# Patient Record
Sex: FEMALE | Race: BLACK OR AFRICAN AMERICAN | NOT HISPANIC OR LATINO | Employment: OTHER | RURAL
[De-identification: names, ages, dates, MRNs, and addresses within clinical notes are randomized per-mention and may not be internally consistent; named-entity substitution may affect disease eponyms.]

---

## 2017-09-19 ENCOUNTER — HISTORICAL (OUTPATIENT)
Dept: ADMINISTRATIVE | Facility: HOSPITAL | Age: 52
End: 2017-09-19

## 2017-09-20 LAB
LAB AP CLINICAL INFORMATION: NORMAL
LAB AP DIAGNOSIS - HISTORICAL: NORMAL
LAB AP GROSS PATHOLOGY - HISTORICAL: NORMAL
LAB AP SPECIMEN SUBMITTED - HISTORICAL: NORMAL

## 2021-01-20 ENCOUNTER — HISTORICAL (OUTPATIENT)
Dept: ADMINISTRATIVE | Facility: HOSPITAL | Age: 56
End: 2021-01-20

## 2021-01-20 LAB
ALBUMIN SERPL BCP-MCNC: 3.7 G/DL (ref 3.4–5)
ALBUMIN/GLOB SERPL: 1.3 {RATIO}
ALP SERPL-CCNC: 72 U/L (ref 46–116)
ALT SERPL W P-5'-P-CCNC: 19 U/L (ref 14–63)
ANION GAP SERPL CALCULATED.3IONS-SCNC: 14 MMOL/L
AST SERPL W P-5'-P-CCNC: 14 U/L (ref 15–37)
BASOPHILS # BLD AUTO: 0.02 X10E3/UL (ref 0–0.2)
BASOPHILS NFR BLD AUTO: 0.2 % (ref 0–1)
BILIRUB SERPL-MCNC: 0.3 MG/DL (ref 0.2–1)
BUN SERPL-MCNC: 19 MG/DL (ref 7–17)
BUN/CREAT SERPL: 21.8
CALCIUM SERPL-MCNC: 9.1 MG/DL (ref 8.5–10.1)
CHLORIDE SERPL-SCNC: 107 MMOL/L (ref 98–107)
CO2 SERPL-SCNC: 27 MMOL/L (ref 21–32)
CREAT SERPL-MCNC: 0.87 MG/DL (ref 0.55–1.3)
EOSINOPHIL # BLD AUTO: 0.19 X10E3/UL (ref 0–0.5)
EOSINOPHIL NFR BLD AUTO: 2.1 % (ref 1–4)
ERYTHROCYTE [DISTWIDTH] IN BLOOD BY AUTOMATED COUNT: 18.7 % (ref 11.5–14.5)
GLOBULIN SER-MCNC: 2.8 G/DL
GLUCOSE SERPL-MCNC: 130 MG/DL (ref 74–106)
HCT VFR BLD AUTO: 40 % (ref 38–47)
HGB BLD-MCNC: 12 G/DL (ref 12–16)
LYMPHOCYTES # BLD AUTO: 1.87 X10E3/UL (ref 1–4.8)
LYMPHOCYTES NFR BLD AUTO: 20.8 % (ref 27–41)
MCH RBC QN AUTO: 25.2 PG (ref 27–31)
MCHC RBC AUTO-ENTMCNC: 30 G/DL (ref 32–36)
MCV RBC AUTO: 84 FL (ref 80–96)
MONOCYTES # BLD AUTO: 0.33 X10E3/UL (ref 0–0.8)
MONOCYTES NFR BLD AUTO: 3.7 % (ref 2–6)
MPC BLD CALC-MCNC: 12.4 FL (ref 9.4–12.4)
NEUTROPHILS # BLD AUTO: 6.56 X10E3/UL (ref 1.8–7.7)
NEUTROPHILS NFR BLD AUTO: 73.2 % (ref 53–65)
PLATELET # BLD AUTO: 172 X10E3/UL (ref 150–400)
POTASSIUM SERPL-SCNC: 3.9 MMOL/L (ref 3.5–5.1)
PROT SERPL-MCNC: 6.5 G/DL (ref 6.4–8.2)
RBC # BLD AUTO: 4.76 X10E6/UL (ref 4.2–5.4)
SODIUM SERPL-SCNC: 144 MMOL/L (ref 136–145)
WBC # BLD AUTO: 8.97 X10E3/UL (ref 4.5–11)

## 2021-01-21 LAB
CHOLEST SERPL-MCNC: 158 MG/DL
CHOLEST/HDLC SERPL: 5.3 {RATIO}
HDLC SERPL-MCNC: 30 MG/DL
LDLC SERPL CALC-MCNC: 62 MG/DL
TRIGL SERPL-MCNC: 332 MG/DL
TSH SERPL DL<=0.005 MIU/L-ACNC: 5.41 UIU/ML (ref 0.36–3.74)

## 2021-01-22 ENCOUNTER — HISTORICAL (OUTPATIENT)
Dept: ADMINISTRATIVE | Facility: HOSPITAL | Age: 56
End: 2021-01-22

## 2021-01-22 LAB
BACTERIA #/AREA URNS HPF: ABNORMAL /HPF
BILIRUB UR QL STRIP: NEGATIVE MG/DL
CLARITY UR: CLEAR
COLOR UR: YELLOW
GLUCOSE UR STRIP-MCNC: NEGATIVE MG/DL
KETONES UR STRIP-SCNC: NEGATIVE MG/DL
LEUKOCYTE ESTERASE UR QL STRIP: NEGATIVE LEU/UL
NITRITE UR QL STRIP: NEGATIVE
PH UR STRIP: 6 PH UNITS (ref 5–8)
PROT UR QL STRIP: NEGATIVE MG/DL
RBC # UR STRIP: NEGATIVE ERY/UL
RBC #/AREA URNS HPF: ABNORMAL /HPF (ref 0–3)
SP GR UR STRIP: 1.02 (ref 1–1.03)
SQUAMOUS #/AREA URNS LPF: ABNORMAL /LPF
UROBILINOGEN UR STRIP-ACNC: 0.2 EU/DL
WBC #/AREA URNS HPF: ABNORMAL /HPF (ref 0–5)

## 2021-01-24 LAB
REPORT: NORMAL

## 2021-03-08 ENCOUNTER — HISTORICAL (OUTPATIENT)
Dept: ADMINISTRATIVE | Facility: HOSPITAL | Age: 56
End: 2021-03-08

## 2022-08-25 ENCOUNTER — HOSPITAL ENCOUNTER (EMERGENCY)
Facility: HOSPITAL | Age: 57
Discharge: HOME | End: 2022-08-25
Payer: MEDICARE

## 2022-08-25 VITALS
BODY MASS INDEX: 26.66 KG/M2 | RESPIRATION RATE: 18 BRPM | SYSTOLIC BLOOD PRESSURE: 128 MMHG | OXYGEN SATURATION: 97 % | HEART RATE: 62 BPM | DIASTOLIC BLOOD PRESSURE: 71 MMHG | WEIGHT: 160 LBS | HEIGHT: 65 IN | TEMPERATURE: 98 F

## 2022-08-25 DIAGNOSIS — K59.00 CONSTIPATION, UNSPECIFIED CONSTIPATION TYPE: Primary | ICD-10-CM

## 2022-08-25 DIAGNOSIS — R10.30 LOWER ABDOMINAL PAIN: ICD-10-CM

## 2022-08-25 LAB
ALBUMIN SERPL BCP-MCNC: 3.8 G/DL (ref 3.5–5)
ALBUMIN/GLOB SERPL: 1 {RATIO}
ALP SERPL-CCNC: 71 U/L (ref 46–118)
ALT SERPL W P-5'-P-CCNC: 19 U/L (ref 13–56)
ANION GAP SERPL CALCULATED.3IONS-SCNC: 13 MMOL/L (ref 7–16)
AST SERPL W P-5'-P-CCNC: 13 U/L (ref 15–37)
BASOPHILS # BLD AUTO: 0.03 K/UL (ref 0–0.2)
BASOPHILS NFR BLD AUTO: 0.4 % (ref 0–1)
BILIRUB SERPL-MCNC: 0.3 MG/DL (ref 0–1.2)
BILIRUB UR QL STRIP: NEGATIVE
BUN SERPL-MCNC: 19 MG/DL (ref 7–18)
BUN/CREAT SERPL: 24 (ref 6–20)
CALCIUM SERPL-MCNC: 9.2 MG/DL (ref 8.5–10.1)
CHLORIDE SERPL-SCNC: 107 MMOL/L (ref 98–107)
CLARITY UR: CLEAR
CO2 SERPL-SCNC: 30 MMOL/L (ref 21–32)
COLOR UR: YELLOW
CREAT SERPL-MCNC: 0.78 MG/DL (ref 0.55–1.02)
DIFFERENTIAL METHOD BLD: ABNORMAL
EGFR (NO RACE VARIABLE) (RUSH/TITUS): 88 ML/MIN/1.73M²
EOSINOPHIL # BLD AUTO: 0.2 K/UL (ref 0–0.5)
EOSINOPHIL NFR BLD AUTO: 3 % (ref 1–4)
ERYTHROCYTE [DISTWIDTH] IN BLOOD BY AUTOMATED COUNT: 18.5 % (ref 11.5–14.5)
GLOBULIN SER-MCNC: 3.9 G/DL (ref 2–4)
GLUCOSE SERPL-MCNC: 93 MG/DL (ref 74–106)
GLUCOSE UR STRIP-MCNC: NEGATIVE MG/DL
HCT VFR BLD AUTO: 42.1 % (ref 38–47)
HGB BLD-MCNC: 13.2 G/DL (ref 12–16)
KETONES UR STRIP-SCNC: NEGATIVE MG/DL
LACTATE SERPL-SCNC: 1.5 MMOL/L (ref 0.4–2)
LEUKOCYTE ESTERASE UR QL STRIP: NEGATIVE
LIPASE SERPL-CCNC: 154 U/L (ref 73–393)
LYMPHOCYTES # BLD AUTO: 2.61 K/UL (ref 1–4.8)
LYMPHOCYTES NFR BLD AUTO: 38.6 % (ref 27–41)
MCH RBC QN AUTO: 24.7 PG (ref 27–31)
MCHC RBC AUTO-ENTMCNC: 31.4 G/DL (ref 32–36)
MCV RBC AUTO: 78.7 FL (ref 80–96)
MONOCYTES # BLD AUTO: 0.56 K/UL (ref 0–0.8)
MONOCYTES NFR BLD AUTO: 8.3 % (ref 2–6)
MPC BLD CALC-MCNC: 11.7 FL (ref 9.4–12.4)
NEUTROPHILS # BLD AUTO: 3.37 K/UL (ref 1.8–7.7)
NEUTROPHILS NFR BLD AUTO: 49.7 % (ref 53–65)
NITRITE UR QL STRIP: NEGATIVE
PH UR STRIP: 6 PH UNITS
PLATELET # BLD AUTO: 162 K/UL (ref 150–400)
POTASSIUM SERPL-SCNC: 3.9 MMOL/L (ref 3.5–5.1)
PROT SERPL-MCNC: 7.7 G/DL (ref 6.4–8.2)
PROT UR QL STRIP: NEGATIVE
RBC # BLD AUTO: 5.35 M/UL (ref 4.2–5.4)
RBC # UR STRIP: NEGATIVE /UL
SODIUM SERPL-SCNC: 146 MMOL/L (ref 136–145)
SP GR UR STRIP: 1.02
UROBILINOGEN UR STRIP-ACNC: 0.2 MG/DL
WBC # BLD AUTO: 6.77 K/UL (ref 4.5–11)

## 2022-08-25 PROCEDURE — 83605 ASSAY OF LACTIC ACID: CPT

## 2022-08-25 PROCEDURE — 81003 URINALYSIS AUTO W/O SCOPE: CPT

## 2022-08-25 PROCEDURE — 80053 COMPREHEN METABOLIC PANEL: CPT

## 2022-08-25 PROCEDURE — 83690 ASSAY OF LIPASE: CPT

## 2022-08-25 PROCEDURE — 85025 COMPLETE CBC W/AUTO DIFF WBC: CPT

## 2022-08-25 PROCEDURE — 99284 EMERGENCY DEPT VISIT MOD MDM: CPT | Mod: 25

## 2022-08-25 PROCEDURE — 36415 COLL VENOUS BLD VENIPUNCTURE: CPT

## 2022-08-25 PROCEDURE — 99284 EMERGENCY DEPT VISIT MOD MDM: CPT | Mod: GF

## 2022-08-25 NOTE — DISCHARGE INSTRUCTIONS
Over the counter Miralax as discussed. Follow-up with your primary care provider. Return to Emergency Department for any new or worsening symptoms.

## 2022-08-25 NOTE — ED TRIAGE NOTES
Pt presents to ed complaining of lower abdominal and lower back pain beginning about 2 weeks ago. Pt states LBM 4 days ago.

## 2022-08-25 NOTE — ED PROVIDER NOTES
Encounter Date: 8/25/2022       History     Chief Complaint   Patient presents with    Abdominal Pain    Back Pain     56 yo female presents to ED with c/o intermittent, lower abdominal and lower back pain x 2 weeks. Reports last BM was 4 days ago and was loose without blood. She denies any N/V or fever. She is tolerating PO intake w/o difficulty. She has been seen by PCP for this complaint and was instructed to come to ED today for further evaluation. Patient is bedridden s/p back surgery 6 years ago with chronic pain on Norco.     The history is provided by the patient and a relative.     Review of patient's allergies indicates:   Allergen Reactions    Penicillins Anaphylaxis     Past Medical History:   Diagnosis Date    GERD (gastroesophageal reflux disease)     Paraplegia      History reviewed. No pertinent surgical history.  History reviewed. No pertinent family history.  Social History     Tobacco Use    Smoking status: Never Smoker    Smokeless tobacco: Never Used   Substance Use Topics    Alcohol use: Never    Drug use: Never     Review of Systems   Constitutional: Negative for appetite change and fever.   HENT: Negative for sore throat.    Respiratory: Negative for shortness of breath.    Cardiovascular: Negative for chest pain.   Gastrointestinal: Positive for abdominal pain and constipation. Negative for diarrhea, nausea and vomiting.   Genitourinary: Negative for dysuria.   Musculoskeletal: Positive for back pain.   Skin: Negative for rash.   Neurological: Negative for weakness.   Hematological: Does not bruise/bleed easily.       Physical Exam     Initial Vitals [08/25/22 1057]   BP Pulse Resp Temp SpO2   139/77 66 18 98.2 °F (36.8 °C) 98 %      MAP       --         Physical Exam    Vitals reviewed.  Constitutional: She is not diaphoretic. No distress.   HENT:   Head: Normocephalic and atraumatic.   Mouth/Throat: Oropharynx is clear and moist.   Eyes: Conjunctivae are normal. Pupils are equal,  round, and reactive to light.   Neck: Neck supple.   Normal range of motion.  Cardiovascular: Normal rate, regular rhythm, normal heart sounds and intact distal pulses.   Pulmonary/Chest: Breath sounds normal. No respiratory distress.   Abdominal: Abdomen is soft. Bowel sounds are normal. There is abdominal tenderness (lower abdomen).   Musculoskeletal:         General: No tenderness or edema.      Cervical back: Normal range of motion and neck supple.      Comments: Upper and lower extremity contractures noted.      Neurological: She is alert and oriented to person, place, and time.   Skin: Skin is warm and dry.   Psychiatric: She has a normal mood and affect.         Medical Screening Exam   See Full Note    ED Course   Procedures  Labs Reviewed   COMPREHENSIVE METABOLIC PANEL - Abnormal; Notable for the following components:       Result Value    Sodium 146 (*)     BUN 19 (*)     BUN/Creatinine Ratio 24 (*)     AST 13 (*)     All other components within normal limits   CBC WITH DIFFERENTIAL - Abnormal; Notable for the following components:    MCV 78.7 (*)     MCH 24.7 (*)     MCHC 31.4 (*)     RDW 18.5 (*)     Neutrophils % 49.7 (*)     Monocytes % 8.3 (*)     All other components within normal limits   LIPASE - Normal   LACTIC ACID, PLASMA - Normal   CBC W/ AUTO DIFFERENTIAL    Narrative:     The following orders were created for panel order CBC auto differential.  Procedure                               Abnormality         Status                     ---------                               -----------         ------                     CBC with Differential[[id number]]        Abnormal            Final result                 Please view results for these tests on the individual orders.   URINALYSIS, REFLEX TO URINE CULTURE          Imaging Results          CT Abdomen Pelvis  Without Contrast (Final result)  Result time 08/25/22 12:50:53    Final result by Mark Parra MD (08/25/22 12:50:53)                  Impression:      Slight stranding in the mesentery nonspecific similar to prior.  May represent sequela of infectious/inflammatory process.    Mild degree of colonic stool.    Nonobstructing right renal stones similar to prior.    Chronic right femoral neck fracture.      Electronically signed by: Mark Parra  Date:    08/25/2022  Time:    12:50             Narrative:    EXAMINATION:  CT ABDOMEN PELVIS WITHOUT CONTRAST    CLINICAL HISTORY:  Abdominal pain, acute, Pt presents to ed complaining of lower abdominal and lower back pain beginning about 2 weeks ago. Pt states LBM 4 days ago.    TECHNIQUE:  Low dose axial images, sagittal and coronal reformations were obtained from the lung bases to the pubic symphysis.  Oral contrast was not administered.    COMPARISON:  03/08/2021 abdominal and pelvic CT.    FINDINGS:  Lung bases are clear.    Liver and gallbladder unremarkable.    Adrenals, pancreas, and spleen are unremarkable.  There are nonobstructing stones seen of both kidneys.  There is no hydronephrosis or hydroureter of either kidney.    Urinary bladder is unremarkable.  No adnexal lesion.    There is a mild degree of colonic stool.  Dilated sigmoid colon with air in stool is similar to prior study.  No bowel obstruction identified.    No pneumoperitoneum.  No ascites.  No adenopathy.  There is slight stranding in the mesentery of the upper abdomen similar to prior exam.    Chronic right hip fracture.  Degenerative changes.  No acute fracture detected.                                 Medications - No data to display  Medical Decision Making:   ED Management:  CT reveals mild constipation w/o obstruction. Patient afebrile in no distress; tolerating PO intake. Labs unremarkable. I discussed lab/radiology findings with Dr. Brar who recommends Miralax and PCP f/u. I discussed home care, PCP f/u, and strict return to ED precautions. Patient and her mother at bedside voiced understanding.                    Clinical  Impression:   Final diagnoses:  [K59.00] Constipation, unspecified constipation type (Primary)  [R10.30] Lower abdominal pain          ED Disposition Condition    Discharge Stable        ED Prescriptions     None        Follow-up Information     Follow up With Specialties Details Why Contact Info    Gonzalo Barrera NP Gerontology, Family Medicine, Emergency Medicine Call today  [address] [facility] [address]  [phone]             MARYA Parra  08/25/22 6511

## 2022-08-26 ENCOUNTER — TELEPHONE (OUTPATIENT)
Dept: EMERGENCY MEDICINE | Facility: HOSPITAL | Age: 57
End: 2022-08-26
Payer: MEDICAID

## 2023-01-06 ENCOUNTER — LAB REQUISITION (OUTPATIENT)
Dept: LAB | Facility: HOSPITAL | Age: 58
End: 2023-01-06
Payer: MEDICARE

## 2023-01-06 DIAGNOSIS — N39.0 URINARY TRACT INFECTION, SITE NOT SPECIFIED: ICD-10-CM

## 2023-01-06 DIAGNOSIS — G90.09 OTHER IDIOPATHIC PERIPHERAL AUTONOMIC NEUROPATHY: ICD-10-CM

## 2023-01-06 LAB
ALBUMIN SERPL BCP-MCNC: 3.3 G/DL (ref 3.5–5)
ALBUMIN/GLOB SERPL: 1 {RATIO}
ALP SERPL-CCNC: 61 U/L (ref 46–118)
ALT SERPL W P-5'-P-CCNC: 27 U/L (ref 13–56)
ANION GAP SERPL CALCULATED.3IONS-SCNC: 12 MMOL/L (ref 7–16)
AST SERPL W P-5'-P-CCNC: 20 U/L (ref 15–37)
BASOPHILS # BLD AUTO: 0.01 K/UL (ref 0–0.2)
BASOPHILS NFR BLD AUTO: 0.2 % (ref 0–1)
BILIRUB SERPL-MCNC: 0.3 MG/DL (ref ?–1.2)
BUN SERPL-MCNC: 18 MG/DL (ref 7–18)
BUN/CREAT SERPL: 20 (ref 6–20)
CALCIUM SERPL-MCNC: 8.3 MG/DL (ref 8.5–10.1)
CHLORIDE SERPL-SCNC: 108 MMOL/L (ref 98–107)
CO2 SERPL-SCNC: 28 MMOL/L (ref 21–32)
CREAT SERPL-MCNC: 0.88 MG/DL (ref 0.55–1.02)
DIFFERENTIAL METHOD BLD: ABNORMAL
EGFR (NO RACE VARIABLE) (RUSH/TITUS): 76 ML/MIN/1.73M²
EOSINOPHIL # BLD AUTO: 0.04 K/UL (ref 0–0.5)
EOSINOPHIL NFR BLD AUTO: 0.8 % (ref 1–4)
ERYTHROCYTE [DISTWIDTH] IN BLOOD BY AUTOMATED COUNT: 17.7 % (ref 11.5–14.5)
GLOBULIN SER-MCNC: 3.2 G/DL (ref 2–4)
GLUCOSE SERPL-MCNC: 78 MG/DL (ref 74–106)
HCT VFR BLD AUTO: 37.3 % (ref 38–47)
HGB BLD-MCNC: 11.5 G/DL (ref 12–16)
LYMPHOCYTES # BLD AUTO: 1.78 K/UL (ref 1–4.8)
LYMPHOCYTES NFR BLD AUTO: 34.1 % (ref 27–41)
MCH RBC QN AUTO: 24.6 PG (ref 27–31)
MCHC RBC AUTO-ENTMCNC: 30.8 G/DL (ref 32–36)
MCV RBC AUTO: 79.9 FL (ref 80–96)
MONOCYTES # BLD AUTO: 0.49 K/UL (ref 0–0.8)
MONOCYTES NFR BLD AUTO: 9.4 % (ref 2–6)
MPC BLD CALC-MCNC: 12 FL (ref 9.4–12.4)
NEUTROPHILS # BLD AUTO: 2.9 K/UL (ref 1.8–7.7)
NEUTROPHILS NFR BLD AUTO: 55.5 % (ref 53–65)
PLATELET # BLD AUTO: 155 K/UL (ref 150–400)
POTASSIUM SERPL-SCNC: 4 MMOL/L (ref 3.5–5.1)
PROT SERPL-MCNC: 6.5 G/DL (ref 6.4–8.2)
RBC # BLD AUTO: 4.67 M/UL (ref 4.2–5.4)
SODIUM SERPL-SCNC: 144 MMOL/L (ref 136–145)
WBC # BLD AUTO: 5.22 K/UL (ref 4.5–11)

## 2023-01-06 PROCEDURE — 85025 COMPLETE CBC W/AUTO DIFF WBC: CPT | Performed by: NURSE PRACTITIONER

## 2023-01-06 PROCEDURE — 82306 VITAMIN D 25 HYDROXY: CPT | Performed by: NURSE PRACTITIONER

## 2023-01-06 PROCEDURE — 83036 HEMOGLOBIN GLYCOSYLATED A1C: CPT | Performed by: NURSE PRACTITIONER

## 2023-01-06 PROCEDURE — 82607 VITAMIN B-12: CPT | Performed by: NURSE PRACTITIONER

## 2023-01-06 PROCEDURE — 84443 ASSAY THYROID STIM HORMONE: CPT | Performed by: NURSE PRACTITIONER

## 2023-01-06 PROCEDURE — 80053 COMPREHEN METABOLIC PANEL: CPT | Performed by: NURSE PRACTITIONER

## 2023-01-07 LAB
25(OH)D3 SERPL-MCNC: 13.4 NG/ML
EST. AVERAGE GLUCOSE BLD GHB EST-MCNC: 110 MG/DL
HBA1C MFR BLD HPLC: 5.9 % (ref 4.5–6.6)
TSH SERPL DL<=0.005 MIU/L-ACNC: 1.37 UIU/ML (ref 0.36–3.74)
VIT B12 SERPL-MCNC: 141 PG/ML (ref 193–986)

## 2024-08-08 ENCOUNTER — LAB REQUISITION (OUTPATIENT)
Dept: LAB | Facility: HOSPITAL | Age: 59
End: 2024-08-08
Attending: NURSE PRACTITIONER
Payer: MEDICARE

## 2024-08-08 DIAGNOSIS — E78.5 HYPERLIPIDEMIA, UNSPECIFIED: ICD-10-CM

## 2024-08-08 DIAGNOSIS — D50.9 IRON DEFICIENCY ANEMIA, UNSPECIFIED: ICD-10-CM

## 2024-08-08 LAB
25(OH)D3 SERPL-MCNC: 28.5 NG/ML
ALBUMIN SERPL BCP-MCNC: 3.6 G/DL (ref 3.5–5)
ALBUMIN/GLOB SERPL: 1.1 {RATIO}
ALP SERPL-CCNC: 81 U/L (ref 50–130)
ALT SERPL W P-5'-P-CCNC: 22 U/L (ref 13–56)
ANION GAP SERPL CALCULATED.3IONS-SCNC: 12 MMOL/L (ref 7–16)
AST SERPL W P-5'-P-CCNC: 15 U/L (ref 15–37)
BASOPHILS # BLD AUTO: 0.04 K/UL (ref 0–0.2)
BASOPHILS NFR BLD AUTO: 0.5 % (ref 0–1)
BILIRUB SERPL-MCNC: 0.3 MG/DL (ref ?–1.2)
BUN SERPL-MCNC: 21 MG/DL (ref 7–18)
BUN/CREAT SERPL: 27 (ref 6–20)
CALCIUM SERPL-MCNC: 8.5 MG/DL (ref 8.5–10.1)
CHLORIDE SERPL-SCNC: 108 MMOL/L (ref 98–107)
CHOLEST SERPL-MCNC: 148 MG/DL (ref 0–200)
CHOLEST/HDLC SERPL: 4.1 {RATIO}
CO2 SERPL-SCNC: 30 MMOL/L (ref 21–32)
CREAT SERPL-MCNC: 0.77 MG/DL (ref 0.55–1.02)
DIFFERENTIAL METHOD BLD: ABNORMAL
EGFR (NO RACE VARIABLE) (RUSH/TITUS): 88 ML/MIN/1.73M2
EOSINOPHIL # BLD AUTO: 0.19 K/UL (ref 0–0.5)
EOSINOPHIL NFR BLD AUTO: 2.4 % (ref 1–4)
ERYTHROCYTE [DISTWIDTH] IN BLOOD BY AUTOMATED COUNT: 17.6 % (ref 11.5–14.5)
GLOBULIN SER-MCNC: 3.2 G/DL (ref 2–4)
GLUCOSE SERPL-MCNC: 114 MG/DL (ref 74–106)
HCT VFR BLD AUTO: 43.2 % (ref 38–47)
HDLC SERPL-MCNC: 36 MG/DL (ref 40–60)
HGB BLD-MCNC: 12.5 G/DL (ref 12–16)
IMM GRANULOCYTES # BLD AUTO: 0.02 K/UL (ref 0–0.04)
IMM GRANULOCYTES NFR BLD: 0.3 % (ref 0–0.4)
LDLC SERPL CALC-MCNC: 68 MG/DL
LDLC/HDLC SERPL: 1.9 {RATIO}
LYMPHOCYTES # BLD AUTO: 2.42 K/UL (ref 1–4.8)
LYMPHOCYTES NFR BLD AUTO: 30.9 % (ref 27–41)
MAGNESIUM SERPL-MCNC: 2 MG/DL (ref 1.7–2.3)
MCH RBC QN AUTO: 23.7 PG (ref 27–31)
MCHC RBC AUTO-ENTMCNC: 28.9 G/DL (ref 32–36)
MCV RBC AUTO: 82 FL (ref 80–96)
MONOCYTES # BLD AUTO: 0.59 K/UL (ref 0–0.8)
MONOCYTES NFR BLD AUTO: 7.5 % (ref 2–6)
MPC BLD CALC-MCNC: 11.8 FL (ref 9.4–12.4)
NEUTROPHILS # BLD AUTO: 4.58 K/UL (ref 1.8–7.7)
NEUTROPHILS NFR BLD AUTO: 58.4 % (ref 53–65)
NONHDLC SERPL-MCNC: 112 MG/DL
NRBC # BLD AUTO: 0 X10E3/UL
NRBC, AUTO (.00): 0 %
PLATELET # BLD AUTO: 175 K/UL (ref 150–400)
POTASSIUM SERPL-SCNC: 4.4 MMOL/L (ref 3.5–5.1)
PROT SERPL-MCNC: 6.8 G/DL (ref 6.4–8.2)
RBC # BLD AUTO: 5.27 M/UL (ref 4.2–5.4)
SODIUM SERPL-SCNC: 146 MMOL/L (ref 136–145)
TRIGL SERPL-MCNC: 220 MG/DL (ref 35–150)
TSH SERPL DL<=0.005 MIU/L-ACNC: 2.22 UIU/ML (ref 0.36–3.74)
VIT B12 SERPL-MCNC: 198 PG/ML (ref 193–986)
VLDLC SERPL-MCNC: 44 MG/DL
WBC # BLD AUTO: 7.84 K/UL (ref 4.5–11)

## 2024-08-08 PROCEDURE — 85025 COMPLETE CBC W/AUTO DIFF WBC: CPT | Performed by: NURSE PRACTITIONER

## 2024-08-08 PROCEDURE — 84443 ASSAY THYROID STIM HORMONE: CPT | Performed by: NURSE PRACTITIONER

## 2024-08-08 PROCEDURE — 82306 VITAMIN D 25 HYDROXY: CPT | Performed by: NURSE PRACTITIONER

## 2024-08-08 PROCEDURE — 80053 COMPREHEN METABOLIC PANEL: CPT | Performed by: NURSE PRACTITIONER

## 2024-08-08 PROCEDURE — 82607 VITAMIN B-12: CPT | Performed by: NURSE PRACTITIONER

## 2024-08-08 PROCEDURE — 80061 LIPID PANEL: CPT | Performed by: NURSE PRACTITIONER

## 2024-08-08 PROCEDURE — 83735 ASSAY OF MAGNESIUM: CPT | Performed by: NURSE PRACTITIONER

## 2024-08-12 ENCOUNTER — LAB REQUISITION (OUTPATIENT)
Dept: LAB | Facility: HOSPITAL | Age: 59
End: 2024-08-12
Payer: MEDICARE

## 2024-08-12 DIAGNOSIS — R30.0 DYSURIA: ICD-10-CM

## 2024-08-12 LAB
BACTERIA #/AREA URNS HPF: ABNORMAL /HPF
BILIRUB UR QL STRIP: NEGATIVE
CLARITY UR: ABNORMAL
COLOR UR: YELLOW
GLUCOSE UR STRIP-MCNC: NEGATIVE MG/DL
KETONES UR STRIP-SCNC: NEGATIVE MG/DL
LEUKOCYTE ESTERASE UR QL STRIP: NEGATIVE
NITRITE UR QL STRIP: POSITIVE
PH UR STRIP: 5.5 PH UNITS
PROT UR QL STRIP: NEGATIVE
RBC # UR STRIP: ABNORMAL /UL
RBC #/AREA URNS HPF: ABNORMAL /HPF
SP GR UR STRIP: 1.01
SQUAMOUS #/AREA URNS LPF: ABNORMAL /LPF
UROBILINOGEN UR STRIP-ACNC: 0.2 MG/DL
WBC #/AREA URNS HPF: ABNORMAL /HPF

## 2024-08-12 PROCEDURE — 81003 URINALYSIS AUTO W/O SCOPE: CPT | Performed by: NURSE PRACTITIONER

## 2024-08-12 PROCEDURE — 81001 URINALYSIS AUTO W/SCOPE: CPT | Performed by: NURSE PRACTITIONER

## 2024-08-12 PROCEDURE — 87086 URINE CULTURE/COLONY COUNT: CPT | Performed by: NURSE PRACTITIONER

## 2024-08-15 LAB — UA COMPLETE W REFLEX CULTURE PNL UR: ABNORMAL

## 2025-02-06 DIAGNOSIS — R13.10 DYSPHAGIA: Primary | ICD-10-CM

## 2025-02-12 ENCOUNTER — HOSPITAL ENCOUNTER (INPATIENT)
Facility: HOSPITAL | Age: 60
LOS: 2 days | Discharge: OTHER INSTITUTION | DRG: 871 | End: 2025-02-14
Attending: FAMILY MEDICINE | Admitting: FAMILY MEDICINE
Payer: MEDICARE

## 2025-02-12 DIAGNOSIS — N30.01 ACUTE CYSTITIS WITH HEMATURIA: ICD-10-CM

## 2025-02-12 DIAGNOSIS — A41.9 SEPSIS: Primary | ICD-10-CM

## 2025-02-12 DIAGNOSIS — R50.9 FEVER OF UNKNOWN ORIGIN: ICD-10-CM

## 2025-02-12 DIAGNOSIS — R00.0 TACHYCARDIA: ICD-10-CM

## 2025-02-12 DIAGNOSIS — J18.9 COMMUNITY ACQUIRED PNEUMONIA OF LEFT LOWER LOBE OF LUNG: ICD-10-CM

## 2025-02-12 DIAGNOSIS — R09.02 HYPOXIA: ICD-10-CM

## 2025-02-12 DIAGNOSIS — R00.0 SINUS TACHYCARDIA: ICD-10-CM

## 2025-02-12 PROBLEM — K21.9 GERD (GASTROESOPHAGEAL REFLUX DISEASE): Status: ACTIVE | Noted: 2025-02-12

## 2025-02-12 PROBLEM — G82.50 QUADRIPLEGIA: Status: ACTIVE | Noted: 2025-02-12

## 2025-02-12 LAB
ALBUMIN SERPL BCP-MCNC: 3.1 G/DL (ref 3.4–4.8)
ALBUMIN/GLOB SERPL: 0.9 {RATIO}
ALP SERPL-CCNC: 60 U/L (ref 40–150)
ALT SERPL W P-5'-P-CCNC: 7 U/L
ANION GAP SERPL CALCULATED.3IONS-SCNC: 13 MMOL/L (ref 7–16)
ANISOCYTOSIS BLD QL SMEAR: ABNORMAL
AST SERPL W P-5'-P-CCNC: 13 U/L (ref 5–34)
BACTERIA #/AREA URNS HPF: ABNORMAL /HPF
BASOPHILS # BLD AUTO: 0.02 K/UL (ref 0–0.2)
BASOPHILS NFR BLD AUTO: 0.1 % (ref 0–1)
BILIRUB SERPL-MCNC: 0.3 MG/DL
BILIRUB UR QL STRIP: NEGATIVE
BUN SERPL-MCNC: 12 MG/DL (ref 10–20)
BUN/CREAT SERPL: 18 (ref 6–20)
CALCIUM SERPL-MCNC: 8.9 MG/DL (ref 8.4–10.2)
CHLORIDE SERPL-SCNC: 103 MMOL/L (ref 98–107)
CLARITY UR: ABNORMAL
CO2 SERPL-SCNC: 29 MMOL/L (ref 23–31)
COLOR UR: YELLOW
CREAT SERPL-MCNC: 0.67 MG/DL (ref 0.55–1.02)
DIFFERENTIAL METHOD BLD: ABNORMAL
DOHLE BOD BLD QL SMEAR: ABNORMAL
EGFR (NO RACE VARIABLE) (RUSH/TITUS): 100 ML/MIN/1.73M2
EOSINOPHIL # BLD AUTO: 0 K/UL (ref 0–0.5)
EOSINOPHIL NFR BLD AUTO: 0 % (ref 1–4)
ERYTHROCYTE [DISTWIDTH] IN BLOOD BY AUTOMATED COUNT: 17.8 % (ref 11.5–14.5)
GLOBULIN SER-MCNC: 3.4 G/DL (ref 2–4)
GLUCOSE SERPL-MCNC: 117 MG/DL (ref 82–115)
GLUCOSE UR STRIP-MCNC: NEGATIVE MG/DL
HCT VFR BLD AUTO: 35.8 % (ref 38–47)
HGB BLD-MCNC: 10.4 G/DL (ref 12–16)
HYPOCHROMIA BLD QL SMEAR: ABNORMAL
IMM GRANULOCYTES # BLD AUTO: 0.07 K/UL (ref 0–0.04)
IMM GRANULOCYTES NFR BLD: 0.5 % (ref 0–0.4)
INFLUENZA A MOLECULAR (OHS): NEGATIVE
INFLUENZA B MOLECULAR (OHS): NEGATIVE
KETONES UR STRIP-SCNC: NEGATIVE MG/DL
LACTATE SERPL-SCNC: 2.1 MMOL/L (ref 0.5–2.2)
LACTATE SERPL-SCNC: 2.6 MMOL/L (ref 0.5–2.2)
LEUKOCYTE ESTERASE UR QL STRIP: ABNORMAL
LYMPHOCYTES # BLD AUTO: 1.14 K/UL (ref 1–4.8)
LYMPHOCYTES NFR BLD AUTO: 8 % (ref 27–41)
MAGNESIUM SERPL-MCNC: 1.9 MG/DL (ref 1.6–2.6)
MCH RBC QN AUTO: 24.5 PG (ref 27–31)
MCHC RBC AUTO-ENTMCNC: 29.1 G/DL (ref 32–36)
MCV RBC AUTO: 84.2 FL (ref 80–96)
MONOCYTES # BLD AUTO: 0.93 K/UL (ref 0–0.8)
MONOCYTES NFR BLD AUTO: 6.5 % (ref 2–6)
MPC BLD CALC-MCNC: 11.4 FL (ref 9.4–12.4)
NEUTROPHILS # BLD AUTO: 12.13 K/UL (ref 1.8–7.7)
NEUTROPHILS NFR BLD AUTO: 84.9 % (ref 53–65)
NITRITE UR QL STRIP: POSITIVE
NRBC # BLD AUTO: 0 X10E3/UL
NRBC, AUTO (.00): 0 %
NT-PROBNP SERPL-MCNC: 163 PG/ML (ref 1–125)
PH UR STRIP: 6 PH UNITS
PLATELET # BLD AUTO: 143 K/UL (ref 150–400)
PLATELET MORPHOLOGY: ABNORMAL
POTASSIUM SERPL-SCNC: 3.7 MMOL/L (ref 3.5–5.1)
PROT SERPL-MCNC: 6.5 G/DL (ref 5.8–7.6)
PROT UR QL STRIP: ABNORMAL
RBC # BLD AUTO: 4.25 M/UL (ref 4.2–5.4)
RBC # UR STRIP: ABNORMAL /UL
RBC #/AREA URNS HPF: ABNORMAL /HPF
SARS-COV-2 RDRP RESP QL NAA+PROBE: NEGATIVE
SODIUM SERPL-SCNC: 141 MMOL/L (ref 136–145)
SP GR UR STRIP: 1.02
SQUAMOUS #/AREA URNS LPF: ABNORMAL /LPF
UROBILINOGEN UR STRIP-ACNC: 0.2 MG/DL
WBC # BLD AUTO: 14.29 K/UL (ref 4.5–11)
WBC #/AREA URNS HPF: ABNORMAL /HPF

## 2025-02-12 PROCEDURE — 87040 BLOOD CULTURE FOR BACTERIA: CPT | Performed by: NURSE PRACTITIONER

## 2025-02-12 PROCEDURE — 11000001 HC ACUTE MED/SURG PRIVATE ROOM

## 2025-02-12 PROCEDURE — 94761 N-INVAS EAR/PLS OXIMETRY MLT: CPT

## 2025-02-12 PROCEDURE — 87502 INFLUENZA DNA AMP PROBE: CPT | Performed by: NURSE PRACTITIONER

## 2025-02-12 PROCEDURE — 36415 COLL VENOUS BLD VENIPUNCTURE: CPT | Performed by: NURSE PRACTITIONER

## 2025-02-12 PROCEDURE — 85025 COMPLETE CBC W/AUTO DIFF WBC: CPT | Performed by: NURSE PRACTITIONER

## 2025-02-12 PROCEDURE — 25000242 PHARM REV CODE 250 ALT 637 W/ HCPCS: Performed by: NURSE PRACTITIONER

## 2025-02-12 PROCEDURE — 93010 ELECTROCARDIOGRAM REPORT: CPT | Performed by: HOSPITALIST

## 2025-02-12 PROCEDURE — 80053 COMPREHEN METABOLIC PANEL: CPT | Performed by: NURSE PRACTITIONER

## 2025-02-12 PROCEDURE — 96361 HYDRATE IV INFUSION ADD-ON: CPT

## 2025-02-12 PROCEDURE — 27000944

## 2025-02-12 PROCEDURE — 25000003 PHARM REV CODE 250: Performed by: NURSE PRACTITIONER

## 2025-02-12 PROCEDURE — 87086 URINE CULTURE/COLONY COUNT: CPT | Performed by: NURSE PRACTITIONER

## 2025-02-12 PROCEDURE — 81003 URINALYSIS AUTO W/O SCOPE: CPT | Performed by: NURSE PRACTITIONER

## 2025-02-12 PROCEDURE — 27000221 HC OXYGEN, UP TO 24 HOURS

## 2025-02-12 PROCEDURE — 63600175 PHARM REV CODE 636 W HCPCS: Performed by: NURSE PRACTITIONER

## 2025-02-12 PROCEDURE — 87635 SARS-COV-2 COVID-19 AMP PRB: CPT | Performed by: NURSE PRACTITIONER

## 2025-02-12 PROCEDURE — 83880 ASSAY OF NATRIURETIC PEPTIDE: CPT | Performed by: NURSE PRACTITIONER

## 2025-02-12 PROCEDURE — 99285 EMERGENCY DEPT VISIT HI MDM: CPT | Mod: GF | Performed by: NURSE PRACTITIONER

## 2025-02-12 PROCEDURE — 83735 ASSAY OF MAGNESIUM: CPT | Performed by: NURSE PRACTITIONER

## 2025-02-12 PROCEDURE — 96365 THER/PROPH/DIAG IV INF INIT: CPT

## 2025-02-12 PROCEDURE — 99285 EMERGENCY DEPT VISIT HI MDM: CPT | Mod: 25

## 2025-02-12 PROCEDURE — 99900035 HC TECH TIME PER 15 MIN (STAT)

## 2025-02-12 PROCEDURE — 94640 AIRWAY INHALATION TREATMENT: CPT

## 2025-02-12 PROCEDURE — 94799 UNLISTED PULMONARY SVC/PX: CPT

## 2025-02-12 PROCEDURE — 83605 ASSAY OF LACTIC ACID: CPT | Performed by: NURSE PRACTITIONER

## 2025-02-12 PROCEDURE — 93005 ELECTROCARDIOGRAM TRACING: CPT

## 2025-02-12 RX ORDER — SODIUM CHLORIDE 9 MG/ML
1000 INJECTION, SOLUTION INTRAVENOUS
Status: COMPLETED | OUTPATIENT
Start: 2025-02-12 | End: 2025-02-12

## 2025-02-12 RX ORDER — SERTRALINE HYDROCHLORIDE 50 MG/1
50 TABLET, FILM COATED ORAL DAILY
Status: ON HOLD | COMMUNITY

## 2025-02-12 RX ORDER — CEFTRIAXONE 2 G/1
2 INJECTION, POWDER, FOR SOLUTION INTRAMUSCULAR; INTRAVENOUS
Status: DISCONTINUED | OUTPATIENT
Start: 2025-02-13 | End: 2025-02-14 | Stop reason: HOSPADM

## 2025-02-12 RX ORDER — IBUPROFEN 200 MG
600 TABLET ORAL
Status: COMPLETED | OUTPATIENT
Start: 2025-02-12 | End: 2025-02-12

## 2025-02-12 RX ORDER — CIPROFLOXACIN 2 MG/ML
400 INJECTION, SOLUTION INTRAVENOUS
Status: COMPLETED | OUTPATIENT
Start: 2025-02-12 | End: 2025-02-12

## 2025-02-12 RX ORDER — ATORVASTATIN CALCIUM 10 MG/1
20 TABLET, FILM COATED ORAL DAILY
Status: DISCONTINUED | OUTPATIENT
Start: 2025-02-13 | End: 2025-02-14 | Stop reason: HOSPADM

## 2025-02-12 RX ORDER — VIT C/E/ZN/COPPR/LUTEIN/ZEAXAN 250MG-90MG
50000 CAPSULE ORAL WEEKLY
Status: DISCONTINUED | OUTPATIENT
Start: 2025-02-12 | End: 2025-02-12

## 2025-02-12 RX ORDER — ATORVASTATIN CALCIUM 20 MG/1
20 TABLET, FILM COATED ORAL DAILY
Status: ON HOLD | COMMUNITY

## 2025-02-12 RX ORDER — SELENIUM 50 MCG
1 TABLET ORAL
Status: DISCONTINUED | OUTPATIENT
Start: 2025-02-12 | End: 2025-02-14 | Stop reason: HOSPADM

## 2025-02-12 RX ORDER — LINACLOTIDE 290 UG/1
290 CAPSULE, GELATIN COATED ORAL
Status: ON HOLD | COMMUNITY
Start: 2025-01-10

## 2025-02-12 RX ORDER — VIT C/E/ZN/COPPR/LUTEIN/ZEAXAN 250MG-90MG
50000 CAPSULE ORAL WEEKLY
Status: DISCONTINUED | OUTPATIENT
Start: 2025-02-17 | End: 2025-02-13

## 2025-02-12 RX ORDER — SODIUM CHLORIDE 9 MG/ML
INJECTION, SOLUTION INTRAVENOUS CONTINUOUS
Status: DISCONTINUED | OUTPATIENT
Start: 2025-02-12 | End: 2025-02-14

## 2025-02-12 RX ORDER — PREGABALIN 75 MG/1
75 CAPSULE ORAL ONCE
Status: COMPLETED | OUTPATIENT
Start: 2025-02-12 | End: 2025-02-12

## 2025-02-12 RX ORDER — ASPIRIN 325 MG
1250 TABLET, DELAYED RELEASE (ENTERIC COATED) ORAL
Status: ON HOLD | COMMUNITY

## 2025-02-12 RX ORDER — EZETIMIBE 10 MG/1
10 TABLET ORAL DAILY
COMMUNITY
Start: 2024-08-29 | End: 2025-02-12

## 2025-02-12 RX ORDER — PREGABALIN 75 MG/1
75 CAPSULE ORAL 2 TIMES DAILY
Status: ON HOLD | COMMUNITY

## 2025-02-12 RX ORDER — PANTOPRAZOLE SODIUM 40 MG/1
40 TABLET, DELAYED RELEASE ORAL DAILY
Status: DISCONTINUED | OUTPATIENT
Start: 2025-02-13 | End: 2025-02-14 | Stop reason: HOSPADM

## 2025-02-12 RX ORDER — ENOXAPARIN SODIUM 100 MG/ML
40 INJECTION SUBCUTANEOUS EVERY 24 HOURS
Status: DISCONTINUED | OUTPATIENT
Start: 2025-02-12 | End: 2025-02-14 | Stop reason: HOSPADM

## 2025-02-12 RX ORDER — OMEPRAZOLE 40 MG/1
40 CAPSULE, DELAYED RELEASE ORAL EVERY MORNING
Status: ON HOLD | COMMUNITY

## 2025-02-12 RX ORDER — ONDANSETRON HYDROCHLORIDE 2 MG/ML
4 INJECTION, SOLUTION INTRAVENOUS EVERY 8 HOURS PRN
Status: DISCONTINUED | OUTPATIENT
Start: 2025-02-12 | End: 2025-02-14 | Stop reason: HOSPADM

## 2025-02-12 RX ORDER — SERTRALINE HYDROCHLORIDE 50 MG/1
50 TABLET, FILM COATED ORAL DAILY
Status: DISCONTINUED | OUTPATIENT
Start: 2025-02-13 | End: 2025-02-14 | Stop reason: HOSPADM

## 2025-02-12 RX ORDER — ACETAMINOPHEN 325 MG/1
650 TABLET ORAL EVERY 4 HOURS PRN
Status: DISCONTINUED | OUTPATIENT
Start: 2025-02-12 | End: 2025-02-14 | Stop reason: HOSPADM

## 2025-02-12 RX ORDER — SODIUM CHLORIDE 0.9 % (FLUSH) 0.9 %
10 SYRINGE (ML) INJECTION EVERY 12 HOURS PRN
Status: DISCONTINUED | OUTPATIENT
Start: 2025-02-12 | End: 2025-02-14 | Stop reason: HOSPADM

## 2025-02-12 RX ORDER — ATORVASTATIN CALCIUM 40 MG/1
40 TABLET, FILM COATED ORAL NIGHTLY
Status: DISCONTINUED | OUTPATIENT
Start: 2025-02-12 | End: 2025-02-12

## 2025-02-12 RX ORDER — IPRATROPIUM BROMIDE AND ALBUTEROL SULFATE 2.5; .5 MG/3ML; MG/3ML
3 SOLUTION RESPIRATORY (INHALATION) EVERY 6 HOURS PRN
Status: DISCONTINUED | OUTPATIENT
Start: 2025-02-12 | End: 2025-02-14 | Stop reason: HOSPADM

## 2025-02-12 RX ORDER — TRAZODONE HYDROCHLORIDE 100 MG/1
100 TABLET ORAL NIGHTLY
Status: ON HOLD | COMMUNITY

## 2025-02-12 RX ADMIN — AZITHROMYCIN MONOHYDRATE 500 MG: 500 INJECTION, POWDER, LYOPHILIZED, FOR SOLUTION INTRAVENOUS at 06:02

## 2025-02-12 RX ADMIN — SODIUM CHLORIDE 1000 ML: 900 INJECTION, SOLUTION INTRAVENOUS at 02:02

## 2025-02-12 RX ADMIN — SODIUM CHLORIDE: 900 INJECTION, SOLUTION INTRAVENOUS at 06:02

## 2025-02-12 RX ADMIN — SODIUM CHLORIDE 1000 ML: 900 INJECTION, SOLUTION INTRAVENOUS at 04:02

## 2025-02-12 RX ADMIN — Medication 1 CAPSULE: at 06:02

## 2025-02-12 RX ADMIN — PREGABALIN 75 MG: 75 CAPSULE ORAL at 10:02

## 2025-02-12 RX ADMIN — ENOXAPARIN SODIUM 40 MG: 40 INJECTION SUBCUTANEOUS at 06:02

## 2025-02-12 RX ADMIN — IPRATROPIUM BROMIDE AND ALBUTEROL SULFATE 3 ML: 2.5; .5 SOLUTION RESPIRATORY (INHALATION) at 10:02

## 2025-02-12 RX ADMIN — IBUPROFEN 600 MG: 200 TABLET, FILM COATED ORAL at 04:02

## 2025-02-12 RX ADMIN — CIPROFLOXACIN 400 MG: 2 INJECTION, SOLUTION INTRAVENOUS at 03:02

## 2025-02-12 NOTE — Clinical Note
Diagnosis: Sepsis [700268]   Future Attending Provider: AMRISEL BURGOS IV [[id number]]   Reason for IP Medical Treatment  (Clinical interventions that can only be accomplished in the IP setting? ) :: need for IVFs and antibiotics   Plans for Post-Acute care--if anticipated (pick the single best option):: A. No post acute care anticipated at this time

## 2025-02-12 NOTE — SUBJECTIVE & OBJECTIVE
Past Medical History:   Diagnosis Date    GERD (gastroesophageal reflux disease)     Paraplegia        History reviewed. No pertinent surgical history.    Review of patient's allergies indicates:   Allergen Reactions    Penicillins Anaphylaxis       No current facility-administered medications on file prior to encounter.     Current Outpatient Medications on File Prior to Encounter   Medication Sig    atorvastatin (LIPITOR) 20 MG tablet Take 20 mg by mouth once daily.    cholecalciferol, vitamin D3, 1,250 mcg (50,000 unit) capsule Take 1,250 mcg by mouth every 7 days.    LINZESS 290 mcg Cap capsule Take 290 mcg by mouth before breakfast.    omeprazole (PRILOSEC) 40 MG capsule Take 40 mg by mouth every morning.    pregabalin (LYRICA) 75 MG capsule Take 75 mg by mouth 2 (two) times daily.    sertraline (ZOLOFT) 50 MG tablet Take 50 mg by mouth once daily.    traZODone (DESYREL) 100 MG tablet Take 100 mg by mouth every evening.    [DISCONTINUED] ezetimibe (ZETIA) 10 mg tablet Take 10 mg by mouth once daily.     Family History    None       Tobacco Use    Smoking status: Never    Smokeless tobacco: Never   Substance and Sexual Activity    Alcohol use: Never    Drug use: Never    Sexual activity: Not Currently     Review of Systems   Constitutional:  Negative for appetite change.        Reports fever starting this morning - mother reports temp up to 102   HENT:  Negative for dental problem, sinus pressure, sore throat and trouble swallowing.         Mother reports Mrs. Denney does not have any trouble swallowing- eats regular diet        Hard of hearing    Eyes:         Wears glasses   Respiratory:  Positive for cough and shortness of breath. Negative for chest tightness.         Reports having mucus for past couple of weeks worsening past few days    Cardiovascular:  Negative for palpitations.   Gastrointestinal:  Negative for abdominal distention, abdominal pain, constipation, diarrhea, nausea and vomiting.    Genitourinary:  Negative for difficulty urinating, dyspareunia, dysuria and flank pain.        Denies any odor to urine    Musculoskeletal:  Positive for arthralgias.        States has occasional pain to hands - states takes norco for pain    Neurological:  Negative for headaches.        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     Physical Exam  Constitutional:       Appearance: She is ill-appearing.   HENT:      Head: Normocephalic.   Eyes:      Pupils: Pupils are equal, round, and reactive to light.   Cardiovascular:      Rate and Rhythm: Tachycardia present.      Pulses: Normal pulses.      Heart sounds: Normal heart sounds. No murmur heard.     No friction rub. No gallop.   Pulmonary:      Comments: Decreased breath sound to RLL and LLL      Sat on 4 liters 95%  Abdominal:      General: Bowel sounds are normal. There is no distension.      Palpations: Abdomen is soft. There is no mass.      Tenderness: There is no abdominal tenderness.      Hernia: No hernia is present.   Musculoskeletal:      Comments: Contractures to hands     Abnormal muscle tone     Quadraplegic         Foot drop bilaterally   Neurological:      Mental Status: She is alert.      Comments: Quadraplegic               CRANIAL NERVES     CN III, IV, VI   Pupils are equal, round, and reactive to light.       Significant  Labs: All pertinent labs within the past 24 hours have been reviewed.  Recent Lab Results         02/12/25  1524   02/12/25  1448   02/12/25  1446   02/12/25  1414        Influenza A, Molecular       Negative       Influenza B, Molecular       Negative       Albumin/Globulin Ratio     0.9         Albumin     3.1         ALP     60         ALT     7         Anion Gap     13         Aniso   1+           Appearance, UA Cloudy             AST     13         Bacteria, UA Many             Baso #   0.02           Basophil %   0.1           Bilirubin (UA) Negative             BILIRUBIN TOTAL     0.3         BUN     12         BUN/CREAT RATIO     18         Calcium     8.9         Chloride     103         CO2     29         Color, UA Yellow             SARS COV-2 MOLECULAR       Negative       Creatinine     0.67         Differential Method   Scan Smear           Dohle Bodies     Comment: RARE           eGFR     100  Comment: Estimated GFR calculated using the CKD-EPI creatinine (2021) equation.         Eos #   0.00           Eos %   0.0           Globulin, Total     3.4         Glucose     117         Glucose, UA Negative             Hematocrit   35.8           Hemoglobin   10.4           Hypo   1+           Immature Grans (Abs)   0.07           Immature Granulocytes   0.5           Ketones, UA Negative             Lactic Acid Level   2.6  Comment: A repeat order for Lactic Acid has been placed for collection in 2 hours.           Leukocyte Esterase, UA Small             Lymph #   1.14           Lymph %   8.0           Magnesium      1.9         MCH   24.5           MCHC   29.1           MCV   84.2           Mono #   0.93           Mono %   6.5           MPV   11.4           Neutrophils, Abs   12.13           Neutrophils Relative   84.9           NITRITE UA Positive             nRBC   0.0           NT-proBNP     163         NUCLEATED RBC ABSOLUTE   0.00           Blood, UA Moderate             pH, UA 6.0              PLATELET MORPHOLOGY   Large & Giant Platelets           Platelet Count   143           Potassium     3.7         PROTEIN TOTAL     6.5         Protein, UA Trace             RBC   4.25           RBC, UA 10-15             RDW   17.8           Sodium     141         Spec Grav UA 1.020             Squam Epithel, UA Few             UROBILINOGEN UA 0.2             WBC, UA 11-15             WBC   14.29                   Significant Imaging: I have reviewed all pertinent imaging results/findings within the past 24 hours.

## 2025-02-12 NOTE — H&P
[facility] - Medical Surgical Unit  Hospital Medicine  History & Physical    Patient Name: Karie Denney  MRN: [id number]  Patient Class: IP- Inpatient  Admission Date: 2/12/2025  Attending Physician: Wyatt Hoffman IV, DO   Primary Care Provider: Gonzalo Barrera NP         Patient information was obtained from patient, past medical records, and ER records. Mother     Subjective:     Principal Problem:CAP (community acquired pneumonia)    Chief Complaint:   Chief Complaint   Patient presents with    Fever    low sats     X 1 day        HPI: Pt is a 59 year old bedridden AAF who presents to the ER for fever of unknown origin.  EMS was called for fever.  Pt denies cough, sore throat, ear pain, abd pain, n/v/d, HA.  EMS reports pulse ox was 88% on room air, and she is not typically on oxygen at home.  He placed patient on oxygen and pulse ox improved to 96%-97%. Patient arrived to the ER with hypotension, tachycardia and low grade fever.  Sepsis protocols initiated.  Patient received NS 500mls en route per EMS.  Patient is bedridden following complications from back surgery years ago.  She was able to use her left side after the surgery.  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       The history is provided by the patient, a parent and the EMS personnel.   Fever  Primary symptoms of the febrile illness include fever. Primary symptoms do not include headaches, cough, abdominal pain, nausea, vomiting, diarrhea, dysuria, altered mental status or rash.   The maximum temperature recorded prior to her arrival was 103 to 104 F.     Acute cystitis with hematuria: acute illness or injury     Details: Cipro  Community acquired pneumonia of left lower lobe of lung:     Details: Cipro in ER.  Changed to Rocephin and Azith for admission after speaking with Dr Hoffman  Hypoxia: acute illness or injury that poses a threat to life or bodily functions     Details: Oxygen  Sepsis:  acute illness or injury that poses a threat to life or bodily functions     Details: IVF, antibiotics     Amount and/or Complexity of Data Reviewed  Independent Historian: parent  External Data Reviewed: labs, radiology and notes.  Labs: ordered. Decision-making details documented in ED Course.  Radiology: ordered. Decision-making details documented in ED Course.  ECG/medicine tests: ordered. Decision-making details documented in ED Course.     Risk  OTC drugs.  Prescription drug management.  Decision regarding hospitalization.        Additional MDM:   Sepsis:   This patient does have evidence of infective focus  My overall impression is sepsis.  Source: Respiratory and Urinary Tract  Antibiotics given- Antibiotics     Patient Encounter Information Not Found      Latest lactate reviewed- 2.6  Organ dysfunction indicated by hypoxia, respiratory failure     Fluid challenge Contraindicated- Fluid bolus is contraindicated in this patient due to CXR reading edema, no known hx of CHF      Post- resuscitation assessment Yes - I attest a sepsis perfusion exam was performed within 6 hours of sepsis, severe sepsis, or septic shock presentation, following fluid resuscitation.        Work up in ER as above. Admitted to acute care with CAP, UTI, hypoxia and tachycardia. Mother present at bedside. Code status is full code. PCP is Gonzalo MALHOTRA.     Past Medical History:   Diagnosis Date    GERD (gastroesophageal reflux disease)     Paraplegia        History reviewed. No pertinent surgical history.    Review of patient's allergies indicates:   Allergen Reactions    Penicillins Anaphylaxis       No current facility-administered medications on file prior to encounter.     Current Outpatient Medications on File Prior to Encounter   Medication Sig    atorvastatin (LIPITOR) 20 MG tablet Take 20 mg by mouth once daily.    cholecalciferol, vitamin D3, 1,250 mcg (50,000 unit) capsule Take 1,250 mcg by mouth every 7 days.    LINZESS  290 mcg Cap capsule Take 290 mcg by mouth before breakfast.    omeprazole (PRILOSEC) 40 MG capsule Take 40 mg by mouth every morning.    pregabalin (LYRICA) 75 MG capsule Take 75 mg by mouth 2 (two) times daily.    sertraline (ZOLOFT) 50 MG tablet Take 50 mg by mouth once daily.    traZODone (DESYREL) 100 MG tablet Take 100 mg by mouth every evening.    [DISCONTINUED] ezetimibe (ZETIA) 10 mg tablet Take 10 mg by mouth once daily.     Family History    None       Tobacco Use    Smoking status: Never    Smokeless tobacco: Never   Substance and Sexual Activity    Alcohol use: Never    Drug use: Never    Sexual activity: Not Currently     Review of Systems   Constitutional:  Negative for appetite change.        Reports fever starting this morning - mother reports temp up to 102   HENT:  Negative for dental problem, sinus pressure, sore throat and trouble swallowing.         Mother reports Mrs. Denney does not have any trouble swallowing- eats regular diet        Hard of hearing    Eyes:         Wears glasses   Respiratory:  Positive for cough and shortness of breath. Negative for chest tightness.         Reports having mucus for past couple of weeks worsening past few days    Cardiovascular:  Negative for palpitations.   Gastrointestinal:  Negative for abdominal distention, abdominal pain, constipation, diarrhea, nausea and vomiting.   Genitourinary:  Negative for difficulty urinating, dyspareunia, dysuria and flank pain.        Denies any odor to urine    Musculoskeletal:  Positive for arthralgias.        States has occasional pain to hands - states takes norco for pain    Neurological:  Negative for headaches.        Mother states she has complained of headache past few days - which was relieved by tylenol      States she was paralyzed 9 years ago - complications from back surgery  Mother is primary caregiver  Has home health services     Objective:     Vital Signs (Most Recent):  Temp: 99.5 °F (37.5 °C) (02/12/25  1606)  Pulse: (!) 116 (02/12/25 1630)  Resp: 11 (02/12/25 1630)  BP: (!) 102/54 (02/12/25 1630)  SpO2: 95 % (02/12/25 1630) Vital Signs (24h Range):  Temp:  [99.5 °F (37.5 °C)-100.3 °F (37.9 °C)] 99.5 °F (37.5 °C)  Pulse:  [112-123] 116  Resp:  [11-18] 11  SpO2:  [94 %-98 %] 95 %  BP: ()/(48-54) 102/54     Weight: 86.2 kg (190 lb)  Body mass index is 30.67 kg/m².     Physical Exam  Constitutional:       Appearance: She is ill-appearing.   HENT:      Head: Normocephalic.   Eyes:      Pupils: Pupils are equal, round, and reactive to light.   Cardiovascular:      Rate and Rhythm: Tachycardia present.      Pulses: Normal pulses.      Heart sounds: Normal heart sounds. No murmur heard.     No friction rub. No gallop.   Pulmonary:      Comments: Decreased breath sound to RLL and LLL      Sat on 4 liters 95%  Abdominal:      General: Bowel sounds are normal. There is no distension.      Palpations: Abdomen is soft. There is no mass.      Tenderness: There is no abdominal tenderness.      Hernia: No hernia is present.   Musculoskeletal:      Comments: Contractures to hands     Abnormal muscle tone     Quadraplegic         Foot drop bilaterally   Neurological:      Mental Status: She is alert.      Comments: Quadraplegic               CRANIAL NERVES     CN III, IV, VI   Pupils are equal, round, and reactive to light.       Significant Labs: All pertinent labs within the past 24 hours have been reviewed.  Recent Lab Results         02/12/25  1524   02/12/25  1448   02/12/25  1446   02/12/25  1414        Influenza A, Molecular       Negative       Influenza B, Molecular       Negative       Albumin/Globulin Ratio     0.9         Albumin     3.1         ALP     60         ALT     7         Anion Gap     13         Aniso   1+           Appearance, UA Cloudy             AST     13         Bacteria, UA Many             Baso #   0.02           Basophil %   0.1           Bilirubin (UA) Negative             BILIRUBIN TOTAL      0.3         BUN     12         BUN/CREAT RATIO     18         Calcium     8.9         Chloride     103         CO2     29         Color, UA Yellow             SARS COV-2 MOLECULAR       Negative       Creatinine     0.67         Differential Method   Scan Smear           Dohle Bodies     Comment: RARE           eGFR     100  Comment: Estimated GFR calculated using the CKD-EPI creatinine (2021) equation.         Eos #   0.00           Eos %   0.0           Globulin, Total     3.4         Glucose     117         Glucose, UA Negative             Hematocrit   35.8           Hemoglobin   10.4           Hypo   1+           Immature Grans (Abs)   0.07           Immature Granulocytes   0.5           Ketones, UA Negative             Lactic Acid Level   2.6  Comment: A repeat order for Lactic Acid has been placed for collection in 2 hours.           Leukocyte Esterase, UA Small             Lymph #   1.14           Lymph %   8.0           Magnesium      1.9         MCH   24.5           MCHC   29.1           MCV   84.2           Mono #   0.93           Mono %   6.5           MPV   11.4           Neutrophils, Abs   12.13           Neutrophils Relative   84.9           NITRITE UA Positive             nRBC   0.0           NT-proBNP     163         NUCLEATED RBC ABSOLUTE   0.00           Blood, UA Moderate             pH, UA 6.0             PLATELET MORPHOLOGY   Large & Giant Platelets           Platelet Count   143           Potassium     3.7         PROTEIN TOTAL     6.5         Protein, UA Trace             RBC   4.25           RBC, UA 10-15             RDW   17.8           Sodium     141         Spec Grav UA 1.020             Squam Epithel, UA Few             UROBILINOGEN UA 0.2             WBC, UA 11-15             WBC   14.29                   Significant Imaging: I have reviewed all pertinent imaging results/findings within the past 24 hours.  Assessment/Plan:     * CAP (community acquired pneumonia)  Patient has a diagnosis  of pneumonia. The cause of the pneumonia is thought to be bacterial.  The patient has the following signs/symptoms of pneumonia: persistent hypoxia , cough, sputum production, and shortness of breath. The patient does have a current oxygen requirement and the patient does not have a home oxygen requirement. I have reviewed the pertinent imaging. The following cultures have been collected: Blood cultures The culture results are listed below.     Current antimicrobial regimen consists of the antibiotics listed below. Will monitor patient closely and continue current treatment plan unchanged.  Rocephin and zithromax     Microbiology Results (last 7 days)       Procedure Component Value Units Date/Time    Urine culture [[id number]] Collected: 02/12/25 1524    Order Status: Sent Specimen: Urine, Clean Catch Updated: 02/12/25 1530    Blood culture #2 [[id number]] Collected: 02/12/25 1430    Order Status: Sent Specimen: Blood Updated: 02/12/25 1452    Blood culture #1 [[id number]] Collected: 02/12/25 1415    Order Status: Sent Specimen: Blood Updated: 02/12/25 1449    Influenza A & B by Molecular [[id number]]  (Normal) Collected: 02/12/25 1414    Order Status: Completed Specimen: Nasopharyngeal Swab Updated: 02/12/25 1436     INFLUENZA A MOLECULAR Negative     INFLUENZA B MOLECULAR  Negative            Hypoxia  O2 per nasal cannula @ 4 liters  Sat is 96%    Duonebs every 6 hours     Acute cystitis with hematuria  Recd cipro in ER    Urine culture pending     Treat with rocephin and zithromax       Tachycardia  Monitor on telemetry         Quadriplegia  CLAUDIA mattress  Turn every 2 hours and prn       GERD (gastroesophageal reflux disease)  Continue PPI          VTE Risk Mitigation (From admission, onward)           Ordered     enoxaparin injection 40 mg  Daily         02/12/25 1708     IP VTE HIGH RISK PATIENT  Once         02/12/25 1708     Place sequential compression device  Until discontinued         02/12/25 1708                                     Wyatt Hoffmna IV, DO  Department of Hospital Medicine  [facility] - Medical Surgical Unit

## 2025-02-12 NOTE — HPI
Pt is a 59 year old bedridden AAF who presents to the ER for fever of unknown origin.  EMS was called for fever.  Pt denies cough, sore throat, ear pain, abd pain, n/v/d, HA.  EMS reports pulse ox was 88% on room air, and she is not typically on oxygen at home.  He placed patient on oxygen and pulse ox improved to 96%-97%. Patient arrived to the ER with hypotension, tachycardia and low grade fever.  Sepsis protocols initiated.  Patient received NS 500mls en route per EMS.  Patient is bedridden following complications from back surgery years ago.  She was able to use her left side after the surgery.  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       The history is provided by the patient, a parent and the EMS personnel.   Fever  Primary symptoms of the febrile illness include fever. Primary symptoms do not include headaches, cough, abdominal pain, nausea, vomiting, diarrhea, dysuria, altered mental status or rash.   The maximum temperature recorded prior to her arrival was 103 to 104 F.     Acute cystitis with hematuria: acute illness or injury     Details: Cipro  Community acquired pneumonia of left lower lobe of lung:     Details: Cipro in ER.  Changed to Rocephin and Azith for admission after speaking with Dr Hoffman  Hypoxia: acute illness or injury that poses a threat to life or bodily functions     Details: Oxygen  Sepsis: acute illness or injury that poses a threat to life or bodily functions     Details: IVF, antibiotics     Amount and/or Complexity of Data Reviewed  Independent Historian: parent  External Data Reviewed: labs, radiology and notes.  Labs: ordered. Decision-making details documented in ED Course.  Radiology: ordered. Decision-making details documented in ED Course.  ECG/medicine tests: ordered. Decision-making details documented in ED Course.     Risk  OTC drugs.  Prescription drug management.  Decision regarding hospitalization.        Additional MDM:   Sepsis:    This patient does have evidence of infective focus  My overall impression is sepsis.  Source: Respiratory and Urinary Tract  Antibiotics given- Antibiotics     Patient Encounter Information Not Found      Latest lactate reviewed- 2.6  Organ dysfunction indicated by hypoxia, respiratory failure     Fluid challenge Contraindicated- Fluid bolus is contraindicated in this patient due to CXR reading edema, no known hx of CHF      Post- resuscitation assessment Yes - I attest a sepsis perfusion exam was performed within 6 hours of sepsis, severe sepsis, or septic shock presentation, following fluid resuscitation.        Work up in ER as above. Admitted to acute care with CAP, UTI, hypoxia and tachycardia. Mother present at bedside. Code status is full code. PCP is Gonzalo MALHOTRA.

## 2025-02-12 NOTE — ASSESSMENT & PLAN NOTE
Patient has a diagnosis of pneumonia. The cause of the pneumonia is thought to be bacterial.  The patient has the following signs/symptoms of pneumonia: persistent hypoxia , cough, sputum production, and shortness of breath. The patient does have a current oxygen requirement and the patient does not have a home oxygen requirement. I have reviewed the pertinent imaging. The following cultures have been collected: Blood cultures The culture results are listed below.     Current antimicrobial regimen consists of the antibiotics listed below. Will monitor patient closely and continue current treatment plan unchanged.  Rocephin and zithromax     Microbiology Results (last 7 days)       Procedure Component Value Units Date/Time    Urine culture [[id number]] Collected: 02/12/25 1524    Order Status: Sent Specimen: Urine, Clean Catch Updated: 02/12/25 1530    Blood culture #2 [[id number]] Collected: 02/12/25 1430    Order Status: Sent Specimen: Blood Updated: 02/12/25 1452    Blood culture #1 [[id number]] Collected: 02/12/25 1415    Order Status: Sent Specimen: Blood Updated: 02/12/25 1449    Influenza A & B by Molecular [[id number]]  (Normal) Collected: 02/12/25 1414    Order Status: Completed Specimen: Nasopharyngeal Swab Updated: 02/12/25 1436     INFLUENZA A MOLECULAR Negative     INFLUENZA B MOLECULAR  Negative

## 2025-02-12 NOTE — ED PROVIDER NOTES
Encounter Date: 2/12/2025       History     Chief Complaint   Patient presents with    Fever    low sats     X 1 day     Pt is a 59 year old bedridden AAF who presents to the ER for fever of unknown origin.  EMS was called for fever.  Pt denies cough, sore throat, ear pain, abd pain, n/v/d, HA.  EMS reports pulse ox was 88% on room air, and she is not typically on oxygen at home.  He placed patient on oxygen and pulse ox improved to 96%-97%. Patient arrived to the ER with hypotension, tachycardia and low grade fever.  Sepsis protocols initiated.  Patient received NS 500mls en route per EMS.  Patient is bedridden following complications from back surgery years ago.  She was able to use her left side after the surgery.  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      The history is provided by the patient, a parent and the EMS personnel.   Fever  Primary symptoms of the febrile illness include fever. Primary symptoms do not include headaches, cough, abdominal pain, nausea, vomiting, diarrhea, dysuria, altered mental status or rash.   The maximum temperature recorded prior to her arrival was 103 to 104 F.     Review of patient's allergies indicates:   Allergen Reactions    Penicillins Anaphylaxis     Past Medical History:   Diagnosis Date    GERD (gastroesophageal reflux disease)     Paraplegia      History reviewed. No pertinent surgical history.  No family history on file.  Social History     Tobacco Use    Smoking status: Never    Smokeless tobacco: Never   Substance Use Topics    Alcohol use: Never    Drug use: Never     Review of Systems   Constitutional:  Positive for fever.   Respiratory:  Negative for cough.    Gastrointestinal:  Negative for abdominal pain, diarrhea, nausea and vomiting.   Genitourinary:  Negative for dysuria and urgency.   Skin:  Negative for rash.   Neurological:  Negative for headaches.       Physical Exam     Initial Vitals [02/12/25 1405]   BP Pulse Resp  Temp SpO2   (!) 95/48 (!) 123 18 100.3 °F (37.9 °C) (!) 94 %      MAP       --         Physical Exam    Nursing note and vitals reviewed.  Constitutional: She appears well-developed and well-nourished. She is not diaphoretic. She is Obese . She is cooperative. She has a sickly appearance. She appears ill. No distress. She is not intubated.   HENT:   Head: Normocephalic and atraumatic.   Nose: Nose normal. Mouth/Throat: Uvula is midline and oropharynx is clear and moist.   Eyes: Pupils are equal, round, and reactive to light.   Neck: Neck supple.   Cardiovascular:  Regular rhythm, normal heart sounds and intact distal pulses.   Tachycardia present.   Exam reveals no gallop and no friction rub.       No murmur heard.  Pulmonary/Chest: Effort normal. No accessory muscle usage. No apnea, no tachypnea and no bradypnea. She is not intubated. No respiratory distress. She has decreased breath sounds in the right lower field and the left lower field. She has no wheezes. She has no rhonchi. She has no rales. She exhibits no tenderness.   Abdominal: Abdomen is soft. There is no abdominal tenderness.   Musculoskeletal:      Cervical back: Neck supple.     Neurological: She is alert and oriented to person, place, and time. She exhibits abnormal muscle tone.   Pt has ubaldo hand contractures.  She has paralysis BUE and BLE.     Skin: Skin is warm and dry. Capillary refill takes less than 2 seconds.   Psychiatric: She has a normal mood and affect.         Medical Screening Exam   See Full Note    ED Course   Procedures  Labs Reviewed   COMPREHENSIVE METABOLIC PANEL - Abnormal       Result Value    Sodium 141      Potassium 3.7      Chloride 103      CO2 29      Anion Gap 13      Glucose 117 (*)     BUN 12      Creatinine 0.67      BUN/Creatinine Ratio 18      Calcium 8.9      Total Protein 6.5      Albumin 3.1 (*)     Globulin 3.4      A/G Ratio 0.9      Bilirubin, Total 0.3      Alk Phos 60      ALT 7      AST 13      eGFR 100      URINALYSIS, REFLEX TO URINE CULTURE - Abnormal    Color, UA Yellow      Clarity, UA Cloudy      pH, UA 6.0      Leukocytes, UA Small (*)     Nitrites, UA Positive (*)     Protein, UA Trace (*)     Glucose, UA Negative      Ketones, UA Negative      Urobilinogen, UA 0.2      Bilirubin, UA Negative      Blood, UA Moderate (*)     Specific Gravity, UA 1.020     LACTIC ACID, PLASMA - Abnormal    Lactic Acid 2.6 (*)    CBC WITH DIFFERENTIAL - Abnormal    WBC 14.29 (*)     RBC 4.25      Hemoglobin 10.4 (*)     Hematocrit 35.8 (*)     MCV 84.2      MCH 24.5 (*)     MCHC 29.1 (*)     RDW 17.8 (*)     Platelet Count 143 (*)     MPV 11.4      Neutrophils % 84.9 (*)     Lymphocytes % 8.0 (*)     Monocytes % 6.5 (*)     Eosinophils % 0.0 (*)     Basophils % 0.1      Immature Granulocytes % 0.5 (*)     nRBC, Auto 0.0      Neutrophils, Abs 12.13 (*)     Lymphocytes, Absolute 1.14      Monocytes, Absolute 0.93 (*)     Eosinophils, Absolute 0.00      Basophils, Absolute 0.02      Immature Granulocytes, Absolute 0.07 (*)     nRBC, Absolute 0.00      Diff Type Scan Smear     NT-PRO NATRIURETIC PEPTIDE - Abnormal    ProBNP 163 (*)    CBC MORPHOLOGY - Abnormal    Platelet Morphology Large & Giant Platelets (*)     Anisocytosis 1+      Hypochromic 1+      Dohle Bodies       URINALYSIS, MICROSCOPIC - Abnormal    WBC, UA 11-15 (*)     RBC, UA 10-15 (*)     Bacteria, UA Many (*)     Squamous Epithelial Cells, UA Few (*)    INFLUENZA A & B BY MOLECULAR - Normal    INFLUENZA A MOLECULAR Negative      INFLUENZA B MOLECULAR  Negative     MAGNESIUM - Normal    Magnesium 1.9     SARS-COV-2 RNA AMPLIFICATION, QUAL - Normal    SARS COV-2 Molecular Negative      Narrative:     Negative SARS-CoV results should not be used as the sole basis for treatment or patient management decisions; negative results should be considered in the context of a patient's recent exposures, history and the presene of clinical signs and symptoms consistent with  COVID-19.  Negative results should be treated as presumptive and confirmed by molecular assay, if necessary for patient management.   CULTURE, BLOOD   CULTURE, BLOOD   CULTURE, URINE   CBC W/ AUTO DIFFERENTIAL    Narrative:     The following orders were created for panel order CBC auto differential.  Procedure                               Abnormality         Status                     ---------                               -----------         ------                     CBC with Differential[[id number]]       Abnormal            Final result                 Please view results for these tests on the individual orders.   LACTIC ACID, PLASMA     EKG Readings: (Independently Interpreted)   Initial Reading: No STEMI. Rhythm: Sinus Tachycardia. Heart Rate: 120. Ectopy: No Ectopy. Conduction: Normal. ST Segments: Normal ST Segments. Axis: Normal. Clinical Impression: Sinus Tachycardia       Imaging Results              X-Ray Chest AP Portable (Final result)  Result time 02/12/25 15:03:28      Final result by Juan Nevarez MD (02/12/25 15:03:28)                   Impression:      1. Pulmonary findings suggest edema noting there may be small left pleural effusion.  Developing left lower lung zone consolidation not excluded.      Electronically signed by: Juan Nevarez MD  Date:    02/12/2025  Time:    15:03               Narrative:    EXAMINATION:  XR CHEST AP PORTABLE    CLINICAL HISTORY:  Fever, unspecified    TECHNIQUE:  Single frontal view of the chest was performed.    COMPARISON:  07/05/2017    FINDINGS:  The cardiomediastinal silhouette is not enlarged, magnified by technique noting calcification of the aorta.  There is elevation of the right hemidiaphragm..  There there may be small left pleural effusion..  The trachea is midline.  The lungs are symmetrically expanded bilaterally with coarse interstitial attenuation bilaterally.  There is bilateral basilar subsegmental atelectasis or scarring..  Developing  left lower lung zone consolidation not excluded.  There is no pneumothorax.  The osseous structures are remarkable for degenerative change..                                       Medications   ciprofloxacin (CIPRO)400mg/200ml D5W IVPB 400 mg (400 mg Intravenous New Bag 2/12/25 1550)   sodium chloride 0.9% bolus 1,000 mL 1,000 mL (0 mLs Intravenous Stopped 2/12/25 1545)   0.9% NaCl infusion (1,000 mLs Intravenous New Bag 2/12/25 1606)   ibuprofen tablet 600 mg (600 mg Oral Given 2/12/25 1606)     Medical Decision Making  Problems Addressed:  Acute cystitis with hematuria: acute illness or injury     Details: Cipro  Community acquired pneumonia of left lower lobe of lung:     Details: Cipro in ER.  Changed to Rocephin and Azith for admission after speaking with Dr Hoffman  Hypoxia: acute illness or injury that poses a threat to life or bodily functions     Details: Oxygen  Sepsis: acute illness or injury that poses a threat to life or bodily functions     Details: IVF, antibiotics    Amount and/or Complexity of Data Reviewed  Independent Historian: parent  External Data Reviewed: labs, radiology and notes.  Labs: ordered. Decision-making details documented in ED Course.  Radiology: ordered. Decision-making details documented in ED Course.  ECG/medicine tests: ordered. Decision-making details documented in ED Course.    Risk  OTC drugs.  Prescription drug management.  Decision regarding hospitalization.      Additional MDM:   Sepsis:   This patient does have evidence of infective focus  My overall impression is sepsis.  Source: Respiratory and Urinary Tract  Antibiotics given- Antibiotics     Patient Encounter Information Not Found      Latest lactate reviewed- 2.6  Organ dysfunction indicated by hypoxia, respiratory failure    Fluid challenge Contraindicated- Fluid bolus is contraindicated in this patient due to CXR reading edema, no known hx of CHF     Post- resuscitation assessment Yes - I attest a sepsis perfusion  exam was performed within 6 hours of sepsis, severe sepsis, or septic shock presentation, following fluid resuscitation.      Will Not start Pressors- Levophed for MAP of 65  Source control achieved by:                  ED Course as of 02/12/25 1621   Wed Feb 12, 2025   1419 BP(!): 95/48 [AG]   1420 Temp: 100.3 °F (37.9 °C) [AG]   1420 Pulse(!): 123 [AG]   1420 SpO2(!): 94 % [AG]   1512 Lactic Acid Level(!): 2.6 [AG]   1514 WBC(!): 14.29 [AG]   1514 Platelet Count(!): 143 [AG]   1529 Leukocyte Esterase, UA(!): Small [AG]   1529 NITRITE UA(!): Positive [AG]   1529 Blood, UA(!): Moderate [AG]   1530 WBC, UA(!): 11-15 [AG]   1530 Bacteria, UA(!): Many [AG]   1531 Patient has gotten 1000ns here in the ER and she had 500mls of NS per EMS.  CXR shows possible edema, so full 30mls/kg not given.  proBNP was added [AG]   1552 Dr Hoffman and Tonny NP are reviewing her chart for possible admission here.  Base fluids ordered.  [AG]   1609 Spoke with patient and mother regarding code status and they request full code [AG]      ED Course User Index  [AG] Jessica Gamboa FNP                             Clinical Impression:   Final diagnoses:  [R50.9] Fever of unknown origin  [R00.0] Tachycardia  [A41.9] Sepsis (Primary)  [J18.9] Community acquired pneumonia of left lower lobe of lung  [N30.01] Acute cystitis with hematuria  [R09.02] Hypoxia        ED Disposition Condition    Admit Stable                Jessica Gamboa FNP  02/12/25 1621

## 2025-02-13 LAB
ANION GAP SERPL CALCULATED.3IONS-SCNC: 10 MMOL/L (ref 7–16)
ANISOCYTOSIS BLD QL SMEAR: ABNORMAL
BASOPHILS # BLD AUTO: 0.02 K/UL (ref 0–0.2)
BASOPHILS NFR BLD AUTO: 0.2 % (ref 0–1)
BUN SERPL-MCNC: 13 MG/DL (ref 10–20)
BUN/CREAT SERPL: 25 (ref 6–20)
CALCIUM SERPL-MCNC: 8.8 MG/DL (ref 8.4–10.2)
CHLORIDE SERPL-SCNC: 106 MMOL/L (ref 98–107)
CO2 SERPL-SCNC: 29 MMOL/L (ref 23–31)
CREAT SERPL-MCNC: 0.53 MG/DL (ref 0.55–1.02)
DIFFERENTIAL METHOD BLD: ABNORMAL
EGFR (NO RACE VARIABLE) (RUSH/TITUS): 105 ML/MIN/1.73M2
EOSINOPHIL # BLD AUTO: 0.1 K/UL (ref 0–0.5)
EOSINOPHIL NFR BLD AUTO: 0.8 % (ref 1–4)
ERYTHROCYTE [DISTWIDTH] IN BLOOD BY AUTOMATED COUNT: 17.9 % (ref 11.5–14.5)
GLUCOSE SERPL-MCNC: 81 MG/DL (ref 82–115)
HCT VFR BLD AUTO: 37.3 % (ref 38–47)
HGB BLD-MCNC: 10.7 G/DL (ref 12–16)
HYPOCHROMIA BLD QL SMEAR: ABNORMAL
IMM GRANULOCYTES # BLD AUTO: 0.05 K/UL (ref 0–0.04)
IMM GRANULOCYTES NFR BLD: 0.4 % (ref 0–0.4)
LYMPHOCYTES # BLD AUTO: 1.09 K/UL (ref 1–4.8)
LYMPHOCYTES NFR BLD AUTO: 8.9 % (ref 27–41)
MAGNESIUM SERPL-MCNC: 1.8 MG/DL (ref 1.6–2.6)
MCH RBC QN AUTO: 24.2 PG (ref 27–31)
MCHC RBC AUTO-ENTMCNC: 28.7 G/DL (ref 32–36)
MCV RBC AUTO: 84.4 FL (ref 80–96)
MONOCYTES # BLD AUTO: 0.46 K/UL (ref 0–0.8)
MONOCYTES NFR BLD AUTO: 3.8 % (ref 2–6)
MPC BLD CALC-MCNC: 11.2 FL (ref 9.4–12.4)
NEUTROPHILS # BLD AUTO: 10.53 K/UL (ref 1.8–7.7)
NEUTROPHILS NFR BLD AUTO: 85.9 % (ref 53–65)
NRBC # BLD AUTO: 0 X10E3/UL
NRBC, AUTO (.00): 0 %
NT-PROBNP SERPL-MCNC: 911 PG/ML (ref 1–125)
PLATELET # BLD AUTO: 151 K/UL (ref 150–400)
PLATELET MORPHOLOGY: ABNORMAL
POTASSIUM SERPL-SCNC: 3.5 MMOL/L (ref 3.5–5.1)
RBC # BLD AUTO: 4.42 M/UL (ref 4.2–5.4)
SODIUM SERPL-SCNC: 141 MMOL/L (ref 136–145)
WBC # BLD AUTO: 12.25 K/UL (ref 4.5–11)

## 2025-02-13 PROCEDURE — 27000221 HC OXYGEN, UP TO 24 HOURS

## 2025-02-13 PROCEDURE — 63600175 PHARM REV CODE 636 W HCPCS: Performed by: NURSE PRACTITIONER

## 2025-02-13 PROCEDURE — 94761 N-INVAS EAR/PLS OXIMETRY MLT: CPT

## 2025-02-13 PROCEDURE — 83735 ASSAY OF MAGNESIUM: CPT | Performed by: NURSE PRACTITIONER

## 2025-02-13 PROCEDURE — 94667 MNPJ CHEST WALL 1ST: CPT

## 2025-02-13 PROCEDURE — 36415 COLL VENOUS BLD VENIPUNCTURE: CPT | Performed by: NURSE PRACTITIONER

## 2025-02-13 PROCEDURE — 83880 ASSAY OF NATRIURETIC PEPTIDE: CPT | Performed by: NURSE PRACTITIONER

## 2025-02-13 PROCEDURE — 25000242 PHARM REV CODE 250 ALT 637 W/ HCPCS: Performed by: NURSE PRACTITIONER

## 2025-02-13 PROCEDURE — 80048 BASIC METABOLIC PNL TOTAL CA: CPT | Performed by: NURSE PRACTITIONER

## 2025-02-13 PROCEDURE — 94640 AIRWAY INHALATION TREATMENT: CPT

## 2025-02-13 PROCEDURE — 25000003 PHARM REV CODE 250: Performed by: NURSE PRACTITIONER

## 2025-02-13 PROCEDURE — 94668 MNPJ CHEST WALL SBSQ: CPT

## 2025-02-13 PROCEDURE — 85025 COMPLETE CBC W/AUTO DIFF WBC: CPT | Performed by: NURSE PRACTITIONER

## 2025-02-13 PROCEDURE — 99900035 HC TECH TIME PER 15 MIN (STAT)

## 2025-02-13 PROCEDURE — 27000944

## 2025-02-13 PROCEDURE — 11000001 HC ACUTE MED/SURG PRIVATE ROOM

## 2025-02-13 RX ORDER — ERGOCALCIFEROL 1.25 MG/1
50000 CAPSULE ORAL
Status: DISCONTINUED | OUTPATIENT
Start: 2025-02-17 | End: 2025-02-14 | Stop reason: HOSPADM

## 2025-02-13 RX ORDER — GUAIFENESIN 600 MG/1
600 TABLET, EXTENDED RELEASE ORAL 2 TIMES DAILY
Status: DISCONTINUED | OUTPATIENT
Start: 2025-02-13 | End: 2025-02-14 | Stop reason: HOSPADM

## 2025-02-13 RX ORDER — METOPROLOL SUCCINATE 25 MG/1
25 TABLET, EXTENDED RELEASE ORAL DAILY
Status: DISCONTINUED | OUTPATIENT
Start: 2025-02-13 | End: 2025-02-14 | Stop reason: HOSPADM

## 2025-02-13 RX ORDER — POTASSIUM CHLORIDE 7.45 MG/ML
10 INJECTION INTRAVENOUS ONCE
Status: COMPLETED | OUTPATIENT
Start: 2025-02-13 | End: 2025-02-13

## 2025-02-13 RX ORDER — VIT C/E/ZN/COPPR/LUTEIN/ZEAXAN 250MG-90MG
50000 CAPSULE ORAL WEEKLY
Status: DISCONTINUED | OUTPATIENT
Start: 2025-02-17 | End: 2025-02-13 | Stop reason: CLARIF

## 2025-02-13 RX ADMIN — Medication 1 CAPSULE: at 01:02

## 2025-02-13 RX ADMIN — ACETAMINOPHEN 650 MG: 325 TABLET ORAL at 10:02

## 2025-02-13 RX ADMIN — ATORVASTATIN CALCIUM 20 MG: 10 TABLET, FILM COATED ORAL at 08:02

## 2025-02-13 RX ADMIN — POTASSIUM CHLORIDE 10 MEQ: 10 INJECTION, SOLUTION INTRAVENOUS at 06:02

## 2025-02-13 RX ADMIN — Medication 1 CAPSULE: at 05:02

## 2025-02-13 RX ADMIN — METOPROLOL SUCCINATE 25 MG: 25 TABLET, EXTENDED RELEASE ORAL at 01:02

## 2025-02-13 RX ADMIN — GUAIFENESIN 600 MG: 600 TABLET, EXTENDED RELEASE ORAL at 09:02

## 2025-02-13 RX ADMIN — IPRATROPIUM BROMIDE AND ALBUTEROL SULFATE 3 ML: 2.5; .5 SOLUTION RESPIRATORY (INHALATION) at 01:02

## 2025-02-13 RX ADMIN — SODIUM CHLORIDE: 900 INJECTION, SOLUTION INTRAVENOUS at 12:02

## 2025-02-13 RX ADMIN — SODIUM CHLORIDE: 900 INJECTION, SOLUTION INTRAVENOUS at 05:02

## 2025-02-13 RX ADMIN — Medication 1 CAPSULE: at 08:02

## 2025-02-13 RX ADMIN — SERTRALINE HYDROCHLORIDE 50 MG: 50 TABLET ORAL at 08:02

## 2025-02-13 RX ADMIN — AZITHROMYCIN MONOHYDRATE 500 MG: 500 INJECTION, POWDER, LYOPHILIZED, FOR SOLUTION INTRAVENOUS at 05:02

## 2025-02-13 RX ADMIN — ENOXAPARIN SODIUM 40 MG: 40 INJECTION SUBCUTANEOUS at 05:02

## 2025-02-13 RX ADMIN — GUAIFENESIN 600 MG: 600 TABLET, EXTENDED RELEASE ORAL at 08:02

## 2025-02-13 RX ADMIN — SODIUM CHLORIDE: 900 INJECTION, SOLUTION INTRAVENOUS at 08:02

## 2025-02-13 RX ADMIN — PANTOPRAZOLE SODIUM 40 MG: 40 TABLET, DELAYED RELEASE ORAL at 08:02

## 2025-02-13 RX ADMIN — IPRATROPIUM BROMIDE AND ALBUTEROL SULFATE 3 ML: 2.5; .5 SOLUTION RESPIRATORY (INHALATION) at 07:02

## 2025-02-13 RX ADMIN — LINACLOTIDE 290 MCG: 145 CAPSULE, GELATIN COATED ORAL at 06:02

## 2025-02-13 RX ADMIN — CEFTRIAXONE SODIUM 2 G: 2 INJECTION, POWDER, FOR SOLUTION INTRAMUSCULAR; INTRAVENOUS at 08:02

## 2025-02-13 NOTE — ASSESSMENT & PLAN NOTE
O2 per nasal cannula @ 4 liters  Sat is 96%    Duonebs every 6 hours       02/13/25 sat on 5 liters is 98%

## 2025-02-13 NOTE — ASSESSMENT & PLAN NOTE
Patient has a diagnosis of pneumonia. The cause of the pneumonia is thought to be bacterial.  The patient has the following signs/symptoms of pneumonia: persistent hypoxia , cough, sputum production, and shortness of breath. The patient does have a current oxygen requirement and the patient does not have a home oxygen requirement. I have reviewed the pertinent imaging. The following cultures have been collected: Blood cultures The culture results are listed below.     Current antimicrobial regimen consists of the antibiotics listed below. Will monitor patient closely and continue current treatment plan unchanged.  Rocephin and zithromax     Microbiology Results (last 7 days)       Procedure Component Value Units Date/Time    Urine culture [[id number]] Collected: 02/12/25 1524    Order Status: Sent Specimen: Urine, Clean Catch Updated: 02/12/25 1530    Blood culture #2 [[id number]] Collected: 02/12/25 1430    Order Status: Sent Specimen: Blood Updated: 02/12/25 1452    Blood culture #1 [[id number]] Collected: 02/12/25 1415    Order Status: Sent Specimen: Blood Updated: 02/12/25 1449    Influenza A & B by Molecular [[id number]]  (Normal) Collected: 02/12/25 1414    Order Status: Completed Specimen: Nasopharyngeal Swab Updated: 02/12/25 1436     INFLUENZA A MOLECULAR Negative     INFLUENZA B MOLECULAR  Negative              02/13/25 bilateral rhonchi  Sat on 5 liters is 98%  Ordered CPT BID  Mucinex BID

## 2025-02-13 NOTE — DISCHARGE SUMMARY
[facility] - Medical Surgical Unit  Hospital Medicine  Discharge Summary      Patient Name: Karie Denney  MRN: [id number]  [facility]: [id number]  Patient Class: IP- Inpatient  Admission Date: 2/12/2025  Hospital Length of Stay: 1 days  Discharge Date and Time:  02/13/2025 4:08 PM  Attending Physician: Wyatt Hoffman IV, DO   Discharging Provider: MARYA Gonzalez  Primary Care Provider: Gonzalo Barrera NP    Primary Care Team: Networked reference to record PCT     HPI:   Pt is a 59 year old bedridden AAF who presents to the ER for fever of unknown origin.  EMS was called for fever.  Pt denies cough, sore throat, ear pain, abd pain, n/v/d, HA.  EMS reports pulse ox was 88% on room air, and she is not typically on oxygen at home.  He placed patient on oxygen and pulse ox improved to 96%-97%. Patient arrived to the ER with hypotension, tachycardia and low grade fever.  Sepsis protocols initiated.  Patient received NS 500mls en route per EMS.  Patient is bedridden following complications from back surgery years ago.  She was able to use her left side after the surgery.  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       The history is provided by the patient, a parent and the EMS personnel.   Fever  Primary symptoms of the febrile illness include fever. Primary symptoms do not include headaches, cough, abdominal pain, nausea, vomiting, diarrhea, dysuria, altered mental status or rash.   The maximum temperature recorded prior to her arrival was 103 to 104 F.     Acute cystitis with hematuria: acute illness or injury     Details: Cipro  Community acquired pneumonia of left lower lobe of lung:     Details: Cipro in ER.  Changed to Rocephin and Azith for admission after speaking with Dr Hoffman  Hypoxia: acute illness or injury that poses a threat to life or bodily functions     Details: Oxygen  Sepsis: acute illness or injury that poses a threat to life or bodily  functions     Details: IVF, antibiotics     Amount and/or Complexity of Data Reviewed  Independent Historian: parent  External Data Reviewed: labs, radiology and notes.  Labs: ordered. Decision-making details documented in ED Course.  Radiology: ordered. Decision-making details documented in ED Course.  ECG/medicine tests: ordered. Decision-making details documented in ED Course.     Risk  OTC drugs.  Prescription drug management.  Decision regarding hospitalization.        Additional MDM:   Sepsis:   This patient does have evidence of infective focus  My overall impression is sepsis.  Source: Respiratory and Urinary Tract  Antibiotics given- Antibiotics     Patient Encounter Information Not Found      Latest lactate reviewed- 2.6  Organ dysfunction indicated by hypoxia, respiratory failure     Fluid challenge Contraindicated- Fluid bolus is contraindicated in this patient due to CXR reading edema, no known hx of CHF      Post- resuscitation assessment Yes - I attest a sepsis perfusion exam was performed within 6 hours of sepsis, severe sepsis, or septic shock presentation, following fluid resuscitation.        Work up in ER as above. Admitted to acute care with CAP, UTI, hypoxia and tachycardia. Mother present at bedside. Code status is full code. PCP is Gonzalo MALHOTRA.     * No surgery found *      Hospital Course:     2/13/25 Rn asked for me to come see patient.  Reports they were turning her this morning and she had some frothy sputum.  Patient reports she just feels tired this morning.  Denies SOB.  On assessment, patient noted with ubaldo rhonchi.  She has pending morning lab and I added repeat proBNP and repeat CXR.  Pulse ox stable this morning at 94%.  02/13/25 Awake and resting in bed with HOB elevated. Sat is 98% on 5 liters. States she is feeling better today than yesterday. Talked with her re possible aspiration. She states she thinks she may have aspirated sputum when she was being bathed this  morning. She has bilateral rhonchi. Chest xray and labs pending. Will have ST assess swallowing. Ordered CPT BID, mucinex BID. Continue antibiotics for CAP and UTI.  02/13/25 Talked with Mrs. Denney re need for MBS due to aspiration. Talked with her re need for transferring to higher level of care for this . She is agreeable. PFC request placed. Talked with Dr. Cavazos re Mrs. Denney and she has accepted her in transfer to [facility].       02/14/25 waiting for bed at [facility]. WBC 10.48. Afebrile. Continue abx and supplemental o2. NPO except meds. Replace potassium.  Will dc to [facility] when bed available      Goals of Care Treatment Preferences:  Code Status: Full Code      SDOH Screening:  The patient was screened for utility difficulties, food insecurity, transport difficulties, housing insecurity, and interpersonal safety and there were no concerns identified this admission.     Consults:     * CAP (community acquired pneumonia)  Patient has a diagnosis of pneumonia. The cause of the pneumonia is thought to be bacterial.  The patient has the following signs/symptoms of pneumonia: persistent hypoxia , cough, sputum production, and shortness of breath. The patient does have a current oxygen requirement and the patient does not have a home oxygen requirement. I have reviewed the pertinent imaging. The following cultures have been collected: Blood cultures The culture results are listed below.     Current antimicrobial regimen consists of the antibiotics listed below. Will monitor patient closely and continue current treatment plan unchanged.  Rocephin and zithromax     Microbiology Results (last 7 days)       Procedure Component Value Units Date/Time    Urine culture [[id number]] Collected: 02/12/25 1524    Order Status: Sent Specimen: Urine, Clean Catch Updated: 02/12/25 1530    Blood culture #2 [[id number]] Collected: 02/12/25 1430    Order Status: Sent Specimen: Blood Updated: 02/12/25 6722     Blood culture #1 [[id number]] Collected: 02/12/25 1415    Order Status: Sent Specimen: Blood Updated: 02/12/25 1449    Influenza A & B by Molecular [[id number]]  (Normal) Collected: 02/12/25 1414    Order Status: Completed Specimen: Nasopharyngeal Swab Updated: 02/12/25 1436     INFLUENZA A MOLECULAR Negative     INFLUENZA B MOLECULAR  Negative              02/13/25 bilateral rhonchi  Sat on 5 liters is 98%  Ordered CPT BID  Mucinex BID      Hypoxia  O2 per nasal cannula @ 4 liters  Sat is 96%    Duonebs every 6 hours       02/13/25 sat on 5 liters is 98%    Acute cystitis with hematuria  Recd cipro in ER    Urine culture pending     Treat with rocephin and zithromax       Sinus tachycardia  Monitor on telemetry     Started metoprolol 25 mg po daily     Quadriplegia  CLAUDIA mattress  Turn every 2 hours and prn       GERD (gastroesophageal reflux disease)  Continue PPI          Final Active Diagnoses:    Diagnosis Date Noted POA    PRINCIPAL PROBLEM:  CAP (community acquired pneumonia) [J18.9] 02/12/2025 Yes    Hypoxia [R09.02] 02/12/2025 Yes    Acute cystitis with hematuria [N30.01] 02/12/2025 Yes    Sinus tachycardia [R00.0] 02/12/2025 Yes    Quadriplegia [G82.50] 02/12/2025 Yes    GERD (gastroesophageal reflux disease) [K21.9] 02/12/2025 Yes      Problems Resolved During this Admission:       Discharged Condition: stable    Disposition: Another Health Care Inst*    Follow Up:    Patient Instructions:   No discharge procedures on file.    Significant Diagnostic Studies: Labs: All labs within the past 24 hours have been reviewed    Pending Diagnostic Studies:       Procedure Component Value Units Date/Time    Lactic Acid, Plasma [[id number]] Collected: 02/13/25 0630    Order Status: Sent Lab Status: No result     Specimen: Blood            Medications:  Reconciled Home Medications:      Medication List        ASK your doctor about these medications      atorvastatin 20 MG tablet  Commonly known as: LIPITOR  Take 20 mg  by mouth once daily.     cholecalciferol (vitamin D3) 1,250 mcg (50,000 unit) capsule  Take 1,250 mcg by mouth every 7 days.     LINZESS 290 mcg Cap capsule  Generic drug: linaCLOtide  Take 290 mcg by mouth before breakfast.     omeprazole 40 MG capsule  Commonly known as: PRILOSEC  Take 40 mg by mouth every morning.     pregabalin 75 MG capsule  Commonly known as: LYRICA  Take 75 mg by mouth 2 (two) times daily.     sertraline 50 MG tablet  Commonly known as: ZOLOFT  Take 50 mg by mouth once daily.     traZODone 100 MG tablet  Commonly known as: DESYREL  Take 100 mg by mouth every evening.              Indwelling Lines/Drains at time of discharge:   Lines/Drains/Airways       None                   Time spent on the discharge of patient: 35 minutes         Wyatt Hoffman IV, DO  Department of Hospital Medicine  [facility] - Medical Surgical Unit   Post-Care Instructions: I reviewed with the patient in detail post-care instructions. Patient is to wear sunprotection, and avoid picking at any of the treated lesions. Pt may apply Vaseline to crusted or scabbing areas. Render Post-Care Instructions In Note?: no Medical Necessity Information: It is in your best interest to select a reason for this procedure from the list below. All of these items fulfill various CMS LCD requirements except the new and changing color options. Total Number Of Lesions Treated: 1 Anesthesia Volume In Cc: 0.5 Medical Necessity Clause: This procedure was medically necessary because the lesions that were treated were: Detail Level: Simple Consent: The patient's consent was obtained including but not limited to risks of crusting, scabbing, blistering, scarring, darker or lighter pigmentary change, recurrence, incomplete removal and infection.

## 2025-02-13 NOTE — PLAN OF CARE
Problem: Infection  Goal: Absence of Infection Signs and Symptoms  Outcome: Progressing     Problem: Adult Inpatient Plan of Care  Goal: Plan of Care Review  Outcome: Progressing  Goal: Patient-Specific Goal (Individualized)  Outcome: Progressing  Goal: Absence of Hospital-Acquired Illness or Injury  Outcome: Progressing  Goal: Optimal Comfort and Wellbeing  Outcome: Progressing  Goal: Readiness for Transition of Care  Outcome: Progressing     Problem: Pneumonia  Goal: Fluid Balance  Outcome: Progressing  Goal: Resolution of Infection Signs and Symptoms  Outcome: Progressing  Goal: Effective Oxygenation and Ventilation  Outcome: Progressing     Problem: Skin Injury Risk Increased  Goal: Skin Health and Integrity  Outcome: Progressing

## 2025-02-13 NOTE — PROGRESS NOTES
[facility] Surgical Unit  Hospital Medicine  Progress Note    Patient Name: Karie Denney  MRN: [id number]  Patient Class: IP- Inpatient   Admission Date: 2/12/2025  Length of Stay: 1 days  Attending Physician: Wyatt Hoffman IV, DO  Primary Care Provider: Gonzalo Barrera NP        Subjective     Principal Problem:CAP (community acquired pneumonia)        HPI:  Pt is a 59 year old bedridden AAF who presents to the ER for fever of unknown origin.  EMS was called for fever.  Pt denies cough, sore throat, ear pain, abd pain, n/v/d, HA.  EMS reports pulse ox was 88% on room air, and she is not typically on oxygen at home.  He placed patient on oxygen and pulse ox improved to 96%-97%. Patient arrived to the ER with hypotension, tachycardia and low grade fever.  Sepsis protocols initiated.  Patient received NS 500mls en route per EMS.  Patient is bedridden following complications from back surgery years ago.  She was able to use her left side after the surgery.  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       The history is provided by the patient, a parent and the EMS personnel.   Fever  Primary symptoms of the febrile illness include fever. Primary symptoms do not include headaches, cough, abdominal pain, nausea, vomiting, diarrhea, dysuria, altered mental status or rash.   The maximum temperature recorded prior to her arrival was 103 to 104 F.     Acute cystitis with hematuria: acute illness or injury     Details: Cipro  Community acquired pneumonia of left lower lobe of lung:     Details: Cipro in ER.  Changed to Rocephin and Azith for admission after speaking with Dr Hoffman  Hypoxia: acute illness or injury that poses a threat to life or bodily functions     Details: Oxygen  Sepsis: acute illness or injury that poses a threat to life or bodily functions     Details: IVF, antibiotics     Amount and/or Complexity of Data Reviewed  Independent Historian:  parent  External Data Reviewed: labs, radiology and notes.  Labs: ordered. Decision-making details documented in ED Course.  Radiology: ordered. Decision-making details documented in ED Course.  ECG/medicine tests: ordered. Decision-making details documented in ED Course.     Risk  OTC drugs.  Prescription drug management.  Decision regarding hospitalization.        Additional MDM:   Sepsis:   This patient does have evidence of infective focus  My overall impression is sepsis.  Source: Respiratory and Urinary Tract  Antibiotics given- Antibiotics     Patient Encounter Information Not Found      Latest lactate reviewed- 2.6  Organ dysfunction indicated by hypoxia, respiratory failure     Fluid challenge Contraindicated- Fluid bolus is contraindicated in this patient due to CXR reading edema, no known hx of CHF      Post- resuscitation assessment Yes - I attest a sepsis perfusion exam was performed within 6 hours of sepsis, severe sepsis, or septic shock presentation, following fluid resuscitation.        Work up in ER as above. Admitted to acute care with CAP, UTI, hypoxia and tachycardia. Mother present at bedside. Code status is full code. PCP is Gonzalo MALHOTRA.     Overview/Hospital Course:  2/13/25 Rn asked for me to come see patient.  Reports they were turning her this morning and she had some frothy sputum.  Patient reports she just feels tired this morning.  Denies SOB.  On assessment, patient noted with ubaldo rhonchi.  She has pending morning lab and I added repeat proBNP and repeat CXR.  Pulse ox stable this morning at 94%.  02/13/25 Awake and resting in bed with HOB elevated. Sat is 98% on 5 liters. States she is feeling better today than yesterday. Talked with her re possible aspiration. She states she thinks she may have aspirated sputum when she was being bathed this morning. She has bilateral rhonchi. Chest xray and labs pending. Will have ST assess swallowing. Ordered CPT BID, mucinex BID. Continue  antibiotics for CAP and UTI.    Interval History: CAP and UTI, possible aspiration     Review of Systems   Constitutional:  Negative for appetite change.   HENT:  Negative for dental problem, sinus pressure, sore throat and trouble swallowing.    Eyes:         Wears glasses   Respiratory:  Positive for cough. Negative for chest tightness and shortness of breath.         Reports possible aspiration during bath this morning   Cardiovascular:  Negative for palpitations.   Gastrointestinal:  Negative for abdominal distention, abdominal pain, constipation, diarrhea, nausea and vomiting.   Genitourinary:  Negative for difficulty urinating, dyspareunia, dysuria and flank pain.        Denies any odor to urine    Musculoskeletal:  Positive for arthralgias.   Neurological:  Negative for headaches.     Objective:     Vital Signs (Most Recent):  Temp: 98.9 °F (37.2 °C) (02/13/25 0400)  Pulse: (!) 117 (02/13/25 0615)  Resp: 16 (02/13/25 0400)  BP: (!) 112/58 (02/13/25 0615)  SpO2: (!) 93 % (02/13/25 0615) Vital Signs (24h Range):  Temp:  [97 °F (36.1 °C)-100.3 °F (37.9 °C)] 98.9 °F (37.2 °C)  Pulse:  [] 117  Resp:  [11-20] 16  SpO2:  [88 %-98 %] 93 %  BP: ()/(39-58) 112/58     Weight: 86.2 kg (190 lb)  Body mass index is 30.67 kg/m².    Intake/Output Summary (Last 24 hours) at 2/13/2025 0737  Last data filed at 2/13/2025 0541  Gross per 24 hour   Intake 3224.25 ml   Output --   Net 3224.25 ml         Physical Exam  Constitutional:       Appearance: She is ill-appearing.   HENT:      Head: Normocephalic.   Eyes:      Pupils: Pupils are equal, round, and reactive to light.   Cardiovascular:      Rate and Rhythm: Normal rate and regular rhythm.      Pulses: Normal pulses.      Heart sounds: Normal heart sounds. No murmur heard.     No friction rub. No gallop.   Pulmonary:      Breath sounds: Rhonchi present.      Comments: Rhonchi bilaterally      Sat on 5 liters 98 %  Abdominal:      General: Bowel sounds are normal.  There is no distension.      Palpations: Abdomen is soft. There is no mass.      Tenderness: There is no abdominal tenderness.      Hernia: No hernia is present.   Musculoskeletal:      Comments: Contractures to hands     Abnormal muscle tone     Quadraplegic         Foot drop bilaterally   Skin:     General: Skin is warm and dry.   Neurological:      Mental Status: She is alert.      Comments: Quadraplegic              Significant Labs: All pertinent labs within the past 24 hours have been reviewed.    Significant Imaging: I have reviewed all pertinent imaging results/findings within the past 24 hours.    Assessment and Plan     * CAP (community acquired pneumonia)  Patient has a diagnosis of pneumonia. The cause of the pneumonia is thought to be bacterial.  The patient has the following signs/symptoms of pneumonia: persistent hypoxia , cough, sputum production, and shortness of breath. The patient does have a current oxygen requirement and the patient does not have a home oxygen requirement. I have reviewed the pertinent imaging. The following cultures have been collected: Blood cultures The culture results are listed below.     Current antimicrobial regimen consists of the antibiotics listed below. Will monitor patient closely and continue current treatment plan unchanged.  Rocephin and zithromax     Microbiology Results (last 7 days)       Procedure Component Value Units Date/Time    Urine culture [[id number]] Collected: 02/12/25 1524    Order Status: Sent Specimen: Urine, Clean Catch Updated: 02/12/25 1530    Blood culture #2 [[id number]] Collected: 02/12/25 1430    Order Status: Sent Specimen: Blood Updated: 02/12/25 1452    Blood culture #1 [[id number]] Collected: 02/12/25 1415    Order Status: Sent Specimen: Blood Updated: 02/12/25 1449    Influenza A & B by Molecular [[id number]]  (Normal) Collected: 02/12/25 1414    Order Status: Completed Specimen: Nasopharyngeal Swab Updated: 02/12/25 1436     INFLUENZA  A MOLECULAR Negative     INFLUENZA B MOLECULAR  Negative              02/13/25 bilateral rhonchi  Sat on 5 liters is 98%  Ordered CPT BID  Mucinex BID      Hypoxia  O2 per nasal cannula @ 4 liters  Sat is 96%    Duonebs every 6 hours       02/13/25 sat on 5 liters is 98%    Acute cystitis with hematuria  Recd cipro in ER    Urine culture pending     Treat with rocephin and zithromax       Tachycardia  Monitor on telemetry         Quadriplegia  CLAUDIA mattress  Turn every 2 hours and prn       GERD (gastroesophageal reflux disease)  Continue PPI          VTE Risk Mitigation (From admission, onward)           Ordered     enoxaparin injection 40 mg  Daily         02/12/25 1708     IP VTE HIGH RISK PATIENT  Once         02/12/25 1708     Place sequential compression device  Until discontinued         02/12/25 1708                    Discharge Planning   SHRADDHA:      Code Status: Full Code   Medical Readiness for Discharge Date:                            MARYA Gonzalez  Department of Hospital Medicine   [facility]

## 2025-02-13 NOTE — PLAN OF CARE
[facility]  Initial Discharge Assessment       Primary Care Provider: Gonzalo Barrera NP    Admission Diagnosis: Fever of unknown origin [R50.9]  Tachycardia [R00.0]  Hypoxia [R09.02]  Acute cystitis with hematuria [N30.01]  Sepsis [A41.9]  Community acquired pneumonia of left lower lobe of lung [J18.9]    Admission Date: 2/12/2025  Expected Discharge Date:     Transition of Care Barriers: (P) Mobility    Payor: [employer] MEDICARE / Plan: [employer] HMO PPO SPECIAL NEEDS / Product Type: Medicare Advantage /     Extended Emergency Contact Information  Primary Emergency Contact: Carolina Matias  [place] Phone: [phone]  Relation: Sister  Preferred language: English   needed? No  Secondary Emergency Contact: Ashley Montanez  [employer] Phone: [phone]  Relation: Daughter  Preferred language: English   needed? No    Discharge Plan A: (P) Home with family, Home Health         [facility] [address]  [address]  [place] [address]  Phone: [phone] Fax: [phone]      Initial Assessment (most recent)       Adult Discharge Assessment - 02/13/25 1010          Discharge Assessment    Assessment Type Discharge Planning Assessment (P)      Confirmed/corrected address, phone number and insurance Yes (P)      Confirmed Demographics Correct on Facesheet (P)      Source of Information patient;family (P)      Communicated SHRADDHA with patient/caregiver Date not available/Unable to determine (P)      Reason For Admission Community Acquired Pneumonia (P)      People in Home parent(s) (P)      Do you expect to return to your current living situation? Yes (P)      Do you have help at home or someone to help you manage your care at home? Yes (P)      Who are your caregiver(s) and their phone number(s)? Carolina Matias [phone] (P)      Prior to hospitilization cognitive status: Alert/Oriented (P)      Current cognitive status:  Alert/Oriented (P)      Walking or Climbing Stairs Difficulty yes (P)      Walking or Climbing Stairs ambulation difficulty, dependent;stair climbing difficulty, dependent;transferring difficulty, dependent (P)      Mobility Management Paraplegic (P)      Dressing/Bathing Difficulty yes (P)      Dressing/Bathing bathing difficulty, dependent;dressing difficulty, dependent (P)      Dressing/Bathing Management paraplegic (P)      Equipment Currently Used at Home blood pressure machine;hospital bed;wheelchair (P)      Readmission within 30 days? No (P)      Patient currently being followed by outpatient case management? No (P)      Do you currently have service(s) that help you manage your care at home? No (P)      Do you take prescription medications? Yes (P)      Do you have prescription coverage? Yes (P)      Coverage [employer]/ Medicaid (P)      Do you have any problems affording any of your prescribed medications? No (P)      Is the patient taking medications as prescribed? yes (P)      Who is going to help you get home at discharge? Carolina Matias, mother (P)      How do you get to doctors appointments? family or friend will provide (P)      Are you on dialysis? No (P)      Do you take coumadin? No (P)      Discharge Plan A Home with family;Home Health (P)      DME Needed Upon Discharge  suction machine (P)      Discharge Plan discussed with: Patient;Parent(s) (P)      Transition of Care Barriers Mobility (P)         Physical Activity    On average, how many days per week do you engage in moderate to strenuous exercise (like a brisk walk)? 0 days (P)      On average, how many minutes do you engage in exercise at this level? 0 min (P)         Financial Resource Strain    How hard is it for you to pay for the very basics like food, housing, medical care, and heating? Not hard at all (P)         Housing Stability    In the last 12 months, was there a time when you were not able to pay the mortgage or rent on time? No (P)       At any time in the past 12 months, were you homeless or living in a shelter (including now)? No (P)         Transportation Needs    Has the lack of transportation kept you from medical appointments, meetings, work or from getting things needed for daily living? No (P)         Food Insecurity    Within the past 12 months, you worried that your food would run out before you got the money to buy more. Never true (P)      Within the past 12 months, the food you bought just didn't last and you didn't have money to get more. Never true (P)         Stress    Do you feel stress - tense, restless, nervous, or anxious, or unable to sleep at night because your mind is troubled all the time - these days? Not at all (P)         Social Isolation    How often do you feel lonely or isolated from those around you?  Never (P)         Alcohol Use    Q1: How often do you have a drink containing alcohol? Never (P)      Q2: How many drinks containing alcohol do you have on a typical day when you are drinking? Patient does not drink (P)      Q3: How often do you have six or more drinks on one occasion? Never (P)         [employer]    In the past 12 months has the electric, gas, oil, or water company threatened to shut off services in your home? No (P)         Health Literacy    How often do you need to have someone help you when you read instructions, pamphlets, or other written material from your doctor or pharmacy? Often (P)                    Ms. Denney is paraplegic and lives in the home with her mother. She had been receiving services from home health but unsure of agency and had been using [facility] - would like to continue services. Ms. Denney's mother says they may need a suction machine at discharge, made MARYA Silverman aware of request and will continue to follow up with this request at discharge. She has all other needed DME.

## 2025-02-13 NOTE — SUBJECTIVE & OBJECTIVE
Interval History: CAP and UTI, possible aspiration     Review of Systems   Constitutional:  Negative for appetite change.   HENT:  Negative for dental problem, sinus pressure, sore throat and trouble swallowing.    Eyes:         Wears glasses   Respiratory:  Positive for cough. Negative for chest tightness and shortness of breath.         Reports possible aspiration during bath this morning   Cardiovascular:  Negative for palpitations.   Gastrointestinal:  Negative for abdominal distention, abdominal pain, constipation, diarrhea, nausea and vomiting.   Genitourinary:  Negative for difficulty urinating, dyspareunia, dysuria and flank pain.        Denies any odor to urine    Musculoskeletal:  Positive for arthralgias.   Neurological:  Negative for headaches.     Objective:     Vital Signs (Most Recent):  Temp: 98.9 °F (37.2 °C) (02/13/25 0400)  Pulse: (!) 117 (02/13/25 0615)  Resp: 16 (02/13/25 0400)  BP: (!) 112/58 (02/13/25 0615)  SpO2: (!) 93 % (02/13/25 0615) Vital Signs (24h Range):  Temp:  [97 °F (36.1 °C)-100.3 °F (37.9 °C)] 98.9 °F (37.2 °C)  Pulse:  [] 117  Resp:  [11-20] 16  SpO2:  [88 %-98 %] 93 %  BP: ()/(39-58) 112/58     Weight: 86.2 kg (190 lb)  Body mass index is 30.67 kg/m².    Intake/Output Summary (Last 24 hours) at 2/13/2025 0737  Last data filed at 2/13/2025 0541  Gross per 24 hour   Intake 3224.25 ml   Output --   Net 3224.25 ml         Physical Exam  Constitutional:       Appearance: She is ill-appearing.   HENT:      Head: Normocephalic.   Eyes:      Pupils: Pupils are equal, round, and reactive to light.   Cardiovascular:      Rate and Rhythm: Normal rate and regular rhythm.      Pulses: Normal pulses.      Heart sounds: Normal heart sounds. No murmur heard.     No friction rub. No gallop.   Pulmonary:      Breath sounds: Rhonchi present.      Comments: Rhonchi bilaterally      Sat on 5 liters 98 %  Abdominal:      General: Bowel sounds are normal. There is no distension.       Palpations: Abdomen is soft. There is no mass.      Tenderness: There is no abdominal tenderness.      Hernia: No hernia is present.   Musculoskeletal:      Comments: Contractures to hands     Abnormal muscle tone     Quadraplegic         Foot drop bilaterally   Skin:     General: Skin is warm and dry.   Neurological:      Mental Status: She is alert.      Comments: Quadraplegic              Significant Labs: All pertinent labs within the past 24 hours have been reviewed.    Significant Imaging: I have reviewed all pertinent imaging results/findings within the past 24 hours.

## 2025-02-13 NOTE — HOSPITAL COURSE
2/13/25 Rn asked for me to come see patient.  Reports they were turning her this morning and she had some frothy sputum.  Patient reports she just feels tired this morning.  Denies SOB.  On assessment, patient noted with ubaldo rhonchi.  She has pending morning lab and I added repeat proBNP and repeat CXR.  Pulse ox stable this morning at 94%.  02/13/25 Awake and resting in bed with HOB elevated. Sat is 98% on 5 liters. States she is feeling better today than yesterday. Talked with her re possible aspiration. She states she thinks she may have aspirated sputum when she was being bathed this morning. She has bilateral rhonchi. Chest xray and labs pending. Will have ST assess swallowing. Ordered CPT BID, mucinex BID. Continue antibiotics for CAP and UTI.  02/13/25 Talked with Mrs. Denney re need for MBS due to aspiration. Talked with her re need for transferring to higher level of care for this . She is agreeable. [facility] request placed. Talked with Dr. Cavazos re Mrs. Denney and she has accepted her in transfer to [facility].      1629 [facility] called to say it may be tomorrow before bed is available. Mrs. Denney notified.          02/14/25 Mrs. Denney is resting in bed with mother at side. O2 per nc at 5 liters with sat of 96%. Breath sounds are clear. States she had just recd breathing treatment and CPT. WBC 10.48. Potassium is 3.3. Will replace IVPB. Waiting on bed for [facility].       1236 still waiting on bed assignment. Swallow eval ordered. Informed Mrs. Denney of this.

## 2025-02-13 NOTE — PT/OT/SLP PROGRESS
Speech Language Pathology      Karie Denney  MRN: [id number]    Patient not seen today secondary to she has been accepted to be transferred to [facility] secondary to needing a MODIFIED BARIUM SWALLOW  study. SLP was in patient's room to complete the BEDSIDE SWALLOW EVALUATION when the nurse practitioner came in and reported that she had been accepted at [facility]. Will d/c order for BEDSIDE SWALLOW EVALUATION at this time.

## 2025-02-13 NOTE — PLAN OF CARE
Problem: Infection  Goal: Absence of Infection Signs and Symptoms  Outcome: Progressing  Intervention: Prevent or Manage Infection  Flowsheets (Taken 2/12/2025 1802)  Fever Reduction/Comfort Measures:   lightweight bedding   lightweight clothing     Problem: Pneumonia  Goal: Fluid Balance  Outcome: Progressing  Intervention: Monitor and Manage Fluid Balance  Flowsheets (Taken 2/12/2025 1802)  Fluid/Electrolyte Management: fluids provided

## 2025-02-13 NOTE — PLAN OF CARE
Problem: Infection  Goal: Absence of Infection Signs and Symptoms  2/13/2025 1708 by Annel Verdin RN  Outcome: Progressing  2/13/2025 1657 by Annel Verdin RN  Outcome: Progressing     Problem: Adult Inpatient Plan of Care  Goal: Plan of Care Review  2/13/2025 1708 by Annel Verdin RN  Outcome: Progressing  2/13/2025 1657 by Annel Verdin RN  Outcome: Progressing  Goal: Patient-Specific Goal (Individualized)  2/13/2025 1708 by Annel Verdin RN  Outcome: Progressing  2/13/2025 1657 by Annel Verdin RN  Outcome: Progressing  Goal: Absence of Hospital-Acquired Illness or Injury  2/13/2025 1708 by Annel Verdin RN  Outcome: Progressing  2/13/2025 1657 by Annel Verdin RN  Outcome: Progressing  Goal: Optimal Comfort and Wellbeing  2/13/2025 1708 by Annel Verdin RN  Outcome: Progressing  2/13/2025 1657 by Annel Verdin RN  Outcome: Progressing  Goal: Readiness for Transition of Care  2/13/2025 1708 by Annel Verdin RN  Outcome: Progressing  2/13/2025 1657 by Annel Verdin RN  Outcome: Progressing     Problem: Pneumonia  Goal: Fluid Balance  2/13/2025 1708 by Annel Verdin RN  Outcome: Progressing  2/13/2025 1657 by Annel Verdin RN  Outcome: Progressing  Goal: Resolution of Infection Signs and Symptoms  2/13/2025 1708 by Annel Verdin RN  Outcome: Progressing  2/13/2025 1657 by Annel Verdin RN  Outcome: Progressing  Goal: Effective Oxygenation and Ventilation  2/13/2025 1708 by Annel Verdin RN  Outcome: Progressing  2/13/2025 1657 by Annel Verdin RN  Outcome: Progressing     Problem: Skin Injury Risk Increased  Goal: Skin Health and Integrity  2/13/2025 1708 by Annel Verdin RN  Outcome: Progressing  2/13/2025 1657 by Annel Verdin RN  Outcome: Progressing

## 2025-02-14 ENCOUNTER — HOSPITAL ENCOUNTER (INPATIENT)
Facility: HOSPITAL | Age: 60
LOS: 14 days | Discharge: OTHER INSTITUTION | DRG: 207 | End: 2025-02-28
Payer: MEDICARE

## 2025-02-14 DIAGNOSIS — I47.10 SVT (SUPRAVENTRICULAR TACHYCARDIA): ICD-10-CM

## 2025-02-14 DIAGNOSIS — Z16.12 UTI DUE TO EXTENDED-SPECTRUM BETA LACTAMASE (ESBL) PRODUCING ESCHERICHIA COLI: ICD-10-CM

## 2025-02-14 DIAGNOSIS — I50.9 CHF (CONGESTIVE HEART FAILURE): ICD-10-CM

## 2025-02-14 DIAGNOSIS — R00.0 TACHYCARDIA: ICD-10-CM

## 2025-02-14 DIAGNOSIS — J69.0 ASPIRATION PNEUMONIA, UNSPECIFIED ASPIRATION PNEUMONIA TYPE, UNSPECIFIED LATERALITY, UNSPECIFIED PART OF LUNG: Primary | ICD-10-CM

## 2025-02-14 DIAGNOSIS — I31.39 PERICARDIAL EFFUSION: ICD-10-CM

## 2025-02-14 DIAGNOSIS — K59.04 CHRONIC IDIOPATHIC CONSTIPATION: ICD-10-CM

## 2025-02-14 DIAGNOSIS — F32.A DEPRESSION, UNSPECIFIED DEPRESSION TYPE: ICD-10-CM

## 2025-02-14 DIAGNOSIS — R13.10 DYSPHAGIA, UNSPECIFIED TYPE: ICD-10-CM

## 2025-02-14 DIAGNOSIS — T17.500A MUCUS PLUGGING OF BRONCHI: ICD-10-CM

## 2025-02-14 DIAGNOSIS — I30.9 ACUTE PERICARDITIS, UNSPECIFIED TYPE: ICD-10-CM

## 2025-02-14 DIAGNOSIS — T50.905A SEDATED DUE TO MEDICATION: ICD-10-CM

## 2025-02-14 DIAGNOSIS — R07.9 CHEST PAIN: ICD-10-CM

## 2025-02-14 DIAGNOSIS — D62 ACUTE BLOOD LOSS ANEMIA: ICD-10-CM

## 2025-02-14 DIAGNOSIS — K21.9 GASTROESOPHAGEAL REFLUX DISEASE, UNSPECIFIED WHETHER ESOPHAGITIS PRESENT: ICD-10-CM

## 2025-02-14 DIAGNOSIS — J69.0 ASPIRATION PNEUMONIA OF RIGHT MIDDLE LOBE, UNSPECIFIED ASPIRATION PNEUMONIA TYPE: ICD-10-CM

## 2025-02-14 DIAGNOSIS — J90 PLEURAL EFFUSION: ICD-10-CM

## 2025-02-14 DIAGNOSIS — J96.01 ACUTE RESPIRATORY FAILURE WITH HYPOXIA AND HYPERCARBIA: ICD-10-CM

## 2025-02-14 DIAGNOSIS — J69.0 ASPIRATION PNEUMONIA: ICD-10-CM

## 2025-02-14 DIAGNOSIS — J96.01 ACUTE HYPOXEMIC RESPIRATORY FAILURE: ICD-10-CM

## 2025-02-14 DIAGNOSIS — B96.29 UTI DUE TO EXTENDED-SPECTRUM BETA LACTAMASE (ESBL) PRODUCING ESCHERICHIA COLI: ICD-10-CM

## 2025-02-14 DIAGNOSIS — R40.4 SEDATED DUE TO MEDICATION: ICD-10-CM

## 2025-02-14 DIAGNOSIS — R00.0 HEART RATE FAST: ICD-10-CM

## 2025-02-14 DIAGNOSIS — I30.0 ACUTE IDIOPATHIC PERICARDITIS: ICD-10-CM

## 2025-02-14 DIAGNOSIS — R23.3 SPONTANEOUS HEMATOMA OF LOWER LEG: ICD-10-CM

## 2025-02-14 DIAGNOSIS — R57.1 HYPOVOLEMIC SHOCK: ICD-10-CM

## 2025-02-14 DIAGNOSIS — N39.0 UTI DUE TO EXTENDED-SPECTRUM BETA LACTAMASE (ESBL) PRODUCING ESCHERICHIA COLI: ICD-10-CM

## 2025-02-14 DIAGNOSIS — G82.20 PARAPLEGIA: ICD-10-CM

## 2025-02-14 DIAGNOSIS — N30.01 ACUTE CYSTITIS WITH HEMATURIA: ICD-10-CM

## 2025-02-14 DIAGNOSIS — J96.02 ACUTE RESPIRATORY FAILURE WITH HYPOXIA AND HYPERCARBIA: ICD-10-CM

## 2025-02-14 LAB
ANION GAP SERPL CALCULATED.3IONS-SCNC: 10 MMOL/L (ref 7–16)
ANISOCYTOSIS BLD QL SMEAR: ABNORMAL
BASOPHILS # BLD AUTO: 0.02 K/UL (ref 0–0.2)
BASOPHILS NFR BLD AUTO: 0.2 % (ref 0–1)
BUN SERPL-MCNC: 10 MG/DL (ref 10–20)
BUN/CREAT SERPL: 19 (ref 6–20)
CALCIUM SERPL-MCNC: 9 MG/DL (ref 8.4–10.2)
CHLORIDE SERPL-SCNC: 106 MMOL/L (ref 98–107)
CO2 SERPL-SCNC: 27 MMOL/L (ref 23–31)
CREAT SERPL-MCNC: 0.53 MG/DL (ref 0.55–1.02)
DIFFERENTIAL METHOD BLD: ABNORMAL
EGFR (NO RACE VARIABLE) (RUSH/TITUS): 105 ML/MIN/1.73M2
EOSINOPHIL # BLD AUTO: 0.06 K/UL (ref 0–0.5)
EOSINOPHIL NFR BLD AUTO: 0.6 % (ref 1–4)
ERYTHROCYTE [DISTWIDTH] IN BLOOD BY AUTOMATED COUNT: 17.2 % (ref 11.5–14.5)
GLUCOSE SERPL-MCNC: 68 MG/DL (ref 82–115)
HCT VFR BLD AUTO: 33.5 % (ref 38–47)
HGB BLD-MCNC: 9.7 G/DL (ref 12–16)
HYPOCHROMIA BLD QL SMEAR: ABNORMAL
IMM GRANULOCYTES # BLD AUTO: 0.05 K/UL (ref 0–0.04)
IMM GRANULOCYTES NFR BLD: 0.5 % (ref 0–0.4)
LYMPHOCYTES # BLD AUTO: 1.34 K/UL (ref 1–4.8)
LYMPHOCYTES NFR BLD AUTO: 12.8 % (ref 27–41)
MAGNESIUM SERPL-MCNC: 1.8 MG/DL (ref 1.6–2.6)
MCH RBC QN AUTO: 24.1 PG (ref 27–31)
MCHC RBC AUTO-ENTMCNC: 29 G/DL (ref 32–36)
MCV RBC AUTO: 83.1 FL (ref 80–96)
MONOCYTES # BLD AUTO: 0.47 K/UL (ref 0–0.8)
MONOCYTES NFR BLD AUTO: 4.5 % (ref 2–6)
MPC BLD CALC-MCNC: 11.8 FL (ref 9.4–12.4)
NEUTROPHILS # BLD AUTO: 8.54 K/UL (ref 1.8–7.7)
NEUTROPHILS NFR BLD AUTO: 81.4 % (ref 53–65)
NRBC # BLD AUTO: 0 X10E3/UL
NRBC, AUTO (.00): 0 %
PLATELET # BLD AUTO: 140 K/UL (ref 150–400)
PLATELET MORPHOLOGY: ABNORMAL
POTASSIUM SERPL-SCNC: 3.3 MMOL/L (ref 3.5–5.1)
RBC # BLD AUTO: 4.03 M/UL (ref 4.2–5.4)
SODIUM SERPL-SCNC: 140 MMOL/L (ref 136–145)
WBC # BLD AUTO: 10.48 K/UL (ref 4.5–11)

## 2025-02-14 PROCEDURE — 94761 N-INVAS EAR/PLS OXIMETRY MLT: CPT

## 2025-02-14 PROCEDURE — 27000221 HC OXYGEN, UP TO 24 HOURS

## 2025-02-14 PROCEDURE — 94640 AIRWAY INHALATION TREATMENT: CPT

## 2025-02-14 PROCEDURE — 85025 COMPLETE CBC W/AUTO DIFF WBC: CPT | Performed by: NURSE PRACTITIONER

## 2025-02-14 PROCEDURE — 11000001 HC ACUTE MED/SURG PRIVATE ROOM

## 2025-02-14 PROCEDURE — 99900035 HC TECH TIME PER 15 MIN (STAT)

## 2025-02-14 PROCEDURE — 25000242 PHARM REV CODE 250 ALT 637 W/ HCPCS: Performed by: NURSE PRACTITIONER

## 2025-02-14 PROCEDURE — 80048 BASIC METABOLIC PNL TOTAL CA: CPT | Performed by: NURSE PRACTITIONER

## 2025-02-14 PROCEDURE — 36415 COLL VENOUS BLD VENIPUNCTURE: CPT | Performed by: NURSE PRACTITIONER

## 2025-02-14 PROCEDURE — 83735 ASSAY OF MAGNESIUM: CPT | Performed by: NURSE PRACTITIONER

## 2025-02-14 PROCEDURE — 63600175 PHARM REV CODE 636 W HCPCS: Performed by: NURSE PRACTITIONER

## 2025-02-14 PROCEDURE — 25000003 PHARM REV CODE 250: Performed by: NURSE PRACTITIONER

## 2025-02-14 PROCEDURE — 27000944

## 2025-02-14 PROCEDURE — S5010 5% DEXTROSE AND 0.45% SALINE: HCPCS | Performed by: NURSE PRACTITIONER

## 2025-02-14 PROCEDURE — 94668 MNPJ CHEST WALL SBSQ: CPT

## 2025-02-14 RX ORDER — DEXTROSE MONOHYDRATE AND SODIUM CHLORIDE 5; .45 G/100ML; G/100ML
INJECTION, SOLUTION INTRAVENOUS CONTINUOUS
Status: DISCONTINUED | OUTPATIENT
Start: 2025-02-14 | End: 2025-02-14 | Stop reason: HOSPADM

## 2025-02-14 RX ORDER — POTASSIUM CHLORIDE 7.45 MG/ML
10 INJECTION INTRAVENOUS
Status: COMPLETED | OUTPATIENT
Start: 2025-02-14 | End: 2025-02-14

## 2025-02-14 RX ADMIN — ATORVASTATIN CALCIUM 20 MG: 10 TABLET, FILM COATED ORAL at 08:02

## 2025-02-14 RX ADMIN — LINACLOTIDE 290 MCG: 145 CAPSULE, GELATIN COATED ORAL at 06:02

## 2025-02-14 RX ADMIN — Medication 1 CAPSULE: at 11:02

## 2025-02-14 RX ADMIN — METOPROLOL SUCCINATE 25 MG: 25 TABLET, EXTENDED RELEASE ORAL at 08:02

## 2025-02-14 RX ADMIN — Medication 1 CAPSULE: at 08:02

## 2025-02-14 RX ADMIN — POTASSIUM CHLORIDE 10 MEQ: 10 INJECTION INTRAVENOUS at 11:02

## 2025-02-14 RX ADMIN — POTASSIUM CHLORIDE 10 MEQ: 10 INJECTION INTRAVENOUS at 02:02

## 2025-02-14 RX ADMIN — IPRATROPIUM BROMIDE AND ALBUTEROL SULFATE 3 ML: 2.5; .5 SOLUTION RESPIRATORY (INHALATION) at 07:02

## 2025-02-14 RX ADMIN — AZITHROMYCIN MONOHYDRATE 500 MG: 500 INJECTION, POWDER, LYOPHILIZED, FOR SOLUTION INTRAVENOUS at 05:02

## 2025-02-14 RX ADMIN — POTASSIUM CHLORIDE 10 MEQ: 10 INJECTION INTRAVENOUS at 10:02

## 2025-02-14 RX ADMIN — Medication 1 CAPSULE: at 04:02

## 2025-02-14 RX ADMIN — PANTOPRAZOLE SODIUM 40 MG: 40 TABLET, DELAYED RELEASE ORAL at 08:02

## 2025-02-14 RX ADMIN — ENOXAPARIN SODIUM 40 MG: 40 INJECTION SUBCUTANEOUS at 04:02

## 2025-02-14 RX ADMIN — DEXTROSE AND SODIUM CHLORIDE: 5; 450 INJECTION, SOLUTION INTRAVENOUS at 03:02

## 2025-02-14 RX ADMIN — DEXTROSE AND SODIUM CHLORIDE: 5; 450 INJECTION, SOLUTION INTRAVENOUS at 07:02

## 2025-02-14 RX ADMIN — GUAIFENESIN 600 MG: 600 TABLET, EXTENDED RELEASE ORAL at 08:02

## 2025-02-14 RX ADMIN — CEFTRIAXONE SODIUM 2 G: 2 INJECTION, POWDER, FOR SOLUTION INTRAMUSCULAR; INTRAVENOUS at 08:02

## 2025-02-14 RX ADMIN — SODIUM CHLORIDE: 900 INJECTION, SOLUTION INTRAVENOUS at 02:02

## 2025-02-14 RX ADMIN — SERTRALINE HYDROCHLORIDE 50 MG: 50 TABLET ORAL at 08:02

## 2025-02-14 NOTE — PLAN OF CARE
Problem: Infection  Goal: Absence of Infection Signs and Symptoms  Outcome: Met     Problem: Adult Inpatient Plan of Care  Goal: Plan of Care Review  Outcome: Met  Goal: Patient-Specific Goal (Individualized)  Outcome: Met  Goal: Absence of Hospital-Acquired Illness or Injury  Outcome: Met  Goal: Optimal Comfort and Wellbeing  Outcome: Met  Goal: Readiness for Transition of Care  Outcome: Met     Problem: Pneumonia  Goal: Fluid Balance  Outcome: Met  Goal: Resolution of Infection Signs and Symptoms  Outcome: Met  Goal: Effective Oxygenation and Ventilation  Outcome: Met     Problem: Skin Injury Risk Increased  Goal: Skin Health and Integrity  Outcome: Met

## 2025-02-14 NOTE — PLAN OF CARE
[facility]  Discharge Assessment    Primary Care Provider: Gonzalo Barrera NP     Discharge Assessment (most recent)       BRIEF DISCHARGE ASSESSMENT - 02/14/25 1126          Discharge Planning    Assessment Type Discharge Planning Brief Assessment (P)                    Visited with Ms. Denney and her mother, they are awaiting transfer to [facility]. No needs voiced at this time.

## 2025-02-14 NOTE — NURSING
Discharge papers given and explained to patient. Pt voiced understanding. Awaiting on patient to finish eating lunch before transporting pt to automobile

## 2025-02-15 VITALS
TEMPERATURE: 98 F | SYSTOLIC BLOOD PRESSURE: 140 MMHG | WEIGHT: 190 LBS | BODY MASS INDEX: 30.53 KG/M2 | HEIGHT: 66 IN | DIASTOLIC BLOOD PRESSURE: 80 MMHG | RESPIRATION RATE: 16 BRPM | OXYGEN SATURATION: 100 % | HEART RATE: 92 BPM

## 2025-02-15 PROBLEM — F51.01 PRIMARY INSOMNIA: Status: ACTIVE | Noted: 2025-02-15

## 2025-02-15 PROBLEM — J69.0 ASPIRATION PNEUMONIA: Status: ACTIVE | Noted: 2025-02-12

## 2025-02-15 PROBLEM — E87.6 HYPOKALEMIA: Status: ACTIVE | Noted: 2025-02-15

## 2025-02-15 PROBLEM — L89.90 PRESSURE ULCERS OF SKIN OF MULTIPLE TOPOGRAPHIC SITES: Status: ACTIVE | Noted: 2025-02-12

## 2025-02-15 PROBLEM — R13.10 DYSPHAGIA: Status: ACTIVE | Noted: 2025-02-15

## 2025-02-15 PROBLEM — J96.01 ACUTE HYPOXEMIC RESPIRATORY FAILURE: Status: ACTIVE | Noted: 2025-02-15

## 2025-02-15 PROBLEM — K59.04 CHRONIC IDIOPATHIC CONSTIPATION: Status: ACTIVE | Noted: 2024-08-27

## 2025-02-15 PROBLEM — F32.A DEPRESSION: Status: ACTIVE | Noted: 2024-08-27

## 2025-02-15 LAB
ALBUMIN SERPL BCP-MCNC: 2.8 G/DL (ref 3.4–4.8)
ALBUMIN/GLOB SERPL: 0.9 {RATIO}
ALP SERPL-CCNC: 72 U/L (ref 40–150)
ALT SERPL W P-5'-P-CCNC: 18 U/L
ANION GAP SERPL CALCULATED.3IONS-SCNC: 11 MMOL/L (ref 7–16)
ANION GAP SERPL CALCULATED.3IONS-SCNC: 14 MMOL/L (ref 7–16)
AST SERPL W P-5'-P-CCNC: 25 U/L (ref 5–34)
BASOPHILS # BLD AUTO: 0.03 K/UL (ref 0–0.2)
BASOPHILS # BLD AUTO: 0.04 K/UL (ref 0–0.2)
BASOPHILS NFR BLD AUTO: 0.3 % (ref 0–1)
BASOPHILS NFR BLD AUTO: 0.4 % (ref 0–1)
BILIRUB SERPL-MCNC: 0.5 MG/DL
BUN SERPL-MCNC: 7 MG/DL (ref 10–20)
BUN SERPL-MCNC: 7 MG/DL (ref 10–20)
BUN/CREAT SERPL: 12 (ref 6–20)
BUN/CREAT SERPL: 13 (ref 6–20)
CALCIUM SERPL-MCNC: 8.7 MG/DL (ref 8.4–10.2)
CALCIUM SERPL-MCNC: 9 MG/DL (ref 8.4–10.2)
CHLORIDE SERPL-SCNC: 105 MMOL/L (ref 98–107)
CHLORIDE SERPL-SCNC: 105 MMOL/L (ref 98–107)
CO2 SERPL-SCNC: 27 MMOL/L (ref 23–31)
CO2 SERPL-SCNC: 29 MMOL/L (ref 23–31)
CREAT SERPL-MCNC: 0.52 MG/DL (ref 0.55–1.02)
CREAT SERPL-MCNC: 0.58 MG/DL (ref 0.55–1.02)
CRENATED CELLS: NORMAL
DIFFERENTIAL METHOD BLD: ABNORMAL
DIFFERENTIAL METHOD BLD: ABNORMAL
EGFR (NO RACE VARIABLE) (RUSH/TITUS): 103 ML/MIN/1.73M2
EGFR (NO RACE VARIABLE) (RUSH/TITUS): 106 ML/MIN/1.73M2
EOSINOPHIL # BLD AUTO: 0.1 K/UL (ref 0–0.5)
EOSINOPHIL # BLD AUTO: 0.11 K/UL (ref 0–0.5)
EOSINOPHIL NFR BLD AUTO: 1 % (ref 1–4)
EOSINOPHIL NFR BLD AUTO: 1.2 % (ref 1–4)
ERYTHROCYTE [DISTWIDTH] IN BLOOD BY AUTOMATED COUNT: 17.1 % (ref 11.5–14.5)
ERYTHROCYTE [DISTWIDTH] IN BLOOD BY AUTOMATED COUNT: 17.2 % (ref 11.5–14.5)
GLOBULIN SER-MCNC: 3 G/DL (ref 2–4)
GLUCOSE SERPL-MCNC: 82 MG/DL (ref 82–115)
GLUCOSE SERPL-MCNC: 83 MG/DL (ref 82–115)
HCO3 UR-SCNC: 35 MMOL/L (ref 21–28)
HCT VFR BLD AUTO: 35.6 % (ref 38–47)
HCT VFR BLD AUTO: 39.7 % (ref 38–47)
HGB BLD-MCNC: 10 G/DL (ref 12–16)
HGB BLD-MCNC: 11.4 G/DL (ref 12–16)
IMM GRANULOCYTES # BLD AUTO: 0.06 K/UL (ref 0–0.04)
IMM GRANULOCYTES # BLD AUTO: 0.08 K/UL (ref 0–0.04)
IMM GRANULOCYTES NFR BLD: 0.7 % (ref 0–0.4)
IMM GRANULOCYTES NFR BLD: 0.8 % (ref 0–0.4)
LYMPHOCYTES # BLD AUTO: 1.17 K/UL (ref 1–4.8)
LYMPHOCYTES # BLD AUTO: 1.18 K/UL (ref 1–4.8)
LYMPHOCYTES NFR BLD AUTO: 12.2 % (ref 27–41)
LYMPHOCYTES NFR BLD AUTO: 13 % (ref 27–41)
MCH RBC QN AUTO: 23.8 PG (ref 27–31)
MCH RBC QN AUTO: 24.1 PG (ref 27–31)
MCHC RBC AUTO-ENTMCNC: 28.1 G/DL (ref 32–36)
MCHC RBC AUTO-ENTMCNC: 28.7 G/DL (ref 32–36)
MCV RBC AUTO: 83.9 FL (ref 80–96)
MCV RBC AUTO: 84.6 FL (ref 80–96)
MONOCYTES # BLD AUTO: 0.51 K/UL (ref 0–0.8)
MONOCYTES # BLD AUTO: 0.6 K/UL (ref 0–0.8)
MONOCYTES NFR BLD AUTO: 5.3 % (ref 2–6)
MONOCYTES NFR BLD AUTO: 6.7 % (ref 2–6)
MPC BLD CALC-MCNC: 11.4 FL (ref 9.4–12.4)
MPC BLD CALC-MCNC: 12.2 FL (ref 9.4–12.4)
NEUTROPHILS # BLD AUTO: 7.04 K/UL (ref 1.8–7.7)
NEUTROPHILS # BLD AUTO: 7.74 K/UL (ref 1.8–7.7)
NEUTROPHILS NFR BLD AUTO: 78 % (ref 53–65)
NEUTROPHILS NFR BLD AUTO: 80.4 % (ref 53–65)
NRBC # BLD AUTO: 0 X10E3/UL
NRBC # BLD AUTO: 0 X10E3/UL
NRBC, AUTO (.00): 0 %
NRBC, AUTO (.00): 0 %
OHS QRS DURATION: 72 MS
OHS QTC CALCULATION: 460 MS
OVALOCYTES BLD QL SMEAR: NORMAL
PCO2 BLDA: 62 MMHG (ref 35–48)
PH SMN: 7.36 [PH] (ref 7.35–7.45)
PLATELET # BLD AUTO: 141 K/UL (ref 150–400)
PLATELET # BLD AUTO: 168 K/UL (ref 150–400)
PLATELET MORPHOLOGY: NORMAL
PO2 BLDA: 80 MMHG (ref 83–108)
POC BASE EXCESS: 7.6 MMOL/L (ref -2–3)
POC SATURATED O2: 95 % (ref 95–98)
POLYCHROMASIA BLD QL SMEAR: NORMAL
POTASSIUM SERPL-SCNC: 3.4 MMOL/L (ref 3.5–5.1)
POTASSIUM SERPL-SCNC: 3.4 MMOL/L (ref 3.5–5.1)
PROT SERPL-MCNC: 5.8 G/DL (ref 5.8–7.6)
RBC # BLD AUTO: 4.21 M/UL (ref 4.2–5.4)
RBC # BLD AUTO: 4.73 M/UL (ref 4.2–5.4)
SODIUM SERPL-SCNC: 142 MMOL/L (ref 136–145)
SODIUM SERPL-SCNC: 143 MMOL/L (ref 136–145)
VANCOMYCIN TROUGH SERPL-MCNC: <1.4 ΜG/ML (ref 15–20)
WBC # BLD AUTO: 9.02 K/UL (ref 4.5–11)
WBC # BLD AUTO: 9.64 K/UL (ref 4.5–11)

## 2025-02-15 PROCEDURE — 80053 COMPREHEN METABOLIC PANEL: CPT

## 2025-02-15 PROCEDURE — 99233 SBSQ HOSP IP/OBS HIGH 50: CPT | Mod: ,,, | Performed by: HOSPITALIST

## 2025-02-15 PROCEDURE — 11000001 HC ACUTE MED/SURG PRIVATE ROOM

## 2025-02-15 PROCEDURE — 63600175 PHARM REV CODE 636 W HCPCS

## 2025-02-15 PROCEDURE — 94640 AIRWAY INHALATION TREATMENT: CPT

## 2025-02-15 PROCEDURE — 63600175 PHARM REV CODE 636 W HCPCS: Performed by: INTERNAL MEDICINE

## 2025-02-15 PROCEDURE — 25000003 PHARM REV CODE 250

## 2025-02-15 PROCEDURE — 94761 N-INVAS EAR/PLS OXIMETRY MLT: CPT

## 2025-02-15 PROCEDURE — 92610 EVALUATE SWALLOWING FUNCTION: CPT

## 2025-02-15 PROCEDURE — 82803 BLOOD GASES ANY COMBINATION: CPT

## 2025-02-15 PROCEDURE — 25000242 PHARM REV CODE 250 ALT 637 W/ HCPCS

## 2025-02-15 PROCEDURE — 36415 COLL VENOUS BLD VENIPUNCTURE: CPT

## 2025-02-15 PROCEDURE — 85025 COMPLETE CBC W/AUTO DIFF WBC: CPT | Performed by: INTERNAL MEDICINE

## 2025-02-15 PROCEDURE — 85025 COMPLETE CBC W/AUTO DIFF WBC: CPT

## 2025-02-15 PROCEDURE — 99900035 HC TECH TIME PER 15 MIN (STAT)

## 2025-02-15 PROCEDURE — 36600 WITHDRAWAL OF ARTERIAL BLOOD: CPT

## 2025-02-15 PROCEDURE — 80202 ASSAY OF VANCOMYCIN: CPT

## 2025-02-15 PROCEDURE — 36415 COLL VENOUS BLD VENIPUNCTURE: CPT | Performed by: INTERNAL MEDICINE

## 2025-02-15 PROCEDURE — 63600175 PHARM REV CODE 636 W HCPCS: Performed by: HOSPITALIST

## 2025-02-15 PROCEDURE — 27000221 HC OXYGEN, UP TO 24 HOURS

## 2025-02-15 RX ORDER — SERTRALINE HYDROCHLORIDE 50 MG/1
50 TABLET, FILM COATED ORAL DAILY
Status: DISCONTINUED | OUTPATIENT
Start: 2025-02-15 | End: 2025-02-15

## 2025-02-15 RX ORDER — IPRATROPIUM BROMIDE AND ALBUTEROL SULFATE 2.5; .5 MG/3ML; MG/3ML
3 SOLUTION RESPIRATORY (INHALATION)
Status: DISCONTINUED | OUTPATIENT
Start: 2025-02-15 | End: 2025-02-28 | Stop reason: HOSPADM

## 2025-02-15 RX ORDER — POLYETHYLENE GLYCOL 3350 17 G/17G
17 POWDER, FOR SOLUTION ORAL 2 TIMES DAILY PRN
Status: DISCONTINUED | OUTPATIENT
Start: 2025-02-15 | End: 2025-02-15

## 2025-02-15 RX ORDER — IBUPROFEN 200 MG
24 TABLET ORAL
Status: DISCONTINUED | OUTPATIENT
Start: 2025-02-15 | End: 2025-02-25

## 2025-02-15 RX ORDER — SODIUM CHLORIDE 0.9 % (FLUSH) 0.9 %
10 SYRINGE (ML) INJECTION EVERY 12 HOURS PRN
Status: DISCONTINUED | OUTPATIENT
Start: 2025-02-15 | End: 2025-02-28 | Stop reason: HOSPADM

## 2025-02-15 RX ORDER — PREGABALIN 75 MG/1
75 CAPSULE ORAL 2 TIMES DAILY
Status: DISCONTINUED | OUTPATIENT
Start: 2025-02-15 | End: 2025-02-15

## 2025-02-15 RX ORDER — ENOXAPARIN SODIUM 100 MG/ML
40 INJECTION SUBCUTANEOUS EVERY 24 HOURS
Status: DISCONTINUED | OUTPATIENT
Start: 2025-02-15 | End: 2025-02-17

## 2025-02-15 RX ORDER — SODIUM CHLORIDE, SODIUM LACTATE, POTASSIUM CHLORIDE, CALCIUM CHLORIDE 600; 310; 30; 20 MG/100ML; MG/100ML; MG/100ML; MG/100ML
INJECTION, SOLUTION INTRAVENOUS CONTINUOUS
Status: DISCONTINUED | OUTPATIENT
Start: 2025-02-15 | End: 2025-02-17

## 2025-02-15 RX ORDER — PROCHLORPERAZINE EDISYLATE 5 MG/ML
5 INJECTION INTRAMUSCULAR; INTRAVENOUS EVERY 6 HOURS PRN
Status: DISCONTINUED | OUTPATIENT
Start: 2025-02-15 | End: 2025-02-28 | Stop reason: HOSPADM

## 2025-02-15 RX ORDER — AZTREONAM 1 G/1
1000 INJECTION, POWDER, LYOPHILIZED, FOR SOLUTION INTRAMUSCULAR; INTRAVENOUS
Status: DISCONTINUED | OUTPATIENT
Start: 2025-02-15 | End: 2025-02-15

## 2025-02-15 RX ORDER — CEFTRIAXONE 1 G/1
1 INJECTION, POWDER, FOR SOLUTION INTRAMUSCULAR; INTRAVENOUS
Status: DISCONTINUED | OUTPATIENT
Start: 2025-02-15 | End: 2025-02-15

## 2025-02-15 RX ORDER — ATORVASTATIN CALCIUM 20 MG/1
20 TABLET, FILM COATED ORAL DAILY
Status: DISCONTINUED | OUTPATIENT
Start: 2025-02-15 | End: 2025-02-15

## 2025-02-15 RX ORDER — IBUPROFEN 200 MG
16 TABLET ORAL
Status: DISCONTINUED | OUTPATIENT
Start: 2025-02-15 | End: 2025-02-25

## 2025-02-15 RX ORDER — NALOXONE HCL 0.4 MG/ML
0.02 VIAL (ML) INJECTION
Status: DISCONTINUED | OUTPATIENT
Start: 2025-02-15 | End: 2025-02-28 | Stop reason: HOSPADM

## 2025-02-15 RX ORDER — TRAZODONE HYDROCHLORIDE 50 MG/1
100 TABLET ORAL NIGHTLY
Status: DISCONTINUED | OUTPATIENT
Start: 2025-02-15 | End: 2025-02-15

## 2025-02-15 RX ORDER — AZTREONAM 2 G/1
2000 INJECTION, POWDER, LYOPHILIZED, FOR SOLUTION INTRAMUSCULAR; INTRAVENOUS
Status: DISCONTINUED | OUTPATIENT
Start: 2025-02-15 | End: 2025-02-17

## 2025-02-15 RX ORDER — METRONIDAZOLE 500 MG/100ML
500 INJECTION, SOLUTION INTRAVENOUS
Status: DISCONTINUED | OUTPATIENT
Start: 2025-02-15 | End: 2025-02-16

## 2025-02-15 RX ORDER — GLUCAGON 1 MG
1 KIT INJECTION
Status: DISCONTINUED | OUTPATIENT
Start: 2025-02-15 | End: 2025-02-25

## 2025-02-15 RX ORDER — ONDANSETRON HYDROCHLORIDE 2 MG/ML
4 INJECTION, SOLUTION INTRAVENOUS EVERY 8 HOURS PRN
Status: DISCONTINUED | OUTPATIENT
Start: 2025-02-15 | End: 2025-02-28 | Stop reason: HOSPADM

## 2025-02-15 RX ORDER — PANTOPRAZOLE SODIUM 40 MG/1
40 TABLET, DELAYED RELEASE ORAL DAILY
Status: DISCONTINUED | OUTPATIENT
Start: 2025-02-15 | End: 2025-02-15

## 2025-02-15 RX ORDER — LEVOFLOXACIN 5 MG/ML
750 INJECTION, SOLUTION INTRAVENOUS
Status: DISCONTINUED | OUTPATIENT
Start: 2025-02-15 | End: 2025-02-15

## 2025-02-15 RX ADMIN — IPRATROPIUM BROMIDE AND ALBUTEROL SULFATE 3 ML: 2.5; .5 SOLUTION RESPIRATORY (INHALATION) at 07:02

## 2025-02-15 RX ADMIN — ENOXAPARIN SODIUM 40 MG: 40 INJECTION SUBCUTANEOUS at 05:02

## 2025-02-15 RX ADMIN — METRONIDAZOLE 500 MG: 500 INJECTION, SOLUTION INTRAVENOUS at 08:02

## 2025-02-15 RX ADMIN — IPRATROPIUM BROMIDE AND ALBUTEROL SULFATE 3 ML: 2.5; .5 SOLUTION RESPIRATORY (INHALATION) at 01:02

## 2025-02-15 RX ADMIN — AZTREONAM 1000 MG: 1 INJECTION, POWDER, LYOPHILIZED, FOR SOLUTION INTRAMUSCULAR; INTRAVENOUS at 12:02

## 2025-02-15 RX ADMIN — SODIUM CHLORIDE, POTASSIUM CHLORIDE, SODIUM LACTATE AND CALCIUM CHLORIDE: 600; 310; 30; 20 INJECTION, SOLUTION INTRAVENOUS at 04:02

## 2025-02-15 RX ADMIN — VANCOMYCIN HYDROCHLORIDE 1750 MG: 500 INJECTION, POWDER, LYOPHILIZED, FOR SOLUTION INTRAVENOUS at 06:02

## 2025-02-15 RX ADMIN — METRONIDAZOLE 500 MG: 500 INJECTION, SOLUTION INTRAVENOUS at 04:02

## 2025-02-15 RX ADMIN — SODIUM CHLORIDE, POTASSIUM CHLORIDE, SODIUM LACTATE AND CALCIUM CHLORIDE: 600; 310; 30; 20 INJECTION, SOLUTION INTRAVENOUS at 10:02

## 2025-02-15 RX ADMIN — POTASSIUM BICARBONATE 40 MEQ: 782 TABLET, EFFERVESCENT ORAL at 12:02

## 2025-02-15 RX ADMIN — TRAZODONE HYDROCHLORIDE 100 MG: 50 TABLET ORAL at 12:02

## 2025-02-15 RX ADMIN — SODIUM CHLORIDE, POTASSIUM CHLORIDE, SODIUM LACTATE AND CALCIUM CHLORIDE: 600; 310; 30; 20 INJECTION, SOLUTION INTRAVENOUS at 12:02

## 2025-02-15 RX ADMIN — METRONIDAZOLE 500 MG: 500 INJECTION, SOLUTION INTRAVENOUS at 12:02

## 2025-02-15 RX ADMIN — AZTREONAM 1000 MG: 1 INJECTION, POWDER, LYOPHILIZED, FOR SOLUTION INTRAMUSCULAR; INTRAVENOUS at 04:02

## 2025-02-15 RX ADMIN — AZTREONAM 2000 MG: 2 INJECTION, POWDER, LYOPHILIZED, FOR SOLUTION INTRAMUSCULAR; INTRAVENOUS at 08:02

## 2025-02-15 RX ADMIN — VANCOMYCIN HYDROCHLORIDE 1750 MG: 500 INJECTION, POWDER, LYOPHILIZED, FOR SOLUTION INTRAVENOUS at 11:02

## 2025-02-15 RX ADMIN — IPRATROPIUM BROMIDE AND ALBUTEROL SULFATE 3 ML: 2.5; .5 SOLUTION RESPIRATORY (INHALATION) at 09:02

## 2025-02-15 NOTE — HPI
The patient is a 59-year-old female who was transferred from [facility] for the complaints suspected aspiration pneumonia and UTI. The patient's daughter was at bedside and provided most of the history. The patient began experiencing flu-like symptoms a few weeks ago, including a productive cough, fever, and chills. She also experienced mild shortness of breath at home. Her doctor started her on oral antibiotics for pneumonia, but the symptoms did not resolve completely.   Last Wednesday, the patient became very weak and presented to the ER, where she was diagnosed with pneumonia and admitted for treatment with IV antibiotics. she was transferred to [facility] for treatment of aspiration pneumonia, swallow evaluation test.   At the time of the encounter, she was lying comfortably in bed and did not complain of any acute issues. she was oriented x3. according to the patient's daughter, she is at her baseline as far as mentation is concerned.  Patient has PMH of Chronic constipation, GERD, insomnia, depression, neuropathy, history of CVA in 2024 leading to left-sided paralysis. she underwent back surgery in 2015 that led her paraplegic in bother lower limbs.    Patient is non ambulatory and bedbound. she lives with her mother and a home health nurse visit her once in a week. she doesn't supplemental oxygen or CPAP at home. She was on 4.5 L O2 via nasal canula at the time of encounter.

## 2025-02-15 NOTE — ASSESSMENT & PLAN NOTE
Patient's most recent potassium results are listed below.   Recent Labs     02/12/25  1446 02/13/25  0742 02/14/25  0546   K 3.7 3.5 3.3*     Plan  - Replete potassium per protocol  - Monitor potassium Daily  - Patient's hypokalemia is stable  - Monitor

## 2025-02-15 NOTE — ASSESSMENT & PLAN NOTE
Patient has a diagnosis of pneumonia. The cause of the pneumonia is suspected to be bacterial in etiology but organism is not known. The pneumonia is worsening due to hypoxia, oxygen requirement, and worsening of CXR . The patient has the following signs/symptoms of pneumonia: persistent hypoxia , cough, and sputum production. The patient does have a current oxygen requirement and the patient does not have a home oxygen requirement. I have reviewed the pertinent imaging. The following cultures have been collected: Blood cultures and Sputum culture The culture results are listed below.     Current antimicrobial regimen consists of the antibiotics listed below. Will monitor patient closely and continue current treatment plan unchanged.    Antibiotics (From admission, onward)      Start     Stop Route Frequency Ordered    02/15/25 1500  cefTRIAXone injection 1 g         -- IV Every 24 hours (non-standard times) 02/15/25 0029    02/15/25 1500  levoFLOXacin 750 mg/150 mL IVPB 750 mg         -- IV Every 24 hours (non-standard times) 02/15/25 0029            Microbiology Results (last 7 days)       Procedure Component Value Units Date/Time    Blood culture (site 1) [[id number]]     Order Status: Canceled Specimen: Blood     Blood culture (site 2) [[id number]]     Order Status: Canceled Specimen: Blood         CXR from 2/13 showed worsening from the one done on 2/12.  CXR   Impression:  Worsening acute airspace process.  Differential considerations include multifocal pneumonia, aspiration, pulmonary edema, or a combination of these entities.  - Patient failed outpatient treatment with azithromycin.  - started on Levofloxacin for broader coverage.

## 2025-02-15 NOTE — PROGRESS NOTES
Pharmacist Renal Dose Adjustment Note    Karie Denney is a 59 y.o. female being treated with the medication Azactam    Patient Data:    Vital Signs (Most Recent):  Temp: 97.9 °F (36.6 °C) (02/15/25 0758)  Pulse: 94 (02/15/25 1359)  Resp: 20 (02/15/25 1359)  BP: 138/70 (02/15/25 0758)  SpO2: 95 % (02/15/25 1359) Vital Signs (72h Range):  Temp:  [97 °F (36.1 °C)-99.7 °F (37.6 °C)]   Pulse:  []   Resp:  [11-20]   BP: ()/(39-89)   SpO2:  [88 %-100 %]      Recent Labs   Lab 02/14/25  0546 02/15/25  0036 02/15/25  0750   CREATININE 0.53* 0.52* 0.58     Serum creatinine: 0.58 mg/dL 02/15/25 0750  Estimated creatinine clearance: 112.7 mL/min    Medication:Azactam  dose: 1gm frequency q8hrs will be changed to medication:Azactam dose:2gm frequency:q8hrs    Pharmacist's Name: Carlos Dahl  Pharmacist's Extension: [phone]

## 2025-02-15 NOTE — PLAN OF CARE
Problem: SLP  Goal: SLP Goal  Description: Patient will complete hard glottal attacks and tongue base retractions exercises with mod-max accuracy Independently.   Patient will complete laryngeal elevation exercises with mod-max accuracy Independently.   Patient will complete lingual protrusions, elevations, depressions and lateralizations with mod-max accuracy Independently.   Patient will complete labial protrusions and retractions with mod-max accuracy Independently.    Outcome: Progressing

## 2025-02-15 NOTE — PLAN OF CARE
Problem: Adult Inpatient Plan of Care  Goal: Plan of Care Review  Outcome: Progressing  Goal: Patient-Specific Goal (Individualized)  Outcome: Progressing  Goal: Absence of Hospital-Acquired Illness or Injury  Outcome: Progressing  Goal: Optimal Comfort and Wellbeing  Outcome: Progressing  Goal: Readiness for Transition of Care  Outcome: Progressing     Problem: Pneumonia  Goal: Fluid Balance  Outcome: Progressing  Goal: Resolution of Infection Signs and Symptoms  Outcome: Progressing  Goal: Effective Oxygenation and Ventilation  Outcome: Progressing     Problem: Wound  Goal: Optimal Coping  Outcome: Progressing  Goal: Optimal Functional Ability  Outcome: Progressing  Goal: Absence of Infection Signs and Symptoms  Outcome: Progressing  Goal: Improved Oral Intake  Outcome: Progressing  Goal: Optimal Pain Control and Function  Outcome: Progressing  Goal: Skin Health and Integrity  Outcome: Progressing  Goal: Optimal Wound Healing  Outcome: Progressing     Problem: Skin Injury Risk Increased  Goal: Skin Health and Integrity  Outcome: Progressing

## 2025-02-15 NOTE — ASSESSMENT & PLAN NOTE
Patient has a diagnosis of pneumonia. The cause of the pneumonia is suspected to be bacterial in etiology but organism is not known. The pneumonia is worsening due to hypoxia, oxygen requirement, and worsening of CXR . The patient has the following signs/symptoms of pneumonia: persistent hypoxia , cough, and sputum production. The patient does have a current oxygen requirement and the patient does not have a home oxygen requirement. I have reviewed the pertinent imaging. The following cultures have been collected: Blood cultures and Sputum culture The culture results are listed below.     Current antimicrobial regimen consists of the antibiotics listed below. Will monitor patient closely and continue current treatment plan unchanged.    2/15: Continue with IV antibiotics.      Antibiotics (From admission, onward)      Start     Stop Route Frequency Ordered    02/15/25 0315  aztreonam injection 1,000 mg         -- IV Every 8 hours (non-standard times) 02/15/25 0208    02/15/25 0315  metronidazole IVPB 500 mg         -- IV Every 8 hours (non-standard times) 02/15/25 0208            Microbiology Results (last 7 days)       Procedure Component Value Units Date/Time    Culture, Lower Respiratory [[id number]]     Order Status: Sent Specimen: Respiratory from Sputum Induced     Blood culture (site 1) [[id number]]     Order Status: Canceled Specimen: Blood     Blood culture (site 2) [[id number]]     Order Status: Canceled Specimen: Blood           Pharmacy to dose Vancomycin  CXR from 2/13 showed worsening from the one done on 2/12.  CXR   Impression:  Worsening acute airspace process.  Differential considerations include multifocal pneumonia, aspiration, pulmonary edema, or a combination of these entities.  - Patient failed outpatient treatment with azithromycin.  CT chest with contrast pending

## 2025-02-15 NOTE — PLAN OF CARE
SW spoke with pt for her initial discharge assessment but she was not able to provide much information.  SW attempted to reach contacts on chart with no success today but will follow up on Sunday.

## 2025-02-15 NOTE — PLAN OF CARE
Problem: Airway Clearance Ineffective  Goal: Effective Airway Clearance  2/15/2025 0804 by Any Tan, RRT  Outcome: Progressing  2/15/2025 0804 by Any Tan, RRT  Outcome: Progressing  2/15/2025 0803 by Any Tan, RRT  Outcome: Progressing     Problem: Gas Exchange Impaired  Goal: Optimal Gas Exchange  Outcome: Progressing

## 2025-02-15 NOTE — ASSESSMENT & PLAN NOTE
Patient with Hypoxic Respiratory failure which is Acute.  she is not on home oxygen. Supplemental oxygen was provided and noted improved in the oxygen saturation.  Patient is currently on 4.5 L of O2 via Nasal canula     .   Signs/symptoms of respiratory failure include- wheezing and hypoxia . Contributing diagnoses includes - Aspiration and Pneumonia Labs and images were reviewed. Patient Has recent ABG, which has been reviewed. Will treat underlying causes and adjust management of respiratory failure as follows-  -  Antibiotics (From admission, onward)      Start     Stop Route Frequency Ordered    02/15/25 0315  aztreonam injection 1,000 mg         -- IV Every 8 hours (non-standard times) 02/15/25 0208    02/15/25 0315  metronidazole IVPB 500 mg         -- IV Every 8 hours (non-standard times) 02/15/25 0208       - blood cultures and sputum cultures pendi  - aileenoneluma scheduled  - swallow evaluation pending  - CXR from 2/13 showed worsening from the one done on 2/12.  CXR   Impression:  Worsening acute airspace process.  Differential considerations include multifocal pneumonia, aspiration, pulmonary edema, or a combination of these entities.  - Patient failed outpatient treatment with azithromycin.  - CT chest pending  Pharmacy to dose Vancomycin

## 2025-02-15 NOTE — PT/OT/SLP EVAL
Speech Language Pathology Evaluation  Bedside Swallow    Patient Name:  Karie Denney   MRN:  [id number]  Admitting Diagnosis: Acute hypoxemic respiratory failure    Recommendations:                 General Recommendations:  Dysphagia therapy  Diet recommendations:  Chopped meat, Regular, Thin   Aspiration Precautions: 1 bite/sip at a time, Assistance with meals, Feed only when awake/alert, HOB to 90 degrees, Remain upright 30 minutes post meal, and Standard aspiration precautions   General Precautions: Standard, aspiration  Communication strategies:  none    Assessment:     Karie Denney is a 59 y.o. female with an SLP diagnosis of Dysphagia.  She did not present with s/s of aspiration on puree and solids. During thin liquid trials, the Pt burped 1x and did not present with other s/s of aspiration. However, during all trials the Pt presented with signs of pain while swallowing characterized by grimacing and straining muscles. It is recommended that the Pt receives a regular diet with chopped meats and thin liquids. It is also recommended that the Pt receives dysphagia therapy 5x/week.    History:     Past Medical History:   Diagnosis Date    GERD (gastroesophageal reflux disease)     Paraplegia        Past Surgical History:   Procedure Laterality Date    BACK SURGERY         Social History: see chart.    Prior Intubation HX:  see chart    Modified Barium Swallow: see chart    Chest X-Rays: see chart    Prior diet: Regular diet with chopped meats..    Occupation/hobbies/homemaking: see chart.    Subjective     The Pt was lying in bed and sleeping upon ST arrival. Her daughter was sitting at bedside. Once aroused, the Pt was alert and agreeable to BSE.  Patient goals: none stated     Pain/Comfort:   No pain reported    Respiratory Status:  See chart    Objective:     Oral Musculature Evaluation  Oral Musculature: WFL  Dentition: scattered dentition  Mandibular Strength and Mobility: WFL  Oral Labial Strength and  "Mobility: [facility]  Lingual Strength and Mobility: [facility]    Bedside Swallow Eval:   Consistencies Assessed:  Thin liquids : The Pt was provided multiple trials of water via a straw. The Pt burped 1x during trials.  Puree The Pt was provided multiple trials of chocolate pudding via spoon. There was no s/s of aspiration.  Solids The Pt was provided multiple trials of a michael cracker. Water was alternated with the cracker to improve mastication. However, there was no s/s of aspiration.    Should also be noted that the Pt seemed to experience pain after each swallow (ie. Straining muscles and grimacing). When asked if she was in pain she responded with "no", but then continued to present the previously mentioned signs.   Oral Phase:   WFL    Pharyngeal Phase:   no overt clinical signs/symptoms of aspiration    Compensatory Strategies  None    Treatment: n/a    Goals:   Multidisciplinary Problems       SLP Goals          Problem: SLP    Goal Priority Disciplines Outcome   SLP Goal     SLP Progressing   Description: Patient will complete hard glottal attacks and tongue base retractions exercises with mod-max accuracy Independently.   Patient will complete laryngeal elevation exercises with mod-max accuracy Independently.   Patient will complete lingual protrusions, elevations, depressions and lateralizations with mod-max accuracy Independently.   Patient will complete labial protrusions and retractions with mod-max accuracy Independently.                         Plan:     Patient to be seen:  5 x/week   Plan of Care expires:  02/27/25  Plan of Care reviewed with:  patient, daughter   SLP Follow-Up:  Yes       Discharge recommendations:    Home health or SNF.  Barriers to Discharge:  Skilled assistance needed.     Time Tracking:     SLP Treatment Date:    2/15/2025  Speech Start Time:  0945  Speech Stop Time:  1000     Speech Total Time (min):  15 min    Billable Minutes: Eval Swallow and Oral Function 15    02/15/2025         "

## 2025-02-15 NOTE — H&P
[facility]  [facility]  History & Physical    Patient Name: Karie Denney  MRN: [id number]  Patient Class: IP- Inpatient  Admission Date: 2/14/2025  Attending Physician: Jordana Cavazos MD   Primary Care Provider: Gonzalo Barrera NP         Patient information was obtained from patient, relative(s), and past medical records.     Subjective:     Principal Problem:Acute hypoxemic respiratory failure    Chief Complaint: No chief complaint on file.       HPI: The patient is a 59-year-old female who was transferred from [facility] for the complaints suspected aspiration pneumonia and UTI. The patient's daughter was at bedside and provided most of the history. The patient began experiencing flu-like symptoms a few weeks ago, including a productive cough, fever, and chills. She also experienced mild shortness of breath at home. Her doctor started her on oral antibiotics for pneumonia, but the symptoms did not resolve completely.   Last Wednesday, the patient became very weak and presented to the ER, where she was diagnosed with pneumonia and admitted for treatment with IV antibiotics. she was transferred to [facility] for treatment of aspiration pneumonia, swallow evaluation test.   At the time of the encounter, she was lying comfortably in bed and did not complain of any acute issues. she was oriented x3. according to the patient's daughter, she is at her baseline as far as mentation is concerned.  Patient has PMH of Chronic constipation, GERD, insomnia, depression, neuropathy, history of CVA in 2024 leading to left-sided paralysis. she underwent back surgery in 2015 that led her paraplegic in bother lower limbs.    Patient is non ambulatory and bedbound. she lives with her mother and a home health nurse visit her once in a week. she doesn't supplemental oxygen or CPAP at home. She was on 4.5 L O2 via nasal canula at the time of encounter.    Past Medical History:   Diagnosis Date     GERD (gastroesophageal reflux disease)     Paraplegia        No past surgical history on file.    Review of patient's allergies indicates:   Allergen Reactions    Penicillins Anaphylaxis       Current Facility-Administered Medications on File Prior to Encounter   Medication    [COMPLETED] potassium chloride 10 mEq in 100 mL IVPB    [DISCONTINUED] 0.9% NaCl infusion    [DISCONTINUED] acetaminophen tablet 650 mg    [DISCONTINUED] albuterol-ipratropium 2.5 mg-0.5 mg/3 mL nebulizer solution 3 mL    [DISCONTINUED] atorvastatin tablet 20 mg    [DISCONTINUED] azithromycin (ZITHROMAX) 500 mg in 0.9% NaCl 250 mL IVPB (admixture device)    [DISCONTINUED] cefTRIAXone injection 2 g    [DISCONTINUED] dextrose 5 % and 0.45 % NaCl infusion    [DISCONTINUED] enoxaparin injection 40 mg    [DISCONTINUED] ergocalciferol capsule 50,000 Units    [DISCONTINUED] guaiFENesin 12 hr tablet 600 mg    [DISCONTINUED] Lactobacillus acidophilus capsule 1 capsule    [DISCONTINUED] linaCLOtide capsule 290 mcg    [DISCONTINUED] metoprolol succinate (TOPROL-XL) 24 hr tablet 25 mg    [DISCONTINUED] ondansetron injection 4 mg    [DISCONTINUED] pantoprazole EC tablet 40 mg    [DISCONTINUED] sertraline tablet 50 mg    [DISCONTINUED] sodium chloride 0.9% flush 10 mL     Current Outpatient Medications on File Prior to Encounter   Medication Sig    atorvastatin (LIPITOR) 20 MG tablet Take 20 mg by mouth once daily.    cholecalciferol, vitamin D3, 1,250 mcg (50,000 unit) capsule Take 1,250 mcg by mouth every 7 days.    LINZESS 290 mcg Cap capsule Take 290 mcg by mouth before breakfast.    omeprazole (PRILOSEC) 40 MG capsule Take 40 mg by mouth every morning.    pregabalin (LYRICA) 75 MG capsule Take 75 mg by mouth 2 (two) times daily.    sertraline (ZOLOFT) 50 MG tablet Take 50 mg by mouth once daily.    traZODone (DESYREL) 100 MG tablet Take 100 mg by mouth every evening.     Family History    None       Tobacco Use    Smoking status: Never    Smokeless  tobacco: Never   Substance and Sexual Activity    Alcohol use: Never    Drug use: Never    Sexual activity: Not Currently     Review of Systems   Constitutional:  Positive for fever. Negative for chills, diaphoresis, fatigue and unexpected weight change.   HENT:  Positive for congestion. Negative for drooling, ear discharge, ear pain, postnasal drip, sinus pressure, sinus pain, sneezing and sore throat.    Eyes:  Negative for discharge, redness, itching and visual disturbance.   Respiratory:  Positive for cough and shortness of breath.    Cardiovascular:  Negative for chest pain, palpitations and leg swelling.   Gastrointestinal:  Negative for abdominal pain, constipation, diarrhea, nausea and vomiting.   Genitourinary:  Negative for difficulty urinating, dysuria, flank pain, frequency, hematuria and urgency.   Musculoskeletal:  Negative for arthralgias, back pain, joint swelling, myalgias, neck pain and neck stiffness.   Skin:  Negative for rash.   Neurological:  Negative for dizziness, syncope, weakness and headaches.   Psychiatric/Behavioral:  Negative for agitation, behavioral problems, confusion and decreased concentration.      Objective:     Vital Signs (Most Recent):    Vital Signs (24h Range):  Temp:  [98.2 °F (36.8 °C)-99.7 °F (37.6 °C)] 98.5 °F (36.9 °C)  Pulse:  [] 93  Resp:  [18-20] 20  SpO2:  [95 %-100 %] 97 %  BP: (122-142)/(62-83) 126/68        There is no height or weight on file to calculate BMI.     Physical Exam  Vitals and nursing note reviewed.   Constitutional:       General: She is not in acute distress.     Appearance: Normal appearance. She is obese. She is not ill-appearing.   HENT:      Head: Normocephalic and atraumatic.      Nose: No congestion or rhinorrhea.   Eyes:      Extraocular Movements: Extraocular movements intact.      Conjunctiva/sclera: Conjunctivae normal.      Pupils: Pupils are equal, round, and reactive to light.   Cardiovascular:      Rate and Rhythm: Normal rate  and regular rhythm.      Pulses: Normal pulses.      Heart sounds: Normal heart sounds. No murmur heard.  Pulmonary:      Effort: Pulmonary effort is normal. No respiratory distress.      Breath sounds: Normal breath sounds. No wheezing.   Abdominal:      General: Bowel sounds are normal. There is no distension.      Palpations: Abdomen is soft.      Tenderness: There is no abdominal tenderness.   Musculoskeletal:      Cervical back: Normal range of motion and neck supple. No rigidity.      Right lower leg: No edema.      Left lower leg: No edema.   Skin:     General: Skin is warm.      Capillary Refill: Capillary refill takes less than 2 seconds.   Neurological:      General: No focal deficit present.      Mental Status: She is alert and oriented to person, place, and time.      Comments: Patient is paraplegic s/p back surgery in 2015.  CVA in 2025 leading her to have left sided upper extremity weakness.              CRANIAL NERVES     CN III, IV, VI   Pupils are equal, round, and reactive to light.       Significant Labs: All pertinent labs within the past 24 hours have been reviewed.    Significant Imaging: I have reviewed all pertinent imaging results/findings within the past 24 hours.  Assessment/Plan:     * Acute hypoxemic respiratory failure  Patient with Hypoxic Respiratory failure which is Acute.  she is not on home oxygen. Supplemental oxygen was provided and noted improved in the oxygen saturation.  Patient is currently on 4.5 L of O2 via Nasal canula     .   Signs/symptoms of respiratory failure include- wheezing and hypoxia . Contributing diagnoses includes - Aspiration and Pneumonia Labs and images were reviewed. Patient Has recent ABG, which has been reviewed. Will treat underlying causes and adjust management of respiratory failure as follows-  -  Antibiotics (From admission, onward)      Start     Stop Route Frequency Ordered    02/15/25 0315  aztreonam injection 1,000 mg         -- IV Every 8 hours  (non-standard times) 02/15/25 0208    02/15/25 0315  metronidazole IVPB 500 mg         -- IV Every 8 hours (non-standard times) 02/15/25 0208       - blood cultures and sputum cultures pendi  - angela scheduled  - swallow evaluation pending  - CXR from 2/13 showed worsening from the one done on 2/12.  CXR   Impression:  Worsening acute airspace process.  Differential considerations include multifocal pneumonia, aspiration, pulmonary edema, or a combination of these entities.  - Patient failed outpatient treatment with azithromycin.  - CT chest pending  Pharmacy to dose Vancomycin      Aspiration pneumonia  Patient has a diagnosis of pneumonia. The cause of the pneumonia is suspected to be bacterial in etiology but organism is not known. The pneumonia is worsening due to hypoxia, oxygen requirement, and worsening of CXR . The patient has the following signs/symptoms of pneumonia: persistent hypoxia , cough, and sputum production. The patient does have a current oxygen requirement and the patient does not have a home oxygen requirement. I have reviewed the pertinent imaging. The following cultures have been collected: Blood cultures and Sputum culture The culture results are listed below.     Current antimicrobial regimen consists of the antibiotics listed below. Will monitor patient closely and continue current treatment plan unchanged.    Antibiotics (From admission, onward)      Start     Stop Route Frequency Ordered    02/15/25 0315  aztreonam injection 1,000 mg         -- IV Every 8 hours (non-standard times) 02/15/25 0208    02/15/25 0315  metronidazole IVPB 500 mg         -- IV Every 8 hours (non-standard times) 02/15/25 0208            Microbiology Results (last 7 days)       Procedure Component Value Units Date/Time    Culture, Lower Respiratory [[id number]]     Order Status: Sent Specimen: Respiratory from Sputum Induced     Blood culture (site 1) [[id number]]     Order Status: Canceled Specimen: Blood      Blood culture (site 2) [[id number]]     Order Status: Canceled Specimen: Blood           Pharmacy to dose Vancomycin  CXR from 2/13 showed worsening from the one done on 2/12.  CXR   Impression:  Worsening acute airspace process.  Differential considerations include multifocal pneumonia, aspiration, pulmonary edema, or a combination of these entities.  - Patient failed outpatient treatment with azithromycin.  CT chest with contrast pending      Acute cystitis with hematuria  Patient UA was significant for WBC and RBC  Cultures grew gram negative bacilli  Last Urine culture grew Ecoli sensitive to Rocephin  Antibiotics (From admission, onward)      Start     Stop Route Frequency Ordered    02/15/25 0315  aztreonam injection 1,000 mg         -- IV Every 8 hours (non-standard times) 02/15/25 0208                       Primary insomnia  Continue home trazodone 100 mg QHS        Hypokalemia  Patient's most recent potassium results are listed below.   Recent Labs     02/12/25  1446 02/13/25  0742 02/14/25  0546   K 3.7 3.5 3.3*     Plan  - Replete potassium per protocol  - Monitor potassium Daily  - Patient's hypokalemia is stable  - Monitor    Depression  Patient has persistent depression which is mild and is currently controlled. Will Continue anti-depressant medications. We will not consult psychiatry at this time. Patient does not display psychosis at this time. Continue to monitor closely and adjust plan of care as needed.    Continue zoloft 50 mg daily    Chronic idiopathic constipation  Continue Linzess        GERD (gastroesophageal reflux disease)  Continue Protonix        Pressure ulcers of skin of multiple topographic sites  Patient has pressure ulcers left gluteal region  Wound care consulted        VTE Risk Mitigation (From admission, onward)           Ordered     enoxaparin injection 40 mg  Every 24 hours         02/15/25 0008     IP VTE HIGH RISK PATIENT  Once         02/15/25 0008     Place sequential  compression device  Until discontinued         02/15/25 0008                                    Raquel Lr MD  Department of Hospital Medicine  [facility]

## 2025-02-15 NOTE — ASSESSMENT & PLAN NOTE
Patient UA was significant for WBC and RBC  Cultures grew gram negative bacilli  Last Urine culture grew Ecoli sensitive to Rocephin  Continue Ceftriaxone IV Q24H    Antibiotics (From admission, onward)      Start     Stop Route Frequency Ordered    02/15/25 1500  cefTRIAXone injection 1 g         -- IV Every 24 hours (non-standard times) 02/15/25 0029

## 2025-02-15 NOTE — ASSESSMENT & PLAN NOTE
Patient has persistent depression which is mild and is currently controlled. Will Continue anti-depressant medications. We will not consult psychiatry at this time. Patient does not display psychosis at this time. Continue to monitor closely and adjust plan of care as needed.    Continue zoloft 50 mg daily

## 2025-02-15 NOTE — ASSESSMENT & PLAN NOTE
Patient UA was significant for WBC and RBC  Cultures grew gram negative bacilli  Last Urine culture grew Ecoli sensitive to Rocephin  Antibiotics (From admission, onward)      Start     Stop Route Frequency Ordered    02/15/25 0315  aztreonam injection 1,000 mg         -- IV Every 8 hours (non-standard times) 02/15/25 020

## 2025-02-15 NOTE — ASSESSMENT & PLAN NOTE
Patient with Hypoxic Respiratory failure which is Acute.  she is not on home oxygen. Supplemental oxygen was provided and noted improved in the oxygen saturation.  Patient is currently on 4.5 L of O2 via Nasal canula     .   Signs/symptoms of respiratory failure include- wheezing and hypoxia . Contributing diagnoses includes - Aspiration and Pneumonia Labs and images were reviewed. Patient Has recent ABG, which has been reviewed. Will treat underlying causes and adjust management of respiratory failure as follows-  -  Antibiotics (From admission, onward)      Start     Stop Route Frequency Ordered    02/15/25 1500  cefTRIAXone injection 1 g         -- IV Every 24 hours (non-standard times) 02/15/25 0029    02/15/25 1500  levoFLOXacin 750 mg/150 mL IVPB 750 mg         -- IV Every 24 hours (non-standard times) 02/15/25 0029        - blood cultures and sputum cultures pending  - duonebs scheduled  - swallow evaluation pending  - CXR from 2/13 showed worsening from the one done on 2/12.  CXR   Impression:  Worsening acute airspace process.  Differential considerations include multifocal pneumonia, aspiration, pulmonary edema, or a combination of these entities.  - Patient failed outpatient treatment with azithromycin.  - started on Levofloxacin for broader coverage.

## 2025-02-15 NOTE — CONSULTS
Pharmacokinetic Initial Assessment: IV Vancomycin    Assessment/Plan:    Initiate intravenous Vancomycin 1750 mg every 18 hrs.  Desired empiric serum trough concentration is 15 to 20 mcg/mL  Draw vancomycin trough level 30 min prior to fourth dose on 2/17 at approximately 1130.  Pharmacy will continue to follow and monitor vancomycin.      Please contact pharmacy at extension [phone] with any questions regarding this assessment.     Thank you for the consult,   Manfred Maher       Patient brief summary:  Karie Denney is a 59 y.o. female initiated on antimicrobial therapy with IV Vancomycin for treatment of suspected lower respiratory infection    Drug Allergies:   Review of patient's allergies indicates:   Allergen Reactions    Penicillins Anaphylaxis       Actual Body Weight:   86.2 kg      CBC (last 72 hours):  Recent Labs   Lab Result Units 02/12/25  1448 02/13/25  0742 02/14/25  0546 02/15/25  0036   WBC K/uL 14.29* 12.25* 10.48 9.64   Hemoglobin g/dL 10.4* 10.7* 9.7* 11.4*   Hematocrit % 35.8* 37.3* 33.5* 39.7   Platelet Count K/uL 143* 151 140* 141*   Lymphocytes % % 8.0* 8.9* 12.8* 12.2*   Monocytes % % 6.5* 3.8 4.5 5.3   Eosinophils % % 0.0* 0.8* 0.6* 1.0   Basophils % % 0.1 0.2 0.2 0.3   Diff Type  Scan Smear Scan Smear Scan Smear Scan Smear       Metabolic Panel (last 72 hours):  Recent Labs   Lab Result Units 02/12/25  1446 02/12/25  1524 02/13/25  0742 02/14/25  0546 02/15/25  0036   Sodium mmol/L 141  --  141 140 143   Potassium mmol/L 3.7  --  3.5 3.3* 3.4*   Chloride mmol/L 103  --  106 106 105   CO2 mmol/L 29  --  29 27 27   Glucose mg/dL 117*  --  81* 68* 82   Glucose, UA mg/dL  --  Negative  --   --   --    BUN mg/dL 12 --  13 10 7*   Creatinine mg/dL 0.67  --  0.53* 0.53* 0.52*   Albumin g/dL 3.1*  --   --   --  2.8*   Bilirubin, Total mg/dL 0.3  --   --   --  0.5   Alk Phos U/L 60  --   --   --  72   AST U/L 13  --   --   --  25   ALT U/L 7  --   --   --  18   Magnesium mg/dL 1.9  --  1.8 1.8  --         Drug levels (last 3 results):  Recent Labs   Lab Result Units 02/15/25  0215   Vancomycin, Trough µg/mL <1.4*       Microbiologic Results:  Microbiology Results (last 7 days)       Procedure Component Value Units Date/Time    Culture, Lower Respiratory [[id number]]     Order Status: Sent Specimen: Respiratory from Sputum Induced     Blood culture (site 1) [[id number]]     Order Status: Canceled Specimen: Blood     Blood culture (site 2) [[id number]]     Order Status: Canceled Specimen: Blood

## 2025-02-15 NOTE — SUBJECTIVE & OBJECTIVE
Past Medical History:   Diagnosis Date    GERD (gastroesophageal reflux disease)     Paraplegia        No past surgical history on file.    Review of patient's allergies indicates:   Allergen Reactions    Penicillins Anaphylaxis       Current Facility-Administered Medications on File Prior to Encounter   Medication    [COMPLETED] potassium chloride 10 mEq in 100 mL IVPB    [DISCONTINUED] 0.9% NaCl infusion    [DISCONTINUED] acetaminophen tablet 650 mg    [DISCONTINUED] albuterol-ipratropium 2.5 mg-0.5 mg/3 mL nebulizer solution 3 mL    [DISCONTINUED] atorvastatin tablet 20 mg    [DISCONTINUED] azithromycin (ZITHROMAX) 500 mg in 0.9% NaCl 250 mL IVPB (admixture device)    [DISCONTINUED] cefTRIAXone injection 2 g    [DISCONTINUED] dextrose 5 % and 0.45 % NaCl infusion    [DISCONTINUED] enoxaparin injection 40 mg    [DISCONTINUED] ergocalciferol capsule 50,000 Units    [DISCONTINUED] guaiFENesin 12 hr tablet 600 mg    [DISCONTINUED] Lactobacillus acidophilus capsule 1 capsule    [DISCONTINUED] linaCLOtide capsule 290 mcg    [DISCONTINUED] metoprolol succinate (TOPROL-XL) 24 hr tablet 25 mg    [DISCONTINUED] ondansetron injection 4 mg    [DISCONTINUED] pantoprazole EC tablet 40 mg    [DISCONTINUED] sertraline tablet 50 mg    [DISCONTINUED] sodium chloride 0.9% flush 10 mL     Current Outpatient Medications on File Prior to Encounter   Medication Sig    atorvastatin (LIPITOR) 20 MG tablet Take 20 mg by mouth once daily.    cholecalciferol, vitamin D3, 1,250 mcg (50,000 unit) capsule Take 1,250 mcg by mouth every 7 days.    LINZESS 290 mcg Cap capsule Take 290 mcg by mouth before breakfast.    omeprazole (PRILOSEC) 40 MG capsule Take 40 mg by mouth every morning.    pregabalin (LYRICA) 75 MG capsule Take 75 mg by mouth 2 (two) times daily.    sertraline (ZOLOFT) 50 MG tablet Take 50 mg by mouth once daily.    traZODone (DESYREL) 100 MG tablet Take 100 mg by mouth every evening.     Family History    None       Tobacco Use     Smoking status: Never    Smokeless tobacco: Never   Substance and Sexual Activity    Alcohol use: Never    Drug use: Never    Sexual activity: Not Currently     Review of Systems   Constitutional:  Positive for fever. Negative for chills, diaphoresis, fatigue and unexpected weight change.   HENT:  Positive for congestion. Negative for drooling, ear discharge, ear pain, postnasal drip, sinus pressure, sinus pain, sneezing and sore throat.    Eyes:  Negative for discharge, redness, itching and visual disturbance.   Respiratory:  Positive for cough and shortness of breath.    Cardiovascular:  Negative for chest pain, palpitations and leg swelling.   Gastrointestinal:  Negative for abdominal pain, constipation, diarrhea, nausea and vomiting.   Genitourinary:  Negative for difficulty urinating, dysuria, flank pain, frequency, hematuria and urgency.   Musculoskeletal:  Negative for arthralgias, back pain, joint swelling, myalgias, neck pain and neck stiffness.   Skin:  Negative for rash.   Neurological:  Negative for dizziness, syncope, weakness and headaches.   Psychiatric/Behavioral:  Negative for agitation, behavioral problems, confusion and decreased concentration.      Objective:     Vital Signs (Most Recent):    Vital Signs (24h Range):  Temp:  [98.2 °F (36.8 °C)-99.7 °F (37.6 °C)] 98.5 °F (36.9 °C)  Pulse:  [] 93  Resp:  [18-20] 20  SpO2:  [95 %-100 %] 97 %  BP: (122-142)/(62-83) 126/68        There is no height or weight on file to calculate BMI.     Physical Exam  Vitals and nursing note reviewed.   Constitutional:       General: She is not in acute distress.     Appearance: Normal appearance. She is obese. She is not ill-appearing.   HENT:      Head: Normocephalic and atraumatic.      Nose: No congestion or rhinorrhea.   Eyes:      Extraocular Movements: Extraocular movements intact.      Conjunctiva/sclera: Conjunctivae normal.      Pupils: Pupils are equal, round, and reactive to light.    Cardiovascular:      Rate and Rhythm: Normal rate and regular rhythm.      Pulses: Normal pulses.      Heart sounds: Normal heart sounds. No murmur heard.  Pulmonary:      Effort: Pulmonary effort is normal. No respiratory distress.      Breath sounds: Normal breath sounds. No wheezing.   Abdominal:      General: Bowel sounds are normal. There is no distension.      Palpations: Abdomen is soft.      Tenderness: There is no abdominal tenderness.   Musculoskeletal:      Cervical back: Normal range of motion and neck supple. No rigidity.      Right lower leg: No edema.      Left lower leg: No edema.   Skin:     General: Skin is warm.      Capillary Refill: Capillary refill takes less than 2 seconds.   Neurological:      General: No focal deficit present.      Mental Status: She is alert and oriented to person, place, and time.      Comments: Patient is paraplegic s/p back surgery in 2015.  CVA in 2025 leading her to have left sided upper extremity weakness.              CRANIAL NERVES     CN III, IV, VI   Pupils are equal, round, and reactive to light.       Significant Labs: All pertinent labs within the past 24 hours have been reviewed.    Significant Imaging: I have reviewed all pertinent imaging results/findings within the past 24 hours.

## 2025-02-16 LAB
ANION GAP SERPL CALCULATED.3IONS-SCNC: 11 MMOL/L (ref 7–16)
BASOPHILS # BLD AUTO: 0.05 K/UL (ref 0–0.2)
BASOPHILS NFR BLD AUTO: 0.5 % (ref 0–1)
BUN SERPL-MCNC: 9 MG/DL (ref 10–20)
BUN/CREAT SERPL: 13 (ref 6–20)
CALCIUM SERPL-MCNC: 8.6 MG/DL (ref 8.4–10.2)
CHLORIDE SERPL-SCNC: 103 MMOL/L (ref 98–107)
CO2 SERPL-SCNC: 30 MMOL/L (ref 23–31)
CREAT SERPL-MCNC: 0.71 MG/DL (ref 0.55–1.02)
DIFFERENTIAL METHOD BLD: ABNORMAL
EGFR (NO RACE VARIABLE) (RUSH/TITUS): 97 ML/MIN/1.73M2
EOSINOPHIL # BLD AUTO: 0.1 K/UL (ref 0–0.5)
EOSINOPHIL NFR BLD AUTO: 1 % (ref 1–4)
ERYTHROCYTE [DISTWIDTH] IN BLOOD BY AUTOMATED COUNT: 17.1 % (ref 11.5–14.5)
GLUCOSE SERPL-MCNC: 81 MG/DL (ref 82–115)
HCT VFR BLD AUTO: 35 % (ref 38–47)
HGB BLD-MCNC: 10 G/DL (ref 12–16)
IMM GRANULOCYTES # BLD AUTO: 0.11 K/UL (ref 0–0.04)
IMM GRANULOCYTES NFR BLD: 1.1 % (ref 0–0.4)
LYMPHOCYTES # BLD AUTO: 0.97 K/UL (ref 1–4.8)
LYMPHOCYTES NFR BLD AUTO: 9.5 % (ref 27–41)
MCH RBC QN AUTO: 23.9 PG (ref 27–31)
MCHC RBC AUTO-ENTMCNC: 28.6 G/DL (ref 32–36)
MCV RBC AUTO: 83.5 FL (ref 80–96)
MONOCYTES # BLD AUTO: 0.81 K/UL (ref 0–0.8)
MONOCYTES NFR BLD AUTO: 7.9 % (ref 2–6)
MPC BLD CALC-MCNC: 11.2 FL (ref 9.4–12.4)
NEUTROPHILS # BLD AUTO: 8.19 K/UL (ref 1.8–7.7)
NEUTROPHILS NFR BLD AUTO: 80 % (ref 53–65)
NRBC # BLD AUTO: 0 X10E3/UL
NRBC, AUTO (.00): 0 %
PLATELET # BLD AUTO: 157 K/UL (ref 150–400)
POTASSIUM SERPL-SCNC: 3.6 MMOL/L (ref 3.5–5.1)
RBC # BLD AUTO: 4.19 M/UL (ref 4.2–5.4)
SODIUM SERPL-SCNC: 140 MMOL/L (ref 136–145)
UA COMPLETE W REFLEX CULTURE PNL UR: ABNORMAL
WBC # BLD AUTO: 10.23 K/UL (ref 4.5–11)

## 2025-02-16 PROCEDURE — 25500020 PHARM REV CODE 255: Performed by: HOSPITALIST

## 2025-02-16 PROCEDURE — 27000221 HC OXYGEN, UP TO 24 HOURS

## 2025-02-16 PROCEDURE — 80048 BASIC METABOLIC PNL TOTAL CA: CPT | Performed by: INTERNAL MEDICINE

## 2025-02-16 PROCEDURE — 85025 COMPLETE CBC W/AUTO DIFF WBC: CPT | Performed by: INTERNAL MEDICINE

## 2025-02-16 PROCEDURE — 25000242 PHARM REV CODE 250 ALT 637 W/ HCPCS

## 2025-02-16 PROCEDURE — 63600175 PHARM REV CODE 636 W HCPCS

## 2025-02-16 PROCEDURE — 63600175 PHARM REV CODE 636 W HCPCS: Performed by: INTERNAL MEDICINE

## 2025-02-16 PROCEDURE — 25000003 PHARM REV CODE 250: Performed by: HOSPITALIST

## 2025-02-16 PROCEDURE — 99900035 HC TECH TIME PER 15 MIN (STAT)

## 2025-02-16 PROCEDURE — 94761 N-INVAS EAR/PLS OXIMETRY MLT: CPT

## 2025-02-16 PROCEDURE — 99233 SBSQ HOSP IP/OBS HIGH 50: CPT | Mod: ,,, | Performed by: HOSPITALIST

## 2025-02-16 PROCEDURE — 36415 COLL VENOUS BLD VENIPUNCTURE: CPT | Performed by: INTERNAL MEDICINE

## 2025-02-16 PROCEDURE — 63600175 PHARM REV CODE 636 W HCPCS: Performed by: HOSPITALIST

## 2025-02-16 PROCEDURE — 94640 AIRWAY INHALATION TREATMENT: CPT

## 2025-02-16 PROCEDURE — 11000001 HC ACUTE MED/SURG PRIVATE ROOM

## 2025-02-16 RX ORDER — PANTOPRAZOLE SODIUM 40 MG/1
40 TABLET, DELAYED RELEASE ORAL DAILY
Status: DISCONTINUED | OUTPATIENT
Start: 2025-02-16 | End: 2025-02-24

## 2025-02-16 RX ORDER — SERTRALINE HYDROCHLORIDE 50 MG/1
50 TABLET, FILM COATED ORAL DAILY
Status: DISCONTINUED | OUTPATIENT
Start: 2025-02-16 | End: 2025-02-24

## 2025-02-16 RX ORDER — SULFAMETHOXAZOLE AND TRIMETHOPRIM 800; 160 MG/1; MG/1
1 TABLET ORAL 2 TIMES DAILY
Status: DISCONTINUED | OUTPATIENT
Start: 2025-02-16 | End: 2025-02-19

## 2025-02-16 RX ORDER — ASPIRIN 325 MG
50000 TABLET, DELAYED RELEASE (ENTERIC COATED) ORAL
Status: DISCONTINUED | OUTPATIENT
Start: 2025-02-16 | End: 2025-02-16 | Stop reason: CLARIF

## 2025-02-16 RX ORDER — ERGOCALCIFEROL 1.25 MG/1
50000 CAPSULE ORAL
Status: DISCONTINUED | OUTPATIENT
Start: 2025-02-16 | End: 2025-02-27

## 2025-02-16 RX ORDER — TRAZODONE HYDROCHLORIDE 50 MG/1
100 TABLET ORAL NIGHTLY
Status: DISCONTINUED | OUTPATIENT
Start: 2025-02-16 | End: 2025-02-25

## 2025-02-16 RX ORDER — ATORVASTATIN CALCIUM 20 MG/1
20 TABLET, FILM COATED ORAL DAILY
Status: DISCONTINUED | OUTPATIENT
Start: 2025-02-16 | End: 2025-02-25

## 2025-02-16 RX ORDER — PREGABALIN 75 MG/1
75 CAPSULE ORAL 2 TIMES DAILY
Status: DISCONTINUED | OUTPATIENT
Start: 2025-02-16 | End: 2025-02-20

## 2025-02-16 RX ORDER — ACETAMINOPHEN 325 MG/1
650 TABLET ORAL EVERY 6 HOURS PRN
Status: DISCONTINUED | OUTPATIENT
Start: 2025-02-16 | End: 2025-02-25

## 2025-02-16 RX ORDER — IOPAMIDOL 755 MG/ML
100 INJECTION, SOLUTION INTRAVASCULAR
Status: COMPLETED | OUTPATIENT
Start: 2025-02-16 | End: 2025-02-16

## 2025-02-16 RX ADMIN — IPRATROPIUM BROMIDE AND ALBUTEROL SULFATE 3 ML: 2.5; .5 SOLUTION RESPIRATORY (INHALATION) at 07:02

## 2025-02-16 RX ADMIN — PREGABALIN 75 MG: 75 CAPSULE ORAL at 09:02

## 2025-02-16 RX ADMIN — PREGABALIN 75 MG: 75 CAPSULE ORAL at 08:02

## 2025-02-16 RX ADMIN — SULFAMETHOXAZOLE AND TRIMETHOPRIM 1 TABLET: 800; 160 TABLET ORAL at 09:02

## 2025-02-16 RX ADMIN — TRAZODONE HYDROCHLORIDE 100 MG: 50 TABLET ORAL at 09:02

## 2025-02-16 RX ADMIN — IPRATROPIUM BROMIDE AND ALBUTEROL SULFATE 3 ML: 2.5; .5 SOLUTION RESPIRATORY (INHALATION) at 08:02

## 2025-02-16 RX ADMIN — SERTRALINE HYDROCHLORIDE 50 MG: 50 TABLET ORAL at 08:02

## 2025-02-16 RX ADMIN — SODIUM CHLORIDE, POTASSIUM CHLORIDE, SODIUM LACTATE AND CALCIUM CHLORIDE: 600; 310; 30; 20 INJECTION, SOLUTION INTRAVENOUS at 08:02

## 2025-02-16 RX ADMIN — ENOXAPARIN SODIUM 40 MG: 40 INJECTION SUBCUTANEOUS at 05:02

## 2025-02-16 RX ADMIN — IOPAMIDOL 100 ML: 755 INJECTION, SOLUTION INTRAVENOUS at 11:02

## 2025-02-16 RX ADMIN — ACETAMINOPHEN 650 MG: 325 TABLET ORAL at 05:02

## 2025-02-16 RX ADMIN — ACETAMINOPHEN 650 MG: 325 TABLET ORAL at 08:02

## 2025-02-16 RX ADMIN — SODIUM CHLORIDE, POTASSIUM CHLORIDE, SODIUM LACTATE AND CALCIUM CHLORIDE: 600; 310; 30; 20 INJECTION, SOLUTION INTRAVENOUS at 05:02

## 2025-02-16 RX ADMIN — PANTOPRAZOLE SODIUM 40 MG: 40 TABLET, DELAYED RELEASE ORAL at 08:02

## 2025-02-16 RX ADMIN — AZTREONAM 2000 MG: 2 INJECTION, POWDER, LYOPHILIZED, FOR SOLUTION INTRAMUSCULAR; INTRAVENOUS at 08:02

## 2025-02-16 RX ADMIN — ATORVASTATIN CALCIUM 20 MG: 20 TABLET, FILM COATED ORAL at 08:02

## 2025-02-16 RX ADMIN — METRONIDAZOLE 500 MG: 500 INJECTION, SOLUTION INTRAVENOUS at 05:02

## 2025-02-16 RX ADMIN — AZTREONAM 2000 MG: 2 INJECTION, POWDER, LYOPHILIZED, FOR SOLUTION INTRAMUSCULAR; INTRAVENOUS at 12:02

## 2025-02-16 RX ADMIN — IPRATROPIUM BROMIDE AND ALBUTEROL SULFATE 3 ML: 2.5; .5 SOLUTION RESPIRATORY (INHALATION) at 02:02

## 2025-02-16 RX ADMIN — AZTREONAM 2000 MG: 2 INJECTION, POWDER, LYOPHILIZED, FOR SOLUTION INTRAMUSCULAR; INTRAVENOUS at 05:02

## 2025-02-16 NOTE — PLAN OF CARE
Problem: Adult Inpatient Plan of Care  Goal: Plan of Care Review  2/16/2025 0450 by Mesfin Colindres RN  Outcome: Progressing  2/16/2025 0444 by Mesfin Colindres RN  Outcome: Progressing  Goal: Patient-Specific Goal (Individualized)  2/16/2025 0450 by Mesfin Colindres RN  Outcome: Progressing  2/16/2025 0444 by Mesfin Colindres RN  Outcome: Progressing  Goal: Absence of Hospital-Acquired Illness or Injury  2/16/2025 0450 by Mesfin Colindres RN  Outcome: Progressing  2/16/2025 0444 by Mesfin Colindres RN  Outcome: Progressing  Goal: Optimal Comfort and Wellbeing  2/16/2025 0450 by Mesfin Colindres RN  Outcome: Progressing  2/16/2025 0444 by Mesfin Colindres RN  Outcome: Progressing  Goal: Readiness for Transition of Care  2/16/2025 0450 by Mesfin Colindres RN  Outcome: Progressing  2/16/2025 0444 by Mesfin Colindres RN  Outcome: Progressing     Problem: Pneumonia  Goal: Fluid Balance  2/16/2025 0450 by Mesfin Colindres RN  Outcome: Progressing  2/16/2025 0444 by Mesfin Colindres RN  Outcome: Progressing  Goal: Resolution of Infection Signs and Symptoms  2/16/2025 0450 by Mesfin Colindres RN  Outcome: Progressing  2/16/2025 0444 by Mesfin Colindres RN  Outcome: Progressing  Goal: Effective Oxygenation and Ventilation  2/16/2025 0450 by Mesfin Colindres RN  Outcome: Progressing  2/16/2025 0444 by Mesfin Colindres RN  Outcome: Progressing     Problem: Wound  Goal: Optimal Coping  2/16/2025 0450 by Mesfin Colindres RN  Outcome: Progressing  2/16/2025 0444 by Mesfin Colindres RN  Outcome: Progressing  Goal: Optimal Functional Ability  2/16/2025 0450 by Mesfin Colindres RN  Outcome: Progressing  2/16/2025 0444 by Mesfin Colindres RN  Outcome: Progressing  Goal: Absence of Infection Signs and Symptoms  2/16/2025 0450 by Mesfin Colindres RN  Outcome: Progressing  2/16/2025 0444 by Mesfin Colindres RN  Outcome: Progressing  Goal: Improved Oral Intake  2/16/2025 0450 by Jens, Mesfin, RN  Outcome: Progressing  2/16/2025  0444 by [place], Mesfin, RN  Outcome: Progressing  Goal: Optimal Pain Control and Function  2/16/2025 0450 by Mesfin Colindres RN  Outcome: Progressing  2/16/2025 0444 by Mesfin Colindres RN  Outcome: Progressing  Goal: Skin Health and Integrity  2/16/2025 0450 by Mesfin Colindres RN  Outcome: Progressing  2/16/2025 0444 by Mesfin Colindres RN  Outcome: Progressing  Goal: Optimal Wound Healing  2/16/2025 0450 by Mesfin Colindres RN  Outcome: Progressing  2/16/2025 0444 by Mesfin Colindres RN  Outcome: Progressing     Problem: Skin Injury Risk Increased  Goal: Skin Health and Integrity  2/16/2025 0450 by Mesfin Colindres RN  Outcome: Progressing  2/16/2025 0444 by Mesfin Colindres RN  Outcome: Progressing     Problem: Airway Clearance Ineffective  Goal: Effective Airway Clearance  2/16/2025 0450 by Mesfin Colindres RN  Outcome: Progressing  2/16/2025 0444 by Mesfin Colindres RN  Outcome: Progressing     Problem: Gas Exchange Impaired  Goal: Optimal Gas Exchange  2/16/2025 0450 by Mesfin Colindres RN  Outcome: Progressing  2/16/2025 0444 by Mesfin Colindres RN  Outcome: Progressing

## 2025-02-16 NOTE — PLAN OF CARE
Problem: Pneumonia  Goal: Fluid Balance  Outcome: Progressing  Intervention: Monitor and Manage Fluid Balance  Flowsheets (Taken 2/16/2025 1648)  Fluid/Electrolyte Management: intravenous fluids adjusted  Goal: Resolution of Infection Signs and Symptoms  Outcome: Progressing  Intervention: Prevent Infection Progression  Flowsheets (Taken 2/16/2025 1648)  Infection Management: aseptic technique maintained  Isolation Precautions: precautions maintained  Goal: Effective Oxygenation and Ventilation  Outcome: Progressing  Intervention: Promote Airway Secretion Clearance  Flowsheets (Taken 2/16/2025 1648)  Cough And Deep Breathing: done independently per patient  Intervention: Optimize Oxygenation and Ventilation  Flowsheets (Taken 2/16/2025 1648)  Head of Bed (HOB) Positioning: HOB elevated     Problem: Wound  Goal: Optimal Coping  Outcome: Progressing  Intervention: Support Patient and Family Response  Flowsheets (Taken 2/16/2025 1648)  Supportive Measures:   self-reflection promoted   self-responsibility promoted   self-care encouraged  Goal: Optimal Functional Ability  Outcome: Progressing  Intervention: Optimize Functional Ability  Flowsheets (Taken 2/16/2025 1648)  Activity Management: Patient unable to perform activities  Goal: Absence of Infection Signs and Symptoms  Outcome: Progressing  Intervention: Prevent or Manage Infection  Flowsheets (Taken 2/16/2025 1648)  Infection Management: aseptic technique maintained  Isolation Precautions: precautions maintained  Goal: Improved Oral Intake  Outcome: Progressing  Intervention: Promote and Optimize Oral Intake  Flowsheets (Taken 2/16/2025 1648)  Oral Nutrition Promotion: rest periods promoted  Nutrition Interventions:   meal set-up provided   diet advanced  Goal: Optimal Pain Control and Function  Outcome: Progressing  Intervention: Prevent or Manage Pain  Flowsheets (Taken 2/16/2025 1648)  Sleep/Rest Enhancement:   awakenings minimized   regular sleep/rest pattern  promoted  Pain Management Interventions: medication offered  Goal: Skin Health and Integrity  Outcome: Progressing  Intervention: Optimize Skin Protection  Flowsheets (Taken 2/16/2025 1648)  Pressure Reduction Techniques:   frequent weight shift encouraged   weight shift assistance provided  Skin Protection: silicone foam dressing in place  Activity Management: Patient unable to perform activities  Head of Bed (HOB) Positioning: HOB elevated  Goal: Optimal Wound Healing  Outcome: Progressing  Intervention: Promote Wound Healing  Flowsheets (Taken 2/16/2025 1648)  Sleep/Rest Enhancement:   awakenings minimized   regular sleep/rest pattern promoted     Problem: Skin Injury Risk Increased  Goal: Skin Health and Integrity  Outcome: Progressing  Intervention: Optimize Skin Protection  Flowsheets (Taken 2/16/2025 1648)  Pressure Reduction Techniques:   frequent weight shift encouraged   weight shift assistance provided  Skin Protection: silicone foam dressing in place  Activity Management: Patient unable to perform activities  Head of Bed (HOB) Positioning: HOB elevated  Intervention: Promote and Optimize Oral Intake  Flowsheets (Taken 2/16/2025 1648)  Oral Nutrition Promotion: rest periods promoted  Nutrition Interventions:   meal set-up provided   diet advanced     Problem: Airway Clearance Ineffective  Goal: Effective Airway Clearance  Outcome: Progressing  Intervention: Promote Airway Secretion Clearance  Flowsheets (Taken 2/16/2025 1648)  Cough And Deep Breathing: done independently per patient  Activity Management: Patient unable to perform activities     Problem: Gas Exchange Impaired  Goal: Optimal Gas Exchange  Outcome: Progressing  Intervention: Optimize Oxygenation and Ventilation  Flowsheets (Taken 2/16/2025 1648)  Head of Bed (HOB) Positioning: HOB elevated

## 2025-02-16 NOTE — NURSING
Order in place for SCD's. No SCD pumps on the floor. Called 5n, ER and ICU. No pumps. Pharmacological interventions in place and given as ordered. Will place on pump when there is one available and pass on to night shift.     1800 secure chatted dr. Gerber to inform him that we do not have an scd pump for the patient. He just asked that I document it. He is aware. Will pass on to night shift nurse

## 2025-02-16 NOTE — PROGRESS NOTES
[facility]  [facility] Medicine  Progress Note    Patient Name: Karie Denney  MRN: [id number]  Patient Class: IP- Inpatient   Admission Date: 2/14/2025  Length of Stay: 1 days  Attending Physician: Cassie Bar MD  Primary Care Provider: Gonzalo Barrera NP    Subjective     Principal Problem:Aspiration pneumonia        HPI:  The patient is a 59-year-old female who was transferred from [facility] for the complaints suspected aspiration pneumonia and UTI. The patient's daughter was at bedside and provided most of the history. The patient began experiencing flu-like symptoms a few weeks ago, including a productive cough, fever, and chills. She also experienced mild shortness of breath at home. Her doctor started her on oral antibiotics for pneumonia, but the symptoms did not resolve completely.   Last Wednesday, the patient became very weak and presented to the ER, where she was diagnosed with pneumonia and admitted for treatment with IV antibiotics. she was transferred to [facility] for treatment of aspiration pneumonia, swallow evaluation test.   At the time of the encounter, she was lying comfortably in bed and did not complain of any acute issues. she was oriented x3. according to the patient's daughter, she is at her baseline as far as mentation is concerned.  Patient has PMH of Chronic constipation, GERD, insomnia, depression, neuropathy, history of CVA in 2024 leading to left-sided paralysis. she underwent back surgery in 2015 that led her paraplegic in bother lower limbs.    Patient is non ambulatory and bedbound. she lives with her mother and a home health nurse visit her once in a week. she doesn't supplemental oxygen or CPAP at home. She was on 4.5 L O2 via nasal canula at the time of encounter.    Overview/Hospital Course:  No notes on file    Interval History:     Review of Systems   Constitutional:  Positive for fever. Negative for chills, diaphoresis, fatigue and  unexpected weight change.   HENT:  Positive for congestion. Negative for drooling, ear discharge, ear pain, postnasal drip, sinus pressure, sinus pain, sneezing and sore throat.    Eyes:  Negative for discharge, redness, itching and visual disturbance.   Respiratory:  Positive for cough and shortness of breath.    Cardiovascular:  Negative for chest pain, palpitations and leg swelling.   Gastrointestinal:  Negative for abdominal pain, constipation, diarrhea, nausea and vomiting.   Genitourinary:  Negative for difficulty urinating, dysuria, flank pain, frequency, hematuria and urgency.   Musculoskeletal:  Negative for arthralgias, back pain, joint swelling, myalgias, neck pain and neck stiffness.   Skin:  Negative for rash.   Neurological:  Negative for dizziness, syncope, weakness and headaches.   Psychiatric/Behavioral:  Negative for agitation, behavioral problems, confusion and decreased concentration.      Objective:     Vital Signs (Most Recent):  Temp: 98.3 °F (36.8 °C) (02/15/25 1633)  Pulse: 107 (02/15/25 1633)  Resp: 16 (02/15/25 1633)  BP: 120/65 (02/15/25 1633)  SpO2: 97 % (02/15/25 1633) Vital Signs (24h Range):  Temp:  [97.5 °F (36.4 °C)-98.5 °F (36.9 °C)] 98.3 °F (36.8 °C)  Pulse:  [] 107  Resp:  [16-20] 16  SpO2:  [95 %-100 %] 97 %  BP: (120-140)/(65-89) 120/65     Weight: 86.2 kg (190 lb)  Body mass index is 30.67 kg/m².  No intake or output data in the 24 hours ending 02/15/25 1943      Physical Exam  Vitals and nursing note reviewed.   Constitutional:       General: She is not in acute distress.     Appearance: Normal appearance. She is obese. She is not ill-appearing.   HENT:      Head: Normocephalic and atraumatic.      Nose: No congestion or rhinorrhea.   Eyes:      Extraocular Movements: Extraocular movements intact.      Conjunctiva/sclera: Conjunctivae normal.      Pupils: Pupils are equal, round, and reactive to light.   Cardiovascular:      Rate and Rhythm: Normal rate and regular  rhythm.      Pulses: Normal pulses.      Heart sounds: Normal heart sounds. No murmur heard.  Pulmonary:      Effort: Pulmonary effort is normal. No respiratory distress.      Breath sounds: Normal breath sounds. No wheezing.   Abdominal:      General: Bowel sounds are normal. There is no distension.      Palpations: Abdomen is soft.      Tenderness: There is no abdominal tenderness.   Musculoskeletal:      Cervical back: Normal range of motion and neck supple. No rigidity.      Right lower leg: No edema.      Left lower leg: No edema.   Skin:     General: Skin is warm.      Capillary Refill: Capillary refill takes less than 2 seconds.   Neurological:      General: No focal deficit present.      Mental Status: She is alert and oriented to person, place, and time.      Comments: Patient is paraplegic s/p back surgery in 2015.  CVA in 2025 leading her to have left sided upper extremity weakness.             Significant Labs: All pertinent labs within the past 24 hours have been reviewed.    Significant Imaging: I have reviewed all pertinent imaging results/findings within the past 24 hours.    Assessment and Plan     * Aspiration pneumonia  Patient has a diagnosis of pneumonia. The cause of the pneumonia is suspected to be bacterial in etiology but organism is not known. The pneumonia is worsening due to hypoxia, oxygen requirement, and worsening of CXR . The patient has the following signs/symptoms of pneumonia: persistent hypoxia , cough, and sputum production. The patient does have a current oxygen requirement and the patient does not have a home oxygen requirement. I have reviewed the pertinent imaging. The following cultures have been collected: Blood cultures and Sputum culture The culture results are listed below.     Current antimicrobial regimen consists of the antibiotics listed below. Will monitor patient closely and continue current treatment plan unchanged.    2/15: Continue with IV  antibiotics.      Antibiotics (From admission, onward)      Start     Stop Route Frequency Ordered    02/15/25 0315  aztreonam injection 1,000 mg         -- IV Every 8 hours (non-standard times) 02/15/25 0208    02/15/25 0315  metronidazole IVPB 500 mg         -- IV Every 8 hours (non-standard times) 02/15/25 0208            Microbiology Results (last 7 days)       Procedure Component Value Units Date/Time    Culture, Lower Respiratory [[id number]]     Order Status: Sent Specimen: Respiratory from Sputum Induced     Blood culture (site 1) [[id number]]     Order Status: Canceled Specimen: Blood     Blood culture (site 2) [[id number]]     Order Status: Canceled Specimen: Blood           Pharmacy to dose Vancomycin  CXR from 2/13 showed worsening from the one done on 2/12.  CXR   Impression:  Worsening acute airspace process.  Differential considerations include multifocal pneumonia, aspiration, pulmonary edema, or a combination of these entities.  - Patient failed outpatient treatment with azithromycin.  CT chest with contrast pending      Dysphagia  Patient likely with dysphagia related to stroke from last year.    Swallow eval demonstrated patient can have mechanical soft diet and this has been ordered.      Primary insomnia  Continue home trazodone 100 mg QHS        Hypokalemia  Patient's most recent potassium results are listed below.   Recent Labs     02/12/25  1446 02/13/25  0742 02/14/25  0546   K 3.7 3.5 3.3*     Plan  - Replete potassium per protocol  - Monitor potassium Daily  - Patient's hypokalemia is stable  - Monitor    Depression  Patient has persistent depression which is mild and is currently controlled. Will Continue anti-depressant medications. We will not consult psychiatry at this time. Patient does not display psychosis at this time. Continue to monitor closely and adjust plan of care as needed.    Continue zoloft 50 mg daily    Chronic idiopathic constipation  Continue Linzess        Acute  hypoxemic respiratory failure  Patient with Hypoxic Respiratory failure which is Acute.  she is not on home oxygen. Supplemental oxygen was provided and noted improved in the oxygen saturation.  Patient is currently on 4.5 L of O2 via Nasal canula     .   Signs/symptoms of respiratory failure include- wheezing and hypoxia . Contributing diagnoses includes - Aspiration and Pneumonia Labs and images were reviewed. Patient Has recent ABG, which has been reviewed. Will treat underlying causes and adjust management of respiratory failure as follows-  -  Antibiotics (From admission, onward)      Start     Stop Route Frequency Ordered    02/15/25 0315  aztreonam injection 1,000 mg         -- IV Every 8 hours (non-standard times) 02/15/25 0208    02/15/25 0315  metronidazole IVPB 500 mg         -- IV Every 8 hours (non-standard times) 02/15/25 0208       - blood cultures and sputum cultures pendi  - duonebs scheduled  - swallow evaluation pending  - CXR from 2/13 showed worsening from the one done on 2/12.  CXR   Impression:  Worsening acute airspace process.  Differential considerations include multifocal pneumonia, aspiration, pulmonary edema, or a combination of these entities.  - Patient failed outpatient treatment with azithromycin.  - CT chest pending  Pharmacy to dose Vancomycin      GERD (gastroesophageal reflux disease)  Continue Protonix        Pressure ulcers of skin of multiple topographic sites  Patient has pressure ulcers left gluteal region  Wound care consulted      Acute cystitis with hematuria  Patient UA was significant for WBC and RBC  Cultures grew gram negative bacilli  Last Urine culture grew Ecoli sensitive to Rocephin  Antibiotics (From admission, onward)      Start     Stop Route Frequency Ordered    02/15/25 0315  aztreonam injection 1,000 mg         -- IV Every 8 hours (non-standard times) 02/15/25 0208                         VTE Risk Mitigation (From admission, onward)           Ordered      enoxaparin injection 40 mg  Every 24 hours         02/15/25 0008     IP VTE HIGH RISK PATIENT  Once         02/15/25 0008     Place sequential compression device  Until discontinued         02/15/25 0008                    Discharge Planning   SHRADDHA:      Code Status: Full Code   Medical Readiness for Discharge Date:                  Cassie Bar MD  Department of Hospital Medicine   [facility]

## 2025-02-16 NOTE — ASSESSMENT & PLAN NOTE
Patient has a diagnosis of pneumonia. The cause of the pneumonia is suspected to be bacterial in etiology but organism is not known. The pneumonia is worsening due to hypoxia, oxygen requirement, and worsening of CXR . The patient has the following signs/symptoms of pneumonia: persistent hypoxia , cough, and sputum production. The patient does have a current oxygen requirement and the patient does not have a home oxygen requirement. I have reviewed the pertinent imaging. The following cultures have been collected: Blood cultures and Sputum culture The culture results are listed below.     Current antimicrobial regimen consists of the antibiotics listed below. Will monitor patient closely and continue current treatment plan unchanged.    2/15: Continue with IV antibiotics.    2/16: aspiration episode today.  Patient cannot tolerate mechanically soft.  She needs a pureed diet.   Possible discharge tomorrow, if stable. She has medicaid waiver.        Antibiotics (From admission, onward)      Start     Stop Route Frequency Ordered    02/16/25 2100  sulfamethoxazole-trimethoprim 800-160mg per tablet 1 tablet         02/21/25 2059 Oral 2 times daily 02/16/25 1204    02/15/25 2000  aztreonam injection 2,000 mg         -- IV Every 8 hours (non-standard times) 02/15/25 1511            Microbiology Results (last 7 days)       Procedure Component Value Units Date/Time    Culture, Lower Respiratory [[id number]]     Order Status: Canceled Specimen: Respiratory from Sputum Induced     Blood culture (site 1) [[id number]]     Order Status: Canceled Specimen: Blood     Blood culture (site 2) [[id number]]     Order Status: Canceled Specimen: Blood           Pharmacy to dose Vancomycin  CXR from 2/13 showed worsening from the one done on 2/12.  CXR   Impression:  Worsening acute airspace process.  Differential considerations include multifocal pneumonia, aspiration, pulmonary edema, or a combination of these entities.  - Patient  failed outpatient treatment with azithromycin.  CT chest with contrast pending

## 2025-02-16 NOTE — SUBJECTIVE & OBJECTIVE
Interval History:     Review of Systems   Constitutional:  Positive for fever. Negative for chills, diaphoresis, fatigue and unexpected weight change.   HENT:  Positive for congestion. Negative for drooling, ear discharge, ear pain, postnasal drip, sinus pressure, sinus pain, sneezing and sore throat.    Eyes:  Negative for discharge, redness, itching and visual disturbance.   Respiratory:  Positive for cough and shortness of breath.    Cardiovascular:  Negative for chest pain, palpitations and leg swelling.   Gastrointestinal:  Negative for abdominal pain, constipation, diarrhea, nausea and vomiting.   Genitourinary:  Negative for difficulty urinating, dysuria, flank pain, frequency, hematuria and urgency.   Musculoskeletal:  Negative for arthralgias, back pain, joint swelling, myalgias, neck pain and neck stiffness.   Skin:  Negative for rash.   Neurological:  Negative for dizziness, syncope, weakness and headaches.   Psychiatric/Behavioral:  Negative for agitation, behavioral problems, confusion and decreased concentration.      Objective:     Vital Signs (Most Recent):  Temp: 98.3 °F (36.8 °C) (02/15/25 1633)  Pulse: 107 (02/15/25 1633)  Resp: 16 (02/15/25 1633)  BP: 120/65 (02/15/25 1633)  SpO2: 97 % (02/15/25 1633) Vital Signs (24h Range):  Temp:  [97.5 °F (36.4 °C)-98.5 °F (36.9 °C)] 98.3 °F (36.8 °C)  Pulse:  [] 107  Resp:  [16-20] 16  SpO2:  [95 %-100 %] 97 %  BP: (120-140)/(65-89) 120/65     Weight: 86.2 kg (190 lb)  Body mass index is 30.67 kg/m².  No intake or output data in the 24 hours ending 02/15/25 1943      Physical Exam  Vitals and nursing note reviewed.   Constitutional:       General: She is not in acute distress.     Appearance: Normal appearance. She is obese. She is not ill-appearing.   HENT:      Head: Normocephalic and atraumatic.      Nose: No congestion or rhinorrhea.   Eyes:      Extraocular Movements: Extraocular movements intact.      Conjunctiva/sclera: Conjunctivae normal.       Pupils: Pupils are equal, round, and reactive to light.   Cardiovascular:      Rate and Rhythm: Normal rate and regular rhythm.      Pulses: Normal pulses.      Heart sounds: Normal heart sounds. No murmur heard.  Pulmonary:      Effort: Pulmonary effort is normal. No respiratory distress.      Breath sounds: Normal breath sounds. No wheezing.   Abdominal:      General: Bowel sounds are normal. There is no distension.      Palpations: Abdomen is soft.      Tenderness: There is no abdominal tenderness.   Musculoskeletal:      Cervical back: Normal range of motion and neck supple. No rigidity.      Right lower leg: No edema.      Left lower leg: No edema.   Skin:     General: Skin is warm.      Capillary Refill: Capillary refill takes less than 2 seconds.   Neurological:      General: No focal deficit present.      Mental Status: She is alert and oriented to person, place, and time.      Comments: Patient is paraplegic s/p back surgery in 2015.  CVA in 2025 leading her to have left sided upper extremity weakness.             Significant Labs: All pertinent labs within the past 24 hours have been reviewed.    Significant Imaging: I have reviewed all pertinent imaging results/findings within the past 24 hours.

## 2025-02-16 NOTE — NURSING
Unable to collect respiratory sample due to pt not producing sample with cough. Will pass along to dayshift nurse to attempt to collect.

## 2025-02-16 NOTE — PLAN OF CARE
Problem: Adult Inpatient Plan of Care  Goal: Plan of Care Review  Outcome: Progressing  Goal: Patient-Specific Goal (Individualized)  Outcome: Progressing  Goal: Absence of Hospital-Acquired Illness or Injury  Outcome: Progressing  Goal: Optimal Comfort and Wellbeing  Outcome: Progressing  Goal: Readiness for Transition of Care  Outcome: Progressing     Problem: Pneumonia  Goal: Fluid Balance  Outcome: Progressing  Goal: Resolution of Infection Signs and Symptoms  Outcome: Progressing  Goal: Effective Oxygenation and Ventilation  Outcome: Progressing     Problem: Wound  Goal: Optimal Coping  Outcome: Progressing  Goal: Optimal Functional Ability  Outcome: Progressing  Goal: Absence of Infection Signs and Symptoms  Outcome: Progressing  Goal: Improved Oral Intake  Outcome: Progressing  Goal: Optimal Pain Control and Function  Outcome: Progressing  Goal: Skin Health and Integrity  Outcome: Progressing  Goal: Optimal Wound Healing  Outcome: Progressing     Problem: Skin Injury Risk Increased  Goal: Skin Health and Integrity  Outcome: Progressing     Problem: Airway Clearance Ineffective  Goal: Effective Airway Clearance  Outcome: Progressing     Problem: Gas Exchange Impaired  Goal: Optimal Gas Exchange  Outcome: Progressing

## 2025-02-16 NOTE — PLAN OF CARE
[facility]  Initial Discharge Assessment       Primary Care Provider: Gonzalo Barrera NP    Admission Diagnosis: Aspiration pneumonia [J69.0]    Admission Date: 2/14/2025  Expected Discharge Date:     Transition of Care Barriers: (P) Mobility    Payor: [employer] MANAGED MEDICARE / Plan: [employer] SNP HMO PPO SPECIAL NEEDS / Product Type: Medicare Advantage /     Extended Emergency Contact Information  Primary Emergency Contact: Carolina Matias  Home Phone: [phone]  Relation: Sister  Preferred language: English   needed? No  Secondary Emergency Contact: Ashley Montanez  Mobile Phone: [phone]  Relation: Daughter  Preferred language: English   needed? No    Discharge Plan A: Home with family  Discharge Plan B: Home with family, Home Health, Long-term acute care facility (LTAC), Rehab, Skilled Nursing Facility      [facility] [address]  [address]  [place] [address]  Phone: [phone] Fax: [phone]      Initial Assessment (most recent)       Adult Discharge Assessment - 02/16/25 1109          Discharge Assessment    Assessment Type Discharge Planning Assessment     Confirmed/corrected address, phone number and insurance Yes   Carolina Matias is pt's mother not her sister    Confirmed Demographics Correct on Facesheet     Source of Information family     If unable to respond/provide information was family/caregiver contacted? Yes     Contact Name/Number Carolina Matias (Mother) [phone]     Communicated SHRADDHA with patient/caregiver Date not available/Unable to determine     People in Home parent(s)     Do you expect to return to your current living situation? Yes     Do you have help at home or someone to help you manage your care at home? Yes     Who are your caregiver(s) and their phone number(s)? Carolina Matias (Mother) [phone]; Ashley Montanez (Daughter) [phone]     Prior to hospitilization cognitive status: Unable to  Assess     Current cognitive status: --   Pt is alert but per mother, she is confused at times    Walking or Climbing Stairs Difficulty yes     Walking or Climbing Stairs ambulation difficulty, dependent;transferring difficulty, dependent     Dressing/Bathing Difficulty yes     Dressing/Bathing bathing difficulty, dependent;dressing difficulty, dependent     Do you have any problems with: --   No problems reported    Home Accessibility wheelchair accessible     Home Layout Able to live on 1st floor     Equipment Currently Used at Home wheelchair;power chair;hospital bed;blood pressure machine (P)      Readmission within 30 days? Yes     Patient currently being followed by outpatient case management? Unable to determine (comments)     Do you currently have service(s) that help you manage your care at home? Yes   Per mother, pt receives services through Medicaid Waiver    How Many hours does patient receive services --   Mother states pt receives services daily through Medicaid Waiver    Is the pt/caregiver preference to resume services with current agency Yes     Do you take prescription medications? Yes     Do you have prescription coverage? Yes     Coverage [employer]     Do you have any problems affording any of your prescribed medications? No     Is the patient taking medications as prescribed? yes     Who is going to help you get home at discharge? Family/Ambulance     How do you get to doctors appointments? family or friend will provide (P)      Are you on dialysis? No     Do you take coumadin? No     Discharge Plan A Home with family     Discharge Plan B Home with family;Home Health;Long-term acute care facility (LTAC);Rehab;Skilled Nursing Facility     DME Needed Upon Discharge  none     Discharge Plan discussed with: Parent(s)     Name(s) and Number(s) Carolina Matias (Mother) [phone]     Transition of Care Barriers Mobility (P)         Physical Activity    On average, how many days per week do you engage in  moderate to strenuous exercise (like a brisk walk)? 0 days     On average, how many minutes do you engage in exercise at this level? 0 min        Financial Resource Strain    How hard is it for you to pay for the very basics like food, housing, medical care, and heating? Not hard at all        Housing Stability    In the last 12 months, was there a time when you were not able to pay the mortgage or rent on time? No     At any time in the past 12 months, were you homeless or living in a shelter (including now)? No        Transportation Needs    Has the lack of transportation kept you from medical appointments, meetings, work or from getting things needed for daily living? No        Food Insecurity    Within the past 12 months, you worried that your food would run out before you got the money to buy more. Never true     Within the past 12 months, the food you bought just didn't last and you didn't have money to get more. Never true        Stress    Do you feel stress - tense, restless, nervous, or anxious, or unable to sleep at night because your mind is troubled all the time - these days? Not at all (P)         Social Isolation    How often do you feel lonely or isolated from those around you?  Never (P)         Alcohol Use    Q1: How often do you have a drink containing alcohol? Never (P)      Q2: How many drinks containing alcohol do you have on a typical day when you are drinking? Patient does not drink (P)      Q3: How often do you have six or more drinks on one occasion? Never (P)         Utilities    In the past 12 months has the electric, gas, oil, or water company threatened to shut off services in your home? No (P)         Health Literacy    How often do you need to have someone help you when you read instructions, pamphlets, or other written material from your doctor or pharmacy? Always (P)         OTHER    Name(s) of People in Home Carolina Matias (Mother) (P)                  SW spoke with pt's mother,  Carolina at pt's bedside to obtain information for pt's initial discharge assessment.  Pt lives at home with her mother and discharge plans are to return back home with family when medically ready.  Per mother, pt receives services at home provided by Medicaid Waiver. Pt has a wheelchair, power chair and hospital bed. [facility] questions completed and IM obtained. SS following for discharge needs as they arise.

## 2025-02-16 NOTE — ASSESSMENT & PLAN NOTE
Patient likely with dysphagia related to stroke from last year.    Swallow eval demonstrated patient can have mechanical soft diet and this has been ordered.

## 2025-02-16 NOTE — PROGRESS NOTES
[facility]  [facility] Medicine  Progress Note    Patient Name: Karie Denney  MRN: [id number]  Patient Class: IP- Inpatient   Admission Date: 2/14/2025  Length of Stay: 2 days  Attending Physician: Cassie Bar MD  Primary Care Provider: Gonzalo Barrera NP        Subjective     Principal Problem:Aspiration pneumonia        HPI:  The patient is a 59-year-old female who was transferred from [facility] for the complaints suspected aspiration pneumonia and UTI. The patient's daughter was at bedside and provided most of the history. The patient began experiencing flu-like symptoms a few weeks ago, including a productive cough, fever, and chills. She also experienced mild shortness of breath at home. Her doctor started her on oral antibiotics for pneumonia, but the symptoms did not resolve completely.   Last Wednesday, the patient became very weak and presented to the ER, where she was diagnosed with pneumonia and admitted for treatment with IV antibiotics. she was transferred to [facility] for treatment of aspiration pneumonia, swallow evaluation test.   At the time of the encounter, she was lying comfortably in bed and did not complain of any acute issues. she was oriented x3. according to the patient's daughter, she is at her baseline as far as mentation is concerned.  Patient has PMH of Chronic constipation, GERD, insomnia, depression, neuropathy, history of CVA in 2024 leading to left-sided paralysis. she underwent back surgery in 2015 that led her paraplegic in bother lower limbs.    Patient is non ambulatory and bedbound. she lives with her mother and a home health nurse visit her once in a week. she doesn't supplemental oxygen or CPAP at home. She was on 4.5 L O2 via nasal canula at the time of encounter.    Overview/Hospital Course:  No notes on file    Interval History:     Review of Systems   Constitutional:  Positive for fever. Negative for chills, diaphoresis, fatigue and  unexpected weight change.   HENT:  Positive for congestion. Negative for drooling, ear discharge, ear pain, postnasal drip, sinus pressure, sinus pain, sneezing and sore throat.    Eyes:  Negative for discharge, redness, itching and visual disturbance.   Respiratory:  Positive for cough and shortness of breath.    Cardiovascular:  Negative for chest pain, palpitations and leg swelling.   Gastrointestinal:  Negative for abdominal pain, constipation, diarrhea, nausea and vomiting.   Genitourinary:  Negative for difficulty urinating, dysuria, flank pain, frequency, hematuria and urgency.   Musculoskeletal:  Negative for arthralgias, back pain, joint swelling, myalgias, neck pain and neck stiffness.   Skin:  Negative for rash.   Neurological:  Negative for dizziness, syncope, weakness and headaches.   Psychiatric/Behavioral:  Negative for agitation, behavioral problems, confusion and decreased concentration.      Objective:     Vital Signs (Most Recent):  Temp: 98.1 °F (36.7 °C) (02/16/25 1509)  Pulse: (!) 123 (02/16/25 1509)  Resp: 16 (02/16/25 1429)  BP: 131/75 (02/16/25 1509)  SpO2: (!) 94 % (02/16/25 1509) Vital Signs (24h Range):  Temp:  [97.8 °F (36.6 °C)-98.1 °F (36.7 °C)] 98.1 °F (36.7 °C)  Pulse:  [] 123  Resp:  [16-20] 16  SpO2:  [92 %-100 %] 94 %  BP: (118-136)/(62-75) 131/75     Weight: 86.2 kg (190 lb)  Body mass index is 30.67 kg/m².  No intake or output data in the 24 hours ending 02/16/25 1701      Physical Exam  Vitals and nursing note reviewed.   Constitutional:       General: She is not in acute distress.     Appearance: Normal appearance. She is obese. She is not ill-appearing.   HENT:      Head: Normocephalic and atraumatic.      Nose: No congestion or rhinorrhea.   Eyes:      Extraocular Movements: Extraocular movements intact.      Conjunctiva/sclera: Conjunctivae normal.      Pupils: Pupils are equal, round, and reactive to light.   Cardiovascular:      Rate and Rhythm: Normal rate and  regular rhythm.      Pulses: Normal pulses.      Heart sounds: Normal heart sounds. No murmur heard.  Pulmonary:      Effort: Pulmonary effort is normal. No respiratory distress.      Breath sounds: Normal breath sounds. No wheezing.   Abdominal:      General: Bowel sounds are normal. There is no distension.      Palpations: Abdomen is soft.      Tenderness: There is no abdominal tenderness.   Musculoskeletal:      Cervical back: Normal range of motion and neck supple. No rigidity.      Right lower leg: No edema.      Left lower leg: No edema.   Skin:     General: Skin is warm.      Capillary Refill: Capillary refill takes less than 2 seconds.   Neurological:      General: No focal deficit present.      Mental Status: She is alert and oriented to person, place, and time.      Comments: Patient is paraplegic s/p back surgery in 2015.  CVA in 2025 leading her to have left sided upper extremity weakness.             Significant Labs: All pertinent labs within the past 24 hours have been reviewed.    Significant Imaging: I have reviewed all pertinent imaging results/findings within the past 24 hours.    Assessment and Plan     * Aspiration pneumonia  Patient has a diagnosis of pneumonia. The cause of the pneumonia is suspected to be bacterial in etiology but organism is not known. The pneumonia is worsening due to hypoxia, oxygen requirement, and worsening of CXR . The patient has the following signs/symptoms of pneumonia: persistent hypoxia , cough, and sputum production. The patient does have a current oxygen requirement and the patient does not have a home oxygen requirement. I have reviewed the pertinent imaging. The following cultures have been collected: Blood cultures and Sputum culture The culture results are listed below.     Current antimicrobial regimen consists of the antibiotics listed below. Will monitor patient closely and continue current treatment plan unchanged.    2/15: Continue with IV  antibiotics.    2/16: aspiration episode today.  Patient cannot tolerate mechanically soft.  She needs a pureed diet.   Possible discharge tomorrow, if stable. She has medicaid waiver.        Antibiotics (From admission, onward)      Start     Stop Route Frequency Ordered    02/16/25 2100  sulfamethoxazole-trimethoprim 800-160mg per tablet 1 tablet         02/21/25 2059 Oral 2 times daily 02/16/25 1204    02/15/25 2000  aztreonam injection 2,000 mg         -- IV Every 8 hours (non-standard times) 02/15/25 1511            Microbiology Results (last 7 days)       Procedure Component Value Units Date/Time    Culture, Lower Respiratory [[id number]]     Order Status: Canceled Specimen: Respiratory from Sputum Induced     Blood culture (site 1) [[id number]]     Order Status: Canceled Specimen: Blood     Blood culture (site 2) [[id number]]     Order Status: Canceled Specimen: Blood           Pharmacy to dose Vancomycin  CXR from 2/13 showed worsening from the one done on 2/12.  CXR   Impression:  Worsening acute airspace process.  Differential considerations include multifocal pneumonia, aspiration, pulmonary edema, or a combination of these entities.  - Patient failed outpatient treatment with azithromycin.  CT chest with contrast pending      Dysphagia  Patient likely with dysphagia related to stroke from last year.    Swallow eval demonstrated patient can have mechanical soft diet and this has been ordered.      Primary insomnia  Continue home trazodone 100 mg QHS        Hypokalemia  Patient's most recent potassium results are listed below.   Recent Labs     02/12/25  1446 02/13/25  0742 02/14/25  0546   K 3.7 3.5 3.3*     Plan  - Replete potassium per protocol  - Monitor potassium Daily  - Patient's hypokalemia is stable  - Monitor    Depression  Patient has persistent depression which is mild and is currently controlled. Will Continue anti-depressant medications. We will not consult psychiatry at this time. Patient  does not display psychosis at this time. Continue to monitor closely and adjust plan of care as needed.    Continue zoloft 50 mg daily    Chronic idiopathic constipation  Continue Linzess        Acute hypoxemic respiratory failure  Patient with Hypoxic Respiratory failure which is Acute.  she is not on home oxygen. Supplemental oxygen was provided and noted improved in the oxygen saturation.  Patient is currently on 4.5 L of O2 via Nasal canula     .   Signs/symptoms of respiratory failure include- wheezing and hypoxia . Contributing diagnoses includes - Aspiration and Pneumonia Labs and images were reviewed. Patient Has recent ABG, which has been reviewed. Will treat underlying causes and adjust management of respiratory failure as follows-  -  Antibiotics (From admission, onward)      Start     Stop Route Frequency Ordered    02/15/25 0315  aztreonam injection 1,000 mg         -- IV Every 8 hours (non-standard times) 02/15/25 0208    02/15/25 0315  metronidazole IVPB 500 mg         -- IV Every 8 hours (non-standard times) 02/15/25 0208       - blood cultures and sputum cultures pendi  - duonebs scheduled  - swallow evaluation pending  - CXR from 2/13 showed worsening from the one done on 2/12.  CXR   Impression:  Worsening acute airspace process.  Differential considerations include multifocal pneumonia, aspiration, pulmonary edema, or a combination of these entities.  - Patient failed outpatient treatment with azithromycin.  - CT chest pending  Pharmacy to dose Vancomycin      GERD (gastroesophageal reflux disease)  Continue Protonix        Pressure ulcers of skin of multiple topographic sites  Patient has pressure ulcers left gluteal region  Wound care consulted      Acute cystitis with hematuria  Patient UA was significant for WBC and RBC  Cultures grew gram negative bacilli  Last Urine culture grew Ecoli sensitive to Rocephin  Antibiotics (From admission, onward)      Start     Stop Route Frequency Ordered     02/15/25 0315  aztreonam injection 1,000 mg         -- IV Every 8 hours (non-standard times) 02/15/25 0208                         VTE Risk Mitigation (From admission, onward)           Ordered     enoxaparin injection 40 mg  Every 24 hours         02/15/25 0008     IP VTE HIGH RISK PATIENT  Once         02/15/25 0008     Place sequential compression device  Until discontinued         02/15/25 0008                    Discharge Planning   SHRADDHA:      Code Status: Full Code   Medical Readiness for Discharge Date:   Discharge Plan A: Home with family                        Cassie Bar MD  Department of Hospital Medicine   [facility]

## 2025-02-16 NOTE — NURSING
1830 order in EHR for lower respiratory culture. Pt has no cough and is not able to give a sample. Will pass on to night shift to see if they can attempt to collect

## 2025-02-16 NOTE — SUBJECTIVE & OBJECTIVE
Interval History:     Review of Systems   Constitutional:  Positive for fever. Negative for chills, diaphoresis, fatigue and unexpected weight change.   HENT:  Positive for congestion. Negative for drooling, ear discharge, ear pain, postnasal drip, sinus pressure, sinus pain, sneezing and sore throat.    Eyes:  Negative for discharge, redness, itching and visual disturbance.   Respiratory:  Positive for cough and shortness of breath.    Cardiovascular:  Negative for chest pain, palpitations and leg swelling.   Gastrointestinal:  Negative for abdominal pain, constipation, diarrhea, nausea and vomiting.   Genitourinary:  Negative for difficulty urinating, dysuria, flank pain, frequency, hematuria and urgency.   Musculoskeletal:  Negative for arthralgias, back pain, joint swelling, myalgias, neck pain and neck stiffness.   Skin:  Negative for rash.   Neurological:  Negative for dizziness, syncope, weakness and headaches.   Psychiatric/Behavioral:  Negative for agitation, behavioral problems, confusion and decreased concentration.      Objective:     Vital Signs (Most Recent):  Temp: 98.1 °F (36.7 °C) (02/16/25 1509)  Pulse: (!) 123 (02/16/25 1509)  Resp: 16 (02/16/25 1429)  BP: 131/75 (02/16/25 1509)  SpO2: (!) 94 % (02/16/25 1509) Vital Signs (24h Range):  Temp:  [97.8 °F (36.6 °C)-98.1 °F (36.7 °C)] 98.1 °F (36.7 °C)  Pulse:  [] 123  Resp:  [16-20] 16  SpO2:  [92 %-100 %] 94 %  BP: (118-136)/(62-75) 131/75     Weight: 86.2 kg (190 lb)  Body mass index is 30.67 kg/m².  No intake or output data in the 24 hours ending 02/16/25 1701      Physical Exam  Vitals and nursing note reviewed.   Constitutional:       General: She is not in acute distress.     Appearance: Normal appearance. She is obese. She is not ill-appearing.   HENT:      Head: Normocephalic and atraumatic.      Nose: No congestion or rhinorrhea.   Eyes:      Extraocular Movements: Extraocular movements intact.      Conjunctiva/sclera: Conjunctivae  normal.      Pupils: Pupils are equal, round, and reactive to light.   Cardiovascular:      Rate and Rhythm: Normal rate and regular rhythm.      Pulses: Normal pulses.      Heart sounds: Normal heart sounds. No murmur heard.  Pulmonary:      Effort: Pulmonary effort is normal. No respiratory distress.      Breath sounds: Normal breath sounds. No wheezing.   Abdominal:      General: Bowel sounds are normal. There is no distension.      Palpations: Abdomen is soft.      Tenderness: There is no abdominal tenderness.   Musculoskeletal:      Cervical back: Normal range of motion and neck supple. No rigidity.      Right lower leg: No edema.      Left lower leg: No edema.   Skin:     General: Skin is warm.      Capillary Refill: Capillary refill takes less than 2 seconds.   Neurological:      General: No focal deficit present.      Mental Status: She is alert and oriented to person, place, and time.      Comments: Patient is paraplegic s/p back surgery in 2015.  CVA in 2025 leading her to have left sided upper extremity weakness.             Significant Labs: All pertinent labs within the past 24 hours have been reviewed.    Significant Imaging: I have reviewed all pertinent imaging results/findings within the past 24 hours.

## 2025-02-17 PROCEDURE — 94640 AIRWAY INHALATION TREATMENT: CPT

## 2025-02-17 PROCEDURE — 94761 N-INVAS EAR/PLS OXIMETRY MLT: CPT

## 2025-02-17 PROCEDURE — 99900035 HC TECH TIME PER 15 MIN (STAT)

## 2025-02-17 PROCEDURE — 99233 SBSQ HOSP IP/OBS HIGH 50: CPT | Mod: ,,, | Performed by: HOSPITALIST

## 2025-02-17 PROCEDURE — 94668 MNPJ CHEST WALL SBSQ: CPT

## 2025-02-17 PROCEDURE — 63600175 PHARM REV CODE 636 W HCPCS: Performed by: HOSPITALIST

## 2025-02-17 PROCEDURE — 25000242 PHARM REV CODE 250 ALT 637 W/ HCPCS: Performed by: HOSPITALIST

## 2025-02-17 PROCEDURE — 25000003 PHARM REV CODE 250: Performed by: HOSPITALIST

## 2025-02-17 PROCEDURE — 11000001 HC ACUTE MED/SURG PRIVATE ROOM

## 2025-02-17 PROCEDURE — 92526 ORAL FUNCTION THERAPY: CPT

## 2025-02-17 PROCEDURE — 25000242 PHARM REV CODE 250 ALT 637 W/ HCPCS

## 2025-02-17 PROCEDURE — 63600175 PHARM REV CODE 636 W HCPCS: Performed by: INTERNAL MEDICINE

## 2025-02-17 PROCEDURE — 27000221 HC OXYGEN, UP TO 24 HOURS

## 2025-02-17 RX ORDER — ENOXAPARIN SODIUM 100 MG/ML
40 INJECTION SUBCUTANEOUS EVERY 24 HOURS
Status: DISCONTINUED | OUTPATIENT
Start: 2025-02-17 | End: 2025-02-26

## 2025-02-17 RX ORDER — ACETYLCYSTEINE 200 MG/ML
4 SOLUTION ORAL; RESPIRATORY (INHALATION) 2 TIMES DAILY
Status: DISCONTINUED | OUTPATIENT
Start: 2025-02-17 | End: 2025-02-17

## 2025-02-17 RX ORDER — ACETYLCYSTEINE 200 MG/ML
4 SOLUTION ORAL; RESPIRATORY (INHALATION) 2 TIMES DAILY
Status: DISCONTINUED | OUTPATIENT
Start: 2025-02-17 | End: 2025-02-20

## 2025-02-17 RX ORDER — AZTREONAM 1 G/1
2000 INJECTION, POWDER, LYOPHILIZED, FOR SOLUTION INTRAMUSCULAR; INTRAVENOUS
Status: DISCONTINUED | OUTPATIENT
Start: 2025-02-17 | End: 2025-02-19

## 2025-02-17 RX ORDER — GUAIFENESIN 100 MG/5ML
200 SOLUTION ORAL 2 TIMES DAILY
Status: DISCONTINUED | OUTPATIENT
Start: 2025-02-17 | End: 2025-02-25

## 2025-02-17 RX ADMIN — SULFAMETHOXAZOLE AND TRIMETHOPRIM 1 TABLET: 800; 160 TABLET ORAL at 09:02

## 2025-02-17 RX ADMIN — SODIUM CHLORIDE, POTASSIUM CHLORIDE, SODIUM LACTATE AND CALCIUM CHLORIDE: 600; 310; 30; 20 INJECTION, SOLUTION INTRAVENOUS at 02:02

## 2025-02-17 RX ADMIN — ENOXAPARIN SODIUM 40 MG: 40 INJECTION SUBCUTANEOUS at 04:02

## 2025-02-17 RX ADMIN — PREGABALIN 75 MG: 75 CAPSULE ORAL at 09:02

## 2025-02-17 RX ADMIN — ATORVASTATIN CALCIUM 20 MG: 20 TABLET, FILM COATED ORAL at 09:02

## 2025-02-17 RX ADMIN — PANTOPRAZOLE SODIUM 40 MG: 40 TABLET, DELAYED RELEASE ORAL at 09:02

## 2025-02-17 RX ADMIN — IPRATROPIUM BROMIDE AND ALBUTEROL SULFATE 3 ML: 2.5; .5 SOLUTION RESPIRATORY (INHALATION) at 01:02

## 2025-02-17 RX ADMIN — ACETYLCYSTEINE 4 ML: 200 SOLUTION ORAL; RESPIRATORY (INHALATION) at 09:02

## 2025-02-17 RX ADMIN — SERTRALINE HYDROCHLORIDE 50 MG: 50 TABLET ORAL at 09:02

## 2025-02-17 RX ADMIN — AZTREONAM 2000 MG: 2 INJECTION, POWDER, LYOPHILIZED, FOR SOLUTION INTRAMUSCULAR; INTRAVENOUS at 04:02

## 2025-02-17 RX ADMIN — IPRATROPIUM BROMIDE AND ALBUTEROL SULFATE 3 ML: 2.5; .5 SOLUTION RESPIRATORY (INHALATION) at 08:02

## 2025-02-17 RX ADMIN — GUAIFENESIN 200 MG: 200 SOLUTION ORAL at 04:02

## 2025-02-17 RX ADMIN — IPRATROPIUM BROMIDE AND ALBUTEROL SULFATE 3 ML: 2.5; .5 SOLUTION RESPIRATORY (INHALATION) at 07:02

## 2025-02-17 RX ADMIN — TRAZODONE HYDROCHLORIDE 100 MG: 50 TABLET ORAL at 09:02

## 2025-02-17 RX ADMIN — AZTREONAM 2000 MG: 2 INJECTION, POWDER, LYOPHILIZED, FOR SOLUTION INTRAMUSCULAR; INTRAVENOUS at 12:02

## 2025-02-17 RX ADMIN — AZTREONAM 2000 MG: 2 INJECTION, POWDER, LYOPHILIZED, FOR SOLUTION INTRAMUSCULAR; INTRAVENOUS at 08:02

## 2025-02-17 NOTE — ASSESSMENT & PLAN NOTE
Patient has a diagnosis of pneumonia. The cause of the pneumonia is suspected to be bacterial in etiology but organism is not known. The pneumonia is worsening due to hypoxia, oxygen requirement, and worsening of CXR . The patient has the following signs/symptoms of pneumonia: persistent hypoxia , cough, and sputum production. The patient does have a current oxygen requirement and the patient does not have a home oxygen requirement. I have reviewed the pertinent imaging. The following cultures have been collected: Blood cultures and Sputum culture The culture results are listed below.     Current antimicrobial regimen consists of the antibiotics listed below. Will monitor patient closely and continue current treatment plan unchanged.    2/15: Continue with IV antibiotics.    2/16: aspiration episode today.  Patient cannot tolerate mechanically soft.  She needs a pureed diet.   Possible discharge tomorrow, if stable. She has medicaid waiver.      2/17:  Continue with aspiration precautions.  Patient with thick mucus and secretions and chest so ordered Mucomyst and chest physiotherapy.  Continue with current care.      Antibiotics (From admission, onward)      Start     Stop Route Frequency Ordered    02/17/25 2000  aztreonam injection 2,000 mg         -- IV Every 8 hours (non-standard times) 02/17/25 1349    02/16/25 2100  sulfamethoxazole-trimethoprim 800-160mg per tablet 1 tablet         02/21/25 2059 Oral 2 times daily 02/16/25 1204            Microbiology Results (last 7 days)       Procedure Component Value Units Date/Time    Culture, Lower Respiratory [[id number]]     Order Status: Canceled Specimen: Respiratory from Sputum Induced     Blood culture (site 1) [[id number]]     Order Status: Canceled Specimen: Blood     Blood culture (site 2) [[id number]]     Order Status: Canceled Specimen: Blood           Pharmacy to dose Vancomycin  CXR from 2/13 showed worsening from the one done on 2/12.  CXR    Impression:  Worsening acute airspace process.  Differential considerations include multifocal pneumonia, aspiration, pulmonary edema, or a combination of these entities.  - Patient failed outpatient treatment with azithromycin.  CT chest with contrast pending

## 2025-02-17 NOTE — PROGRESS NOTES
[facility]  Wound Care    Patient Name:  Karie Denney   MRN:  [id number]  Date: 2/17/2025  Diagnosis: Aspiration pneumonia    History:     Past Medical History:   Diagnosis Date    GERD (gastroesophageal reflux disease)     Paraplegia        Social History[1]    Precautions:     Allergies as of 02/13/2025 - Reviewed 02/12/2025   Allergen Reaction Noted    Penicillins Anaphylaxis 08/25/2022       WOC Assessment Details/Treatment     Narrative: Seen patient for initial preventative skin care measures.  Ulcerations to left buttock.  Foam borders noted to bilateral heels and sacral.  Consult wound care of any other findings.    02/17/2025        [1]   Social History  Socioeconomic History    Marital status: Single   Tobacco Use    Smoking status: Never    Smokeless tobacco: Never   Substance and Sexual Activity    Alcohol use: Never    Drug use: Never    Sexual activity: Not Currently     Social Drivers of Health     Financial Resource Strain: Low Risk  (2/16/2025)    Overall Financial Resource Strain (CARDIA)     Difficulty of Paying Living Expenses: Not hard at all   Food Insecurity: No Food Insecurity (2/16/2025)    Hunger Vital Sign     Worried About Running Out of Food in the Last Year: Never true     Ran Out of Food in the Last Year: Never true   Transportation Needs: Not At Risk (8/27/2024)    Received from [facility]    BOC Transportation Needs     Financial/Environmental Concerns: Unrecognized value   Physical Activity: Inactive (2/16/2025)    Exercise Vital Sign     Days of Exercise per Week: 0 days     Minutes of Exercise per Session: 0 min   Stress: No Stress Concern Present (2/16/2025)    [employer] [place] [employer] - Occupational Stress Questionnaire     Feeling of Stress : Not at all   Housing Stability: Low Risk  (2/16/2025)    Housing Stability Vital Sign     Unable to Pay for Housing in the Last Year: No     Homeless in the Last Year:  No      Jovita

## 2025-02-17 NOTE — PROGRESS NOTES
[facility]  Wound Care    Patient Name:  Karie Denney   MRN:  [id number]  Date: 2/17/2025  Diagnosis: Aspiration pneumonia    History:     Past Medical History:   Diagnosis Date    GERD (gastroesophageal reflux disease)     Paraplegia        Social History[1]    Precautions:     Allergies as of 02/13/2025 - Reviewed 02/12/2025   Allergen Reaction Noted    Penicillins Anaphylaxis 08/25/2022       WOC Assessment Details/Treatment        02/17/25 1030   WOCN Assessment   WOCN Total Time (mins) 60   Visit Date 02/17/25   Visit Time 1030   Consult Type New   WOCN Speciality Wound   Wound skin tear;moisture   Number of Wounds 1   Continence Type Urinary;Fecal   Intervention assessed;changed;chart review;coordination of care   Teaching on-going   Positioning   Body Position head facing, left;sitting up in bed   Head of Bed (HOB) Positioning HOB elevated   Positioning/Transfer Devices pillows;in use   Pressure Injury Prevention    Check Moisture Management Pad Done   Sacral Foam Dressing Peel back sacral foam dressing, assess skin and reapply   Heel protection technique Foam dressing   Heel preventative measures Peel back dressing/boot, assess skin and reapply   Check Medical Devices Done        Wound 02/14/25 2230 Skin Tear Left posterior Thigh   Date First Assessed/Time First Assessed: 02/14/25 2230   Primary Wound Type: Skin Tear  Side: Left  Orientation: posterior  Location: Thigh  Is this injury device related?: No   Wound Image    Dressing Appearance Dry;Intact;Clean   Drainage Amount Scant   Drainage Characteristics/Odor Brown   Appearance Pink   Tissue loss description Full thickness   Black (%), Wound Tissue Color 0 %   Red (%), Wound Tissue Color 100 %   Yellow (%), Wound Tissue Color 0 %   Periwound Area Intact   Wound Edges Undefined   Wound Length (cm) 6 cm   Wound Width (cm) 10 cm   Wound Depth (cm) 0.1 cm   Wound Volume (cm^3) 3.142 cm^3   Wound Surface Area (cm^2) 47.12 cm^2   Care  "Cleansed with:;Antimicrobial agent   Dressing Applied;Silver;Calcium alginate;Foam;[place]land/border;Silicone   Dressing Change Due 02/20/25         WO Team consulted for "Left Gluteal region"    Narrative: pt lyin gin bed with mother @ bedside. NADN, pt tolerated wound care well.     Active Wounds and Recommendations: clean open areas with vashe, apply aquacel AG, secure with foam border. Change dressing q3 days or PRN     Goals for Wound Healing: Moisture management, Reduce pain, Reduce bioburden, and Educate on proper wound management post D/C     Barriers to Wound Healing: multiple co-morbidities excessive wound moisture and macerated tissue large surface area advanced age fragile skin no protective sensation infection    Orders placed.    Thank you for the consult.     We will continue to follow. See additional note under Notes Tab for tentative f/u plan/dates.        02/17/2025       [1]   Social History  Socioeconomic History    Marital status: Single   Tobacco Use    Smoking status: Never    Smokeless tobacco: Never   Substance and Sexual Activity    Alcohol use: Never    Drug use: Never    Sexual activity: Not Currently     Social Drivers of Health     Financial Resource Strain: Low Risk  (2/16/2025)    Overall Financial Resource Strain (CARDIA)     Difficulty of Paying Living Expenses: Not hard at all   Food Insecurity: No Food Insecurity (2/16/2025)    Hunger Vital Sign     Worried About Running Out of Food in the Last Year: Never true     Ran Out of Food in the Last Year: Never true   Transportation Needs: Not At Risk (8/27/2024)    Received from [facility]    BOC Transportation Needs     Financial/Environmental Concerns: Unrecognized value   Physical Activity: Inactive (2/16/2025)    Exercise Vital Sign     Days of Exercise per Week: 0 days     Minutes of Exercise per Session: 0 min   Stress: No Stress Concern Present (2/16/2025)    [employer] [place] [employer] - " Occupational Stress Questionnaire     Feeling of Stress : Not at all   Housing Stability: Low Risk  (2/16/2025)    Housing Stability Vital Sign     Unable to Pay for Housing in the Last Year: No     Homeless in the Last Year: No

## 2025-02-17 NOTE — PT/OT/SLP PROGRESS
Speech Language Pathology Treatment    Patient Name:  Karie Denney   MRN:  [id number]  Admitting Diagnosis: Aspiration pneumonia    Recommendations:                 General Recommendations:  Dysphagia therapy  Diet recommendations:  Puree, Liquid Diet Level: Thin   Aspiration Precautions: 1 bite/sip at a time, Assistance with meals, HOB to 90 degrees, Remain upright 30 minutes post meal, Small bites/sips, and Standard aspiration precautions   General Precautions: Standard, aspiration  Communication strategies:   Patient able to answer most questions and follow simple commands.     Assessment:     Karie Denney is a 59 y.o. female with an SLP diagnosis of Dysphagia.  She presents with needing pureed diet.    Subjective     Patient lying in bed awake. Mom at bedside. Patient agreeable to SPEECH THERAPY.  Patient goals: to eat and drink     Pain/Comfort:  Pain Rating 1: 0/10 (No pain reported by pt.)    Respiratory Status: Nasal cannula, flow 4 L/min    Objective:     Has the patient been evaluated by SLP for swallowing?   Yes  Keep patient NPO? No   Current Respiratory Status:        Patient completed hard glottal attacks and tongue base retraction exercises with min accuracy after cues/imitation  Patient completed laryngeal elevation exercises with min accuracy after cues/imitation.   Patient completed multiple sets of lingual protrusions, elevations, depressions and lateralizations withmod accuracy after cues/imitation.   Patient completed multiple sets of labial protrusions and retractions with mod-max accuracy after cues/imitation.      Goals:   Multidisciplinary Problems       SLP Goals          Problem: SLP    Goal Priority Disciplines Outcome   SLP Goal     SLP Progressing   Description: Patient will complete hard glottal attacks and tongue base retractions exercises with mod-max accuracy Independently.   Patient will complete laryngeal elevation exercises with mod-max accuracy Independently.   Patient  will complete lingual protrusions, elevations, depressions and lateralizations with mod-max accuracy Independently.   Patient will complete labial protrusions and retractions with mod-max accuracy Independently.                         Plan:     Patient to be seen:  5 x/week   Plan of Care expires:  02/27/25  Plan of Care reviewed with:  patient, daughter   SLP Follow-Up:  Yes       Discharge recommendations:      Barriers to Discharge:  None    Time Tracking:     SLP Treatment Date:   02/17/25  Speech Start Time:  0924  Speech Stop Time:  0944     Speech Total Time (min):  20 min    Billable Minutes: Treatment Swallowing Dysfunction 20 02/17/2025

## 2025-02-17 NOTE — PROGRESS NOTES
[facility]  [facility] Medicine  Progress Note    Patient Name: Karie Denney  MRN: [id number]  Patient Class: IP- Inpatient   Admission Date: 2/14/2025  Length of Stay: 3 days  Attending Physician: Cassie Bar MD  Primary Care Provider: Gonzalo Barrera NP        Subjective     Principal Problem:Aspiration pneumonia    HPI:  The patient is a 59-year-old female who was transferred from [facility] for the complaints suspected aspiration pneumonia and UTI. The patient's daughter was at bedside and provided most of the history. The patient began experiencing flu-like symptoms a few weeks ago, including a productive cough, fever, and chills. She also experienced mild shortness of breath at home. Her doctor started her on oral antibiotics for pneumonia, but the symptoms did not resolve completely.   Last Wednesday, the patient became very weak and presented to the ER, where she was diagnosed with pneumonia and admitted for treatment with IV antibiotics. she was transferred to [facility] for treatment of aspiration pneumonia, swallow evaluation test.   At the time of the encounter, she was lying comfortably in bed and did not complain of any acute issues. she was oriented x3. according to the patient's daughter, she is at her baseline as far as mentation is concerned.  Patient has PMH of Chronic constipation, GERD, insomnia, depression, neuropathy, history of CVA in 2024 leading to left-sided paralysis. she underwent back surgery in 2015 that led her paraplegic in bother lower limbs.    Patient is non ambulatory and bedbound. she lives with her mother and a home health nurse visit her once in a week. she doesn't supplemental oxygen or CPAP at home. She was on 4.5 L O2 via nasal canula at the time of encounter.    Overview/Hospital Course:  No notes on file    Interval History:     Review of Systems   Constitutional:  Positive for fever. Negative for chills, diaphoresis, fatigue and  unexpected weight change.   HENT:  Positive for congestion. Negative for drooling, ear discharge, ear pain, postnasal drip, sinus pressure, sinus pain, sneezing and sore throat.    Eyes:  Negative for discharge, redness, itching and visual disturbance.   Respiratory:  Positive for cough and shortness of breath.    Cardiovascular:  Negative for chest pain, palpitations and leg swelling.   Gastrointestinal:  Negative for abdominal pain, constipation, diarrhea, nausea and vomiting.   Genitourinary:  Negative for difficulty urinating, dysuria, flank pain, frequency, hematuria and urgency.   Musculoskeletal:  Negative for arthralgias, back pain, joint swelling, myalgias, neck pain and neck stiffness.   Skin:  Negative for rash.   Neurological:  Negative for dizziness, syncope, weakness and headaches.   Psychiatric/Behavioral:  Negative for agitation, behavioral problems, confusion and decreased concentration.      Objective:     Vital Signs (Most Recent):  Temp: 98.3 °F (36.8 °C) (02/17/25 1155)  Pulse: (!) 116 (02/17/25 1307)  Resp: 16 (02/17/25 1307)  BP: 108/72 (02/17/25 1155)  SpO2: 99 % (02/17/25 1307) Vital Signs (24h Range):  Temp:  [98.2 °F (36.8 °C)-99 °F (37.2 °C)] 98.3 °F (36.8 °C)  Pulse:  [] 116  Resp:  [16-20] 16  SpO2:  [93 %-99 %] 99 %  BP: (104-125)/(54-76) 108/72     Weight: 86.2 kg (190 lb)  Body mass index is 30.67 kg/m².  No intake or output data in the 24 hours ending 02/17/25 1635      Physical Exam  Vitals and nursing note reviewed.   Constitutional:       General: She is not in acute distress.     Appearance: Normal appearance. She is obese. She is not ill-appearing.   HENT:      Head: Normocephalic and atraumatic.      Nose: No congestion or rhinorrhea.   Eyes:      Extraocular Movements: Extraocular movements intact.      Conjunctiva/sclera: Conjunctivae normal.      Pupils: Pupils are equal, round, and reactive to light.   Cardiovascular:      Rate and Rhythm: Normal rate and regular  rhythm.      Pulses: Normal pulses.      Heart sounds: Normal heart sounds. No murmur heard.  Pulmonary:      Effort: Pulmonary effort is normal. No respiratory distress.      Breath sounds: Normal breath sounds. No wheezing.   Abdominal:      General: Bowel sounds are normal. There is no distension.      Palpations: Abdomen is soft.      Tenderness: There is no abdominal tenderness.   Musculoskeletal:      Cervical back: Normal range of motion and neck supple. No rigidity.      Right lower leg: No edema.      Left lower leg: No edema.   Skin:     General: Skin is warm.      Capillary Refill: Capillary refill takes less than 2 seconds.   Neurological:      General: No focal deficit present.      Mental Status: She is alert and oriented to person, place, and time.      Comments: Patient is paraplegic s/p back surgery in 2015.  CVA in 2025 leading her to have left sided upper extremity weakness.             Significant Labs: All pertinent labs within the past 24 hours have been reviewed.    Significant Imaging: I have reviewed all pertinent imaging results/findings within the past 24 hours.    Assessment and Plan     * Aspiration pneumonia  Patient has a diagnosis of pneumonia. The cause of the pneumonia is suspected to be bacterial in etiology but organism is not known. The pneumonia is worsening due to hypoxia, oxygen requirement, and worsening of CXR . The patient has the following signs/symptoms of pneumonia: persistent hypoxia , cough, and sputum production. The patient does have a current oxygen requirement and the patient does not have a home oxygen requirement. I have reviewed the pertinent imaging. The following cultures have been collected: Blood cultures and Sputum culture The culture results are listed below.     Current antimicrobial regimen consists of the antibiotics listed below. Will monitor patient closely and continue current treatment plan unchanged.    2/15: Continue with IV antibiotics.    2/16:  aspiration episode today.  Patient cannot tolerate mechanically soft.  She needs a pureed diet.   Possible discharge tomorrow, if stable. She has medicaid waiver.      2/17:  Continue with aspiration precautions.  Patient with thick mucus and secretions and chest so ordered Mucomyst and chest physiotherapy.  Continue with current care.      Antibiotics (From admission, onward)      Start     Stop Route Frequency Ordered    02/17/25 2000  aztreonam injection 2,000 mg         -- IV Every 8 hours (non-standard times) 02/17/25 1349    02/16/25 2100  sulfamethoxazole-trimethoprim 800-160mg per tablet 1 tablet         02/21/25 2059 Oral 2 times daily 02/16/25 1204            Microbiology Results (last 7 days)       Procedure Component Value Units Date/Time    Culture, Lower Respiratory [[id number]]     Order Status: Canceled Specimen: Respiratory from Sputum Induced     Blood culture (site 1) [[id number]]     Order Status: Canceled Specimen: Blood     Blood culture (site 2) [[id number]]     Order Status: Canceled Specimen: Blood           Pharmacy to dose Vancomycin  CXR from 2/13 showed worsening from the one done on 2/12.  CXR   Impression:  Worsening acute airspace process.  Differential considerations include multifocal pneumonia, aspiration, pulmonary edema, or a combination of these entities.  - Patient failed outpatient treatment with azithromycin.  CT chest with contrast pending      Dysphagia  Patient likely with dysphagia related to stroke from last year.    Swallow eval demonstrated patient can have mechanical soft diet and this has been ordered.    2/17:  Repeat swallow eval ordered.  The patient unable to swallow or this persists will need to consult GI.      Primary insomnia  Continue home trazodone 100 mg QHS        Hypokalemia  Patient's most recent potassium results are listed below.   Recent Labs     02/12/25  1446 02/13/25  0742 02/14/25  0546   K 3.7 3.5 3.3*     Plan  - Replete potassium per  protocol  - Monitor potassium Daily  - Patient's hypokalemia is stable  - Monitor    Depression  Patient has persistent depression which is mild and is currently controlled. Will Continue anti-depressant medications. We will not consult psychiatry at this time. Patient does not display psychosis at this time. Continue to monitor closely and adjust plan of care as needed.    Continue zoloft 50 mg daily    Chronic idiopathic constipation  Continue Linzess        Acute hypoxemic respiratory failure  Patient with Hypoxic Respiratory failure which is Acute.  she is not on home oxygen. Supplemental oxygen was provided and noted improved in the oxygen saturation.  Patient is currently on 4.5 L of O2 via Nasal canula     .   Signs/symptoms of respiratory failure include- wheezing and hypoxia . Contributing diagnoses includes - Aspiration and Pneumonia Labs and images were reviewed. Patient Has recent ABG, which has been reviewed. Will treat underlying causes and adjust management of respiratory failure as follows-  -  Antibiotics (From admission, onward)      Start     Stop Route Frequency Ordered    02/15/25 0315  aztreonam injection 1,000 mg         -- IV Every 8 hours (non-standard times) 02/15/25 0208    02/15/25 0315  metronidazole IVPB 500 mg         -- IV Every 8 hours (non-standard times) 02/15/25 0208       - blood cultures and sputum cultures pendi  - angela scheduled  - swallow evaluation pending  - CXR from 2/13 showed worsening from the one done on 2/12.  CXR   Impression:  Worsening acute airspace process.  Differential considerations include multifocal pneumonia, aspiration, pulmonary edema, or a combination of these entities.  - Patient failed outpatient treatment with azithromycin.  - CT chest pending  Pharmacy to dose Vancomycin      GERD (gastroesophageal reflux disease)  Continue Protonix        Pressure ulcers of skin of multiple topographic sites  Patient has pressure ulcers left gluteal region  Wound  care consulted      Acute cystitis with hematuria  Patient UA was significant for WBC and RBC  Cultures grew gram negative bacilli  Last Urine culture grew Ecoli sensitive to Rocephin  Antibiotics (From admission, onward)      Start     Stop Route Frequency Ordered    02/15/25 0315  aztreonam injection 1,000 mg         -- IV Every 8 hours (non-standard times) 02/15/25 0208                         VTE Risk Mitigation (From admission, onward)           Ordered     enoxaparin injection 40 mg  Daily         02/17/25 1204     IP VTE HIGH RISK PATIENT  Once         02/17/25 1204     Place sequential compression device  Until discontinued         02/15/25 0008                    Discharge Planning   SHRADDHA:      Code Status: Full Code   Medical Readiness for Discharge Date:   Discharge Plan A: Home with family            Cassie Bar MD  Department of Hospital Medicine   [facility]

## 2025-02-17 NOTE — ASSESSMENT & PLAN NOTE
Patient likely with dysphagia related to stroke from last year.    Swallow eval demonstrated patient can have mechanical soft diet and this has been ordered.    2/17:  Repeat swallow eval ordered.  The patient unable to swallow or this persists will need to consult GI.

## 2025-02-17 NOTE — NURSING
1130 Notified nursing supervisor of need for CLAUDIA bed per WC team for fiona score.    1650 moved pt to CLAUDIA bed at this time

## 2025-02-17 NOTE — SUBJECTIVE & OBJECTIVE
Interval History:     Review of Systems   Constitutional:  Positive for fever. Negative for chills, diaphoresis, fatigue and unexpected weight change.   HENT:  Positive for congestion. Negative for drooling, ear discharge, ear pain, postnasal drip, sinus pressure, sinus pain, sneezing and sore throat.    Eyes:  Negative for discharge, redness, itching and visual disturbance.   Respiratory:  Positive for cough and shortness of breath.    Cardiovascular:  Negative for chest pain, palpitations and leg swelling.   Gastrointestinal:  Negative for abdominal pain, constipation, diarrhea, nausea and vomiting.   Genitourinary:  Negative for difficulty urinating, dysuria, flank pain, frequency, hematuria and urgency.   Musculoskeletal:  Negative for arthralgias, back pain, joint swelling, myalgias, neck pain and neck stiffness.   Skin:  Negative for rash.   Neurological:  Negative for dizziness, syncope, weakness and headaches.   Psychiatric/Behavioral:  Negative for agitation, behavioral problems, confusion and decreased concentration.      Objective:     Vital Signs (Most Recent):  Temp: 98.3 °F (36.8 °C) (02/17/25 1155)  Pulse: (!) 116 (02/17/25 1307)  Resp: 16 (02/17/25 1307)  BP: 108/72 (02/17/25 1155)  SpO2: 99 % (02/17/25 1307) Vital Signs (24h Range):  Temp:  [98.2 °F (36.8 °C)-99 °F (37.2 °C)] 98.3 °F (36.8 °C)  Pulse:  [] 116  Resp:  [16-20] 16  SpO2:  [93 %-99 %] 99 %  BP: (104-125)/(54-76) 108/72     Weight: 86.2 kg (190 lb)  Body mass index is 30.67 kg/m².  No intake or output data in the 24 hours ending 02/17/25 1635      Physical Exam  Vitals and nursing note reviewed.   Constitutional:       General: She is not in acute distress.     Appearance: Normal appearance. She is obese. She is not ill-appearing.   HENT:      Head: Normocephalic and atraumatic.      Nose: No congestion or rhinorrhea.   Eyes:      Extraocular Movements: Extraocular movements intact.      Conjunctiva/sclera: Conjunctivae normal.       Pupils: Pupils are equal, round, and reactive to light.   Cardiovascular:      Rate and Rhythm: Normal rate and regular rhythm.      Pulses: Normal pulses.      Heart sounds: Normal heart sounds. No murmur heard.  Pulmonary:      Effort: Pulmonary effort is normal. No respiratory distress.      Breath sounds: Normal breath sounds. No wheezing.   Abdominal:      General: Bowel sounds are normal. There is no distension.      Palpations: Abdomen is soft.      Tenderness: There is no abdominal tenderness.   Musculoskeletal:      Cervical back: Normal range of motion and neck supple. No rigidity.      Right lower leg: No edema.      Left lower leg: No edema.   Skin:     General: Skin is warm.      Capillary Refill: Capillary refill takes less than 2 seconds.   Neurological:      General: No focal deficit present.      Mental Status: She is alert and oriented to person, place, and time.      Comments: Patient is paraplegic s/p back surgery in 2015.  CVA in 2025 leading her to have left sided upper extremity weakness.             Significant Labs: All pertinent labs within the past 24 hours have been reviewed.    Significant Imaging: I have reviewed all pertinent imaging results/findings within the past 24 hours.

## 2025-02-17 NOTE — PLAN OF CARE
02/17/25 1326   Rounds   Attendance Provider;;Charge nurse;Physical therapist;Pharmacist   Discharge Plan A Home with family   Why the patient remains in the hospital Requires continued medical care   Transition of Care Barriers None     Chart reviewed. D/c plan is to return home with medicaid waiver at d/c. Ss following.

## 2025-02-18 PROCEDURE — 99900035 HC TECH TIME PER 15 MIN (STAT)

## 2025-02-18 PROCEDURE — 25000003 PHARM REV CODE 250: Performed by: HOSPITALIST

## 2025-02-18 PROCEDURE — 27200966 HC CLOSED SUCTION SYSTEM

## 2025-02-18 PROCEDURE — 25000242 PHARM REV CODE 250 ALT 637 W/ HCPCS

## 2025-02-18 PROCEDURE — 11000001 HC ACUTE MED/SURG PRIVATE ROOM

## 2025-02-18 PROCEDURE — 94640 AIRWAY INHALATION TREATMENT: CPT

## 2025-02-18 PROCEDURE — 63600175 PHARM REV CODE 636 W HCPCS

## 2025-02-18 PROCEDURE — 63600175 PHARM REV CODE 636 W HCPCS: Performed by: HOSPITALIST

## 2025-02-18 PROCEDURE — 27000207 HC ISOLATION

## 2025-02-18 PROCEDURE — 27000221 HC OXYGEN, UP TO 24 HOURS

## 2025-02-18 PROCEDURE — 94761 N-INVAS EAR/PLS OXIMETRY MLT: CPT

## 2025-02-18 PROCEDURE — 92611 MOTION FLUOROSCOPY/SWALLOW: CPT

## 2025-02-18 PROCEDURE — 25000242 PHARM REV CODE 250 ALT 637 W/ HCPCS: Performed by: HOSPITALIST

## 2025-02-18 PROCEDURE — 99233 SBSQ HOSP IP/OBS HIGH 50: CPT | Mod: ,,, | Performed by: HOSPITALIST

## 2025-02-18 PROCEDURE — 94668 MNPJ CHEST WALL SBSQ: CPT

## 2025-02-18 RX ADMIN — ATORVASTATIN CALCIUM 20 MG: 20 TABLET, FILM COATED ORAL at 09:02

## 2025-02-18 RX ADMIN — AZTREONAM 2000 MG: 2 INJECTION, POWDER, LYOPHILIZED, FOR SOLUTION INTRAMUSCULAR; INTRAVENOUS at 01:02

## 2025-02-18 RX ADMIN — ENOXAPARIN SODIUM 40 MG: 40 INJECTION SUBCUTANEOUS at 04:02

## 2025-02-18 RX ADMIN — PANTOPRAZOLE SODIUM 40 MG: 40 TABLET, DELAYED RELEASE ORAL at 10:02

## 2025-02-18 RX ADMIN — AZTREONAM 2000 MG: 2 INJECTION, POWDER, LYOPHILIZED, FOR SOLUTION INTRAMUSCULAR; INTRAVENOUS at 09:02

## 2025-02-18 RX ADMIN — ACETAMINOPHEN 650 MG: 325 TABLET ORAL at 09:02

## 2025-02-18 RX ADMIN — GUAIFENESIN 200 MG: 200 SOLUTION ORAL at 09:02

## 2025-02-18 RX ADMIN — PREGABALIN 75 MG: 75 CAPSULE ORAL at 10:02

## 2025-02-18 RX ADMIN — IPRATROPIUM BROMIDE AND ALBUTEROL SULFATE 3 ML: 2.5; .5 SOLUTION RESPIRATORY (INHALATION) at 12:02

## 2025-02-18 RX ADMIN — SERTRALINE HYDROCHLORIDE 50 MG: 50 TABLET ORAL at 10:02

## 2025-02-18 RX ADMIN — AZTREONAM 2000 MG: 2 INJECTION, POWDER, LYOPHILIZED, FOR SOLUTION INTRAMUSCULAR; INTRAVENOUS at 04:02

## 2025-02-18 RX ADMIN — ACETYLCYSTEINE 4 ML: 200 SOLUTION ORAL; RESPIRATORY (INHALATION) at 08:02

## 2025-02-18 RX ADMIN — SULFAMETHOXAZOLE AND TRIMETHOPRIM 1 TABLET: 800; 160 TABLET ORAL at 10:02

## 2025-02-18 RX ADMIN — PREGABALIN 75 MG: 75 CAPSULE ORAL at 09:02

## 2025-02-18 RX ADMIN — SULFAMETHOXAZOLE AND TRIMETHOPRIM 1 TABLET: 800; 160 TABLET ORAL at 09:02

## 2025-02-18 RX ADMIN — ONDANSETRON 4 MG: 2 INJECTION INTRAMUSCULAR; INTRAVENOUS at 04:02

## 2025-02-18 RX ADMIN — IPRATROPIUM BROMIDE AND ALBUTEROL SULFATE 3 ML: 2.5; .5 SOLUTION RESPIRATORY (INHALATION) at 08:02

## 2025-02-18 RX ADMIN — TRAZODONE HYDROCHLORIDE 100 MG: 50 TABLET ORAL at 09:02

## 2025-02-18 NOTE — PLAN OF CARE
Problem: Adult Inpatient Plan of Care  Goal: Plan of Care Review  Outcome: Progressing  Goal: Patient-Specific Goal (Individualized)  Outcome: Progressing  Goal: Absence of Hospital-Acquired Illness or Injury  Outcome: Progressing  Goal: Optimal Comfort and Wellbeing  Outcome: Progressing  Goal: Readiness for Transition of Care  Outcome: Progressing     Problem: Pneumonia  Goal: Fluid Balance  Outcome: Progressing  Goal: Resolution of Infection Signs and Symptoms  Outcome: Progressing  Goal: Effective Oxygenation and Ventilation  Outcome: Progressing     Problem: Wound  Goal: Optimal Coping  Outcome: Progressing  Goal: Optimal Functional Ability  Outcome: Progressing  Goal: Absence of Infection Signs and Symptoms  Outcome: Progressing  Goal: Improved Oral Intake  Outcome: Progressing  Goal: Optimal Pain Control and Function  Outcome: Progressing  Goal: Skin Health and Integrity  Outcome: Progressing  Goal: Optimal Wound Healing  Outcome: Progressing     Problem: Skin Injury Risk Increased  Goal: Skin Health and Integrity  Outcome: Progressing     Problem: Airway Clearance Ineffective  Goal: Effective Airway Clearance  Outcome: Progressing

## 2025-02-18 NOTE — PT/OT/SLP EVAL
Modified Barium Swallow    Patient Name:  Karie Denney   MRN:  [id number]      Recommendations:     Recommendations:                General Recommendations:  GI evaluation  Diet recommendations:  Puree, Thin   Aspiration Precautions: 1 bite/sip at a time, Alternating bites/sips, Double swallow with each bite/sip, HOB to 90 degrees, Remain upright 30 minutes post meal, Small bites/sips, and Standard aspiration precautions   General Precautions: Standard, aspiration    Referral     Reason for Referral  Patient was referred for a Modified Barium Swallow Study to assess the efficiency of his/her swallow function, rule out aspiration and make recommendations regarding safe dietary consistencies, effective compensatory strategies, and safe eating environment.     Diagnosis: Aspiration pneumonia     Fluoro Time: 2 minutes, 23 seconds     History:     Past Medical History:   Diagnosis Date    GERD (gastroesophageal reflux disease)     Paraplegia      Past Surgical History:   Procedure Laterality Date    BACK SURGERY         Objective:     Current Respiratory Status: 02/17/25    Alert: yes    Cooperative: yes    Follows Directions: yes    Visualization  Patient was seen in the lateral view    Oral Peripheral Examination  Oral Musculature: WFL  Structure Abnormalities: none noted  Dentition: scattered dentition  Voice Prior to PO Intake: clear  Oral Musculature Comments: Oral motor skills WFL.    Consistencies Assessed  Thin Patient was given small sips of thin liquid via a spoon and a straw. Patient swallowed without difficulty; however, liquid bolus did reflux back each time. This puts patient at risk for aspiration.   Puree Patient was given small bites of pudding via a spoon. Patient required double swallows to help clear each bolus. No penetration or aspiration noted; however, each bolus noted to be very slow passing through patient's esophagus and did reflux back up after most swallows. Again, this puts patient at  risk for aspiration.     Oral Preparation/Oral Phase  WFL- Pt with adequate bolus acceptance, containment, control and timely A-P transfer across consistencies     Pharyngeal Phase   No penetration or aspiration noted with either consistency tested.     Cervical Esophageal Phase  Decreased UES opening  Retrograde flow noted with both consistencies tested    Assessment:     Impressions    Patient would benefit from a GI consult for possible esophageal dilatation.     Prognosis: Good    Barriers:  None    Recommendations:  1 bite/sip at a time, Alternating bites/sips, Double swallow with each bite/sip, HOB to 90 degrees, Remain upright 30 minutes post meal, Small bites/sips, and Standard aspiration precautions    Plan  Continue with a pureed diet with thin liquids following SLP recommendations as tolerated. Recommend GI consult for possible esophageal dilatation.    Education  Results discussed with patient, her caregiver, and patient's nurse.     If you have any questions or recommendations regarding this study, please do not hesitate to call me at [phone].    TORRIE Welch, CCC-SLP  02/18/2025

## 2025-02-19 LAB
ALBUMIN SERPL BCP-MCNC: 2.3 G/DL (ref 3.4–4.8)
ALBUMIN/GLOB SERPL: 0.7 {RATIO}
ALP SERPL-CCNC: 58 U/L (ref 40–150)
ALT SERPL W P-5'-P-CCNC: 21 U/L
ANION GAP SERPL CALCULATED.3IONS-SCNC: 12 MMOL/L (ref 7–16)
AORTIC ROOT ANNULUS: 2.43 CM
AORTIC VALVE CUSP SEPERATION: 1.97 CM
APICAL FOUR CHAMBER EJECTION FRACTION: 50 %
AST SERPL W P-5'-P-CCNC: 33 U/L (ref 5–34)
AV INDEX (PROSTH): 0.71
AV MEAN GRADIENT: 15 MMHG
AV PEAK GRADIENT: 23 MMHG
AV VALVE AREA BY VELOCITY RATIO: 2 CM²
AV VALVE AREA: 1.8 CM²
AV VELOCITY RATIO: 0.79
BACTERIA BLD CULT: NORMAL
BACTERIA BLD CULT: NORMAL
BASOPHILS # BLD AUTO: 0.04 K/UL (ref 0–0.2)
BASOPHILS NFR BLD AUTO: 0.3 % (ref 0–1)
BILIRUB SERPL-MCNC: 0.2 MG/DL
BSA FOR ECHO PROCEDURE: 2 M2
BUN SERPL-MCNC: 10 MG/DL (ref 10–20)
BUN/CREAT SERPL: 12 (ref 6–20)
CALCIUM SERPL-MCNC: 8.9 MG/DL (ref 8.4–10.2)
CHLORIDE SERPL-SCNC: 101 MMOL/L (ref 98–107)
CO2 SERPL-SCNC: 32 MMOL/L (ref 23–31)
CREAT SERPL-MCNC: 0.85 MG/DL (ref 0.55–1.02)
CV ECHO LV RWT: 0.46 CM
D DIMER PPP FEU-MCNC: 2.03 ΜG/ML (ref 0–0.47)
DIFFERENTIAL METHOD BLD: ABNORMAL
DOP CALC AO PEAK VEL: 2.4 M/S
DOP CALC AO VTI: 33 CM
DOP CALC LVOT AREA: 2.5 CM2
DOP CALC LVOT DIAMETER: 1.8 CM
DOP CALC LVOT PEAK VEL: 1.9 M/S
DOP CALC LVOT STROKE VOLUME: 59.3 CM3
DOP CALCLVOT PEAK VEL VTI: 23.3 CM
E WAVE DECELERATION TIME: 81 MSEC
E/A RATIO: 0.6
E/E' RATIO: 7 M/S
ECHO LV POSTERIOR WALL: 0.8 CM (ref 0.6–1.1)
EGFR (NO RACE VARIABLE) (RUSH/TITUS): 78 ML/MIN/1.73M2
EJECTION FRACTION: 70 %
EOSINOPHIL # BLD AUTO: 0.11 K/UL (ref 0–0.5)
EOSINOPHIL NFR BLD AUTO: 0.8 % (ref 1–4)
ERYTHROCYTE [DISTWIDTH] IN BLOOD BY AUTOMATED COUNT: 18.1 % (ref 11.5–14.5)
FRACTIONAL SHORTENING: 34.3 % (ref 28–44)
GLOBULIN SER-MCNC: 3.3 G/DL (ref 2–4)
GLUCOSE SERPL-MCNC: 131 MG/DL (ref 82–115)
HCO3 UR-SCNC: 38.4 MMOL/L (ref 21–28)
HCO3 UR-SCNC: 43.1 MMOL/L
HCT VFR BLD AUTO: 32.9 % (ref 38–47)
HGB BLD-MCNC: 9.1 G/DL (ref 12–16)
IMM GRANULOCYTES # BLD AUTO: 0.26 K/UL (ref 0–0.04)
IMM GRANULOCYTES NFR BLD: 1.8 % (ref 0–0.4)
INTERVENTRICULAR SEPTUM: 0.8 CM (ref 0.6–1.1)
IVC DIAMETER: 1.1 CM
LACTATE SERPL-SCNC: 0.6 MMOL/L (ref 0.5–2.2)
LEFT ATRIUM SIZE: 2.9 CM
LEFT INTERNAL DIMENSION IN SYSTOLE: 2.3 CM (ref 2.1–4)
LEFT VENTRICLE DIASTOLIC VOLUME INDEX: 25.19 ML/M2
LEFT VENTRICLE DIASTOLIC VOLUME: 49.38 ML
LEFT VENTRICLE END DIASTOLIC VOLUME APICAL 4 CHAMBER: 23.72 ML
LEFT VENTRICLE MASS INDEX: 38.4 G/M2
LEFT VENTRICLE SYSTOLIC VOLUME INDEX: 9.4 ML/M2
LEFT VENTRICLE SYSTOLIC VOLUME: 18.34 ML
LEFT VENTRICULAR INTERNAL DIMENSION IN DIASTOLE: 3.5 CM (ref 3.5–6)
LEFT VENTRICULAR MASS: 75.3 G
LV LATERAL E/E' RATIO: 7 M/S
LV SEPTAL E/E' RATIO: 7.8 M/S
LVED V (TEICH): 49.38 ML
LVES V (TEICH): 18.34 ML
LVOT MG: 8.57 MMHG
LVOT MV: 1.36 CM/S
LYMPHOCYTES # BLD AUTO: 1.44 K/UL (ref 1–4.8)
LYMPHOCYTES NFR BLD AUTO: 9.9 % (ref 27–41)
MCH RBC QN AUTO: 23.6 PG (ref 27–31)
MCHC RBC AUTO-ENTMCNC: 27.7 G/DL (ref 32–36)
MCV RBC AUTO: 85.2 FL (ref 80–96)
MONOCYTES # BLD AUTO: 1.18 K/UL (ref 0–0.8)
MONOCYTES NFR BLD AUTO: 8.1 % (ref 2–6)
MPC BLD CALC-MCNC: 12.1 FL (ref 9.4–12.4)
MV PEAK A VEL: 1.17 M/S
MV PEAK E VEL: 0.7 M/S
MV STENOSIS PRESSURE HALF TIME: 23.47 MS
MV VALVE AREA P 1/2 METHOD: 9.37 CM2
NEUTROPHILS # BLD AUTO: 11.54 K/UL (ref 1.8–7.7)
NEUTROPHILS NFR BLD AUTO: 79.1 % (ref 53–65)
NRBC # BLD AUTO: 0 X10E3/UL
NRBC, AUTO (.00): 0 %
NT-PROBNP SERPL-MCNC: 82 PG/ML (ref 1–125)
OHS CV RV/LV RATIO: 1.03 CM
PCO2 BLDA: 68 MMHG (ref 35–48)
PCO2 BLDA: 78 MMHG (ref 35–48)
PERICARDIUM LATERAL DIMENSION: 1.5 CM
PH SMN: 7.3 [PH] (ref 7.35–7.45)
PH SMN: 7.41 [PH]
PISA TR MAX VEL: 2.4 M/S
PLATELET # BLD AUTO: 111 K/UL (ref 150–400)
PO2 BLDA: 44 MMHG
PO2 BLDA: 57 MMHG (ref 83–108)
POC A-ADO2: 99 MMHG
POC BASE EXCESS: 15 MMOL/L (ref -2–3)
POC BASE EXCESS: 9.1 MMOL/L (ref -2–3)
POC SATURATED O2: 84.2 % (ref 95–98)
POC SATURATED O2: 86 % (ref 95–98)
POTASSIUM SERPL-SCNC: 3.9 MMOL/L (ref 3.5–5.1)
PROT SERPL-MCNC: 5.6 G/DL (ref 5.8–7.6)
PV PEAK GRADIENT: 6 MMHG
PV PEAK VELOCITY: 1.24 M/S
RA MAJOR: 3.84 CM
RA PRESSURE ESTIMATED: 3 MMHG
RBC # BLD AUTO: 3.86 M/UL (ref 4.2–5.4)
RIGHT VENTRICLE DIASTOLIC BASEL DIMENSION: 3.6 CM
RIGHT VENTRICLE DIASTOLIC LENGTH: 3.8 CM
RIGHT VENTRICLE DIASTOLIC MID DIMENSION: 3.1 CM
RIGHT VENTRICULAR LENGTH IN DIASTOLE (APICAL 4-CHAMBER VIEW): 3.83 CM
RV MID DIAMA: 3.14 CM
RV TB RVSP: 5 MMHG
SODIUM SERPL-SCNC: 141 MMOL/L (ref 136–145)
TDI LATERAL: 0.1 M/S
TDI SEPTAL: 0.09 M/S
TDI: 0.1 M/S
TR MAX PG: 23 MMHG
TRICUSPID ANNULAR PLANE SYSTOLIC EXCURSION: 1.34 CM
TROPONIN I SERPL HS-MCNC: <2.7 NG/L
TV REST PULMONARY ARTERY PRESSURE: 26 MMHG
WBC # BLD AUTO: 14.57 K/UL (ref 4.5–11)
Z-SCORE OF LEFT VENTRICULAR DIMENSION IN END DIASTOLE: -4.72
Z-SCORE OF LEFT VENTRICULAR DIMENSION IN END SYSTOLE: -3.2

## 2025-02-19 PROCEDURE — 36415 COLL VENOUS BLD VENIPUNCTURE: CPT | Performed by: HOSPITALIST

## 2025-02-19 PROCEDURE — 25500020 PHARM REV CODE 255: Performed by: HOSPITALIST

## 2025-02-19 PROCEDURE — 99233 SBSQ HOSP IP/OBS HIGH 50: CPT | Mod: ,,, | Performed by: HOSPITALIST

## 2025-02-19 PROCEDURE — 99900035 HC TECH TIME PER 15 MIN (STAT)

## 2025-02-19 PROCEDURE — 25000003 PHARM REV CODE 250: Performed by: NURSE PRACTITIONER

## 2025-02-19 PROCEDURE — 80053 COMPREHEN METABOLIC PANEL: CPT | Performed by: HOSPITALIST

## 2025-02-19 PROCEDURE — 20000000 HC ICU ROOM

## 2025-02-19 PROCEDURE — 94761 N-INVAS EAR/PLS OXIMETRY MLT: CPT

## 2025-02-19 PROCEDURE — 82803 BLOOD GASES ANY COMBINATION: CPT

## 2025-02-19 PROCEDURE — 94640 AIRWAY INHALATION TREATMENT: CPT

## 2025-02-19 PROCEDURE — 83605 ASSAY OF LACTIC ACID: CPT | Performed by: HOSPITALIST

## 2025-02-19 PROCEDURE — 63600175 PHARM REV CODE 636 W HCPCS: Performed by: HOSPITALIST

## 2025-02-19 PROCEDURE — 25000242 PHARM REV CODE 250 ALT 637 W/ HCPCS

## 2025-02-19 PROCEDURE — 94668 MNPJ CHEST WALL SBSQ: CPT

## 2025-02-19 PROCEDURE — 27000207 HC ISOLATION

## 2025-02-19 PROCEDURE — 93010 ELECTROCARDIOGRAM REPORT: CPT | Mod: ,,, | Performed by: INTERNAL MEDICINE

## 2025-02-19 PROCEDURE — 93005 ELECTROCARDIOGRAM TRACING: CPT

## 2025-02-19 PROCEDURE — 36600 WITHDRAWAL OF ARTERIAL BLOOD: CPT

## 2025-02-19 PROCEDURE — 83880 ASSAY OF NATRIURETIC PEPTIDE: CPT | Performed by: HOSPITALIST

## 2025-02-19 PROCEDURE — 27000221 HC OXYGEN, UP TO 24 HOURS

## 2025-02-19 PROCEDURE — 85379 FIBRIN DEGRADATION QUANT: CPT | Performed by: HOSPITALIST

## 2025-02-19 PROCEDURE — 94660 CPAP INITIATION&MGMT: CPT

## 2025-02-19 PROCEDURE — 87641 MR-STAPH DNA AMP PROBE: CPT | Performed by: HOSPITALIST

## 2025-02-19 PROCEDURE — 84484 ASSAY OF TROPONIN QUANT: CPT | Performed by: HOSPITALIST

## 2025-02-19 PROCEDURE — 25000242 PHARM REV CODE 250 ALT 637 W/ HCPCS: Performed by: HOSPITALIST

## 2025-02-19 PROCEDURE — 85025 COMPLETE CBC W/AUTO DIFF WBC: CPT | Performed by: HOSPITALIST

## 2025-02-19 PROCEDURE — 27000190 HC CPAP FULL FACE MASK W/VALVE

## 2025-02-19 PROCEDURE — 99900031 HC PATIENT EDUCATION (STAT)

## 2025-02-19 PROCEDURE — 94799 UNLISTED PULMONARY SVC/PX: CPT

## 2025-02-19 PROCEDURE — 93010 ELECTROCARDIOGRAM REPORT: CPT | Mod: ,,, | Performed by: HOSPITALIST

## 2025-02-19 PROCEDURE — 25000003 PHARM REV CODE 250: Performed by: HOSPITALIST

## 2025-02-19 PROCEDURE — 5A09357 ASSISTANCE WITH RESPIRATORY VENTILATION, LESS THAN 24 CONSECUTIVE HOURS, CONTINUOUS POSITIVE AIRWAY PRESSURE: ICD-10-PCS | Performed by: STUDENT IN AN ORGANIZED HEALTH CARE EDUCATION/TRAINING PROGRAM

## 2025-02-19 RX ORDER — SODIUM CHLORIDE 9 MG/ML
INJECTION, SOLUTION INTRAVENOUS CONTINUOUS
Status: DISCONTINUED | OUTPATIENT
Start: 2025-02-19 | End: 2025-02-22

## 2025-02-19 RX ORDER — MEROPENEM 1 G/1
1 INJECTION, POWDER, FOR SOLUTION INTRAVENOUS
Status: COMPLETED | OUTPATIENT
Start: 2025-02-19 | End: 2025-02-23

## 2025-02-19 RX ORDER — ADENOSINE 3 MG/ML
6 INJECTION, SOLUTION INTRAVENOUS ONCE
Status: COMPLETED | OUTPATIENT
Start: 2025-02-19 | End: 2025-02-19

## 2025-02-19 RX ORDER — IOPAMIDOL 755 MG/ML
100 INJECTION, SOLUTION INTRAVASCULAR
Status: COMPLETED | OUTPATIENT
Start: 2025-02-19 | End: 2025-02-19

## 2025-02-19 RX ORDER — MUPIROCIN 20 MG/G
OINTMENT TOPICAL 2 TIMES DAILY
Status: DISPENSED | OUTPATIENT
Start: 2025-02-19 | End: 2025-02-24

## 2025-02-19 RX ORDER — ALBUTEROL SULFATE 0.83 MG/ML
2.5 SOLUTION RESPIRATORY (INHALATION) EVERY 4 HOURS PRN
Status: DISCONTINUED | OUTPATIENT
Start: 2025-02-19 | End: 2025-02-28 | Stop reason: HOSPADM

## 2025-02-19 RX ORDER — ADENOSINE 3 MG/ML
12 INJECTION, SOLUTION INTRAVENOUS ONCE
Status: COMPLETED | OUTPATIENT
Start: 2025-02-19 | End: 2025-02-19

## 2025-02-19 RX ADMIN — MEROPENEM 1 G: 1 INJECTION, POWDER, FOR SOLUTION INTRAVENOUS at 04:02

## 2025-02-19 RX ADMIN — SULFAMETHOXAZOLE AND TRIMETHOPRIM 1 TABLET: 800; 160 TABLET ORAL at 08:02

## 2025-02-19 RX ADMIN — AZTREONAM 2000 MG: 2 INJECTION, POWDER, LYOPHILIZED, FOR SOLUTION INTRAMUSCULAR; INTRAVENOUS at 05:02

## 2025-02-19 RX ADMIN — ENOXAPARIN SODIUM 40 MG: 40 INJECTION SUBCUTANEOUS at 04:02

## 2025-02-19 RX ADMIN — PANTOPRAZOLE SODIUM 40 MG: 40 TABLET, DELAYED RELEASE ORAL at 08:02

## 2025-02-19 RX ADMIN — SODIUM CHLORIDE, POTASSIUM CHLORIDE, SODIUM LACTATE AND CALCIUM CHLORIDE 2000 ML: 600; 310; 30; 20 INJECTION, SOLUTION INTRAVENOUS at 06:02

## 2025-02-19 RX ADMIN — ADENOSINE 6 MG: 3 INJECTION INTRAVENOUS at 01:02

## 2025-02-19 RX ADMIN — GUAIFENESIN 200 MG: 200 SOLUTION ORAL at 08:02

## 2025-02-19 RX ADMIN — ACETYLCYSTEINE 4 ML: 200 SOLUTION ORAL; RESPIRATORY (INHALATION) at 07:02

## 2025-02-19 RX ADMIN — ADENOSINE 12 MG: 3 INJECTION INTRAVENOUS at 01:02

## 2025-02-19 RX ADMIN — VANCOMYCIN HYDROCHLORIDE 1750 MG: 500 INJECTION, POWDER, LYOPHILIZED, FOR SOLUTION INTRAVENOUS at 04:02

## 2025-02-19 RX ADMIN — ALBUTEROL SULFATE 2.5 MG: 2.5 SOLUTION RESPIRATORY (INHALATION) at 06:02

## 2025-02-19 RX ADMIN — IOPAMIDOL 100 ML: 755 INJECTION, SOLUTION INTRAVENOUS at 02:02

## 2025-02-19 RX ADMIN — IPRATROPIUM BROMIDE AND ALBUTEROL SULFATE 3 ML: 2.5; .5 SOLUTION RESPIRATORY (INHALATION) at 07:02

## 2025-02-19 RX ADMIN — PREGABALIN 75 MG: 75 CAPSULE ORAL at 08:02

## 2025-02-19 RX ADMIN — SODIUM CHLORIDE 250 ML: 9 INJECTION, SOLUTION INTRAVENOUS at 11:02

## 2025-02-19 RX ADMIN — SERTRALINE HYDROCHLORIDE 50 MG: 50 TABLET ORAL at 08:02

## 2025-02-19 RX ADMIN — ATORVASTATIN CALCIUM 20 MG: 20 TABLET, FILM COATED ORAL at 08:02

## 2025-02-19 RX ADMIN — SODIUM CHLORIDE: 9 INJECTION, SOLUTION INTRAVENOUS at 05:02

## 2025-02-19 NOTE — SUBJECTIVE & OBJECTIVE
Interval History:     Review of Systems   Constitutional:  Positive for fever. Negative for chills, diaphoresis, fatigue and unexpected weight change.   HENT:  Positive for congestion. Negative for drooling, ear discharge, ear pain, postnasal drip, sinus pressure, sinus pain, sneezing and sore throat.    Eyes:  Negative for discharge, redness, itching and visual disturbance.   Respiratory:  Positive for cough and shortness of breath.    Cardiovascular:  Negative for chest pain, palpitations and leg swelling.   Gastrointestinal:  Negative for abdominal pain, constipation, diarrhea, nausea and vomiting.   Genitourinary:  Negative for difficulty urinating, dysuria, flank pain, frequency, hematuria and urgency.   Musculoskeletal:  Negative for arthralgias, back pain, joint swelling, myalgias, neck pain and neck stiffness.   Skin:  Negative for rash.   Neurological:  Negative for dizziness, syncope, weakness and headaches.   Psychiatric/Behavioral:  Negative for agitation, behavioral problems, confusion and decreased concentration.      Objective:     Vital Signs (Most Recent):  Temp: 99.4 °F (37.4 °C) (02/19/25 1016)  Pulse: (!) 152 (02/19/25 1016)  Resp: 16 (02/19/25 0758)  BP: (!) 74/42 (02/19/25 1016)  SpO2: (!) 92 % (02/19/25 1016) Vital Signs (24h Range):  Temp:  [98.3 °F (36.8 °C)-99.4 °F (37.4 °C)] 99.4 °F (37.4 °C)  Pulse:  [116-152] 152  Resp:  [16-22] 16  SpO2:  [88 %-95 %] 92 %  BP: ()/(42-95) 74/42     Weight: 86.2 kg (190 lb)  Body mass index is 30.67 kg/m².  No intake or output data in the 24 hours ending 02/19/25 1535      Physical Exam  Vitals and nursing note reviewed.   Constitutional:       General: She is not in acute distress.     Appearance: She is obese. She is ill-appearing.   HENT:      Head: Normocephalic and atraumatic.      Nose: No congestion or rhinorrhea.   Eyes:      Extraocular Movements: Extraocular movements intact.      Conjunctiva/sclera: Conjunctivae normal.      Pupils:  Pupils are equal, round, and reactive to light.   Cardiovascular:      Rate and Rhythm: Regular rhythm. Tachycardia present.      Pulses: Normal pulses.      Heart sounds: Normal heart sounds. No murmur heard.  Pulmonary:      Effort: Pulmonary effort is normal. No respiratory distress.      Breath sounds: Normal breath sounds. No wheezing.   Abdominal:      General: Bowel sounds are normal. There is no distension.      Palpations: Abdomen is soft.      Tenderness: There is no abdominal tenderness.   Musculoskeletal:      Cervical back: Normal range of motion and neck supple. No rigidity.      Right lower leg: No edema.      Left lower leg: No edema.   Skin:     General: Skin is warm.      Capillary Refill: Capillary refill takes less than 2 seconds.   Neurological:      General: No focal deficit present.      Mental Status: She is alert and oriented to person, place, and time.      Motor: Weakness present.      Comments: Patient is paraplegic s/p back surgery in 2015.  CVA in 2025 leading her to have left sided upper extremity weakness.             Significant Labs: All pertinent labs within the past 24 hours have been reviewed.    Significant Imaging: I have reviewed all pertinent imaging results/findings within the past 24 hours.

## 2025-02-19 NOTE — PLAN OF CARE
Problem: Adult Inpatient Plan of Care  Goal: Plan of Care Review  Outcome: Not Progressing  Goal: Patient-Specific Goal (Individualized)  Outcome: Not Progressing  Goal: Absence of Hospital-Acquired Illness or Injury  Outcome: Not Progressing  Goal: Optimal Comfort and Wellbeing  Outcome: Not Progressing  Goal: Readiness for Transition of Care  Outcome: Not Progressing     Problem: Pneumonia  Goal: Fluid Balance  Outcome: Not Progressing  Goal: Resolution of Infection Signs and Symptoms  Outcome: Not Progressing  Goal: Effective Oxygenation and Ventilation  Outcome: Not Progressing     Problem: Wound  Goal: Optimal Coping  Outcome: Not Progressing  Goal: Optimal Functional Ability  Outcome: Not Progressing  Goal: Absence of Infection Signs and Symptoms  Outcome: Not Progressing  Goal: Improved Oral Intake  Outcome: Not Progressing  Goal: Optimal Pain Control and Function  Outcome: Not Progressing  Goal: Skin Health and Integrity  Outcome: Not Progressing  Goal: Optimal Wound Healing  Outcome: Not Progressing     Problem: Skin Injury Risk Increased  Goal: Skin Health and Integrity  Outcome: Not Progressing     Problem: Airway Clearance Ineffective  Goal: Effective Airway Clearance  Outcome: Not Progressing     Problem: Gas Exchange Impaired  Goal: Optimal Gas Exchange  Outcome: Not Progressing     Problem: Infection  Goal: Absence of Infection Signs and Symptoms  Outcome: Not Progressing

## 2025-02-19 NOTE — ASSESSMENT & PLAN NOTE
Patient likely with dysphagia related to stroke from last year.    Swallow eval demonstrated patient can have mechanical soft diet and this has been ordered.    2/17:  Repeat swallow eval ordered.  The patient unable to swallow or this persists will need to consult GI.    2/18: Family and patient's caregivers and outside providers are concerned about worsening dysphagia.  Barium swallow was ordered today and GI was consulted.

## 2025-02-19 NOTE — PLAN OF CARE
Problem: Adult Inpatient Plan of Care  Goal: Plan of Care Review  Outcome: Progressing  Goal: Patient-Specific Goal (Individualized)  Outcome: Progressing  Goal: Absence of Hospital-Acquired Illness or Injury  Outcome: Progressing  Goal: Optimal Comfort and Wellbeing  Outcome: Progressing  Goal: Readiness for Transition of Care  Outcome: Progressing     Problem: Pneumonia  Goal: Fluid Balance  Outcome: Progressing  Goal: Resolution of Infection Signs and Symptoms  Outcome: Progressing  Goal: Effective Oxygenation and Ventilation  Outcome: Progressing     Problem: Wound  Goal: Optimal Coping  Outcome: Progressing  Goal: Optimal Functional Ability  Outcome: Progressing  Goal: Absence of Infection Signs and Symptoms  Outcome: Progressing  Goal: Improved Oral Intake  Outcome: Progressing  Goal: Optimal Pain Control and Function  Outcome: Progressing  Goal: Skin Health and Integrity  Outcome: Progressing  Goal: Optimal Wound Healing  Outcome: Progressing     Problem: Skin Injury Risk Increased  Goal: Skin Health and Integrity  Outcome: Progressing     Problem: Airway Clearance Ineffective  Goal: Effective Airway Clearance  Outcome: Progressing     Problem: Gas Exchange Impaired  Goal: Optimal Gas Exchange  Outcome: Progressing     Problem: Infection  Goal: Absence of Infection Signs and Symptoms  Outcome: Progressing

## 2025-02-19 NOTE — PLAN OF CARE
Problem: Adult Inpatient Plan of Care  Goal: Plan of Care Review  Outcome: Progressing  Goal: Patient-Specific Goal (Individualized)  Outcome: Progressing  Goal: Absence of Hospital-Acquired Illness or Injury  Outcome: Progressing  Goal: Optimal Comfort and Wellbeing  Outcome: Progressing  Goal: Readiness for Transition of Care  Outcome: Progressing     Problem: Pneumonia  Goal: Fluid Balance  Outcome: Progressing  Goal: Resolution of Infection Signs and Symptoms  Outcome: Progressing  Goal: Effective Oxygenation and Ventilation  Outcome: Progressing     Problem: Wound  Goal: Optimal Coping  Outcome: Progressing  Goal: Optimal Functional Ability  Outcome: Progressing  Goal: Absence of Infection Signs and Symptoms  Outcome: Progressing  Goal: Improved Oral Intake  Outcome: Progressing  Goal: Optimal Pain Control and Function  Outcome: Progressing  Goal: Skin Health and Integrity  Outcome: Progressing  Goal: Optimal Wound Healing  Outcome: Progressing     Problem: Airway Clearance Ineffective  Goal: Effective Airway Clearance  Outcome: Progressing     Problem: Gas Exchange Impaired  Goal: Optimal Gas Exchange  Outcome: Progressing     Problem: Infection  Goal: Absence of Infection Signs and Symptoms  Outcome: Progressing

## 2025-02-19 NOTE — SUBJECTIVE & OBJECTIVE
Interval History:     Review of Systems   Constitutional:  Positive for fever. Negative for chills, diaphoresis, fatigue and unexpected weight change.   HENT:  Positive for congestion. Negative for drooling, ear discharge, ear pain, postnasal drip, sinus pressure, sinus pain, sneezing and sore throat.    Eyes:  Negative for discharge, redness, itching and visual disturbance.   Respiratory:  Positive for cough and shortness of breath.    Cardiovascular:  Negative for chest pain, palpitations and leg swelling.   Gastrointestinal:  Negative for abdominal pain, constipation, diarrhea, nausea and vomiting.   Genitourinary:  Negative for difficulty urinating, dysuria, flank pain, frequency, hematuria and urgency.   Musculoskeletal:  Negative for arthralgias, back pain, joint swelling, myalgias, neck pain and neck stiffness.   Skin:  Negative for rash.   Neurological:  Negative for dizziness, syncope, weakness and headaches.   Psychiatric/Behavioral:  Negative for agitation, behavioral problems, confusion and decreased concentration.      Objective:     Vital Signs (Most Recent):  Temp: 99.4 °F (37.4 °C) (02/19/25 0744)  Pulse: (!) 128 (02/19/25 0744)  Resp: 18 (02/19/25 0744)  BP: (!) 92/59 (02/19/25 0744)  SpO2: (!) 89 % (02/19/25 0744) Vital Signs (24h Range):  Temp:  [98.2 °F (36.8 °C)-99.4 °F (37.4 °C)] 99.4 °F (37.4 °C)  Pulse:  [106-129] 128  Resp:  [16-22] 18  SpO2:  [88 %-98 %] 89 %  BP: ()/(54-95) 92/59     Weight: 86.2 kg (190 lb)  Body mass index is 30.67 kg/m².  No intake or output data in the 24 hours ending 02/19/25 0758      Physical Exam  Vitals and nursing note reviewed.   Constitutional:       General: She is not in acute distress.     Appearance: Normal appearance. She is obese. She is not ill-appearing.   HENT:      Head: Normocephalic and atraumatic.      Nose: No congestion or rhinorrhea.   Eyes:      Extraocular Movements: Extraocular movements intact.      Conjunctiva/sclera: Conjunctivae  normal.      Pupils: Pupils are equal, round, and reactive to light.   Cardiovascular:      Rate and Rhythm: Normal rate and regular rhythm.      Pulses: Normal pulses.      Heart sounds: Normal heart sounds. No murmur heard.  Pulmonary:      Effort: Pulmonary effort is normal. No respiratory distress.      Breath sounds: Normal breath sounds. No wheezing.   Abdominal:      General: Bowel sounds are normal. There is no distension.      Palpations: Abdomen is soft.      Tenderness: There is no abdominal tenderness.   Musculoskeletal:      Cervical back: Normal range of motion and neck supple. No rigidity.      Right lower leg: No edema.      Left lower leg: No edema.   Skin:     General: Skin is warm.      Capillary Refill: Capillary refill takes less than 2 seconds.   Neurological:      General: No focal deficit present.      Mental Status: She is alert and oriented to person, place, and time.      Comments: Patient is paraplegic s/p back surgery in 2015.  CVA in 2025 leading her to have left sided upper extremity weakness.             Significant Labs: All pertinent labs within the past 24 hours have been reviewed.    Significant Imaging: I have reviewed all pertinent imaging results/findings within the past 24 hours.

## 2025-02-19 NOTE — ASSESSMENT & PLAN NOTE
Patient with Hypoxic Respiratory failure which is Acute.  she is not on home oxygen. Supplemental oxygen was provided and noted improved in the oxygen saturation.  Patient is currently on 4.5 L of O2 via Nasal canula     .   Signs/symptoms of respiratory failure include- wheezing and hypoxia . Contributing diagnoses includes - Aspiration and Pneumonia Labs and images were reviewed. Patient Has recent ABG, which has been reviewed. Will treat underlying causes and adjust management of respiratory failure as follows-  -  Antibiotics (From admission, onward)      Start     Stop Route Frequency Ordered    02/15/25 0315  aztreonam injection 1,000 mg         -- IV Every 8 hours (non-standard times) 02/15/25 0208    02/15/25 0315  metronidazole IVPB 500 mg         -- IV Every 8 hours (non-standard times) 02/15/25 0208       - blood cultures and sputum cultures pendi  - duonebs scheduled  - swallow evaluation pending  - CXR from 2/13 showed worsening from the one done on 2/12.  CXR   Impression:  Worsening acute airspace process.  Differential considerations include multifocal pneumonia, aspiration, pulmonary edema, or a combination of these entities.  - Patient failed outpatient treatment with azithromycin.  - CT chest pending  Pharmacy to dose Vancomycin    2/19: in setting of recurrent aspirations.  Given decreased mobility, PE is also a consideration.    ABG and cardiac work-up ordered.   D-dimer ordered and CTA/doppler to follow if elevated.    Unclear if this unfortunate woman will be able to eat without aspiration.      Update: requiring frequent suctioning.  Transfer to ICU.   Expand antibiotics to merrem and vancomycin.  Monitor given beta lactam allergy.

## 2025-02-19 NOTE — NURSING
Approx 1000 am, RT notified nursing staff of ABG results. Staff obtained a set of vitals BP 74/42 Sinus Tach on the monitor and sats 90%. RT placed pt on NRM @ 15L.   Second set of vitals obtained 70/48, Sinus tach on the monitor and sats 92% NRM. Notified provider, 250ml fluid bolus ordered. Provider rounded on pt and ordered MRSA nasal swab and one round of adenosine 6mg IVP.   Moved pt from room 466 to 457. With crash cart in the room along with suction at bedside, 6 mg rapid push followed by rapid flushing of line. Pt did not convert. Provider still at bedside, 12 mg rapid push immediately followed by rapid flush of line.   Pt taken to CT with RT, staff nurse, charge nurse, and OC. While in CT pt started to vomit thick green/yellow mucus. Suctioned while in CT.   Pt transferred to ICU bed 6. Report given to ICU nurse.

## 2025-02-19 NOTE — ASSESSMENT & PLAN NOTE
Patient with Hypoxic Respiratory failure which is Acute.  she is not on home oxygen. Supplemental oxygen was provided and noted improved in the oxygen saturation.  Patient is currently on 4.5 L of O2 via Nasal canula     .   Signs/symptoms of respiratory failure include- wheezing and hypoxia . Contributing diagnoses includes - Aspiration and Pneumonia Labs and images were reviewed. Patient Has recent ABG, which has been reviewed. Will treat underlying causes and adjust management of respiratory failure as follows-  -  Antibiotics (From admission, onward)      Start     Stop Route Frequency Ordered    02/15/25 0315  aztreonam injection 1,000 mg         -- IV Every 8 hours (non-standard times) 02/15/25 0208    02/15/25 0315  metronidazole IVPB 500 mg         -- IV Every 8 hours (non-standard times) 02/15/25 0208       - blood cultures and sputum cultures pendi  - duonebs scheduled  - swallow evaluation pending  - CXR from 2/13 showed worsening from the one done on 2/12.  CXR   Impression:  Worsening acute airspace process.  Differential considerations include multifocal pneumonia, aspiration, pulmonary edema, or a combination of these entities.  - Patient failed outpatient treatment with azithromycin.  - CT chest pending  Pharmacy to dose Vancomycin    2/19: in setting of recurrent aspirations.  Given decreased mobility, PE is also a consideration.    ABG and cardiac work-up ordered.   D-dimer ordered and CTA/doppler to follow if elevated.    Unclear if this unfortunate woman will be able to eat without aspiration.

## 2025-02-19 NOTE — PROGRESS NOTES
[facility] Hospital Medicine  Progress Note    Patient Name: Karie Denney  MRN: [id number]  Patient Class: IP- Inpatient   Admission Date: 2/14/2025  Length of Stay: 5 days  Attending Physician: Cassie Bar MD  Primary Care Provider: Gonzalo Barrera NP        Subjective     Principal Problem:Acute hypoxemic respiratory failure    HPI:  The patient is a 59-year-old female who was transferred from [facility] for the complaints suspected aspiration pneumonia and UTI. The patient's daughter was at bedside and provided most of the history. The patient began experiencing flu-like symptoms a few weeks ago, including a productive cough, fever, and chills. She also experienced mild shortness of breath at home. Her doctor started her on oral antibiotics for pneumonia, but the symptoms did not resolve completely.   Last Wednesday, the patient became very weak and presented to the ER, where she was diagnosed with pneumonia and admitted for treatment with IV antibiotics. she was transferred to [facility] for treatment of aspiration pneumonia, swallow evaluation test.   At the time of the encounter, she was lying comfortably in bed and did not complain of any acute issues. she was oriented x3. according to the patient's daughter, she is at her baseline as far as mentation is concerned.  Patient has PMH of Chronic constipation, GERD, insomnia, depression, neuropathy, history of CVA in 2024 leading to left-sided paralysis. she underwent back surgery in 2015 that led her paraplegic in bother lower limbs.    Patient is non ambulatory and bedbound. she lives with her mother and a home health nurse visit her once in a week. she doesn't supplemental oxygen or CPAP at home. She was on 4.5 L O2 via nasal canula at the time of encounter.    Overview/Hospital Course:  No notes on file    Interval History:     Review of Systems   Constitutional:  Positive for fever. Negative for chills, diaphoresis,  fatigue and unexpected weight change.   HENT:  Positive for congestion. Negative for drooling, ear discharge, ear pain, postnasal drip, sinus pressure, sinus pain, sneezing and sore throat.    Eyes:  Negative for discharge, redness, itching and visual disturbance.   Respiratory:  Positive for cough and shortness of breath.    Cardiovascular:  Negative for chest pain, palpitations and leg swelling.   Gastrointestinal:  Negative for abdominal pain, constipation, diarrhea, nausea and vomiting.   Genitourinary:  Negative for difficulty urinating, dysuria, flank pain, frequency, hematuria and urgency.   Musculoskeletal:  Negative for arthralgias, back pain, joint swelling, myalgias, neck pain and neck stiffness.   Skin:  Negative for rash.   Neurological:  Negative for dizziness, syncope, weakness and headaches.   Psychiatric/Behavioral:  Negative for agitation, behavioral problems, confusion and decreased concentration.      Objective:     Vital Signs (Most Recent):  Temp: 99.4 °F (37.4 °C) (02/19/25 1016)  Pulse: (!) 152 (02/19/25 1016)  Resp: 16 (02/19/25 0758)  BP: (!) 74/42 (02/19/25 1016)  SpO2: (!) 92 % (02/19/25 1016) Vital Signs (24h Range):  Temp:  [98.3 °F (36.8 °C)-99.4 °F (37.4 °C)] 99.4 °F (37.4 °C)  Pulse:  [116-152] 152  Resp:  [16-22] 16  SpO2:  [88 %-95 %] 92 %  BP: ()/(42-95) 74/42     Weight: 86.2 kg (190 lb)  Body mass index is 30.67 kg/m².  No intake or output data in the 24 hours ending 02/19/25 1535      Physical Exam  Vitals and nursing note reviewed.   Constitutional:       General: She is not in acute distress.     Appearance: She is obese. She is ill-appearing.   HENT:      Head: Normocephalic and atraumatic.      Nose: No congestion or rhinorrhea.   Eyes:      Extraocular Movements: Extraocular movements intact.      Conjunctiva/sclera: Conjunctivae normal.      Pupils: Pupils are equal, round, and reactive to light.   Cardiovascular:      Rate and Rhythm: Regular rhythm. Tachycardia  present.      Pulses: Normal pulses.      Heart sounds: Normal heart sounds. No murmur heard.  Pulmonary:      Effort: Pulmonary effort is normal. No respiratory distress.      Breath sounds: Normal breath sounds. No wheezing.   Abdominal:      General: Bowel sounds are normal. There is no distension.      Palpations: Abdomen is soft.      Tenderness: There is no abdominal tenderness.   Musculoskeletal:      Cervical back: Normal range of motion and neck supple. No rigidity.      Right lower leg: No edema.      Left lower leg: No edema.   Skin:     General: Skin is warm.      Capillary Refill: Capillary refill takes less than 2 seconds.   Neurological:      General: No focal deficit present.      Mental Status: She is alert and oriented to person, place, and time.      Motor: Weakness present.      Comments: Patient is paraplegic s/p back surgery in 2015.  CVA in 2025 leading her to have left sided upper extremity weakness.             Significant Labs: All pertinent labs within the past 24 hours have been reviewed.    Significant Imaging: I have reviewed all pertinent imaging results/findings within the past 24 hours.    Assessment and Plan     * Acute hypoxemic respiratory failure  Patient with Hypoxic Respiratory failure which is Acute.  she is not on home oxygen. Supplemental oxygen was provided and noted improved in the oxygen saturation.  Patient is currently on 4.5 L of O2 via Nasal canula     .   Signs/symptoms of respiratory failure include- wheezing and hypoxia . Contributing diagnoses includes - Aspiration and Pneumonia Labs and images were reviewed. Patient Has recent ABG, which has been reviewed. Will treat underlying causes and adjust management of respiratory failure as follows-  -  Antibiotics (From admission, onward)      Start     Stop Route Frequency Ordered    02/15/25 0315  aztreonam injection 1,000 mg         -- IV Every 8 hours (non-standard times) 02/15/25 0208    02/15/25 0315  metronidazole  IVPB 500 mg         -- IV Every 8 hours (non-standard times) 02/15/25 0208       - blood cultures and sputum cultures pendi  - angela scheduled  - swallow evaluation pending  - CXR from 2/13 showed worsening from the one done on 2/12.  CXR   Impression:  Worsening acute airspace process.  Differential considerations include multifocal pneumonia, aspiration, pulmonary edema, or a combination of these entities.  - Patient failed outpatient treatment with azithromycin.  - CT chest pending  Pharmacy to dose Vancomycin    2/19: in setting of recurrent aspirations.  Given decreased mobility, PE is also a consideration.    ABG and cardiac work-up ordered.   D-dimer ordered and CTA/doppler to follow if elevated.    Unclear if this unfortunate woman will be able to eat without aspiration.      Update: requiring frequent suctioning.  Transfer to ICU.   Expand antibiotics to merrem and vancomycin.  Monitor given beta lactam allergy.       Dysphagia  Patient likely with dysphagia related to stroke from last year.    Swallow eval demonstrated patient can have mechanical soft diet and this has been ordered.    2/17:  Repeat swallow eval ordered.  The patient unable to swallow or this persists will need to consult GI.    2/18: Family and patient's caregivers and outside providers are concerned about worsening dysphagia.  Barium swallow was ordered today and GI was consulted.      Aspiration pneumonia  Patient has a diagnosis of pneumonia. The cause of the pneumonia is suspected to be bacterial in etiology but organism is not known. The pneumonia is worsening due to hypoxia, oxygen requirement, and worsening of CXR . The patient has the following signs/symptoms of pneumonia: persistent hypoxia , cough, and sputum production. The patient does have a current oxygen requirement and the patient does not have a home oxygen requirement. I have reviewed the pertinent imaging. The following cultures have been collected: Blood cultures and  Sputum culture The culture results are listed below.     Current antimicrobial regimen consists of the antibiotics listed below. Will monitor patient closely and continue current treatment plan unchanged.    2/15: Continue with IV antibiotics.    2/16: aspiration episode today.  Patient cannot tolerate mechanically soft.  She needs a pureed diet.   Possible discharge tomorrow, if stable. She has medicaid waiver.      2/17:  Continue with aspiration precautions.  Patient with thick mucus and secretions and chest so ordered Mucomyst and chest physiotherapy.  Continue with current care.      Antibiotics (From admission, onward)      Start     Stop Route Frequency Ordered    02/17/25 2000  aztreonam injection 2,000 mg         -- IV Every 8 hours (non-standard times) 02/17/25 1349    02/16/25 2100  sulfamethoxazole-trimethoprim 800-160mg per tablet 1 tablet         02/21/25 2059 Oral 2 times daily 02/16/25 1204            Microbiology Results (last 7 days)       Procedure Component Value Units Date/Time    Culture, Lower Respiratory [[id number]]     Order Status: Canceled Specimen: Respiratory from Sputum Induced     Blood culture (site 1) [[id number]]     Order Status: Canceled Specimen: Blood     Blood culture (site 2) [[id number]]     Order Status: Canceled Specimen: Blood           Pharmacy to dose Vancomycin  CXR from 2/13 showed worsening from the one done on 2/12.  CXR   Impression:  Worsening acute airspace process.  Differential considerations include multifocal pneumonia, aspiration, pulmonary edema, or a combination of these entities.  - Patient failed outpatient treatment with azithromycin.  CT chest with contrast pending      Primary insomnia  Continue home trazodone 100 mg QHS        Hypokalemia  Patient's most recent potassium results are listed below.   Recent Labs     02/12/25  1446 02/13/25  0742 02/14/25  0546   K 3.7 3.5 3.3*     Plan  - Replete potassium per protocol  - Monitor potassium Daily  -  Patient's hypokalemia is stable  - Monitor    Depression  Patient has persistent depression which is mild and is currently controlled. Will Continue anti-depressant medications. We will not consult psychiatry at this time. Patient does not display psychosis at this time. Continue to monitor closely and adjust plan of care as needed.    Continue zoloft 50 mg daily    Chronic idiopathic constipation  Continue Linzess        GERD (gastroesophageal reflux disease)  Continue Protonix        Pressure ulcers of skin of multiple topographic sites  Patient has pressure ulcers left gluteal region  Wound care consulted      Acute cystitis with hematuria  Patient UA was significant for WBC and RBC  Cultures grew gram negative bacilli  Last Urine culture grew Ecoli sensitive to Rocephin  Antibiotics (From admission, onward)      Start     Stop Route Frequency Ordered    02/15/25 0315  aztreonam injection 1,000 mg         -- IV Every 8 hours (non-standard times) 02/15/25 0208                         VTE Risk Mitigation (From admission, onward)           Ordered     enoxaparin injection 40 mg  Daily         02/17/25 1204     IP VTE HIGH RISK PATIENT  Once         02/17/25 1204     Place sequential compression device  Until discontinued         02/15/25 0008                    Discharge Planning   SHRADDHA:      Code Status: Full Code   Medical Readiness for Discharge Date:   Discharge Plan A: Home with family            Cassie Bar MD  Department of Hospital Medicine   [facility]

## 2025-02-19 NOTE — PROGRESS NOTES
"Pharmacokinetic Initial Assessment: IV Vancomycin    Assessment/Plan:    Initiate intravenous vancomycin as 1750 mg IV q18h  Desired empiric serum trough concentration is 15 to 20 mcg/mL  Draw vancomycin trough level 30 min prior to fourth dose on 2/21 at approximately 2230  Pharmacy will continue to follow and monitor vancomycin.      Please contact pharmacy at extension [phone] with any questions regarding this assessment.     Patient brief summary:  Karie Denney is a 59 y.o. female initiated on antimicrobial therapy with IV Vancomycin for treatment of suspected  pneumonia    Drug Allergies:   Review of patient's allergies indicates:   Allergen Reactions    Penicillins Anaphylaxis       Actual Body Weight:   86.2 kg    Renal Function:   Estimated Creatinine Clearance: 76.9 mL/min (based on SCr of 0.85 mg/dL).,     Dialysis Method (if applicable):  N/A    CBC (last 72 hours):  Recent Labs   Lab Result Units 02/19/25  0818   WBC K/uL 14.57*   Hemoglobin g/dL 9.1*   Hematocrit % 32.9*   Platelet Count K/uL 111*   Lymphocytes % % 9.9*   Monocytes % % 8.1*   Eosinophils % % 0.8*   Basophils % % 0.3   Diff Type  Auto       Metabolic Panel (last 72 hours):  Recent Labs   Lab Result Units 02/19/25  0954   Sodium mmol/L 141   Potassium mmol/L 3.9   Chloride mmol/L 101   CO2 mmol/L 32*   Glucose mg/dL 131*   BUN mg/dL 10   Creatinine mg/dL 0.85   Albumin g/dL 2.3*   Bilirubin, Total mg/dL 0.2   Alk Phos U/L 58   AST U/L 33   ALT U/L 21       Drug levels (last 3 results):  No results for input(s): "VANCOMYCINRA", "VANCORANDOM", "VANCOMYCINPE", "VANCOPEAK", "VANCOMYCINTR", "VANCOTROUGH" in the last 72 hours.    Microbiologic Results:  Microbiology Results (last 7 days)       Procedure Component Value Units Date/Time    Culture, Lower Respiratory [[id number]]     Order Status: Sent Specimen: Respiratory     Culture, Lower Respiratory [[id number]]     Order Status: Canceled Specimen: Respiratory from Sputum Induced     Blood " culture (site 1) [[id number]]     Order Status: Canceled Specimen: Blood     Blood culture (site 2) [[id number]]     Order Status: Canceled Specimen: Blood

## 2025-02-19 NOTE — NURSING
Patient sounds right congested, and unable to cough up secretions. They are very thick and brown. Contacted Dr. Quinonez for prn breathing treatment, and respiratory was called to suction patient. Safety measures and care ongoing.

## 2025-02-19 NOTE — PROGRESS NOTES
[facility]  [facility] Medicine  Progress Note    Patient Name: Karie Denney  MRN: [id number]  Patient Class: IP- Inpatient   Admission Date: 2/14/2025  Length of Stay: 5 days  Attending Physician: Cassie Bar MD  Primary Care Provider: Gonzalo Barrera NP        Subjective     Principal Problem:Acute hypoxemic respiratory failure    HPI:  The patient is a 59-year-old female who was transferred from [facility] for the complaints suspected aspiration pneumonia and UTI. The patient's daughter was at bedside and provided most of the history. The patient began experiencing flu-like symptoms a few weeks ago, including a productive cough, fever, and chills. She also experienced mild shortness of breath at home. Her doctor started her on oral antibiotics for pneumonia, but the symptoms did not resolve completely.   Last Wednesday, the patient became very weak and presented to the ER, where she was diagnosed with pneumonia and admitted for treatment with IV antibiotics. she was transferred to [facility] for treatment of aspiration pneumonia, swallow evaluation test.   At the time of the encounter, she was lying comfortably in bed and did not complain of any acute issues. she was oriented x3. according to the patient's daughter, she is at her baseline as far as mentation is concerned.  Patient has PMH of Chronic constipation, GERD, insomnia, depression, neuropathy, history of CVA in 2024 leading to left-sided paralysis. she underwent back surgery in 2015 that led her paraplegic in bother lower limbs.    Patient is non ambulatory and bedbound. she lives with her mother and a home health nurse visit her once in a week. she doesn't supplemental oxygen or CPAP at home. She was on 4.5 L O2 via nasal canula at the time of encounter.    Overview/Hospital Course:  No notes on file    Interval History:     Review of Systems   Constitutional:  Positive for fever. Negative for chills, diaphoresis,  fatigue and unexpected weight change.   HENT:  Positive for congestion. Negative for drooling, ear discharge, ear pain, postnasal drip, sinus pressure, sinus pain, sneezing and sore throat.    Eyes:  Negative for discharge, redness, itching and visual disturbance.   Respiratory:  Positive for cough and shortness of breath.    Cardiovascular:  Negative for chest pain, palpitations and leg swelling.   Gastrointestinal:  Negative for abdominal pain, constipation, diarrhea, nausea and vomiting.   Genitourinary:  Negative for difficulty urinating, dysuria, flank pain, frequency, hematuria and urgency.   Musculoskeletal:  Negative for arthralgias, back pain, joint swelling, myalgias, neck pain and neck stiffness.   Skin:  Negative for rash.   Neurological:  Negative for dizziness, syncope, weakness and headaches.   Psychiatric/Behavioral:  Negative for agitation, behavioral problems, confusion and decreased concentration.      Objective:     Vital Signs (Most Recent):  Temp: 99.4 °F (37.4 °C) (02/19/25 0744)  Pulse: (!) 128 (02/19/25 0744)  Resp: 18 (02/19/25 0744)  BP: (!) 92/59 (02/19/25 0744)  SpO2: (!) 89 % (02/19/25 0744) Vital Signs (24h Range):  Temp:  [98.2 °F (36.8 °C)-99.4 °F (37.4 °C)] 99.4 °F (37.4 °C)  Pulse:  [106-129] 128  Resp:  [16-22] 18  SpO2:  [88 %-98 %] 89 %  BP: ()/(54-95) 92/59     Weight: 86.2 kg (190 lb)  Body mass index is 30.67 kg/m².  No intake or output data in the 24 hours ending 02/19/25 0755      Physical Exam  Vitals and nursing note reviewed.   Constitutional:       General: She is not in acute distress.     Appearance: Normal appearance. She is obese. She is not ill-appearing.   HENT:      Head: Normocephalic and atraumatic.      Nose: No congestion or rhinorrhea.   Eyes:      Extraocular Movements: Extraocular movements intact.      Conjunctiva/sclera: Conjunctivae normal.      Pupils: Pupils are equal, round, and reactive to light.   Cardiovascular:      Rate and Rhythm: Normal  rate and regular rhythm.      Pulses: Normal pulses.      Heart sounds: Normal heart sounds. No murmur heard.  Pulmonary:      Effort: Pulmonary effort is normal. No respiratory distress.      Breath sounds: Normal breath sounds. No wheezing.   Abdominal:      General: Bowel sounds are normal. There is no distension.      Palpations: Abdomen is soft.      Tenderness: There is no abdominal tenderness.   Musculoskeletal:      Cervical back: Normal range of motion and neck supple. No rigidity.      Right lower leg: No edema.      Left lower leg: No edema.   Skin:     General: Skin is warm.      Capillary Refill: Capillary refill takes less than 2 seconds.   Neurological:      General: No focal deficit present.      Mental Status: She is alert and oriented to person, place, and time.      Comments: Patient is paraplegic s/p back surgery in 2015.  CVA in 2025 leading her to have left sided upper extremity weakness.             Significant Labs: All pertinent labs within the past 24 hours have been reviewed.    Significant Imaging: I have reviewed all pertinent imaging results/findings within the past 24 hours.    Assessment and Plan     * Acute hypoxemic respiratory failure  Patient with Hypoxic Respiratory failure which is Acute.  she is not on home oxygen. Supplemental oxygen was provided and noted improved in the oxygen saturation.  Patient is currently on 4.5 L of O2 via Nasal canula     .   Signs/symptoms of respiratory failure include- wheezing and hypoxia . Contributing diagnoses includes - Aspiration and Pneumonia Labs and images were reviewed. Patient Has recent ABG, which has been reviewed. Will treat underlying causes and adjust management of respiratory failure as follows-  -  Antibiotics (From admission, onward)      Start     Stop Route Frequency Ordered    02/15/25 0315  aztreonam injection 1,000 mg         -- IV Every 8 hours (non-standard times) 02/15/25 0208    02/15/25 0315  metronidazole IVPB 500 mg          -- IV Every 8 hours (non-standard times) 02/15/25 0208       - blood cultures and sputum cultures pendi  - angela scheduled  - swallow evaluation pending  - CXR from 2/13 showed worsening from the one done on 2/12.  CXR   Impression:  Worsening acute airspace process.  Differential considerations include multifocal pneumonia, aspiration, pulmonary edema, or a combination of these entities.  - Patient failed outpatient treatment with azithromycin.  - CT chest pending  Pharmacy to dose Vancomycin    2/19: in setting of recurrent aspirations.  Given decreased mobility, PE is also a consideration.    ABG and cardiac work-up ordered.   D-dimer ordered and CTA/doppler to follow if elevated.    Unclear if this unfortunate woman will be able to eat without aspiration.        Dysphagia  Patient likely with dysphagia related to stroke from last year.    Swallow eval demonstrated patient can have mechanical soft diet and this has been ordered.    2/17:  Repeat swallow eval ordered.  The patient unable to swallow or this persists will need to consult GI.    2/18: Family and patient's caregivers and outside providers are concerned about worsening dysphagia.  Barium swallow was ordered today and GI was consulted.      Aspiration pneumonia  Patient has a diagnosis of pneumonia. The cause of the pneumonia is suspected to be bacterial in etiology but organism is not known. The pneumonia is worsening due to hypoxia, oxygen requirement, and worsening of CXR . The patient has the following signs/symptoms of pneumonia: persistent hypoxia , cough, and sputum production. The patient does have a current oxygen requirement and the patient does not have a home oxygen requirement. I have reviewed the pertinent imaging. The following cultures have been collected: Blood cultures and Sputum culture The culture results are listed below.     Current antimicrobial regimen consists of the antibiotics listed below. Will monitor patient closely  and continue current treatment plan unchanged.    2/15: Continue with IV antibiotics.    2/16: aspiration episode today.  Patient cannot tolerate mechanically soft.  She needs a pureed diet.   Possible discharge tomorrow, if stable. She has medicaid waiver.      2/17:  Continue with aspiration precautions.  Patient with thick mucus and secretions and chest so ordered Mucomyst and chest physiotherapy.  Continue with current care.      Antibiotics (From admission, onward)      Start     Stop Route Frequency Ordered    02/17/25 2000  aztreonam injection 2,000 mg         -- IV Every 8 hours (non-standard times) 02/17/25 1349    02/16/25 2100  sulfamethoxazole-trimethoprim 800-160mg per tablet 1 tablet         02/21/25 2059 Oral 2 times daily 02/16/25 1204            Microbiology Results (last 7 days)       Procedure Component Value Units Date/Time    Culture, Lower Respiratory [[id number]]     Order Status: Canceled Specimen: Respiratory from Sputum Induced     Blood culture (site 1) [[id number]]     Order Status: Canceled Specimen: Blood     Blood culture (site 2) [[id number]]     Order Status: Canceled Specimen: Blood           Pharmacy to dose Vancomycin  CXR from 2/13 showed worsening from the one done on 2/12.  CXR   Impression:  Worsening acute airspace process.  Differential considerations include multifocal pneumonia, aspiration, pulmonary edema, or a combination of these entities.  - Patient failed outpatient treatment with azithromycin.  CT chest with contrast pending      Primary insomnia  Continue home trazodone 100 mg QHS        Hypokalemia  Patient's most recent potassium results are listed below.   Recent Labs     02/12/25  1446 02/13/25  0742 02/14/25  0546   K 3.7 3.5 3.3*     Plan  - Replete potassium per protocol  - Monitor potassium Daily  - Patient's hypokalemia is stable  - Monitor    Depression  Patient has persistent depression which is mild and is currently controlled. Will Continue  anti-depressant medications. We will not consult psychiatry at this time. Patient does not display psychosis at this time. Continue to monitor closely and adjust plan of care as needed.    Continue zoloft 50 mg daily    Chronic idiopathic constipation  Continue Linzess        GERD (gastroesophageal reflux disease)  Continue Protonix        Pressure ulcers of skin of multiple topographic sites  Patient has pressure ulcers left gluteal region  Wound care consulted      Acute cystitis with hematuria  Patient UA was significant for WBC and RBC  Cultures grew gram negative bacilli  Last Urine culture grew Ecoli sensitive to Rocephin  Antibiotics (From admission, onward)      Start     Stop Route Frequency Ordered    02/15/25 0315  aztreonam injection 1,000 mg         -- IV Every 8 hours (non-standard times) 02/15/25 0208                         VTE Risk Mitigation (From admission, onward)           Ordered     enoxaparin injection 40 mg  Daily         02/17/25 1204     IP VTE HIGH RISK PATIENT  Once         02/17/25 1204     Place sequential compression device  Until discontinued         02/15/25 0008                    Discharge Planning   SHRADDHA:      Code Status: Full Code   Medical Readiness for Discharge Date:   Discharge Plan A: Home with family                  Cassie Bar MD  Department of Hospital Medicine   [facility]

## 2025-02-19 NOTE — PROGRESS NOTES
Attempted to see patient twice today - patient gone from room both times. Has had clinical decompensation with likely aspiration and is moving to the ICU. I will see tomorrow.     Aniket Rivera MD  Gastroenterology

## 2025-02-19 NOTE — PROGRESS NOTES
[facility]  [facility] Medicine  Progress Note    Patient Name: Karie Denney  MRN: [id number]  Patient Class: IP- Inpatient   Admission Date: 2/14/2025  Length of Stay: 4 days  Attending Physician: Cassie Bar MD  Primary Care Provider: Gonzalo Barrera NP        Subjective     Principal Problem:Dysphagia    HPI:  The patient is a 59-year-old female who was transferred from [facility] for the complaints suspected aspiration pneumonia and UTI. The patient's daughter was at bedside and provided most of the history. The patient began experiencing flu-like symptoms a few weeks ago, including a productive cough, fever, and chills. She also experienced mild shortness of breath at home. Her doctor started her on oral antibiotics for pneumonia, but the symptoms did not resolve completely.   Last Wednesday, the patient became very weak and presented to the ER, where she was diagnosed with pneumonia and admitted for treatment with IV antibiotics. she was transferred to [facility] for treatment of aspiration pneumonia, swallow evaluation test.   At the time of the encounter, she was lying comfortably in bed and did not complain of any acute issues. she was oriented x3. according to the patient's daughter, she is at her baseline as far as mentation is concerned.  Patient has PMH of Chronic constipation, GERD, insomnia, depression, neuropathy, history of CVA in 2024 leading to left-sided paralysis. she underwent back surgery in 2015 that led her paraplegic in bother lower limbs.    Patient is non ambulatory and bedbound. she lives with her mother and a home health nurse visit her once in a week. she doesn't supplemental oxygen or CPAP at home. She was on 4.5 L O2 via nasal canula at the time of encounter.    Overview/Hospital Course:  No notes on file    Interval History:     Review of Systems   Constitutional:  Positive for fever. Negative for chills, diaphoresis, fatigue and unexpected  weight change.   HENT:  Positive for congestion. Negative for drooling, ear discharge, ear pain, postnasal drip, sinus pressure, sinus pain, sneezing and sore throat.    Eyes:  Negative for discharge, redness, itching and visual disturbance.   Respiratory:  Positive for cough and shortness of breath.    Cardiovascular:  Negative for chest pain, palpitations and leg swelling.   Gastrointestinal:  Negative for abdominal pain, constipation, diarrhea, nausea and vomiting.   Genitourinary:  Negative for difficulty urinating, dysuria, flank pain, frequency, hematuria and urgency.   Musculoskeletal:  Negative for arthralgias, back pain, joint swelling, myalgias, neck pain and neck stiffness.   Skin:  Negative for rash.   Neurological:  Negative for dizziness, syncope, weakness and headaches.   Psychiatric/Behavioral:  Negative for agitation, behavioral problems, confusion and decreased concentration.      Objective:     Vital Signs (Most Recent):  Temp: 98.3 °F (36.8 °C) (02/18/25 1744)  Pulse: (!) 121 (02/18/25 1744)  Resp: 16 (02/18/25 1744)  BP: 96/67 (02/18/25 1744)  SpO2: (!) 88 % (02/18/25 1744) Vital Signs (24h Range):  Temp:  [97.6 °F (36.4 °C)-98.6 °F (37 °C)] 98.3 °F (36.8 °C)  Pulse:  [101-121] 121  Resp:  [16-20] 16  SpO2:  [88 %-98 %] 88 %  BP: ()/(54-67) 96/67     Weight: 86.2 kg (190 lb)  Body mass index is 30.67 kg/m².    Intake/Output Summary (Last 24 hours) at 2/18/2025 2013  Last data filed at 2/18/2025 0606  Gross per 24 hour   Intake --   Output 1200 ml   Net -1200 ml         Physical Exam  Vitals and nursing note reviewed.   Constitutional:       General: She is not in acute distress.     Appearance: Normal appearance. She is obese. She is not ill-appearing.   HENT:      Head: Normocephalic and atraumatic.      Nose: No congestion or rhinorrhea.   Eyes:      Extraocular Movements: Extraocular movements intact.      Conjunctiva/sclera: Conjunctivae normal.      Pupils: Pupils are equal, round,  and reactive to light.   Cardiovascular:      Rate and Rhythm: Normal rate and regular rhythm.      Pulses: Normal pulses.      Heart sounds: Normal heart sounds. No murmur heard.  Pulmonary:      Effort: Pulmonary effort is normal. No respiratory distress.      Breath sounds: Normal breath sounds. No wheezing.   Abdominal:      General: Bowel sounds are normal. There is no distension.      Palpations: Abdomen is soft.      Tenderness: There is no abdominal tenderness.   Musculoskeletal:      Cervical back: Normal range of motion and neck supple. No rigidity.      Right lower leg: No edema.      Left lower leg: No edema.   Skin:     General: Skin is warm.      Capillary Refill: Capillary refill takes less than 2 seconds.   Neurological:      General: No focal deficit present.      Mental Status: She is alert and oriented to person, place, and time.      Comments: Patient is paraplegic s/p back surgery in 2015.  CVA in 2025 leading her to have left sided upper extremity weakness.             Significant Labs: All pertinent labs within the past 24 hours have been reviewed.    Significant Imaging: I have reviewed all pertinent imaging results/findings within the past 24 hours.    Assessment and Plan     * Dysphagia  Patient likely with dysphagia related to stroke from last year.    Swallow eval demonstrated patient can have mechanical soft diet and this has been ordered.    2/17:  Repeat swallow eval ordered.  The patient unable to swallow or this persists will need to consult GI.    2/18: Family and patient's caregivers and outside providers are concerned about worsening dysphagia.  Barium swallow was ordered today and GI was consulted.      Aspiration pneumonia  Patient has a diagnosis of pneumonia. The cause of the pneumonia is suspected to be bacterial in etiology but organism is not known. The pneumonia is worsening due to hypoxia, oxygen requirement, and worsening of CXR . The patient has the following  signs/symptoms of pneumonia: persistent hypoxia , cough, and sputum production. The patient does have a current oxygen requirement and the patient does not have a home oxygen requirement. I have reviewed the pertinent imaging. The following cultures have been collected: Blood cultures and Sputum culture The culture results are listed below.     Current antimicrobial regimen consists of the antibiotics listed below. Will monitor patient closely and continue current treatment plan unchanged.    2/15: Continue with IV antibiotics.    2/16: aspiration episode today.  Patient cannot tolerate mechanically soft.  She needs a pureed diet.   Possible discharge tomorrow, if stable. She has medicaid waiver.      2/17:  Continue with aspiration precautions.  Patient with thick mucus and secretions and chest so ordered Mucomyst and chest physiotherapy.  Continue with current care.      Antibiotics (From admission, onward)      Start     Stop Route Frequency Ordered    02/17/25 2000  aztreonam injection 2,000 mg         -- IV Every 8 hours (non-standard times) 02/17/25 1349    02/16/25 2100  sulfamethoxazole-trimethoprim 800-160mg per tablet 1 tablet         02/21/25 2059 Oral 2 times daily 02/16/25 1204            Microbiology Results (last 7 days)       Procedure Component Value Units Date/Time    Culture, Lower Respiratory [[id number]]     Order Status: Canceled Specimen: Respiratory from Sputum Induced     Blood culture (site 1) [[id number]]     Order Status: Canceled Specimen: Blood     Blood culture (site 2) [[id number]]     Order Status: Canceled Specimen: Blood           Pharmacy to dose Vancomycin  CXR from 2/13 showed worsening from the one done on 2/12.  CXR   Impression:  Worsening acute airspace process.  Differential considerations include multifocal pneumonia, aspiration, pulmonary edema, or a combination of these entities.  - Patient failed outpatient treatment with azithromycin.  CT chest with contrast  pending      Primary insomnia  Continue home trazodone 100 mg QHS        Hypokalemia  Patient's most recent potassium results are listed below.   Recent Labs     02/12/25  1446 02/13/25  0742 02/14/25  0546   K 3.7 3.5 3.3*     Plan  - Replete potassium per protocol  - Monitor potassium Daily  - Patient's hypokalemia is stable  - Monitor    Depression  Patient has persistent depression which is mild and is currently controlled. Will Continue anti-depressant medications. We will not consult psychiatry at this time. Patient does not display psychosis at this time. Continue to monitor closely and adjust plan of care as needed.    Continue zoloft 50 mg daily    Chronic idiopathic constipation  Continue Linzess        Acute hypoxemic respiratory failure  Patient with Hypoxic Respiratory failure which is Acute.  she is not on home oxygen. Supplemental oxygen was provided and noted improved in the oxygen saturation.  Patient is currently on 4.5 L of O2 via Nasal canula     .   Signs/symptoms of respiratory failure include- wheezing and hypoxia . Contributing diagnoses includes - Aspiration and Pneumonia Labs and images were reviewed. Patient Has recent ABG, which has been reviewed. Will treat underlying causes and adjust management of respiratory failure as follows-  -  Antibiotics (From admission, onward)      Start     Stop Route Frequency Ordered    02/15/25 0315  aztreonam injection 1,000 mg         -- IV Every 8 hours (non-standard times) 02/15/25 0208    02/15/25 0315  metronidazole IVPB 500 mg         -- IV Every 8 hours (non-standard times) 02/15/25 0208       - blood cultures and sputum cultures pendi  - angela scheduled  - swallow evaluation pending  - CXR from 2/13 showed worsening from the one done on 2/12.  CXR   Impression:  Worsening acute airspace process.  Differential considerations include multifocal pneumonia, aspiration, pulmonary edema, or a combination of these entities.  - Patient failed outpatient  treatment with azithromycin.  - CT chest pending  Pharmacy to dose Vancomycin      GERD (gastroesophageal reflux disease)  Continue Protonix        Pressure ulcers of skin of multiple topographic sites  Patient has pressure ulcers left gluteal region  Wound care consulted      Acute cystitis with hematuria  Patient UA was significant for WBC and RBC  Cultures grew gram negative bacilli  Last Urine culture grew Ecoli sensitive to Rocephin  Antibiotics (From admission, onward)      Start     Stop Route Frequency Ordered    02/15/25 0315  aztreonam injection 1,000 mg         -- IV Every 8 hours (non-standard times) 02/15/25 0208                         VTE Risk Mitigation (From admission, onward)           Ordered     enoxaparin injection 40 mg  Daily         02/17/25 1204     IP VTE HIGH RISK PATIENT  Once         02/17/25 1204     Place sequential compression device  Until discontinued         02/15/25 0008                    Discharge Planning   SHRADDHA:      Code Status: Full Code   Medical Readiness for Discharge Date:   Discharge Plan A: Home with family                Cassie Bar MD  Department of Hospital Medicine   [facility]

## 2025-02-19 NOTE — SUBJECTIVE & OBJECTIVE
Interval History:     Review of Systems   Constitutional:  Positive for fever. Negative for chills, diaphoresis, fatigue and unexpected weight change.   HENT:  Positive for congestion. Negative for drooling, ear discharge, ear pain, postnasal drip, sinus pressure, sinus pain, sneezing and sore throat.    Eyes:  Negative for discharge, redness, itching and visual disturbance.   Respiratory:  Positive for cough and shortness of breath.    Cardiovascular:  Negative for chest pain, palpitations and leg swelling.   Gastrointestinal:  Negative for abdominal pain, constipation, diarrhea, nausea and vomiting.   Genitourinary:  Negative for difficulty urinating, dysuria, flank pain, frequency, hematuria and urgency.   Musculoskeletal:  Negative for arthralgias, back pain, joint swelling, myalgias, neck pain and neck stiffness.   Skin:  Negative for rash.   Neurological:  Negative for dizziness, syncope, weakness and headaches.   Psychiatric/Behavioral:  Negative for agitation, behavioral problems, confusion and decreased concentration.      Objective:     Vital Signs (Most Recent):  Temp: 98.3 °F (36.8 °C) (02/18/25 1744)  Pulse: (!) 121 (02/18/25 1744)  Resp: 16 (02/18/25 1744)  BP: 96/67 (02/18/25 1744)  SpO2: (!) 88 % (02/18/25 1744) Vital Signs (24h Range):  Temp:  [97.6 °F (36.4 °C)-98.6 °F (37 °C)] 98.3 °F (36.8 °C)  Pulse:  [101-121] 121  Resp:  [16-20] 16  SpO2:  [88 %-98 %] 88 %  BP: ()/(54-67) 96/67     Weight: 86.2 kg (190 lb)  Body mass index is 30.67 kg/m².    Intake/Output Summary (Last 24 hours) at 2/18/2025 2013  Last data filed at 2/18/2025 0606  Gross per 24 hour   Intake --   Output 1200 ml   Net -1200 ml         Physical Exam  Vitals and nursing note reviewed.   Constitutional:       General: She is not in acute distress.     Appearance: Normal appearance. She is obese. She is not ill-appearing.   HENT:      Head: Normocephalic and atraumatic.      Nose: No congestion or rhinorrhea.   Eyes:       Extraocular Movements: Extraocular movements intact.      Conjunctiva/sclera: Conjunctivae normal.      Pupils: Pupils are equal, round, and reactive to light.   Cardiovascular:      Rate and Rhythm: Normal rate and regular rhythm.      Pulses: Normal pulses.      Heart sounds: Normal heart sounds. No murmur heard.  Pulmonary:      Effort: Pulmonary effort is normal. No respiratory distress.      Breath sounds: Normal breath sounds. No wheezing.   Abdominal:      General: Bowel sounds are normal. There is no distension.      Palpations: Abdomen is soft.      Tenderness: There is no abdominal tenderness.   Musculoskeletal:      Cervical back: Normal range of motion and neck supple. No rigidity.      Right lower leg: No edema.      Left lower leg: No edema.   Skin:     General: Skin is warm.      Capillary Refill: Capillary refill takes less than 2 seconds.   Neurological:      General: No focal deficit present.      Mental Status: She is alert and oriented to person, place, and time.      Comments: Patient is paraplegic s/p back surgery in 2015.  CVA in 2025 leading her to have left sided upper extremity weakness.             Significant Labs: All pertinent labs within the past 24 hours have been reviewed.    Significant Imaging: I have reviewed all pertinent imaging results/findings within the past 24 hours.

## 2025-02-20 PROBLEM — I31.39 PERICARDIAL EFFUSION: Status: ACTIVE | Noted: 2025-02-20

## 2025-02-20 LAB
BSA FOR ECHO PROCEDURE: 2 M2
BUN SERPL-MCNC: 8 MG/DL (ref 10–20)
BUN/CREAT SERPL: 17 (ref 6–20)
CREAT SERPL-MCNC: 0.46 MG/DL (ref 0.55–1.02)
CRP SERPL HS-MCNC: >160 MG/L
EGFR (NO RACE VARIABLE) (RUSH/TITUS): 109 ML/MIN/1.73M2
ERYTHROCYTE [SEDIMENTATION RATE] IN BLOOD BY WESTERGREN METHOD: 76 MM/HR (ref 0–30)
HCO3 UR-SCNC: 40.8 MMOL/L (ref 21–28)
IVC DIAMETER: 1.2 CM
OHS QRS DURATION: 68 MS
OHS QTC CALCULATION: 467 MS
PCO2 BLDA: 93 MMHG (ref 35–48)
PH SMN: 7.25 [PH] (ref 7.35–7.45)
PO2 BLDA: 74 MMHG (ref 83–108)
POC BASE EXCESS: 10.1 MMOL/L (ref -2–3)
POC SATURATED O2: 92 % (ref 95–98)
RA PRESSURE ESTIMATED: 3 MMHG

## 2025-02-20 PROCEDURE — 20000000 HC ICU ROOM

## 2025-02-20 PROCEDURE — 99233 SBSQ HOSP IP/OBS HIGH 50: CPT | Mod: ,,, | Performed by: INTERNAL MEDICINE

## 2025-02-20 PROCEDURE — 63600175 PHARM REV CODE 636 W HCPCS: Performed by: NURSE PRACTITIONER

## 2025-02-20 PROCEDURE — 99499 UNLISTED E&M SERVICE: CPT | Mod: ,,, | Performed by: STUDENT IN AN ORGANIZED HEALTH CARE EDUCATION/TRAINING PROGRAM

## 2025-02-20 PROCEDURE — 36415 COLL VENOUS BLD VENIPUNCTURE: CPT | Performed by: INTERNAL MEDICINE

## 2025-02-20 PROCEDURE — 86141 C-REACTIVE PROTEIN HS: CPT | Performed by: HOSPITALIST

## 2025-02-20 PROCEDURE — 25000242 PHARM REV CODE 250 ALT 637 W/ HCPCS

## 2025-02-20 PROCEDURE — 63600175 PHARM REV CODE 636 W HCPCS: Performed by: HOSPITALIST

## 2025-02-20 PROCEDURE — 99900035 HC TECH TIME PER 15 MIN (STAT)

## 2025-02-20 PROCEDURE — 99223 1ST HOSP IP/OBS HIGH 75: CPT | Mod: ,,, | Performed by: INTERNAL MEDICINE

## 2025-02-20 PROCEDURE — 25000003 PHARM REV CODE 250: Performed by: HOSPITALIST

## 2025-02-20 PROCEDURE — 63600175 PHARM REV CODE 636 W HCPCS: Performed by: INTERNAL MEDICINE

## 2025-02-20 PROCEDURE — 99900026 HC AIRWAY MAINTENANCE (STAT)

## 2025-02-20 PROCEDURE — 27000207 HC ISOLATION

## 2025-02-20 PROCEDURE — 36415 COLL VENOUS BLD VENIPUNCTURE: CPT | Performed by: HOSPITALIST

## 2025-02-20 PROCEDURE — 27000221 HC OXYGEN, UP TO 24 HOURS

## 2025-02-20 PROCEDURE — 94640 AIRWAY INHALATION TREATMENT: CPT

## 2025-02-20 PROCEDURE — 25000003 PHARM REV CODE 250: Performed by: INTERNAL MEDICINE

## 2025-02-20 PROCEDURE — 82803 BLOOD GASES ANY COMBINATION: CPT

## 2025-02-20 PROCEDURE — 94761 N-INVAS EAR/PLS OXIMETRY MLT: CPT

## 2025-02-20 PROCEDURE — 94660 CPAP INITIATION&MGMT: CPT

## 2025-02-20 PROCEDURE — 85651 RBC SED RATE NONAUTOMATED: CPT | Performed by: HOSPITALIST

## 2025-02-20 PROCEDURE — 94668 MNPJ CHEST WALL SBSQ: CPT

## 2025-02-20 PROCEDURE — 36600 WITHDRAWAL OF ARTERIAL BLOOD: CPT

## 2025-02-20 PROCEDURE — 84520 ASSAY OF UREA NITROGEN: CPT | Performed by: INTERNAL MEDICINE

## 2025-02-20 PROCEDURE — 99223 1ST HOSP IP/OBS HIGH 75: CPT | Mod: ,,, | Performed by: HOSPITALIST

## 2025-02-20 RX ORDER — METHOCARBAMOL 500 MG/1
1000 TABLET, FILM COATED ORAL EVERY 8 HOURS PRN
Status: DISCONTINUED | OUTPATIENT
Start: 2025-02-20 | End: 2025-02-20

## 2025-02-20 RX ORDER — NOREPINEPHRINE BITARTRATE/D5W 4MG/250ML
0-3 PLASTIC BAG, INJECTION (ML) INTRAVENOUS CONTINUOUS
Status: DISCONTINUED | OUTPATIENT
Start: 2025-02-20 | End: 2025-02-20

## 2025-02-20 RX ORDER — MORPHINE SULFATE 2 MG/ML
2 INJECTION, SOLUTION INTRAMUSCULAR; INTRAVENOUS EVERY 4 HOURS PRN
Status: DISCONTINUED | OUTPATIENT
Start: 2025-02-20 | End: 2025-02-24

## 2025-02-20 RX ADMIN — MORPHINE SULFATE 2 MG: 2 INJECTION, SOLUTION INTRAMUSCULAR; INTRAVENOUS at 02:02

## 2025-02-20 RX ADMIN — SODIUM CHLORIDE: 9 INJECTION, SOLUTION INTRAVENOUS at 02:02

## 2025-02-20 RX ADMIN — IPRATROPIUM BROMIDE AND ALBUTEROL SULFATE 3 ML: 2.5; .5 SOLUTION RESPIRATORY (INHALATION) at 07:02

## 2025-02-20 RX ADMIN — SODIUM CHLORIDE: 9 INJECTION, SOLUTION INTRAVENOUS at 10:02

## 2025-02-20 RX ADMIN — MUPIROCIN: 20 OINTMENT TOPICAL at 09:02

## 2025-02-20 RX ADMIN — NOREPINEPHRINE BITARTRATE 0.02 MCG/KG/MIN: 4 INJECTION, SOLUTION INTRAVENOUS at 12:02

## 2025-02-20 RX ADMIN — MEROPENEM 1 G: 1 INJECTION, POWDER, FOR SOLUTION INTRAVENOUS at 08:02

## 2025-02-20 RX ADMIN — MUPIROCIN: 20 OINTMENT TOPICAL at 08:02

## 2025-02-20 RX ADMIN — ACETAMINOPHEN 650 MG: 325 TABLET ORAL at 04:02

## 2025-02-20 RX ADMIN — IPRATROPIUM BROMIDE AND ALBUTEROL SULFATE 3 ML: 2.5; .5 SOLUTION RESPIRATORY (INHALATION) at 01:02

## 2025-02-20 RX ADMIN — METHOCARBAMOL TABLETS 1000 MG: 500 TABLET, COATED ORAL at 04:02

## 2025-02-20 RX ADMIN — VANCOMYCIN HYDROCHLORIDE 1750 MG: 500 INJECTION, POWDER, LYOPHILIZED, FOR SOLUTION INTRAVENOUS at 11:02

## 2025-02-20 RX ADMIN — MEROPENEM 1 G: 1 INJECTION, POWDER, FOR SOLUTION INTRAVENOUS at 04:02

## 2025-02-20 RX ADMIN — MORPHINE SULFATE 2 MG: 2 INJECTION, SOLUTION INTRAMUSCULAR; INTRAVENOUS at 08:02

## 2025-02-20 RX ADMIN — MEROPENEM 1 G: 1 INJECTION, POWDER, FOR SOLUTION INTRAVENOUS at 12:02

## 2025-02-20 RX ADMIN — AMINOPHYLLINE 0.3 MG/KG/HR: 25 INJECTION, SOLUTION INTRAVENOUS at 06:02

## 2025-02-20 RX ADMIN — SODIUM CHLORIDE: 9 INJECTION, SOLUTION INTRAVENOUS at 04:02

## 2025-02-20 RX ADMIN — ENOXAPARIN SODIUM 40 MG: 40 INJECTION SUBCUTANEOUS at 04:02

## 2025-02-20 NOTE — PLAN OF CARE
Problem: Adult Inpatient Plan of Care  Goal: Plan of Care Review  Outcome: Progressing     Problem: Pneumonia  Goal: Fluid Balance  Outcome: Progressing  Goal: Resolution of Infection Signs and Symptoms  Outcome: Progressing  Goal: Effective Oxygenation and Ventilation  Outcome: Progressing     Problem: Wound  Goal: Optimal Coping  Outcome: Progressing  Goal: Optimal Pain Control and Function  Outcome: Progressing     Problem: Skin Injury Risk Increased  Goal: Skin Health and Integrity  Outcome: Progressing     Problem: Airway Clearance Ineffective  Goal: Effective Airway Clearance  Outcome: Progressing     Problem: Gas Exchange Impaired  Goal: Optimal Gas Exchange  Outcome: Progressing     Problem: Infection  Goal: Absence of Infection Signs and Symptoms  Outcome: Progressing     Problem: Noninvasive Ventilation Acute  Goal: Effective Unassisted Ventilation and Oxygenation  Outcome: Progressing

## 2025-02-20 NOTE — PHYSICIAN QUERY
Please clarify the integumentary diagnosis related to the left gluteal region.  Other Integumentary Diagnosis (please specify): unstagable.

## 2025-02-20 NOTE — SUBJECTIVE & OBJECTIVE
Interval History/Significant Events:  Patient able to answer simple questions follow commands    Review of Systems  Objective:     Vital Signs (Most Recent):  Temp: 99.2 °F (37.3 °C) (02/20/25 0315)  Pulse: (!) 122 (02/20/25 0600)  Resp: 18 (02/20/25 0600)  BP: (!) 99/36 (02/20/25 0600)  SpO2: 98 % (02/20/25 0600) Vital Signs (24h Range):  Temp:  [60.8 °F (16 °C)-99.4 °F (37.4 °C)] 99.2 °F (37.3 °C)  Pulse:  [108-152] 122  Resp:  [11-20] 18  SpO2:  [89 %-100 %] 98 %  BP: ()/(33-59) 99/36   Weight: 90.9 kg (200 lb 6.4 oz)  Body mass index is 32.35 kg/m².      Intake/Output Summary (Last 24 hours) at 2/20/2025 0630  Last data filed at 2/20/2025 0623  Gross per 24 hour   Intake 3565.27 ml   Output 2100 ml   Net 1465.27 ml          Physical Exam  Vitals reviewed.   Constitutional:       Appearance: Normal appearance.      Interventions: She is not intubated.  HENT:      Head: Normocephalic and atraumatic.      Nose: Nose normal.      Mouth/Throat:      Mouth: Mucous membranes are dry.      Pharynx: Oropharynx is clear.   Eyes:      Extraocular Movements: Extraocular movements intact.      Conjunctiva/sclera: Conjunctivae normal.      Pupils: Pupils are equal, round, and reactive to light.   Cardiovascular:      Rate and Rhythm: Normal rate.      Heart sounds: Normal heart sounds. No murmur heard.  Pulmonary:      Effort: Pulmonary effort is normal. She is not intubated.      Breath sounds: Normal breath sounds.   Abdominal:      General: Abdomen is flat. Bowel sounds are normal.      Palpations: Abdomen is soft.   Musculoskeletal:         General: Normal range of motion.      Cervical back: Normal range of motion and neck supple.      Right lower leg: No edema.      Left lower leg: No edema.   Skin:     General: Skin is warm and dry.      Capillary Refill: Capillary refill takes less than 2 seconds.   Neurological:      General: No focal deficit present.      Mental Status: She is alert and oriented to person,  place, and time.   Psychiatric:         Mood and Affect: Mood normal.         Behavior: Behavior normal.            Vents:  Oxygen Concentration (%): 75 (02/20/25 0515)  Lines/Drains/Airways       Drain  Duration             Female External Urinary Catheter w/ Suction 02/19/25 1600 <1 day              Peripheral Intravenous Line  Duration                  Peripheral IV - Single Lumen 02/19/25 1256 22 G Left Breast <1 day         Peripheral IV - Single Lumen 02/19/25 1600 20 G Anterior;Left Upper Arm <1 day                  Significant Labs:    CBC/Anemia Profile:  Recent Labs   Lab 02/19/25  0818   WBC 14.57*   HGB 9.1*   HCT 32.9*   *   MCV 85.2   RDW 18.1*        Chemistries:  Recent Labs   Lab 02/19/25  0954      K 3.9      CO2 32*   BUN 10   CREATININE 0.85   CALCIUM 8.9   ALBUMIN 2.3*   PROT 5.6*   BILITOT 0.2   ALKPHOS 58   ALT 21   AST 33       Recent Lab Results  (Last 5 results in the past 24 hours)        02/20/25  0527   02/19/25  1637   02/19/25  1058   02/19/25  0954   02/19/25  0836        POC A-aDO2         99       Albumin/Globulin Ratio       0.7         A4C EF     50           Albumin       2.3         ALP       58         ALT       21         Anion Gap       12         Ao root annulus     2.43           Ao peak adair     2.4           Ao VTI     33.0           AST       33         AV valve area     1.8           ALIE by Velocity Ratio     2.0           AORTIC VALVE CUSP SEPERATION     1.97           AV mean gradient     15           AV index (prosthetic)     0.71           AV peak gradient     23           AV Velocity Ratio     0.79           BILIRUBIN TOTAL       0.2         BSA     2           BUN       10         BUN/CREAT RATIO       12         Calcium       8.9         Chloride       101         CO2       32         Creatinine       0.85         Left Ventricle Relative Wall Thickness     0.46           E/A ratio     0.60           E/E' ratio     7           eGFR        78  Comment: Estimated GFR calculated using the CKD-EPI creatinine (2021) equation.         EF     70           E wave deceleration time     81           FS     34.3           Globulin, Total       3.3         Glucose       131         IVC diameter     1.10           IVSd     0.8           LA size     2.9           LVOT area     2.5           LV LATERAL E/E' RATIO     7.0           LV SEPTAL E/E' RATIO     7.8           LV EDV BP     49.38           LV Diastolic Volume Index     25.19           Left Ventricular End Diastolic Volume by Teichholz Method     49.38           LV EDV A4C     23.72           Left Ventricular End Systolic Volume by Teichholz Method     18.34           LVIDd     3.5           LVIDs     2.3           LV mass     75.3           LV Mass Index     38.4           Left Ventricular Outflow Tract Mean Gradient     8.57           Left Ventricular Outflow Tract Mean Velocity     1.36           LVOT diameter     1.8           LVOT peak guilherme     1.9           LVOT stroke volume     59.3           LVOT peak VTI     23.3           LV ESV BP     18.34           LV Systolic Volume Index     9.4           Mean e'     0.10           MV valve area p 1/2 method     9.37           MV Peak A Guilherme     1.17           MV Peak E Guilhemre     0.70           MV stenosis pressure 1/2 time     23.47           NT-proBNP       82         Lateral dimension     1.5           POC Base Excess 10.1   9.1       15.0       POC HCO3 40.8   38.4       43.1       POC PCO2 93   78       68       POC PH 7.25   7.30       7.41       POC PO2 74   57       44       POC SATURATED O2 92   86       84.2       Potassium       3.9         PROTEIN TOTAL       5.6         PV peak gradient     6           PV PEAK VELOCITY     1.24           PW     0.8           RA Major Axis     3.84           Est. RA pres     3           RV mid diameter     3.14           RV TB RVSP     5           RV/LV Ratio     1.03           RV- galeano basal diam     3.6            RV-galeano length     3.8           RV-galeano mid d     3.1           RV Basal Diameter     3.83           Sodium       141         TAPSE     1.34           TDI SEPTAL     0.09           TDI LATERAL     0.10           Triscuspid Valve Regurgitation Peak Gradient     23           TR Max Guilherme     2.4           Troponin I High Sensitivity       <2.7         TV resting pulmonary artery pressure     26           ZLVIDD     -4.72           ZLVIDS     -3.20                                  Significant Imaging:  I have reviewed all pertinent imaging results/findings within the past 24 hours.

## 2025-02-20 NOTE — ASSESSMENT & PLAN NOTE
- patient seen and evaluated by Dr. Briseno  - plan for pericardiocentesis today  - further recommendations to follow

## 2025-02-20 NOTE — ASSESSMENT & PLAN NOTE
Being treated with antibiotics, looks like she has aspirated significant amounts of barium continue antibiotics and steroids

## 2025-02-20 NOTE — SUBJECTIVE & OBJECTIVE
Past Medical History:   Diagnosis Date    GERD (gastroesophageal reflux disease)     Paraplegia        Past Surgical History:   Procedure Laterality Date    BACK SURGERY         Review of patient's allergies indicates:   Allergen Reactions    Penicillins Anaphylaxis       No current facility-administered medications on file prior to encounter.     Current Outpatient Medications on File Prior to Encounter   Medication Sig    atorvastatin (LIPITOR) 20 MG tablet Take 20 mg by mouth once daily.    cholecalciferol, vitamin D3, 1,250 mcg (50,000 unit) capsule Take 1,250 mcg by mouth every 7 days.    LINZESS 290 mcg Cap capsule Take 290 mcg by mouth before breakfast.    omeprazole (PRILOSEC) 40 MG capsule Take 40 mg by mouth every morning.    pregabalin (LYRICA) 75 MG capsule Take 75 mg by mouth 2 (two) times daily.    sertraline (ZOLOFT) 50 MG tablet Take 50 mg by mouth once daily.    traZODone (DESYREL) 100 MG tablet Take 100 mg by mouth every evening.     Family History    Family history is unknown by patient.       Tobacco Use    Smoking status: Never    Smokeless tobacco: Never   Substance and Sexual Activity    Alcohol use: Never    Drug use: Never    Sexual activity: Not Currently     Review of Systems   Reason unable to perform ROS: Unable to obtain, no family at bedside.     Objective:     Vital Signs (Most Recent):  Temp: 97.8 °F (36.6 °C) (02/20/25 1101)  Pulse: (!) 124 (02/20/25 1358)  Resp: (!) 21 (02/20/25 1358)  BP: (!) 139/55 (02/20/25 1115)  SpO2: 98 % (02/20/25 1358) Vital Signs (24h Range):  Temp:  [97.5 °F (36.4 °C)-99.2 °F (37.3 °C)] 97.8 °F (36.6 °C)  Pulse:  [108-144] 124  Resp:  [11-26] 21  SpO2:  [91 %-100 %] 98 %  BP: ()/(33-59) 139/55     Weight: 90.9 kg (200 lb 6.4 oz)  Body mass index is 32.35 kg/m².    SpO2: 98 %         Intake/Output Summary (Last 24 hours) at 2/20/2025 1402  Last data filed at 2/20/2025 1101  Gross per 24 hour   Intake 4396.06 ml   Output 2500 ml   Net 1896.06 ml  "      Lines/Drains/Airways       Drain  Duration             Female External Urinary Catheter w/ Suction 02/19/25 1600 <1 day              Peripheral Intravenous Line  Duration                  Peripheral IV - Single Lumen 02/19/25 1256 22 G Left Breast 1 day         Peripheral IV - Single Lumen 02/19/25 1600 20 G Anterior;Left Upper Arm <1 day                     Physical Exam  Vitals reviewed.   Constitutional:       Appearance: She is ill-appearing.   HENT:      Mouth/Throat:      Mouth: Mucous membranes are dry.   Eyes:      General: No scleral icterus.  Cardiovascular:      Rate and Rhythm: Regular rhythm. Tachycardia present.      Heart sounds: Normal heart sounds. Heart sounds not distant.      No friction rub.   Pulmonary:      Breath sounds: Decreased air movement present. Rales present.      Comments: On BiPAP  Musculoskeletal:      Right lower leg: No edema.      Left lower leg: No edema.   Skin:     General: Skin is warm and dry.   Neurological:      Mental Status: She is alert.          Significant Labs: ABG:   Recent Labs   Lab 02/19/25  0836 02/19/25  1637 02/20/25  0527   PH 7.41 7.30* 7.25*   PCO2 68* 78* 93*   HCO3 43.1 38.4* 40.8*   POCSATURATED 84.2* 86* 92*   , Blood Culture: No results for input(s): "LABBLOO" in the last 48 hours., BMP:   Recent Labs   Lab 02/19/25  0954 02/20/25  0850   *  --      --    K 3.9  --      --    CO2 32*  --    BUN 10 8*   CREATININE 0.85 0.46*   CALCIUM 8.9  --    , CMP   Recent Labs   Lab 02/19/25  0954 02/20/25  0850     --    K 3.9  --      --    CO2 32*  --    *  --    BUN 10 8*   CREATININE 0.85 0.46*   CALCIUM 8.9  --    PROT 5.6*  --    ALBUMIN 2.3*  --    BILITOT 0.2  --    ALKPHOS 58  --    AST 33  --    ALT 21  --    ANIONGAP 12  --    , CBC   Recent Labs   Lab 02/19/25  0818   WBC 14.57*   HGB 9.1*   HCT 32.9*   *   , INR No results for input(s): "INR", "PROTIME" in the last 48 hours., Lipid Panel No results " "for input(s): "CHOL", "HDL", "LDLCALC", "TRIG", "CHOLHDL" in the last 48 hours., and Troponin No results for input(s): "TROPONINIHS" in the last 48 hours.    Significant Imaging: CT scan:   CT Chest With Contrast  Order: [id number]   Status: Final result       Next appt: 02/27/2025 at 10:00 AM in Outpatient Rehab ([facility], [facility])    Test Result Released: No (inaccessible in MyChart)    0 Result Notes  Details    Reading Physician Reading Date Result Priority   Henrry Guevara MD  [phone]  2/16/2025 Routine     Narrative & Impression  EXAMINATION:  CT CHEST WITH CONTRAST     CLINICAL HISTORY:  Pneumonia, unresolved;     TECHNIQUE:  Low dose axial images, sagittal and coronal reformations were obtained from the thoracic inlet to the lung bases after the intravenous administration of 100 cc Isovue 370.     COMPARISON:  Chest radiograph 02/13/2025     FINDINGS:  Image quality is degraded by motion artifact limiting assessment.     Generalized atrophy of the thoracic wall musculature.     Thoracic aorta is normal caliber contains mild calcific atherosclerosis.  Heart is enlarged.  No definite pericardial effusion.  Prominence of the pulmonary arteries which can be seen with pulmonary arterial hypertension.     No pathologically enlarged thoracic lymph nodes.     Small right and small to moderate left pleural effusions with associated compressive atelectasis of the adjacent lung.  Non dependent left pleural effusion along the medial pleural margin concerning for loculation.  Opacification of the bilateral lower lobes which may represent atelectasis however cannot exclude superimposed consolidation.  Respiratory motion blurring limits assessment of pulmonary parenchyma.  Patchy ground-glass density and likely developing consolidation in the left upper lobe (series 5, image 17) and lingula.  No pneumothorax.     Diffusely hypodense hepatic parenchyma suggestive for steatosis.  Wedge-shaped region of " "hypoenhancement in the spleen suggestive for infarct.     Degenerative changes of the spine.  Partially visualized cervical fusion hardware.  No acute bony abnormality.     Impression:     1. Motion limited examination.  2. Small right and small/moderate left pleural effusions.  Non dependent portion of the left pleural effusion along the medial pleural margin concerning for loculation.  3. Opacification of the bilateral lower lobes which may represent atelectasis however cannot exclude superimposed consolidation.  4. Patchy ground-glass density and likely developing consolidation in the left upper lobe and lingula.  Finding is concerning for pulmonary infection/pneumonia however pulmonary edema could appear similarly.  5. Cardiomegaly.  6. Prominence of the bilateral pulmonary arteries which can be seen with pulmonary arterial hypertension.  7. Wedge-shaped hypoenhancing region the spleen suggestive for infarct.  8. Additional findings as above.        Electronically signed by:Henrry Guevara  Date:                                            02/16/2025  Time:                                           12:33       , Echocardiogram: Transthoracic echo (TTE) complete (Cupid Only):   Results for orders placed or performed during the hospital encounter of 02/14/25   Echo   Result Value Ref Range    BSA 2 m2    IVC diameter 1.20 cm    Est. RA pres 3 mmHg   ,  Transthoracic echo (TTE) complete w/ myocardial strain    Height: 5' 6" (1.676 m)   Weight: 86.2 kg (190 lb)   Blood Pressure: 74/42    Date of Study: 2/19/25   Ordering Provider: Cassie Bar MD    Clinical Indications: CHF (congestive heart failure) [I50.9 (ICD-10-CM)]       Reading Physicians  Performing Staff   Cardiology: Vitor Briseno MD     Tech: Trino Barillas RCS         Reason for Exam  Priority: Routine  Dx: CHF (congestive heart failure) [I50.9 (ICD-10-CM)]     View Images Vital Vitrea     Show images for Echo  Summary  Show Result " Comparison     Left Ventricle: The left ventricle is normal in size. Normal wall thickness. There is hyperdynamic systolic function. Ejection fraction is approximately 70%. There is normal diastolic function. Moderate LV mid-cavity obstruction at rest.    Right Ventricle: Right ventricle was not well visualized due to poor acoustic window. Moderate right ventricular enlargement. Systolic function is moderately reduced.    Right Atrium: Right atrium is moderately dilated.    Aortic Valve: The aortic valve is a trileaflet valve. Mildly calcified cusps.    Tricuspid Valve: There is mild regurgitation.    IVC/SVC: Normal venous pressure at 3 mmHg.    Pericardium: There is a moderate circumferential effusion. No indication of cardiac tamponade. Evidence includes no IVC dilation, no chamber collapse.     and EKG:   Results for orders placed or performed during the hospital encounter of 02/14/25   EKG 12-lead    Collection Time: 02/19/25  4:14 PM   Result Value Ref Range    QRS Duration 68 ms    [facility] QTC Calculation 467 ms    Narrative    Test Reason : I47.10,    Vent. Rate : 130 BPM     Atrial Rate : 130 BPM     P-R Int : 128 ms          QRS Dur :  68 ms      QT Int : 318 ms       P-R-T Axes :  37  45  50 degrees    QTcB Int : 467 ms    Sinus tachycardia with occasional Premature ventricular complexes and  Fusion complexes  Nonspecific ST and T wave abnormality  Abnormal ECG  No previous ECGs available  Confirmed by Luke Santizo ([id number]) on 2/20/2025 8:41:58 AM    Referred By: KATHERINE MURRAY           Confirmed By: Luke Santizo

## 2025-02-20 NOTE — CONSULTS
[facility] ICU  Cardiology  Consult Note    Patient Name: Karie Denney  MRN: [id number]  Admission Date: 2/14/2025  Hospital Length of Stay: 6 days  Code Status: Full Code   Attending Provider: Luke Santizo MD   Consulting Provider: MARYA Potter  Primary Care Physician: Gonzalo Barrera NP  Principal Problem:Acute hypoxemic respiratory failure    Patient information was obtained from past medical records, ER records, and primary team.     Inpatient consult to Cardiology  Consult performed by: Ghada Miguel FNP  Consult ordered by: Cassie Bar MD  Reason for consult: pericardial centesis        Subjective:     Chief Complaint:  suspected aspiration pneumonia     HPI:   59-year-old female with PMH of back surgery 2015 which resulted in BLE paralysis, CVA (2024) with left-sided paralysis/bed bound), neuropathy, GERD, and depression who was transferred from [facility] for the complaints suspected aspiration pneumonia and UTI. The patient's daughter was at bedside and provided most of the history. The patient began experiencing flu-like symptoms a few weeks ago, including a productive cough, fever, and chills. She also experienced mild shortness of breath at home. Her doctor started her on oral antibiotics for pneumonia, but the symptoms did not resolve completely. Last Wednesday, the patient became very weak and presented to the ER, where she was diagnosed with pneumonia and admitted for treatment with IV antibiotics. she was transferred to [facility] for treatment of aspiration pneumonia, swallow evaluation test.     Cardiology consulted for pericardial effusion. Patient apparently decompensated yesterday and is now in ICU on BiPAP, no longer requiring pressor support.      Past Medical History:   Diagnosis Date    GERD (gastroesophageal reflux disease)     Paraplegia        Past Surgical History:   Procedure Laterality Date    BACK SURGERY         Review of  patient's allergies indicates:   Allergen Reactions    Penicillins Anaphylaxis       No current facility-administered medications on file prior to encounter.     Current Outpatient Medications on File Prior to Encounter   Medication Sig    atorvastatin (LIPITOR) 20 MG tablet Take 20 mg by mouth once daily.    cholecalciferol, vitamin D3, 1,250 mcg (50,000 unit) capsule Take 1,250 mcg by mouth every 7 days.    LINZESS 290 mcg Cap capsule Take 290 mcg by mouth before breakfast.    omeprazole (PRILOSEC) 40 MG capsule Take 40 mg by mouth every morning.    pregabalin (LYRICA) 75 MG capsule Take 75 mg by mouth 2 (two) times daily.    sertraline (ZOLOFT) 50 MG tablet Take 50 mg by mouth once daily.    traZODone (DESYREL) 100 MG tablet Take 100 mg by mouth every evening.     Family History    Family history is unknown by patient.       Tobacco Use    Smoking status: Never    Smokeless tobacco: Never   Substance and Sexual Activity    Alcohol use: Never    Drug use: Never    Sexual activity: Not Currently     Review of Systems   Reason unable to perform ROS: Unable to obtain, no family at bedside.     Objective:     Vital Signs (Most Recent):  Temp: 97.8 °F (36.6 °C) (02/20/25 1101)  Pulse: (!) 124 (02/20/25 1358)  Resp: (!) 21 (02/20/25 1358)  BP: (!) 139/55 (02/20/25 1115)  SpO2: 98 % (02/20/25 1358) Vital Signs (24h Range):  Temp:  [97.5 °F (36.4 °C)-99.2 °F (37.3 °C)] 97.8 °F (36.6 °C)  Pulse:  [108-144] 124  Resp:  [11-26] 21  SpO2:  [91 %-100 %] 98 %  BP: ()/(33-59) 139/55     Weight: 90.9 kg (200 lb 6.4 oz)  Body mass index is 32.35 kg/m².    SpO2: 98 %         Intake/Output Summary (Last 24 hours) at 2/20/2025 1402  Last data filed at 2/20/2025 1101  Gross per 24 hour   Intake 4396.06 ml   Output 2500 ml   Net 1896.06 ml       Lines/Drains/Airways       Drain  Duration             Female External Urinary Catheter w/ Suction 02/19/25 1600 <1 day              Peripheral Intravenous Line  Duration                   "Peripheral IV - Single Lumen 02/19/25 1256 22 G Left Breast 1 day         Peripheral IV - Single Lumen 02/19/25 1600 20 G Anterior;Left Upper Arm <1 day                     Physical Exam  Vitals reviewed.   Constitutional:       Appearance: She is ill-appearing.   HENT:      Mouth/Throat:      Mouth: Mucous membranes are dry.   Eyes:      General: No scleral icterus.  Cardiovascular:      Rate and Rhythm: Regular rhythm. Tachycardia present.      Heart sounds: Normal heart sounds. Heart sounds not distant.      No friction rub.   Pulmonary:      Breath sounds: Decreased air movement present. Rales present.      Comments: On BiPAP  Musculoskeletal:      Right lower leg: No edema.      Left lower leg: No edema.   Skin:     General: Skin is warm and dry.   Neurological:      Mental Status: She is alert.          Significant Labs: ABG:   Recent Labs   Lab 02/19/25  0836 02/19/25  1637 02/20/25  0527   PH 7.41 7.30* 7.25*   PCO2 68* 78* 93*   HCO3 43.1 38.4* 40.8*   POCSATURATED 84.2* 86* 92*   , Blood Culture: No results for input(s): "LABBLOO" in the last 48 hours., BMP:   Recent Labs   Lab 02/19/25  0954 02/20/25  0850   *  --      --    K 3.9  --      --    CO2 32*  --    BUN 10 8*   CREATININE 0.85 0.46*   CALCIUM 8.9  --    , CMP   Recent Labs   Lab 02/19/25  0954 02/20/25  0850     --    K 3.9  --      --    CO2 32*  --    *  --    BUN 10 8*   CREATININE 0.85 0.46*   CALCIUM 8.9  --    PROT 5.6*  --    ALBUMIN 2.3*  --    BILITOT 0.2  --    ALKPHOS 58  --    AST 33  --    ALT 21  --    ANIONGAP 12  --    , CBC   Recent Labs   Lab 02/19/25  0818   WBC 14.57*   HGB 9.1*   HCT 32.9*   *   , INR No results for input(s): "INR", "PROTIME" in the last 48 hours., Lipid Panel No results for input(s): "CHOL", "HDL", "LDLCALC", "TRIG", "CHOLHDL" in the last 48 hours., and Troponin No results for input(s): "TROPONINIHS" in the last 48 hours.    Significant Imaging: CT scan:   CT " Chest With Contrast  Order: [id number]   Status: Final result       Next appt: 02/27/2025 at 10:00 AM in Outpatient Rehab ([facility], [facility])    Test Result Released: No (inaccessible in MyChart)    0 Result Notes  Details    Reading Physician Reading Date Result Priority   Henrry Guevara MD  [phone]  2/16/2025 Routine     Narrative & Impression  EXAMINATION:  CT CHEST WITH CONTRAST     CLINICAL HISTORY:  Pneumonia, unresolved;     TECHNIQUE:  Low dose axial images, sagittal and coronal reformations were obtained from the thoracic inlet to the lung bases after the intravenous administration of 100 cc Isovue 370.     COMPARISON:  Chest radiograph 02/13/2025     FINDINGS:  Image quality is degraded by motion artifact limiting assessment.     Generalized atrophy of the thoracic wall musculature.     Thoracic aorta is normal caliber contains mild calcific atherosclerosis.  Heart is enlarged.  No definite pericardial effusion.  Prominence of the pulmonary arteries which can be seen with pulmonary arterial hypertension.     No pathologically enlarged thoracic lymph nodes.     Small right and small to moderate left pleural effusions with associated compressive atelectasis of the adjacent lung.  Non dependent left pleural effusion along the medial pleural margin concerning for loculation.  Opacification of the bilateral lower lobes which may represent atelectasis however cannot exclude superimposed consolidation.  Respiratory motion blurring limits assessment of pulmonary parenchyma.  Patchy ground-glass density and likely developing consolidation in the left upper lobe (series 5, image 17) and lingula.  No pneumothorax.     Diffusely hypodense hepatic parenchyma suggestive for steatosis.  Wedge-shaped region of hypoenhancement in the spleen suggestive for infarct.     Degenerative changes of the spine.  Partially visualized cervical fusion hardware.  No acute bony abnormality.     Impression:     1. Motion  "limited examination.  2. Small right and small/moderate left pleural effusions.  Non dependent portion of the left pleural effusion along the medial pleural margin concerning for loculation.  3. Opacification of the bilateral lower lobes which may represent atelectasis however cannot exclude superimposed consolidation.  4. Patchy ground-glass density and likely developing consolidation in the left upper lobe and lingula.  Finding is concerning for pulmonary infection/pneumonia however pulmonary edema could appear similarly.  5. Cardiomegaly.  6. Prominence of the bilateral pulmonary arteries which can be seen with pulmonary arterial hypertension.  7. Wedge-shaped hypoenhancing region the spleen suggestive for infarct.  8. Additional findings as above.        Electronically signed by:Henrry Guevara  Date:                                            02/16/2025  Time:                                           12:33       , Echocardiogram: Transthoracic echo (TTE) complete (Cupid Only):   Results for orders placed or performed during the hospital encounter of 02/14/25   Echo   Result Value Ref Range    BSA 2 m2    IVC diameter 1.20 cm    Est. RA pres 3 mmHg   ,  Transthoracic echo (TTE) complete w/ myocardial strain    Height: 5' 6" (1.676 m)   Weight: 86.2 kg (190 lb)   Blood Pressure: 74/42    Date of Study: 2/19/25   Ordering Provider: Cassie Bar MD    Clinical Indications: CHF (congestive heart failure) [I50.9 (ICD-10-CM)]       Reading Physicians  Performing Staff   Cardiology: Vitor Briseno MD     Tech: Trino Barillas RCS         Reason for Exam  Priority: Routine  Dx: CHF (congestive heart failure) [I50.9 (ICD-10-CM)]     View Images Vital Vitrea     Show images for Echo  Summary  Show Result Comparison     Left Ventricle: The left ventricle is normal in size. Normal wall thickness. There is hyperdynamic systolic function. Ejection fraction is approximately 70%. There is normal diastolic " function. Moderate LV mid-cavity obstruction at rest.    Right Ventricle: Right ventricle was not well visualized due to poor acoustic window. Moderate right ventricular enlargement. Systolic function is moderately reduced.    Right Atrium: Right atrium is moderately dilated.    Aortic Valve: The aortic valve is a trileaflet valve. Mildly calcified cusps.    Tricuspid Valve: There is mild regurgitation.    IVC/SVC: Normal venous pressure at 3 mmHg.    Pericardium: There is a moderate circumferential effusion. No indication of cardiac tamponade. Evidence includes no IVC dilation, no chamber collapse.     and EKG:   Results for orders placed or performed during the hospital encounter of 02/14/25   EKG 12-lead    Collection Time: 02/19/25  4:14 PM   Result Value Ref Range    QRS Duration 68 ms    [facility] QTC Calculation 467 ms    Narrative    Test Reason : I47.10,    Vent. Rate : 130 BPM     Atrial Rate : 130 BPM     P-R Int : 128 ms          QRS Dur :  68 ms      QT Int : 318 ms       P-R-T Axes :  37  45  50 degrees    QTcB Int : 467 ms    Sinus tachycardia with occasional Premature ventricular complexes and  Fusion complexes  Nonspecific ST and T wave abnormality  Abnormal ECG  No previous ECGs available  Confirmed by Luke Santizo ([id number]) on 2/20/2025 8:41:58 AM    Referred By: KATHERINE MURRAY           Confirmed By: Luke Santizo      Assessment and Plan:     * Acute hypoxemic respiratory failure  - being followed by critical care team    Pericardial effusion  - patient seen and evaluated by Dr. Briseno  - plan for pericardiocentesis today  - further recommendations to follow    Aspiration pneumonia  - being followed by critical care team        VTE Risk Mitigation (From admission, onward)           Ordered     enoxaparin injection 40 mg  Daily         02/17/25 1204     IP VTE HIGH RISK PATIENT  Once         02/17/25 1204     Place sequential compression device  Until discontinued         02/15/25 0008                     Thank you for your consult. I will follow-up with patient. Please contact us if you have any additional questions.    MARYA Potter  Cardiology   [facility] ICU

## 2025-02-20 NOTE — PLAN OF CARE
Chart review. Patient transferred to [facility] afternoon 2/19. Patient is currently on bipap. Iv meds noted. Cm will continue to follow.

## 2025-02-20 NOTE — PLAN OF CARE
Problem: Pneumonia  Goal: Effective Oxygenation and Ventilation  Outcome: Progressing     Problem: Skin Injury Risk Increased  Goal: Skin Health and Integrity  Outcome: Progressing     Problem: Airway Clearance Ineffective  Goal: Effective Airway Clearance  Outcome: Progressing     Problem: Gas Exchange Impaired  Goal: Optimal Gas Exchange  Outcome: Progressing     Problem: Noninvasive Ventilation Acute  Goal: Effective Unassisted Ventilation and Oxygenation  Outcome: Progressing

## 2025-02-20 NOTE — HPI
59-year-old female with PMH of back surgery 2015 which resulted in BLE paralysis, CVA (2024) with left-sided paralysis/bed bound), neuropathy, GERD, and depression who was transferred from [facility] for the complaints suspected aspiration pneumonia and UTI. The patient's daughter was at bedside and provided most of the history. The patient began experiencing flu-like symptoms a few weeks ago, including a productive cough, fever, and chills. She also experienced mild shortness of breath at home. Her doctor started her on oral antibiotics for pneumonia, but the symptoms did not resolve completely. Last Wednesday, the patient became very weak and presented to the ER, where she was diagnosed with pneumonia and admitted for treatment with IV antibiotics. she was transferred to [facility] for treatment of aspiration pneumonia, swallow evaluation test.     Cardiology consulted for pericardial effusion. Patient apparently decompensated yesterday and is now in ICU on BiPAP, no longer requiring pressor support.

## 2025-02-20 NOTE — PLAN OF CARE
Problem: Adult Inpatient Plan of Care  Goal: Plan of Care Review  Outcome: Progressing  Goal: Patient-Specific Goal (Individualized)  Outcome: Progressing  Goal: Absence of Hospital-Acquired Illness or Injury  Outcome: Progressing  Intervention: Identify and Manage Fall Risk  Flowsheets (Taken 2/20/2025 0449)  Safety Promotion/Fall Prevention:   assistive device/personal item within reach   pulse ox   room near unit station   side rails raised x 2  Goal: Optimal Comfort and Wellbeing  Outcome: Progressing  Goal: Readiness for Transition of Care  Outcome: Progressing     Problem: Pneumonia  Goal: Fluid Balance  Outcome: Progressing  Goal: Resolution of Infection Signs and Symptoms  Outcome: Progressing  Intervention: Prevent Infection Progression  Flowsheets (Taken 2/20/2025 0449)  Infection Management: aseptic technique maintained  Isolation Precautions: precautions maintained  Goal: Effective Oxygenation and Ventilation  Outcome: Progressing     Problem: Wound  Goal: Optimal Coping  Outcome: Progressing  Intervention: Support Patient and Family Response  Flowsheets (Taken 2/20/2025 0449)  Supportive Measures: active listening utilized  Goal: Optimal Functional Ability  Outcome: Progressing  Intervention: Optimize Functional Ability  Flowsheets (Taken 2/20/2025 0449)  Activity Assistance Provided: assistance, 2 people  Goal: Absence of Infection Signs and Symptoms  Outcome: Progressing  Goal: Improved Oral Intake  Outcome: Progressing  Goal: Optimal Pain Control and Function  Outcome: Progressing  Goal: Skin Health and Integrity  Outcome: Progressing  Goal: Optimal Wound Healing  Outcome: Progressing     Problem: Skin Injury Risk Increased  Goal: Skin Health and Integrity  Outcome: Progressing  Intervention: Optimize Skin Protection  Flowsheets (Taken 2/20/2025 0449)  Pressure Reduction Techniques:   pressure points protected   sit time limited to 2 hours   heels elevated off bed   weight shift assistance  provided  Pressure Reduction Devices:   foam padding utilized   heel offloading device utilized   specialty bed utilized   positioning supports utilized  Skin Protection: incontinence pads utilized  Head of Bed (HOB) Positioning: HOB at 30-45 degrees     Problem: Airway Clearance Ineffective  Goal: Effective Airway Clearance  Outcome: Progressing     Problem: Gas Exchange Impaired  Goal: Optimal Gas Exchange  Outcome: Progressing  Intervention: Optimize Oxygenation and Ventilation  Flowsheets (Taken 2/20/2025 0449)  Airway/Ventilation Management: airway patency maintained  Head of Bed (HOB) Positioning: HOB at 30-45 degrees     Problem: Infection  Goal: Absence of Infection Signs and Symptoms  Outcome: Progressing  Intervention: Prevent or Manage Infection  Flowsheets (Taken 2/20/2025 0449)  Infection Management: aseptic technique maintained  Isolation Precautions: precautions maintained     Problem: Noninvasive Ventilation Acute  Goal: Effective Unassisted Ventilation and Oxygenation  Outcome: Progressing  Intervention: Monitor and Manage Noninvasive Ventilation  Flowsheets (Taken 2/20/2025 0449)  Airway/Ventilation Management: airway patency maintained

## 2025-02-20 NOTE — ASSESSMENT & PLAN NOTE
Clinically her respiratory failure looks better her numbers on her a pCO2 have gone up in her PH has gone down a little bit I still do think she needs to be intubated go ahead and start aminophylline make sure she is on steroids limit her O2 as much as we can.  Observe closely for the need for intubation

## 2025-02-20 NOTE — PROGRESS NOTES
[facility]  Critical Care Medicine  Progress Note    Patient Name: Karie Denney  MRN: [id number]  Admission Date: 2/14/2025  Hospital Length of Stay: 6 days  Code Status: Full Code  Attending Provider: Luke Santizo MD  Primary Care Provider: Gonzalo Barrera NP   Principal Problem: Acute hypoxemic respiratory failure    Subjective:     HPI:  No notes on file    Hospital/ICU Course:  No notes on file    Interval History/Significant Events:  Patient able to answer simple questions follow commands    Review of Systems  Objective:     Vital Signs (Most Recent):  Temp: 99.2 °F (37.3 °C) (02/20/25 0315)  Pulse: (!) 122 (02/20/25 0600)  Resp: 18 (02/20/25 0600)  BP: (!) 99/36 (02/20/25 0600)  SpO2: 98 % (02/20/25 0600) Vital Signs (24h Range):  Temp:  [60.8 °F (16 °C)-99.4 °F (37.4 °C)] 99.2 °F (37.3 °C)  Pulse:  [108-152] 122  Resp:  [11-20] 18  SpO2:  [89 %-100 %] 98 %  BP: ()/(33-59) 99/36   Weight: 90.9 kg (200 lb 6.4 oz)  Body mass index is 32.35 kg/m².      Intake/Output Summary (Last 24 hours) at 2/20/2025 0630  Last data filed at 2/20/2025 0623  Gross per 24 hour   Intake 3565.27 ml   Output 2100 ml   Net 1465.27 ml          Physical Exam  Vitals reviewed.   Constitutional:       Appearance: Normal appearance.      Interventions: She is not intubated.  HENT:      Head: Normocephalic and atraumatic.      Nose: Nose normal.      Mouth/Throat:      Mouth: Mucous membranes are dry.      Pharynx: Oropharynx is clear.   Eyes:      Extraocular Movements: Extraocular movements intact.      Conjunctiva/sclera: Conjunctivae normal.      Pupils: Pupils are equal, round, and reactive to light.   Cardiovascular:      Rate and Rhythm: Normal rate.      Heart sounds: Normal heart sounds. No murmur heard.  Pulmonary:      Effort: Pulmonary effort is normal. She is not intubated.      Breath sounds: Normal breath sounds.   Abdominal:      General: Abdomen is flat. Bowel sounds are normal.       Palpations: Abdomen is soft.   Musculoskeletal:         General: Normal range of motion.      Cervical back: Normal range of motion and neck supple.      Right lower leg: No edema.      Left lower leg: No edema.   Skin:     General: Skin is warm and dry.      Capillary Refill: Capillary refill takes less than 2 seconds.   Neurological:      General: No focal deficit present.      Mental Status: She is alert and oriented to person, place, and time.   Psychiatric:         Mood and Affect: Mood normal.         Behavior: Behavior normal.            Vents:  Oxygen Concentration (%): 75 (02/20/25 0515)  Lines/Drains/Airways       Drain  Duration             Female External Urinary Catheter w/ Suction 02/19/25 1600 <1 day              Peripheral Intravenous Line  Duration                  Peripheral IV - Single Lumen 02/19/25 1256 22 G Left Breast <1 day         Peripheral IV - Single Lumen 02/19/25 1600 20 G Anterior;Left Upper Arm <1 day                  Significant Labs:    CBC/Anemia Profile:  Recent Labs   Lab 02/19/25  0818   WBC 14.57*   HGB 9.1*   HCT 32.9*   *   MCV 85.2   RDW 18.1*        Chemistries:  Recent Labs   Lab 02/19/25  0954      K 3.9      CO2 32*   BUN 10   CREATININE 0.85   CALCIUM 8.9   ALBUMIN 2.3*   PROT 5.6*   BILITOT 0.2   ALKPHOS 58   ALT 21   AST 33       Recent Lab Results  (Last 5 results in the past 24 hours)        02/20/25  0527   02/19/25  1637   02/19/25  1058   02/19/25  0954   02/19/25  0836        POC A-aDO2         99       Albumin/Globulin Ratio       0.7         A4C EF     50           Albumin       2.3         ALP       58         ALT       21         Anion Gap       12         Ao root annulus     2.43           Ao peak adair     2.4           Ao VTI     33.0           AST       33         AV valve area     1.8           ALIE by Velocity Ratio     2.0           AORTIC VALVE CUSP SEPERATION     1.97           AV mean gradient     15           AV index  (prosthetic)     0.71           AV peak gradient     23           AV Velocity Ratio     0.79           BILIRUBIN TOTAL       0.2         BSA     2           BUN       10         BUN/CREAT RATIO       12         Calcium       8.9         Chloride       101         CO2       32         Creatinine       0.85         Left Ventricle Relative Wall Thickness     0.46           E/A ratio     0.60           E/E' ratio     7           eGFR       78  Comment: Estimated GFR calculated using the CKD-EPI creatinine (2021) equation.         EF     70           E wave deceleration time     81           FS     34.3           Globulin, Total       3.3         Glucose       131         IVC diameter     1.10           IVSd     0.8           LA size     2.9           LVOT area     2.5           LV LATERAL E/E' RATIO     7.0           LV SEPTAL E/E' RATIO     7.8           LV EDV BP     49.38           LV Diastolic Volume Index     25.19           Left Ventricular End Diastolic Volume by Teichholz Method     49.38           LV EDV A4C     23.72           Left Ventricular End Systolic Volume by Teichholz Method     18.34           LVIDd     3.5           LVIDs     2.3           LV mass     75.3           LV Mass Index     38.4           Left Ventricular Outflow Tract Mean Gradient     8.57           Left Ventricular Outflow Tract Mean Velocity     1.36           LVOT diameter     1.8           LVOT peak guilherme     1.9           LVOT stroke volume     59.3           LVOT peak VTI     23.3           LV ESV BP     18.34           LV Systolic Volume Index     9.4           Mean e'     0.10           MV valve area p 1/2 method     9.37           MV Peak A Guilherme     1.17           MV Peak E Guilherme     0.70           MV stenosis pressure 1/2 time     23.47           NT-proBNP       82         Lateral dimension     1.5           POC Base Excess 10.1   9.1       15.0       POC HCO3 40.8   38.4       43.1       POC PCO2 93   78       68       POC PH 7.25    7.30       7.41       POC PO2 74   57       44       POC SATURATED O2 92   86       84.2       Potassium       3.9         PROTEIN TOTAL       5.6         PV peak gradient     6           PV PEAK VELOCITY     1.24           PW     0.8           RA Major Axis     3.84           Est. RA pres     3           RV mid diameter     3.14           RV TB RVSP     5           RV/LV Ratio     1.03           RV- galeano basal diam     3.6           RV-galeano length     3.8           RV-galeano mid d     3.1           RV Basal Diameter     3.83           Sodium       141         TAPSE     1.34           TDI SEPTAL     0.09           TDI LATERAL     0.10           Triscuspid Valve Regurgitation Peak Gradient     23           TR Max Guilherme     2.4           Troponin I High Sensitivity       <2.7         TV resting pulmonary artery pressure     26           ZLVIDD     -4.72           ZLVIDS     -3.20                                  Significant Imaging:  I have reviewed all pertinent imaging results/findings within the past 24 hours.    ABG  Recent Labs   Lab 02/20/25  0527   PH 7.25*   PO2 74*   PCO2 93*   HCO3 40.8*     Assessment/Plan:     Pulmonary  * Acute hypoxemic respiratory failure  Clinically her respiratory failure looks better her numbers on her a pCO2 have gone up in her PH has gone down a little bit I still do think she needs to be intubated go ahead and start aminophylline make sure she is on steroids limit her O2 as much as we can.  Observe closely for the need for intubation    Aspiration pneumonia  Being treated with antibiotics, looks like she has aspirated significant amounts of barium continue antibiotics and steroids    Renal/  Acute cystitis with hematuria  Patient has a ESBL in her urine E coli sensitive to Merrem    GI  Dysphagia  Patient has barium swallow now when I review the CT scan of the chest looks like she has barium that she has aspirated throughout her lung             Luke Santizo MD  Critical Care  Medicine  [facility]

## 2025-02-20 NOTE — ASSESSMENT & PLAN NOTE
Patient has barium swallow now when I review the CT scan of the chest looks like she has barium that she has aspirated throughout her lung

## 2025-02-21 LAB
BUN SERPL-MCNC: 6 MG/DL (ref 10–20)
BUN/CREAT SERPL: 14 (ref 6–20)
CREAT SERPL-MCNC: 0.44 MG/DL (ref 0.55–1.02)
EGFR (NO RACE VARIABLE) (RUSH/TITUS): 110 ML/MIN/1.73M2
HCO3 UR-SCNC: 37.6 MMOL/L (ref 21–28)
HCO3 UR-SCNC: 37.9 MMOL/L (ref 21–28)
METHICILLIN RESISTANT STAPHYLOCOCCUS AUREUS: NEGATIVE
OHS QRS DURATION: 68 MS
OHS QTC CALCULATION: 448 MS
PCO2 BLDA: 67 MMHG (ref 35–48)
PCO2 BLDA: 80 MMHG (ref 35–48)
PH SMN: 7.28 [PH] (ref 7.35–7.45)
PH SMN: 7.36 [PH] (ref 7.35–7.45)
PO2 BLDA: 55 MMHG (ref 83–108)
PO2 BLDA: 96 MMHG (ref 83–108)
POC BASE EXCESS: 10 MMOL/L (ref -2–3)
POC BASE EXCESS: 8.1 MMOL/L (ref -2–3)
POC SATURATED O2: 84 % (ref 95–98)
POC SATURATED O2: 97 % (ref 95–98)
RHEUMATOID FACT SER NEPH-ACNC: POSITIVE [IU]/ML
T4 FREE SERPL-MCNC: 0.83 NG/DL (ref 0.7–1.48)
THEOPHYLLINE BLD-MCNC: 6.2 ΜG/ML (ref 8–20)
VANCOMYCIN TROUGH SERPL-MCNC: 16.9 ΜG/ML (ref 15–20)

## 2025-02-21 PROCEDURE — 94640 AIRWAY INHALATION TREATMENT: CPT

## 2025-02-21 PROCEDURE — 27000207 HC ISOLATION

## 2025-02-21 PROCEDURE — 36415 COLL VENOUS BLD VENIPUNCTURE: CPT | Performed by: INTERNAL MEDICINE

## 2025-02-21 PROCEDURE — 27000221 HC OXYGEN, UP TO 24 HOURS

## 2025-02-21 PROCEDURE — 82803 BLOOD GASES ANY COMBINATION: CPT

## 2025-02-21 PROCEDURE — 25000003 PHARM REV CODE 250: Performed by: HOSPITALIST

## 2025-02-21 PROCEDURE — 63600175 PHARM REV CODE 636 W HCPCS

## 2025-02-21 PROCEDURE — 63600175 PHARM REV CODE 636 W HCPCS: Performed by: NURSE PRACTITIONER

## 2025-02-21 PROCEDURE — 94668 MNPJ CHEST WALL SBSQ: CPT

## 2025-02-21 PROCEDURE — 80202 ASSAY OF VANCOMYCIN: CPT | Performed by: HOSPITALIST

## 2025-02-21 PROCEDURE — 99233 SBSQ HOSP IP/OBS HIGH 50: CPT | Mod: ,,, | Performed by: INTERNAL MEDICINE

## 2025-02-21 PROCEDURE — 94761 N-INVAS EAR/PLS OXIMETRY MLT: CPT

## 2025-02-21 PROCEDURE — 86038 ANTINUCLEAR ANTIBODIES: CPT | Performed by: REGISTERED NURSE

## 2025-02-21 PROCEDURE — 86430 RHEUMATOID FACTOR TEST QUAL: CPT | Performed by: REGISTERED NURSE

## 2025-02-21 PROCEDURE — 20000000 HC ICU ROOM

## 2025-02-21 PROCEDURE — 99900035 HC TECH TIME PER 15 MIN (STAT)

## 2025-02-21 PROCEDURE — 25000003 PHARM REV CODE 250: Performed by: INTERNAL MEDICINE

## 2025-02-21 PROCEDURE — 63600175 PHARM REV CODE 636 W HCPCS: Performed by: HOSPITALIST

## 2025-02-21 PROCEDURE — 84520 ASSAY OF UREA NITROGEN: CPT | Performed by: INTERNAL MEDICINE

## 2025-02-21 PROCEDURE — 36415 COLL VENOUS BLD VENIPUNCTURE: CPT | Performed by: REGISTERED NURSE

## 2025-02-21 PROCEDURE — 36600 WITHDRAWAL OF ARTERIAL BLOOD: CPT

## 2025-02-21 PROCEDURE — 25000242 PHARM REV CODE 250 ALT 637 W/ HCPCS

## 2025-02-21 PROCEDURE — 84439 ASSAY OF FREE THYROXINE: CPT | Performed by: REGISTERED NURSE

## 2025-02-21 PROCEDURE — 94660 CPAP INITIATION&MGMT: CPT

## 2025-02-21 PROCEDURE — 80198 ASSAY OF THEOPHYLLINE: CPT | Performed by: INTERNAL MEDICINE

## 2025-02-21 RX ADMIN — MUPIROCIN: 20 OINTMENT TOPICAL at 08:02

## 2025-02-21 RX ADMIN — ENOXAPARIN SODIUM 40 MG: 40 INJECTION SUBCUTANEOUS at 04:02

## 2025-02-21 RX ADMIN — MORPHINE SULFATE 2 MG: 2 INJECTION, SOLUTION INTRAMUSCULAR; INTRAVENOUS at 08:02

## 2025-02-21 RX ADMIN — MORPHINE SULFATE 2 MG: 2 INJECTION, SOLUTION INTRAMUSCULAR; INTRAVENOUS at 01:02

## 2025-02-21 RX ADMIN — GUAIFENESIN 200 MG: 200 SOLUTION ORAL at 08:02

## 2025-02-21 RX ADMIN — MUPIROCIN: 20 OINTMENT TOPICAL at 09:02

## 2025-02-21 RX ADMIN — IPRATROPIUM BROMIDE AND ALBUTEROL SULFATE 3 ML: 2.5; .5 SOLUTION RESPIRATORY (INHALATION) at 06:02

## 2025-02-21 RX ADMIN — VANCOMYCIN HYDROCHLORIDE 1750 MG: 500 INJECTION, POWDER, LYOPHILIZED, FOR SOLUTION INTRAVENOUS at 05:02

## 2025-02-21 RX ADMIN — ONDANSETRON 4 MG: 2 INJECTION INTRAMUSCULAR; INTRAVENOUS at 08:02

## 2025-02-21 RX ADMIN — ACETAMINOPHEN 650 MG: 325 TABLET ORAL at 08:02

## 2025-02-21 RX ADMIN — ONDANSETRON 4 MG: 2 INJECTION INTRAMUSCULAR; INTRAVENOUS at 05:02

## 2025-02-21 RX ADMIN — MEROPENEM 1 G: 1 INJECTION, POWDER, FOR SOLUTION INTRAVENOUS at 11:02

## 2025-02-21 RX ADMIN — MEROPENEM 1 G: 1 INJECTION, POWDER, FOR SOLUTION INTRAVENOUS at 08:02

## 2025-02-21 RX ADMIN — IPRATROPIUM BROMIDE AND ALBUTEROL SULFATE 3 ML: 2.5; .5 SOLUTION RESPIRATORY (INHALATION) at 01:02

## 2025-02-21 RX ADMIN — TRAZODONE HYDROCHLORIDE 100 MG: 50 TABLET ORAL at 08:02

## 2025-02-21 RX ADMIN — MEROPENEM 1 G: 1 INJECTION, POWDER, FOR SOLUTION INTRAVENOUS at 01:02

## 2025-02-21 RX ADMIN — MEROPENEM 1 G: 1 INJECTION, POWDER, FOR SOLUTION INTRAVENOUS at 04:02

## 2025-02-21 RX ADMIN — IPRATROPIUM BROMIDE AND ALBUTEROL SULFATE 3 ML: 2.5; .5 SOLUTION RESPIRATORY (INHALATION) at 07:02

## 2025-02-21 RX ADMIN — SODIUM CHLORIDE: 9 INJECTION, SOLUTION INTRAVENOUS at 05:02

## 2025-02-21 RX ADMIN — VANCOMYCIN HYDROCHLORIDE 1750 MG: 500 INJECTION, POWDER, LYOPHILIZED, FOR SOLUTION INTRAVENOUS at 11:02

## 2025-02-21 RX ADMIN — SODIUM CHLORIDE: 9 INJECTION, SOLUTION INTRAVENOUS at 11:02

## 2025-02-21 NOTE — SUBJECTIVE & OBJECTIVE
Interval History/Significant Events:  Patient is alert    Review of Systems  Objective:     Vital Signs (Most Recent):  Temp: 98 °F (36.7 °C) (02/21/25 0301)  Pulse: (!) 138 (02/21/25 0530)  Resp: 16 (02/21/25 0530)  BP: 130/60 (02/21/25 0530)  SpO2: 97 % (02/21/25 0530) Vital Signs (24h Range):  Temp:  [97.4 °F (36.3 °C)-98 °F (36.7 °C)] 98 °F (36.7 °C)  Pulse:  [118-138] 138  Resp:  [10-26] 16  SpO2:  [93 %-100 %] 97 %  BP: (104-149)/(43-68) 130/60   Weight: 90.9 kg (200 lb 6.4 oz)  Body mass index is 32.35 kg/m².      Intake/Output Summary (Last 24 hours) at 2/21/2025 0628  Last data filed at 2/21/2025 0501  Gross per 24 hour   Intake 3458.49 ml   Output 1850 ml   Net 1608.49 ml          Physical Exam  Vitals reviewed.   Constitutional:       Appearance: Normal appearance.      Interventions: She is not intubated.  HENT:      Head: Normocephalic and atraumatic.      Nose: Nose normal.      Mouth/Throat:      Mouth: Mucous membranes are dry.      Pharynx: Oropharynx is clear.   Eyes:      Extraocular Movements: Extraocular movements intact.      Conjunctiva/sclera: Conjunctivae normal.      Pupils: Pupils are equal, round, and reactive to light.   Cardiovascular:      Rate and Rhythm: Normal rate.      Heart sounds: Normal heart sounds. No murmur heard.  Pulmonary:      Effort: Pulmonary effort is normal. She is not intubated.      Breath sounds: Normal breath sounds.   Abdominal:      General: Abdomen is flat. Bowel sounds are normal.      Palpations: Abdomen is soft.   Musculoskeletal:         General: Normal range of motion.      Cervical back: Normal range of motion and neck supple.      Right lower leg: No edema.      Left lower leg: No edema.   Skin:     General: Skin is warm and dry.      Capillary Refill: Capillary refill takes less than 2 seconds.   Neurological:      General: No focal deficit present.      Mental Status: She is alert and oriented to person, place, and time.   Psychiatric:         Mood  and Affect: Mood normal.         Behavior: Behavior normal.            Vents:  Oxygen Concentration (%): 60 (02/21/25 0317)  Lines/Drains/Airways       Drain  Duration             Female External Urinary Catheter w/ Suction 02/19/25 1600 1 day              Peripheral Intravenous Line  Duration                  Peripheral IV - Single Lumen 02/19/25 1256 22 G Left Breast 1 day         Peripheral IV - Single Lumen 02/19/25 1600 20 G Anterior;Left Upper Arm 1 day                  Significant Labs:    CBC/Anemia Profile:  Recent Labs   Lab 02/19/25  0818   WBC 14.57*   HGB 9.1*   HCT 32.9*   *   MCV 85.2   RDW 18.1*        Chemistries:  Recent Labs   Lab 02/19/25  0954 02/20/25  0850     --    K 3.9  --      --    CO2 32*  --    BUN 10 8*   CREATININE 0.85 0.46*   CALCIUM 8.9  --    ALBUMIN 2.3*  --    PROT 5.6*  --    BILITOT 0.2  --    ALKPHOS 58  --    ALT 21  --    AST 33  --        Recent Lab Results         02/20/25  1348   02/20/25  0850   02/20/25  0842        SED RATE (WESTERGREN)     76       BSA 2           BUN   8         BUN/CREAT RATIO   17         Creatinine   0.46         CRP, High Sensitivity     >160.00       eGFR   109  Comment: Estimated GFR calculated using the CKD-EPI creatinine (2021) equation.         IVC diameter 1.20           Est. RA pres 3                   Significant Imaging:  I have reviewed all pertinent imaging results/findings within the past 24 hours.

## 2025-02-21 NOTE — CONSULTS
"Gastroenterology Consult Note    Chief Complaint: Acute hypoxemic respiratory failure, dysphagia     Consulted by:   Dr. Bar    HPI:  Karie Denney is a 59 y.o. obese AAF with paraplegia, GERD, h/o esophageal dysphagia with dilation that presented to [facility] with fever and hypoxia with suspected pneumonia and transferred to OR for swallow evaluation with course complicated by further respiratory decompensation requiring transfer to ICU for continuous BiPAP. Family reports that she had a stroke in Aug 2024 with aphasia and LUE paresis/numbness, but review of MRI Brain that admission was negative for acute stroke and showed findings most consistent with chronic microvascular ischemic changes. Daughter does report that she had dysphagia over the summer last year that improved following EGD with dilation at OSH, and she hoped her mom could have the same thing performed this admission. No [facility] GI related cancer.     Past Medical History:   Diagnosis Date    GERD (gastroesophageal reflux disease)     Paraplegia      Past Surgical History:   Procedure Laterality Date    BACK SURGERY       Family History   Family history unknown: Yes       OBJECTIVE:  BP (!) 114/43   Pulse (!) 134   Temp 98 °F (36.7 °C) (Axillary)   Resp 15   Ht 5' 6" (1.676 m)   Wt 90.9 kg (200 lb 6.4 oz)   SpO2 98%   BMI 32.35 kg/m²   GEN: ill appearing, obese  HEENT: NCAT, anicteric   NECK: Supple, no LAD  CV: tachycardic, regular rhythm  RESP: coarse over left lung, effort on BiPAP  ABD: protuberant, no TTP  EXT: No clubbing, cyanosis  SKIN: Warm and dry  NEURO: AAO x2. BLE paralysis.    LABS:  CMP  Sodium   Date Value Ref Range Status   02/19/2025 141 136 - 145 mmol/L Final     Potassium   Date Value Ref Range Status   02/19/2025 3.9 3.5 - 5.1 mmol/L Final     Chloride   Date Value Ref Range Status   02/19/2025 101 98 - 107 mmol/L Final     CO2   Date Value Ref Range Status   02/19/2025 32 (H) 23 - 31 mmol/L Final     Glucose "   Date Value Ref Range Status   02/19/2025 131 (H) 82 - 115 mg/dL Final     BUN   Date Value Ref Range Status   02/20/2025 8 (L) 10 - 20 mg/dL Final     Creatinine   Date Value Ref Range Status   02/20/2025 0.46 (L) 0.55 - 1.02 mg/dL Final     Calcium   Date Value Ref Range Status   02/19/2025 8.9 8.4 - 10.2 mg/dL Final     Total Protein   Date Value Ref Range Status   02/19/2025 5.6 (L) 5.8 - 7.6 g/dL Final     Albumin   Date Value Ref Range Status   02/19/2025 2.3 (L) 3.4 - 4.8 g/dL Final     Bilirubin, Total   Date Value Ref Range Status   02/19/2025 0.2 <=1.5 mg/dL Final     Alk Phos   Date Value Ref Range Status   02/19/2025 58 40 - 150 U/L Final     AST   Date Value Ref Range Status   02/19/2025 33 5 - 34 U/L Final     ALT   Date Value Ref Range Status   02/19/2025 21 <=55 U/L Final     Anion Gap   Date Value Ref Range Status   02/19/2025 12 7 - 16 mmol/L Final     eGFR   Date Value Ref Range Status   02/20/2025 109 >=60 mL/min/1.73m2 Final     Comment:     Estimated GFR calculated using the CKD-EPI creatinine (2021) equation.     Lab Results   Component Value Date    WBC 14.57 (H) 02/19/2025    HGB 9.1 (L) 02/19/2025    HCT 32.9 (L) 02/19/2025    MCV 85.2 02/19/2025     (L) 02/19/2025       Labs reviewed, notable for Hgb 9.1, Plt 111    IMAGING:  Imaging reviewed, notable for CT PE negative for PE but showing HARRISON consolidation and likely aspiration PNA/pneumonitis.     ASSESSMENT:    59 y.o. obese AAF with paraplegia, GERD, h/o esophageal dysphagia with dilation that presented to [facility] with fever and hypoxia with suspected pneumonia and transferred to [facility] for swallow evaluation with course complicated by further respiratory decompensation requiring transfer to ICU for continuous BiPAP.     PLAN:    Esophageal Dysphagia  - abnormal imaging on MBS, GERD, dysphagia to solids  - agree with PPI  - given critical respiratory status, will defer elective EGD for the time being  - can consider upper  endoscopy with dilation when patient has recovered, is out of ICU (or even as outpatient), and is more optimized from a respiratory standpoint       Thank you for the consult and including me in the care of this patient. Please call me with questions.     Aniket Rivera MD  Gastroenterology

## 2025-02-21 NOTE — PLAN OF CARE
Problem: Adult Inpatient Plan of Care  Goal: Plan of Care Review  Outcome: Progressing     Problem: Airway Clearance Ineffective  Goal: Effective Airway Clearance  Outcome: Progressing     Problem: Gas Exchange Impaired  Goal: Optimal Gas Exchange  Outcome: Progressing     Problem: Noninvasive Ventilation Acute  Goal: Effective Unassisted Ventilation and Oxygenation  Outcome: Progressing

## 2025-02-21 NOTE — PROGRESS NOTES
[facility]  Cardiology  Progress Note    Patient Name: Karie Denney  MRN: [id number]  Admission Date: 2/14/2025  Hospital Length of Stay: 7 days  Code Status: Full Code   Attending Physician: Luke Santizo MD   Primary Care Physician: Gonzalo Barrera NP  Expected Discharge Date:   Principal Problem:Acute hypoxemic respiratory failure    Subjective:     Hospital Course:   No notes on file    Interval History: No significant events. Continues to require bipAP    Review of Systems   Reason unable to perform ROS: Unable to obtain, no family at bedside.     Objective:     Vital Signs (Most Recent):  Temp: 99.6 °F (37.6 °C) (02/21/25 1501)  Pulse: (!) 123 (02/21/25 1600)  Resp: 12 (02/21/25 1600)  BP: (!) 160/72 (02/21/25 1600)  SpO2: 97 % (02/21/25 1600) Vital Signs (24h Range):  Temp:  [97.5 °F (36.4 °C)-100.1 °F (37.8 °C)] 99.6 °F (37.6 °C)  Pulse:  [120-138] 123  Resp:  [8-21] 12  SpO2:  [94 %-100 %] 97 %  BP: (105-164)/(43-81) 160/72     Weight: 90.9 kg (200 lb 6.4 oz)  Body mass index is 32.35 kg/m².     SpO2: 97 %         Intake/Output Summary (Last 24 hours) at 2/21/2025 1638  Last data filed at 2/21/2025 1001  Gross per 24 hour   Intake 2165.09 ml   Output 1750 ml   Net 415.09 ml       Lines/Drains/Airways       Drain  Duration             Female External Urinary Catheter w/ Suction 02/19/25 1600 2 days         NG/OG Tube 02/21/25 1252 Left nostril <1 day              Peripheral Intravenous Line  Duration                  Peripheral IV - Single Lumen 02/19/25 1256 22 G Left Breast 2 days         Peripheral IV - Single Lumen 02/19/25 1600 20 G Anterior;Left Upper Arm 2 days                       Physical Exam  Vitals reviewed.   Constitutional:       Appearance: She is ill-appearing.   HENT:      Mouth/Throat:      Mouth: Mucous membranes are dry.   Eyes:      General: No scleral icterus.  Cardiovascular:      Rate and Rhythm: Regular rhythm. Tachycardia present.      Heart  sounds: Normal heart sounds. Heart sounds not distant.      No friction rub.   Pulmonary:      Breath sounds: Decreased air movement present. Rales present.      Comments: On BiPAP  Musculoskeletal:      Right lower leg: No edema.      Left lower leg: No edema.   Skin:     General: Skin is warm and dry.   Neurological:      Mental Status: She is alert.            Significant Labs: All pertinent lab results from the last 24 hours have been reviewed.    Significant Imaging: Echocardiogram: Transthoracic echo (TTE) complete (Cupid Only):   Results for orders placed or performed during the hospital encounter of 02/14/25   Echo   Result Value Ref Range    BSA 2 m2    IVC diameter 1.20 cm    Est. RA pres 3 mmHg    Narrative      IVC/SVC: Normal venous pressure at 3 mmHg.    Pericardium: There is a moderate circumferential effusion.    Interval decrease in the amount of pericardial fluid; pericardium is   now roughly 10mm from epicardial border; was 18-20mm prior.    The effusion is now notably less symmetrical and appears to be   predominantly retrocardiac.    This was a limited echo performed as part of pericardiocentesis that   was ultimately aborted given above findings and relative interval   improvement in hemodynamics.       Assessment and Plan:     Brief HPI: 59-year-old female with PMH of back surgery 2015 which resulted in BLE paralysis, CVA (2024) with left-sided paralysis/bed bound), neuropathy, GERD, and depression who was transferred from [facility] for the complaints suspected aspiration pneumonia and UTI. The patient's daughter was at bedside and provided most of the history. The patient began experiencing flu-like symptoms a few weeks ago, including a productive cough, fever, and chills. She also experienced mild shortness of breath at home. Her doctor started her on oral antibiotics for pneumonia, but the symptoms did not resolve completely. Last Wednesday, the patient became very weak and presented  to the ER, where she was diagnosed with pneumonia and admitted for treatment with IV antibiotics. she was transferred to [facility] for treatment of aspiration pneumonia, swallow evaluation test.      Cardiology consulted for pericardial effusion. Patient apparently decompensated yesterday and is now in ICU on BiPAP, no longer requiring pressor support.    * Acute hypoxemic respiratory failure  - being followed by critical care team    Pericardial effusion  - patient seen and evaluated by Dr. Briseno  - plan for pericardiocentesis today  - further recommendations to follow    2/21/25  Cardiocentesis aborted due to not enough fluid  Will rule out Myxedema, autoimmune disorder. systemic lupus erythematosus.  Labs ordered      Aspiration pneumonia  - being followed by critical care team        VTE Risk Mitigation (From admission, onward)           Ordered     enoxaparin injection 40 mg  Daily         02/17/25 1204     IP VTE HIGH RISK PATIENT  Once         02/17/25 1204     Place sequential compression device  Until discontinued         02/15/25 0008                    PASTOR Francisco  Cardiology  [facility]

## 2025-02-21 NOTE — ASSESSMENT & PLAN NOTE
- patient seen and evaluated by Dr. Briseno  - plan for pericardiocentesis today  - further recommendations to follow    2/21/25  Cardiocentesis aborted due to not enough fluid  Will rule out Myxedema, autoimmune disorder. systemic lupus erythematosus.  Labs ordered

## 2025-02-21 NOTE — SUBJECTIVE & OBJECTIVE
Interval History: No significant events. Continues to require bipAP    Review of Systems   Reason unable to perform ROS: Unable to obtain, no family at bedside.     Objective:     Vital Signs (Most Recent):  Temp: 99.6 °F (37.6 °C) (02/21/25 1501)  Pulse: (!) 123 (02/21/25 1600)  Resp: 12 (02/21/25 1600)  BP: (!) 160/72 (02/21/25 1600)  SpO2: 97 % (02/21/25 1600) Vital Signs (24h Range):  Temp:  [97.5 °F (36.4 °C)-100.1 °F (37.8 °C)] 99.6 °F (37.6 °C)  Pulse:  [120-138] 123  Resp:  [8-21] 12  SpO2:  [94 %-100 %] 97 %  BP: (105-164)/(43-81) 160/72     Weight: 90.9 kg (200 lb 6.4 oz)  Body mass index is 32.35 kg/m².     SpO2: 97 %         Intake/Output Summary (Last 24 hours) at 2/21/2025 1638  Last data filed at 2/21/2025 1001  Gross per 24 hour   Intake 2165.09 ml   Output 1750 ml   Net 415.09 ml       Lines/Drains/Airways       Drain  Duration             Female External Urinary Catheter w/ Suction 02/19/25 1600 2 days         NG/OG Tube 02/21/25 1252 Left nostril <1 day              Peripheral Intravenous Line  Duration                  Peripheral IV - Single Lumen 02/19/25 1256 22 G Left Breast 2 days         Peripheral IV - Single Lumen 02/19/25 1600 20 G Anterior;Left Upper Arm 2 days                       Physical Exam  Vitals reviewed.   Constitutional:       Appearance: She is ill-appearing.   HENT:      Mouth/Throat:      Mouth: Mucous membranes are dry.   Eyes:      General: No scleral icterus.  Cardiovascular:      Rate and Rhythm: Regular rhythm. Tachycardia present.      Heart sounds: Normal heart sounds. Heart sounds not distant.      No friction rub.   Pulmonary:      Breath sounds: Decreased air movement present. Rales present.      Comments: On BiPAP  Musculoskeletal:      Right lower leg: No edema.      Left lower leg: No edema.   Skin:     General: Skin is warm and dry.   Neurological:      Mental Status: She is alert.            Significant Labs: All pertinent lab results from the last 24 hours  have been reviewed.    Significant Imaging: Echocardiogram: Transthoracic echo (TTE) complete (Cupid Only):   Results for orders placed or performed during the hospital encounter of 02/14/25   Echo   Result Value Ref Range    BSA 2 m2    IVC diameter 1.20 cm    Est. RA pres 3 mmHg    Narrative      IVC/SVC: Normal venous pressure at 3 mmHg.    Pericardium: There is a moderate circumferential effusion.    Interval decrease in the amount of pericardial fluid; pericardium is   now roughly 10mm from epicardial border; was 18-20mm prior.    The effusion is now notably less symmetrical and appears to be   predominantly retrocardiac.    This was a limited echo performed as part of pericardiocentesis that   was ultimately aborted given above findings and relative interval   improvement in hemodynamics.

## 2025-02-21 NOTE — PLAN OF CARE
Problem: Adult Inpatient Plan of Care  Goal: Plan of Care Review  Outcome: Progressing  Goal: Patient-Specific Goal (Individualized)  Outcome: Progressing  Goal: Absence of Hospital-Acquired Illness or Injury  Outcome: Progressing  Intervention: Identify and Manage Fall Risk  Flowsheets (Taken 2/21/2025 0447)  Safety Promotion/Fall Prevention:   assistive device/personal item within reach   pulse ox   room near unit station   side rails raised x 2  Goal: Optimal Comfort and Wellbeing  Outcome: Progressing  Goal: Readiness for Transition of Care  Outcome: Progressing     Problem: Pneumonia  Goal: Fluid Balance  Outcome: Progressing  Intervention: Monitor and Manage Fluid Balance  Flowsheets (Taken 2/21/2025 0447)  Fluid/Electrolyte Management: fluids provided  Goal: Resolution of Infection Signs and Symptoms  Outcome: Progressing  Goal: Effective Oxygenation and Ventilation  Outcome: Progressing     Problem: Wound  Goal: Optimal Coping  Outcome: Progressing  Intervention: Support Patient and Family Response  Flowsheets (Taken 2/21/2025 0447)  Supportive Measures:   active listening utilized   positive reinforcement provided  Goal: Optimal Functional Ability  Outcome: Progressing  Goal: Absence of Infection Signs and Symptoms  Outcome: Progressing  Goal: Improved Oral Intake  Outcome: Progressing  Goal: Optimal Pain Control and Function  Outcome: Progressing  Goal: Skin Health and Integrity  Outcome: Progressing  Goal: Optimal Wound Healing  Outcome: Progressing     Problem: Skin Injury Risk Increased  Goal: Skin Health and Integrity  Outcome: Progressing  Intervention: Optimize Skin Protection  Flowsheets (Taken 2/21/2025 0447)  Pressure Reduction Techniques:   frequent weight shift encouraged   heels elevated off bed   pressure points protected   sit time limited to 2 hours   weight shift assistance provided  Pressure Reduction Devices:   foam padding utilized   heel offloading device utilized   positioning  supports utilized   specialty bed utilized  Skin Protection: incontinence pads utilized  Activity Management:   Arm raise - L1   Heel slide - L1   Rolling - L1  Head of Bed (HOB) Positioning: HOB at 30-45 degrees     Problem: Airway Clearance Ineffective  Goal: Effective Airway Clearance  Outcome: Progressing  Intervention: Promote Airway Secretion Clearance  Flowsheets (Taken 2/21/2025 0447)  Cough And Deep Breathing: done with encouragement  Activity Management:   Arm raise - L1   Heel slide - L1   Rolling - L1     Problem: Gas Exchange Impaired  Goal: Optimal Gas Exchange  Outcome: Progressing  Intervention: Optimize Oxygenation and Ventilation  Flowsheets (Taken 2/21/2025 0447)  Airway/Ventilation Management: airway patency maintained  Head of Bed (HOB) Positioning: HOB at 30-45 degrees     Problem: Infection  Goal: Absence of Infection Signs and Symptoms  Outcome: Progressing  Intervention: Prevent or Manage Infection  Flowsheets (Taken 2/21/2025 0447)  Infection Management: aseptic technique maintained  Isolation Precautions: precautions maintained     Problem: Noninvasive Ventilation Acute  Goal: Effective Unassisted Ventilation and Oxygenation  Outcome: Progressing  Intervention: Monitor and Manage Noninvasive Ventilation  Flowsheets (Taken 2/21/2025 0447)  Airway/Ventilation Management: airway patency maintained

## 2025-02-21 NOTE — PLAN OF CARE
Problem: Adult Inpatient Plan of Care  Goal: Plan of Care Review  Outcome: Progressing  Goal: Patient-Specific Goal (Individualized)  Outcome: Progressing  Goal: Readiness for Transition of Care  Outcome: Progressing     Problem: Pneumonia  Goal: Fluid Balance  Outcome: Progressing  Goal: Effective Oxygenation and Ventilation  Outcome: Progressing     Problem: Wound  Goal: Optimal Coping  Outcome: Progressing  Goal: Absence of Infection Signs and Symptoms  Outcome: Progressing  Goal: Optimal Wound Healing  Outcome: Progressing     Problem: Skin Injury Risk Increased  Goal: Skin Health and Integrity  Outcome: Progressing     Problem: Airway Clearance Ineffective  Goal: Effective Airway Clearance  Outcome: Progressing     Problem: Gas Exchange Impaired  Goal: Optimal Gas Exchange  Outcome: Progressing     Problem: Infection  Goal: Absence of Infection Signs and Symptoms  Outcome: Progressing     Problem: Noninvasive Ventilation Acute  Goal: Effective Unassisted Ventilation and Oxygenation  Outcome: Progressing

## 2025-02-21 NOTE — PLAN OF CARE
Cm visited patients room. Patient is still on bipap. On iv ab tx. Cm will continue to follow for dc planning.

## 2025-02-21 NOTE — ASSESSMENT & PLAN NOTE
Patient looks better today went to yesterday to the cath lab for possible pericardiocentesis there was not enough fluid to tap.  On her CT looks like there could be an element of barium I am not sure about that I do not know how much she really aspirated during the modified barium swallow it could have been a lot.  But patient has aspirated some will check arterial blood gases today I would like to get her off of BiPAP and go to high-flow if we can

## 2025-02-21 NOTE — PROGRESS NOTES
[facility]  Critical Care Medicine  Progress Note    Patient Name: Karie Denney  MRN: [id number]  Admission Date: 2/14/2025  Hospital Length of Stay: 7 days  Code Status: Full Code  Attending Provider: Luke Santizo MD  Primary Care Provider: Gonzalo Barrera NP   Principal Problem: Acute hypoxemic respiratory failure    Subjective:     HPI:  No notes on file    Hospital/ICU Course:  No notes on file    Interval History/Significant Events:  Patient is alert    Review of Systems  Objective:     Vital Signs (Most Recent):  Temp: 98 °F (36.7 °C) (02/21/25 0301)  Pulse: (!) 138 (02/21/25 0530)  Resp: 16 (02/21/25 0530)  BP: 130/60 (02/21/25 0530)  SpO2: 97 % (02/21/25 0530) Vital Signs (24h Range):  Temp:  [97.4 °F (36.3 °C)-98 °F (36.7 °C)] 98 °F (36.7 °C)  Pulse:  [118-138] 138  Resp:  [10-26] 16  SpO2:  [93 %-100 %] 97 %  BP: (104-149)/(43-68) 130/60   Weight: 90.9 kg (200 lb 6.4 oz)  Body mass index is 32.35 kg/m².      Intake/Output Summary (Last 24 hours) at 2/21/2025 0628  Last data filed at 2/21/2025 0501  Gross per 24 hour   Intake 3458.49 ml   Output 1850 ml   Net 1608.49 ml          Physical Exam  Vitals reviewed.   Constitutional:       Appearance: Normal appearance.      Interventions: She is not intubated.  HENT:      Head: Normocephalic and atraumatic.      Nose: Nose normal.      Mouth/Throat:      Mouth: Mucous membranes are dry.      Pharynx: Oropharynx is clear.   Eyes:      Extraocular Movements: Extraocular movements intact.      Conjunctiva/sclera: Conjunctivae normal.      Pupils: Pupils are equal, round, and reactive to light.   Cardiovascular:      Rate and Rhythm: Normal rate.      Heart sounds: Normal heart sounds. No murmur heard.  Pulmonary:      Effort: Pulmonary effort is normal. She is not intubated.      Breath sounds: Normal breath sounds.   Abdominal:      General: Abdomen is flat. Bowel sounds are normal.      Palpations: Abdomen is soft.    Musculoskeletal:         General: Normal range of motion.      Cervical back: Normal range of motion and neck supple.      Right lower leg: No edema.      Left lower leg: No edema.   Skin:     General: Skin is warm and dry.      Capillary Refill: Capillary refill takes less than 2 seconds.   Neurological:      General: No focal deficit present.      Mental Status: She is alert and oriented to person, place, and time.   Psychiatric:         Mood and Affect: Mood normal.         Behavior: Behavior normal.            Vents:  Oxygen Concentration (%): 60 (02/21/25 0317)  Lines/Drains/Airways       Drain  Duration             Female External Urinary Catheter w/ Suction 02/19/25 1600 1 day              Peripheral Intravenous Line  Duration                  Peripheral IV - Single Lumen 02/19/25 1256 22 G Left Breast 1 day         Peripheral IV - Single Lumen 02/19/25 1600 20 G Anterior;Left Upper Arm 1 day                  Significant Labs:    CBC/Anemia Profile:  Recent Labs   Lab 02/19/25  0818   WBC 14.57*   HGB 9.1*   HCT 32.9*   *   MCV 85.2   RDW 18.1*        Chemistries:  Recent Labs   Lab 02/19/25  0954 02/20/25  0850     --    K 3.9  --      --    CO2 32*  --    BUN 10 8*   CREATININE 0.85 0.46*   CALCIUM 8.9  --    ALBUMIN 2.3*  --    PROT 5.6*  --    BILITOT 0.2  --    ALKPHOS 58  --    ALT 21  --    AST 33  --        Recent Lab Results         02/20/25  1348   02/20/25  0850   02/20/25  0842        SED RATE (WESTERGREN)     76       BSA 2           BUN   8         BUN/CREAT RATIO   17         Creatinine   0.46         CRP, High Sensitivity     >160.00       eGFR   109  Comment: Estimated GFR calculated using the CKD-EPI creatinine (2021) equation.         IVC diameter 1.20           Est. RA pres 3                   Significant Imaging:  I have reviewed all pertinent imaging results/findings within the past 24 hours.    ABG  Recent Labs   Lab 02/20/25  0527   PH 7.25*   PO2 74*   PCO2 93*    HCO3 40.8*     Assessment/Plan:     Pulmonary  * Acute hypoxemic respiratory failure  Patient looks better today went to yesterday to the cath lab for possible pericardiocentesis there was not enough fluid to tap.  On her CT looks like there could be an element of barium I am not sure about that I do not know how much she really aspirated during the modified barium swallow it could have been a lot.  But patient has aspirated some will check arterial blood gases today I would like to get her off of BiPAP and go to high-flow if we can    Aspiration pneumonia  Being treated with antibiotics, looks like she has aspirated significant amounts of barium continue antibiotics and steroids    Cardiac/Vascular  Pericardial effusion  Not tapped yesterday    Renal/  Acute cystitis with hematuria  Patient has a ESBL in her urine E coli sensitive to Merrem    GI  Dysphagia  Possible of aspiration some level of barium supportive care only             Luke Santizo MD  Critical Care Medicine  [facility]

## 2025-02-21 NOTE — PT/OT/SLP PROGRESS
Speech Language Pathology      Karie Denney  MRN: [id number]    Patient not seen today secondary to Other (Comment) (SLP discussed with nurse practitioner). Patient needs to be seen by GI prior to eating orally at this time. Patient did not aspirate during the MODIFIED BARIUM SWALLOW  study; however, she was at risk secondary to the reflux noted with both consistencies tested. It was noted in her chart that it is suspected that patient did end up aspirating some of the barium used for the swallow study. Recommend patient have alternate route of feeding until GI can further assess. Nurse practitioner in agreement. D/C order for BEDSIDE SWALLOW EVALUATION at this time.

## 2025-02-22 LAB
BUN SERPL-MCNC: 7 MG/DL (ref 10–20)
BUN/CREAT SERPL: 15 (ref 6–20)
CREAT SERPL-MCNC: 0.48 MG/DL (ref 0.55–1.02)
EGFR (NO RACE VARIABLE) (RUSH/TITUS): 108 ML/MIN/1.73M2
TSH SERPL DL<=0.005 MIU/L-ACNC: 2.2 UIU/ML (ref 0.35–4.94)

## 2025-02-22 PROCEDURE — 99291 CRITICAL CARE FIRST HOUR: CPT | Mod: ,,, | Performed by: NURSE PRACTITIONER

## 2025-02-22 PROCEDURE — 63600175 PHARM REV CODE 636 W HCPCS: Performed by: NURSE PRACTITIONER

## 2025-02-22 PROCEDURE — 36415 COLL VENOUS BLD VENIPUNCTURE: CPT | Performed by: REGISTERED NURSE

## 2025-02-22 PROCEDURE — 27000207 HC ISOLATION

## 2025-02-22 PROCEDURE — 94640 AIRWAY INHALATION TREATMENT: CPT

## 2025-02-22 PROCEDURE — 84443 ASSAY THYROID STIM HORMONE: CPT | Performed by: REGISTERED NURSE

## 2025-02-22 PROCEDURE — 94668 MNPJ CHEST WALL SBSQ: CPT

## 2025-02-22 PROCEDURE — 94660 CPAP INITIATION&MGMT: CPT

## 2025-02-22 PROCEDURE — 63600175 PHARM REV CODE 636 W HCPCS

## 2025-02-22 PROCEDURE — 20000000 HC ICU ROOM

## 2025-02-22 PROCEDURE — 25000242 PHARM REV CODE 250 ALT 637 W/ HCPCS

## 2025-02-22 PROCEDURE — 84520 ASSAY OF UREA NITROGEN: CPT | Performed by: INTERNAL MEDICINE

## 2025-02-22 PROCEDURE — 25000003 PHARM REV CODE 250: Performed by: HOSPITALIST

## 2025-02-22 PROCEDURE — 99900035 HC TECH TIME PER 15 MIN (STAT)

## 2025-02-22 PROCEDURE — 27000221 HC OXYGEN, UP TO 24 HOURS

## 2025-02-22 PROCEDURE — 63600175 PHARM REV CODE 636 W HCPCS: Performed by: HOSPITALIST

## 2025-02-22 PROCEDURE — 94761 N-INVAS EAR/PLS OXIMETRY MLT: CPT

## 2025-02-22 PROCEDURE — 25000003 PHARM REV CODE 250: Performed by: NURSE PRACTITIONER

## 2025-02-22 RX ORDER — POLYETHYLENE GLYCOL 3350 17 G/17G
17 POWDER, FOR SOLUTION ORAL DAILY
Status: DISCONTINUED | OUTPATIENT
Start: 2025-02-22 | End: 2025-02-25

## 2025-02-22 RX ORDER — AMOXICILLIN 250 MG
1 CAPSULE ORAL 2 TIMES DAILY
Status: DISCONTINUED | OUTPATIENT
Start: 2025-02-22 | End: 2025-02-25

## 2025-02-22 RX ORDER — PREGABALIN 75 MG/1
75 CAPSULE ORAL NIGHTLY
Status: DISCONTINUED | OUTPATIENT
Start: 2025-02-22 | End: 2025-02-25

## 2025-02-22 RX ADMIN — MEROPENEM 1 G: 1 INJECTION, POWDER, FOR SOLUTION INTRAVENOUS at 04:02

## 2025-02-22 RX ADMIN — SENNOSIDES AND DOCUSATE SODIUM 1 TABLET: 50; 8.6 TABLET ORAL at 11:02

## 2025-02-22 RX ADMIN — GUAIFENESIN 200 MG: 200 SOLUTION ORAL at 08:02

## 2025-02-22 RX ADMIN — TRAZODONE HYDROCHLORIDE 100 MG: 50 TABLET ORAL at 08:02

## 2025-02-22 RX ADMIN — MUPIROCIN: 20 OINTMENT TOPICAL at 08:02

## 2025-02-22 RX ADMIN — ENOXAPARIN SODIUM 40 MG: 40 INJECTION SUBCUTANEOUS at 04:02

## 2025-02-22 RX ADMIN — IPRATROPIUM BROMIDE AND ALBUTEROL SULFATE 3 ML: 2.5; .5 SOLUTION RESPIRATORY (INHALATION) at 01:02

## 2025-02-22 RX ADMIN — MORPHINE SULFATE 2 MG: 2 INJECTION, SOLUTION INTRAMUSCULAR; INTRAVENOUS at 07:02

## 2025-02-22 RX ADMIN — IPRATROPIUM BROMIDE AND ALBUTEROL SULFATE 3 ML: 2.5; .5 SOLUTION RESPIRATORY (INHALATION) at 07:02

## 2025-02-22 RX ADMIN — POLYETHYLENE GLYCOL 3350 17 G: 17 POWDER, FOR SOLUTION ORAL at 11:02

## 2025-02-22 RX ADMIN — ATORVASTATIN CALCIUM 20 MG: 20 TABLET, FILM COATED ORAL at 08:02

## 2025-02-22 RX ADMIN — MUPIROCIN: 20 OINTMENT TOPICAL at 09:02

## 2025-02-22 RX ADMIN — SENNOSIDES AND DOCUSATE SODIUM 1 TABLET: 50; 8.6 TABLET ORAL at 08:02

## 2025-02-22 RX ADMIN — ONDANSETRON 4 MG: 2 INJECTION INTRAMUSCULAR; INTRAVENOUS at 01:02

## 2025-02-22 RX ADMIN — PREGABALIN 75 MG: 75 CAPSULE ORAL at 08:02

## 2025-02-22 RX ADMIN — MEROPENEM 1 G: 1 INJECTION, POWDER, FOR SOLUTION INTRAVENOUS at 08:02

## 2025-02-22 RX ADMIN — PANTOPRAZOLE SODIUM 40 MG: 40 TABLET, DELAYED RELEASE ORAL at 08:02

## 2025-02-22 RX ADMIN — SERTRALINE HYDROCHLORIDE 50 MG: 50 TABLET ORAL at 08:02

## 2025-02-22 RX ADMIN — ACETAMINOPHEN 650 MG: 325 TABLET ORAL at 04:02

## 2025-02-22 NOTE — PLAN OF CARE
Recommend to initiate EN via NG tube with Isosource 1.5 at 10 ml/hr.   Advance by 10 ml/by every 8 hours to goal of 55 ml/hr.   Flush with 35 ml/hr free water hourly.  Consider bowel regimen as constipation can affect EN tolerance.   Monitor for s/s of intolerance (n/v/c/d) and report to AMIRAH

## 2025-02-22 NOTE — PROGRESS NOTES
Pharmacy assisting in the management of vancomycin for this patient for: pneumonia    Vancomycin level: 16.9  No changes at this time    Next trough 2/24    Pharmacy will continue to monitor daily and make adjustments as needed.    Patricia Vazquez, PharmD  [id number]

## 2025-02-22 NOTE — ASSESSMENT & PLAN NOTE
Possible of aspiration some level of barium   ST evaluation - recommend GI consult for dilation - GI following will perform dilation once respiratory status improves

## 2025-02-22 NOTE — ASSESSMENT & PLAN NOTE
Patient looks better today went to yesterday to the cath lab for possible pericardiocentesis there was not enough fluid to tap.  On her CT looks like there could be an element of barium I am not sure about that I do not know how much she really aspirated during the modified barium swallow it could have been a lot.      2/22- improved, continue to titrate O2 on HFNC

## 2025-02-22 NOTE — PLAN OF CARE
Problem: Adult Inpatient Plan of Care  Goal: Plan of Care Review  Outcome: Progressing  Goal: Patient-Specific Goal (Individualized)  Outcome: Progressing     Problem: Pneumonia  Goal: Fluid Balance  Outcome: Progressing  Goal: Resolution of Infection Signs and Symptoms  Outcome: Progressing  Goal: Effective Oxygenation and Ventilation  Outcome: Progressing     Problem: Wound  Goal: Optimal Coping  Outcome: Progressing  Goal: Optimal Functional Ability  Outcome: Progressing  Goal: Absence of Infection Signs and Symptoms  Outcome: Progressing  Goal: Skin Health and Integrity  Outcome: Progressing  Goal: Optimal Wound Healing  Outcome: Progressing     Problem: Skin Injury Risk Increased  Goal: Skin Health and Integrity  Outcome: Progressing     Problem: Airway Clearance Ineffective  Goal: Effective Airway Clearance  Outcome: Progressing     Problem: Gas Exchange Impaired  Goal: Optimal Gas Exchange  Outcome: Progressing     Problem: Infection  Goal: Absence of Infection Signs and Symptoms  Outcome: Progressing     Problem: Noninvasive Ventilation Acute  Goal: Effective Unassisted Ventilation and Oxygenation  Outcome: Progressing

## 2025-02-22 NOTE — CONSULTS
[facility]  Adult Nutrition  Consult Note         Reason for Assessment  Reason For Assessment: consult, new tube feeding        Assessment and Plan  Consult for tube feedings received and appreciated. Patient is a 59 y.o. female admitted on 2/19 with a dx of acute hypoxemic respiratory failure, aspiration pneumonia and acute cystitis. Patient is currently NPO due to high risk of aspiration and hx of dysphagia and is pending further GI assessment. Patient is 90.8 kg with a BMI of 32.31. Last BM noted 2/14 with dx of chronic idiopathic constipation.     Recommend to initiate EN via NG tube with Isosource 1.5 at 10 ml/hr. Advance by 10 ml/by every 8 hours to goal of 55 ml/hr. Flush with 35 ml/hr free water hourly. Consider bowel regimen as constipation can affect EN tolerance. Monitor for s/s of intolerance (n/v/c/d) and report to RD. RD Following.       Learning Needs/Social Determinants of Health  Learning Assessment       02/15/2025 0254 [facility] (2/14/2025 - Present)   Created by Mesfin Colindres, RN - RN (Nurse) Status: Complete                 PRIMARY LEARNER     Primary Learner Name:  Karie Denney TG - 02/15/2025 0254    Relationship:  Patient TG - 02/15/2025 0254    Does the primary learner have any barriers to learning?:  No Barriers TG - 02/15/2025 0254    What is the preferred language of the primary learner?:  English TG - 02/15/2025 0254    Is an  required?:  No TG - 02/15/2025 0254    How does the primary learner prefer to learn new concepts?:  Listening TG - 02/15/2025 0254    How often do you need to have someone help you read instructions, pamphlets, or written material from your doctor or pharmacy?:  Sometimes TG - 02/15/2025 0254        CO-LEARNER #1     No question answered        CO-LEARNER #2     No question answered        SPECIAL TOPICS     No question answered        ANSWERED BY:     No question answered        Comments         Edit  History       Mesfin Colindres, RN - RN (Nurse)   02/15/2025 0254                           Social Drivers of Health     Tobacco Use: Low Risk  (2/15/2025)    Patient History     Smoking Tobacco Use: Never     Smokeless Tobacco Use: Never     Passive Exposure: Not on file   Alcohol Use: Not At Risk (2/16/2025)    AUDIT-C     Frequency of Alcohol Consumption: Never     Average Number of Drinks: Patient does not drink     Frequency of Binge Drinking: Never   Financial Resource Strain: Low Risk  (2/16/2025)    Overall Financial Resource Strain (CARDIA)     Difficulty of Paying Living Expenses: Not hard at all   Food Insecurity: No Food Insecurity (2/16/2025)    Hunger Vital Sign     Worried About Running Out of Food in the Last Year: Never true     Ran Out of Food in the Last Year: Never true   Transportation Needs: Not At Risk (8/27/2024)    Received from [facility]    BOC Transportation Needs     Financial/Environmental Concerns: Unrecognized value   Physical Activity: Inactive (2/16/2025)    Exercise Vital Sign     Days of Exercise per Week: 0 days     Minutes of Exercise per Session: 0 min   Stress: No Stress Concern Present (2/16/2025)    [employer] - Occupational Stress Questionnaire     Feeling of Stress : Not at all   Housing Stability: Low Risk  (2/16/2025)    Housing Stability Vital Sign     Unable to Pay for Housing in the Last Year: No     Number of Times Moved in the Last Year: Not on file     Homeless in the Last Year: No   Depression: Not on file   Utilities: Not At Risk (2/16/2025)    [facility] Utilities     Threatened with loss of utilities: No   Health Literacy: Inadequate Health Literacy (2/16/2025)     Health Literacy     Frequency of need for help with medical instructions: Always   Social Isolation: Socially Integrated (2/16/2025)    Social Isolation     Social Isolation: 1            Malnutrition  Is Patient Malnourished: No    Nutrition  Diagnosis  Inadequate energy intake related to Decreased ability to consume sufficient energy and Dysphagia/ difficulty swallowing as evidenced by NPO status with NG placed   Comments: Initiate EN as ordered     Recent Labs   Lab 02/19/25  0954   *     Comments on Glucose: noted; no pmh DM    Nutrition Prescription / Recommendations  Recommendation/Intervention: Recommend to initiate EN of Isosource 1.5 at 10 ml/hr and advance by 10 ml/hr every 8 hours to goal of 55 ml/hr. Flush with 35 ml/hr free water flush. Monitor for s/s of intolerance (n/v/d/c) and report to RD. Consider bowel regimen.  Goals: EN tolerance at goal  Nutrition Goal Status: new  Current Diet Order: NPO  Chewing or Swallowing Difficulty?: Swallowing difficulty  Recommended Diet: Enteral Nutrition  Recommended Oral Supplement: No Oral Supplements  Is Nutrition Support Recommended: yes    Needs Calculated  Energy Need Method: Kcal/kg Energy Calorie Requirements (kcal): 6264-1345  Protein Requirements: 73-91  Enteral Nutrition Isosource 1.5 at goal 55 ml/hr with 35 ml/hr water flush  Enteral Nutrition Formula Provides:  1980 kcals Propofol Rate: No  89 g Protein  222 g Carbohydrates  86 g Fat Propofol Rate: No  1026 ml Fluid without Flush    840 ml Fluid by flush   1866 ml Total Fluid  Enteral Nutrition Recommended Order:  Tube feeding via NG/ Price Sump  Tube feeding formula: Isosource 1.5 NG/ Price Sump  Free Water Flush: 35 ml hourly  Modular Supplements:No Modular Supplements needed  Enteral Nutrition meets needs?: yes  Enteral Nutrition Status: New Order  Is Nutrition Education Recommended: No    Monitor and Evaluation  % current Intake: Enteral Nutrition at goal  % intake to meet estimated needs: Enteral Nutrition   Food and Nutrient Intake: energy intake, food and beverage intake  Food and Nutrient Adminstration: diet order  Anthropometric Measurements: height/length, weight, weight change, body mass index  Biochemical Data, Medical  Tests and Procedures: electrolyte and renal panel, gastrointestinal profile, glucose/endocrine profile, inflammatory profile, lipid profile  Enteral Calories (kcal): 1980  Enteral Protein (gm): 89  Enteral (Free Water) Fluid (mL): 1026  Free Water Flush Fluid (mL): 840  Total Fluid Intake (mL): 1866  Total Fluid Intake (mL/kg): 20    Current Medical Diagnosis and Past Medical History  Diagnosis: pulmonary disease  Past Medical History:   Diagnosis Date    GERD (gastroesophageal reflux disease)     Paraplegia        Nutrition/Diet History  Spiritual, Cultural Beliefs, Sabianism Practices, Values that Affect Care: no  Factors Affecting Nutritional Intake: difficulty/impaired swallowing, NPO    Lab/Procedures/Meds  Recent Labs   Lab 02/19/25  0954 02/20/25  0850 02/22/25  0431     --   --    K 3.9  --   --    BUN 10   < > 7*   CREATININE 0.85   < > 0.48*   CALCIUM 8.9  --   --    ALBUMIN 2.3*  --   --      --   --    ALT 21  --   --    AST 33  --   --     < > = values in this interval not displayed.     Last A1c:   Lab Results   Component Value Date    HGBA1C 5.9 08/26/2024     Lab Results   Component Value Date    RBC 3.86 (L) 02/19/2025    HGB 9.1 (L) 02/19/2025    HCT 32.9 (L) 02/19/2025    MCV 85.2 02/19/2025    MCH 23.6 (L) 02/19/2025    MCHC 27.7 (L) 02/19/2025     Pertinent Labs Reviewed: reviewed  Pertinent Medications Reviewed: reviewed  Scheduled Meds:   albuterol-ipratropium  3 mL Nebulization Q6H WAKE    atorvastatin  20 mg Oral Daily    enoxparin  40 mg Subcutaneous Daily    ergocalciferol  50,000 Units Oral Q7 Days    guaiFENesin 100 mg/5 ml  200 mg Oral BID    meropenem IV (PEDS and ADULTS)  1 g Intravenous Q8H    mupirocin   Nasal BID    pantoprazole  40 mg Oral Daily    sertraline  50 mg Oral Daily    traZODone  100 mg Oral QHS    vancomycin (VANCOCIN) IV (PEDS and ADULTS)  1,750 mg Intravenous Q18H     Continuous Infusions:   0.9% NaCl   Intravenous Continuous 50 mL/hr at 02/22/25 0600  "Rate Verify at 02/22/25 0600     PRN Meds:.  Current Facility-Administered Medications:     acetaminophen, 650 mg, Oral, Q6H PRN    albuterol sulfate, 2.5 mg, Nebulization, Q4H PRN    dextrose 50%, 12.5 g, Intravenous, PRN    dextrose 50%, 25 g, Intravenous, PRN    glucagon (human recombinant), 1 mg, Intramuscular, PRN    glucose, 16 g, Oral, PRN    glucose, 24 g, Oral, PRN    morphine, 2 mg, Intravenous, Q4H PRN    naloxone, 0.02 mg, Intravenous, PRN    ondansetron, 4 mg, Intravenous, Q8H PRN    prochlorperazine, 5 mg, Intravenous, Q6H PRN    sodium chloride 0.9%, 10 mL, Intravenous, Q12H PRN    Pharmacy to dose Vancomycin consult, , , Once **AND** vancomycin - pharmacy to dose, , Intravenous, pharmacy to manage frequency    Anthropometrics  Height: 5' 6" (167.6 cm)  Height (inches): 66 in  Weight: 90.8 kg (200 lb 2.8 oz)  Weight (lb): 200.18 lb  Weight Method: Bed Scale  Ideal Body Weight (IBW), Female: 130 lb  % Ideal Body Weight, Female (lb): 146.15 %  BMI (Calculated): 32.3  BMI Grade: 30 - 34.9- obesity - grade I       Estimated/Assessed Needs      Temp: 97 °F (36.1 °C)Axillary  Weight Used For Calorie Calculations: 90.8 kg (200 lb 2.8 oz)   Energy Need Method: Kcal/kg Energy Calorie Requirements (kcal): 8249-5604  Weight Used For Protein Calculations: 90.8 kg (200 lb 2.8 oz)  Protein Requirements: 73-91  Estimated Fluid Requirement Method: RDA Method    RDA Method (mL): 1816       Nutrition by Nursing  Diet/Nutrition Received: mechanical/dental soft           Last Bowel Movement: 02/15/25                Nutrition Follow-Up  RD Follow-up?: Yes      Nutrition Discharge Planning: RD following for dc needs          Available via Secure Chat  "

## 2025-02-22 NOTE — PLAN OF CARE
Problem: Adult Inpatient Plan of Care  Goal: Plan of Care Review  Outcome: Progressing  Goal: Patient-Specific Goal (Individualized)  Outcome: Progressing  Goal: Absence of Hospital-Acquired Illness or Injury  Outcome: Progressing  Intervention: Identify and Manage Fall Risk  Flowsheets (Taken 2/22/2025 0407)  Safety Promotion/Fall Prevention:   assistive device/personal item within reach   medications reviewed   pulse ox   side rails raised x 2  Goal: Optimal Comfort and Wellbeing  Outcome: Progressing  Goal: Readiness for Transition of Care  Outcome: Progressing     Problem: Pneumonia  Goal: Fluid Balance  Outcome: Progressing  Goal: Resolution of Infection Signs and Symptoms  Outcome: Progressing  Goal: Effective Oxygenation and Ventilation  Outcome: Progressing     Problem: Wound  Goal: Optimal Coping  Outcome: Progressing  Intervention: Support Patient and Family Response  Flowsheets (Taken 2/22/2025 0407)  Supportive Measures:   active listening utilized   positive reinforcement provided   relaxation techniques promoted  Goal: Optimal Functional Ability  Outcome: Progressing  Goal: Absence of Infection Signs and Symptoms  Outcome: Progressing  Goal: Improved Oral Intake  Outcome: Progressing  Goal: Optimal Pain Control and Function  Outcome: Progressing  Goal: Skin Health and Integrity  Outcome: Progressing  Goal: Optimal Wound Healing  Outcome: Progressing     Problem: Skin Injury Risk Increased  Goal: Skin Health and Integrity  Outcome: Progressing  Intervention: Optimize Skin Protection  Flowsheets (Taken 2/22/2025 0407)  Pressure Reduction Techniques:   heels elevated off bed   pressure points protected   sit time limited to 2 hours   weight shift assistance provided  Pressure Reduction Devices:   foam padding utilized   heel offloading device utilized   positioning supports utilized   specialty bed utilized  Skin Protection: incontinence pads utilized  Activity Management:   Arm raise - L1   Heel slide  - L1   Rolling - L1  Head of Bed (HOB) Positioning: HOB at 30-45 degrees     Problem: Airway Clearance Ineffective  Goal: Effective Airway Clearance  Outcome: Progressing  Intervention: Promote Airway Secretion Clearance  Flowsheets (Taken 2/22/2025 0407)  Activity Management:   Arm raise - L1   Heel slide - L1   Rolling - L1     Problem: Gas Exchange Impaired  Goal: Optimal Gas Exchange  Outcome: Progressing  Intervention: Optimize Oxygenation and Ventilation  Flowsheets (Taken 2/22/2025 0407)  Airway/Ventilation Management: airway patency maintained  Head of Bed (HOB) Positioning: HOB at 30-45 degrees     Problem: Infection  Goal: Absence of Infection Signs and Symptoms  Outcome: Progressing  Intervention: Prevent or Manage Infection  Flowsheets (Taken 2/22/2025 0407)  Infection Management: aseptic technique maintained  Isolation Precautions: precautions maintained     Problem: Noninvasive Ventilation Acute  Goal: Effective Unassisted Ventilation and Oxygenation  Outcome: Progressing  Intervention: Monitor and Manage Noninvasive Ventilation  Flowsheets (Taken 2/22/2025 0407)  Airway/Ventilation Management: airway patency maintained

## 2025-02-22 NOTE — SUBJECTIVE & OBJECTIVE
"Interval History/Significant Events:  awake, c/o pain in legs     Review of Systems   Unable to perform ROS: Other     Objective:     Vital Signs (Most Recent):  Temp: 97.3 °F (36.3 °C) (02/22/25 1501)  Pulse: 103 (02/22/25 1730)  Resp: 16 (02/22/25 1730)  BP: (!) 141/83 (02/22/25 1730)  SpO2: 98 % (02/22/25 1730) Vital Signs (24h Range):  Temp:  [97 °F (36.1 °C)-99 °F (37.2 °C)] 97.3 °F (36.3 °C)  Pulse:  [] 103  Resp:  [10-22] 16  SpO2:  [88 %-100 %] 98 %  BP: (113-169)/(51-83) 141/83   Weight: 90.8 kg (200 lb 2.8 oz)  Body mass index is 32.31 kg/m².      Intake/Output Summary (Last 24 hours) at 2/22/2025 1747  Last data filed at 2/22/2025 1701  Gross per 24 hour   Intake 1444.58 ml   Output 2770 ml   Net -1325.42 ml          Physical Exam  Vitals reviewed.   HENT:      Right Ear: External ear normal.      Left Ear: External ear normal.      Mouth/Throat:      Mouth: Mucous membranes are dry.   Cardiovascular:      Rate and Rhythm: Normal rate.   Pulmonary:      Effort: Pulmonary effort is normal.      Breath sounds: Normal breath sounds.   Abdominal:      Palpations: Abdomen is soft.   Musculoskeletal:      Comments: Paraplegic    Skin:     General: Skin is warm and dry.   Neurological:      Mental Status: She is alert. Mental status is at baseline.            Vents:  Oxygen Concentration (%): 51 (02/22/25 1427)  Lines/Drains/Airways       Drain  Duration             Female External Urinary Catheter w/ Suction 02/19/25 1600 3 days         NG/OG Tube 02/21/25 1252 Left nostril 1 day              Peripheral Intravenous Line  Duration                  Peripheral IV - Single Lumen 02/19/25 1256 22 G Left Breast 3 days         Peripheral IV - Single Lumen 02/19/25 1600 20 G Anterior;Left Upper Arm 3 days                  Significant Labs:    CBC/Anemia Profile:  No results for input(s): "WBC", "HGB", "HCT", "PLT", "MCV", "RDW", "IRON", "FERRITIN", "RETIC", "FOLATE", "[id number]", "OCCULTBLOOD" in the last 48 " hours.     Chemistries:  Recent Labs   Lab 02/21/25  0421 02/22/25  0431   BUN 6* 7*   CREATININE 0.44* 0.48*       All pertinent labs within the past 24 hours have been reviewed.    Significant Imaging:  I have reviewed all pertinent imaging results/findings within the past 24 hours.

## 2025-02-22 NOTE — PROGRESS NOTES
[facility]  Critical Care Medicine  Progress Note    Patient Name: Karie Denney  MRN: [id number]  Admission Date: 2/14/2025  Hospital Length of Stay: 8 days  Code Status: Full Code  Attending Provider: Luke Santizo MD  Primary Care Provider: Gonzalo Barrera NP   Principal Problem: Acute hypoxemic respiratory failure    Subjective:         Hospital/ICU Course:  No notes on file    Interval History/Significant Events:  awake, c/o pain in legs     Review of Systems   Unable to perform ROS: Other     Objective:     Vital Signs (Most Recent):  Temp: 97.3 °F (36.3 °C) (02/22/25 1501)  Pulse: 103 (02/22/25 1730)  Resp: 16 (02/22/25 1730)  BP: (!) 141/83 (02/22/25 1730)  SpO2: 98 % (02/22/25 1730) Vital Signs (24h Range):  Temp:  [97 °F (36.1 °C)-99 °F (37.2 °C)] 97.3 °F (36.3 °C)  Pulse:  [] 103  Resp:  [10-22] 16  SpO2:  [88 %-100 %] 98 %  BP: (113-169)/(51-83) 141/83   Weight: 90.8 kg (200 lb 2.8 oz)  Body mass index is 32.31 kg/m².      Intake/Output Summary (Last 24 hours) at 2/22/2025 1747  Last data filed at 2/22/2025 1701  Gross per 24 hour   Intake 1444.58 ml   Output 2770 ml   Net -1325.42 ml          Physical Exam  Vitals reviewed.   HENT:      Right Ear: External ear normal.      Left Ear: External ear normal.      Mouth/Throat:      Mouth: Mucous membranes are dry.   Cardiovascular:      Rate and Rhythm: Normal rate.   Pulmonary:      Effort: Pulmonary effort is normal.      Breath sounds: Normal breath sounds.   Abdominal:      Palpations: Abdomen is soft.   Musculoskeletal:      Comments: Paraplegic    Skin:     General: Skin is warm and dry.   Neurological:      Mental Status: She is alert. Mental status is at baseline.            Vents:  Oxygen Concentration (%): 51 (02/22/25 1427)  Lines/Drains/Airways       Drain  Duration             Female External Urinary Catheter w/ Suction 02/19/25 1600 3 days         NG/OG Tube 02/21/25 1252 Left nostril 1 day               "Peripheral Intravenous Line  Duration                  Peripheral IV - Single Lumen 02/19/25 1256 22 G Left Breast 3 days         Peripheral IV - Single Lumen 02/19/25 1600 20 G Anterior;Left Upper Arm 3 days                  Significant Labs:    CBC/Anemia Profile:  No results for input(s): "WBC", "HGB", "HCT", "PLT", "MCV", "RDW", "IRON", "FERRITIN", "RETIC", "FOLATE", "[id number]", "OCCULTBLOOD" in the last 48 hours.     Chemistries:  Recent Labs   Lab 02/21/25  0421 02/22/25  0431   BUN 6* 7*   CREATININE 0.44* 0.48*       All pertinent labs within the past 24 hours have been reviewed.    Significant Imaging:  I have reviewed all pertinent imaging results/findings within the past 24 hours.    ABG  Recent Labs   Lab 02/21/25  0723   PH 7.36   PO2 96   PCO2 67*   HCO3 37.9*     Assessment/Plan:     Pulmonary  * Acute hypoxemic respiratory failure  Patient looks better today went to yesterday to the cath lab for possible pericardiocentesis there was not enough fluid to tap.  On her CT looks like there could be an element of barium I am not sure about that I do not know how much she really aspirated during the modified barium swallow it could have been a lot.      2/22- improved, continue to titrate O2 on HFNC    Aspiration pneumonia  Being treated with antibiotics, looks like she has aspirated significant amounts of barium continue antibiotics and steroids    Cardiac/Vascular  Pericardial effusion  Did not require intervention     Renal/  Acute cystitis with hematuria  Patient has a ESBL in her urine E coli   Treated with bactrim and Merrem     GI  Dysphagia  Possible of aspiration some level of barium   ST evaluation - recommend GI consult for dilation - GI following will perform dilation once respiratory status improves     Chronic idiopathic constipation  Stool softener     Other  Depression  Continue home medication     Critical Care Time: 35 minutes  Critical secondary to Patient has a condition that poses " threat to life and bodily function: respiratory failure      Critical care was time spent personally by me on the following activities: development of treatment plan with patient or surrogate and bedside caregivers, discussions with consultants, evaluation of patient's response to treatment, examination of patient, ordering and performing treatments and interventions, ordering and review of laboratory studies, ordering and review of radiographic studies, pulse oximetry, re-evaluation of patient's condition. This critical care time did not overlap with that of any other provider or involve time for any procedures.     ANGELI Weinstein-ACNP  Critical Care Medicine  [facility]

## 2025-02-23 PROBLEM — G82.20 PARAPLEGIA: Status: ACTIVE | Noted: 2025-02-23

## 2025-02-23 LAB
BUN SERPL-MCNC: 7 MG/DL (ref 10–20)
BUN/CREAT SERPL: 11 (ref 6–20)
CREAT SERPL-MCNC: 0.63 MG/DL (ref 0.55–1.02)
EGFR (NO RACE VARIABLE) (RUSH/TITUS): 101 ML/MIN/1.73M2

## 2025-02-23 PROCEDURE — 63600175 PHARM REV CODE 636 W HCPCS: Performed by: NURSE PRACTITIONER

## 2025-02-23 PROCEDURE — 36415 COLL VENOUS BLD VENIPUNCTURE: CPT | Performed by: INTERNAL MEDICINE

## 2025-02-23 PROCEDURE — 82565 ASSAY OF CREATININE: CPT | Performed by: INTERNAL MEDICINE

## 2025-02-23 PROCEDURE — 99900035 HC TECH TIME PER 15 MIN (STAT)

## 2025-02-23 PROCEDURE — 94640 AIRWAY INHALATION TREATMENT: CPT

## 2025-02-23 PROCEDURE — 27000207 HC ISOLATION

## 2025-02-23 PROCEDURE — 99900022

## 2025-02-23 PROCEDURE — 27000221 HC OXYGEN, UP TO 24 HOURS

## 2025-02-23 PROCEDURE — 25000242 PHARM REV CODE 250 ALT 637 W/ HCPCS

## 2025-02-23 PROCEDURE — 94668 MNPJ CHEST WALL SBSQ: CPT

## 2025-02-23 PROCEDURE — 25000003 PHARM REV CODE 250: Performed by: HOSPITALIST

## 2025-02-23 PROCEDURE — 25000003 PHARM REV CODE 250: Performed by: NURSE PRACTITIONER

## 2025-02-23 PROCEDURE — 63600175 PHARM REV CODE 636 W HCPCS: Performed by: HOSPITALIST

## 2025-02-23 PROCEDURE — 94660 CPAP INITIATION&MGMT: CPT

## 2025-02-23 PROCEDURE — 94761 N-INVAS EAR/PLS OXIMETRY MLT: CPT

## 2025-02-23 PROCEDURE — 99291 CRITICAL CARE FIRST HOUR: CPT | Mod: ,,, | Performed by: NURSE PRACTITIONER

## 2025-02-23 PROCEDURE — 11000001 HC ACUTE MED/SURG PRIVATE ROOM

## 2025-02-23 RX ADMIN — IPRATROPIUM BROMIDE AND ALBUTEROL SULFATE 3 ML: 2.5; .5 SOLUTION RESPIRATORY (INHALATION) at 01:02

## 2025-02-23 RX ADMIN — MEROPENEM 1 G: 1 INJECTION, POWDER, FOR SOLUTION INTRAVENOUS at 12:02

## 2025-02-23 RX ADMIN — MORPHINE SULFATE 2 MG: 2 INJECTION, SOLUTION INTRAMUSCULAR; INTRAVENOUS at 09:02

## 2025-02-23 RX ADMIN — SENNOSIDES AND DOCUSATE SODIUM 1 TABLET: 50; 8.6 TABLET ORAL at 08:02

## 2025-02-23 RX ADMIN — ACETAMINOPHEN 650 MG: 325 TABLET ORAL at 08:02

## 2025-02-23 RX ADMIN — ATORVASTATIN CALCIUM 20 MG: 20 TABLET, FILM COATED ORAL at 08:02

## 2025-02-23 RX ADMIN — GUAIFENESIN 200 MG: 200 SOLUTION ORAL at 08:02

## 2025-02-23 RX ADMIN — IPRATROPIUM BROMIDE AND ALBUTEROL SULFATE 3 ML: 2.5; .5 SOLUTION RESPIRATORY (INHALATION) at 07:02

## 2025-02-23 RX ADMIN — POLYETHYLENE GLYCOL 3350 17 G: 17 POWDER, FOR SOLUTION ORAL at 08:02

## 2025-02-23 RX ADMIN — PREGABALIN 75 MG: 75 CAPSULE ORAL at 08:02

## 2025-02-23 RX ADMIN — MUPIROCIN: 20 OINTMENT TOPICAL at 08:02

## 2025-02-23 RX ADMIN — ENOXAPARIN SODIUM 40 MG: 40 INJECTION SUBCUTANEOUS at 05:02

## 2025-02-23 RX ADMIN — MEROPENEM 1 G: 1 INJECTION, POWDER, FOR SOLUTION INTRAVENOUS at 08:02

## 2025-02-23 RX ADMIN — SERTRALINE HYDROCHLORIDE 50 MG: 50 TABLET ORAL at 08:02

## 2025-02-23 RX ADMIN — TRAZODONE HYDROCHLORIDE 100 MG: 50 TABLET ORAL at 08:02

## 2025-02-23 RX ADMIN — PANTOPRAZOLE SODIUM 40 MG: 40 TABLET, DELAYED RELEASE ORAL at 08:02

## 2025-02-23 RX ADMIN — MEROPENEM 1 G: 1 INJECTION, POWDER, FOR SOLUTION INTRAVENOUS at 05:02

## 2025-02-23 NOTE — PLAN OF CARE
Problem: Adult Inpatient Plan of Care  Goal: Plan of Care Review  Outcome: Progressing  Goal: Patient-Specific Goal (Individualized)  Outcome: Progressing  Goal: Absence of Hospital-Acquired Illness or Injury  Outcome: Progressing  Goal: Optimal Comfort and Wellbeing  Outcome: Progressing  Goal: Readiness for Transition of Care  Outcome: Progressing     Problem: Pneumonia  Goal: Fluid Balance  Outcome: Progressing  Goal: Resolution of Infection Signs and Symptoms  Outcome: Progressing  Goal: Effective Oxygenation and Ventilation  Outcome: Progressing     Problem: Airway Clearance Ineffective  Goal: Effective Airway Clearance  Outcome: Progressing     Problem: Gas Exchange Impaired  Goal: Optimal Gas Exchange  Outcome: Progressing     Problem: Noninvasive Ventilation Acute  Goal: Effective Unassisted Ventilation and Oxygenation  Outcome: Progressing

## 2025-02-23 NOTE — ASSESSMENT & PLAN NOTE
Possible of aspiration some level of barium   ST evaluation - recommend GI consult for dilation - GI following will perform dilation once respiratory status improves   Discussed with mother who agrees to PEG tube should she require one

## 2025-02-23 NOTE — ASSESSMENT & PLAN NOTE
Patient looks better today went to yesterday to the cath lab for possible pericardiocentesis there was not enough fluid to tap.  On her CT looks like there could be an element of barium I am not sure about that I do not know how much she really aspirated during the modified barium swallow it could have been a lot.    2/22- improved, continue to titrate O2 on HFNC  2/23 - HFNC 40L/40% - will continue to titrate and then transition to NC

## 2025-02-23 NOTE — PLAN OF CARE
Problem: Adult Inpatient Plan of Care  Goal: Plan of Care Review  Outcome: Progressing     Problem: Gas Exchange Impaired  Goal: Optimal Gas Exchange  Outcome: Progressing

## 2025-02-23 NOTE — PLAN OF CARE
Problem: Adult Inpatient Plan of Care  Goal: Plan of Care Review  Outcome: Progressing  Flowsheets (Taken 2/23/2025 1717)  Plan of Care Reviewed With:   patient   family  Goal: Optimal Comfort and Wellbeing  Outcome: Progressing  Intervention: Monitor Pain and Promote Comfort  Flowsheets (Taken 2/23/2025 1717)  Pain Management Interventions:   medication offered   pillow support provided  Intervention: Provide Person-Centered Care  Flowsheets (Taken 2/23/2025 1717)  Trust Relationship/Rapport:   care explained   questions answered     Problem: Pneumonia  Goal: Fluid Balance  Outcome: Progressing  Intervention: Monitor and Manage Fluid Balance  Flowsheets (Taken 2/23/2025 1717)  Fluid/Electrolyte Management: fluids provided     Problem: Gas Exchange Impaired  Goal: Optimal Gas Exchange  Outcome: Progressing     Problem: Infection  Goal: Absence of Infection Signs and Symptoms  Outcome: Progressing     Problem: Noninvasive Ventilation Acute  Goal: Effective Unassisted Ventilation and Oxygenation  Outcome: Progressing
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
## 2025-02-23 NOTE — ASSESSMENT & PLAN NOTE
looks like she has aspirated significant amounts of barium   Treated with antibiotics and steroids

## 2025-02-23 NOTE — PLAN OF CARE
Problem: Adult Inpatient Plan of Care  Goal: Absence of Hospital-Acquired Illness or Injury  Outcome: Progressing  Intervention: Prevent Skin Injury  Flowsheets (Taken 2/23/2025 0449)  Body Position:   turned   heels elevated   weight shifting   log-rolled  Skin Protection:   incontinence pads utilized   protective footwear used   pulse oximeter probe site changed   silicone foam dressing in place  Device Skin Pressure Protection:   absorbent pad utilized/changed   adhesive use limited   positioning supports utilized   pressure points protected  Intervention: Prevent and Manage VTE (Venous Thromboembolism) Risk  Flowsheets (Taken 2/23/2025 0449)  VTE Prevention/Management:   remove, assess skin, and reapply sequential compression device   ROM (passive) performed  Intervention: Prevent Infection  Flowsheets (Taken 2/23/2025 0449)  Infection Prevention:   hand hygiene promoted   single patient room provided  Goal: Optimal Comfort and Wellbeing  Outcome: Progressing  Intervention: Monitor Pain and Promote Comfort  Flowsheets (Taken 2/23/2025 0449)  Pain Management Interventions:   care clustered   medication offered   pillow support provided   position adjusted

## 2025-02-23 NOTE — RESPIRATORY THERAPY
PLACED ON CPAP @ 08:05 - 10:05  WITH 5 PEEP , 12 PRESSURE SUPPORT AND 50% FIO2 WITH NO COMPLICATIONS , 1ST TRIAL DONE TODAY                                                  PLACED BACK ON CPAP WITH SAME SETTINGS @ 13:40 - 15:45 WITH NO COMPLICATIONS , 2ND TRIAL DONE TODAY.

## 2025-02-24 PROBLEM — T17.500A MUCUS PLUGGING OF BRONCHI: Status: ACTIVE | Noted: 2025-02-24

## 2025-02-24 PROBLEM — B96.29 UTI DUE TO EXTENDED-SPECTRUM BETA LACTAMASE (ESBL) PRODUCING ESCHERICHIA COLI: Status: ACTIVE | Noted: 2025-02-12

## 2025-02-24 PROBLEM — R40.4 SEDATED DUE TO MEDICATION: Status: ACTIVE | Noted: 2025-02-24

## 2025-02-24 PROBLEM — T50.905A SEDATED DUE TO MEDICATION: Status: ACTIVE | Noted: 2025-02-24

## 2025-02-24 PROBLEM — I30.9 ACUTE PERICARDITIS: Status: ACTIVE | Noted: 2025-02-24

## 2025-02-24 PROBLEM — I30.0 ACUTE IDIOPATHIC PERICARDITIS: Status: ACTIVE | Noted: 2025-02-20

## 2025-02-24 PROBLEM — Z16.12 UTI DUE TO EXTENDED-SPECTRUM BETA LACTAMASE (ESBL) PRODUCING ESCHERICHIA COLI: Status: ACTIVE | Noted: 2025-02-12

## 2025-02-24 PROBLEM — N39.0 UTI DUE TO EXTENDED-SPECTRUM BETA LACTAMASE (ESBL) PRODUCING ESCHERICHIA COLI: Status: ACTIVE | Noted: 2025-02-12

## 2025-02-24 PROBLEM — J96.02 ACUTE RESPIRATORY FAILURE WITH HYPOXIA AND HYPERCARBIA: Status: ACTIVE | Noted: 2025-02-15

## 2025-02-24 LAB
ANION GAP SERPL CALCULATED.3IONS-SCNC: 15 MMOL/L (ref 7–16)
BASOPHILS # BLD AUTO: 0.06 K/UL (ref 0–0.2)
BASOPHILS NFR BLD AUTO: 0.4 % (ref 0–1)
BUN SERPL-MCNC: 8 MG/DL (ref 10–20)
BUN SERPL-MCNC: 8 MG/DL (ref 10–20)
BUN/CREAT SERPL: 15 (ref 6–20)
BUN/CREAT SERPL: 15 (ref 6–20)
CALCIUM SERPL-MCNC: 9.3 MG/DL (ref 8.4–10.2)
CHLORIDE SERPL-SCNC: 90 MMOL/L (ref 98–107)
CO2 SERPL-SCNC: 42 MMOL/L (ref 23–31)
CREAT SERPL-MCNC: 0.52 MG/DL (ref 0.55–1.02)
CREAT SERPL-MCNC: 0.52 MG/DL (ref 0.55–1.02)
DIFFERENTIAL METHOD BLD: ABNORMAL
EGFR (NO RACE VARIABLE) (RUSH/TITUS): 106 ML/MIN/1.73M2
EGFR (NO RACE VARIABLE) (RUSH/TITUS): 106 ML/MIN/1.73M2
EOSINOPHIL # BLD AUTO: 0.07 K/UL (ref 0–0.5)
EOSINOPHIL NFR BLD AUTO: 0.5 % (ref 1–4)
ERYTHROCYTE [DISTWIDTH] IN BLOOD BY AUTOMATED COUNT: 17.2 % (ref 11.5–14.5)
GLUCOSE SERPL-MCNC: 84 MG/DL (ref 70–105)
GLUCOSE SERPL-MCNC: 90 MG/DL (ref 82–115)
HCO3 UR-SCNC: 57.1 MMOL/L (ref 21–28)
HCT VFR BLD AUTO: 32.8 % (ref 38–47)
HGB BLD-MCNC: 9.2 G/DL (ref 12–16)
IMM GRANULOCYTES # BLD AUTO: 0.3 K/UL (ref 0–0.04)
IMM GRANULOCYTES NFR BLD: 2.1 % (ref 0–0.4)
LYMPHOCYTES # BLD AUTO: 1.36 K/UL (ref 1–4.8)
LYMPHOCYTES NFR BLD AUTO: 9.6 % (ref 27–41)
MCH RBC QN AUTO: 23.7 PG (ref 27–31)
MCHC RBC AUTO-ENTMCNC: 28 G/DL (ref 32–36)
MCV RBC AUTO: 84.3 FL (ref 80–96)
MONOCYTES # BLD AUTO: 0.93 K/UL (ref 0–0.8)
MONOCYTES NFR BLD AUTO: 6.6 % (ref 2–6)
MPC BLD CALC-MCNC: 10.5 FL (ref 9.4–12.4)
NEUTROPHILS # BLD AUTO: 11.39 K/UL (ref 1.8–7.7)
NEUTROPHILS NFR BLD AUTO: 80.8 % (ref 53–65)
NRBC # BLD AUTO: 0.02 X10E3/UL
NRBC, AUTO (.00): 0.1 %
PCO2 BLDA: 88 MMHG (ref 35–48)
PH SMN: 7.42 [PH] (ref 7.35–7.45)
PLATELET # BLD AUTO: 239 K/UL (ref 150–400)
PO2 BLDA: 44 MMHG (ref 83–108)
POC BASE EXCESS: 27.1 MMOL/L (ref -2–3)
POC SATURATED O2: 81 % (ref 95–98)
POTASSIUM SERPL-SCNC: 4.3 MMOL/L (ref 3.5–5.1)
RBC # BLD AUTO: 3.89 M/UL (ref 4.2–5.4)
SODIUM SERPL-SCNC: 143 MMOL/L (ref 136–145)
WBC # BLD AUTO: 14.11 K/UL (ref 4.5–11)

## 2025-02-24 PROCEDURE — 0BC68ZZ EXTIRPATION OF MATTER FROM RIGHT LOWER LOBE BRONCHUS, VIA NATURAL OR ARTIFICIAL OPENING ENDOSCOPIC: ICD-10-PCS | Performed by: STUDENT IN AN ORGANIZED HEALTH CARE EDUCATION/TRAINING PROGRAM

## 2025-02-24 PROCEDURE — 99292 CRITICAL CARE ADDL 30 MIN: CPT | Mod: 25,,, | Performed by: STUDENT IN AN ORGANIZED HEALTH CARE EDUCATION/TRAINING PROGRAM

## 2025-02-24 PROCEDURE — 36600 WITHDRAWAL OF ARTERIAL BLOOD: CPT

## 2025-02-24 PROCEDURE — 25000003 PHARM REV CODE 250

## 2025-02-24 PROCEDURE — 31500 INSERT EMERGENCY AIRWAY: CPT | Mod: ,,, | Performed by: STUDENT IN AN ORGANIZED HEALTH CARE EDUCATION/TRAINING PROGRAM

## 2025-02-24 PROCEDURE — 0BC58ZZ EXTIRPATION OF MATTER FROM RIGHT MIDDLE LOBE BRONCHUS, VIA NATURAL OR ARTIFICIAL OPENING ENDOSCOPIC: ICD-10-PCS | Performed by: STUDENT IN AN ORGANIZED HEALTH CARE EDUCATION/TRAINING PROGRAM

## 2025-02-24 PROCEDURE — 85025 COMPLETE CBC W/AUTO DIFF WBC: CPT | Performed by: NURSE PRACTITIONER

## 2025-02-24 PROCEDURE — 82803 BLOOD GASES ANY COMBINATION: CPT

## 2025-02-24 PROCEDURE — 82962 GLUCOSE BLOOD TEST: CPT

## 2025-02-24 PROCEDURE — 94002 VENT MGMT INPAT INIT DAY: CPT

## 2025-02-24 PROCEDURE — 99900035 HC TECH TIME PER 15 MIN (STAT)

## 2025-02-24 PROCEDURE — 31500 INSERT EMERGENCY AIRWAY: CPT

## 2025-02-24 PROCEDURE — 31645 BRNCHSC W/THER ASPIR 1ST: CPT | Mod: 59,,, | Performed by: STUDENT IN AN ORGANIZED HEALTH CARE EDUCATION/TRAINING PROGRAM

## 2025-02-24 PROCEDURE — 84520 ASSAY OF UREA NITROGEN: CPT | Performed by: INTERNAL MEDICINE

## 2025-02-24 PROCEDURE — 0BC38ZZ EXTIRPATION OF MATTER FROM RIGHT MAIN BRONCHUS, VIA NATURAL OR ARTIFICIAL OPENING ENDOSCOPIC: ICD-10-PCS | Performed by: STUDENT IN AN ORGANIZED HEALTH CARE EDUCATION/TRAINING PROGRAM

## 2025-02-24 PROCEDURE — 94640 AIRWAY INHALATION TREATMENT: CPT

## 2025-02-24 PROCEDURE — 27000207 HC ISOLATION

## 2025-02-24 PROCEDURE — 80048 BASIC METABOLIC PNL TOTAL CA: CPT | Performed by: NURSE PRACTITIONER

## 2025-02-24 PROCEDURE — 31622 DX BRONCHOSCOPE/WASH: CPT

## 2025-02-24 PROCEDURE — 25000003 PHARM REV CODE 250: Performed by: NURSE PRACTITIONER

## 2025-02-24 PROCEDURE — 63600175 PHARM REV CODE 636 W HCPCS: Performed by: STUDENT IN AN ORGANIZED HEALTH CARE EDUCATION/TRAINING PROGRAM

## 2025-02-24 PROCEDURE — 27000221 HC OXYGEN, UP TO 24 HOURS

## 2025-02-24 PROCEDURE — 99900026 HC AIRWAY MAINTENANCE (STAT)

## 2025-02-24 PROCEDURE — 94660 CPAP INITIATION&MGMT: CPT

## 2025-02-24 PROCEDURE — 94668 MNPJ CHEST WALL SBSQ: CPT

## 2025-02-24 PROCEDURE — 11000001 HC ACUTE MED/SURG PRIVATE ROOM

## 2025-02-24 PROCEDURE — 63600175 PHARM REV CODE 636 W HCPCS: Performed by: HOSPITALIST

## 2025-02-24 PROCEDURE — 99900025 HC BRONCHOSCOPY-ASST (STAT)

## 2025-02-24 PROCEDURE — 25000003 PHARM REV CODE 250: Performed by: STUDENT IN AN ORGANIZED HEALTH CARE EDUCATION/TRAINING PROGRAM

## 2025-02-24 PROCEDURE — 5A1955Z RESPIRATORY VENTILATION, GREATER THAN 96 CONSECUTIVE HOURS: ICD-10-PCS | Performed by: STUDENT IN AN ORGANIZED HEALTH CARE EDUCATION/TRAINING PROGRAM

## 2025-02-24 PROCEDURE — 25000242 PHARM REV CODE 250 ALT 637 W/ HCPCS: Performed by: STUDENT IN AN ORGANIZED HEALTH CARE EDUCATION/TRAINING PROGRAM

## 2025-02-24 PROCEDURE — 25000242 PHARM REV CODE 250 ALT 637 W/ HCPCS

## 2025-02-24 PROCEDURE — 99291 CRITICAL CARE FIRST HOUR: CPT | Mod: 25,,, | Performed by: STUDENT IN AN ORGANIZED HEALTH CARE EDUCATION/TRAINING PROGRAM

## 2025-02-24 PROCEDURE — 99233 SBSQ HOSP IP/OBS HIGH 50: CPT | Mod: ,,, | Performed by: NURSE PRACTITIONER

## 2025-02-24 PROCEDURE — 25000003 PHARM REV CODE 250: Performed by: HOSPITALIST

## 2025-02-24 PROCEDURE — 51702 INSERT TEMP BLADDER CATH: CPT

## 2025-02-24 PROCEDURE — 0BH17EZ INSERTION OF ENDOTRACHEAL AIRWAY INTO TRACHEA, VIA NATURAL OR ARTIFICIAL OPENING: ICD-10-PCS | Performed by: STUDENT IN AN ORGANIZED HEALTH CARE EDUCATION/TRAINING PROGRAM

## 2025-02-24 PROCEDURE — 36415 COLL VENOUS BLD VENIPUNCTURE: CPT | Performed by: NURSE PRACTITIONER

## 2025-02-24 RX ORDER — ACETYLCYSTEINE 200 MG/ML
4 SOLUTION ORAL; RESPIRATORY (INHALATION) EVERY 6 HOURS
Status: DISCONTINUED | OUTPATIENT
Start: 2025-02-24 | End: 2025-02-24

## 2025-02-24 RX ORDER — PREDNISONE 5 MG/1
5 TABLET ORAL DAILY
Status: DISCONTINUED | OUTPATIENT
Start: 2025-02-25 | End: 2025-02-28 | Stop reason: HOSPADM

## 2025-02-24 RX ORDER — ACETYLCYSTEINE 200 MG/ML
4 SOLUTION ORAL; RESPIRATORY (INHALATION)
Status: DISCONTINUED | OUTPATIENT
Start: 2025-02-24 | End: 2025-02-26

## 2025-02-24 RX ORDER — PANTOPRAZOLE SODIUM 40 MG/10ML
40 INJECTION, POWDER, LYOPHILIZED, FOR SOLUTION INTRAVENOUS DAILY
Status: DISCONTINUED | OUTPATIENT
Start: 2025-02-25 | End: 2025-02-28 | Stop reason: HOSPADM

## 2025-02-24 RX ORDER — CHLORHEXIDINE GLUCONATE ORAL RINSE 1.2 MG/ML
15 SOLUTION DENTAL 2 TIMES DAILY
Status: DISCONTINUED | OUTPATIENT
Start: 2025-02-24 | End: 2025-02-28 | Stop reason: HOSPADM

## 2025-02-24 RX ORDER — DEXMEDETOMIDINE HYDROCHLORIDE 4 UG/ML
0-1.4 INJECTION, SOLUTION INTRAVENOUS CONTINUOUS
Status: DISCONTINUED | OUTPATIENT
Start: 2025-02-24 | End: 2025-02-25

## 2025-02-24 RX ORDER — FENTANYL CITRATE 50 UG/ML
50 INJECTION, SOLUTION INTRAMUSCULAR; INTRAVENOUS
Refills: 0 | Status: DISCONTINUED | OUTPATIENT
Start: 2025-02-24 | End: 2025-02-26

## 2025-02-24 RX ORDER — ROCURONIUM BROMIDE 10 MG/ML
72 INJECTION, SOLUTION INTRAVENOUS ONCE
Status: COMPLETED | OUTPATIENT
Start: 2025-02-24 | End: 2025-02-24

## 2025-02-24 RX ORDER — GLUCAGON 1 MG
1 KIT INJECTION
Status: DISCONTINUED | OUTPATIENT
Start: 2025-02-25 | End: 2025-02-28 | Stop reason: HOSPADM

## 2025-02-24 RX ORDER — INSULIN ASPART 100 [IU]/ML
0-5 INJECTION, SOLUTION INTRAVENOUS; SUBCUTANEOUS EVERY 6 HOURS PRN
Status: DISCONTINUED | OUTPATIENT
Start: 2025-02-25 | End: 2025-02-28 | Stop reason: HOSPADM

## 2025-02-24 RX ORDER — ETOMIDATE 2 MG/ML
INJECTION INTRAVENOUS
Status: COMPLETED
Start: 2025-02-24 | End: 2025-02-24

## 2025-02-24 RX ORDER — ETOMIDATE 2 MG/ML
10 INJECTION INTRAVENOUS ONCE
Status: COMPLETED | OUTPATIENT
Start: 2025-02-24 | End: 2025-02-24

## 2025-02-24 RX ORDER — ACETAZOLAMIDE 500 MG/5ML
500 INJECTION, POWDER, LYOPHILIZED, FOR SOLUTION INTRAVENOUS ONCE
Status: COMPLETED | OUTPATIENT
Start: 2025-02-24 | End: 2025-02-24

## 2025-02-24 RX ORDER — ROCURONIUM BROMIDE 10 MG/ML
20 INJECTION, SOLUTION INTRAVENOUS ONCE
Status: COMPLETED | OUTPATIENT
Start: 2025-02-24 | End: 2025-02-24

## 2025-02-24 RX ORDER — NOREPINEPHRINE BITARTRATE/D5W 4MG/250ML
0-.2 PLASTIC BAG, INJECTION (ML) INTRAVENOUS CONTINUOUS
Status: DISCONTINUED | OUTPATIENT
Start: 2025-02-24 | End: 2025-02-25

## 2025-02-24 RX ORDER — FENTANYL CITRATE 50 UG/ML
50 INJECTION, SOLUTION INTRAMUSCULAR; INTRAVENOUS
Refills: 0 | Status: ACTIVE | OUTPATIENT
Start: 2025-02-24 | End: 2025-02-24

## 2025-02-24 RX ORDER — ETOMIDATE 2 MG/ML
30 INJECTION INTRAVENOUS ONCE
Status: COMPLETED | OUTPATIENT
Start: 2025-02-24 | End: 2025-02-24

## 2025-02-24 RX ORDER — COLCHICINE 0.6 MG/1
0.6 TABLET, FILM COATED ORAL 2 TIMES DAILY
Status: DISCONTINUED | OUTPATIENT
Start: 2025-02-24 | End: 2025-02-25

## 2025-02-24 RX ORDER — SODIUM CHLORIDE 9 MG/ML
INJECTION, SOLUTION INTRAVENOUS
Status: COMPLETED
Start: 2025-02-24 | End: 2025-02-24

## 2025-02-24 RX ADMIN — ACETYLCYSTEINE 4 ML: 200 SOLUTION ORAL; RESPIRATORY (INHALATION) at 08:02

## 2025-02-24 RX ADMIN — GUAIFENESIN 200 MG: 200 SOLUTION ORAL at 09:02

## 2025-02-24 RX ADMIN — POLYETHYLENE GLYCOL 3350 17 G: 17 POWDER, FOR SOLUTION ORAL at 09:02

## 2025-02-24 RX ADMIN — COLCHICINE 0.6 MG: 0.6 TABLET ORAL at 08:02

## 2025-02-24 RX ADMIN — CHLORHEXIDINE GLUCONATE, 0.12% ORAL RINSE 15 ML: 1.2 SOLUTION DENTAL at 08:02

## 2025-02-24 RX ADMIN — PANTOPRAZOLE SODIUM 40 MG: 40 TABLET, DELAYED RELEASE ORAL at 09:02

## 2025-02-24 RX ADMIN — ENOXAPARIN SODIUM 40 MG: 40 INJECTION SUBCUTANEOUS at 05:02

## 2025-02-24 RX ADMIN — SODIUM CHLORIDE 500 ML: 9 INJECTION, SOLUTION INTRAVENOUS at 07:02

## 2025-02-24 RX ADMIN — ETOMIDATE 10 MG: 2 INJECTION INTRAVENOUS at 03:02

## 2025-02-24 RX ADMIN — SODIUM CHLORIDE 500 ML: 9 INJECTION, SOLUTION INTRAVENOUS at 10:02

## 2025-02-24 RX ADMIN — PREGABALIN 75 MG: 75 CAPSULE ORAL at 08:02

## 2025-02-24 RX ADMIN — IPRATROPIUM BROMIDE AND ALBUTEROL SULFATE 3 ML: 2.5; .5 SOLUTION RESPIRATORY (INHALATION) at 07:02

## 2025-02-24 RX ADMIN — SENNOSIDES AND DOCUSATE SODIUM 1 TABLET: 50; 8.6 TABLET ORAL at 09:02

## 2025-02-24 RX ADMIN — ATORVASTATIN CALCIUM 20 MG: 20 TABLET, FILM COATED ORAL at 09:02

## 2025-02-24 RX ADMIN — FENTANYL CITRATE 50 MCG: 50 INJECTION, SOLUTION INTRAMUSCULAR; INTRAVENOUS at 03:02

## 2025-02-24 RX ADMIN — SENNOSIDES AND DOCUSATE SODIUM 1 TABLET: 50; 8.6 TABLET ORAL at 08:02

## 2025-02-24 RX ADMIN — GUAIFENESIN 200 MG: 200 SOLUTION ORAL at 08:02

## 2025-02-24 RX ADMIN — SERTRALINE HYDROCHLORIDE 50 MG: 50 TABLET ORAL at 09:02

## 2025-02-24 RX ADMIN — IPRATROPIUM BROMIDE AND ALBUTEROL SULFATE 3 ML: 2.5; .5 SOLUTION RESPIRATORY (INHALATION) at 01:02

## 2025-02-24 RX ADMIN — DEXMEDETOMIDINE HYDROCHLORIDE 0.2 MCG/KG/HR: 4 INJECTION, SOLUTION INTRAVENOUS at 03:02

## 2025-02-24 RX ADMIN — ETOMIDATE 30 MG: 2 INJECTION INTRAVENOUS at 01:02

## 2025-02-24 RX ADMIN — ROCURONIUM BROMIDE 20 MG: 10 INJECTION, SOLUTION INTRAVENOUS at 01:02

## 2025-02-24 RX ADMIN — NOREPINEPHRINE BITARTRATE 0.02 MCG/KG/MIN: 4 INJECTION, SOLUTION INTRAVENOUS at 01:02

## 2025-02-24 RX ADMIN — MUPIROCIN: 20 OINTMENT TOPICAL at 09:02

## 2025-02-24 RX ADMIN — ROCURONIUM BROMIDE 72 MG: 10 INJECTION, SOLUTION INTRAVENOUS at 01:02

## 2025-02-24 RX ADMIN — ACETAZOLAMIDE 500 MG: 500 INJECTION, POWDER, LYOPHILIZED, FOR SOLUTION INTRAVENOUS at 07:02

## 2025-02-24 RX ADMIN — NOREPINEPHRINE BITARTRATE 0.08 MCG/KG/MIN: 4 INJECTION, SOLUTION INTRAVENOUS at 11:02

## 2025-02-24 RX ADMIN — IPRATROPIUM BROMIDE AND ALBUTEROL SULFATE 3 ML: 2.5; .5 SOLUTION RESPIRATORY (INHALATION) at 08:02

## 2025-02-24 RX ADMIN — DEXMEDETOMIDINE HYDROCHLORIDE 0.7 MCG/KG/HR: 4 INJECTION, SOLUTION INTRAVENOUS at 08:02

## 2025-02-24 NOTE — CARE UPDATE
Done by RT student Ozzy Swain       02/24/25 3464   Patient Assessment/Suction   Level of Consciousness (AVPU) alert   Respiratory Effort Normal   Expansion/Accessory Muscles/Retractions no use of accessory muscles   Skin Integrity   $ Wound Care Tech Time 15 min   Area Observed Left;Right;Cheek   Skin Appearance without discoloration   Barrier used? Liquid Filled Cushion   PRE-TX-O2   Device (Oxygen Therapy) high flow nasal cannula   Humidification temp set 31   Humidification temp actual 31   Flow (L/min) (Oxygen Therapy) 40   Oxygen Concentration (%) 80   Pulse Oximetry Type Continuous   Ready to Wean/Extubation Screen   FIO2<=50 (chart decimal) (!) 0.8

## 2025-02-24 NOTE — SUBJECTIVE & OBJECTIVE
Interval History: Patient seen today, CRP >160, sedrate 76. She reports some chest discomfort today and RN states she has some c/o back pain (thought to be chronic). Per Dr. Knowles, given pericardial effusion, elevated inflammatory markers and chest discomfort, we will start colchicine 0.6mg BID for pericarditis.     Review of Systems   Cardiovascular:  Positive for chest pain.   Musculoskeletal:  Positive for back pain.     Objective:     Vital Signs (Most Recent):  Temp: 99.8 °F (37.7 °C) (02/24/25 1110)  Pulse: (!) 119 (02/24/25 1130)  Resp: 17 (02/24/25 1130)  BP: (!) 80/34 (02/24/25 1130)  SpO2: 95 % (02/24/25 1130) Vital Signs (24h Range):  Temp:  [97.6 °F (36.4 °C)-99.8 °F (37.7 °C)] 99.8 °F (37.7 °C)  Pulse:  [] 119  Resp:  [10-19] 17  SpO2:  [89 %-98 %] 95 %  BP: ()/(28-97) 80/34     Weight: 92 kg (202 lb 13.2 oz)  Body mass index is 32.74 kg/m².     SpO2: 95 %         Intake/Output Summary (Last 24 hours) at 2/24/2025 1227  Last data filed at 2/24/2025 0701  Gross per 24 hour   Intake 715 ml   Output 2100 ml   Net -1385 ml       Lines/Drains/Airways       Drain  Duration             Female External Urinary Catheter w/ Suction 02/19/25 1600 4 days         NG/OG Tube 02/21/25 1252 Left nostril 2 days              Peripheral Intravenous Line  Duration                  Peripheral IV - Single Lumen 02/19/25 1256 22 G Left Breast 4 days         Peripheral IV - Single Lumen 02/19/25 1600 20 G Anterior;Left Upper Arm 4 days                       Physical Exam  Vitals reviewed.   Constitutional:       Appearance: She is ill-appearing.   HENT:      Mouth/Throat:      Mouth: Mucous membranes are dry.   Cardiovascular:      Rate and Rhythm: Regular rhythm. Tachycardia present.      Heart sounds: Normal heart sounds. Heart sounds not distant.      No friction rub.   Pulmonary:      Breath sounds: Decreased air movement present. Rales present.   Musculoskeletal:      Right lower leg: No edema.      Left lower  "leg: No edema.   Skin:     General: Skin is warm and dry.   Neurological:      Mental Status: She is alert.            Significant Labs: ABG:   Recent Labs   Lab 02/24/25  0605   PH 7.42   PCO2 88*   HCO3 57.1*   POCSATURATED 81*   , Blood Culture: No results for input(s): "LABBLOO" in the last 48 hours., BMP:   Recent Labs   Lab 02/23/25  0426 02/24/25  0406   GLU  --  90   NA  --  143   K  --  4.3   CL  --  90*   CO2  --  42*   BUN 7* 8*  8*   CREATININE 0.63 0.52*  0.52*   CALCIUM  --  9.3   , CMP   Recent Labs   Lab 02/23/25  0426 02/24/25  0406   NA  --  143   K  --  4.3   CL  --  90*   CO2  --  42*   GLU  --  90   BUN 7* 8*  8*   CREATININE 0.63 0.52*  0.52*   CALCIUM  --  9.3   ANIONGAP  --  15   , CBC   Recent Labs   Lab 02/24/25  0406   WBC 14.11*   HGB 9.2*   HCT 32.8*      , INR No results for input(s): "INR", "PROTIME" in the last 48 hours., Lipid Panel No results for input(s): "CHOL", "HDL", "LDLCALC", "TRIG", "CHOLHDL" in the last 48 hours., and Troponin No results for input(s): "TROPONINIHS" in the last 48 hours.    Significant Imaging: Cardiac Cath: Results for orders placed during the hospital encounter of 02/14/25    Cardiac catheterization    Narrative  Aborted invasive cardiology procedure- see Procedure Log link for details.     , Echocardiogram: Transthoracic echo (TTE) complete ([facility] Only):   Results for orders placed or performed during the hospital encounter of 02/14/25   Echo   Result Value Ref Range    BSA 2 m2    IVC diameter 1.20 cm    Est. RA pres 3 mmHg    Narrative      IVC/SVC: Normal venous pressure at 3 mmHg.    Pericardium: There is a moderate circumferential effusion.    Interval decrease in the amount of pericardial fluid; pericardium is   now roughly 10mm from epicardial border; was 18-20mm prior.    The effusion is now notably less symmetrical and appears to be   predominantly retrocardiac.    This was a limited echo performed as part of pericardiocentesis that "   was ultimately aborted given above findings and relative interval   improvement in hemodynamics.     , EKG:   Results for orders placed or performed during the hospital encounter of 02/14/25   EKG 12-lead    Collection Time: 02/19/25  4:14 PM   Result Value Ref Range    QRS Duration 68 ms    [facility] QTC Calculation 467 ms    Narrative    Test Reason : I47.10,    Vent. Rate : 130 BPM     Atrial Rate : 130 BPM     P-R Int : 128 ms          QRS Dur :  68 ms      QT Int : 318 ms       P-R-T Axes :  37  45  50 degrees    QTcB Int : 467 ms    Sinus tachycardia with occasional Premature ventricular complexes and  Fusion complexes  Nonspecific ST and T wave abnormality  Abnormal ECG  No previous ECGs available  Confirmed by Luke Santizo ([id number]) on 2/20/2025 8:41:58 AM    Referred By: KATHERINE MURRAY           Confirmed By: Luke Santizo    , and X-Ray: CXR: X-Ray Chest 1 View (CXR):   Results for orders placed or performed during the hospital encounter of 02/14/25   X-Ray Chest 1 View    Narrative    EXAMINATION:  XR CHEST 1 VIEW    CLINICAL HISTORY:  hypoxia;    TECHNIQUE:  Single frontal view of the chest was performed.    COMPARISON:  02/19/2025    FINDINGS:  A nasogastric tube extends to the body of the stomach and out of field of view.  The heart size is normal.  Lung volumes are moderately low.  Small bilateral pleural effusions are present.  Mild left perihilar airspace disease is present as well as more dense consolidative change or atelectasis at the lung bases.  Overall aeration of the lungs is similar to prior.      Impression    Bilateral pleural effusions and pulmonary airspace disease with little interval change.      Electronically signed by: Gabino Snell MD  Date:    02/24/2025  Time:    12:07

## 2025-02-24 NOTE — CARE UPDATE
Done by RT student Ozzy Swain       02/24/25 3398   Patient Assessment/Suction   Level of Consciousness (AVPU) alert   Respiratory Effort Normal   Expansion/Accessory Muscles/Retractions no use of accessory muscles   All Lung Fields Breath Sounds Anterior:;diminished   Cough Frequency infrequent   Cough Type nonproductive   Skin Integrity   $ Wound Care Tech Time 15 min   Area Observed Left;Right;Cheek   Skin Appearance without discoloration   Barrier used? Liquid Filled Cushion   PRE-TX-O2   Device (Oxygen Therapy) high flow nasal cannula   $ Is the patient on Low Flow Oxygen? Yes   Humidification temp set 31   Humidification temp actual 31   Flow (L/min) (Oxygen Therapy) 40   Oxygen Concentration (%) 62   SpO2 97 %   Pulse Oximetry Type Continuous   Pulse 110   Resp 16   Aerosol Therapy   $ Aerosol Therapy Charges Aerosol Treatment   Daily Review of Necessity (SVN) completed   Respiratory Treatment Status (SVN) given   Treatment Route (SVN) in-line   Patient Position HOB elevated   Post Treatment Assessment (SVN) breath sounds unchanged   Signs of Intolerance (SVN) none   Breath Sounds Post-Respiratory Treatment   Throughout All Fields Post-Treatment All Fields   Throughout All Fields Post-Treatment no change   Post-treatment Heart Rate (beats/min) 116   Post-treatment Resp Rate (breaths/min) 15   Ready to Wean/Extubation Screen   FIO2<=50 (chart decimal) (!) 0.62

## 2025-02-24 NOTE — PROCEDURES
"Karie Denney is a 59 y.o. female patient.    Temp: 99.8 °F (37.7 °C) (25 1110)  Pulse: (!) 124 (25 1300)  Resp: (!) 25 (25 1300)  BP: (!) 80/34 (25 1130)  SpO2: (!) 90 % (25 1300)  Weight: 92 kg (202 lb 13.2 oz) (25 0230)  Height: 5' 6" (167.6 cm) (02/15/25 0758)     PRE INTUBATION ASSESSMENT:  Difficulty airway with decreased cervical mobility at baseline and low SpO2 pre-intubation despite AVAPS. Oxygentation with HFNC and AVAPs pre. BVM with 20 PEEP for intubation process. Supine positioning and sniff positioning.       Intubation    Date/Time: 2025 2:01 PM  Location procedure was performed: [facility]    Performed by: Mariah Cunha MD  Authorized by: Mariah Cunha MD  Pre-operative diagnosis: ACUTE RESPIRATORY FAILURE WITH HYPOXIA AND HYPERCAPNIA  Post-operative diagnosis: ACUTE RESPIRATORY FAILURE WITH HYPOXIA AND HYPERCAPNIA  Consent Done: Yes  Consent: Verbal consent obtained  Risks and benefits: risks, benefits and alternatives were discussed  Consent given by: mother  Required items: required blood products, implants, devices, and special equipment available  Patient identity confirmed:  and MRN  Indications: respiratory distress, respiratory failure and hypoxemia  Patient status: paralyzed (RSI)  Preoxygenation: nasal cannula and BVM (HFNC, BVM WITH PEEP VALVE)  Sedatives: etomidate (40)  Paralytic: rocuronium (92)  Laryngoscope size: Glide 4  Tube size: 8.0 mm  Tube type: cuffed  Number of attempts: 2  Ventilation between attempts: BVM  Cricoid pressure: no  Cords visualized: yes  Post-procedure assessment: ETCO2 monitor  Breath sounds: clear  Cuff inflated: yes  ETT to lip: 18 cm  Tube secured with: ETT rosado  Chest x-ray interpreted by me.  Chest x-ray findings: endotracheal tube too high  Tube repositioned: tube repositioned successfully  Patient tolerance: Patient tolerated the procedure well with no immediate " complications          2/24/2025 June MD Guerline  Pulmonary and Critical Care  [facility]

## 2025-02-24 NOTE — PROGRESS NOTES
[facility]  Cardiology  Progress Note    Patient Name: Karie Denney  MRN: [id number]  Admission Date: 2/14/2025  Hospital Length of Stay: 10 days  Code Status: Full Code   Attending Physician: Luke Santizo MD   Primary Care Physician: Gonzalo Barrera NP  Expected Discharge Date:   Principal Problem:Acute hypoxemic respiratory failure    Subjective:     Hospital Course:   No notes on file    Interval History: Patient seen today, CRP >160, sedrate 76. She reports some chest discomfort today and RN states she has some c/o back pain (thought to be chronic). Per Dr. Knowles, given pericardial effusion, elevated inflammatory markers and chest discomfort, we will start colchicine 0.6mg BID for pericarditis.     Review of Systems   Cardiovascular:  Positive for chest pain.   Musculoskeletal:  Positive for back pain.     Objective:     Vital Signs (Most Recent):  Temp: 99.8 °F (37.7 °C) (02/24/25 1110)  Pulse: (!) 119 (02/24/25 1130)  Resp: 17 (02/24/25 1130)  BP: (!) 80/34 (02/24/25 1130)  SpO2: 95 % (02/24/25 1130) Vital Signs (24h Range):  Temp:  [97.6 °F (36.4 °C)-99.8 °F (37.7 °C)] 99.8 °F (37.7 °C)  Pulse:  [] 119  Resp:  [10-19] 17  SpO2:  [89 %-98 %] 95 %  BP: ()/(28-97) 80/34     Weight: 92 kg (202 lb 13.2 oz)  Body mass index is 32.74 kg/m².     SpO2: 95 %         Intake/Output Summary (Last 24 hours) at 2/24/2025 1227  Last data filed at 2/24/2025 0701  Gross per 24 hour   Intake 715 ml   Output 2100 ml   Net -1385 ml       Lines/Drains/Airways       Drain  Duration             Female External Urinary Catheter w/ Suction 02/19/25 1600 4 days         NG/OG Tube 02/21/25 1252 Left nostril 2 days              Peripheral Intravenous Line  Duration                  Peripheral IV - Single Lumen 02/19/25 1256 22 G Left Breast 4 days         Peripheral IV - Single Lumen 02/19/25 1600 20 G Anterior;Left Upper Arm 4 days                       Physical Exam  Vitals reviewed.  "  Constitutional:       Appearance: She is ill-appearing.   HENT:      Mouth/Throat:      Mouth: Mucous membranes are dry.   Cardiovascular:      Rate and Rhythm: Regular rhythm. Tachycardia present.      Heart sounds: Normal heart sounds. Heart sounds not distant.      No friction rub.   Pulmonary:      Breath sounds: Decreased air movement present. Rales present.   Musculoskeletal:      Right lower leg: No edema.      Left lower leg: No edema.   Skin:     General: Skin is warm and dry.   Neurological:      Mental Status: She is alert.            Significant Labs: ABG:   Recent Labs   Lab 02/24/25  0605   PH 7.42   PCO2 88*   HCO3 57.1*   POCSATURATED 81*   , Blood Culture: No results for input(s): "LABBLOO" in the last 48 hours., BMP:   Recent Labs   Lab 02/23/25  0426 02/24/25  0406   GLU  --  90   NA  --  143   K  --  4.3   CL  --  90*   CO2  --  42*   BUN 7* 8*  8*   CREATININE 0.63 0.52*  0.52*   CALCIUM  --  9.3   , CMP   Recent Labs   Lab 02/23/25  0426 02/24/25  0406   NA  --  143   K  --  4.3   CL  --  90*   CO2  --  42*   GLU  --  90   BUN 7* 8*  8*   CREATININE 0.63 0.52*  0.52*   CALCIUM  --  9.3   ANIONGAP  --  15   , CBC   Recent Labs   Lab 02/24/25  0406   WBC 14.11*   HGB 9.2*   HCT 32.8*      , INR No results for input(s): "INR", "PROTIME" in the last 48 hours., Lipid Panel No results for input(s): "CHOL", "HDL", "LDLCALC", "TRIG", "CHOLHDL" in the last 48 hours., and Troponin No results for input(s): "TROPONINIHS" in the last 48 hours.    Significant Imaging: Cardiac Cath: Results for orders placed during the hospital encounter of 02/14/25    Cardiac catheterization    Narrative  Aborted invasive cardiology procedure- see Procedure Log link for details.     , Echocardiogram: Transthoracic echo (TTE) complete (Cupid Only):   Results for orders placed or performed during the hospital encounter of 02/14/25   Echo   Result Value Ref Range    BSA 2 m2    IVC diameter 1.20 cm    Est. RA pres " 3 mmHg    Narrative      IVC/SVC: Normal venous pressure at 3 mmHg.    Pericardium: There is a moderate circumferential effusion.    Interval decrease in the amount of pericardial fluid; pericardium is   now roughly 10mm from epicardial border; was 18-20mm prior.    The effusion is now notably less symmetrical and appears to be   predominantly retrocardiac.    This was a limited echo performed as part of pericardiocentesis that   was ultimately aborted given above findings and relative interval   improvement in hemodynamics.     , EKG:   Results for orders placed or performed during the hospital encounter of 02/14/25   EKG 12-lead    Collection Time: 02/19/25  4:14 PM   Result Value Ref Range    QRS Duration 68 ms    [facility] QTC Calculation 467 ms    Narrative    Test Reason : I47.10,    Vent. Rate : 130 BPM     Atrial Rate : 130 BPM     P-R Int : 128 ms          QRS Dur :  68 ms      QT Int : 318 ms       P-R-T Axes :  37  45  50 degrees    QTcB Int : 467 ms    Sinus tachycardia with occasional Premature ventricular complexes and  Fusion complexes  Nonspecific ST and T wave abnormality  Abnormal ECG  No previous ECGs available  Confirmed by Luke Santizo ([id number]) on 2/20/2025 8:41:58 AM    Referred By: KATHERINE MURRAY           Confirmed By: Luke Santizo    , and X-Ray: CXR: X-Ray Chest 1 View (CXR):   Results for orders placed or performed during the hospital encounter of 02/14/25   X-Ray Chest 1 View    Narrative    EXAMINATION:  XR CHEST 1 VIEW    CLINICAL HISTORY:  hypoxia;    TECHNIQUE:  Single frontal view of the chest was performed.    COMPARISON:  02/19/2025    FINDINGS:  A nasogastric tube extends to the body of the stomach and out of field of view.  The heart size is normal.  Lung volumes are moderately low.  Small bilateral pleural effusions are present.  Mild left perihilar airspace disease is present as well as more dense consolidative change or atelectasis at the lung bases.  Overall aeration of the  lungs is similar to prior.      Impression    Bilateral pleural effusions and pulmonary airspace disease with little interval change.      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 The patient's daughter was at bedside and provided most of the history. The patient began experiencing flu-like symptoms a few weeks ago, including a productive cough, fever, and chills. She also experienced mild shortness of breath at home. Her doctor started her on oral antibiotics for pneumonia, but the symptoms did not resolve completely. Last Wednesday, the patient became very weak and presented to the ER, where she was diagnosed with pneumonia and admitted for treatment with IV antibiotics. she was transferred to [facility] for treatment of aspiration pneumonia, swallow evaluation test.      Cardiology consulted for pericardial effusion. Patient apparently decompensated yesterday and is now in ICU on BiPAP, no longer requiring pressor support.       * Acute hypoxemic respiratory failure  - being followed by critical care team    Pericardial effusion  - patient seen and evaluated by Dr. Briseno  - plan for pericardiocentesis today  - further recommendations to follow    2/21/25  Cardiocentesis aborted due to not enough fluid  Will rule out Myxedema, autoimmune disorder. systemic lupus erythematosus.  Labs ordered    2/24/2025:  - CRP >160, sedrate 76. She reports some chest discomfort today and RN states she has some c/o back pain (thought to be chronic). Per Dr. Knowles, given pericardial effusion, elevated inflammatory markers and chest discomfort, we will start colchicine 0.6mg BID for acute  pericarditis.   - she will need to take colchicine 0.6mg BID x 3 months  - cardiology will sign off at this time, please call if any further assistance is needed  - follow up with cardiology in 2 weeks; will need repeat echo in 1-2 weeks      Aspiration pneumonia  - being followed by critical care team        VTE Risk Mitigation (From admission, onward)           Ordered     enoxaparin injection 40 mg  Daily         02/17/25 1204     IP VTE HIGH RISK PATIENT  Once         02/17/25 1204     Place sequential compression device  Until discontinued         02/15/25 0008                    MARYA Potter  Cardiology  [facility]

## 2025-02-24 NOTE — ASSESSMENT & PLAN NOTE
- patient seen and evaluated by Dr. Briseno  - plan for pericardiocentesis today  - further recommendations to follow    2/21/25  Cardiocentesis aborted due to not enough fluid  Will rule out Myxedema, autoimmune disorder. systemic lupus erythematosus.  Labs ordered    2/24/2025:  - CRP >160, sedrate 76. She reports some chest discomfort today and RN states she has some c/o back pain (thought to be chronic). Per Dr. Knowles, given pericardial effusion, elevated inflammatory markers and chest discomfort, we will start colchicine 0.6mg BID for acute pericarditis.   - she will need to take colchicine 0.6mg BID x 3 months  - cardiology will sign off at this time, please call if any further assistance is needed  - follow up with cardiology in 2 weeks; will need repeat echo in 1-2 weeks

## 2025-02-24 NOTE — PLAN OF CARE
02/24/25 1216   Rounds   Attendance Provider;;Pharmacist;Nurse   Discharge Plan A Home   Why the patient remains in the hospital Requires continued medical care   Transition of Care Barriers None     Patient's case reviewed in rounds this am.  Plan is to return home upon discharge.  LCSW to continue to follow.  No current CM needs.

## 2025-02-24 NOTE — PLAN OF CARE
Problem: Adult Inpatient Plan of Care  Goal: Plan of Care Review  Outcome: Progressing  Goal: Optimal Comfort and Wellbeing  Outcome: Progressing     Problem: Pneumonia  Goal: Fluid Balance  Outcome: Progressing  Goal: Resolution of Infection Signs and Symptoms  Outcome: Progressing  Goal: Effective Oxygenation and Ventilation  Outcome: Progressing     Problem: Wound  Goal: Optimal Coping  Outcome: Progressing  Goal: Improved Oral Intake  Outcome: Progressing  Goal: Optimal Pain Control and Function  Outcome: Progressing  Goal: Skin Health and Integrity  Outcome: Progressing  Goal: Optimal Wound Healing  Outcome: Progressing     Problem: Skin Injury Risk Increased  Goal: Skin Health and Integrity  Outcome: Progressing     Problem: Airway Clearance Ineffective  Goal: Effective Airway Clearance  Outcome: Progressing     Problem: Gas Exchange Impaired  Goal: Optimal Gas Exchange  Outcome: Progressing     Problem: Infection  Goal: Absence of Infection Signs and Symptoms  Outcome: Progressing

## 2025-02-24 NOTE — PLAN OF CARE
Problem: Adult Inpatient Plan of Care  Goal: Absence of Hospital-Acquired Illness or Injury  Outcome: Progressing  Intervention: Prevent Skin Injury  Flowsheets (Taken 2/24/2025 0120)  Body Position:   turned   heels elevated   log-rolled   weight shifting  Skin Protection:   incontinence pads utilized   protective footwear used   silicone foam dressing in place  Device Skin Pressure Protection:   absorbent pad utilized/changed   adhesive use limited   positioning supports utilized   pressure points protected  Intervention: Prevent and Manage VTE (Venous Thromboembolism) Risk  Flowsheets (Taken 2/24/2025 0120)  VTE Prevention/Management:   remove, assess skin, and reapply sequential compression device   ROM (passive) performed  Intervention: Prevent Infection  Flowsheets (Taken 2/24/2025 0120)  Infection Prevention:   hand hygiene promoted   single patient room provided  Goal: Optimal Comfort and Wellbeing  Outcome: Progressing  Intervention: Monitor Pain and Promote Comfort  Flowsheets (Taken 2/24/2025 0120)  Pain Management Interventions:   care clustered   medication offered   pillow support provided   position adjusted   quiet environment facilitated

## 2025-02-24 NOTE — NURSING
1330 provider determined pts need for intubation.   1336- 30 of Etomidate  given  1337- 72 Booker given  Pt was blinking additional meds given   1340- 10 of Etomidate given   1340- 20 of Booker given  1342- Pt intubated with a Size 8 ET tube 18 at the lip. X ray performed, ET tube advanced to 23 at the lip

## 2025-02-24 NOTE — CARE UPDATE
Done by RT student Ozzy Swain       02/24/25 0734   Patient Assessment/Suction   Level of Consciousness (AVPU) alert   Respiratory Effort Normal   Expansion/Accessory Muscles/Retractions no use of accessory muscles   All Lung Fields Breath Sounds Anterior:;diminished   Cough Frequency infrequent   Cough Type nonproductive   Skin Integrity   $ Wound Care Tech Time 15 min   Area Observed Left;Right;Cheek   Skin Appearance without discoloration   Barrier used? Liquid Filled Cushion   PRE-TX-O2   Device (Oxygen Therapy) high flow nasal cannula   $ Is the patient on Low Flow Oxygen? Yes   Humidification temp set 31   Humidification temp actual 31   Flow (L/min) (Oxygen Therapy) 40   Oxygen Concentration (%) 62   SpO2 97 %   Pulse Oximetry Type Continuous   Pulse 110   Resp 16   Aerosol Therapy   $ Aerosol Therapy Charges Aerosol Treatment   Daily Review of Necessity (SVN) completed   Respiratory Treatment Status (SVN) given   Treatment Route (SVN) in-line   Patient Position HOB elevated   Post Treatment Assessment (SVN) breath sounds unchanged   Signs of Intolerance (SVN) none   Breath Sounds Post-Respiratory Treatment   Throughout All Fields Post-Treatment All Fields   Throughout All Fields Post-Treatment no change   Post-treatment Heart Rate (beats/min) 116   Post-treatment Resp Rate (breaths/min) 15   Chest Physiotherapy   $ Chest Physiotherapy Charges Subsequent   Daily Review of Necessity (CPT) completed   Type (CPT) percussion   Method (CPT) by hand   Patient Position (CPT) HOB elevated   Lung Fields (CPT) Anterior:;HARRISON (left upper lobe);RUL (right upper lobe)   Duration per Field (CPT) 5 mins   Duration Left Side (CPT) 5 mins   Duration Right Side (CPT) 5 mins   CPT Total Time (Min) 10   Post Treatment Assessment (CPT) breath sounds unchanged   Signs of Intolerance (CPT) none   Ready to Wean/Extubation Screen   FIO2<=50 (chart decimal) (!) 0.62

## 2025-02-25 PROBLEM — N30.01 ACUTE CYSTITIS WITH HEMATURIA: Status: ACTIVE | Noted: 2025-02-25

## 2025-02-25 PROBLEM — R57.1 HYPOVOLEMIC SHOCK: Status: ACTIVE | Noted: 2025-02-25

## 2025-02-25 LAB
ALBUMIN SERPL BCP-MCNC: 1.8 G/DL (ref 3.4–4.8)
ALBUMIN/GLOB SERPL: 0.6 {RATIO}
ALP SERPL-CCNC: 60 U/L (ref 40–150)
ALT SERPL W P-5'-P-CCNC: 18 U/L
ANA SER QL: NEGATIVE
ANION GAP SERPL CALCULATED.3IONS-SCNC: 11 MMOL/L (ref 7–16)
AST SERPL W P-5'-P-CCNC: 36 U/L (ref 5–34)
BILIRUB SERPL-MCNC: 0.4 MG/DL
BUN SERPL-MCNC: 11 MG/DL (ref 10–20)
BUN SERPL-MCNC: 16 MG/DL (ref 10–20)
BUN/CREAT SERPL: 22 (ref 6–20)
BUN/CREAT SERPL: 26 (ref 6–20)
CALCIUM SERPL-MCNC: 7.9 MG/DL (ref 8.4–10.2)
CHLORIDE SERPL-SCNC: 98 MMOL/L (ref 98–107)
CK SERPL-CCNC: 239 U/L (ref 29–168)
CO2 SERPL-SCNC: 31 MMOL/L (ref 23–31)
CREAT SERPL-MCNC: 0.51 MG/DL (ref 0.55–1.02)
CREAT SERPL-MCNC: 0.61 MG/DL (ref 0.55–1.02)
EGFR (NO RACE VARIABLE) (RUSH/TITUS): 102 ML/MIN/1.73M2
EGFR (NO RACE VARIABLE) (RUSH/TITUS): 106 ML/MIN/1.73M2
GLOBULIN SER-MCNC: 3.1 G/DL (ref 2–4)
GLUCOSE SERPL-MCNC: 122 MG/DL (ref 70–105)
GLUCOSE SERPL-MCNC: 134 MG/DL (ref 82–115)
GLUCOSE SERPL-MCNC: 135 MG/DL (ref 70–105)
GLUCOSE SERPL-MCNC: 97 MG/DL (ref 70–105)
HCO3 UR-SCNC: 36.7 MMOL/L (ref 18–23)
HCO3 UR-SCNC: 45.5 MMOL/L (ref 21–28)
LACTATE SERPL-SCNC: 2.7 MMOL/L (ref 0.5–2.2)
LACTATE SERPL-SCNC: 4 MMOL/L (ref 0.5–2.2)
LACTATE SERPL-SCNC: 4.9 MMOL/L (ref 0.5–2.2)
OHS QRS DURATION: 74 MS
OHS QTC CALCULATION: 444 MS
PCO2 BLDA: 42 MMHG
PCO2 BLDA: 57 MMHG (ref 35–48)
PH SMN: 7.51 [PH] (ref 7.35–7.45)
PH SMN: 7.55 [PH] (ref 7.35–7.45)
PO2 BLDA: 58 MMHG (ref 83–108)
PO2 BLDA: 69 MMHG (ref 83–108)
POC A-ADO2: 92 MMHG
POC BASE EXCESS: 12.9 MMOL/L (ref -2–3)
POC BASE EXCESS: 19.4 MMOL/L (ref -2–3)
POC SATURATED O2: 92 % (ref 95–98)
POC SATURATED O2: 97.9 %
POTASSIUM SERPL-SCNC: 3.9 MMOL/L (ref 3.5–5.1)
PROT SERPL-MCNC: 4.9 G/DL (ref 5.8–7.6)
SODIUM SERPL-SCNC: 136 MMOL/L (ref 136–145)
TROPONIN I SERPL HS-MCNC: 15.4 NG/L

## 2025-02-25 PROCEDURE — 94668 MNPJ CHEST WALL SBSQ: CPT

## 2025-02-25 PROCEDURE — 93005 ELECTROCARDIOGRAM TRACING: CPT

## 2025-02-25 PROCEDURE — 63600175 PHARM REV CODE 636 W HCPCS: Performed by: STUDENT IN AN ORGANIZED HEALTH CARE EDUCATION/TRAINING PROGRAM

## 2025-02-25 PROCEDURE — 36415 COLL VENOUS BLD VENIPUNCTURE: CPT | Performed by: INTERNAL MEDICINE

## 2025-02-25 PROCEDURE — 25000003 PHARM REV CODE 250: Performed by: NURSE PRACTITIONER

## 2025-02-25 PROCEDURE — 25000242 PHARM REV CODE 250 ALT 637 W/ HCPCS

## 2025-02-25 PROCEDURE — 84484 ASSAY OF TROPONIN QUANT: CPT | Performed by: STUDENT IN AN ORGANIZED HEALTH CARE EDUCATION/TRAINING PROGRAM

## 2025-02-25 PROCEDURE — 87040 BLOOD CULTURE FOR BACTERIA: CPT

## 2025-02-25 PROCEDURE — 82550 ASSAY OF CK (CPK): CPT

## 2025-02-25 PROCEDURE — 25500020 PHARM REV CODE 255: Performed by: STUDENT IN AN ORGANIZED HEALTH CARE EDUCATION/TRAINING PROGRAM

## 2025-02-25 PROCEDURE — 02HV33Z INSERTION OF INFUSION DEVICE INTO SUPERIOR VENA CAVA, PERCUTANEOUS APPROACH: ICD-10-PCS | Performed by: STUDENT IN AN ORGANIZED HEALTH CARE EDUCATION/TRAINING PROGRAM

## 2025-02-25 PROCEDURE — 94640 AIRWAY INHALATION TREATMENT: CPT

## 2025-02-25 PROCEDURE — 99900026 HC AIRWAY MAINTENANCE (STAT)

## 2025-02-25 PROCEDURE — 36600 WITHDRAWAL OF ARTERIAL BLOOD: CPT

## 2025-02-25 PROCEDURE — 93010 ELECTROCARDIOGRAM REPORT: CPT | Mod: ,,, | Performed by: INTERNAL MEDICINE

## 2025-02-25 PROCEDURE — 99900035 HC TECH TIME PER 15 MIN (STAT)

## 2025-02-25 PROCEDURE — 25000003 PHARM REV CODE 250: Performed by: STUDENT IN AN ORGANIZED HEALTH CARE EDUCATION/TRAINING PROGRAM

## 2025-02-25 PROCEDURE — 82803 BLOOD GASES ANY COMBINATION: CPT

## 2025-02-25 PROCEDURE — 25000003 PHARM REV CODE 250: Performed by: HOSPITALIST

## 2025-02-25 PROCEDURE — 94003 VENT MGMT INPAT SUBQ DAY: CPT

## 2025-02-25 PROCEDURE — 82962 GLUCOSE BLOOD TEST: CPT

## 2025-02-25 PROCEDURE — 63600175 PHARM REV CODE 636 W HCPCS

## 2025-02-25 PROCEDURE — 25000242 PHARM REV CODE 250 ALT 637 W/ HCPCS: Performed by: STUDENT IN AN ORGANIZED HEALTH CARE EDUCATION/TRAINING PROGRAM

## 2025-02-25 PROCEDURE — 11000001 HC ACUTE MED/SURG PRIVATE ROOM

## 2025-02-25 PROCEDURE — 63600175 PHARM REV CODE 636 W HCPCS: Performed by: HOSPITALIST

## 2025-02-25 PROCEDURE — 82565 ASSAY OF CREATININE: CPT | Performed by: INTERNAL MEDICINE

## 2025-02-25 PROCEDURE — 36556 INSERT NON-TUNNEL CV CATH: CPT | Mod: ,,, | Performed by: STUDENT IN AN ORGANIZED HEALTH CARE EDUCATION/TRAINING PROGRAM

## 2025-02-25 PROCEDURE — 80053 COMPREHEN METABOLIC PANEL: CPT | Performed by: STUDENT IN AN ORGANIZED HEALTH CARE EDUCATION/TRAINING PROGRAM

## 2025-02-25 PROCEDURE — 27000207 HC ISOLATION

## 2025-02-25 PROCEDURE — 36415 COLL VENOUS BLD VENIPUNCTURE: CPT | Performed by: STUDENT IN AN ORGANIZED HEALTH CARE EDUCATION/TRAINING PROGRAM

## 2025-02-25 PROCEDURE — 83605 ASSAY OF LACTIC ACID: CPT | Performed by: STUDENT IN AN ORGANIZED HEALTH CARE EDUCATION/TRAINING PROGRAM

## 2025-02-25 PROCEDURE — 99291 CRITICAL CARE FIRST HOUR: CPT | Mod: 25,,, | Performed by: STUDENT IN AN ORGANIZED HEALTH CARE EDUCATION/TRAINING PROGRAM

## 2025-02-25 PROCEDURE — 36415 COLL VENOUS BLD VENIPUNCTURE: CPT

## 2025-02-25 PROCEDURE — 27000221 HC OXYGEN, UP TO 24 HOURS

## 2025-02-25 RX ORDER — IBUPROFEN 200 MG
16 TABLET ORAL
Status: DISCONTINUED | OUTPATIENT
Start: 2025-02-25 | End: 2025-02-28 | Stop reason: HOSPADM

## 2025-02-25 RX ORDER — MEROPENEM 500 MG/1
500 INJECTION, POWDER, FOR SOLUTION INTRAVENOUS
Status: DISCONTINUED | OUTPATIENT
Start: 2025-02-25 | End: 2025-02-25

## 2025-02-25 RX ORDER — ATORVASTATIN CALCIUM 20 MG/1
20 TABLET, FILM COATED ORAL DAILY
Status: DISCONTINUED | OUTPATIENT
Start: 2025-02-26 | End: 2025-02-28 | Stop reason: HOSPADM

## 2025-02-25 RX ORDER — LIDOCAINE HYDROCHLORIDE 10 MG/ML
INJECTION, SOLUTION INFILTRATION; PERINEURAL
Status: COMPLETED
Start: 2025-02-25 | End: 2025-02-25

## 2025-02-25 RX ORDER — ACETAMINOPHEN 325 MG/1
650 TABLET ORAL EVERY 6 HOURS PRN
Status: DISCONTINUED | OUTPATIENT
Start: 2025-02-25 | End: 2025-02-25

## 2025-02-25 RX ORDER — ACETAMINOPHEN 325 MG/1
650 TABLET ORAL EVERY 6 HOURS PRN
Status: DISCONTINUED | OUTPATIENT
Start: 2025-02-25 | End: 2025-02-28 | Stop reason: HOSPADM

## 2025-02-25 RX ORDER — METOPROLOL TARTRATE 1 MG/ML
10 INJECTION, SOLUTION INTRAVENOUS ONCE
Status: DISCONTINUED | OUTPATIENT
Start: 2025-02-25 | End: 2025-02-25

## 2025-02-25 RX ORDER — IBUPROFEN 200 MG
24 TABLET ORAL
Status: DISCONTINUED | OUTPATIENT
Start: 2025-02-25 | End: 2025-02-28 | Stop reason: HOSPADM

## 2025-02-25 RX ORDER — COLCHICINE 0.6 MG/1
0.6 TABLET, FILM COATED ORAL 2 TIMES DAILY
Status: DISCONTINUED | OUTPATIENT
Start: 2025-02-25 | End: 2025-02-26

## 2025-02-25 RX ORDER — IOPAMIDOL 755 MG/ML
100 INJECTION, SOLUTION INTRAVASCULAR
Status: COMPLETED | OUTPATIENT
Start: 2025-02-25 | End: 2025-02-25

## 2025-02-25 RX ORDER — ACETAMINOPHEN 10 MG/ML
1000 INJECTION, SOLUTION INTRAVENOUS ONCE
Status: COMPLETED | OUTPATIENT
Start: 2025-02-25 | End: 2025-02-25

## 2025-02-25 RX ORDER — MEROPENEM 1 G/1
1 INJECTION, POWDER, FOR SOLUTION INTRAVENOUS
Status: DISCONTINUED | OUTPATIENT
Start: 2025-02-26 | End: 2025-02-26

## 2025-02-25 RX ORDER — PREGABALIN 75 MG/1
75 CAPSULE ORAL NIGHTLY
Status: DISCONTINUED | OUTPATIENT
Start: 2025-02-25 | End: 2025-02-28 | Stop reason: HOSPADM

## 2025-02-25 RX ORDER — PHENYLEPHRINE HCL IN 0.9% NACL 20MG/250ML
0-3 PLASTIC BAG, INJECTION (ML) INTRAVENOUS CONTINUOUS
Status: DISCONTINUED | OUTPATIENT
Start: 2025-02-25 | End: 2025-02-25

## 2025-02-25 RX ORDER — AMOXICILLIN 250 MG
1 CAPSULE ORAL 2 TIMES DAILY
Status: DISCONTINUED | OUTPATIENT
Start: 2025-02-25 | End: 2025-02-26

## 2025-02-25 RX ORDER — POLYETHYLENE GLYCOL 3350 17 G/17G
17 POWDER, FOR SOLUTION ORAL DAILY
Status: DISCONTINUED | OUTPATIENT
Start: 2025-02-26 | End: 2025-02-26

## 2025-02-25 RX ORDER — GUAIFENESIN 100 MG/5ML
200 SOLUTION ORAL 2 TIMES DAILY
Status: DISCONTINUED | OUTPATIENT
Start: 2025-02-25 | End: 2025-02-26

## 2025-02-25 RX ORDER — SODIUM CHLORIDE 9 MG/ML
INJECTION, SOLUTION INTRAVENOUS ONCE
Status: COMPLETED | OUTPATIENT
Start: 2025-02-25 | End: 2025-02-25

## 2025-02-25 RX ADMIN — LIDOCAINE HYDROCHLORIDE: 10 INJECTION, SOLUTION INFILTRATION; PERINEURAL at 09:02

## 2025-02-25 RX ADMIN — NOREPINEPHRINE BITARTRATE 0.2 MCG/KG/MIN: 4 INJECTION, SOLUTION INTRAVENOUS at 12:02

## 2025-02-25 RX ADMIN — NOREPINEPHRINE BITARTRATE 0.2 MCG/KG/MIN: 4 INJECTION, SOLUTION INTRAVENOUS at 05:02

## 2025-02-25 RX ADMIN — IPRATROPIUM BROMIDE AND ALBUTEROL SULFATE 3 ML: 2.5; .5 SOLUTION RESPIRATORY (INHALATION) at 07:02

## 2025-02-25 RX ADMIN — ACETAMINOPHEN 650 MG: 325 TABLET ORAL at 03:02

## 2025-02-25 RX ADMIN — ACETYLCYSTEINE 4 ML: 200 SOLUTION ORAL; RESPIRATORY (INHALATION) at 07:02

## 2025-02-25 RX ADMIN — ACETAMINOPHEN 1000 MG: 10 INJECTION, SOLUTION INTRAVENOUS at 08:02

## 2025-02-25 RX ADMIN — MEROPENEM 500 MG: 1 INJECTION, POWDER, FOR SOLUTION INTRAVENOUS at 03:02

## 2025-02-25 RX ADMIN — POLYETHYLENE GLYCOL 3350 17 G: 17 POWDER, FOR SOLUTION ORAL at 08:02

## 2025-02-25 RX ADMIN — COLCHICINE 0.6 MG: 0.6 TABLET ORAL at 08:02

## 2025-02-25 RX ADMIN — PANTOPRAZOLE SODIUM 40 MG: 40 INJECTION, POWDER, FOR SOLUTION INTRAVENOUS at 08:02

## 2025-02-25 RX ADMIN — PHENYLEPHRINE HYDROCHLORIDE 0.5 MCG/KG/MIN: 10 INJECTION INTRAVENOUS at 12:02

## 2025-02-25 RX ADMIN — ATORVASTATIN CALCIUM 20 MG: 20 TABLET, FILM COATED ORAL at 08:02

## 2025-02-25 RX ADMIN — DEXMEDETOMIDINE HYDROCHLORIDE 0.5 MCG/KG/HR: 4 INJECTION, SOLUTION INTRAVENOUS at 03:02

## 2025-02-25 RX ADMIN — GUAIFENESIN 200 MG: 200 SOLUTION ORAL at 08:02

## 2025-02-25 RX ADMIN — CHLORHEXIDINE GLUCONATE, 0.12% ORAL RINSE 15 ML: 1.2 SOLUTION DENTAL at 08:02

## 2025-02-25 RX ADMIN — ACETYLCYSTEINE 4 ML: 200 SOLUTION ORAL; RESPIRATORY (INHALATION) at 01:02

## 2025-02-25 RX ADMIN — NOREPINEPHRINE BITARTRATE 0.2 MCG/KG/MIN: 4 INJECTION, SOLUTION INTRAVENOUS at 08:02

## 2025-02-25 RX ADMIN — NOREPINEPHRINE BITARTRATE 0.2 MCG/KG/MIN: 1 INJECTION, SOLUTION, CONCENTRATE INTRAVENOUS at 02:02

## 2025-02-25 RX ADMIN — IPRATROPIUM BROMIDE AND ALBUTEROL SULFATE 3 ML: 2.5; .5 SOLUTION RESPIRATORY (INHALATION) at 01:02

## 2025-02-25 RX ADMIN — IOPAMIDOL 100 ML: 755 INJECTION, SOLUTION INTRAVENOUS at 06:02

## 2025-02-25 RX ADMIN — PREDNISONE 5 MG: 5 TABLET ORAL at 09:02

## 2025-02-25 RX ADMIN — SODIUM CHLORIDE: 9 INJECTION, SOLUTION INTRAVENOUS at 08:02

## 2025-02-25 RX ADMIN — SODIUM CHLORIDE, POTASSIUM CHLORIDE, SODIUM LACTATE AND CALCIUM CHLORIDE 1000 ML: 600; 310; 30; 20 INJECTION, SOLUTION INTRAVENOUS at 03:02

## 2025-02-25 RX ADMIN — SENNOSIDES AND DOCUSATE SODIUM 1 TABLET: 50; 8.6 TABLET ORAL at 08:02

## 2025-02-25 RX ADMIN — PREGABALIN 75 MG: 75 CAPSULE ORAL at 08:02

## 2025-02-25 RX ADMIN — ENOXAPARIN SODIUM 40 MG: 40 INJECTION SUBCUTANEOUS at 04:02

## 2025-02-25 RX ADMIN — MEROPENEM 1 G: 1 INJECTION, POWDER, FOR SOLUTION INTRAVENOUS at 11:02

## 2025-02-25 NOTE — ASSESSMENT & PLAN NOTE
Recurrent aspiration events. Poor secretion clearance noted as well with weak cough.   - completed Abx  - obtain lower respiratory Cxs, f/u

## 2025-02-25 NOTE — PROCEDURES
"Karie Denney is a 59 y.o. female patient.    Temp: (!) 105.3 °F (40.7 °C) (02/25/25 0845)  Pulse: (!) 115 (02/25/25 1352)  Resp: 16 (02/25/25 1352)  BP: (!) 88/44 (02/25/25 1130)  SpO2: 97 % (02/25/25 1352)  Weight: 92 kg (202 lb 13.2 oz) (02/24/25 0230)  Height: 5' 6" (167.6 cm) (02/15/25 0758)       Central Line    Date/Time: 2/25/2025 1:40 PM    Performed by: Mariah Cunha MD  Authorized by: Mariah Cunha MD    Location procedure was performed:  [facility] CRITICAL [facility]  Pre-operative diagnosis:  HYPOVOLEMIC SHOCK  Post-operative diagnosis:  HYPOVOLEMIC SHOCK  Consent Done ?:  Yes  Time out complete?: Verified correct patient, procedure, equipment, staff, and site/side    Indications:  Vascular access  Anesthesia:  Local infiltration  Local anesthetic:  Lidocaine 1% without epinephrine  Anesthetic total (ml):  5  Preparation:  Skin prepped with ChloraPrep  Skin prep agent dried: Skin prep agent completely dried prior to procedure    Sterile barriers: All five maximal sterile barriers used - gloves, gown, cap, mask and large sterile sheet    Hand hygiene: Hand hygiene performed immediately prior to central venous catheter insertion    Location:  Left internal jugular  Catheter type:  Triple lumen  Catheter size:  7 Fr  Inserted Catheter Length (cm):  14  Ultrasound guidance: Yes    Vessel Caliber:  Medium  Comprressibility:  Normal  Needle advanced into vessel with real time ultrasound guidance.    Guidewire confirmed in vessel.    Image recorded and saved.    Steril sheath on probe.    Sterile gel used.  Manometry: No    Number of attempts:  1  Securement:  Line sutured, chlorhexidine patch and sterile dressing applied  Technical Procedures Used:  No blood return though BROWN port  Complications: No    Guidewire: guidewire removed intact, verified with nurse    XRay:  Placement verified by x-ray and no pneumothorax on x-ray  Adverse Events:  None      Mariah Cunha MD  Pulmonary and Critical Care  [facility]      2/25/2025

## 2025-02-25 NOTE — PROGRESS NOTES
Pharmacist Renal Dose Adjustment Note    Karie Denney is a 59 y.o. female being treated with the medication meropenem    Patient Data:    Vital Signs (Most Recent):  Temp: 97.3 °F (36.3 °C) (02/25/25 1515)  Pulse: (!) 114 (02/25/25 1515)  Resp: 16 (02/25/25 1515)  BP: (!) 138/56 (02/25/25 1515)  SpO2: 96 % (02/25/25 1515) Vital Signs (72h Range):  Temp:  [77 °F (25 °C)-105.6 °F (40.9 °C)]   Pulse:  []   Resp:  [10-27]   BP: ()/(13-97)   SpO2:  [79 %-100 %]      Recent Labs   Lab 02/24/25  0406 02/25/25  0413 02/25/25  1231   CREATININE 0.52*  0.52* 0.51* 0.61     Serum creatinine: 0.61 mg/dL 02/25/25 1231  Estimated creatinine clearance: 110.7 mL/min    Medication:meropenem dose: 500 mg frequency q12h will be changed to medication:meropenem dose:1 gram frequency:q8h    Pharmacist's Name: Dilma Bey  Pharmacist's Extension: [phone]

## 2025-02-25 NOTE — ASSESSMENT & PLAN NOTE
R BI, RML and R superior segment mucus plugging s/p therapeutic aspiration 02/24. Poor secretion clearance.  - Mucomyst Q6H  - cont DuoNebs

## 2025-02-25 NOTE — ASSESSMENT & PLAN NOTE
Multifactorial. Recurrent aspiration events. Further complicated with progressive weakness with decreased pulmonary excursion. 02/24 am ABG with progressive hypercapnia compensated concerning for impending respiratory mechanical failure. Diamox IV x1 administered to aid with reset; previous baseline PaCO2 60s. Progressive hypoxia refractory to NIV including AVAPS and POCUS with no large R pleural effusion.   - intubated 02/24; difficult intubation expected given limited ROM at cervical spine; noted to have LPR with mild epiglottis edema  - s/p therapeutic suctioning 02/24 for mucus plugging  - cont VC/AC 16/500/12/0.5  - AM ABG  - planned SBT in am

## 2025-02-25 NOTE — CONSULTS
Consult due to intubation received and appreciated. Recommend to continue NG feedings as ordered.

## 2025-02-25 NOTE — ASSESSMENT & PLAN NOTE
Managing as acute pericarditis as per Cardiology recs; associated chest pain with small effusion. RA positive; RA vs idiopathic.  - cont Colchicine BID x3 months  - start low dose prednisone for concern of CTD pericarditis with prednisone 5 mg Qday  - daily CBC and monitor for diarrhea  - repeat TTE 03/10

## 2025-02-25 NOTE — ASSESSMENT & PLAN NOTE
Sedated for ventilator synchrony.   - analgesia: Fentanyl intermittent dosing  - sedation: Precedex gtt

## 2025-02-25 NOTE — PLAN OF CARE
Problem: Adult Inpatient Plan of Care  Goal: Absence of Hospital-Acquired Illness or Injury  Outcome: Progressing  Intervention: Prevent Skin Injury  Flowsheets (Taken 2/25/2025 0122)  Body Position:   turned   heels elevated   log-rolled   weight shifting  Skin Protection:   incontinence pads utilized   protective footwear used   silicone foam dressing in place  Device Skin Pressure Protection:   absorbent pad utilized/changed   adhesive use limited   positioning supports utilized   pressure points protected  Intervention: Prevent and Manage VTE (Venous Thromboembolism) Risk  Flowsheets (Taken 2/25/2025 0122)  VTE Prevention/Management:   remove, assess skin, and reapply sequential compression device   ROM (passive) performed  Intervention: Prevent Infection  Flowsheets (Taken 2/25/2025 0122)  Infection Prevention:   hand hygiene promoted   single patient room provided  Goal: Optimal Comfort and Wellbeing  Outcome: Progressing  Intervention: Monitor Pain and Promote Comfort  Flowsheets (Taken 2/25/2025 0122)  Pain Management Interventions:   care clustered   pillow support provided   position adjusted   quiet environment facilitated
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
## 2025-02-25 NOTE — RESPIRATORY THERAPY
Blood gases drawn from left radial; on 30%, held pressure until bleeding stop; pt tolerated well and no complications noted.

## 2025-02-25 NOTE — PLAN OF CARE
Problem: Adult Inpatient Plan of Care  Goal: Plan of Care Review  Outcome: Progressing  Goal: Patient-Specific Goal (Individualized)  Outcome: Progressing  Goal: Absence of Hospital-Acquired Illness or Injury  Outcome: Progressing  Intervention: Identify and Manage Fall Risk  Flowsheets (Taken 2/25/2025 1142)  Safety Promotion/Fall Prevention:   assistive device/personal item within reach   Fall Risk signage in place   Fall Risk reviewed with patient/family   room near unit station   side rails raised x 2   supervised activity   medications reviewed   lighting adjusted  Intervention: Prevent Skin Injury  Flowsheets (Taken 2/25/2025 1142)  Body Position:   turned   30 degrees  Skin Protection:   incontinence pads utilized   silicone foam dressing in place   pulse oximeter probe site changed  Device Skin Pressure Protection:   positioning supports utilized   skin-to-device areas padded   skin-to-skin areas padded   tubing/devices free from skin contact   absorbent pad utilized/changed   adhesive use limited  Intervention: Prevent and Manage VTE (Venous Thromboembolism) Risk  Flowsheets (Taken 2/25/2025 1142)  VTE Prevention/Management: remove, assess skin, and reapply sequential compression device  Intervention: Prevent Infection  Flowsheets (Taken 2/25/2025 1142)  Infection Prevention:   cohorting utilized   environmental surveillance performed   equipment surfaces disinfected   hand hygiene promoted   personal protective equipment utilized   rest/sleep promoted   single patient room provided  Goal: Optimal Comfort and Wellbeing  Outcome: Progressing  Intervention: Monitor Pain and Promote Comfort  Flowsheets (Taken 2/25/2025 1142)  Pain Management Interventions:   care clustered   pillow support provided   position adjusted  Intervention: Provide Person-Centered Care  Flowsheets (Taken 2/25/2025 1142)  Trust Relationship/Rapport:   care explained   choices provided   emotional support provided   empathic listening  provided   questions answered   questions encouraged   reassurance provided  Goal: Readiness for Transition of Care  Outcome: Progressing     Problem: Pneumonia  Goal: Fluid Balance  Outcome: Progressing  Intervention: Monitor and Manage Fluid Balance  Flowsheets (Taken 2/25/2025 1142)  Fluid/Electrolyte Management: fluids provided  Goal: Resolution of Infection Signs and Symptoms  Outcome: Progressing  Intervention: Prevent Infection Progression  Flowsheets (Taken 2/25/2025 1142)  Fever Reduction/Comfort Measures:   cooling blanket applied   ice pack(s) applied   lightweight clothing   lightweight bedding  Infection Management: aseptic technique maintained  Isolation Precautions:   precautions maintained   contact  Goal: Effective Oxygenation and Ventilation  Outcome: Progressing  Intervention: Promote Airway Secretion Clearance  Flowsheets (Taken 2/25/2025 1142)  Cough And Deep Breathing: unable to perform  Intervention: Optimize Oxygenation and Ventilation  Flowsheets (Taken 2/25/2025 1142)  Airway/Ventilation Management:   calming measures promoted   pulmonary hygiene promoted  Head of Bed (HOB) Positioning: HOB at 30-45 degrees     Problem: Wound  Goal: Optimal Coping  Outcome: Progressing  Intervention: Support Patient and Family Response  Flowsheets (Taken 2/25/2025 1142)  Supportive Measures:   active listening utilized   counseling provided   self-care encouraged  Family/Support System Care: caregiver stress acknowledged  Goal: Optimal Functional Ability  Outcome: Progressing  Intervention: Optimize Functional Ability  Flowsheets (Taken 2/25/2025 1142)  Activity Management: Rolling - L1  Activity Assistance Provided: assistance, 2 people  Goal: Absence of Infection Signs and Symptoms  Outcome: Progressing  Intervention: Prevent or Manage Infection  Flowsheets (Taken 2/25/2025 1142)  Fever Reduction/Comfort Measures:   cooling blanket applied   ice pack(s) applied   lightweight clothing   lightweight  bedding  Infection Management: aseptic technique maintained  Isolation Precautions:   precautions maintained   contact  Goal: Improved Oral Intake  Outcome: Progressing  Intervention: Promote and Optimize Oral Intake  Flowsheets (Taken 2/25/2025 1142)  Nutrition Interventions: diet adjusted  Nutrition Support Management: tube feeding rate increased  Goal: Optimal Pain Control and Function  Outcome: Progressing  Intervention: Prevent or Manage Pain  Flowsheets (Taken 2/25/2025 1142)  Sleep/Rest Enhancement:   consistent schedule promoted   awakenings minimized   noise level reduced  Pain Management Interventions:   care clustered   pillow support provided   position adjusted  Goal: Skin Health and Integrity  Outcome: Progressing  Intervention: Optimize Skin Protection  Flowsheets (Taken 2/25/2025 1142)  Pressure Reduction Techniques:   frequent weight shift encouraged   positioned off wounds   pressure points protected   weight shift assistance provided  Skin Protection:   incontinence pads utilized   silicone foam dressing in place   pulse oximeter probe site changed  Activity Management: Rolling - L1  Head of Bed (HOB) Positioning: HOB at 30-45 degrees  Goal: Optimal Wound Healing  Outcome: Progressing     Problem: Skin Injury Risk Increased  Goal: Skin Health and Integrity  Outcome: Progressing     Problem: Airway Clearance Ineffective  Goal: Effective Airway Clearance  Outcome: Progressing     Problem: Gas Exchange Impaired  Goal: Optimal Gas Exchange  Outcome: Progressing     Problem: Infection  Goal: Absence of Infection Signs and Symptoms  Outcome: Progressing     Problem: Noninvasive Ventilation Acute  Goal: Effective Unassisted Ventilation and Oxygenation  Outcome: Progressing     Problem: Mechanical Ventilation Invasive  Goal: Effective Communication  Outcome: Progressing  Goal: Optimal Device Function  Outcome: Progressing  Goal: Mechanical Ventilation Liberation  Outcome: Progressing  Goal: Optimal  Nutrition Delivery  Outcome: Progressing  Goal: Absence of Device-Related Skin and Tissue Injury  Outcome: Progressing  Goal: Absence of Ventilator-Induced Lung Injury  Outcome: Progressing

## 2025-02-25 NOTE — PROCEDURES
"Karie Denney is a 59 y.o. female patient.    Temp: 98.3 °F (36.8 °C) (02/24/25 1504)  Pulse: (!) 132 (02/24/25 2100)  Resp: 16 (02/24/25 2100)  BP: 110/62 (02/24/25 2100)  SpO2: 99 % (02/24/25 2100)  Weight: 92 kg (202 lb 13.2 oz) (02/24/25 0230)  Height: 5' 6" (167.6 cm) (02/15/25 0758)       Procedures  BRONCHOSCOPY WITH THERAPEUTIC ASPIRATION, INITIAL   2/24/2025      Preoperative Diagnoses:  ACUTE HYPOXIC RESPIRATORY FAILURE  RIGHT MIDDLE LOBE ATELECTASIS    Postoperative Diagnoses:  ACUTE HYPOXIC RESPIRATORY FAILURE  MUCUS PLUGGING, BRONCHUS INTERMEDIUS    A time-out was completed prior to start of procedure. The patient was placed in a supine position, swivel adapter secured.  Flexible bronchoscope was inserted into ETT via a swivel adapter and an airway inspection was performed down to the subsegmental levels. No anesthesia required as patient was under sedation per ICU protocol; no additional sedatives required intra-procedurally. Lidocaine 1% as administrated x3 cc to the tracheobronchial tree. Airway inspection demonstrated ETT tip approximately 2 cm above lia. Left sided airway inspection with airways to subsegmental level. Right bronchus with mucus plugging in bronchus intermedius; 100 % obstruction. Therapeutic aspiration performed with thick secretions aspirated. Required several attempts and with mucus burden in right middle lobe segments as well as right lower lobe superior segment. Successful therapeutic aspiration with airway clearance and 0% obstruction post-intervention. Bronchoscope was withdrawn and the procedure was concluded.  Patient tolerated the procedure well.      Procedure Performed:  Bronchoscopy and Therapeutic aspiration    Equipment:  Flexible disposable AMBU scope     Recommendations:  - start Mucomyst Q6H  - cont DuoNebs  - wean FiO2 per ICU goals      June MD Guerline  Pulmonary and Critical Care  [facility]    "

## 2025-02-25 NOTE — SUBJECTIVE & OBJECTIVE
Interval History/Significant Events: low inspiratory excursion with breathing, desaturated overnight with some increase in work of breathing, bedside POCUS with small R pleural effusion and atelectasis; transitioned to AVAPs with oxygenation failure; s/p intubation with bronchoscopy therafter for mucus plugging    Review of Systems  Objective:     Vital Signs (Most Recent):  Temp: 98.6 °F (37 °C) (02/24/25 1920)  Pulse: (!) 130 (02/24/25 2230)  Resp: 16 (02/24/25 2230)  BP: (!) 108/50 (02/24/25 2230)  SpO2: 100 % (02/24/25 2230) Vital Signs (24h Range):  Temp:  [97.6 °F (36.4 °C)-99.8 °F (37.7 °C)] 98.6 °F (37 °C)  Pulse:  [] 130  Resp:  [10-27] 16  SpO2:  [79 %-100 %] 100 %  BP: ()/(28-87) 108/50   Weight: 92 kg (202 lb 13.2 oz)  Body mass index is 32.74 kg/m².      Intake/Output Summary (Last 24 hours) at 2/24/2025 2238  Last data filed at 2/24/2025 2236  Gross per 24 hour   Intake 1530.02 ml   Output 1400 ml   Net 130.02 ml          Physical Exam  Constitutional:       General: She is in acute distress.      Appearance: She is ill-appearing. She is not toxic-appearing.   HENT:      Head: Normocephalic and atraumatic.      Mouth/Throat:      Mouth: Mucous membranes are moist.   Eyes:      General: No scleral icterus.  Cardiovascular:      Rate and Rhythm: Regular rhythm. Tachycardia present.      Heart sounds: Normal heart sounds. No murmur heard.     No friction rub.   Pulmonary:      Breath sounds: Rhonchi present. No wheezing.      Comments: Diminished inspiratory phase  Abdominal:      Palpations: Abdomen is soft.      Tenderness: There is no abdominal tenderness. There is no guarding.   Musculoskeletal:      Right lower leg: Edema present.      Left lower leg: Edema present.   Neurological:      Comments: Chronic contractures of UEs            Vents:  Vent Mode: A/CMV-VC (02/24/25 2150)  Ventilator Initiated: Yes (02/24/25 1343)  Set Rate: 16 BPM (02/24/25 2150)  Vt Set: 500 mL (02/24/25  2150)  PEEP/CPAP: 10 cmH20 (02/24/25 2150)  Oxygen Concentration (%): 40 (02/24/25 2150)  Peak Airway Pressure: 27.3 cmH20 (02/24/25 2150)  Plateau Pressure: 25.5 cmH20 (02/24/25 1550)  Total Ve: 8.08 L/m (02/24/25 2150)  F/VT Ratio<105 (RSBI): (!) 32.79 (02/24/25 2005)  Lines/Drains/Airways       Drain  Duration                  NG/OG Tube 02/21/25 1252 Left nostril 3 days         Urethral Catheter 02/24/25 1623 <1 day              Airway  Duration                  Airway - Non-Surgical 02/24/25 1343 Endotracheal Tube <1 day              Peripheral Intravenous Line  Duration                  Peripheral IV - Single Lumen 02/19/25 1256 22 G Left Breast 5 days         Peripheral IV - Single Lumen 02/19/25 1600 20 G Anterior;Left Upper Arm 5 days                  Significant Labs:    CBC/Anemia Profile:  Recent Labs   Lab 02/24/25  0406   WBC 14.11*   HGB 9.2*   HCT 32.8*      MCV 84.3   RDW 17.2*        Chemistries:  Recent Labs   Lab 02/23/25  0426 02/24/25  0406   NA  --  143   K  --  4.3   CL  --  90*   CO2  --  42*   BUN 7* 8*  8*   CREATININE 0.63 0.52*  0.52*   CALCIUM  --  9.3       All pertinent labs within the past 24 hours have been reviewed.    Significant Imaging:  I have reviewed all pertinent imaging results/findings within the past 24 hours.

## 2025-02-26 PROBLEM — D62 ACUTE BLOOD LOSS ANEMIA: Status: ACTIVE | Noted: 2025-02-26

## 2025-02-26 PROBLEM — R23.3 SPONTANEOUS HEMATOMA OF LOWER LEG: Status: ACTIVE | Noted: 2025-02-26

## 2025-02-26 LAB
ABO + RH BLD: NORMAL
ABORH RETYPE: NORMAL
ALBUMIN SERPL BCP-MCNC: 1.9 G/DL (ref 3.4–4.8)
ALBUMIN/GLOB SERPL: 0.6 {RATIO}
ALP SERPL-CCNC: 58 U/L (ref 40–150)
ALT SERPL W P-5'-P-CCNC: 17 U/L
ANION GAP SERPL CALCULATED.3IONS-SCNC: 15 MMOL/L (ref 7–16)
ANISOCYTOSIS BLD QL SMEAR: ABNORMAL
AST SERPL W P-5'-P-CCNC: 38 U/L (ref 5–34)
BASOPHILS # BLD AUTO: 0.14 K/UL (ref 0–0.2)
BASOPHILS NFR BLD AUTO: 0.5 % (ref 0–1)
BILIRUB SERPL-MCNC: 0.3 MG/DL
BLD PROD TYP BPU: NORMAL
BLOOD UNIT EXPIRATION DATE: NORMAL
BLOOD UNIT TYPE CODE: 6200
BUN SERPL-MCNC: 18 MG/DL (ref 10–20)
BUN/CREAT SERPL: 34 (ref 6–20)
CALCIUM SERPL-MCNC: 7.9 MG/DL (ref 8.4–10.2)
CHLORIDE SERPL-SCNC: 99 MMOL/L (ref 98–107)
CO2 SERPL-SCNC: 27 MMOL/L (ref 23–31)
CREAT SERPL-MCNC: 0.53 MG/DL (ref 0.55–1.02)
CROSSMATCH INTERPRETATION: NORMAL
DIFFERENTIAL METHOD BLD: ABNORMAL
DISPENSE STATUS: NORMAL
EGFR (NO RACE VARIABLE) (RUSH/TITUS): 105 ML/MIN/1.73M2
EOSINOPHIL # BLD AUTO: 0.2 K/UL (ref 0–0.5)
EOSINOPHIL NFR BLD AUTO: 0.7 % (ref 1–4)
ERYTHROCYTE [DISTWIDTH] IN BLOOD BY AUTOMATED COUNT: 17.7 % (ref 11.5–14.5)
GLOBULIN SER-MCNC: 3.4 G/DL (ref 2–4)
GLUCOSE SERPL-MCNC: 109 MG/DL (ref 70–105)
GLUCOSE SERPL-MCNC: 113 MG/DL (ref 70–105)
GLUCOSE SERPL-MCNC: 118 MG/DL (ref 70–105)
GLUCOSE SERPL-MCNC: 133 MG/DL (ref 70–105)
GLUCOSE SERPL-MCNC: 136 MG/DL (ref 70–105)
GLUCOSE SERPL-MCNC: 98 MG/DL (ref 82–115)
HCT VFR BLD AUTO: 18.7 % (ref 38–47)
HCT VFR BLD AUTO: 20.9 % (ref 38–47)
HCT VFR BLD AUTO: 21 % (ref 38–47)
HCT VFR BLD AUTO: 23.7 % (ref 38–47)
HGB BLD-MCNC: 5.6 G/DL (ref 12–16)
HGB BLD-MCNC: 6 G/DL (ref 12–16)
HGB BLD-MCNC: 6.8 G/DL (ref 12–16)
HGB BLD-MCNC: 7.4 G/DL (ref 12–16)
HYPOCHROMIA BLD QL SMEAR: ABNORMAL
IMM GRANULOCYTES # BLD AUTO: 2.42 K/UL (ref 0–0.04)
IMM GRANULOCYTES NFR BLD: 7.9 % (ref 0–0.4)
INDIRECT COOMBS: NORMAL
LACTATE SERPL-SCNC: 3.5 MMOL/L (ref 0.5–2.2)
LACTATE SERPL-SCNC: 3.7 MMOL/L (ref 0.5–2.2)
LACTATE SERPL-SCNC: 4.7 MMOL/L (ref 0.5–2.2)
LYMPHOCYTES # BLD AUTO: 5.35 K/UL (ref 1–4.8)
LYMPHOCYTES NFR BLD AUTO: 17.4 % (ref 27–41)
LYMPHOCYTES NFR BLD MANUAL: 15 % (ref 27–41)
MAGNESIUM SERPL-MCNC: 1.9 MG/DL (ref 1.6–2.6)
MCH RBC QN AUTO: 23.8 PG (ref 27–31)
MCHC RBC AUTO-ENTMCNC: 28.7 G/DL (ref 32–36)
MCV RBC AUTO: 82.9 FL (ref 80–96)
METAMYELOCYTES NFR BLD MANUAL: 1 %
MONOCYTES # BLD AUTO: 3.23 K/UL (ref 0–0.8)
MONOCYTES NFR BLD AUTO: 10.5 % (ref 2–6)
MONOCYTES NFR BLD MANUAL: 8 % (ref 2–6)
MPC BLD CALC-MCNC: 11.2 FL (ref 9.4–12.4)
MYELOCYTES NFR BLD MANUAL: 1 %
NEUTROPHILS # BLD AUTO: 19.37 K/UL (ref 1.8–7.7)
NEUTROPHILS NFR BLD AUTO: 63 % (ref 53–65)
NEUTS BAND NFR BLD MANUAL: 7 % (ref 1–5)
NEUTS SEG NFR BLD MANUAL: 68 % (ref 50–62)
NRBC # BLD AUTO: 0.17 X10E3/UL
NRBC, AUTO (.00): 0.6 %
PHOSPHATE SERPL-MCNC: 3.8 MG/DL (ref 2.3–4.7)
PLATELET # BLD AUTO: 277 K/UL (ref 150–400)
PLATELET MORPHOLOGY: ABNORMAL
POLYCHROMASIA BLD QL SMEAR: ABNORMAL
POTASSIUM SERPL-SCNC: 4.4 MMOL/L (ref 3.5–5.1)
PROT SERPL-MCNC: 5.3 G/DL (ref 5.8–7.6)
RBC # BLD AUTO: 2.52 M/UL (ref 4.2–5.4)
RH BLD: NORMAL
SODIUM SERPL-SCNC: 137 MMOL/L (ref 136–145)
SPECIMEN OUTDATE: NORMAL
TARGETS BLD QL SMEAR: ABNORMAL
UNIT NUMBER: NORMAL
WBC # BLD AUTO: 30.71 K/UL (ref 4.5–11)

## 2025-02-26 PROCEDURE — 36415 COLL VENOUS BLD VENIPUNCTURE: CPT | Performed by: STUDENT IN AN ORGANIZED HEALTH CARE EDUCATION/TRAINING PROGRAM

## 2025-02-26 PROCEDURE — 25000003 PHARM REV CODE 250

## 2025-02-26 PROCEDURE — 83735 ASSAY OF MAGNESIUM: CPT

## 2025-02-26 PROCEDURE — 99900035 HC TECH TIME PER 15 MIN (STAT)

## 2025-02-26 PROCEDURE — 86923 COMPATIBILITY TEST ELECTRIC: CPT

## 2025-02-26 PROCEDURE — 85025 COMPLETE CBC W/AUTO DIFF WBC: CPT | Performed by: STUDENT IN AN ORGANIZED HEALTH CARE EDUCATION/TRAINING PROGRAM

## 2025-02-26 PROCEDURE — 11000001 HC ACUTE MED/SURG PRIVATE ROOM

## 2025-02-26 PROCEDURE — 63600175 PHARM REV CODE 636 W HCPCS

## 2025-02-26 PROCEDURE — 99291 CRITICAL CARE FIRST HOUR: CPT | Mod: ,,, | Performed by: STUDENT IN AN ORGANIZED HEALTH CARE EDUCATION/TRAINING PROGRAM

## 2025-02-26 PROCEDURE — 85018 HEMOGLOBIN: CPT

## 2025-02-26 PROCEDURE — 82962 GLUCOSE BLOOD TEST: CPT

## 2025-02-26 PROCEDURE — 83605 ASSAY OF LACTIC ACID: CPT | Performed by: STUDENT IN AN ORGANIZED HEALTH CARE EDUCATION/TRAINING PROGRAM

## 2025-02-26 PROCEDURE — 36430 TRANSFUSION BLD/BLD COMPNT: CPT

## 2025-02-26 PROCEDURE — 27000207 HC ISOLATION

## 2025-02-26 PROCEDURE — P9016 RBC LEUKOCYTES REDUCED: HCPCS

## 2025-02-26 PROCEDURE — 27000221 HC OXYGEN, UP TO 24 HOURS

## 2025-02-26 PROCEDURE — 86900 BLOOD TYPING SEROLOGIC ABO: CPT | Performed by: STUDENT IN AN ORGANIZED HEALTH CARE EDUCATION/TRAINING PROGRAM

## 2025-02-26 PROCEDURE — 94003 VENT MGMT INPAT SUBQ DAY: CPT

## 2025-02-26 PROCEDURE — 25000003 PHARM REV CODE 250: Performed by: STUDENT IN AN ORGANIZED HEALTH CARE EDUCATION/TRAINING PROGRAM

## 2025-02-26 PROCEDURE — 25000242 PHARM REV CODE 250 ALT 637 W/ HCPCS

## 2025-02-26 PROCEDURE — 94761 N-INVAS EAR/PLS OXIMETRY MLT: CPT

## 2025-02-26 PROCEDURE — 25000003 PHARM REV CODE 250: Performed by: INTERNAL MEDICINE

## 2025-02-26 PROCEDURE — 80053 COMPREHEN METABOLIC PANEL: CPT

## 2025-02-26 PROCEDURE — 63600175 PHARM REV CODE 636 W HCPCS: Performed by: STUDENT IN AN ORGANIZED HEALTH CARE EDUCATION/TRAINING PROGRAM

## 2025-02-26 PROCEDURE — 94640 AIRWAY INHALATION TREATMENT: CPT

## 2025-02-26 PROCEDURE — 85014 HEMATOCRIT: CPT | Performed by: STUDENT IN AN ORGANIZED HEALTH CARE EDUCATION/TRAINING PROGRAM

## 2025-02-26 PROCEDURE — 84100 ASSAY OF PHOSPHORUS: CPT

## 2025-02-26 PROCEDURE — 25500020 PHARM REV CODE 255: Performed by: STUDENT IN AN ORGANIZED HEALTH CARE EDUCATION/TRAINING PROGRAM

## 2025-02-26 PROCEDURE — 99900026 HC AIRWAY MAINTENANCE (STAT)

## 2025-02-26 RX ORDER — IOPAMIDOL 755 MG/ML
100 INJECTION, SOLUTION INTRAVASCULAR
Status: COMPLETED | OUTPATIENT
Start: 2025-02-26 | End: 2025-02-26

## 2025-02-26 RX ORDER — FENTANYL CITRATE 50 UG/ML
25 INJECTION, SOLUTION INTRAMUSCULAR; INTRAVENOUS
Refills: 0 | Status: DISCONTINUED | OUTPATIENT
Start: 2025-02-26 | End: 2025-02-28 | Stop reason: HOSPADM

## 2025-02-26 RX ORDER — AMOXICILLIN 250 MG
2 CAPSULE ORAL 2 TIMES DAILY
Status: DISCONTINUED | OUTPATIENT
Start: 2025-02-26 | End: 2025-02-28 | Stop reason: HOSPADM

## 2025-02-26 RX ORDER — HYDROCODONE BITARTRATE AND ACETAMINOPHEN 500; 5 MG/1; MG/1
TABLET ORAL
Status: DISCONTINUED | OUTPATIENT
Start: 2025-02-26 | End: 2025-02-27

## 2025-02-26 RX ORDER — SENNOSIDES 8.6 MG/1
17.2 TABLET ORAL DAILY
Status: DISCONTINUED | OUTPATIENT
Start: 2025-02-27 | End: 2025-02-26

## 2025-02-26 RX ORDER — FENTANYL CITRATE 50 UG/ML
50 INJECTION, SOLUTION INTRAMUSCULAR; INTRAVENOUS
Status: DISCONTINUED | OUTPATIENT
Start: 2025-02-26 | End: 2025-02-28 | Stop reason: HOSPADM

## 2025-02-26 RX ORDER — POLYETHYLENE GLYCOL 3350 17 G/17G
17 POWDER, FOR SOLUTION ORAL 2 TIMES DAILY
Status: DISCONTINUED | OUTPATIENT
Start: 2025-02-26 | End: 2025-02-28 | Stop reason: HOSPADM

## 2025-02-26 RX ORDER — PROPOFOL 10 MG/ML
0-50 INJECTION, EMULSION INTRAVENOUS CONTINUOUS
Status: DISCONTINUED | OUTPATIENT
Start: 2025-02-26 | End: 2025-02-28 | Stop reason: HOSPADM

## 2025-02-26 RX ORDER — HYDROMORPHONE HYDROCHLORIDE 2 MG/ML
2 INJECTION, SOLUTION INTRAMUSCULAR; INTRAVENOUS; SUBCUTANEOUS ONCE
Status: COMPLETED | OUTPATIENT
Start: 2025-02-26 | End: 2025-02-26

## 2025-02-26 RX ADMIN — IPRATROPIUM BROMIDE AND ALBUTEROL SULFATE 3 ML: 2.5; .5 SOLUTION RESPIRATORY (INHALATION) at 07:02

## 2025-02-26 RX ADMIN — SENNOSIDES AND DOCUSATE SODIUM 1 TABLET: 50; 8.6 TABLET ORAL at 08:02

## 2025-02-26 RX ADMIN — MEROPENEM 1 G: 1 INJECTION, POWDER, FOR SOLUTION INTRAVENOUS at 09:02

## 2025-02-26 RX ADMIN — CHLORHEXIDINE GLUCONATE, 0.12% ORAL RINSE 15 ML: 1.2 SOLUTION DENTAL at 08:02

## 2025-02-26 RX ADMIN — ATORVASTATIN CALCIUM 20 MG: 20 TABLET, FILM COATED ORAL at 08:02

## 2025-02-26 RX ADMIN — POLYETHYLENE GLYCOL 3350 17 G: 17 POWDER, FOR SOLUTION ORAL at 08:02

## 2025-02-26 RX ADMIN — SODIUM CHLORIDE 500 ML: 9 INJECTION, SOLUTION INTRAVENOUS at 05:02

## 2025-02-26 RX ADMIN — LACTULOSE 30 G: 20 SOLUTION ORAL at 08:02

## 2025-02-26 RX ADMIN — HYDROMORPHONE HYDROCHLORIDE 2 MG: 2 INJECTION INTRAMUSCULAR; INTRAVENOUS; SUBCUTANEOUS at 08:02

## 2025-02-26 RX ADMIN — GUAIFENESIN 200 MG: 200 SOLUTION ORAL at 08:02

## 2025-02-26 RX ADMIN — MEROPENEM 1 G: 1 INJECTION, POWDER, FOR SOLUTION INTRAVENOUS at 03:02

## 2025-02-26 RX ADMIN — IPRATROPIUM BROMIDE AND ALBUTEROL SULFATE 3 ML: 2.5; .5 SOLUTION RESPIRATORY (INHALATION) at 01:02

## 2025-02-26 RX ADMIN — SODIUM CHLORIDE: 9 INJECTION, SOLUTION INTRAVENOUS at 11:02

## 2025-02-26 RX ADMIN — PANTOPRAZOLE SODIUM 40 MG: 40 INJECTION, POWDER, FOR SOLUTION INTRAVENOUS at 08:02

## 2025-02-26 RX ADMIN — SENNOSIDES AND DOCUSATE SODIUM 2 TABLET: 50; 8.6 TABLET ORAL at 08:02

## 2025-02-26 RX ADMIN — PREGABALIN 75 MG: 75 CAPSULE ORAL at 08:02

## 2025-02-26 RX ADMIN — PROPOFOL 5 MCG/KG/MIN: 10 INJECTION, EMULSION INTRAVENOUS at 08:02

## 2025-02-26 RX ADMIN — COLCHICINE 0.6 MG: 0.6 TABLET ORAL at 08:02

## 2025-02-26 RX ADMIN — IOPAMIDOL 100 ML: 755 INJECTION, SOLUTION INTRAVENOUS at 06:02

## 2025-02-26 RX ADMIN — PREDNISONE 5 MG: 5 TABLET ORAL at 08:02

## 2025-02-26 NOTE — ASSESSMENT & PLAN NOTE
Sedated for ventilator synchrony.   - analgesia: intermittent Fentanyl  - sedation: none  - RASS goal 0 to -1

## 2025-02-26 NOTE — PLAN OF CARE
Problem: Adult Inpatient Plan of Care  Goal: Plan of Care Review  Outcome: Progressing  Goal: Patient-Specific Goal (Individualized)  Outcome: Progressing  Goal: Absence of Hospital-Acquired Illness or Injury  Outcome: Progressing  Intervention: Identify and Manage Fall Risk  Flowsheets (Taken 2/26/2025 0426)  Safety Promotion/Fall Prevention:   assistive device/personal item within reach   pulse ox   side rails raised x 2   room near unit station  Goal: Optimal Comfort and Wellbeing  Outcome: Progressing  Goal: Readiness for Transition of Care  Outcome: Progressing     Problem: Pneumonia  Goal: Fluid Balance  Outcome: Progressing  Goal: Resolution of Infection Signs and Symptoms  Outcome: Progressing  Goal: Effective Oxygenation and Ventilation  Outcome: Progressing     Problem: Wound  Goal: Optimal Coping  Outcome: Progressing  Goal: Optimal Functional Ability  Outcome: Progressing  Intervention: Optimize Functional Ability  Flowsheets (Taken 2/26/2025 0426)  Activity Management:   Rolling - L1   Arm raise - L1   Ankle pumps - L1   Heel slide - L1  Activity Assistance Provided: assistance, 2 people  Goal: Absence of Infection Signs and Symptoms  Outcome: Progressing  Goal: Improved Oral Intake  Outcome: Progressing  Goal: Optimal Pain Control and Function  Outcome: Progressing  Goal: Skin Health and Integrity  Outcome: Progressing  Goal: Optimal Wound Healing  Outcome: Progressing     Problem: Skin Injury Risk Increased  Goal: Skin Health and Integrity  Outcome: Progressing  Intervention: Optimize Skin Protection  Flowsheets (Taken 2/26/2025 0426)  Pressure Reduction Techniques:   heels elevated off bed   pressure points protected   sit time limited to 2 hours   frequent weight shift encouraged   weight shift assistance provided  Skin Protection: incontinence pads utilized  Activity Management:   Rolling - L1   Arm raise - L1   Ankle pumps - L1   Heel slide - L1  Head of Bed (HOB) Positioning: HOB at 30-45  degrees     Problem: Airway Clearance Ineffective  Goal: Effective Airway Clearance  Outcome: Progressing  Intervention: Promote Airway Secretion Clearance  Flowsheets (Taken 2/26/2025 0426)  Activity Management:   Rolling - L1   Arm raise - L1   Ankle pumps - L1   Heel slide - L1     Problem: Gas Exchange Impaired  Goal: Optimal Gas Exchange  Outcome: Progressing  Intervention: Optimize Oxygenation and Ventilation  Flowsheets (Taken 2/26/2025 0426)  Airway/Ventilation Management: airway patency maintained  Head of Bed (HOB) Positioning: HOB at 30-45 degrees     Problem: Infection  Goal: Absence of Infection Signs and Symptoms  Outcome: Progressing  Intervention: Prevent or Manage Infection  Flowsheets (Taken 2/26/2025 0426)  Infection Management: aseptic technique maintained  Isolation Precautions: precautions maintained     Problem: Noninvasive Ventilation Acute  Goal: Effective Unassisted Ventilation and Oxygenation  Outcome: Progressing     Problem: Mechanical Ventilation Invasive  Goal: Effective Communication  Outcome: Progressing  Intervention: Ensure Effective Communication  Flowsheets (Taken 2/26/2025 0426)  Trust Relationship/Rapport: care explained  Goal: Optimal Device Function  Outcome: Progressing  Goal: Mechanical Ventilation Liberation  Outcome: Progressing  Goal: Optimal Nutrition Delivery  Outcome: Progressing  Goal: Absence of Device-Related Skin and Tissue Injury  Outcome: Progressing  Goal: Absence of Ventilator-Induced Lung Injury  Outcome: Progressing

## 2025-02-26 NOTE — PROGRESS NOTES
[facility]  Adult Nutrition  Follow-up Note         Reason for Assessment  Reason For Assessment: RD follow-up        Assessment and Plan    2/26/2025: RD follow up. Patient remains on Isosource 1.5. Feeds currently at 50mL/hour with a goal rate of 55mL/hour. Recommend continue to advance towards goal of 55mL/hour with 35mL/hour free water flush as tolerated. Last BM 2/25 per flowsheet. RD following.    2/22/2025: Consult for tube feedings received and appreciated. Patient is a 59 y.o. female admitted on 2/19 with a dx of acute hypoxemic respiratory failure, aspiration pneumonia and acute cystitis. Patient is currently NPO due to high risk of aspiration and hx of dysphagia and is pending further GI assessment. Patient is 90.8 kg with a BMI of 32.31. Last BM noted 2/14 with dx of chronic idiopathic constipation.     Recommend to initiate EN via NG tube with Isosource 1.5 at 10 ml/hr. Advance by 10 ml/by every 8 hours to goal of 55 ml/hr. Flush with 35 ml/hr free water hourly. Consider bowel regimen as constipation can affect EN tolerance. Monitor for s/s of intolerance (n/v/c/d) and report to RD. RD Following.       Learning Needs/Social Determinants of Health  Learning Assessment       02/15/2025 0254 [facility] (2/14/2025 - Present)   Created by Mesfin Colindres, RN - RN (Nurse) Status: Complete                 PRIMARY LEARNER     Primary Learner Name:  Karie Denney TG - 02/15/2025 0254    Relationship:  Patient TG - 02/15/2025 0254    Does the primary learner have any barriers to learning?:  No Barriers TG - 02/15/2025 0254    What is the preferred language of the primary learner?:  English TG - 02/15/2025 0254    Is an  required?:  No TG - 02/15/2025 0254    How does the primary learner prefer to learn new concepts?:  Listening TG - 02/15/2025 0254    How often do you need to have someone help you read instructions, pamphlets, or written material from  your doctor or pharmacy?:  Sometimes TG - 02/15/2025 0254        CO-LEARNER #1     No question answered        CO-LEARNER #2     No question answered        SPECIAL TOPICS     No question answered        ANSWERED BY:     No question answered        Comments         Edit History       Mesfin Colindres RN - RN (Nurse)   02/15/2025 0254                           Social Drivers of Health     Tobacco Use: Low Risk  (2/15/2025)    Patient History     Smoking Tobacco Use: Never     Smokeless Tobacco Use: Never     Passive Exposure: Not on file   Alcohol Use: Not At Risk (2/16/2025)    AUDIT-C     Frequency of Alcohol Consumption: Never     Average Number of Drinks: Patient does not drink     Frequency of Binge Drinking: Never   Financial Resource Strain: Low Risk  (2/16/2025)    Overall Financial Resource Strain (CARDIA)     Difficulty of Paying Living Expenses: Not hard at all   Food Insecurity: No Food Insecurity (2/16/2025)    Hunger Vital Sign     Worried About Running Out of Food in the Last Year: Never true     Ran Out of Food in the Last Year: Never true   Transportation Needs: Not At Risk (8/27/2024)    Received from [facility]    BOC Transportation Needs     Financial/Environmental Concerns: Unrecognized value   Physical Activity: Inactive (2/16/2025)    Exercise Vital Sign     Days of Exercise per Week: 0 days     Minutes of Exercise per Session: 0 min   Stress: No Stress Concern Present (2/16/2025)    [employer] [place] - Occupational Stress Questionnaire     Feeling of Stress : Not at all   Housing Stability: Low Risk  (2/16/2025)    Housing Stability Vital Sign     Unable to Pay for Housing in the Last Year: No     Number of Times Moved in the Last Year: Not on file     Homeless in the Last Year: No   Depression: Not on file   Utilities: Not At Risk (2/16/2025)    [facility] Utilities     Threatened with loss of utilities: No   Health Literacy: Inadequate Health  Literacy (2/16/2025)     Health Literacy     Frequency of need for help with medical instructions: Always   Social Isolation: Socially Integrated (2/16/2025)    Social Isolation     Social Isolation: 1            Malnutrition  Is Patient Malnourished: No    Nutrition Diagnosis  Inadequate energy intake related to Decreased ability to consume sufficient energy and Dysphagia/ difficulty swallowing as evidenced by NPO status with NG placed   Comments: Initiate EN as ordered     Recent Labs   Lab 02/26/25  0520 02/26/25  0539   GLU 98  --    POCGLU  --  118*     Comments on Glucose: noted; no pmh DM. Likely related to stress response to UTI    Nutrition Prescription / Recommendations  Recommendation/Intervention: Continue current POC as tolerated.  Goals: EN tolerance at goal  Nutrition Goal Status: progressing towards goal  Current Diet Order: NPO  Chewing or Swallowing Difficulty?: Swallowing difficulty  Recommended Diet: Enteral Nutrition  Recommended Oral Supplement: No Oral Supplements  Is Nutrition Support Recommended: yes    Needs Calculated  Energy Need Method: Kcal/kg Energy Calorie Requirements (kcal): 1759-6149  Protein Requirements: 73-91  Enteral Nutrition Isosource 1.5 at goal 55 ml/hr with 35 ml/hr water flush  Enteral Nutrition Formula Provides:  1980 kcals Propofol Rate: No  89 g Protein  222 g Carbohydrates  86 g Fat Propofol Rate: No  1026 ml Fluid without Flush    840 ml Fluid by flush   1866 ml Total Fluid  Enteral Nutrition Recommended Order:  Tube feeding via NG/ [place] Sump  Tube feeding formula: Isosource 1.5 NG/ [place] Sump at 55mL/hour  Free Water Flush: 35 ml hourly  Modular Supplements:Luis Fernando 2 times a day  Enteral Nutrition meets needs?: yes  Enteral Nutrition Status: Continue Enteral Nutrition  Is Nutrition Education Recommended: No    Monitor and Evaluation  % current Intake: Enteral Nutrition progressing to goal  % intake to meet estimated needs: Enteral Nutrition      Enteral Calories  (kcal): 1980  Enteral Protein (gm): 89  Enteral (Free Water) Fluid (mL): 1026  Free Water Flush Fluid (mL): 840  Total Fluid Intake (mL): 1866  Total Fluid Intake (mL/kg): 20    Current Medical Diagnosis and Past Medical History  Diagnosis: pulmonary disease  Past Medical History:   Diagnosis Date    GERD (gastroesophageal reflux disease)     Paraplegia        Nutrition/Diet History  Spiritual, Cultural Beliefs, Confucianist Practices, Values that Affect Care: no  Factors Affecting Nutritional Intake: difficulty/impaired swallowing, NPO    Lab/Procedures/Meds  Recent Labs   Lab 02/26/25  0520      K 4.4   BUN 18   CREATININE 0.53*   CALCIUM 7.9*   ALBUMIN 1.9*   CL 99   ALT 17   AST 38*   PHOS 3.8   Note: Cr, Ca++, Alb low.   Last A1c:   Lab Results   Component Value Date    HGBA1C 5.9 08/26/2024     Lab Results   Component Value Date    RBC 2.52 (L) 02/26/2025    HGB 5.6 (LL) 02/26/2025    HCT 18.7 (L) 02/26/2025    MCV 82.9 02/26/2025    MCH 23.8 (L) 02/26/2025    MCHC 28.7 (L) 02/26/2025   Note: H&H low    Pertinent Labs Reviewed: reviewed  Pertinent Medications Reviewed: reviewed  Scheduled Meds:   albuterol-ipratropium  3 mL Nebulization Q6H WAKE    atorvastatin  20 mg Per NG tube Daily    chlorhexidine  15 mL Mouth/Throat BID    colchicine  0.6 mg Per NG tube BID    enoxparin  40 mg Subcutaneous Daily    ergocalciferol  50,000 Units Oral Q7 Days    meropenem IV (PEDS and ADULTS)  1 g Intravenous Q8H    pantoprazole  40 mg Intravenous Daily    polyethylene glycol  17 g Per NG tube Daily    predniSONE  5 mg Per NG tube Daily    pregabalin  75 mg Per NG tube QHS    senna-docusate 8.6-50 mg  1 tablet Per NG tube BID     Continuous Infusions:   NORepinephrine bitartrate-D5W  0-3 mcg/kg/min Intravenous Continuous 16.4 mL/hr at 02/26/25 0633 0.38 mcg/kg/min at 02/26/25 0633     PRN Meds:.  Current Facility-Administered Medications:     0.9%  NaCl infusion (for blood administration), , Intravenous, Q24H PRN     "acetaminophen, 650 mg, Per NG tube, Q6H PRN    albuterol sulfate, 2.5 mg, Nebulization, Q4H PRN    dextrose 50%, 12.5 g, Intravenous, PRN    [] fentaNYL, 50 mcg, Intravenous, Q15 Min PRN **FOLLOWED BY** fentaNYL, 50 mcg, Intravenous, Q1H PRN    glucagon (human recombinant), 1 mg, Intramuscular, PRN    glucose, 16 g, Per NG tube, PRN    glucose, 24 g, Per NG tube, PRN    insulin aspart U-100, 0-5 Units, Subcutaneous, Q6H PRN    naloxone, 0.02 mg, Intravenous, PRN    ondansetron, 4 mg, Intravenous, Q8H PRN    prochlorperazine, 5 mg, Intravenous, Q6H PRN    sodium chloride 0.9%, 10 mL, Intravenous, Q12H PRN    Anthropometrics  Height: 5' 6" (167.6 cm)  Height (inches): 66 in  Weight: 92 kg (202 lb 13.2 oz)  Weight (lb): 202.83 lb  Weight Method: Bed Scale  Ideal Body Weight (IBW), Female: 130 lb  % Ideal Body Weight, Female (lb): 146.15 %  BMI (Calculated): 32.8  BMI Grade: 30 - 34.9- obesity - grade I       Estimated/Assessed Needs    Total Ve: 8.36 L/m Temp: 96.6 °F (35.9 °C)Core Bladder  Weight Used For Calorie Calculations: 90.8 kg (200 lb 2.8 oz)   Energy Need Method: Kcal/kg Energy Calorie Requirements (kcal): 5586-5251  Weight Used For Protein Calculations: 90.8 kg (200 lb 2.8 oz)  Protein Requirements: 73-91  Estimated Fluid Requirement Method: RDA Method    RDA Method (mL): 1816       Nutrition by Nursing  Diet/Nutrition Received: tube feeding           Last Bowel Movement: 25       NG/OG Tube 25 1252 Left nostril-Feeding Type: continuous, by pump       NG/OG Tube 25 1252 Left nostril-Current Rate (mL/hr): 40 mL/hr       NG/OG Tube 25 1252 Left nostril-Goal Rate (mL/hr): 55 mL/hr       NG/OG Tube 25 1252 Left nostril-Formula Name: Jlsoyann 1.5    Nutrition Follow-Up  RD Follow-up?: Yes      Nutrition Discharge Planning: RD following for dc needs          Available via Secure Chat  "

## 2025-02-26 NOTE — SUBJECTIVE & OBJECTIVE
Interval History/Significant Events:  Read remains pending for CT-A LE; requested surgery evaluation of images overnight with suspicion of bleed now further supported by clinical evidence of bleed with expanding territory and need for blood transfusion, remains on pressor support, minimal vent settings remain, successful BM     Review of Systems  Objective:     Vital Signs (Most Recent):  Temp: 98.7 °F (37.1 °C) (02/28/25 0800)  Pulse: (!) 111 (02/28/25 0900)  Resp: 15 (02/28/25 0900)  BP: (!) 87/40 (02/28/25 0900)  SpO2: 95 % (02/28/25 0900) Vital Signs (24h Range):  Temp:  [98.6 °F (37 °C)-99.9 °F (37.7 °C)] 98.7 °F (37.1 °C)  Pulse:  [] 111  Resp:  [12-25] 15  SpO2:  [93 %-100 %] 95 %  BP: ()/(31-64) 87/40   Weight: 92 kg (202 lb 13.2 oz)  Body mass index is 32.74 kg/m².      Intake/Output Summary (Last 24 hours) at 2/28/2025 0921  Last data filed at 2/28/2025 0700  Gross per 24 hour   Intake 1668.75 ml   Output 1535 ml   Net 133.75 ml          Physical Exam  Constitutional:       General: She is not in acute distress.     Appearance: She is ill-appearing. She is not toxic-appearing.   HENT:      Head: Normocephalic and atraumatic.      Mouth/Throat:      Mouth: Mucous membranes are moist.   Eyes:      General: No scleral icterus.  Neck:      Comments: Diminished ROM  Cardiovascular:      Rate and Rhythm: Regular rhythm. Tachycardia present.      Heart sounds: Normal heart sounds. No murmur heard.     No friction rub.   Pulmonary:      Breath sounds: No wheezing or rhonchi.   Abdominal:      Palpations: Abdomen is soft.      Tenderness: There is no abdominal tenderness. There is no guarding.   Musculoskeletal:      Right lower leg: Edema present.      Left lower leg: Edema present.      Comments: Chronic lower extremity dependent edema, bilaterally symmetrical   Neurological:      Comments: Chronic contractures of UEs, communicating with eye blinking and attempts at mouthing words             Vents:  Vent Mode: A/CMV-VC (02/28/25 0704)  Ventilator Initiated: Yes (02/24/25 1343)  Set Rate: 16 BPM (02/28/25 0704)  Vt Set: 480 mL (02/28/25 0704)  PEEP/CPAP: 8 cmH20 (02/28/25 0704)  Oxygen Concentration (%): 30 (02/28/25 0704)  Peak Airway Pressure: 25.7 cmH20 (02/28/25 0704)  Plateau Pressure: 20.4 cmH20 (02/28/25 0704)  Total Ve: 7.69 L/m (02/28/25 0704)  F/VT Ratio<105 (RSBI): (!) 43.66 (02/28/25 0704)  Lines/Drains/Airways       Central Venous Catheter Line  Duration             Percutaneous Central Line - Triple Lumen  02/25/25 1340 2 days              Drain  Duration                  NG/OG Tube 02/21/25 1252 Left nostril 6 days         Urethral Catheter 02/24/25 1623 3 days              Airway  Duration                  Airway - Non-Surgical 02/24/25 1343 Endotracheal Tube 3 days              Peripheral Intravenous Line  Duration                  Peripheral IV - Single Lumen 02/19/25 1600 20 G Anterior;Left Upper Arm 8 days                  Significant Labs:    CBC/Anemia Profile:  Recent Labs   Lab 02/27/25  0537 02/27/25  0823 02/28/25  0019 02/28/25  0559 02/28/25  0824   WBC 24.62*  --   --  18.50*  --    HGB 8.4*   < > 6.7* 7.6* 7.7*   HCT 25.8*   < > 21.2* 24.5* 24.7*     --   --  171  --    MCV 87.8  --   --  88.1  --    RDW 15.9*  --   --  17.6*  --     < > = values in this interval not displayed.        Chemistries:  Recent Labs   Lab 02/27/25  0537 02/28/25  0559    141   K 4.3 4.3    105   CO2 29 30   BUN 19 16   CREATININE 0.48* 0.49*   CALCIUM 7.8* 8.0*   ALBUMIN 2.0* 2.0*   PROT 5.2* 4.9*   BILITOT 0.3 0.4   ALKPHOS 74 87   ALT 15 28   AST 36* 77*   MG 2.0 2.2   PHOS 2.5 2.0*       All pertinent labs within the past 24 hours have been reviewed.    Significant Imaging:  I have reviewed all pertinent imaging results/findings within the past 24 hours.
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
## 2025-02-26 NOTE — PLAN OF CARE
02/26/25 1257   Rounds   Attendance Provider;;Nurse;Pharmacist   Discharge Plan A Home   Why the patient remains in the hospital Requires continued medical care   Transition of Care Barriers None     Patient reviewed this am,.  Discharge plan is to return home currently.  LCSW to continue to follow for anticipated discharge needs.

## 2025-02-26 NOTE — HOSPITAL COURSE
She was transferred to ICU 02/19 for progressive respiratory failure with acute hypercapnic and hypoxemic respiratory failure which required NIV.  Additional workup who was also notable for a pericardial effusion for which Cardiology was consulted with recommendation for initiation of colchicine for 6 weeks as part of treatment of an inflammatory pericarditis.  There was ongoing concern of recurrent aspiration with the assessment including barium swallow with aspiration event that further complicated her respiratory status.  She improved on HFNC, however, developed mechanical respiratory failure with difficulty with secretion management with low VTE despite escalation to AVAPS.  She was intubated 02/24; difficult intubation expected given limited ROM of her cervical spine and she also had edema of her epiglottis due to chronic LPR.  Post intubation, she had right middle lobe collapse and she underwent therapeutic aspiration with bronchoscopy where she was found to have extensive mucus plugging.  Course thereafter with plan for SBT of which her mechanical respiratory failure was attributed to weakness.  She had significant pyrexia on 02/25 which is now attributed to drug fever secondary to Precedex with NMS and malignant hyperthermia ruled out.  Her course has been complicated by a spontaneous right lower extremity posterior compartment bleed while having no interventions including no venous or arterial sticks to that leg.  She has required pressor support and serial blood transfusions for which pfc transfer was initiated at time of identification of the bleed to facilitate IR intervention.  CTA lower extremity was obtained with surgery reviewing images while pending official read by Radiology suspicious for ongoing bleeding with clinical evidence of hematoma expansion.  Compression of the limb considered, however, assessment for a compartment is limited due to her paraplegia which further necessitates intervention.   She is accepted for transfer 02/28 a.m. with ongoing planning.  Family updated.

## 2025-02-26 NOTE — PLAN OF CARE
Problem: Adult Inpatient Plan of Care  Goal: Plan of Care Review  Outcome: Progressing  Goal: Patient-Specific Goal (Individualized)  Outcome: Progressing  Goal: Absence of Hospital-Acquired Illness or Injury  Outcome: Progressing  Goal: Optimal Comfort and Wellbeing  Outcome: Progressing  Goal: Readiness for Transition of Care  Outcome: Progressing     Problem: Pneumonia  Goal: Fluid Balance  Outcome: Progressing  Goal: Resolution of Infection Signs and Symptoms  Outcome: Progressing  Goal: Effective Oxygenation and Ventilation  Outcome: Progressing     Problem: Wound  Goal: Optimal Coping  Outcome: Progressing  Goal: Optimal Functional Ability  Outcome: Progressing  Goal: Absence of Infection Signs and Symptoms  Outcome: Progressing  Goal: Improved Oral Intake  Outcome: Progressing  Goal: Optimal Pain Control and Function  Outcome: Progressing  Goal: Skin Health and Integrity  Outcome: Progressing  Goal: Optimal Wound Healing  Outcome: Progressing     Problem: Skin Injury Risk Increased  Goal: Skin Health and Integrity  Outcome: Progressing     Problem: Airway Clearance Ineffective  Goal: Effective Airway Clearance  Outcome: Progressing     Problem: Gas Exchange Impaired  Goal: Optimal Gas Exchange  Outcome: Progressing     Problem: Infection  Goal: Absence of Infection Signs and Symptoms  Outcome: Progressing     Problem: Noninvasive Ventilation Acute  Goal: Effective Unassisted Ventilation and Oxygenation  Outcome: Progressing     Problem: Mechanical Ventilation Invasive  Goal: Effective Communication  Outcome: Progressing  Goal: Optimal Device Function  Outcome: Progressing  Goal: Mechanical Ventilation Liberation  Outcome: Progressing  Goal: Optimal Nutrition Delivery  Outcome: Progressing  Goal: Absence of Device-Related Skin and Tissue Injury  Outcome: Progressing  Goal: Absence of Ventilator-Induced Lung Injury  Outcome: Progressing

## 2025-02-26 NOTE — PLAN OF CARE
Problem: Adult Inpatient Plan of Care  Goal: Plan of Care Review  Outcome: Progressing     Problem: Airway Clearance Ineffective  Goal: Effective Airway Clearance  Outcome: Progressing     Problem: Gas Exchange Impaired  Goal: Optimal Gas Exchange  Outcome: Progressing     Problem: Mechanical Ventilation Invasive  Goal: Effective Communication  Outcome: Progressing

## 2025-02-26 NOTE — NURSING
1637: Call placed to Radiology (Hosp) ext [phone], no answer    1639: Call placed to Radiology at ext [phone], no answer    1640: Call placed to Radiology at ext [phone], Hospital CT techs have left for today. Call ED CT dept    1642: Call placed to ED CT, spoke with Jessica. She currently has a couple ED patients to scan and will call me when table is clear.

## 2025-02-27 LAB
ABO + RH BLD: NORMAL
ALBUMIN SERPL BCP-MCNC: 2 G/DL (ref 3.4–4.8)
ALBUMIN/GLOB SERPL: 0.6 {RATIO}
ALP SERPL-CCNC: 74 U/L (ref 40–150)
ALT SERPL W P-5'-P-CCNC: 15 U/L
ANION GAP SERPL CALCULATED.3IONS-SCNC: 13 MMOL/L (ref 7–16)
ANISOCYTOSIS BLD QL SMEAR: ABNORMAL
AST SERPL W P-5'-P-CCNC: 36 U/L (ref 5–34)
BASOPHILS # BLD AUTO: 0.09 K/UL (ref 0–0.2)
BASOPHILS NFR BLD AUTO: 0.4 % (ref 0–1)
BILIRUB SERPL-MCNC: 0.3 MG/DL
BLD PROD TYP BPU: NORMAL
BLOOD UNIT EXPIRATION DATE: NORMAL
BLOOD UNIT TYPE CODE: 6200
BUN SERPL-MCNC: 19 MG/DL (ref 10–20)
BUN/CREAT SERPL: 40 (ref 6–20)
CALCIUM SERPL-MCNC: 7.8 MG/DL (ref 8.4–10.2)
CHLORIDE SERPL-SCNC: 102 MMOL/L (ref 98–107)
CK SERPL-CCNC: 618 U/L (ref 29–168)
CO2 SERPL-SCNC: 29 MMOL/L (ref 23–31)
CREAT SERPL-MCNC: 0.48 MG/DL (ref 0.55–1.02)
CROSSMATCH INTERPRETATION: NORMAL
DIFFERENTIAL METHOD BLD: ABNORMAL
DISPENSE STATUS: NORMAL
EGFR (NO RACE VARIABLE) (RUSH/TITUS): 108 ML/MIN/1.73M2
EOSINOPHIL # BLD AUTO: 0.25 K/UL (ref 0–0.5)
EOSINOPHIL NFR BLD AUTO: 1 % (ref 1–4)
ERYTHROCYTE [DISTWIDTH] IN BLOOD BY AUTOMATED COUNT: 15.9 % (ref 11.5–14.5)
GLOBULIN SER-MCNC: 3.2 G/DL (ref 2–4)
GLUCOSE SERPL-MCNC: 106 MG/DL (ref 82–115)
GLUCOSE SERPL-MCNC: 113 MG/DL (ref 70–105)
GLUCOSE SERPL-MCNC: 117 MG/DL (ref 70–105)
GLUCOSE SERPL-MCNC: 124 MG/DL (ref 70–105)
HCT VFR BLD AUTO: 23 % (ref 38–47)
HCT VFR BLD AUTO: 23 % (ref 38–47)
HCT VFR BLD AUTO: 24.8 % (ref 38–47)
HCT VFR BLD AUTO: 25.7 % (ref 38–47)
HCT VFR BLD AUTO: 25.8 % (ref 38–47)
HGB BLD-MCNC: 7.3 G/DL (ref 12–16)
HGB BLD-MCNC: 7.3 G/DL (ref 12–16)
HGB BLD-MCNC: 8 G/DL (ref 12–16)
HGB BLD-MCNC: 8 G/DL (ref 12–16)
HGB BLD-MCNC: 8.4 G/DL (ref 12–16)
IMM GRANULOCYTES # BLD AUTO: 0.98 K/UL (ref 0–0.04)
IMM GRANULOCYTES NFR BLD: 4 % (ref 0–0.4)
LYMPHOCYTES # BLD AUTO: 2.89 K/UL (ref 1–4.8)
LYMPHOCYTES NFR BLD AUTO: 11.7 % (ref 27–41)
LYMPHOCYTES NFR BLD MANUAL: 15 % (ref 27–41)
MAGNESIUM SERPL-MCNC: 2 MG/DL (ref 1.6–2.6)
MCH RBC QN AUTO: 28.6 PG (ref 27–31)
MCHC RBC AUTO-ENTMCNC: 32.6 G/DL (ref 32–36)
MCV RBC AUTO: 87.8 FL (ref 80–96)
MONOCYTES # BLD AUTO: 2.44 K/UL (ref 0–0.8)
MONOCYTES NFR BLD AUTO: 9.9 % (ref 2–6)
MONOCYTES NFR BLD MANUAL: 7 % (ref 2–6)
MPC BLD CALC-MCNC: 11.4 FL (ref 9.4–12.4)
NEUTROPHILS # BLD AUTO: 17.97 K/UL (ref 1.8–7.7)
NEUTROPHILS NFR BLD AUTO: 73 % (ref 53–65)
NEUTS BAND NFR BLD MANUAL: 4 % (ref 1–5)
NEUTS SEG NFR BLD MANUAL: 74 % (ref 50–62)
NRBC # BLD AUTO: 0.2 X10E3/UL
NRBC, AUTO (.00): 0.8 %
OVALOCYTES BLD QL SMEAR: ABNORMAL
PHOSPHATE SERPL-MCNC: 2.5 MG/DL (ref 2.3–4.7)
PLATELET # BLD AUTO: 225 K/UL (ref 150–400)
PLATELET MORPHOLOGY: ABNORMAL
POLYCHROMASIA BLD QL SMEAR: ABNORMAL
POTASSIUM SERPL-SCNC: 4.3 MMOL/L (ref 3.5–5.1)
PROT SERPL-MCNC: 5.2 G/DL (ref 5.8–7.6)
RBC # BLD AUTO: 2.94 M/UL (ref 4.2–5.4)
SMUDGE CELLS BLD QL SMEAR: ABNORMAL
SODIUM SERPL-SCNC: 140 MMOL/L (ref 136–145)
UNIT NUMBER: NORMAL
WBC # BLD AUTO: 24.62 K/UL (ref 4.5–11)

## 2025-02-27 PROCEDURE — 63600175 PHARM REV CODE 636 W HCPCS

## 2025-02-27 PROCEDURE — 99900035 HC TECH TIME PER 15 MIN (STAT)

## 2025-02-27 PROCEDURE — 94761 N-INVAS EAR/PLS OXIMETRY MLT: CPT

## 2025-02-27 PROCEDURE — 84100 ASSAY OF PHOSPHORUS: CPT

## 2025-02-27 PROCEDURE — 25000242 PHARM REV CODE 250 ALT 637 W/ HCPCS

## 2025-02-27 PROCEDURE — 27000207 HC ISOLATION

## 2025-02-27 PROCEDURE — P9016 RBC LEUKOCYTES REDUCED: HCPCS | Performed by: INTERNAL MEDICINE

## 2025-02-27 PROCEDURE — 36415 COLL VENOUS BLD VENIPUNCTURE: CPT | Performed by: STUDENT IN AN ORGANIZED HEALTH CARE EDUCATION/TRAINING PROGRAM

## 2025-02-27 PROCEDURE — 36430 TRANSFUSION BLD/BLD COMPNT: CPT

## 2025-02-27 PROCEDURE — 25000003 PHARM REV CODE 250: Performed by: STUDENT IN AN ORGANIZED HEALTH CARE EDUCATION/TRAINING PROGRAM

## 2025-02-27 PROCEDURE — 80053 COMPREHEN METABOLIC PANEL: CPT

## 2025-02-27 PROCEDURE — 85025 COMPLETE CBC W/AUTO DIFF WBC: CPT | Performed by: STUDENT IN AN ORGANIZED HEALTH CARE EDUCATION/TRAINING PROGRAM

## 2025-02-27 PROCEDURE — 99900026 HC AIRWAY MAINTENANCE (STAT)

## 2025-02-27 PROCEDURE — 83735 ASSAY OF MAGNESIUM: CPT

## 2025-02-27 PROCEDURE — 99291 CRITICAL CARE FIRST HOUR: CPT | Mod: ,,, | Performed by: STUDENT IN AN ORGANIZED HEALTH CARE EDUCATION/TRAINING PROGRAM

## 2025-02-27 PROCEDURE — 27000221 HC OXYGEN, UP TO 24 HOURS

## 2025-02-27 PROCEDURE — 94668 MNPJ CHEST WALL SBSQ: CPT

## 2025-02-27 PROCEDURE — 82550 ASSAY OF CK (CPK): CPT | Performed by: STUDENT IN AN ORGANIZED HEALTH CARE EDUCATION/TRAINING PROGRAM

## 2025-02-27 PROCEDURE — 94003 VENT MGMT INPAT SUBQ DAY: CPT

## 2025-02-27 PROCEDURE — 82962 GLUCOSE BLOOD TEST: CPT

## 2025-02-27 PROCEDURE — 11000001 HC ACUTE MED/SURG PRIVATE ROOM

## 2025-02-27 PROCEDURE — 25000003 PHARM REV CODE 250: Performed by: INTERNAL MEDICINE

## 2025-02-27 PROCEDURE — 94640 AIRWAY INHALATION TREATMENT: CPT

## 2025-02-27 PROCEDURE — 86923 COMPATIBILITY TEST ELECTRIC: CPT | Performed by: INTERNAL MEDICINE

## 2025-02-27 PROCEDURE — 25500020 PHARM REV CODE 255: Performed by: STUDENT IN AN ORGANIZED HEALTH CARE EDUCATION/TRAINING PROGRAM

## 2025-02-27 PROCEDURE — 63600175 PHARM REV CODE 636 W HCPCS: Performed by: STUDENT IN AN ORGANIZED HEALTH CARE EDUCATION/TRAINING PROGRAM

## 2025-02-27 PROCEDURE — 85018 HEMOGLOBIN: CPT | Performed by: STUDENT IN AN ORGANIZED HEALTH CARE EDUCATION/TRAINING PROGRAM

## 2025-02-27 RX ORDER — HYDROCODONE BITARTRATE AND ACETAMINOPHEN 500; 5 MG/1; MG/1
TABLET ORAL
Status: DISCONTINUED | OUTPATIENT
Start: 2025-02-27 | End: 2025-02-28 | Stop reason: HOSPADM

## 2025-02-27 RX ORDER — IOPAMIDOL 755 MG/ML
100 INJECTION, SOLUTION INTRAVASCULAR
Status: COMPLETED | OUTPATIENT
Start: 2025-02-27 | End: 2025-02-27

## 2025-02-27 RX ADMIN — POLYETHYLENE GLYCOL 3350 17 G: 17 POWDER, FOR SOLUTION ORAL at 08:02

## 2025-02-27 RX ADMIN — LACTULOSE 30 G: 20 SOLUTION ORAL at 08:02

## 2025-02-27 RX ADMIN — ATORVASTATIN CALCIUM 20 MG: 20 TABLET, FILM COATED ORAL at 08:02

## 2025-02-27 RX ADMIN — IPRATROPIUM BROMIDE AND ALBUTEROL SULFATE 3 ML: 2.5; .5 SOLUTION RESPIRATORY (INHALATION) at 07:02

## 2025-02-27 RX ADMIN — IPRATROPIUM BROMIDE AND ALBUTEROL SULFATE 3 ML: 2.5; .5 SOLUTION RESPIRATORY (INHALATION) at 01:02

## 2025-02-27 RX ADMIN — SODIUM CHLORIDE: 9 INJECTION, SOLUTION INTRAVENOUS at 01:02

## 2025-02-27 RX ADMIN — PROPOFOL 10 MCG/KG/MIN: 10 INJECTION, EMULSION INTRAVENOUS at 04:02

## 2025-02-27 RX ADMIN — PREDNISONE 5 MG: 5 TABLET ORAL at 08:02

## 2025-02-27 RX ADMIN — SENNOSIDES AND DOCUSATE SODIUM 2 TABLET: 50; 8.6 TABLET ORAL at 08:02

## 2025-02-27 RX ADMIN — PREGABALIN 75 MG: 75 CAPSULE ORAL at 08:02

## 2025-02-27 RX ADMIN — PROPOFOL 15 MCG/KG/MIN: 10 INJECTION, EMULSION INTRAVENOUS at 07:02

## 2025-02-27 RX ADMIN — CHLORHEXIDINE GLUCONATE, 0.12% ORAL RINSE 15 ML: 1.2 SOLUTION DENTAL at 08:02

## 2025-02-27 RX ADMIN — PANTOPRAZOLE SODIUM 40 MG: 40 INJECTION, POWDER, FOR SOLUTION INTRAVENOUS at 08:02

## 2025-02-27 RX ADMIN — IOPAMIDOL 100 ML: 755 INJECTION, SOLUTION INTRAVENOUS at 01:02

## 2025-02-27 NOTE — PLAN OF CARE
Problem: Adult Inpatient Plan of Care  Goal: Plan of Care Review  2/27/2025 1405 by Wyatt Gage, RRT  Outcome: Progressing  2/27/2025 1404 by Wyatt Gage, RRT  Outcome: Progressing     Problem: Airway Clearance Ineffective  Goal: Effective Airway Clearance  2/27/2025 1405 by Wyatt Gage, RRT  Outcome: Progressing  2/27/2025 1404 by Wyatt Gage, RRT  Outcome: Progressing     Problem: Gas Exchange Impaired  Goal: Optimal Gas Exchange  2/27/2025 1405 by Wyatt Gage, RRT  Outcome: Progressing  2/27/2025 1404 by Wyatt Gage, RRT  Outcome: Progressing     Problem: Mechanical Ventilation Invasive  Goal: Effective Communication  Outcome: Progressing

## 2025-02-27 NOTE — ASSESSMENT & PLAN NOTE
2/2 spontaneous RLE bleeding w/ hematoma. No trauma. DIC was considered and unlikely given PIV and central line site stability as well as labs. Ongoing bleeding with expansion of hematoma. Requiring serial transfusion.   - cont serial H/H  - stop VTE ppx and maintain hold on colchicine  - transfuse for Hgb  <7   -- s/p 3x pRBCs since 02/26, last 02/28

## 2025-02-27 NOTE — ASSESSMENT & PLAN NOTE
2/2 acute blood loss. S/p blood transfusion. Levophed for goal MAP >60.   - blood transfusion as below  - MAP goal >60

## 2025-02-27 NOTE — PLAN OF CARE
Problem: Adult Inpatient Plan of Care  Goal: Plan of Care Review  Outcome: Progressing  Goal: Patient-Specific Goal (Individualized)  Outcome: Progressing  Goal: Absence of Hospital-Acquired Illness or Injury  Outcome: Progressing  Intervention: Identify and Manage Fall Risk  Flowsheets (Taken 2/27/2025 0443)  Safety Promotion/Fall Prevention:   assistive device/personal item within reach   pulse ox   room near unit station   side rails raised x 2  Goal: Optimal Comfort and Wellbeing  Outcome: Progressing  Goal: Readiness for Transition of Care  Outcome: Progressing     Problem: Pneumonia  Goal: Fluid Balance  Outcome: Progressing  Goal: Resolution of Infection Signs and Symptoms  Outcome: Progressing  Goal: Effective Oxygenation and Ventilation  Outcome: Progressing     Problem: Wound  Goal: Optimal Coping  Outcome: Progressing  Goal: Optimal Functional Ability  Outcome: Progressing  Intervention: Optimize Functional Ability  Flowsheets (Taken 2/27/2025 0443)  Activity Management:   Arm raise - L1   Heel slide - L1   Rolling - L1   Ankle pumps - L1  Activity Assistance Provided: assistance, 2 people  Goal: Absence of Infection Signs and Symptoms  Outcome: Progressing  Goal: Improved Oral Intake  Outcome: Progressing  Goal: Optimal Pain Control and Function  Outcome: Progressing  Goal: Skin Health and Integrity  Outcome: Progressing  Goal: Optimal Wound Healing  Outcome: Progressing     Problem: Skin Injury Risk Increased  Goal: Skin Health and Integrity  Outcome: Progressing     Problem: Airway Clearance Ineffective  Goal: Effective Airway Clearance  Outcome: Progressing     Problem: Gas Exchange Impaired  Goal: Optimal Gas Exchange  Outcome: Progressing     Problem: Infection  Goal: Absence of Infection Signs and Symptoms  Outcome: Progressing     Problem: Noninvasive Ventilation Acute  Goal: Effective Unassisted Ventilation and Oxygenation  Outcome: Progressing     Problem: Mechanical Ventilation  Invasive  Goal: Effective Communication  Outcome: Progressing  Goal: Optimal Device Function  Outcome: Progressing  Goal: Mechanical Ventilation Liberation  Outcome: Progressing  Intervention: Promote Extubation and Mechanical Ventilation Liberation  Flowsheets (Taken 2/27/2025 0443)  Sleep/Rest Enhancement: awakenings minimized  Environmental Support:   calm environment promoted   environmental consistency promoted  Medication Review/Management: medications reviewed  Goal: Optimal Nutrition Delivery  Outcome: Progressing  Goal: Absence of Device-Related Skin and Tissue Injury  Outcome: Progressing  Goal: Absence of Ventilator-Induced Lung Injury  Outcome: Progressing

## 2025-02-27 NOTE — PLAN OF CARE
Problem: Adult Inpatient Plan of Care  Goal: Plan of Care Review  Outcome: Progressing  Goal: Patient-Specific Goal (Individualized)  Outcome: Progressing  Goal: Absence of Hospital-Acquired Illness or Injury  Outcome: Progressing  Goal: Optimal Comfort and Wellbeing  Outcome: Progressing  Goal: Readiness for Transition of Care  Outcome: Progressing     Problem: Pneumonia  Goal: Fluid Balance  Outcome: Progressing  Goal: Resolution of Infection Signs and Symptoms  Outcome: Progressing  Goal: Effective Oxygenation and Ventilation  Outcome: Progressing     Problem: Airway Clearance Ineffective  Goal: Effective Airway Clearance  Outcome: Progressing     Problem: Gas Exchange Impaired  Goal: Optimal Gas Exchange  Outcome: Progressing     Problem: Mechanical Ventilation Invasive  Goal: Effective Communication  Outcome: Progressing  Goal: Optimal Device Function  Outcome: Progressing  Goal: Mechanical Ventilation Liberation  Outcome: Progressing  Goal: Optimal Nutrition Delivery  Outcome: Progressing  Goal: Absence of Device-Related Skin and Tissue Injury  Outcome: Progressing  Goal: Absence of Ventilator-Induced Lung Injury  Outcome: Progressing

## 2025-02-27 NOTE — ASSESSMENT & PLAN NOTE
Managing as acute pericarditis as per Cardiology recs; associated chest pain with small effusion. RA positive; RA vs idiopathic.  - Cardiology recs: Colchicine BID x3 months   -- holding 02/28 for spontaneous bleeding  - cont low dose prednisone for concern of CTD pericarditis with prednisone 5 mg Qday  - repeat TTE 03/10

## 2025-02-27 NOTE — ASSESSMENT & PLAN NOTE
Chronic. Significant stool burden on CT imaging. Escalating bowel regimen to include Senna, lactulose and miralax.   - successful BM 02/28   Iron does not usually cause early periods, I would continue to monitor over another 2 cycles. If this persists, she may need to be reevaluated by gyn.    As for her constipation,  I recommend a daily docusate (stool softener) and she can try taking the iron every other day instead of daily.     I also recommend that she take the laxative once daily for up to 1 week until her BMs are soft, without straining, and complete.  The laxative I recommend is either sennakot OR miralax.,  For her to schedule an appt to be evaluated if not resolved or feeling worse.

## 2025-02-27 NOTE — RESPIRATORY THERAPY
PLACED ON CPAP @ 10:25 - 13:50 WITH 5 PEEP , 30 FIO2 AND 8 PRESSURE SUPPORT . STOPPED BY DOCTOR  DUE TO TACHYPNEA  . 1ST TRIAL  DONE TODAY .
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
## 2025-02-27 NOTE — ASSESSMENT & PLAN NOTE
Spontaneous bleeding of RLE posterior compartment while on prophylactic dosing on Lovenox. No trauma or interventions to vessel. Exam with non pulsatile lesion. Will trend H/H and reassess size serially. Will consider IR intervention for expansion. Will monitor for limb compartment with at least daily CK; quadriplegia further complicates subjective assessment. Pain management with intermittent fentanyl.  - LE CT-A obtained with read pending  - clinical expansion of hematoma with ongoing blood transfusion requirements  - deferring pressure dressing for bleeding due to concern of evolving compartment; CK has remained stable at 600s and clinical evaluation limited by patient's paraplegia at baseline  - accepted for IR intervention at [facility]

## 2025-02-28 VITALS
SYSTOLIC BLOOD PRESSURE: 88 MMHG | HEIGHT: 66 IN | TEMPERATURE: 99 F | DIASTOLIC BLOOD PRESSURE: 38 MMHG | RESPIRATION RATE: 17 BRPM | HEART RATE: 103 BPM | BODY MASS INDEX: 32.59 KG/M2 | OXYGEN SATURATION: 99 % | WEIGHT: 202.81 LBS

## 2025-02-28 LAB
ABO + RH BLD: NORMAL
ALBUMIN SERPL BCP-MCNC: 2 G/DL (ref 3.4–4.8)
ALBUMIN/GLOB SERPL: 0.7 {RATIO}
ALP SERPL-CCNC: 87 U/L (ref 40–150)
ALT SERPL W P-5'-P-CCNC: 28 U/L
ANION GAP SERPL CALCULATED.3IONS-SCNC: 10 MMOL/L (ref 7–16)
ANISOCYTOSIS BLD QL SMEAR: ABNORMAL
AST SERPL W P-5'-P-CCNC: 77 U/L (ref 5–34)
BASOPHILS # BLD AUTO: 0.07 K/UL (ref 0–0.2)
BASOPHILS NFR BLD AUTO: 0.4 % (ref 0–1)
BILIRUB SERPL-MCNC: 0.4 MG/DL
BLD PROD TYP BPU: NORMAL
BLOOD UNIT EXPIRATION DATE: NORMAL
BLOOD UNIT TYPE CODE: 6200
BUN SERPL-MCNC: 16 MG/DL (ref 10–20)
BUN/CREAT SERPL: 33 (ref 6–20)
CALCIUM SERPL-MCNC: 8 MG/DL (ref 8.4–10.2)
CHLORIDE SERPL-SCNC: 105 MMOL/L (ref 98–107)
CK SERPL-CCNC: 603 U/L (ref 29–168)
CO2 SERPL-SCNC: 30 MMOL/L (ref 23–31)
CREAT SERPL-MCNC: 0.49 MG/DL (ref 0.55–1.02)
CROSSMATCH INTERPRETATION: NORMAL
DIFFERENTIAL METHOD BLD: ABNORMAL
DISPENSE STATUS: NORMAL
EGFR (NO RACE VARIABLE) (RUSH/TITUS): 107 ML/MIN/1.73M2
EOSINOPHIL # BLD AUTO: 0.27 K/UL (ref 0–0.5)
EOSINOPHIL NFR BLD AUTO: 1.5 % (ref 1–4)
EOSINOPHIL NFR BLD MANUAL: 1 % (ref 1–4)
ERYTHROCYTE [DISTWIDTH] IN BLOOD BY AUTOMATED COUNT: 17.6 % (ref 11.5–14.5)
GLOBULIN SER-MCNC: 2.9 G/DL (ref 2–4)
GLUCOSE SERPL-MCNC: 102 MG/DL (ref 70–105)
GLUCOSE SERPL-MCNC: 104 MG/DL (ref 70–105)
GLUCOSE SERPL-MCNC: 88 MG/DL (ref 82–115)
HCT VFR BLD AUTO: 21.2 % (ref 38–47)
HCT VFR BLD AUTO: 24.5 % (ref 38–47)
HCT VFR BLD AUTO: 24.7 % (ref 38–47)
HGB BLD-MCNC: 6.7 G/DL (ref 12–16)
HGB BLD-MCNC: 7.6 G/DL (ref 12–16)
HGB BLD-MCNC: 7.7 G/DL (ref 12–16)
IMM GRANULOCYTES # BLD AUTO: 1.14 K/UL (ref 0–0.04)
IMM GRANULOCYTES NFR BLD: 6.2 % (ref 0–0.4)
LYMPHOCYTES # BLD AUTO: 2.37 K/UL (ref 1–4.8)
LYMPHOCYTES NFR BLD AUTO: 12.8 % (ref 27–41)
LYMPHOCYTES NFR BLD MANUAL: 17 % (ref 27–41)
MAGNESIUM SERPL-MCNC: 2.2 MG/DL (ref 1.6–2.6)
MCH RBC QN AUTO: 27.3 PG (ref 27–31)
MCHC RBC AUTO-ENTMCNC: 31 G/DL (ref 32–36)
MCV RBC AUTO: 88.1 FL (ref 80–96)
MONOCYTES # BLD AUTO: 1.96 K/UL (ref 0–0.8)
MONOCYTES NFR BLD AUTO: 10.6 % (ref 2–6)
MONOCYTES NFR BLD MANUAL: 8 % (ref 2–6)
MPC BLD CALC-MCNC: 11.7 FL (ref 9.4–12.4)
NEUTROPHILS # BLD AUTO: 12.69 K/UL (ref 1.8–7.7)
NEUTROPHILS NFR BLD AUTO: 68.5 % (ref 53–65)
NEUTS BAND NFR BLD MANUAL: 7 % (ref 1–5)
NEUTS SEG NFR BLD MANUAL: 67 % (ref 50–62)
NRBC # BLD AUTO: 0.35 X10E3/UL
NRBC BLD MANUAL-RTO: 1 /100 WBC
NRBC, AUTO (.00): 1.9 %
OVALOCYTES BLD QL SMEAR: ABNORMAL
PHOSPHATE SERPL-MCNC: 2 MG/DL (ref 2.3–4.7)
PLATELET # BLD AUTO: 171 K/UL (ref 150–400)
PLATELET MORPHOLOGY: ABNORMAL
POLYCHROMASIA BLD QL SMEAR: ABNORMAL
POTASSIUM SERPL-SCNC: 4.3 MMOL/L (ref 3.5–5.1)
PROT SERPL-MCNC: 4.9 G/DL (ref 5.8–7.6)
RBC # BLD AUTO: 2.78 M/UL (ref 4.2–5.4)
SMUDGE CELLS BLD QL SMEAR: ABNORMAL
SODIUM SERPL-SCNC: 141 MMOL/L (ref 136–145)
UNIT NUMBER: NORMAL
WBC # BLD AUTO: 18.5 K/UL (ref 4.5–11)

## 2025-02-28 PROCEDURE — 85014 HEMATOCRIT: CPT | Performed by: STUDENT IN AN ORGANIZED HEALTH CARE EDUCATION/TRAINING PROGRAM

## 2025-02-28 PROCEDURE — 84100 ASSAY OF PHOSPHORUS: CPT

## 2025-02-28 PROCEDURE — 36430 TRANSFUSION BLD/BLD COMPNT: CPT

## 2025-02-28 PROCEDURE — 94003 VENT MGMT INPAT SUBQ DAY: CPT

## 2025-02-28 PROCEDURE — 99291 CRITICAL CARE FIRST HOUR: CPT | Mod: ,,, | Performed by: STUDENT IN AN ORGANIZED HEALTH CARE EDUCATION/TRAINING PROGRAM

## 2025-02-28 PROCEDURE — 63600175 PHARM REV CODE 636 W HCPCS: Performed by: STUDENT IN AN ORGANIZED HEALTH CARE EDUCATION/TRAINING PROGRAM

## 2025-02-28 PROCEDURE — 80053 COMPREHEN METABOLIC PANEL: CPT

## 2025-02-28 PROCEDURE — 99900035 HC TECH TIME PER 15 MIN (STAT)

## 2025-02-28 PROCEDURE — 94640 AIRWAY INHALATION TREATMENT: CPT

## 2025-02-28 PROCEDURE — 27000221 HC OXYGEN, UP TO 24 HOURS

## 2025-02-28 PROCEDURE — 25000003 PHARM REV CODE 250: Performed by: STUDENT IN AN ORGANIZED HEALTH CARE EDUCATION/TRAINING PROGRAM

## 2025-02-28 PROCEDURE — 94761 N-INVAS EAR/PLS OXIMETRY MLT: CPT

## 2025-02-28 PROCEDURE — 82550 ASSAY OF CK (CPK): CPT | Performed by: STUDENT IN AN ORGANIZED HEALTH CARE EDUCATION/TRAINING PROGRAM

## 2025-02-28 PROCEDURE — 99900026 HC AIRWAY MAINTENANCE (STAT)

## 2025-02-28 PROCEDURE — 83735 ASSAY OF MAGNESIUM: CPT

## 2025-02-28 PROCEDURE — 86923 COMPATIBILITY TEST ELECTRIC: CPT | Performed by: INTERNAL MEDICINE

## 2025-02-28 PROCEDURE — 25000003 PHARM REV CODE 250: Performed by: INTERNAL MEDICINE

## 2025-02-28 PROCEDURE — 85018 HEMOGLOBIN: CPT | Performed by: STUDENT IN AN ORGANIZED HEALTH CARE EDUCATION/TRAINING PROGRAM

## 2025-02-28 PROCEDURE — P9016 RBC LEUKOCYTES REDUCED: HCPCS | Performed by: INTERNAL MEDICINE

## 2025-02-28 PROCEDURE — 85025 COMPLETE CBC W/AUTO DIFF WBC: CPT | Performed by: STUDENT IN AN ORGANIZED HEALTH CARE EDUCATION/TRAINING PROGRAM

## 2025-02-28 PROCEDURE — 36415 COLL VENOUS BLD VENIPUNCTURE: CPT | Performed by: STUDENT IN AN ORGANIZED HEALTH CARE EDUCATION/TRAINING PROGRAM

## 2025-02-28 PROCEDURE — 25000242 PHARM REV CODE 250 ALT 637 W/ HCPCS

## 2025-02-28 PROCEDURE — 94668 MNPJ CHEST WALL SBSQ: CPT

## 2025-02-28 PROCEDURE — 63600175 PHARM REV CODE 636 W HCPCS

## 2025-02-28 PROCEDURE — 82962 GLUCOSE BLOOD TEST: CPT

## 2025-02-28 RX ORDER — HYDROCODONE BITARTRATE AND ACETAMINOPHEN 500; 5 MG/1; MG/1
TABLET ORAL
Status: DISCONTINUED | OUTPATIENT
Start: 2025-02-28 | End: 2025-02-28 | Stop reason: HOSPADM

## 2025-02-28 RX ADMIN — SODIUM CHLORIDE: 9 INJECTION, SOLUTION INTRAVENOUS at 02:02

## 2025-02-28 RX ADMIN — CHLORHEXIDINE GLUCONATE, 0.12% ORAL RINSE 15 ML: 1.2 SOLUTION DENTAL at 08:02

## 2025-02-28 RX ADMIN — LACTULOSE 30 G: 20 SOLUTION ORAL at 08:02

## 2025-02-28 RX ADMIN — ATORVASTATIN CALCIUM 20 MG: 20 TABLET, FILM COATED ORAL at 08:02

## 2025-02-28 RX ADMIN — PREDNISONE 5 MG: 5 TABLET ORAL at 08:02

## 2025-02-28 RX ADMIN — PANTOPRAZOLE SODIUM 40 MG: 40 INJECTION, POWDER, FOR SOLUTION INTRAVENOUS at 08:02

## 2025-02-28 RX ADMIN — POLYETHYLENE GLYCOL 3350 17 G: 17 POWDER, FOR SOLUTION ORAL at 08:02

## 2025-02-28 RX ADMIN — SENNOSIDES AND DOCUSATE SODIUM 2 TABLET: 50; 8.6 TABLET ORAL at 08:02

## 2025-02-28 RX ADMIN — PROPOFOL 15 MCG/KG/MIN: 10 INJECTION, EMULSION INTRAVENOUS at 04:02

## 2025-02-28 RX ADMIN — IPRATROPIUM BROMIDE AND ALBUTEROL SULFATE 3 ML: 2.5; .5 SOLUTION RESPIRATORY (INHALATION) at 07:02

## 2025-02-28 NOTE — NURSING
10:40: Report given to NICOLASA Kirby at [facility] in [facility]. Pt will be transferring for IR procedure via [facility]. [employer] forms faxed. Family updated at bedside. VSS, chart with facesheet, CT scan report and progress not given to EMS

## 2025-02-28 NOTE — HPI
As per admission HPI   HPI: The patient is a 59-year-old female who was transferred from [facility] for the complaints suspected aspiration pneumonia and UTI. The patient's daughter was at bedside and provided most of the history. The patient began experiencing flu-like symptoms a few weeks ago, including a productive cough, fever, and chills. She also experienced mild shortness of breath at home. Her doctor started her on oral antibiotics for pneumonia, but the symptoms did not resolve completely.   Last Wednesday, the patient became very weak and presented to the ER, where she was diagnosed with pneumonia and admitted for treatment with IV antibiotics. she was transferred to [facility] for treatment of aspiration pneumonia, swallow evaluation test.   At the time of the encounter, she was lying comfortably in bed and did not complain of any acute issues. she was oriented x3. according to the patient's daughter, she is at her baseline as far as mentation is concerned.  Patient has PMH of Chronic constipation, GERD, insomnia, depression, neuropathy, history of CVA in 2024 leading to left-sided paralysis. she underwent back surgery in 2015 that led her paraplegic in bother lower limbs.    Patient is non ambulatory and bedbound. she lives with her mother and a home health nurse visit her once in a week. she doesn't supplemental oxygen or CPAP at home. She was on 4.5 L O2 via nasal canula at the time of encounter.
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
## 2025-02-28 NOTE — PLAN OF CARE
[facility] ICU  Discharge Final Note    Primary Care Provider: Gonzalo Barrera NP    Expected Discharge Date: 2/28/2025    Final Discharge Note (most recent)       Final Note - 02/28/25 1436          Final Note    Assessment Type Final Discharge Note     Anticipated Discharge Disposition Home or Self Care        Post-Acute Status    Discharge Delays None known at this time                     Important Message from Medicare  Important Message from Medicare regarding Discharge Appeal Rights: Given to patient/caregiver, Explained to patient/caregiver, Signed/date by patient/caregiver     Date IMM was signed: 02/27/25  Time IMM was signed: 1330    Contact Info       Marcos Lieberman ACNP   Specialty: Cardiology, Emergency Medicine    [address] [place] [id number]   Phone: [phone]       Next Steps: Follow up on 3/17/2025    Instructions: Appointment scheduled with Cardiology on 03/17/2025 at 10:00 a.m.          Patient discharged home to live with sister on today's date.  Will resume Medicaid Waiver services.  IM updated.  No further needs noted.

## 2025-02-28 NOTE — PLAN OF CARE
Problem: Adult Inpatient Plan of Care  Goal: Plan of Care Review  Outcome: Progressing  Goal: Patient-Specific Goal (Individualized)  Outcome: Progressing  Goal: Absence of Hospital-Acquired Illness or Injury  Outcome: Progressing  Intervention: Identify and Manage Fall Risk  Flowsheets (Taken 2/28/2025 0511)  Safety Promotion/Fall Prevention:   assistive device/personal item within reach   medications reviewed   pulse ox   side rails raised x 2   room near unit station  Goal: Optimal Comfort and Wellbeing  Outcome: Progressing  Goal: Readiness for Transition of Care  Outcome: Progressing     Problem: Pneumonia  Goal: Fluid Balance  Outcome: Progressing  Goal: Resolution of Infection Signs and Symptoms  Outcome: Progressing  Intervention: Prevent Infection Progression  Flowsheets (Taken 2/28/2025 0511)  Fever Reduction/Comfort Measures:   lightweight bedding   lightweight clothing  Infection Management: aseptic technique maintained  Isolation Precautions: precautions maintained  Goal: Effective Oxygenation and Ventilation  Outcome: Progressing     Problem: Wound  Goal: Optimal Coping  Outcome: Progressing  Goal: Optimal Functional Ability  Outcome: Progressing  Intervention: Optimize Functional Ability  Flowsheets (Taken 2/28/2025 0511)  Activity Management:   Arm raise - L1   Heel slide - L1   Rolling - L1  Activity Assistance Provided: assistance, 2 people  Goal: Absence of Infection Signs and Symptoms  Outcome: Progressing  Goal: Improved Oral Intake  Outcome: Progressing  Goal: Optimal Pain Control and Function  Outcome: Progressing  Goal: Skin Health and Integrity  Outcome: Progressing  Goal: Optimal Wound Healing  Outcome: Progressing     Problem: Skin Injury Risk Increased  Goal: Skin Health and Integrity  Outcome: Progressing  Intervention: Optimize Skin Protection  Flowsheets (Taken 2/28/2025 0511)  Pressure Reduction Techniques:   heels elevated off bed   frequent weight shift encouraged   sit time  limited to 2 hours   pressure points protected   weight shift assistance provided  Pressure Reduction Devices:   foam padding utilized   heel offloading device utilized   positioning supports utilized   specialty bed utilized  Skin Protection:   incontinence pads utilized   transparent dressing maintained  Activity Management:   Arm raise - L1   Heel slide - L1   Rolling - L1  Head of Bed (HOB) Positioning: HOB at 30-45 degrees     Problem: Airway Clearance Ineffective  Goal: Effective Airway Clearance  Outcome: Progressing  Intervention: Promote Airway Secretion Clearance  Flowsheets (Taken 2/28/2025 0511)  Activity Management:   Arm raise - L1   Heel slide - L1   Rolling - L1     Problem: Gas Exchange Impaired  Goal: Optimal Gas Exchange  Outcome: Progressing     Problem: Infection  Goal: Absence of Infection Signs and Symptoms  Outcome: Progressing     Problem: Noninvasive Ventilation Acute  Goal: Effective Unassisted Ventilation and Oxygenation  Outcome: Progressing  Intervention: Monitor and Manage Noninvasive Ventilation  Flowsheets (Taken 2/28/2025 0511)  Airway/Ventilation Management:   airway patency maintained   pulmonary hygiene promoted     Problem: Mechanical Ventilation Invasive  Goal: Effective Communication  Outcome: Progressing  Goal: Optimal Device Function  Outcome: Progressing  Intervention: Optimize Device Care and Function  Flowsheets (Taken 2/28/2025 0511)  Airway/Ventilation Management:   airway patency maintained   pulmonary hygiene promoted  Airway Safety Measures:   mask valve resuscitator at bedside   manual resuscitator/mask at bedside   oxygen flowmeter at bedside   suction at bedside  Oral Care:   antiseptic solution   suction provided  Goal: Mechanical Ventilation Liberation  Outcome: Progressing  Goal: Optimal Nutrition Delivery  Outcome: Progressing  Goal: Absence of Device-Related Skin and Tissue Injury  Outcome: Progressing  Goal: Absence of Ventilator-Induced Lung  Injury  Outcome: Progressing

## 2025-02-28 NOTE — DISCHARGE SUMMARY
[facility] ICU  Critical Care Medicine  Discharge Summary      Patient Name: Karie Denney  MRN: [id number]  Admission Date: 2/14/2025  Hospital Length of Stay: 14 days  Discharge Date and Time:  02/28/2025 11:16 AM  Attending Physician: Mariah Cunha MD   Discharging Provider: Mariah Cunha MD  Primary Care Provider: Gonzalo Barrera NP  Reason for Admission: E COLI ESBL UTI    HPI:   As per admission HPI   HPI: The patient is a 59-year-old female who was transferred from [facility] for the complaints suspected aspiration pneumonia and UTI. The patient's daughter was at bedside and provided most of the history. The patient began experiencing flu-like symptoms a few weeks ago, including a productive cough, fever, and chills. She also experienced mild shortness of breath at home. Her doctor started her on oral antibiotics for pneumonia, but the symptoms did not resolve completely.   Last Wednesday, the patient became very weak and presented to the ER, where she was diagnosed with pneumonia and admitted for treatment with IV antibiotics. she was transferred to [facility] for treatment of aspiration pneumonia, swallow evaluation test.   At the time of the encounter, she was lying comfortably in bed and did not complain of any acute issues. she was oriented x3. according to the patient's daughter, she is at her baseline as far as mentation is concerned.  Patient has PMH of Chronic constipation, GERD, insomnia, depression, neuropathy, history of CVA in 2024 leading to left-sided paralysis. she underwent back surgery in 2015 that led her paraplegic in bother lower limbs.    Patient is non ambulatory and bedbound. she lives with her mother and a home health nurse visit her once in a week. she doesn't supplemental oxygen or CPAP at home. She was on 4.5 L O2 via nasal canula at the time of encounter.    Procedure(s) (LRB):  Pericardiocentesis (N/A)    Indwelling Lines/Drains at Time of Discharge:    Lines/Drains/Airways       Central Venous Catheter Line  Duration             Percutaneous Central Line - Triple Lumen  02/25/25 1340 2 days              Drain  Duration                  NG/OG Tube 02/21/25 1252 Left nostril 6 days         Urethral Catheter 02/24/25 1623 3 days              Airway  Duration                  Airway - Non-Surgical 02/24/25 1343 Endotracheal Tube 3 days                  Hospital Course:   She was transferred to ICU 02/19 for progressive respiratory failure with acute hypercapnic and hypoxemic respiratory failure which required NIV.  Additional workup who was also notable for a pericardial effusion for which Cardiology was consulted with recommendation for initiation of colchicine for 6 weeks as part of treatment of an inflammatory pericarditis.  There was ongoing concern of recurrent aspiration with the assessment including barium swallow with aspiration event that further complicated her respiratory status.  She improved on HFNC, however, developed mechanical respiratory failure with difficulty with secretion management with low VTE despite escalation to AVAPS.  She was intubated 02/24; difficult intubation expected given limited ROM of her cervical spine and she also had edema of her epiglottis due to chronic LPR.  Post intubation, she had right middle lobe collapse and she underwent therapeutic aspiration with bronchoscopy where she was found to have extensive mucus plugging.  Course thereafter with plan for SBT of which her mechanical respiratory failure was attributed to weakness.  She had significant pyrexia on 02/25 which is now attributed to drug fever secondary to Precedex with NMS and malignant hyperthermia ruled out.  Her course has been complicated by a spontaneous right lower extremity posterior compartment bleed while having no interventions including no venous or arterial sticks to that leg.  She has required pressor support and serial blood transfusions for which pfc  transfer was initiated at time of identification of the bleed to facilitate IR intervention.  CTA lower extremity was obtained with surgery reviewing images while pending official read by Radiology suspicious for ongoing bleeding with clinical evidence of hematoma expansion.  Compression of the limb considered, however, assessment for a compartment is limited due to her paraplegia which further necessitates intervention.  She is accepted for transfer 02/28 a.m. with ongoing planning.  Family updated.    Consults (From admission, onward)          Status Ordering Provider     Inpatient consult to Registered Dietitian/Nutritionist  Once        Provider:  (Not yet assigned)    Completed CAMPBELL, JUNE     Inpatient consult to Registered Dietitian/Nutritionist  Once        Provider:  (Not yet assigned)    Completed LATOYA BRADLEY     Inpatient consult to Cardiology  Once        Provider:  Vitor Briseno MD    Completed GAL ROLAND     Inpatient consult to Gastroenterology  Once        Provider:  Aniket Rivera MD    Completed GAL ROLAND          Significant Labs:  All pertinent labs within the past 24 hours have been reviewed.    Significant Imaging:  I have reviewed all pertinent imaging results/findings within the past 24 hours.    Pending Diagnostic Studies:       Procedure Component Value Units Date/Time    EXTRA TUBES [[id number]] Collected: 02/21/25 1728    Order Status: Sent Lab Status: In process Updated: 02/21/25 1728    Specimen: Blood, Venous     Narrative:      The following orders were created for panel order EXTRA TUBES.  Procedure                               Abnormality         Status                     ---------                               -----------         ------                     Lavender Top Hold[[id number]]                               In process                   Please view results for these tests on the individual orders.    Hemoglobin and Hematocrit [[id number]]      Order Status: Sent Lab Status: No result     Specimen: Blood     Hemoglobin and Hematocrit [[id number]]     Order Status: Sent Lab Status: No result     Specimen: Blood     Urinalysis, Reflex to Urine Culture [[id number]]     Order Status: Sent Lab Status: No result     Specimen: Urine           Final Active Diagnoses:    Diagnosis Date Noted POA    PRINCIPAL PROBLEM:  Acute respiratory failure with hypoxia and hypercarbia [J96.01, J96.02] 02/15/2025 Yes    Spontaneous hematoma of lower leg [R23.3] 02/26/2025 No    Acute blood loss anemia [D62] 02/26/2025 No    Hypovolemic shock [R57.1] 02/25/2025 No    Mucus plugging of bronchi [T17.500A] 02/24/2025 Yes    Sedated due to medication [R40.4, T50.905A] 02/24/2025 No    Paraplegia [G82.20] 02/23/2025 Yes    Acute idiopathic pericarditis [I30.0] 02/20/2025 Yes    Hypokalemia [E87.6] 02/15/2025 Yes    Primary insomnia [F51.01] 02/15/2025 Yes    Dysphagia [R13.10] 02/15/2025 Yes    Aspiration pneumonia [J69.0] 02/12/2025 Yes    UTI due to extended-spectrum beta lactamase (ESBL) producing Escherichia coli [N39.0, B96.29, Z16.12] 02/12/2025 Yes    Pressure ulcers of skin of multiple topographic sites [L89.90] 02/12/2025 Yes    GERD (gastroesophageal reflux disease) [K21.9] 02/12/2025 Yes    Depression [F32.A] 08/27/2024 Yes    Chronic idiopathic constipation [K59.04] 08/27/2024 Yes      Problems Resolved During this Admission:     Neuro  Sedated due to medication  Sedated for ventilator synchrony.   - analgesia: intermittent Fentanyl  - sedation: none  - RASS goal 0 to -1    Pulmonary  * Acute respiratory failure with hypoxia and hypercarbia  Multifactorial. Recurrent aspiration events. Further complicated with progressive weakness with decreased pulmonary excursion. 02/24 am ABG with progressive hypercapnia compensated concerning for impending respiratory mechanical failure. Diamox IV x1 administered to aid with reset; previous baseline PaCO2 60s. Progressive hypoxia  refractory to NIV including AVAPS and POCUS with no large R pleural effusion.   - intubated 02/24; difficult intubation expected given limited ROM at cervical spine; noted to have LPR with mild epiglottis edema  - s/p therapeutic suctioning 02/24 for mucus plugging  - cont VC/AC 16/500/12/0.5  - cont daily SBT, successful with tiring at 2 pm, daily SBT    Cardiac/Vascular  Hypovolemic shock  2/2 acute blood loss. S/p blood transfusion. Levophed for goal MAP >60.   - blood transfusion as below  - MAP goal >60    Acute idiopathic pericarditis  Managing as acute pericarditis as per Cardiology recs; associated chest pain with small effusion. RA positive; RA vs idiopathic.  - Cardiology recs: Colchicine BID x3 months   -- holding 02/28 for spontaneous bleeding  - cont low dose prednisone for concern of CTD pericarditis with prednisone 5 mg Qday  - repeat TTE 03/10    Oncology  Acute blood loss anemia  2/2 spontaneous RLE bleeding w/ hematoma. No trauma. DIC was considered and unlikely given PIV and central line site stability as well as labs. Ongoing bleeding with expansion of hematoma. Requiring serial transfusion.   - cont serial H/H  - stop VTE ppx and maintain hold on colchicine  - transfuse for Hgb  <7   -- s/p 3x pRBCs since 02/26, last 02/28    GI  Chronic idiopathic constipation  Chronic. Significant stool burden on CT imaging. Escalating bowel regimen to include Senna, lactulose and miralax.   - successful BM 02/28    Other  Spontaneous hematoma of lower leg  Spontaneous bleeding of RLE posterior compartment while on prophylactic dosing on Lovenox. No trauma or interventions to vessel. Exam with non pulsatile lesion. Will trend H/H and reassess size serially. Will consider IR intervention for expansion. Will monitor for limb compartment with at least daily CK; quadriplegia further complicates subjective assessment. Pain management with intermittent fentanyl.  - LE CT-A obtained with read pending  - clinical  expansion of hematoma with ongoing blood transfusion requirements  - deferring pressure dressing for bleeding due to concern of evolving compartment; CK has remained stable at 600s and clinical evaluation limited by patient's paraplegia at baseline  - accepted for IR intervention at [facility] Condition:  Critical; on transfer to higher level of  care facility for INTERVENTIONAL RADIOLOGY services    Disposition: Another Health Care Inst*     Follow-up Information       Marcos Lieberman ACNP Follow up on 3/17/2025.    Specialties: Cardiology, Emergency Medicine  Why: Appointment scheduled with Cardiology on 03/17/2025 at 10:00 a.m.  Contact information:  [address] [place] [id number]  [phone]                           Patient Instructions:   No discharge procedures on file.  Medications:  Transfer Medications (for Discharge Readmit only): Current Medications[1]     Mariah Cunha MD  Critical Care Medicine  [facility] ICU       [1]   Current Facility-Administered Medications   Medication Dose Route Frequency Provider Last Rate Last Admin    0.9%  NaCl infusion (for blood administration)   Intravenous Q24H PRN John Gerber MD   New Bag at 02/28/25 0216    0.9%  NaCl infusion (for blood administration)   Intravenous Q24H PRN John Gerber MD        acetaminophen tablet 650 mg  650 mg Per NG tube Q6H PRN Matilde Quinonez MD   650 mg at 02/25/25 0337    albuterol nebulizer solution 2.5 mg  2.5 mg Nebulization Q4H PRN Matilde Quinonez MD   2.5 mg at 02/19/25 0601    albuterol-ipratropium 2.5 mg-0.5 mg/3 mL nebulizer solution 3 mL  3 mL Nebulization Q6H [place] Raquel Lr MD   3 mL at 02/28/25 0704    atorvastatin tablet 20 mg  20 mg Per NG tube Daily Mariah Cunha MD   20 mg at 02/28/25 0841    chlorhexidine 0.12 % solution 15 mL  15 mL Mouth/Throat BID Mariah Cunha MD   15 mL at 02/28/25 0840    dextrose 50% injection 12.5 g  12.5 g Intravenous PRN Mariah Cunha MD         fentaNYL 50 mcg/mL injection 50 mcg  50 mcg Intravenous Q1H PRN Mariah Cunha MD        fentaNYL injection 25 mcg  25 mcg Intravenous Q2H PRN Ngozi David NP        glucagon (human recombinant) injection 1 mg  1 mg Intramuscular PRN Mariah Cunha MD        glucose chewable tablet 16 g  16 g Per NG tube PRN Mariah Cunha MD        glucose chewable tablet 24 g  24 g Per NG tube PRN Mariah Cunha MD        insulin aspart U-100 injection 0-5 Units  0-5 Units Subcutaneous Q6H PRN Mariah Cunha MD        lactulose 20 gram/30 mL solution Soln 30 g  30 g Per NG tube Daily Mariah Cunha MD   30 g at 02/28/25 0841    naloxone 0.4 mg/mL injection 0.02 mg  0.02 mg Intravenous PRN Raquel Lr MD        NORepinephrine 32 mg in 0.9% NaCl 250 mL infusion  0-3 mcg/kg/min Intravenous Continuous Ngozi David NP 0.4 mL/hr at 02/28/25 0900 0.01 mcg/kg/min at 02/28/25 0900    ondansetron injection 4 mg  4 mg Intravenous Q8H PRN Raquel Lr MD   4 mg at 02/22/25 1347    pantoprazole injection 40 mg  40 mg Intravenous Daily Mariah Cunha MD   40 mg at 02/28/25 0840    polyethylene glycol packet 17 g  17 g Per NG tube BID Mariah Cunha MD   17 g at 02/28/25 0841    predniSONE tablet 5 mg  5 mg Per NG tube Daily Mariah Cunha MD   5 mg at 02/28/25 0841    pregabalin capsule 75 mg  75 mg Per NG tube QHS Mariah Cunha MD   75 mg at 02/27/25 2059    prochlorperazine injection Soln 5 mg  5 mg Intravenous Q6H PRN Raquel Lr MD        propofol (DIPRIVAN) 10 mg/mL infusion  0-50 mcg/kg/min Intravenous Continuous Ngozi David NP 8.3 mL/hr at 02/28/25 0900 15 mcg/kg/min at 02/28/25 0900    senna-docusate 8.6-50 mg per tablet 2 tablet  2 tablet Per NG tube BID Mariah Cunha MD   2 tablet at 02/28/25 0841    sodium chloride 0.9% flush 10 mL  10 mL Intravenous Q12H PRN Raquel Lr MD

## 2025-02-28 NOTE — ASSESSMENT & PLAN NOTE
Multifactorial. Recurrent aspiration events. Further complicated with progressive weakness with decreased pulmonary excursion. 02/24 am ABG with progressive hypercapnia compensated concerning for impending respiratory mechanical failure. Diamox IV x1 administered to aid with reset; previous baseline PaCO2 60s. Progressive hypoxia refractory to NIV including AVAPS and POCUS with no large R pleural effusion.   - intubated 02/24; difficult intubation expected given limited ROM at cervical spine; noted to have LPR with mild epiglottis edema  - s/p therapeutic suctioning 02/24 for mucus plugging  - cont VC/AC 16/500/12/0.5  - cont daily SBT, successful with tiring at 2 pm, daily SBT

## 2025-02-28 NOTE — PROGRESS NOTES
[facility]    Patient Name:  Karie Denney   MRN:  [id number]  Date: 2/28/2025  Diagnosis: Acute respiratory failure with hypoxia and hypercarbia    History:     Past Medical History:   Diagnosis Date    GERD (gastroesophageal reflux disease)     Paraplegia        Social History[1]    Precautions:     Allergies as of 02/13/2025 - Reviewed 02/12/2025   Allergen Reaction Noted    Penicillins Anaphylaxis 08/25/2022       WOC Assessment Details/Treatment        02/28/25 0858   WOCN Assessment   WOCN Total Time (mins) 90   Visit Date 02/28/25   Visit Time 0845   Consult Type Follow Up   WOCN Speciality Wound   Wound skin tear;moisture   Number of Wounds 1   Continence Type Urinary;Fecal   Intervention chart review;applied;orders   Teaching on-going   Skin Interventions   Device Skin Pressure Protection absorbent pad utilized/changed;adhesive use limited;pressure points protected   Pressure Reduction Devices heel offloading device utilized;foam padding utilized;pressure-redistributing mattress utilized   Pressure Reduction Techniques pressure points protected;positioned off wounds   Skin Protection incontinence pads utilized;protective footwear used   Positioning   Body Position turned;right   Head of Bed (HOB) Positioning HOB at 30-45 degrees   Positioning/Transfer Devices pillows;wedge;in use   Pressure Injury Prevention    Check Moisture Management Pad Done   Heel protection technique Heel boot        Wound 02/14/25 2230 Skin Tear Left posterior Thigh   Date First Assessed/Time First Assessed: 02/14/25 2230   Primary Wound Type: Skin Tear  Side: Left  Orientation: posterior  Location: Thigh  Is this injury device related?: No   Wound Image     Dressing Appearance Open to air   Drainage Amount None   Appearance [place];Dry   Periwound Area Dry;Scar tissue   Wound Edges Undefined;Irregular   Care Cleansed with:;Antimicrobial agent   Periwound Care Cleansed with pH balanced cleanser     WOC  Team consulted for: L posterior thigh    Narrative: Patient intubated, but alert. Patient nurse Yessica, RN and student nurse in room assisting with turning. Patient tolerated wound care well.     Active Wounds and Recommendations: Continue POC    Goals for Wound Healing: Apply antimicrobial, Reduce bioburden, and Educate on proper wound management post D/C     Barriers to Wound Healing: multiple co-morbidities advanced age    Orders placed.    Thank you for the consult.     We will continue to follow. See additional note under Notes Tab for tentative f/u plan/dates.    We will sign off.    02/28/2025       [1]   Social History  Socioeconomic History    Marital status: Single   Tobacco Use    Smoking status: Never    Smokeless tobacco: Never   Substance and Sexual Activity    Alcohol use: Never    Drug use: Never    Sexual activity: Not Currently     Social Drivers of Health     Financial Resource Strain: Low Risk  (2/16/2025)    Overall Financial Resource Strain (CARDIA)     Difficulty of Paying Living Expenses: Not hard at all   Food Insecurity: No Food Insecurity (2/16/2025)    Hunger Vital Sign     Worried About Running Out of Food in the Last Year: Never true     Ran Out of Food in the Last Year: Never true   Transportation Needs: Not At Risk (8/27/2024)    Received from [facility]    BOC Transportation Needs     Financial/Environmental Concerns: Unrecognized value   Physical Activity: Inactive (2/16/2025)    Exercise Vital Sign     Days of Exercise per Week: 0 days     Minutes of Exercise per Session: 0 min   Stress: No Stress Concern Present (2/16/2025)    [employer] - Occupational Stress Questionnaire     Feeling of Stress : Not at all   Housing Stability: Low Risk  (2/16/2025)    Housing Stability Vital Sign     Unable to Pay for Housing in the Last Year: No     Homeless in the Last Year: No
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
## 2025-03-03 LAB
BACTERIA BLD CULT: NORMAL
BACTERIA BLD CULT: NORMAL

## 2025-03-23 ENCOUNTER — HOSPITAL ENCOUNTER (INPATIENT)
Facility: HOSPITAL | Age: 60
LOS: 38 days | Discharge: HOME HEALTH | DRG: 189 | End: 2025-04-30
Attending: STUDENT IN AN ORGANIZED HEALTH CARE EDUCATION/TRAINING PROGRAM | Admitting: HOSPITALIST
Payer: MEDICARE

## 2025-03-23 DIAGNOSIS — J96.11 CHRONIC RESPIRATORY FAILURE WITH HYPOXIA: ICD-10-CM

## 2025-03-23 DIAGNOSIS — J96.01 ACUTE RESPIRATORY FAILURE WITH HYPOXIA AND HYPERCARBIA: Primary | ICD-10-CM

## 2025-03-23 DIAGNOSIS — J96.90 RESPIRATORY FAILURE: ICD-10-CM

## 2025-03-23 DIAGNOSIS — I31.39 PERICARDIAL EFFUSION: ICD-10-CM

## 2025-03-23 DIAGNOSIS — T17.500A MUCUS PLUGGING OF BRONCHI: ICD-10-CM

## 2025-03-23 DIAGNOSIS — G82.50 QUADRIPLEGIA: ICD-10-CM

## 2025-03-23 DIAGNOSIS — J96.02 ACUTE RESPIRATORY FAILURE WITH HYPOXIA AND HYPERCARBIA: Primary | ICD-10-CM

## 2025-03-23 DIAGNOSIS — R00.0 TACHYCARDIA: ICD-10-CM

## 2025-03-23 DIAGNOSIS — J96.21 ACUTE ON CHRONIC RESPIRATORY FAILURE WITH HYPOXIA: ICD-10-CM

## 2025-03-23 DIAGNOSIS — R07.9 CHEST PAIN: ICD-10-CM

## 2025-03-23 PROBLEM — E46 PROTEIN MALNUTRITION: Status: ACTIVE | Noted: 2025-03-23

## 2025-03-23 PROBLEM — S80.11XD: Status: ACTIVE | Noted: 2025-03-23

## 2025-03-23 PROBLEM — E78.5 HYPERLIPIDEMIA: Status: ACTIVE | Noted: 2025-03-23

## 2025-03-23 PROBLEM — Z93.1 S/P PERCUTANEOUS ENDOSCOPIC GASTROSTOMY (PEG) TUBE PLACEMENT: Status: ACTIVE | Noted: 2025-03-23

## 2025-03-23 PROCEDURE — 94799 UNLISTED PULMONARY SVC/PX: CPT

## 2025-03-23 PROCEDURE — 94761 N-INVAS EAR/PLS OXIMETRY MLT: CPT

## 2025-03-23 PROCEDURE — 11000001 HC ACUTE MED/SURG PRIVATE ROOM

## 2025-03-23 PROCEDURE — 27000221 HC OXYGEN, UP TO 24 HOURS

## 2025-03-23 PROCEDURE — 99900026 HC AIRWAY MAINTENANCE (STAT)

## 2025-03-23 PROCEDURE — 25000003 PHARM REV CODE 250: Performed by: HOSPITALIST

## 2025-03-23 PROCEDURE — 63600175 PHARM REV CODE 636 W HCPCS: Performed by: HOSPITALIST

## 2025-03-23 PROCEDURE — 99223 1ST HOSP IP/OBS HIGH 75: CPT | Mod: ,,, | Performed by: HOSPITALIST

## 2025-03-23 PROCEDURE — 99900035 HC TECH TIME PER 15 MIN (STAT)

## 2025-03-23 RX ORDER — POLYETHYLENE GLYCOL 3350 17 G/17G
17 POWDER, FOR SOLUTION ORAL DAILY PRN
Status: DISCONTINUED | OUTPATIENT
Start: 2025-03-23 | End: 2025-04-30 | Stop reason: HOSPADM

## 2025-03-23 RX ORDER — ACETAMINOPHEN 325 MG/1
650 TABLET ORAL EVERY 8 HOURS PRN
Status: DISCONTINUED | OUTPATIENT
Start: 2025-03-23 | End: 2025-04-30 | Stop reason: HOSPADM

## 2025-03-23 RX ORDER — SODIUM,POTASSIUM PHOSPHATES 280-250MG
2 POWDER IN PACKET (EA) ORAL
Status: DISCONTINUED | OUTPATIENT
Start: 2025-03-23 | End: 2025-04-30 | Stop reason: HOSPADM

## 2025-03-23 RX ORDER — LANOLIN ALCOHOL/MO/W.PET/CERES
800 CREAM (GRAM) TOPICAL
Status: DISCONTINUED | OUTPATIENT
Start: 2025-03-23 | End: 2025-04-30 | Stop reason: HOSPADM

## 2025-03-23 RX ORDER — HYDROMORPHONE HYDROCHLORIDE 2 MG/ML
1 INJECTION, SOLUTION INTRAMUSCULAR; INTRAVENOUS; SUBCUTANEOUS EVERY 6 HOURS PRN
Status: DISCONTINUED | OUTPATIENT
Start: 2025-03-23 | End: 2025-04-25

## 2025-03-23 RX ORDER — ACETAMINOPHEN 325 MG/1
650 TABLET ORAL EVERY 4 HOURS PRN
Status: DISCONTINUED | OUTPATIENT
Start: 2025-03-23 | End: 2025-04-30 | Stop reason: HOSPADM

## 2025-03-23 RX ORDER — ENOXAPARIN SODIUM 100 MG/ML
40 INJECTION SUBCUTANEOUS EVERY 24 HOURS
Status: DISCONTINUED | OUTPATIENT
Start: 2025-03-23 | End: 2025-04-30 | Stop reason: HOSPADM

## 2025-03-23 RX ORDER — TALC
6 POWDER (GRAM) TOPICAL NIGHTLY PRN
Status: DISCONTINUED | OUTPATIENT
Start: 2025-03-23 | End: 2025-04-24

## 2025-03-23 RX ORDER — SERTRALINE HYDROCHLORIDE 50 MG/1
50 TABLET, FILM COATED ORAL DAILY
Status: DISCONTINUED | OUTPATIENT
Start: 2025-03-24 | End: 2025-04-22

## 2025-03-23 RX ORDER — PANTOPRAZOLE SODIUM 40 MG/10ML
40 INJECTION, POWDER, LYOPHILIZED, FOR SOLUTION INTRAVENOUS DAILY
Status: DISCONTINUED | OUTPATIENT
Start: 2025-03-24 | End: 2025-04-30 | Stop reason: HOSPADM

## 2025-03-23 RX ORDER — MIDODRINE HYDROCHLORIDE 5 MG/1
10 TABLET ORAL 2 TIMES DAILY WITH MEALS
Status: DISCONTINUED | OUTPATIENT
Start: 2025-03-23 | End: 2025-03-24

## 2025-03-23 RX ORDER — IBUPROFEN 200 MG
24 TABLET ORAL
Status: DISCONTINUED | OUTPATIENT
Start: 2025-03-23 | End: 2025-04-30 | Stop reason: HOSPADM

## 2025-03-23 RX ORDER — PREGABALIN 75 MG/1
75 CAPSULE ORAL 2 TIMES DAILY
Status: DISCONTINUED | OUTPATIENT
Start: 2025-03-23 | End: 2025-04-25

## 2025-03-23 RX ORDER — MUPIROCIN 20 MG/G
OINTMENT TOPICAL 2 TIMES DAILY
Status: COMPLETED | OUTPATIENT
Start: 2025-03-23 | End: 2025-03-28

## 2025-03-23 RX ORDER — ACETAMINOPHEN 500 MG
1000 TABLET ORAL EVERY 8 HOURS PRN
Status: DISCONTINUED | OUTPATIENT
Start: 2025-03-23 | End: 2025-04-30 | Stop reason: HOSPADM

## 2025-03-23 RX ORDER — ONDANSETRON HYDROCHLORIDE 2 MG/ML
4 INJECTION, SOLUTION INTRAVENOUS EVERY 8 HOURS PRN
Status: DISCONTINUED | OUTPATIENT
Start: 2025-03-23 | End: 2025-04-30 | Stop reason: HOSPADM

## 2025-03-23 RX ORDER — ATORVASTATIN CALCIUM 20 MG/1
20 TABLET, FILM COATED ORAL NIGHTLY
Status: DISCONTINUED | OUTPATIENT
Start: 2025-03-23 | End: 2025-04-30 | Stop reason: HOSPADM

## 2025-03-23 RX ORDER — IBUPROFEN 200 MG
16 TABLET ORAL
Status: DISCONTINUED | OUTPATIENT
Start: 2025-03-23 | End: 2025-04-30 | Stop reason: HOSPADM

## 2025-03-23 RX ORDER — HYDROCODONE BITARTRATE AND ACETAMINOPHEN 5; 325 MG/1; MG/1
1 TABLET ORAL EVERY 6 HOURS PRN
Refills: 0 | Status: DISCONTINUED | OUTPATIENT
Start: 2025-03-23 | End: 2025-04-25

## 2025-03-23 RX ORDER — SODIUM CHLORIDE 0.9 % (FLUSH) 0.9 %
10 SYRINGE (ML) INJECTION
Status: DISCONTINUED | OUTPATIENT
Start: 2025-03-23 | End: 2025-04-30 | Stop reason: HOSPADM

## 2025-03-23 RX ORDER — ALUMINUM HYDROXIDE, MAGNESIUM HYDROXIDE, AND SIMETHICONE 1200; 120; 1200 MG/30ML; MG/30ML; MG/30ML
30 SUSPENSION ORAL 4 TIMES DAILY PRN
Status: DISCONTINUED | OUTPATIENT
Start: 2025-03-23 | End: 2025-04-30 | Stop reason: HOSPADM

## 2025-03-23 RX ORDER — GLUCAGON 1 MG
1 KIT INJECTION
Status: DISCONTINUED | OUTPATIENT
Start: 2025-03-23 | End: 2025-04-30 | Stop reason: HOSPADM

## 2025-03-23 RX ORDER — TRAZODONE HYDROCHLORIDE 50 MG/1
100 TABLET ORAL NIGHTLY PRN
Status: DISCONTINUED | OUTPATIENT
Start: 2025-03-23 | End: 2025-04-24

## 2025-03-23 RX ORDER — NALOXONE HCL 0.4 MG/ML
0.02 VIAL (ML) INJECTION
Status: DISCONTINUED | OUTPATIENT
Start: 2025-03-23 | End: 2025-04-30 | Stop reason: HOSPADM

## 2025-03-23 RX ADMIN — MIDODRINE HYDROCHLORIDE 10 MG: 5 TABLET ORAL at 09:03

## 2025-03-23 RX ADMIN — MUPIROCIN: 20 OINTMENT TOPICAL at 09:03

## 2025-03-23 RX ADMIN — ATORVASTATIN CALCIUM 20 MG: 20 TABLET, FILM COATED ORAL at 09:03

## 2025-03-23 RX ADMIN — ENOXAPARIN SODIUM 40 MG: 40 INJECTION SUBCUTANEOUS at 08:03

## 2025-03-23 RX ADMIN — PREGABALIN 75 MG: 75 CAPSULE ORAL at 09:03

## 2025-03-23 RX ADMIN — HYDROMORPHONE HYDROCHLORIDE 1 MG: 2 INJECTION, SOLUTION INTRAMUSCULAR; INTRAVENOUS; SUBCUTANEOUS at 07:03

## 2025-03-24 PROBLEM — J96.21 ACUTE ON CHRONIC RESPIRATORY FAILURE WITH HYPOXIA: Status: ACTIVE | Noted: 2025-02-15

## 2025-03-24 PROBLEM — J96.11 CHRONIC RESPIRATORY FAILURE WITH HYPOXIA: Status: ACTIVE | Noted: 2025-02-15

## 2025-03-24 PROBLEM — J96.01 ACUTE RESPIRATORY FAILURE WITH HYPOXIA AND HYPERCARBIA: Status: ACTIVE | Noted: 2025-03-24

## 2025-03-24 PROBLEM — J96.02 ACUTE RESPIRATORY FAILURE WITH HYPOXIA AND HYPERCARBIA: Status: ACTIVE | Noted: 2025-03-24

## 2025-03-24 PROBLEM — E43 SEVERE PROTEIN-CALORIE MALNUTRITION: Status: ACTIVE | Noted: 2025-03-23

## 2025-03-24 LAB
ANION GAP SERPL CALCULATED.3IONS-SCNC: 12 MMOL/L (ref 7–16)
BASOPHILS # BLD AUTO: 0.06 K/UL (ref 0–0.2)
BASOPHILS NFR BLD AUTO: 0.6 % (ref 0–1)
BUN SERPL-MCNC: 15 MG/DL (ref 10–20)
BUN/CREAT SERPL: 33 (ref 6–20)
CALCIUM SERPL-MCNC: 9 MG/DL (ref 8.4–10.2)
CHLORIDE SERPL-SCNC: 100 MMOL/L (ref 98–107)
CO2 SERPL-SCNC: 35 MMOL/L (ref 23–31)
CREAT SERPL-MCNC: 0.45 MG/DL (ref 0.55–1.02)
DIFFERENTIAL METHOD BLD: ABNORMAL
EGFR (NO RACE VARIABLE) (RUSH/TITUS): 110 ML/MIN/1.73M2
EOSINOPHIL # BLD AUTO: 0.41 K/UL (ref 0–0.5)
EOSINOPHIL NFR BLD AUTO: 4.2 % (ref 1–4)
ERYTHROCYTE [DISTWIDTH] IN BLOOD BY AUTOMATED COUNT: 19.2 % (ref 11.5–14.5)
GLUCOSE SERPL-MCNC: 74 MG/DL (ref 82–115)
HCT VFR BLD AUTO: 32.7 % (ref 38–47)
HGB BLD-MCNC: 9 G/DL (ref 12–16)
IMM GRANULOCYTES # BLD AUTO: 0.04 K/UL (ref 0–0.04)
IMM GRANULOCYTES NFR BLD: 0.4 % (ref 0–0.4)
LACTATE SERPL-SCNC: 0.8 MMOL/L (ref 0.5–2.2)
LYMPHOCYTES # BLD AUTO: 2.56 K/UL (ref 1–4.8)
LYMPHOCYTES NFR BLD AUTO: 26.3 % (ref 27–41)
MAGNESIUM SERPL-MCNC: 2.1 MG/DL (ref 1.6–2.6)
MCH RBC QN AUTO: 25.4 PG (ref 27–31)
MCHC RBC AUTO-ENTMCNC: 27.5 G/DL (ref 32–36)
MCV RBC AUTO: 92.1 FL (ref 80–96)
MONOCYTES # BLD AUTO: 0.67 K/UL (ref 0–0.8)
MONOCYTES NFR BLD AUTO: 6.9 % (ref 2–6)
MPC BLD CALC-MCNC: 11.6 FL (ref 9.4–12.4)
NEUTROPHILS # BLD AUTO: 6.01 K/UL (ref 1.8–7.7)
NEUTROPHILS NFR BLD AUTO: 61.6 % (ref 53–65)
NRBC # BLD AUTO: 0 X10E3/UL
NRBC, AUTO (.00): 0 %
NT-PROBNP SERPL-MCNC: 27 PG/ML (ref 1–125)
PLATELET # BLD AUTO: 224 K/UL (ref 150–400)
POTASSIUM SERPL-SCNC: 3.7 MMOL/L (ref 3.5–5.1)
PROCALCITONIN SERPL-MCNC: 0.06 NG/ML
RBC # BLD AUTO: 3.55 M/UL (ref 4.2–5.4)
SODIUM SERPL-SCNC: 143 MMOL/L (ref 136–145)
TROPONIN I SERPL HS-MCNC: <2.7 NG/L
WBC # BLD AUTO: 9.75 K/UL (ref 4.5–11)

## 2025-03-24 PROCEDURE — 94668 MNPJ CHEST WALL SBSQ: CPT

## 2025-03-24 PROCEDURE — 25000242 PHARM REV CODE 250 ALT 637 W/ HCPCS: Performed by: STUDENT IN AN ORGANIZED HEALTH CARE EDUCATION/TRAINING PROGRAM

## 2025-03-24 PROCEDURE — 36415 COLL VENOUS BLD VENIPUNCTURE: CPT | Performed by: HOSPITALIST

## 2025-03-24 PROCEDURE — 99233 SBSQ HOSP IP/OBS HIGH 50: CPT | Mod: ,,, | Performed by: HOSPITALIST

## 2025-03-24 PROCEDURE — 94640 AIRWAY INHALATION TREATMENT: CPT

## 2025-03-24 PROCEDURE — 83605 ASSAY OF LACTIC ACID: CPT | Performed by: HOSPITALIST

## 2025-03-24 PROCEDURE — 84145 PROCALCITONIN (PCT): CPT | Performed by: HOSPITALIST

## 2025-03-24 PROCEDURE — 63600175 PHARM REV CODE 636 W HCPCS: Performed by: HOSPITALIST

## 2025-03-24 PROCEDURE — 99900022

## 2025-03-24 PROCEDURE — 87149 DNA/RNA DIRECT PROBE: CPT | Performed by: HOSPITALIST

## 2025-03-24 PROCEDURE — 94761 N-INVAS EAR/PLS OXIMETRY MLT: CPT

## 2025-03-24 PROCEDURE — 11000001 HC ACUTE MED/SURG PRIVATE ROOM

## 2025-03-24 PROCEDURE — 25000242 PHARM REV CODE 250 ALT 637 W/ HCPCS: Performed by: HOSPITALIST

## 2025-03-24 PROCEDURE — 84484 ASSAY OF TROPONIN QUANT: CPT | Performed by: HOSPITALIST

## 2025-03-24 PROCEDURE — 83880 ASSAY OF NATRIURETIC PEPTIDE: CPT | Performed by: HOSPITALIST

## 2025-03-24 PROCEDURE — 25000003 PHARM REV CODE 250: Performed by: HOSPITALIST

## 2025-03-24 PROCEDURE — 99223 1ST HOSP IP/OBS HIGH 75: CPT | Mod: ,,, | Performed by: STUDENT IN AN ORGANIZED HEALTH CARE EDUCATION/TRAINING PROGRAM

## 2025-03-24 PROCEDURE — 87040 BLOOD CULTURE FOR BACTERIA: CPT | Performed by: HOSPITALIST

## 2025-03-24 PROCEDURE — 99900026 HC AIRWAY MAINTENANCE (STAT)

## 2025-03-24 PROCEDURE — 99900035 HC TECH TIME PER 15 MIN (STAT)

## 2025-03-24 PROCEDURE — 27000221 HC OXYGEN, UP TO 24 HOURS

## 2025-03-24 PROCEDURE — 27000207 HC ISOLATION

## 2025-03-24 PROCEDURE — 80048 BASIC METABOLIC PNL TOTAL CA: CPT | Performed by: HOSPITALIST

## 2025-03-24 PROCEDURE — 85025 COMPLETE CBC W/AUTO DIFF WBC: CPT | Performed by: HOSPITALIST

## 2025-03-24 PROCEDURE — 83735 ASSAY OF MAGNESIUM: CPT | Performed by: HOSPITALIST

## 2025-03-24 RX ORDER — ACETYLCYSTEINE 200 MG/ML
2 SOLUTION ORAL; RESPIRATORY (INHALATION) 3 TIMES DAILY
Status: DISCONTINUED | OUTPATIENT
Start: 2025-03-24 | End: 2025-04-13

## 2025-03-24 RX ORDER — IPRATROPIUM BROMIDE AND ALBUTEROL SULFATE 2.5; .5 MG/3ML; MG/3ML
3 SOLUTION RESPIRATORY (INHALATION)
Status: DISCONTINUED | OUTPATIENT
Start: 2025-03-24 | End: 2025-03-24

## 2025-03-24 RX ORDER — LEVALBUTEROL INHALATION SOLUTION 1.25 MG/3ML
1.25 SOLUTION RESPIRATORY (INHALATION) 3 TIMES DAILY
Status: COMPLETED | OUTPATIENT
Start: 2025-03-24 | End: 2025-03-26

## 2025-03-24 RX ORDER — MIDODRINE HYDROCHLORIDE 10 MG/1
10 TABLET ORAL 3 TIMES DAILY
Status: DISCONTINUED | OUTPATIENT
Start: 2025-03-24 | End: 2025-04-30 | Stop reason: HOSPADM

## 2025-03-24 RX ORDER — DEXTROSE MONOHYDRATE AND SODIUM CHLORIDE 5; .9 G/100ML; G/100ML
INJECTION, SOLUTION INTRAVENOUS CONTINUOUS
Status: DISCONTINUED | OUTPATIENT
Start: 2025-03-24 | End: 2025-03-25

## 2025-03-24 RX ORDER — ACETYLCYSTEINE 200 MG/ML
2 SOLUTION ORAL; RESPIRATORY (INHALATION) 2 TIMES DAILY
Status: DISCONTINUED | OUTPATIENT
Start: 2025-03-24 | End: 2025-03-24

## 2025-03-24 RX ADMIN — PREGABALIN 75 MG: 75 CAPSULE ORAL at 09:03

## 2025-03-24 RX ADMIN — DEXTROSE AND SODIUM CHLORIDE: 5; 900 INJECTION, SOLUTION INTRAVENOUS at 09:03

## 2025-03-24 RX ADMIN — LACTULOSE 20 G: 20 SOLUTION ORAL at 09:03

## 2025-03-24 RX ADMIN — ACETYLCYSTEINE 2 ML: 200 SOLUTION ORAL; RESPIRATORY (INHALATION) at 08:03

## 2025-03-24 RX ADMIN — PANTOPRAZOLE SODIUM 40 MG: 40 INJECTION, POWDER, FOR SOLUTION INTRAVENOUS at 09:03

## 2025-03-24 RX ADMIN — DEXTROSE AND SODIUM CHLORIDE: 5; 900 INJECTION, SOLUTION INTRAVENOUS at 11:03

## 2025-03-24 RX ADMIN — MUPIROCIN: 20 OINTMENT TOPICAL at 09:03

## 2025-03-24 RX ADMIN — HYDROCODONE BITARTRATE AND ACETAMINOPHEN 1 TABLET: 5; 325 TABLET ORAL at 09:03

## 2025-03-24 RX ADMIN — LEVALBUTEROL HYDROCHLORIDE 1.25 MG: 1.25 SOLUTION RESPIRATORY (INHALATION) at 08:03

## 2025-03-24 RX ADMIN — MIDODRINE HYDROCHLORIDE 10 MG: 10 TABLET ORAL at 03:03

## 2025-03-24 RX ADMIN — ATORVASTATIN CALCIUM 20 MG: 20 TABLET, FILM COATED ORAL at 09:03

## 2025-03-24 RX ADMIN — MIDODRINE HYDROCHLORIDE 10 MG: 10 TABLET ORAL at 09:03

## 2025-03-24 RX ADMIN — ENOXAPARIN SODIUM 40 MG: 40 INJECTION SUBCUTANEOUS at 05:03

## 2025-03-24 RX ADMIN — SERTRALINE HYDROCHLORIDE 50 MG: 50 TABLET ORAL at 09:03

## 2025-03-24 RX ADMIN — SODIUM CHLORIDE 1000 ML: 9 INJECTION, SOLUTION INTRAVENOUS at 09:03

## 2025-03-24 RX ADMIN — LEVALBUTEROL HYDROCHLORIDE 1.25 MG: 1.25 SOLUTION RESPIRATORY (INHALATION) at 03:03

## 2025-03-24 NOTE — ASSESSMENT & PLAN NOTE
Nutrition consulted. Most recent weight and BMI monitored-     Measurements:  Wt Readings from Last 1 Encounters:   03/24/25 79.8 kg (175 lb 14.8 oz)   Body mass index is 32.18 kg/m².    Patient has been screened and assessed by RD.    Malnutrition Type:  Context: chronic illness  Level: severe    Continue PEG tube feeding.  Nepro at 40 mL/hr  Maintain bowel regimen (lactulose, PEG, senna)  Dietitian to reassess

## 2025-03-24 NOTE — PT/OT/SLP PROGRESS
Speech Language Pathology      Karie Denney  MRN: [id number]    Patient not seen today secondary to Other (Comment) (SLP spoke with pt's nurse (Loyda) and pt's mother. Pt currently has a PEG tube for nutrition and also has a trach. Pt's mother reports that pt is not asking for anything to eat or drink orally, and she does not wish to feed her orally at this time.). Will D/C order for swallow eval at this time.

## 2025-03-24 NOTE — SUBJECTIVE & OBJECTIVE
Past Medical History:   Diagnosis Date    GERD (gastroesophageal reflux disease)     Paraplegia        Past Surgical History:   Procedure Laterality Date    BACK SURGERY         Review of patient's allergies indicates:   Allergen Reactions    Penicillins Anaphylaxis       Current Facility-Administered Medications on File Prior to Encounter   Medication    [DISCONTINUED] GENERIC EXTERNAL MEDICATION     Current Outpatient Medications on File Prior to Encounter   Medication Sig    atorvastatin (LIPITOR) 20 MG tablet Take 20 mg by mouth once daily.    cholecalciferol, vitamin D3, 1,250 mcg (50,000 unit) capsule Take 1,250 mcg by mouth every 7 days.    LINZESS 290 mcg Cap capsule Take 290 mcg by mouth before breakfast.    omeprazole (PRILOSEC) 40 MG capsule Take 40 mg by mouth every morning.    pregabalin (LYRICA) 75 MG capsule Take 75 mg by mouth 2 (two) times daily.    sertraline (ZOLOFT) 50 MG tablet Take 50 mg by mouth once daily.    traZODone (DESYREL) 100 MG tablet Take 100 mg by mouth every evening.     Family History    Family history is unknown by patient.       Tobacco Use    Smoking status: Never    Smokeless tobacco: Never   Substance and Sexual Activity    Alcohol use: Never    Drug use: Never    Sexual activity: Not Currently     Review of Systems   Unable to perform ROS: Patient nonverbal     Objective:     Vital Signs (Most Recent):  Temp: 98 °F (36.7 °C) (03/23/25 1541)  Pulse: (!) 115 (03/23/25 1541)  Resp: 20 (03/23/25 1948)  BP: 97/67 (03/23/25 1541)  SpO2: 100 % (03/23/25 1541) Vital Signs (24h Range):  Temp:  [98 °F (36.7 °C)] 98 °F (36.7 °C)  Pulse:  [] 115  Resp:  [20] 20  SpO2:  [100 %] 100 %  BP: ()/(53-70) 97/67     Weight: 79.6 kg (175 lb 8 oz)  Body mass index is 32.1 kg/m².     Physical Exam  Constitutional:       Appearance: She is ill-appearing.   HENT:      Head: Normocephalic and atraumatic.      Nose: Nose normal.      Mouth/Throat:      Mouth: Mucous membranes are moist.       Pharynx: Oropharynx is clear.   Eyes:      Extraocular Movements: Extraocular movements intact.      Conjunctiva/sclera: Conjunctivae normal.      Pupils: Pupils are equal, round, and reactive to light.   Cardiovascular:      Rate and Rhythm: Regular rhythm. Tachycardia present.      Pulses: Normal pulses.      Heart sounds: Normal heart sounds.   Pulmonary:      Effort: Pulmonary effort is normal.      Breath sounds: Normal breath sounds.   Abdominal:      General: Abdomen is flat. Bowel sounds are normal.      Palpations: Abdomen is soft.   Musculoskeletal:      Cervical back: Normal range of motion and neck supple.   Skin:     General: Skin is warm and dry.   Neurological:      Mental Status: She is alert.      Comments: Quadriplegia    Psychiatric:         Mood and Affect: Mood normal.         Behavior: Behavior normal.              CRANIAL NERVES     CN III, IV, VI   Pupils are equal, round, and reactive to light.       Significant Labs: All pertinent labs within the past 24 hours have been reviewed.    Significant Imaging: I have reviewed all pertinent imaging results/findings within the past 24 hours.

## 2025-03-24 NOTE — SUBJECTIVE & OBJECTIVE
Past Medical History:   Diagnosis Date    GERD (gastroesophageal reflux disease)     Paraplegia        Past Surgical History:   Procedure Laterality Date    BACK SURGERY         Review of patient's allergies indicates:   Allergen Reactions    Penicillins Anaphylaxis       Family History    Family history is unknown by patient.       Tobacco Use    Smoking status: Never    Smokeless tobacco: Never   Substance and Sexual Activity    Alcohol use: Never    Drug use: Never    Sexual activity: Not Currently      Review of Systems  Objective:     Vital Signs (Most Recent):  Temp: 99 °F (37.2 °C) (03/24/25 1525)  Pulse: (!) 121 (03/24/25 1559)  Resp: 16 (03/24/25 1559)  BP: (!) 85/45 (RN aware) (03/24/25 1525)  SpO2: 98 % (03/24/25 1559) Vital Signs (24h Range):  Temp:  [97.6 °F (36.4 °C)-99.7 °F (37.6 °C)] 99 °F (37.2 °C)  Pulse:  [102-125] 121  Resp:  [16-24] 16  SpO2:  [96 %-100 %] 98 %  BP: (83-93)/(45-54) 85/45   Weight: 79.8 kg (175 lb 14.8 oz)  Body mass index is 32.18 kg/m².      Intake/Output Summary (Last 24 hours) at 3/24/2025 1721  Last data filed at 3/24/2025 1613  Gross per 24 hour   Intake 280 ml   Output --   Net 280 ml          Physical Exam  Constitutional:       General: She is not in acute distress.     Appearance: She is not ill-appearing or toxic-appearing.   HENT:      Head: Normocephalic and atraumatic.   Eyes:      General: No scleral icterus.  Neck:      Comments: Tracheostomy secured with ties  Cardiovascular:      Heart sounds: Normal heart sounds. No murmur heard.     No gallop.   Pulmonary:      Effort: Pulmonary effort is normal.      Breath sounds: Rales present. No wheezing or rhonchi.   Abdominal:      Palpations: Abdomen is soft.      Tenderness: There is no abdominal tenderness. There is no guarding.   Skin:     General: Skin is warm.      Capillary Refill: Capillary refill takes less than 2 seconds.      Coloration: Skin is not jaundiced.   Neurological:      Mental Status: She is alert.       Comments: Awake, alert, mouthing words, smiling and interacting with care            Vents:  Oxygen Concentration (%): 40 (03/24/25 1559)  Lines/Drains/Airways       Drain  Duration                  Gastrostomy/Enterostomy LUQ -- days              Airway  Duration                  Airway - Non-Surgical 02/24/25 1343 Endotracheal Tube 28 days    Adult Surgical Airway 03/23/25 1544 Shiley Cuffed  1 day              Peripheral Intravenous Line  Duration                  Peripheral IV - Single Lumen Yes Anterior;Right Upper Arm -- days                  Significant Labs:    CBC/Anemia Profile:  Recent Labs   Lab 03/24/25  0513   WBC 9.75   HGB 9.0*   HCT 32.7*      MCV 92.1   RDW 19.2*        Chemistries:  Recent Labs   Lab 03/24/25  0513      K 3.7      CO2 35*   BUN 15   CREATININE 0.45*   CALCIUM 9.0   MG 2.1       All pertinent labs within the past 24 hours have been reviewed.    Significant Imaging: I have reviewed all pertinent imaging results/findings within the past 24 hours.

## 2025-03-24 NOTE — PLAN OF CARE
Problem: Adult Inpatient Plan of Care  Goal: Plan of Care Review  Outcome: Progressing     Problem: Adult Inpatient Plan of Care  Goal: Patient-Specific Goal (Individualized)  Outcome: Progressing     Problem: Adult Inpatient Plan of Care  Goal: Absence of Hospital-Acquired Illness or Injury  Outcome: Progressing     Problem: Adult Inpatient Plan of Care  Goal: Optimal Comfort and Wellbeing  Outcome: Progressing     Problem: Adult Inpatient Plan of Care  Goal: Readiness for Transition of Care  Outcome: Progressing     Problem: Infection  Goal: Absence of Infection Signs and Symptoms  Outcome: Progressing     Problem: Wound  Goal: Optimal Coping  Outcome: Progressing     Problem: Wound  Goal: Optimal Pain Control and Function  Outcome: Progressing     Problem: Wound  Goal: Optimal Wound Healing  Outcome: Progressing     Problem: Skin Injury Risk Increased  Goal: Skin Health and Integrity  Outcome: Progressing

## 2025-03-24 NOTE — ASSESSMENT & PLAN NOTE
Aquacel Ag + Mepilex dressings  Wound care team consulted    Continue offloading and specialty mattress

## 2025-03-24 NOTE — HPI
Chief Complaint  Transfer for continued management of respiratory failure, tracheostomy care, PEG feeding, and complications of quadriplegia following prolonged hospitalization.    History of Present Illness  Ms. Karie Denney is a 59-year-old woman with a complex medical history including quadriplegia following spinal surgery in 2015 and a cerebrovascular accident (CVA) in 2024 resulting in left-sided paralysis. She was transferred to [facility] from [facility] in [place], [place], for ongoing care following a prolonged ICU course involving intubation, respiratory failure, and significant complications.  She was initially hospitalized on February 15, 2025, for aspiration and dysphagia evaluation, during which she developed aspiration pneumonia and respiratory failure requiring intubation. Her hospital course was complicated by an ESBL E. coli UTI, pericardial effusion (treated with colchicine), and a spontaneous right thigh hematoma concerning for compartment syndrome, leading to transfer to [facility]. She required prolonged mechanical ventilation, tracheostomy placement, and PEG tube insertion.  She has now returned to [facility] for continued respiratory care, tracheostomy management, PEG feeding, management of sacral pressure injury, and rehabilitation planning.    Past Medical History  Quadriplegia post-spinal surgery (2015)  CVA (2024)  Aspiration pneumonia  Acute respiratory failure  UTI (ESBL E. coli)  Depression  GERD  Pharyngoesophageal dysphagia  Pericardial effusion/pericarditis  Chronic constipation  Pressure ulcer (sacral, unstageable)  Hyperlipidemia  Anemia    Past Surgical History  Spinal surgery (2015)  PEG tube placement (03/11/2025)  Esophageal dilation with biopsy (08/2024)    Medications  Active Medications:  Atorvastatin 20 mg daily  Omeprazole 40 mg daily  Sertraline 50 mg daily  Trazodone 100 mg qHS  Pregabalin 75 mg BID  Hydrocodone-acetaminophen 5-325 mg BID  Lactulose 30 mL  daily via PEG  Midodrine 10 mg Q6H via PEG  Levalbuterol 0.63 mg nebulizer Q6H  Polyethylene glycol 17 g daily via PEG  Recent Changes:  Colchicine: Discontinued due to bleeding  Aspirin and ezetimibe: Discontinued    Allergies  Penicillins ? Rash and anaphylaxis    Social History  Never smoker  No alcohol or tobacco use  Lives at home with mother; receives home health weekly  Bedbound, non-ambulatory    Family History  Noncontributory    Review of Systems  Limited due to tracheostomy, quadriplegia, and communication challenges. History obtained from EMR and caregiver.    Physical Examination  General: Chronically ill-appearing woman, alert, tracheostomy in place with T-piece  HEENT: Mild nasal skin necrosis, mucous membranes moist  Eyes: PERRL, EOMI  Neck: Tracheostomy present  CV: RRR, no murmurs  Lungs: Breath sounds diminished at bases; no acute distress; no wheezing  Abdomen: Soft, non-tender, PEG tube in place  Skin: Unstageable sacral ulcer, nasal pressure ulcer  Neuro: Quadriplegia, responsive with eye tracking and blinking  Extremities: RLE hematoma, bilateral edema, no signs of active bleeding    Laboratory Data (Most Recent)  Hgb: 8.5 g/dL  Creatinine: 0.30 mg/dL  Albumin: 3.1 g/dL  WBC: 9.4 K/uL  Ferritin: 265 ng/mL  INR: 0.93  No growth on recent blood and urine cultures    Imaging  CT angio: Large RLE hematoma without active extravasation  Multiple chest X-rays: Stable bilateral pleural effusions, improving atelectasis  Echo: EF 55-60%, small pericardial effusion

## 2025-03-24 NOTE — RESPIRATORY THERAPY
Patient trach still has stiches. Inner cannula is clear, clean and patent.  Gauze around trach changed.  Trach tie is clean. Suctioned thick, clear secretions from trach PRN. Cuff is deflated. 5mL syringes placed at HOB and in trach box .No distress to report.

## 2025-03-24 NOTE — ASSESSMENT & PLAN NOTE
Continue tracheostomy care with T-piece trials  Tracheostomy suctioning ordered due to thick secretions.   Culture tracheostomy secretions.   Monitor oxygenation and readiness for weaning  Pulmonary consult tomorrow.     3/24: mucus plugging requiring frequent suctioning.  Chest physiotherapy, Mucomyst.  Additional recommendations per pulmonology.

## 2025-03-24 NOTE — PROGRESS NOTES
[facility] - [facility]  Wound Care    Patient Name:  Karie Denney   MRN:  [id number]  Date: 3/24/2025  Diagnosis: Acute respiratory failure with hypoxia and hypercarbia    History:     Past Medical History:   Diagnosis Date    GERD (gastroesophageal reflux disease)     Paraplegia        Social History[1]    Precautions:     Allergies as of 03/22/2025 - Reviewed 02/26/2025   Allergen Reaction Noted    Penicillins Anaphylaxis 08/25/2022       WOC Assessment Details/Treatment        03/24/25 1045   WOCN Assessment   WOCN Total Time (mins) 60   Visit Date 03/24/25   Visit Time 1045   Consult Type New   WOCN Speciality Wound   Wound pressure;other   Number of Wounds 3   Continence Type Urinary;Fecal   Intervention assessed;applied;chart review;coordination of care   Teaching on-going   Skin Interventions   Device Skin Pressure Protection absorbent pad utilized/changed   Pressure Reduction Devices foam padding utilized   Pressure Reduction Techniques heels elevated off bed   Skin Protection incontinence pads utilized   Positioning   Body Position head facing, right   Head of Bed (HOB) Positioning HOB at 20 degrees   Positioning/Transfer Devices pillows;wedge;in use   Pressure Injury Prevention    Check Moisture Management Pad Done   Sacral Foam Dressing Peel back sacral foam dressing, assess skin and reapply   Heel protection technique Foam dressing   Heel preventative measures Peel back dressing/boot, assess skin and reapply   Check Medical Devices Done        Wound 03/23/25 1540 Traumatic Nose   Date First Assessed/Time First Assessed: 03/23/25 1540   Present on Original Admission: (c) No  Primary Wound Type: Traumatic  Location: Nose   Wound Image     Dressing Appearance Open to air   Drainage Amount None   Appearance Intact;Pink   Tissue loss description Partial thickness   Black (%), Wound Tissue Color 0 %   Red (%), Wound Tissue Color 100 %   Yellow (%), Wound Tissue Color 0 %   Periwound  Area Intact;Dry   Wound Edges Open;Undefined   Care Cleansed with:;Antimicrobial agent   Dressing Applied;Silver;Calcium alginate;Foam;Island/border   Dressing Change Due 03/27/25        Wound 03/23/25 1541 Pressure Injury Left anterior Foot #2   Date First Assessed/Time First Assessed: 03/23/25 1541   Present on Original Admission: Yes  Primary Wound Type: Pressure Injury  Side: Left  Orientation: anterior  Location: Foot  Wound Number: #2  Is this injury device related?: No   Wound Image    Pressure Injury Stage U   Dressing Appearance Open to air   Drainage Amount None   Appearance Intact;Maroon   Tissue loss description Not applicable   Black (%), Wound Tissue Color 0 %   Red (%), Wound Tissue Color 100 %   Yellow (%), Wound Tissue Color 0 %   Periwound Area Intact;Dry   Wound Edges Undefined   Wound Length (cm) 0.3 cm   Wound Width (cm) 0.2 cm   Wound Depth (cm) 0 cm   Wound Volume (cm^3) 0 cm^3   Wound Surface Area (cm^2) 0.05 cm^2   Care Applied:;Povidone iodine   Dressing Change Due 03/25/25        Wound 03/23/25 1542 Pressure Injury Left Buttocks #3   Date First Assessed/Time First Assessed: 03/23/25 1542   Present on Original Admission: Yes  Primary Wound Type: Pressure Injury  Side: Left  Location: Buttocks  Wound Number: #3   Wound Image    Pressure Injury Stage U   Dressing Appearance Dry;Intact;Clean   Drainage Amount None   Drainage Characteristics/Odor No odor   Appearance Intact;Pink;Eschar   Tissue loss description Full thickness   Black (%), Wound Tissue Color 50 %   Red (%), Wound Tissue Color 50 %   Yellow (%), Wound Tissue Color 0 %   Periwound Area Intact;Dry   Wound Edges Undefined;Irregular   Wound Length (cm) 4 cm   Wound Width (cm) 2 cm   Wound Depth (cm) 0.1 cm   Wound Volume (cm^3) 0.419 cm^3   Wound Surface Area (cm^2) 6.28 cm^2   Care Cleansed with:;Antimicrobial agent   Dressing Applied;Silver;Calcium alginate;Foam;Island/border   Dressing Change Due 03/27/25       WOC Team consulted  for nose, left toe, and left buttock    Narrative: pt lying in bed, pt's nurse and tech @ bedside changing pt's pads. Pt tolerated wound care well.     Active Wounds and Recommendations: Left buttock wound clean with vashe apply aquacel AG and secure with foam border, change every 3 days AND as needed for drainage/soilage. Left 2nd toe apply betadine daily. For nose wound clean with vashe, cover with aquacel A G and secure with foam border cut to fit pt's nose. Change every 3 days,.     Goals for Wound Healing: Moisture management, Apply antimicrobial, Reduce pain, Reduce bioburden, and Educate on proper wound management post D/C     Barriers to Wound Healing: diabetes excessive wound moisture and macerated tissue dry wound bed advanced age fragile skin no protective sensation infection    Orders placed.    Thank you for the consult.     We will continue to follow. See additional note under Notes Tab for tentative f/u plan/dates.        03/24/2025       [1]   Social History  Socioeconomic History    Marital status: Single   Tobacco Use    Smoking status: Never    Smokeless tobacco: Never   Substance and Sexual Activity    Alcohol use: Never    Drug use: Never    Sexual activity: Not Currently     Social Drivers of Health     Financial Resource Strain: Low Risk  (3/24/2025)    Overall Financial Resource Strain (CARDIA)     Difficulty of Paying Living Expenses: Not hard at all   Food Insecurity: No Food Insecurity (3/24/2025)    Hunger Vital Sign     Worried About Running Out of Food in the Last Year: Never true     Ran Out of Food in the Last Year: Never true   Recent Concern: Food Insecurity - Food Insecurity Present (3/23/2025)    Hunger Vital Sign     Worried About Running Out of Food in the Last Year: Often true     Ran Out of Food in the Last Year: Often true   Transportation Needs: No Transportation Needs (3/24/2025)    PRAPARE - Transportation     Lack of Transportation (Medical): No     Lack of Transportation  (Non-Medical): No   Physical Activity: Inactive (3/24/2025)    Exercise Vital Sign     Days of Exercise per Week: 0 days     Minutes of Exercise per Session: 0 min   Stress: No Stress Concern Present (3/24/2025)    [employer] - Occupational Stress Questionnaire     Feeling of Stress : Not at all   Recent Concern: Stress - Stress Concern Present (3/23/2025)    [employer] - Occupational Stress Questionnaire     Feeling of Stress : Rather much   Housing Stability: Low Risk  (3/24/2025)    Housing Stability Vital Sign     Unable to Pay for Housing in the Last Year: No     Homeless in the Last Year: No

## 2025-03-24 NOTE — ASSESSMENT & PLAN NOTE
Poor secretion management 2/2 MSK weakness. Improved core support on assessment this time around. CXR with R lower lobe atelectatic changes. Afebrile.   - caution with starting Abx in absence of fever; will f/u respiratory Cxs  - pulmonary hygiene as follows   -- increase Chest PT for R lower lobar atelectasis to TID   -- increase acetylcysteine neb to TID   -- cont levalbuterol three times daily   -- reposition at least TID for goal postural drainage  - CXR 03/26 am  - will consider therapeutic aspiration with bronchoscopy for respiratory emergency or difficulty with mobilization  - holding on capping trial at this time for secretion management as above

## 2025-03-24 NOTE — ASSESSMENT & PLAN NOTE
Nutrition consulted. Most recent weight and BMI monitored-     Measurements:  Wt Readings from Last 1 Encounters:   03/23/25 79.6 kg (175 lb 8 oz)   Body mass index is 32.1 kg/m².    Patient has been screened and assessed by RD.    Malnutrition Type:  Context:    Level:      Continue PEG tube feeding.  Nepro at 40 mL/hr  Maintain bowel regimen (lactulose, PEG, senna)  Dietitian to reassess

## 2025-03-24 NOTE — ASSESSMENT & PLAN NOTE
Previously on colchicine, now discontinued due to bleeding  Continue low-dose prednisone  Monitor for recurrence

## 2025-03-24 NOTE — CONSULTS
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 She is now transferred back to floor 03/23 s/p tracheostomy tube placement 03/14 for extubation failures now weaned to trach collar. She is also s/p PEG placement 03/11/2025. R spontaneous R thigh hematoma was managed with supportive care. Pulmonary is consulted for tracheostomy tube management recommendations.     At time of my assessment she is awake and participating with assessment. Her mother is at bedside discussing care with Case Management. OSH records reviewed at length.     Hospital/ICU Course:  No notes on file    Past Medical History:   Diagnosis Date    GERD (gastroesophageal reflux disease)     Paraplegia        Past Surgical History:   Procedure Laterality Date    BACK SURGERY         Review of patient's allergies indicates:   Allergen Reactions    Penicillins Anaphylaxis       Family History    Family history is unknown by patient.       Tobacco Use    Smoking status: Never    Smokeless tobacco: Never   Substance and Sexual Activity    Alcohol use: Never    Drug use:  Never    Sexual activity: Not Currently      Review of Systems  Objective:     Vital Signs (Most Recent):  Temp: 99 °F (37.2 °C) (03/24/25 1525)  Pulse: (!) 121 (03/24/25 1559)  Resp: 16 (03/24/25 1559)  BP: (!) 85/45 (RN aware) (03/24/25 1525)  SpO2: 98 % (03/24/25 1559) Vital Signs (24h Range):  Temp:  [97.6 °F (36.4 °C)-99.7 °F (37.6 °C)] 99 °F (37.2 °C)  Pulse:  [102-125] 121  Resp:  [16-24] 16  SpO2:  [96 %-100 %] 98 %  BP: (83-93)/(45-54) 85/45   Weight: 79.8 kg (175 lb 14.8 oz)  Body mass index is 32.18 kg/m².      Intake/Output Summary (Last 24 hours) at 3/24/2025 1721  Last data filed at 3/24/2025 1613  Gross per 24 hour   Intake 280 ml   Output --   Net 280 ml          Physical Exam  Constitutional:       General: She is not in acute distress.     Appearance: She is not ill-appearing or toxic-appearing.   HENT:      Head: Normocephalic and atraumatic.   Eyes:      General: No scleral icterus.  Neck:      Comments: Tracheostomy secured with ties  Cardiovascular:      Heart sounds: Normal heart sounds. No murmur heard.     No gallop.   Pulmonary:      Effort: Pulmonary effort is normal.      Breath sounds: Rales present. No wheezing or rhonchi.   Abdominal:      Palpations: Abdomen is soft.      Tenderness: There is no abdominal tenderness. There is no guarding.   Skin:     General: Skin is warm.      Capillary Refill: Capillary refill takes less than 2 seconds.      Coloration: Skin is not jaundiced.   Neurological:      Mental Status: She is alert.      Comments: Awake, alert, mouthing words, smiling and interacting with care            Vents:  Oxygen Concentration (%): 40 (03/24/25 1559)  Lines/Drains/Airways       Drain  Duration                  Gastrostomy/Enterostomy LUQ -- days              Airway  Duration                  Airway - Non-Surgical 02/24/25 1343 Endotracheal Tube 28 days    Adult Surgical Airway 03/23/25 1544 Shiley Cuffed  1 day              Peripheral Intravenous Line  Duration         "          Peripheral IV - Single Lumen Yes Anterior;Right Upper Arm -- days                  Significant Labs:    CBC/Anemia Profile:  Recent Labs   Lab 03/24/25  0513   WBC 9.75   HGB 9.0*   HCT 32.7*      MCV 92.1   RDW 19.2*        Chemistries:  Recent Labs   Lab 03/24/25  0513      K 3.7      CO2 35*   BUN 15   CREATININE 0.45*   CALCIUM 9.0   MG 2.1       All pertinent labs within the past 24 hours have been reviewed.    Significant Imaging: I have reviewed all pertinent imaging results/findings within the past 24 hours.    ABG  No results for input(s): "PH", "PO2", "PCO2", "HCO3", "BE" in the last 168 hours.  Assessment/Plan:     Pulmonary  * Chronic respiratory failure with hypoxia  She has had mechanical respiratory failure prior to transfer to OSH in this patient who is bed bound with severe illness. Difficult intubation worthy mention given limited ROM of neck. She has her respiratory failure with hypoxia further 2/2 mucus plugging and aspiration events. She is now s/p PEG placement as well. Given well defined history of aspiration and previous requirements of esophageal dilation due to stricture, I would favor a STRICT NPO status for this patient.   - cont trach collar today; cuff down  - supplement for goal SpO2 >92%  - pulmonary toilet as per mucus plugging plan; will hold on trach capping trial given secretions on RT assessment and ongoing mucus plugging management    Mucus plugging of bronchi  Poor secretion management 2/2 MSK weakness. Improved core support on assessment this time around. CXR with R lower lobe atelectatic changes. Afebrile.   - caution with starting Abx in absence of fever; will f/u respiratory Cxs  - pulmonary hygiene as follows   -- increase Chest PT for R lower lobar atelectasis to TID   -- increase acetylcysteine neb to TID   -- cont levalbuterol three times daily   -- reposition at least TID for goal postural drainage  - CXR 03/26 am  - will consider " therapeutic aspiration with bronchoscopy for respiratory emergency or difficulty with mobilization  - holding on capping trial at this time for secretion management as above    Cardiac/Vascular  Pericardial effusion  Noted on prior admission. Cardiology had recommended repeat TTE in 2 weeks. Colchicine was stopped last admission due to spontaneous RLE bleeding.   - interval TTE ordered per Cardiology recs         Thank you for your consult. I will follow-up with patient. Please contact us if you have any additional questions.     Mariah Cunha MD  Pulmonary and Critical Care Medicine  [facility] - [address] [place] [address]

## 2025-03-24 NOTE — ASSESSMENT & PLAN NOTE
Continue tracheostomy care with T-piece trials  Tracheostomy suctioning ordered due to thick secretions.   Culture tracheostomy secretions.   Monitor oxygenation and readiness for weaning  Pulmonary consult tomorrow.

## 2025-03-24 NOTE — NURSING
Spoke with NICOLASA Maldonado in ICU.  Dr. Cunha is on-call.  Joel will pass on consult for pulmonology.

## 2025-03-24 NOTE — PLAN OF CARE
[facility] - [facility]  Initial Discharge Assessment       Primary Care Provider: Gonzalo Barrera NP    Admission Diagnosis: Respiratory failure [J96.90]  DVT (deep vein thrombosis) in pregnancy [O22.30]    Admission Date: 3/23/2025  Expected Discharge Date:     Transition of Care Barriers: None    Payor: HU[employer] MANAGED MEDICARE / Plan: HU[employer]NA SNP HMO PPO SPECIAL NEEDS / Product Type: Medicare Advantage /     Extended Emergency Contact Information  Primary Emergency Contact: Carolina Matias  Home Phone: [phone]  Relation: Sister  Preferred language: English   needed? No  Secondary Emergency Contact: Ashley Montanez  [employer] Phone: [phone]  Relation: Daughter  Preferred language: English   needed? No    Discharge Plan A: Home with family  Discharge Plan B: Long-term acute care facility (LTAC), Rehab, Skilled Nursing Facility, Home Health      [facility] [address]  [address]  [place] [address]  Phone: [phone] Fax: [phone]      Initial Assessment (most recent)       Adult Discharge Assessment - 03/24/25 1045          Discharge Assessment    Assessment Type Discharge Planning Assessment     Confirmed/corrected address, phone number and insurance Yes     Confirmed Demographics Correct on Facesheet     Source of Information family     Communicated SHRADDHA with patient/caregiver Date not available/Unable to determine     People in Home parent(s)     Do you expect to return to your current living situation? Yes     Do you have help at home or someone to help you manage your care at home? Yes     Who are your caregiver(s) and their phone number(s)? Licha Matias mother [phone] (home), [phone] (cell)     Prior to hospitilization cognitive status: Unable to Assess     Current cognitive status: Unable to Assess     Walking or Climbing Stairs Difficulty yes     Walking or Climbing Stairs ambulation difficulty,  dependent     Mobility Management paraplegic     Dressing/Bathing Difficulty yes     Dressing/Bathing dressing difficulty, dependent     Dressing/Bathing Management paraplegic     Home Accessibility wheelchair accessible     Equipment Currently Used at Home lift device     Readmission within 30 days? Yes     Patient currently being followed by outpatient case management? No     Do you currently have service(s) that help you manage your care at home? Yes     How Many hours does patient receive services 56     Name and Contact number of agency Medicaid waiver program. [place] 8hrs a day, 7 days a week     Is the pt/caregiver preference to resume services with current agency Yes     Do you take prescription medications? Yes     Do you have prescription coverage? Yes     Coverage [employer]     Do you have any problems affording any of your prescribed medications? No     Is the patient taking medications as prescribed? yes     Who is going to help you get home at discharge? Licha Matias     How do you get to doctors appointments? family or friend will provide     Are you on dialysis? No     Do you take coumadin? No     Discharge Plan A Home with family     Discharge Plan B Long-term acute care facility (LTAC);Rehab;Skilled Nursing Facility;Home Health     DME Needed Upon Discharge  none     Discharge Plan discussed with: Parent(s)     Name(s) and Number(s) Licha Matias, [phone]     Transition of Care Barriers None        Physical Activity    On average, how many days per week do you engage in moderate to strenuous exercise (like a brisk walk)? 0 days     On average, how many minutes do you engage in exercise at this level? 0 min        Financial Resource Strain    How hard is it for you to pay for the very basics like food, housing, medical care, and heating? Not hard at all        Housing Stability    In the last 12 months, was there a time when you were not able to pay the mortgage or rent on time? No      At any time in the past 12 months, were you homeless or living in a shelter (including now)? No        Transportation Needs    In the past 12 months, has lack of transportation kept you from medical appointments or from getting medications? No     In the past 12 months, has lack of transportation kept you from meetings, work, or from getting things needed for daily living? No        Food Insecurity    Within the past 12 months, you worried that your food would run out before you got the money to buy more. Never true     Within the past 12 months, the food you bought just didn't last and you didn't have money to get more. Never true        Stress    Do you feel stress - tense, restless, nervous, or anxious, or unable to sleep at night because your mind is troubled all the time - these days? Not at all        Social Isolation    How often do you feel lonely or isolated from those around you?  Never        Alcohol Use    Q1: How often do you have a drink containing alcohol? Never     Q2: How many drinks containing alcohol do you have on a typical day when you are drinking? Patient does not drink     Q3: How often do you have six or more drinks on one occasion? Never        Utilities    In the past 12 months has the electric, gas, oil, or water company threatened to shut off services in your home? No        Health Literacy    How often do you need to have someone help you when you read instructions, pamphlets, or other written material from your doctor or pharmacy? Never        OTHER    Name(s) of People in Home Carolina Matias CM spoke with Carolina Matias, pt's mother, at the bedside. Pt lives at home with her mother and she plans take pt back home when medically ready for d/c. Pt is bedbound and has a florentino lift at the house. They have a home helper that assists with ADLs 8 hrs a day, 7 days a week. They use Medicaid Transportation to get to appointments. SDOH completed. IM obtained. CM following for  anticipated d/c needs.     CM consult completed for:Placement at swingbed/LTAC versus home with family. Pt's [facility], CM informed her that [facility] is full at this time. Carolina was apprehensive about [facility] and would like to wait closer to medical d/c to make a choice.  At this time Carolina plans to take pt back home upon d/c. CM following

## 2025-03-24 NOTE — H&P
[facility] - [address] [place] [address] Medicine  History & Physical    Patient Name: Karie Denney  MRN: [id number]  Patient Class: IP- Inpatient  Admission Date: 3/23/2025  Attending Physician: Cassie Bar MD   Primary Care Provider: Gonzalo Barrera NP         Patient information was obtained from patient, parent, past medical records, and ER records.     Subjective:     Principal Problem:Acute respiratory failure with hypoxia and hypercarbia    Chief Complaint:   Chief Complaint   Patient presents with    Shortness of Breath        HPI: Chief Complaint  Transfer for continued management of respiratory failure, tracheostomy care, PEG feeding, and complications of quadriplegia following prolonged hospitalization.    History of Present Illness  Ms. Karie Denney is a 59-year-old woman with a complex medical history including quadriplegia following spinal surgery in 2015 and a cerebrovascular accident (CVA) in 2024 resulting in left-sided paralysis. She was transferred to [facility] from [facility] in [place], [place], for ongoing care following a prolonged ICU course involving intubation, respiratory failure, and significant complications.  She was initially hospitalized on February 15, 2025, for aspiration and dysphagia evaluation, during which she developed aspiration pneumonia and respiratory failure requiring intubation. Her hospital course was complicated by an ESBL E. coli UTI, pericardial effusion (treated with colchicine), and a spontaneous right thigh hematoma concerning for compartment syndrome, leading to transfer to [facility]. She required prolonged mechanical ventilation, tracheostomy placement, and PEG tube insertion.  She has now returned to [facility] for continued respiratory care, tracheostomy management, PEG feeding, management of sacral pressure injury, and rehabilitation planning.    Past Medical History  Quadriplegia post-spinal surgery  (2015)  CVA (2024)  Aspiration pneumonia  Acute respiratory failure  UTI (ESBL E. coli)  Depression  GERD  Pharyngoesophageal dysphagia  Pericardial effusion/pericarditis  Chronic constipation  Pressure ulcer (sacral, unstageable)  Hyperlipidemia  Anemia    Past Surgical History  Spinal surgery (2015)  PEG tube placement (03/11/2025)  Esophageal dilation with biopsy (08/2024)    Medications  Active Medications:  Atorvastatin 20 mg daily  Omeprazole 40 mg daily  Sertraline 50 mg daily  Trazodone 100 mg qHS  Pregabalin 75 mg BID  Hydrocodone-acetaminophen 5-325 mg BID  Lactulose 30 mL daily via PEG  Midodrine 10 mg Q6H via PEG  Levalbuterol 0.63 mg nebulizer Q6H  Polyethylene glycol 17 g daily via PEG  Recent Changes:  Colchicine: Discontinued due to bleeding  Aspirin and ezetimibe: Discontinued    Allergies  Penicillins ? Rash and anaphylaxis    Social History  Never smoker  No alcohol or tobacco use  Lives at home with mother; receives home health weekly  Bedbound, non-ambulatory    Family History  Noncontributory    Review of Systems  Limited due to tracheostomy, quadriplegia, and communication challenges. History obtained from EMR and caregiver.    Physical Examination  General: Chronically ill-appearing woman, alert, tracheostomy in place with T-piece  HEENT: Mild nasal skin necrosis, mucous membranes moist  Eyes: PERRL, EOMI  Neck: Tracheostomy present  CV: RRR, no murmurs  Lungs: Breath sounds diminished at bases; no acute distress; no wheezing  Abdomen: Soft, non-tender, PEG tube in place  Skin: Unstageable sacral ulcer, nasal pressure ulcer  Neuro: Quadriplegia, responsive with eye tracking and blinking  Extremities: RLE hematoma, bilateral edema, no signs of active bleeding    Laboratory Data (Most Recent)  Hgb: 8.5 g/dL  Creatinine: 0.30 mg/dL  Albumin: 3.1 g/dL  WBC: 9.4 K/uL  Ferritin: 265 ng/mL  INR: 0.93  No growth on recent blood and urine cultures    Imaging  CT angio: Large RLE hematoma without  active extravasation  Multiple chest X-rays: Stable bilateral pleural effusions, improving atelectasis  Echo: EF 55-60%, small pericardial effusion      Past Medical History:   Diagnosis Date    GERD (gastroesophageal reflux disease)     Paraplegia        Past Surgical History:   Procedure Laterality Date    BACK SURGERY         Review of patient's allergies indicates:   Allergen Reactions    Penicillins Anaphylaxis       Current Facility-Administered Medications on File Prior to Encounter   Medication    [DISCONTINUED] GENERIC EXTERNAL MEDICATION     Current Outpatient Medications on File Prior to Encounter   Medication Sig    atorvastatin (LIPITOR) 20 MG tablet Take 20 mg by mouth once daily.    cholecalciferol, vitamin D3, 1,250 mcg (50,000 unit) capsule Take 1,250 mcg by mouth every 7 days.    LINZESS 290 mcg Cap capsule Take 290 mcg by mouth before breakfast.    omeprazole (PRILOSEC) 40 MG capsule Take 40 mg by mouth every morning.    pregabalin (LYRICA) 75 MG capsule Take 75 mg by mouth 2 (two) times daily.    sertraline (ZOLOFT) 50 MG tablet Take 50 mg by mouth once daily.    traZODone (DESYREL) 100 MG tablet Take 100 mg by mouth every evening.     Family History    Family history is unknown by patient.       Tobacco Use    Smoking status: Never    Smokeless tobacco: Never   Substance and Sexual Activity    Alcohol use: Never    Drug use: Never    Sexual activity: Not Currently     Review of Systems   Unable to perform ROS: Patient nonverbal     Objective:     Vital Signs (Most Recent):  Temp: 98 °F (36.7 °C) (03/23/25 1541)  Pulse: (!) 115 (03/23/25 1541)  Resp: 20 (03/23/25 1948)  BP: 97/67 (03/23/25 1541)  SpO2: 100 % (03/23/25 1541) Vital Signs (24h Range):  Temp:  [98 °F (36.7 °C)] 98 °F (36.7 °C)  Pulse:  [] 115  Resp:  [20] 20  SpO2:  [100 %] 100 %  BP: ()/(53-70) 97/67     Weight: 79.6 kg (175 lb 8 oz)  Body mass index is 32.1 kg/m².     Physical Exam  Constitutional:       Appearance: She  is ill-appearing.   HENT:      Head: Normocephalic and atraumatic.      Nose: Nose normal.      Mouth/Throat:      Mouth: Mucous membranes are moist.      Pharynx: Oropharynx is clear.   Eyes:      Extraocular Movements: Extraocular movements intact.      Conjunctiva/sclera: Conjunctivae normal.      Pupils: Pupils are equal, round, and reactive to light.   Cardiovascular:      Rate and Rhythm: Regular rhythm. Tachycardia present.      Pulses: Normal pulses.      Heart sounds: Normal heart sounds.   Pulmonary:      Effort: Pulmonary effort is normal.      Breath sounds: Normal breath sounds.   Abdominal:      General: Abdomen is flat. Bowel sounds are normal.      Palpations: Abdomen is soft.   Musculoskeletal:      Cervical back: Normal range of motion and neck supple.   Skin:     General: Skin is warm and dry.   Neurological:      Mental Status: She is alert.      Comments: Quadriplegia    Psychiatric:         Mood and Affect: Mood normal.         Behavior: Behavior normal.              CRANIAL NERVES     CN III, IV, VI   Pupils are equal, round, and reactive to light.       Significant Labs: All pertinent labs within the past 24 hours have been reviewed.    Significant Imaging: I have reviewed all pertinent imaging results/findings within the past 24 hours.  Assessment/Plan:     * Acute respiratory failure with hypoxia and hypercarbia  Continue tracheostomy care with T-piece trials  Tracheostomy suctioning ordered due to thick secretions.   Culture tracheostomy secretions.   Monitor oxygenation and readiness for weaning  Pulmonary consult tomorrow.     Protein malnutrition  Nutrition consulted. Most recent weight and BMI monitored-     Measurements:  Wt Readings from Last 1 Encounters:   03/23/25 79.6 kg (175 lb 8 oz)   Body mass index is 32.1 kg/m².    Patient has been screened and assessed by RD.    Malnutrition Type:  Context:    Level:      Continue PEG tube feeding.  Nepro at 40 mL/hr  Maintain bowel regimen  (lactulose, PEG, senna)  Dietitian to reassess         Dysphagia  Continue omeprazole  Avoid PO intake  Speech therapy if clinically appropriate      Aspiration pneumonia  Recurrent aspiration pneumonia.  Complete two rounds of antibiotics.      Pericardial effusion  Previously on colchicine, now discontinued due to bleeding  Continue low-dose prednisone  Monitor for recurrence      Hyperlipidemia  Continue atorvastatin      Hematoma of lower extremity, right, subsequent encounter  No active bleeding; conservative management  Monitor H/H  No anticoagulation    Quadriplegia  Maximal support, bedbound  PT/OT as tolerated  DME planning for discharge      S/P percutaneous endoscopic gastrostomy (PEG) tube placement  Patient noted to have a percutaneous endoscopic gastrostomy tube in place. I have personally inspected the tube.Tube was placed prior to this admission There are no signs of drainage or infection around the site. The tube is patent. Medications have converted to liquid form if available.  Routine care to be done by wound care and nursing staff.         Depression  Continue pregabalin, sertraline, trazodone        Pressure ulcers of skin of multiple topographic sites  Aquacel Ag + Mepilex dressings  Wound care team consulted    Continue offloading and specialty mattress        VTE Risk Mitigation (From admission, onward)           Ordered     enoxaparin injection 40 mg  Daily         03/23/25 1940     IP VTE HIGH RISK PATIENT  Once         03/23/25 1940     Place sequential compression device  Until discontinued         03/23/25 1941                           Cassie Bar MD  Department of Hospital Medicine  [facility] - [facility]

## 2025-03-24 NOTE — SUBJECTIVE & OBJECTIVE
Interval History:     Review of Systems   Unable to perform ROS: Patient nonverbal     Objective:     Vital Signs (Most Recent):  Temp: 99 °F (37.2 °C) (03/24/25 1525)  Pulse: (!) 121 (03/24/25 1559)  Resp: 16 (03/24/25 1559)  BP: (!) 85/45 (RN aware) (03/24/25 1525)  SpO2: 98 % (03/24/25 1559) Vital Signs (24h Range):  Temp:  [97.6 °F (36.4 °C)-99.7 °F (37.6 °C)] 99 °F (37.2 °C)  Pulse:  [102-125] 121  Resp:  [16-24] 16  SpO2:  [96 %-100 %] 98 %  BP: (83-93)/(45-54) 85/45     Weight: 79.8 kg (175 lb 14.8 oz)  Body mass index is 32.18 kg/m².    Intake/Output Summary (Last 24 hours) at 3/24/2025 1741  Last data filed at 3/24/2025 1613  Gross per 24 hour   Intake 280 ml   Output --   Net 280 ml         Physical Exam  Constitutional:       Appearance: She is ill-appearing.   HENT:      Head: Normocephalic and atraumatic.      Nose: Nose normal.      Mouth/Throat:      Mouth: Mucous membranes are moist.      Pharynx: Oropharynx is clear.   Eyes:      Extraocular Movements: Extraocular movements intact.      Conjunctiva/sclera: Conjunctivae normal.      Pupils: Pupils are equal, round, and reactive to light.   Cardiovascular:      Rate and Rhythm: Regular rhythm. Tachycardia present.      Pulses: Normal pulses.      Heart sounds: Normal heart sounds.   Pulmonary:      Effort: Pulmonary effort is normal.      Breath sounds: Normal breath sounds.   Abdominal:      General: Abdomen is flat. Bowel sounds are normal.      Palpations: Abdomen is soft.   Musculoskeletal:      Cervical back: Normal range of motion and neck supple.   Skin:     General: Skin is warm and dry.   Neurological:      Mental Status: She is alert.      Comments: Quadriplegia    Psychiatric:         Mood and Affect: Mood normal.         Behavior: Behavior normal.               Significant Labs: All pertinent labs within the past 24 hours have been reviewed.    Significant Imaging: I have reviewed all pertinent imaging results/findings within the past 24  hours.

## 2025-03-24 NOTE — PROGRESS NOTES
[facility] - [facility]  Wound Care    Patient Name:  Karie Denney   MRN:  [id number]  Date: 3/24/2025  Diagnosis: Acute respiratory failure with hypoxia and hypercarbia    History:     Past Medical History:   Diagnosis Date    GERD (gastroesophageal reflux disease)     Paraplegia        Social History[1]    Precautions:     Allergies as of 03/22/2025 - Reviewed 02/26/2025   Allergen Reaction Noted    Penicillins Anaphylaxis 08/25/2022       WOC Assessment Details/Treatment   Narrative: Seen patient for initial preventative skin care measures.  Foam borders noted to bilateral heels and sacral.  Ulceration to left buttock.  No redness or open wounds noted to bilateral heels.  Consult wound care of any other findings.      03/24/2025        [1]   Social History  Socioeconomic History    Marital status: Single   Tobacco Use    Smoking status: Never    Smokeless tobacco: Never   Substance and Sexual Activity    Alcohol use: Never    Drug use: Never    Sexual activity: Not Currently     Social Drivers of Health     Financial Resource Strain: Low Risk  (3/24/2025)    Overall Financial Resource Strain (CARDIA)     Difficulty of Paying Living Expenses: Not hard at all   Food Insecurity: No Food Insecurity (3/24/2025)    Hunger Vital Sign     Worried About Running Out of Food in the Last Year: Never true     Ran Out of Food in the Last Year: Never true   Recent Concern: Food Insecurity - Food Insecurity Present (3/23/2025)    Hunger Vital Sign     Worried About Running Out of Food in the Last Year: Often true     Ran Out of Food in the Last Year: Often true   Transportation Needs: No Transportation Needs (3/24/2025)    PRAPARE - Transportation     Lack of Transportation (Medical): No     Lack of Transportation (Non-Medical): No   Physical Activity: Inactive (3/24/2025)    Exercise Vital Sign     Days of Exercise per Week: 0 days     Minutes of Exercise per Session: 0 min   Stress: No Stress Concern  Present (3/24/2025)    [employer] - Occupational Stress Questionnaire     Feeling of Stress : Not at all   Recent Concern: Stress - Stress Concern Present (3/23/2025)    [employer] - Occupational Stress Questionnaire     Feeling of Stress : Rather much   Housing Stability: Low Risk  (3/24/2025)    Housing Stability Vital Sign     Unable to Pay for Housing in the Last Year: No     Homeless in the Last Year: No

## 2025-03-24 NOTE — CONSULTS
[facility] - [address] [place] [address]  Adult Nutrition  Consult Note         Reason for Assessment  Reason For Assessment: new tube feeding        Assessment and Plan  Consult received and appreciated. Consult for new tube feeding. Patient is a 60 yo female with a complex medical history including quadriplegia following spinal surgery in 2015 and a CVA in 2024 resulting in left-sided paralysis. She was initially hospitalized in February 2025 for aspiration and dysphagia evaluation. Required prolonged mechanical ventilation, tracheostomy placement, and PEG tube insertion. Returned to [facility] on 3/23/25 for continued respiratory care, tracheostomy management, PEG feeding, management of sacral pressure injury, and rehab planning. Additional relevant PMHx includes aspiration pneumonia, respiratory failure, depression, GERD, chronic constipation, HLD, anemia.     Per H&P, patient noted to be on PEG tube feeding of Nepro at 40 mL/hr. Please see below for tube feed recommendations.     Patient is 79.8 kg (175 lb) with a BMI of 32.18 and is obese (Class 1 Obesity). Review of records reveals a >10% (18.6%) significant unintentional weight loss x 6 mo (97.5 kg 8/29/24). She is also noted to have an unhealed sacral wound.     Per ASPEN guidelines patient meets criteria for severe protein-calorie malnutrition secondary to dysphagia resulting in inadequate PO intakes as evidenced by >10% significant unintentional weight loss x 6 months, unhealed wounds, and consuming <75% estimated energy requirements for >1 month.     ENTERAL FEED RECOMMENDATIONS:    *Needs were adjusted and account for quadriplegia, BMI, and sacral wound*    Initiate continuous infusion of Isosource 1.5 at 10 mL/hr via PEG and advance 10 mL/hr q8h pending tolerance until goal rate of 30 mL/hr is achieved. Do not exceed goal rate. FWF 40 mL/hr. Keep HOB at 30-45 degrees to reduce risk of aspiration. Monitor for tolerance: n/v/d/c. Hold feeding  and notify RD if symptoms of intolerance develop.     Recommend addition of Luis Fernando BID via PEG to promote wound healing and provide additional protein. Recommend to continue with bowel regimen (lactulose, PEG, senna) given hx of chronic constipation. Tube feed regimen will also provide patient with ~ 11 g of fiber per 24 hours, which may help to further alleviate constipation. Given patient is receiving < 1 L formula per day, recommend to consider addition of multivitamin/mineral supplement to help meet micronutrient needs.    Last Bowel Movement: 03/23/25 (per mother reports)    Medications/labs reviewed. RD following.    Learning Needs/Social Determinants of Health    Learning Assessment       03/23/2025 1451 [facility] (3/23/2025 - Present)   Created by Corry Blue RN - RN (Nurse) Status: Complete                 PRIMARY LEARNER     Primary Learner Name:  marina noble SS - 03/23/2025 1451    Relationship:  Patient, Family SS - 03/23/2025 1451    Does the primary learner have any barriers to learning?:  No Barriers  - 03/23/2025 1451    What is the preferred language of the primary learner?:  English  - 03/23/2025 1451    Is an  required?:  No  - 03/23/2025 1451    How does the primary learner prefer to learn new concepts?:  Listening  - 03/23/2025 1451    How often do you need to have someone help you read instructions, pamphlets, or written material from your doctor or pharmacy?:  Never  - 03/23/2025 1451        CO-LEARNER #1     No question answered        CO-LEARNER #2     No question answered        SPECIAL TOPICS     No question answered        ANSWERED BY:     No question answered        Comments         Edit History       Corry Blue, RN - RN (Nurse)   03/23/2025 1451                          Social Drivers of Health     Tobacco Use: Low Risk  (3/23/2025)    Patient History     Smoking Tobacco Use: Never     Smokeless Tobacco Use:  Never     Passive Exposure: Not on file   Alcohol Use: Not At Risk (2/16/2025)    AUDIT-C     Frequency of Alcohol Consumption: Never     Average Number of Drinks: Patient does not drink     Frequency of Binge Drinking: Never   Financial Resource Strain: Low Risk  (3/23/2025)    Overall Financial Resource Strain (CARDIA)     Difficulty of Paying Living Expenses: Not hard at all   Food Insecurity: Food Insecurity Present (3/23/2025)    Hunger Vital Sign     Worried About Running Out of Food in the Last Year: Often true     Ran Out of Food in the Last Year: Often true   Transportation Needs: No Transportation Needs (3/23/2025)    PRAPARE - Transportation     Lack of Transportation (Medical): No     Lack of Transportation (Non-Medical): No   Physical Activity: Inactive (2/16/2025)    Exercise Vital Sign     Days of Exercise per Week: 0 days     Minutes of Exercise per Session: 0 min   Stress: Stress Concern Present (3/23/2025)    [employer] - Occupational Stress Questionnaire     Feeling of Stress : Rather much   Housing Stability: Low Risk  (3/23/2025)    Housing Stability Vital Sign     Unable to Pay for Housing in the Last Year: No     Number of Times Moved in the Last Year: Not on file     Homeless in the Last Year: No   Depression: Not on file   Utilities: Patient Unable To Answer (3/23/2025)    [facility] Utilities     Threatened with loss of utilities: Patient unable to answer   Health Literacy: Adequate Health Literacy (3/23/2025)     Health Literacy     Frequency of need for help with medical instructions: Rarely   Recent Concern: Health Literacy - Inadequate Health Literacy (2/16/2025)     Health Literacy     Frequency of need for help with medical instructions: Always   Social Isolation: Socially Integrated (2/16/2025)    Social Isolation     Social Isolation: 1     Malnutrition  Is Patient Malnourished: Yes    Nutrition consulted. Most recent weight and BMI monitored-      Measurements:  Wt Readings from Last 1 Encounters:   03/24/25 79.8 kg (175 lb 14.8 oz)   Body mass index is 32.18 kg/m².    Patient has been screened and assessed by RD.    Malnutrition Type:  Context: chronic illness  Level: severe    Malnutrition Characteristic Summary:  Weight Loss (Malnutrition): greater than 10% in 6 months  Energy Intake (Malnutrition): less than or equal to 75% for greater than or equal to 1 month    Interventions/Recommendations (treatment strategy):  Dependence on PEG for feedings, continuous enteral feedings        Nutrition Diagnosis  Malnutrition (Severe) related to Dysphagia/ difficulty swallowing as evidenced by >10% significant unintentional weight loss x 6 months, unhealed wounds, and consuming <75% estimated energy requirements for >1 month.   Comments: upon observation, no apparent muscle and fat wasting - pt still meets malnutrition criteria per ASPEN guidelines    Recent Labs   Lab 03/24/25  0513   GLU 74*     Comments on Glucose: Glucose low. Unclear whether she is receiving enteral feeds.    Nutrition Prescription / Recommendations  Goals: tolerate tube feeds at goal rate, improve skin integrity, weight maintenance during admission  Nutrition Goal Status: new  Current Diet Order: NPO  Nutrition Order Comments: dysphagia, dependence on PEG for feedings  Current Nutrition Support Formula Ordered: Nepro  Current Nutrition Support Rate Ordered: 40 (ml)  Current Nutrition Support Frequency Ordered: hourly  *Note: current regimen does not meet patient's estimated energy/protein needs  Chewing or Swallowing Difficulty?: Swallowing difficulty  Recommended Diet: Enteral Nutrition  Recommended Oral Supplement: Luis Fernando [90 kcals, 2.5g Protein, 10g Carbs(3g Sugar), 7g L-Arginine, 7g L-Glutamine, Vitamin C 300mg, 9.5mg Zinc] 2 times a day  Is Nutrition Support Recommended: Yes  Is Nutrition Education Recommended: No    Needs Calculated    Energy Calorie Requirements (kcal): 1,012 kcal (23  kcal/kg adjusted IBW for quadriplegia) (decreased needs 2/2 decreased metabolic activity due to denervated muscle)  Protein Requirements: 53 - 66 g (1.2 - 1.5 g/kg adjusted IBW for quadriplegia) (increased protein requirements 2/2 sacral wound and prevention of muscle atrophy)  Enteral Nutrition   Enteral Nutrition Formula Provides:  1,260 kcals Propofol Rate: No (1,080 kcal Isosource + 180 Luis Fernando)  54 g Protein (49 g Isosource + 5 Luis Fernando)  121 g Carbohydrates  47 g Fat Propofol Rate: No  560 ml Fluid without Flush    960 ml Fluid by flush   1,520 ml Total Fluid  Enteral Nutrition Recommended Order:  Tube feeding via PEG/ Gastrostomy  Tube feeding formula: Isosource 1.5 PEG/ Gastrostomy  Free Water Flush: 40 ml hourly  Modular Supplements: Luis Fernando BID   Enteral Nutrition meets needs?: yes  Enteral Nutrition Status: New Order    Monitor and Evaluation  % current Intake: NPO  % intake to meet estimated needs: Enteral Nutrition   Monitor and Evaluation: Enteral and parenteral nutrition administration, Weight, Electrolyte and renal panel, Gastrointestinal profile, Glucose/endocrine profile, Inflammatory profile, Lipid profile, Nutrition focused physical findings, Skin    Current Medical Diagnosis and Past Medical History     Past Medical History:   Diagnosis Date    GERD (gastroesophageal reflux disease)     Paraplegia      Nutrition/Diet History  Food Allergies: NKFA  Factors Affecting Nutritional Intake: difficulty/impaired swallowing    Lab/Procedures/Meds  Recent Labs   Lab 03/24/25  0513      K 3.7   BUN 15   CREATININE 0.45*   CALCIUM 9.0      Note: Cr low. Possibly related to malnutrition, reduced muscle mass 2/2 atrophy    Last A1c:   Lab Results   Component Value Date    HGBA1C 5.5 03/01/2025     Lab Results   Component Value Date    RBC 3.55 (L) 03/24/2025    HGB 9.0 (L) 03/24/2025    HCT 32.7 (L) 03/24/2025    MCV 92.1 03/24/2025    MCH 25.4 (L) 03/24/2025    MCHC 27.5 (L) 03/24/2025   Note: H/H low.  PMHx of anemia    Pertinent Labs Reviewed: reviewed  Pertinent Medications Reviewed: reviewed    Scheduled Meds:   acetylcysteine 200 mg/ml (20%)  2 mL Nebulization BID    atorvastatin  20 mg Oral QHS    enoxparin  40 mg Subcutaneous Daily    lactulose  20 g Oral Daily    levalbuterol  1.25 mg Nebulization TID    midodrine  10 mg Oral TID    mupirocin   Nasal BID    pantoprazole  40 mg Intravenous Daily    pregabalin  75 mg Oral BID    sertraline  50 mg Oral Daily    sodium chloride 0.9%  1,000 mL Intravenous Once   Note: atorvastatin (HLD), lactulose (constipation)    Continuous Infusions:   D5 and 0.9% NaCl   Intravenous Continuous         PRN Meds:.  Current Facility-Administered Medications:     acetaminophen, 1,000 mg, Oral, Q8H PRN    acetaminophen, 650 mg, Oral, Q8H PRN    acetaminophen, 650 mg, Oral, Q4H PRN    aluminum-magnesium hydroxide-simethicone, 30 mL, Oral, QID PRN    dextrose 50%, 12.5 g, Intravenous, PRN    dextrose 50%, 12.5 g, Intravenous, PRN    dextrose 50%, 25 g, Intravenous, PRN    glucagon (human recombinant), 1 mg, Intramuscular, PRN    glucose, 16 g, Oral, PRN    glucose, 24 g, Oral, PRN    HYDROcodone-acetaminophen, 1 tablet, Oral, Q6H PRN    HYDROmorphone, 1 mg, Intravenous, Q6H PRN    magnesium oxide, 800 mg, Oral, PRN    magnesium oxide, 800 mg, Oral, PRN    melatonin, 6 mg, Oral, Nightly PRN    naloxone, 0.02 mg, Intravenous, PRN    ondansetron, 4 mg, Intravenous, Q8H PRN    polyethylene glycol, 17 g, Oral, Daily PRN    potassium bicarbonate, 35 mEq, Oral, PRN    potassium bicarbonate, 50 mEq, Oral, PRN    potassium bicarbonate, 60 mEq, Oral, PRN    potassium, sodium phosphates, 2 packet, Oral, PRN    potassium, sodium phosphates, 2 packet, Oral, PRN    potassium, sodium phosphates, 2 packet, Oral, PRN    sodium chloride 0.9%, 10 mL, Intravenous, PRN    traZODone, 100 mg, Oral, Nightly PRN  Note: magnesium oxide, ondansetron, polyethylene glycol, potassium  "bicarb    Anthropometrics  Height: 5' 2" (157.5 cm)  Height (inches): 62 in  Height Method: Stated  Weight: 79.8 kg (175 lb 14.8 oz)  Weight (lb): 175.93 lb  Weight Method: Bed Scale  Ideal Body Weight (IBW), Female: 110 lb  BMI (Calculated): 32.2  Tetraplegia (Quadriplegia) Ideal Body Weight (IBW) Adjustment: 97 lb    Estimated/Assessed Needs  RMR (Aubrey-St. Jeor Equation): 1316.25     Temp: 97.6 °F (36.4 °C)Axillary  Weight Used For Calorie Calculations: 44 kg (97 lb)     Energy Calorie Requirements (kcal): 1,012 kcal (23 kcal/kg adjusted IBW for quadriplegia) (decreased needs 2/2 decreased metabolic activity due to denervated muscle)  Weight Used For Protein Calculations: 44 kg (97 lb)  Protein Requirements: 53 - 66 g (1.2 - 1.5 g/kg adjusted IBW for quadriplegia) (increased protein requirements 2/2 sacral wound and prevention of muscle atrophy)       Fluids: 30 - 40 mL/kg normal requirements for patient with quadriplegia   Fluid Requirements: 1, 540 mL (35 mL/kg adjusted IBW for quadriplegia)       Nutrition by Nursing     Intake (%): 0%                      Nutrition Follow-Up  RD Follow-up?: Yes    Nutrition Discharge Planning: Enteral nutrition (comments)       Nidhi Britt, MS, RD, LD  Available via Secure Chat  "

## 2025-03-24 NOTE — HPI
58 yo F who is bed bound (back surgery 2015 c/b b/l LE weakness, CVA 2024 with L paralysis), GERD and previous hospitalization 02/2025 course with progressive respiratory failure 2/2 aspiration, mucus plugging and mechanical respiratory failure requiring intubation 02/24 (difficult 2/2 reduced ROM cervical spine) with ICU course c/b spontaneous RLE compartment bleed with transfer to OSH for IR services. She is now transferred back to floor 03/23 s/p tracheostomy tube placement 03/14 for extubation failures now weaned to trach collar. She is also s/p PEG placement 03/11/2025. R spontaneous R thigh hematoma was managed with supportive care. Pulmonary is consulted for tracheostomy tube management recommendations.

## 2025-03-24 NOTE — PLAN OF CARE
Problem: Infection  Goal: Absence of Infection Signs and Symptoms  Outcome: Progressing     Problem: Wound  Goal: Optimal Coping  Outcome: Progressing  Goal: Optimal Functional Ability  Outcome: Progressing  Goal: Absence of Infection Signs and Symptoms  Outcome: Progressing  Goal: Improved Oral Intake  Outcome: Progressing  Goal: Optimal Pain Control and Function  Outcome: Progressing  Goal: Skin Health and Integrity  Outcome: Progressing  Goal: Optimal Wound Healing  Outcome: Progressing     Problem: Skin Injury Risk Increased  Goal: Skin Health and Integrity  Outcome: Progressing

## 2025-03-24 NOTE — PROGRESS NOTES
[facility] - [address] [place] [address] Medicine  Progress Note    Patient Name: Karie Denney  MRN: [id number]  Patient Class: IP- Inpatient   Admission Date: 3/23/2025  Length of Stay: 1 days  Attending Physician: Cassie Bar MD  Primary Care Provider: Gonzalo Barrera NP        Subjective     Principal Problem:Acute on chronic respiratory failure with hypoxia    HPI:  Chief Complaint  Transfer for continued management of respiratory failure, tracheostomy care, PEG feeding, and complications of quadriplegia following prolonged hospitalization.    History of Present Illness  Ms. Karie Denney is a 59-year-old woman with a complex medical history including quadriplegia following spinal surgery in 2015 and a cerebrovascular accident (CVA) in 2024 resulting in left-sided paralysis. She was transferred to [facility] from [facility] in [place], [place], for ongoing care following a prolonged ICU course involving intubation, respiratory failure, and significant complications.  She was initially hospitalized on February 15, 2025, for aspiration and dysphagia evaluation, during which she developed aspiration pneumonia and respiratory failure requiring intubation. Her hospital course was complicated by an ESBL E. coli UTI, pericardial effusion (treated with colchicine), and a spontaneous right thigh hematoma concerning for compartment syndrome, leading to transfer to [facility]. She required prolonged mechanical ventilation, tracheostomy placement, and PEG tube insertion.  She has now returned to [facility] for continued respiratory care, tracheostomy management, PEG feeding, management of sacral pressure injury, and rehabilitation planning.    Past Medical History  Quadriplegia post-spinal surgery (2015)  CVA (2024)  Aspiration pneumonia  Acute respiratory failure  UTI (ESBL E. coli)  Depression  GERD  Pharyngoesophageal dysphagia  Pericardial effusion/pericarditis  Chronic  constipation  Pressure ulcer (sacral, unstageable)  Hyperlipidemia  Anemia    Past Surgical History  Spinal surgery (2015)  PEG tube placement (03/11/2025)  Esophageal dilation with biopsy (08/2024)    Medications  Active Medications:  Atorvastatin 20 mg daily  Omeprazole 40 mg daily  Sertraline 50 mg daily  Trazodone 100 mg qHS  Pregabalin 75 mg BID  Hydrocodone-acetaminophen 5-325 mg BID  Lactulose 30 mL daily via PEG  Midodrine 10 mg Q6H via PEG  Levalbuterol 0.63 mg nebulizer Q6H  Polyethylene glycol 17 g daily via PEG  Recent Changes:  Colchicine: Discontinued due to bleeding  Aspirin and ezetimibe: Discontinued    Allergies  Penicillins ? Rash and anaphylaxis    Social History  Never smoker  No alcohol or tobacco use  Lives at home with mother; receives home health weekly  Bedbound, non-ambulatory    Family History  Noncontributory    Review of Systems  Limited due to tracheostomy, quadriplegia, and communication challenges. History obtained from EMR and caregiver.    Physical Examination  General: Chronically ill-appearing woman, alert, tracheostomy in place with T-piece  HEENT: Mild nasal skin necrosis, mucous membranes moist  Eyes: PERRL, EOMI  Neck: Tracheostomy present  CV: RRR, no murmurs  Lungs: Breath sounds diminished at bases; no acute distress; no wheezing  Abdomen: Soft, non-tender, PEG tube in place  Skin: Unstageable sacral ulcer, nasal pressure ulcer  Neuro: Quadriplegia, responsive with eye tracking and blinking  Extremities: RLE hematoma, bilateral edema, no signs of active bleeding    Laboratory Data (Most Recent)  Hgb: 8.5 g/dL  Creatinine: 0.30 mg/dL  Albumin: 3.1 g/dL  WBC: 9.4 K/uL  Ferritin: 265 ng/mL  INR: 0.93  No growth on recent blood and urine cultures    Imaging  CT angio: Large RLE hematoma without active extravasation  Multiple chest X-rays: Stable bilateral pleural effusions, improving atelectasis  Echo: EF 55-60%, small pericardial effusion      Overview/Hospital Course:  No  notes on file    Interval History:     Review of Systems   Unable to perform ROS: Patient nonverbal     Objective:     Vital Signs (Most Recent):  Temp: 99 °F (37.2 °C) (03/24/25 1525)  Pulse: (!) 121 (03/24/25 1559)  Resp: 16 (03/24/25 1559)  BP: (!) 85/45 (RN aware) (03/24/25 1525)  SpO2: 98 % (03/24/25 1559) Vital Signs (24h Range):  Temp:  [97.6 °F (36.4 °C)-99.7 °F (37.6 °C)] 99 °F (37.2 °C)  Pulse:  [102-125] 121  Resp:  [16-24] 16  SpO2:  [96 %-100 %] 98 %  BP: (83-93)/(45-54) 85/45     Weight: 79.8 kg (175 lb 14.8 oz)  Body mass index is 32.18 kg/m².    Intake/Output Summary (Last 24 hours) at 3/24/2025 1741  Last data filed at 3/24/2025 1613  Gross per 24 hour   Intake 280 ml   Output --   Net 280 ml         Physical Exam  Constitutional:       Appearance: She is ill-appearing.   HENT:      Head: Normocephalic and atraumatic.      Nose: Nose normal.      Mouth/Throat:      Mouth: Mucous membranes are moist.      Pharynx: Oropharynx is clear.   Eyes:      Extraocular Movements: Extraocular movements intact.      Conjunctiva/sclera: Conjunctivae normal.      Pupils: Pupils are equal, round, and reactive to light.   Cardiovascular:      Rate and Rhythm: Regular rhythm. Tachycardia present.      Pulses: Normal pulses.      Heart sounds: Normal heart sounds.   Pulmonary:      Effort: Pulmonary effort is normal.      Breath sounds: Normal breath sounds.   Abdominal:      General: Abdomen is flat. Bowel sounds are normal.      Palpations: Abdomen is soft.   Musculoskeletal:      Cervical back: Normal range of motion and neck supple.   Skin:     General: Skin is warm and dry.   Neurological:      Mental Status: She is alert.      Comments: Quadriplegia    Psychiatric:         Mood and Affect: Mood normal.         Behavior: Behavior normal.               Significant Labs: All pertinent labs within the past 24 hours have been reviewed.    Significant Imaging: I have reviewed all pertinent imaging results/findings  within the past 24 hours.      Assessment & Plan  Acute on chronic respiratory failure with hypoxia  Continue tracheostomy care with T-piece trials  Tracheostomy suctioning ordered due to thick secretions.   Culture tracheostomy secretions.   Monitor oxygenation and readiness for weaning  Pulmonary consult tomorrow.     3/24: mucus plugging requiring frequent suctioning.  Chest physiotherapy, Mucomyst.  Additional recommendations per pulmonology.   Severe protein-calorie malnutrition  Nutrition consulted. Most recent weight and BMI monitored-     Measurements:  Wt Readings from Last 1 Encounters:   03/24/25 79.8 kg (175 lb 14.8 oz)   Body mass index is 32.18 kg/m².    Patient has been screened and assessed by RD.    Malnutrition Type:  Context: chronic illness  Level: severe    Continue PEG tube feeding.  Nepro at 40 mL/hr  Maintain bowel regimen (lactulose, PEG, senna)  Dietitian to reassess       Dysphagia  Continue omeprazole  Avoid PO intake  Speech therapy if clinically appropriate    Aspiration pneumonia  Recurrent aspiration pneumonia.  Complete two rounds of antibiotics.    Pressure ulcers of skin of multiple topographic sites  Aquacel Ag + Mepilex dressings  Wound care team consulted    Continue offloading and specialty mattress    Depression  Continue pregabalin, sertraline, trazodone      Mucus plugging of bronchi      S/P percutaneous endoscopic gastrostomy (PEG) tube placement  Patient noted to have a percutaneous endoscopic gastrostomy tube in place. I have personally inspected the tube.Tube was placed prior to this admission There are no signs of drainage or infection around the site. The tube is patent. Medications have converted to liquid form if available.  Routine care to be done by wound care and nursing staff.       Quadriplegia  Maximal support, bedbound  PT/OT as tolerated  DME planning for discharge    Hematoma of lower extremity, right, subsequent encounter  No active bleeding; conservative  management  Monitor H/H  No anticoagulation  Hyperlipidemia  Continue atorvastatin    Pericardial effusion  Previously on colchicine, now discontinued due to bleeding  Continue low-dose prednisone  Monitor for recurrence    VTE Risk Mitigation (From admission, onward)           Ordered     enoxaparin injection 40 mg  Daily         03/23/25 1940     IP VTE HIGH RISK PATIENT  Once         03/23/25 1940     Place sequential compression device  Until discontinued         03/23/25 1941                    Discharge Planning   SHRADDHA:      Code Status: Full Code   Medical Readiness for Discharge Date:   Discharge Plan A: Home with family            Cassie Bar MD  Department of Hospital Medicine   [facility] - [facility] Tele[facility]

## 2025-03-24 NOTE — ASSESSMENT & PLAN NOTE
She has had mechanical respiratory failure prior to transfer to OSH in this patient who is bed bound with severe illness. Difficult intubation worthy mention given limited ROM of neck. She has her respiratory failure with hypoxia further 2/2 mucus plugging and aspiration events. She is now s/p PEG placement as well. Given well defined history of aspiration and previous requirements of esophageal dilation due to stricture, I would favor a STRICT NPO status for this patient.   - cont trach collar today; cuff down  - supplement for goal SpO2 >92%  - pulmonary toilet as per mucus plugging plan; will hold on trach capping trial given secretions on RT assessment and ongoing mucus plugging management

## 2025-03-24 NOTE — ASSESSMENT & PLAN NOTE
Noted on prior admission. Cardiology had recommended repeat TTE in 2 weeks. Colchicine was stopped last admission due to spontaneous RLE bleeding.   - interval TTE ordered per Cardiology recs

## 2025-03-24 NOTE — NURSING
Cardiac monitor ordered.  Spoke with monitor room and no monitor available at this time.   Monitor room will notify me when one becomes available.

## 2025-03-25 PROBLEM — B95.7 COAG NEGATIVE STAPHYLOCOCCUS BACTEREMIA: Status: ACTIVE | Noted: 2025-03-25

## 2025-03-25 PROBLEM — R78.81 COAG NEGATIVE STAPHYLOCOCCUS BACTEREMIA: Status: ACTIVE | Noted: 2025-03-25

## 2025-03-25 PROBLEM — J96.11 CHRONIC RESPIRATORY FAILURE WITH HYPOXIA: Status: ACTIVE | Noted: 2025-03-25

## 2025-03-25 LAB
ANION GAP SERPL CALCULATED.3IONS-SCNC: 10 MMOL/L (ref 7–16)
AORTIC ROOT ANNULUS: 2.32 CM
AORTIC VALVE CUSP SEPERATION: 1.95 CM
APICAL FOUR CHAMBER EJECTION FRACTION: 66 %
AV INDEX (PROSTH): 0.6
AV MEAN GRADIENT: 4 MMHG
AV PEAK GRADIENT: 7 MMHG
AV VALVE AREA BY VELOCITY RATIO: 2.4 CM²
AV VALVE AREA: 1.7 CM²
AV VELOCITY RATIO: 0.85
BASOPHILS # BLD AUTO: 0.04 K/UL (ref 0–0.2)
BASOPHILS NFR BLD AUTO: 0.5 % (ref 0–1)
BUN SERPL-MCNC: 13 MG/DL (ref 10–20)
BUN/CREAT SERPL: 30 (ref 6–20)
CALCIUM SERPL-MCNC: 8.5 MG/DL (ref 8.4–10.2)
CHLORIDE SERPL-SCNC: 105 MMOL/L (ref 98–107)
CO2 SERPL-SCNC: 31 MMOL/L (ref 23–31)
CREAT SERPL-MCNC: 0.43 MG/DL (ref 0.55–1.02)
CV ECHO LV RWT: 0.58 CM
DIFFERENTIAL METHOD BLD: ABNORMAL
DOP CALC AO PEAK VEL: 1.3 M/S
DOP CALC AO VTI: 29 CM
DOP CALC LVOT AREA: 2.8 CM2
DOP CALC LVOT DIAMETER: 1.9 CM
DOP CALC LVOT PEAK VEL: 1.1 M/S
DOP CALC LVOT STROKE VOLUME: 49 CM3
DOP CALCLVOT PEAK VEL VTI: 17.3 CM
E WAVE DECELERATION TIME: 136 MSEC
E/A RATIO: 0.75
E/E' RATIO: 8 M/S
ECHO LV POSTERIOR WALL: 1.1 CM (ref 0.6–1.1)
EGFR (NO RACE VARIABLE) (RUSH/TITUS): 111 ML/MIN/1.73M2
EJECTION FRACTION: 60 %
EOSINOPHIL # BLD AUTO: 0.29 K/UL (ref 0–0.5)
EOSINOPHIL NFR BLD AUTO: 3.6 % (ref 1–4)
ERYTHROCYTE [DISTWIDTH] IN BLOOD BY AUTOMATED COUNT: 19.2 % (ref 11.5–14.5)
FRACTIONAL SHORTENING: 34.2 % (ref 28–44)
GLUCOSE SERPL-MCNC: 105 MG/DL (ref 82–115)
HCT VFR BLD AUTO: 28.2 % (ref 38–47)
HGB BLD-MCNC: 7.8 G/DL (ref 12–16)
IMM GRANULOCYTES # BLD AUTO: 0.04 K/UL (ref 0–0.04)
IMM GRANULOCYTES NFR BLD: 0.5 % (ref 0–0.4)
INTERVENTRICULAR SEPTUM: 0.9 CM (ref 0.6–1.1)
IVC DIAMETER: 0.68 CM
LEFT ATRIUM AREA SYSTOLIC (APICAL 4 CHAMBER): 10.31 CM2
LEFT ATRIUM SIZE: 3.2 CM
LEFT INTERNAL DIMENSION IN SYSTOLE: 2.5 CM (ref 2.1–4)
LEFT VENTRICLE DIASTOLIC VOLUME INDEX: 34.81 ML/M2
LEFT VENTRICLE DIASTOLIC VOLUME: 63 ML
LEFT VENTRICLE END DIASTOLIC VOLUME APICAL 4 CHAMBER: 48.49 ML
LEFT VENTRICLE END SYSTOLIC VOLUME APICAL 4 CHAMBER: 19.05 ML
LEFT VENTRICLE MASS INDEX: 64.8 G/M2
LEFT VENTRICLE SYSTOLIC VOLUME INDEX: 12.7 ML/M2
LEFT VENTRICLE SYSTOLIC VOLUME: 23 ML
LEFT VENTRICULAR INTERNAL DIMENSION IN DIASTOLE: 3.8 CM (ref 3.5–6)
LEFT VENTRICULAR MASS: 117.3 G
LV LATERAL E/E' RATIO: 7.3 M/S
LV SEPTAL E/E' RATIO: 8.1 M/S
LVED V (TEICH): 63.04 ML
LVES V (TEICH): 22.86 ML
LVOT MG: 2.5 MMHG
LVOT MV: 0.75 CM/S
LYMPHOCYTES # BLD AUTO: 2.22 K/UL (ref 1–4.8)
LYMPHOCYTES NFR BLD AUTO: 27.4 % (ref 27–41)
MAGNESIUM SERPL-MCNC: 2.1 MG/DL (ref 1.6–2.6)
MCH RBC QN AUTO: 25.7 PG (ref 27–31)
MCHC RBC AUTO-ENTMCNC: 27.7 G/DL (ref 32–36)
MCV RBC AUTO: 93.1 FL (ref 80–96)
MONOCYTES # BLD AUTO: 0.53 K/UL (ref 0–0.8)
MONOCYTES NFR BLD AUTO: 6.6 % (ref 2–6)
MPC BLD CALC-MCNC: 11.3 FL (ref 9.4–12.4)
MV PEAK A VEL: 0.97 M/S
MV PEAK E VEL: 0.73 M/S
MV STENOSIS PRESSURE HALF TIME: 39.33 MS
MV VALVE AREA P 1/2 METHOD: 5.59 CM2
NEUTROPHILS # BLD AUTO: 4.97 K/UL (ref 1.8–7.7)
NEUTROPHILS NFR BLD AUTO: 61.4 % (ref 53–65)
NRBC # BLD AUTO: 0 X10E3/UL
NRBC, AUTO (.00): 0 %
OHS CV RV/LV RATIO: 0.76 CM
PLATELET # BLD AUTO: 200 K/UL (ref 150–400)
POTASSIUM SERPL-SCNC: 3 MMOL/L (ref 3.5–5.1)
PV PEAK GRADIENT: 3 MMHG
PV PEAK VELOCITY: 0.89 M/S
RA MAJOR: 3.63 CM
RA PRESSURE ESTIMATED: 3 MMHG
RBC # BLD AUTO: 3.03 M/UL (ref 4.2–5.4)
RIGHT VENTRICLE DIASTOLIC BASEL DIMENSION: 2.9 CM
RIGHT VENTRICLE DIASTOLIC LENGTH: 3.4 CM
RIGHT VENTRICLE DIASTOLIC MID DIMENSION: 2.6 CM
RIGHT VENTRICULAR LENGTH IN DIASTOLE (APICAL 4-CHAMBER VIEW): 3.4 CM
RV MID DIAMA: 2.56 CM
SODIUM SERPL-SCNC: 143 MMOL/L (ref 136–145)
TDI LATERAL: 0.1 M/S
TDI SEPTAL: 0.09 M/S
TDI: 0.1 M/S
TRICUSPID ANNULAR PLANE SYSTOLIC EXCURSION: 1.35 CM
VERIGENE RESULT: ABNORMAL
WBC # BLD AUTO: 8.09 K/UL (ref 4.5–11)
Z-SCORE OF LEFT VENTRICULAR DIMENSION IN END DIASTOLE: -2.76
Z-SCORE OF LEFT VENTRICULAR DIMENSION IN END SYSTOLE: -1.68

## 2025-03-25 PROCEDURE — 63600175 PHARM REV CODE 636 W HCPCS: Performed by: HOSPITALIST

## 2025-03-25 PROCEDURE — 25000242 PHARM REV CODE 250 ALT 637 W/ HCPCS: Performed by: STUDENT IN AN ORGANIZED HEALTH CARE EDUCATION/TRAINING PROGRAM

## 2025-03-25 PROCEDURE — 11000001 HC ACUTE MED/SURG PRIVATE ROOM

## 2025-03-25 PROCEDURE — 85025 COMPLETE CBC W/AUTO DIFF WBC: CPT | Performed by: HOSPITALIST

## 2025-03-25 PROCEDURE — 99233 SBSQ HOSP IP/OBS HIGH 50: CPT | Mod: ,,, | Performed by: HOSPITALIST

## 2025-03-25 PROCEDURE — 27000221 HC OXYGEN, UP TO 24 HOURS

## 2025-03-25 PROCEDURE — 83735 ASSAY OF MAGNESIUM: CPT | Performed by: HOSPITALIST

## 2025-03-25 PROCEDURE — 94640 AIRWAY INHALATION TREATMENT: CPT

## 2025-03-25 PROCEDURE — 99232 SBSQ HOSP IP/OBS MODERATE 35: CPT | Mod: ,,, | Performed by: STUDENT IN AN ORGANIZED HEALTH CARE EDUCATION/TRAINING PROGRAM

## 2025-03-25 PROCEDURE — 80048 BASIC METABOLIC PNL TOTAL CA: CPT | Performed by: HOSPITALIST

## 2025-03-25 PROCEDURE — 27000207 HC ISOLATION

## 2025-03-25 PROCEDURE — 99900026 HC AIRWAY MAINTENANCE (STAT)

## 2025-03-25 PROCEDURE — 36415 COLL VENOUS BLD VENIPUNCTURE: CPT | Performed by: HOSPITALIST

## 2025-03-25 PROCEDURE — 99900035 HC TECH TIME PER 15 MIN (STAT)

## 2025-03-25 PROCEDURE — 94668 MNPJ CHEST WALL SBSQ: CPT

## 2025-03-25 PROCEDURE — 25000242 PHARM REV CODE 250 ALT 637 W/ HCPCS: Performed by: HOSPITALIST

## 2025-03-25 PROCEDURE — 25000003 PHARM REV CODE 250: Performed by: HOSPITALIST

## 2025-03-25 PROCEDURE — 94761 N-INVAS EAR/PLS OXIMETRY MLT: CPT

## 2025-03-25 RX ORDER — DEXTROSE MONOHYDRATE, SODIUM CHLORIDE, AND POTASSIUM CHLORIDE 50; 2.98; 4.5 G/1000ML; G/1000ML; G/1000ML
INJECTION, SOLUTION INTRAVENOUS CONTINUOUS
Status: DISPENSED | OUTPATIENT
Start: 2025-03-25 | End: 2025-03-26

## 2025-03-25 RX ADMIN — ATORVASTATIN CALCIUM 20 MG: 20 TABLET, FILM COATED ORAL at 08:03

## 2025-03-25 RX ADMIN — VANCOMYCIN HYDROCHLORIDE 1500 MG: 500 INJECTION, POWDER, LYOPHILIZED, FOR SOLUTION INTRAVENOUS at 05:03

## 2025-03-25 RX ADMIN — LEVALBUTEROL HYDROCHLORIDE 1.25 MG: 1.25 SOLUTION RESPIRATORY (INHALATION) at 01:03

## 2025-03-25 RX ADMIN — MIDODRINE HYDROCHLORIDE 10 MG: 10 TABLET ORAL at 08:03

## 2025-03-25 RX ADMIN — ACETYLCYSTEINE 2 ML: 200 SOLUTION ORAL; RESPIRATORY (INHALATION) at 01:03

## 2025-03-25 RX ADMIN — HYDROCODONE BITARTRATE AND ACETAMINOPHEN 1 TABLET: 5; 325 TABLET ORAL at 08:03

## 2025-03-25 RX ADMIN — MIDODRINE HYDROCHLORIDE 10 MG: 10 TABLET ORAL at 04:03

## 2025-03-25 RX ADMIN — PREGABALIN 75 MG: 75 CAPSULE ORAL at 08:03

## 2025-03-25 RX ADMIN — ACETYLCYSTEINE 2 ML: 200 SOLUTION ORAL; RESPIRATORY (INHALATION) at 08:03

## 2025-03-25 RX ADMIN — POTASSIUM CHLORIDE, DEXTROSE MONOHYDRATE AND SODIUM CHLORIDE: 300; 5; 450 INJECTION, SOLUTION INTRAVENOUS at 09:03

## 2025-03-25 RX ADMIN — ENOXAPARIN SODIUM 40 MG: 40 INJECTION SUBCUTANEOUS at 04:03

## 2025-03-25 RX ADMIN — LEVALBUTEROL HYDROCHLORIDE 1.25 MG: 1.25 SOLUTION RESPIRATORY (INHALATION) at 08:03

## 2025-03-25 RX ADMIN — MUPIROCIN: 20 OINTMENT TOPICAL at 08:03

## 2025-03-25 RX ADMIN — POTASSIUM CHLORIDE, DEXTROSE MONOHYDRATE AND SODIUM CHLORIDE: 300; 5; 450 INJECTION, SOLUTION INTRAVENOUS at 08:03

## 2025-03-25 RX ADMIN — PANTOPRAZOLE SODIUM 40 MG: 40 INJECTION, POWDER, FOR SOLUTION INTRAVENOUS at 08:03

## 2025-03-25 RX ADMIN — POTASSIUM BICARBONATE 50 MEQ: 977.5 TABLET, EFFERVESCENT ORAL at 08:03

## 2025-03-25 RX ADMIN — SERTRALINE HYDROCHLORIDE 50 MG: 50 TABLET ORAL at 08:03

## 2025-03-25 NOTE — RESPIRATORY THERAPY
Trach care done. Inner cannula cleaned with sterile water and patent. No signs and symptoms of respiratory distress to report.

## 2025-03-25 NOTE — PROGRESS NOTES
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 She is now transferred back to floor 03/23 s/p tracheostomy tube placement 03/14 for extubation failures now weaned to trach collar. She is also s/p PEG placement 03/11/2025. R spontaneous R thigh hematoma was managed with supportive care. Pulmonary is consulted for tracheostomy tube management recommendations.     At time of my assessment she is awake and participating with assessment. Her mother is at bedside discussing care with Case Management. OSH records reviewed at length.     Hospital/ICU Course:  No notes on file    Interval History/Significant Events: no acute overnight events, remains on trach collar, ongoing efforts with chest PT    Review of Systems  Objective:     Vital Signs (Most Recent):  Temp: 98.3 °F (36.8 °C) (03/25/25 0754)  Pulse: 99 (03/25/25 0806)  Resp: 16 (03/25/25 0806)  BP: 127/79 (03/25/25 0754)  SpO2: 98 % (03/25/25 0806) Vital Signs (24h Range):  Temp:  [98.1 °F (36.7 °C)-99.7 °F (37.6 °C)] 98.3 °F (36.8 °C)  Pulse:  [] 99  Resp:  [16-20] 16  SpO2:  [95 %-100 %] 98 %  BP: ()/(45-84) 127/79   Weight: 79.8 kg (175 lb 14.8 oz)  Body mass index is 32.18  "kg/m².      Intake/Output Summary (Last 24 hours) at 3/25/2025 1112  Last data filed at 3/25/2025 0715  Gross per 24 hour   Intake 80 ml   Output --   Net 80 ml          Physical Exam  Constitutional:       General: She is not in acute distress.     Appearance: She is not toxic-appearing.   HENT:      Head: Normocephalic and atraumatic.      Mouth/Throat:      Mouth: Mucous membranes are moist.   Cardiovascular:      Rate and Rhythm: Normal rate and regular rhythm.   Pulmonary:      Effort: Pulmonary effort is normal.      Breath sounds: Rhonchi present. No wheezing.   Abdominal:      Palpations: Abdomen is soft.      Tenderness: There is no right CVA tenderness or guarding.   Skin:     General: Skin is warm.   Neurological:      Mental Status: She is alert. Mental status is at baseline.            Vents:  Oxygen Concentration (%): 40 (03/25/25 0806)  Lines/Drains/Airways       Drain  Duration                  Gastrostomy/Enterostomy LUQ -- days              Airway  Duration                  Airway - Non-Surgical 02/24/25 1343 Endotracheal Tube 28 days    Adult Surgical Airway 03/23/25 1544 Shiley Cuffed  1 day              Peripheral Intravenous Line  Duration                  Peripheral IV - Single Lumen Yes Anterior;Right Upper Arm -- days                  Significant Labs:    CBC/Anemia Profile:  Recent Labs   Lab 03/24/25  0513 03/25/25  0452   WBC 9.75 8.09   HGB 9.0* 7.8*   HCT 32.7* 28.2*    200   MCV 92.1 93.1   RDW 19.2* 19.2*        Chemistries:  Recent Labs   Lab 03/24/25  0513 03/25/25  0451    143   K 3.7 3.0*    105   CO2 35* 31   BUN 15 13   CREATININE 0.45* 0.43*   CALCIUM 9.0 8.5   MG 2.1 2.1       All pertinent labs within the past 24 hours have been reviewed.    Significant Imaging:  I have reviewed all pertinent imaging results/findings within the past 24 hours.    ABG  No results for input(s): "PH", "PO2", "PCO2", "HCO3", "BE" in the last 168 hours.  Assessment/Plan: "     Pulmonary  * Acute on chronic respiratory failure with hypoxia  She has had mechanical respiratory failure prior to transfer to OSH in this patient who is bed bound with severe illness. Difficult intubation worthy mention given limited ROM of neck. She has her respiratory failure with hypoxia further 2/2 mucus plugging and aspiration events. She is now s/p PEG placement as well. Given well defined history of aspiration and previous requirements of esophageal dilation due to stricture, I would favor a STRICT NPO status for this patient.   - cont trach collar today; cuff down  - supplement for goal SpO2 >92%  - pulmonary toilet as per mucus plugging plan; will hold on trach capping trial given secretions on RT assessment and ongoing mucus plugging management    Mucus plugging of bronchi  Poor secretion management 2/2 MSK weakness. Improved core support on assessment this time around. CXR with R lower lobe atelectatic changes. Afebrile.   - caution with starting Abx in absence of fever; will f/u respiratory Cxs  - pulmonary hygiene as follows   -- cont Chest PT for R lower lobar atelectasis to TID   -- cont acetylcysteine neb to TID   -- cont levalbuterol three times daily   -- reposition at least TID for goal postural drainage  - CXR 03/26 am  - will consider therapeutic aspiration with bronchoscopy for respiratory emergency or difficulty with mobilization  - holding on capping trial at this time for secretion management as above    Cardiac/Vascular  Pericardial effusion  Noted on prior admission. Cardiology had recommended repeat TTE in 2 weeks. Colchicine was stopped last admission due to spontaneous RLE bleeding.   - interval TTE ordered per Cardiology recs, f/u         June MD Guerline  Pulmonary and Critical Care Medicine  [facility] - [address] [place] [address]

## 2025-03-25 NOTE — ASSESSMENT & PLAN NOTE
Poor secretion management 2/2 MSK weakness. Improved core support on assessment this time around. CXR with R lower lobe atelectatic changes. Afebrile.   - caution with starting Abx in absence of fever; will f/u respiratory Cxs  - pulmonary hygiene as follows   -- cont Chest PT for R lower lobar atelectasis to TID   -- cont acetylcysteine neb to TID   -- cont levalbuterol three times daily   -- reposition at least TID for goal postural drainage  - CXR 03/26 am  - will consider therapeutic aspiration with bronchoscopy for respiratory emergency or difficulty with mobilization  - holding on capping trial at this time for secretion management as above

## 2025-03-25 NOTE — SUBJECTIVE & OBJECTIVE
Interval History/Significant Events: no acute overnight events, remains on trach collar, ongoing efforts with chest PT    Review of Systems  Objective:     Vital Signs (Most Recent):  Temp: 98.3 °F (36.8 °C) (03/25/25 0754)  Pulse: 99 (03/25/25 0806)  Resp: 16 (03/25/25 0806)  BP: 127/79 (03/25/25 0754)  SpO2: 98 % (03/25/25 0806) Vital Signs (24h Range):  Temp:  [98.1 °F (36.7 °C)-99.7 °F (37.6 °C)] 98.3 °F (36.8 °C)  Pulse:  [] 99  Resp:  [16-20] 16  SpO2:  [95 %-100 %] 98 %  BP: ()/(45-84) 127/79   Weight: 79.8 kg (175 lb 14.8 oz)  Body mass index is 32.18 kg/m².      Intake/Output Summary (Last 24 hours) at 3/25/2025 1112  Last data filed at 3/25/2025 0715  Gross per 24 hour   Intake 80 ml   Output --   Net 80 ml          Physical Exam  Constitutional:       General: She is not in acute distress.     Appearance: She is not toxic-appearing.   HENT:      Head: Normocephalic and atraumatic.      Mouth/Throat:      Mouth: Mucous membranes are moist.   Cardiovascular:      Rate and Rhythm: Normal rate and regular rhythm.   Pulmonary:      Effort: Pulmonary effort is normal.      Breath sounds: Rhonchi present. No wheezing.   Abdominal:      Palpations: Abdomen is soft.      Tenderness: There is no right CVA tenderness or guarding.   Skin:     General: Skin is warm.   Neurological:      Mental Status: She is alert. Mental status is at baseline.            Vents:  Oxygen Concentration (%): 40 (03/25/25 0806)  Lines/Drains/Airways       Drain  Duration                  Gastrostomy/Enterostomy LUQ -- days              Airway  Duration                  Airway - Non-Surgical 02/24/25 1343 Endotracheal Tube 28 days    Adult Surgical Airway 03/23/25 1544 Shiley Cuffed  1 day              Peripheral Intravenous Line  Duration                  Peripheral IV - Single Lumen Yes Anterior;Right Upper Arm -- days                  Significant Labs:    CBC/Anemia Profile:  Recent Labs   Lab 03/24/25  0513 03/25/25  0452    WBC 9.75 8.09   HGB 9.0* 7.8*   HCT 32.7* 28.2*    200   MCV 92.1 93.1   RDW 19.2* 19.2*        Chemistries:  Recent Labs   Lab 03/24/25  0513 03/25/25  0451    143   K 3.7 3.0*    105   CO2 35* 31   BUN 15 13   CREATININE 0.45* 0.43*   CALCIUM 9.0 8.5   MG 2.1 2.1       All pertinent labs within the past 24 hours have been reviewed.    Significant Imaging:  I have reviewed all pertinent imaging results/findings within the past 24 hours.

## 2025-03-25 NOTE — PLAN OF CARE
03/25/25 1534   Rounds   Attendance Provider;Nurse ;Physical therapist;Charge nurse;Pharmacist   Discharge Plan A Long-term acute care facility (LTAC)   Why the patient remains in the hospital Requires continued medical care   Transition of Care Barriers None     Per [facility] rounds pt is not medically ready for d/c. Chart reviewed. Dr. Bar believes LTAC placement would be the best d/c option for this pt. Choice for [facility] given by pt's mother. Will notify Desiree ZHENG following.

## 2025-03-25 NOTE — ASSESSMENT & PLAN NOTE
Noted on prior admission. Cardiology had recommended repeat TTE in 2 weeks. Colchicine was stopped last admission due to spontaneous RLE bleeding.   - interval TTE ordered per Cardiology recs, f/u

## 2025-03-25 NOTE — CARE UPDATE
1/2 positive blood culture with gram positive cocci.  Suspect contaminant but given hypotension, initiate treatment.

## 2025-03-25 NOTE — PROGRESS NOTES
Pharmacy consulted to assist with the management of vancomycin in this patient to treat: bacteremia    1 of 2 blood culture bottles were + for gram positive cocci. Likely contaminant but treating for now     Patient weight: 79.8 kg  Patient CrCl: ~67 ml/min    Will begin vancomycin: 1500mg every 18 hours    Vancomycin level ordered before 4th dose.     Pharmacy will continue to monitor and make adjustments as needed.      Thank you for the consult,    Patricia Vazquez, PharmD  [phone]

## 2025-03-26 LAB
ANION GAP SERPL CALCULATED.3IONS-SCNC: 12 MMOL/L (ref 7–16)
BASOPHILS # BLD AUTO: 0.05 K/UL (ref 0–0.2)
BASOPHILS NFR BLD AUTO: 0.6 % (ref 0–1)
BUN SERPL-MCNC: 9 MG/DL (ref 10–20)
BUN/CREAT SERPL: 21 (ref 6–20)
CALCIUM SERPL-MCNC: 8.5 MG/DL (ref 8.4–10.2)
CHLORIDE SERPL-SCNC: 104 MMOL/L (ref 98–107)
CO2 SERPL-SCNC: 29 MMOL/L (ref 23–31)
CREAT SERPL-MCNC: 0.43 MG/DL (ref 0.55–1.02)
DIFFERENTIAL METHOD BLD: ABNORMAL
EGFR (NO RACE VARIABLE) (RUSH/TITUS): 111 ML/MIN/1.73M2
EOSINOPHIL # BLD AUTO: 0.31 K/UL (ref 0–0.5)
EOSINOPHIL NFR BLD AUTO: 3.7 % (ref 1–4)
ERYTHROCYTE [DISTWIDTH] IN BLOOD BY AUTOMATED COUNT: 18.7 % (ref 11.5–14.5)
GLUCOSE SERPL-MCNC: 107 MG/DL (ref 82–115)
HCT VFR BLD AUTO: 30.7 % (ref 38–47)
HGB BLD-MCNC: 8.7 G/DL (ref 12–16)
IMM GRANULOCYTES # BLD AUTO: 0.03 K/UL (ref 0–0.04)
IMM GRANULOCYTES NFR BLD: 0.4 % (ref 0–0.4)
LYMPHOCYTES # BLD AUTO: 2.25 K/UL (ref 1–4.8)
LYMPHOCYTES NFR BLD AUTO: 26.7 % (ref 27–41)
MAGNESIUM SERPL-MCNC: 2 MG/DL (ref 1.6–2.6)
MCH RBC QN AUTO: 25.7 PG (ref 27–31)
MCHC RBC AUTO-ENTMCNC: 28.3 G/DL (ref 32–36)
MCV RBC AUTO: 90.6 FL (ref 80–96)
MONOCYTES # BLD AUTO: 0.58 K/UL (ref 0–0.8)
MONOCYTES NFR BLD AUTO: 6.9 % (ref 2–6)
MPC BLD CALC-MCNC: 10.9 FL (ref 9.4–12.4)
NEUTROPHILS # BLD AUTO: 5.21 K/UL (ref 1.8–7.7)
NEUTROPHILS NFR BLD AUTO: 61.7 % (ref 53–65)
NRBC # BLD AUTO: 0 X10E3/UL
NRBC, AUTO (.00): 0 %
PLATELET # BLD AUTO: 216 K/UL (ref 150–400)
POTASSIUM SERPL-SCNC: 4.6 MMOL/L (ref 3.5–5.1)
RBC # BLD AUTO: 3.39 M/UL (ref 4.2–5.4)
SODIUM SERPL-SCNC: 140 MMOL/L (ref 136–145)
WBC # BLD AUTO: 8.43 K/UL (ref 4.5–11)

## 2025-03-26 PROCEDURE — 99900026 HC AIRWAY MAINTENANCE (STAT)

## 2025-03-26 PROCEDURE — 85025 COMPLETE CBC W/AUTO DIFF WBC: CPT | Performed by: HOSPITALIST

## 2025-03-26 PROCEDURE — 93005 ELECTROCARDIOGRAM TRACING: CPT

## 2025-03-26 PROCEDURE — 36415 COLL VENOUS BLD VENIPUNCTURE: CPT | Performed by: HOSPITALIST

## 2025-03-26 PROCEDURE — 25000003 PHARM REV CODE 250: Performed by: HOSPITALIST

## 2025-03-26 PROCEDURE — 99233 SBSQ HOSP IP/OBS HIGH 50: CPT | Mod: ,,,

## 2025-03-26 PROCEDURE — 25000242 PHARM REV CODE 250 ALT 637 W/ HCPCS: Performed by: STUDENT IN AN ORGANIZED HEALTH CARE EDUCATION/TRAINING PROGRAM

## 2025-03-26 PROCEDURE — 63600175 PHARM REV CODE 636 W HCPCS: Performed by: HOSPITALIST

## 2025-03-26 PROCEDURE — 99900035 HC TECH TIME PER 15 MIN (STAT)

## 2025-03-26 PROCEDURE — 94761 N-INVAS EAR/PLS OXIMETRY MLT: CPT

## 2025-03-26 PROCEDURE — 80048 BASIC METABOLIC PNL TOTAL CA: CPT | Performed by: HOSPITALIST

## 2025-03-26 PROCEDURE — 93010 ELECTROCARDIOGRAM REPORT: CPT | Mod: ,,, | Performed by: INTERNAL MEDICINE

## 2025-03-26 PROCEDURE — 27000207 HC ISOLATION

## 2025-03-26 PROCEDURE — 27200966 HC CLOSED SUCTION SYSTEM

## 2025-03-26 PROCEDURE — 11000001 HC ACUTE MED/SURG PRIVATE ROOM

## 2025-03-26 PROCEDURE — 94640 AIRWAY INHALATION TREATMENT: CPT

## 2025-03-26 PROCEDURE — 83735 ASSAY OF MAGNESIUM: CPT | Performed by: HOSPITALIST

## 2025-03-26 PROCEDURE — 94668 MNPJ CHEST WALL SBSQ: CPT

## 2025-03-26 PROCEDURE — 27000221 HC OXYGEN, UP TO 24 HOURS

## 2025-03-26 PROCEDURE — 25000242 PHARM REV CODE 250 ALT 637 W/ HCPCS: Performed by: HOSPITALIST

## 2025-03-26 PROCEDURE — 99233 SBSQ HOSP IP/OBS HIGH 50: CPT | Mod: ,,, | Performed by: STUDENT IN AN ORGANIZED HEALTH CARE EDUCATION/TRAINING PROGRAM

## 2025-03-26 RX ADMIN — LEVALBUTEROL HYDROCHLORIDE 1.25 MG: 1.25 SOLUTION RESPIRATORY (INHALATION) at 07:03

## 2025-03-26 RX ADMIN — POTASSIUM CHLORIDE, DEXTROSE MONOHYDRATE AND SODIUM CHLORIDE: 300; 5; 450 INJECTION, SOLUTION INTRAVENOUS at 08:03

## 2025-03-26 RX ADMIN — MIDODRINE HYDROCHLORIDE 10 MG: 10 TABLET ORAL at 10:03

## 2025-03-26 RX ADMIN — MUPIROCIN: 20 OINTMENT TOPICAL at 10:03

## 2025-03-26 RX ADMIN — ACETYLCYSTEINE 2 ML: 200 SOLUTION ORAL; RESPIRATORY (INHALATION) at 07:03

## 2025-03-26 RX ADMIN — ACETYLCYSTEINE 2 ML: 200 SOLUTION ORAL; RESPIRATORY (INHALATION) at 02:03

## 2025-03-26 RX ADMIN — MUPIROCIN: 20 OINTMENT TOPICAL at 08:03

## 2025-03-26 RX ADMIN — PREGABALIN 75 MG: 75 CAPSULE ORAL at 10:03

## 2025-03-26 RX ADMIN — ALUMINUM HYDROXIDE, MAGNESIUM HYDROXIDE, AND SIMETHICONE 30 ML: 200; 200; 20 SUSPENSION ORAL at 04:03

## 2025-03-26 RX ADMIN — VANCOMYCIN HYDROCHLORIDE 1500 MG: 500 INJECTION, POWDER, LYOPHILIZED, FOR SOLUTION INTRAVENOUS at 12:03

## 2025-03-26 RX ADMIN — LEVALBUTEROL HYDROCHLORIDE 1.25 MG: 1.25 SOLUTION RESPIRATORY (INHALATION) at 08:03

## 2025-03-26 RX ADMIN — PREGABALIN 75 MG: 75 CAPSULE ORAL at 08:03

## 2025-03-26 RX ADMIN — PANTOPRAZOLE SODIUM 40 MG: 40 INJECTION, POWDER, FOR SOLUTION INTRAVENOUS at 08:03

## 2025-03-26 RX ADMIN — ENOXAPARIN SODIUM 40 MG: 40 INJECTION SUBCUTANEOUS at 05:03

## 2025-03-26 RX ADMIN — LEVALBUTEROL HYDROCHLORIDE 1.25 MG: 1.25 SOLUTION RESPIRATORY (INHALATION) at 02:03

## 2025-03-26 RX ADMIN — ATORVASTATIN CALCIUM 20 MG: 20 TABLET, FILM COATED ORAL at 10:03

## 2025-03-26 RX ADMIN — SERTRALINE HYDROCHLORIDE 50 MG: 50 TABLET ORAL at 08:03

## 2025-03-26 RX ADMIN — VANCOMYCIN HYDROCHLORIDE 1500 MG: 500 INJECTION, POWDER, LYOPHILIZED, FOR SOLUTION INTRAVENOUS at 05:03

## 2025-03-26 RX ADMIN — ACETYLCYSTEINE 2 ML: 200 SOLUTION ORAL; RESPIRATORY (INHALATION) at 08:03

## 2025-03-26 RX ADMIN — MIDODRINE HYDROCHLORIDE 10 MG: 10 TABLET ORAL at 04:03

## 2025-03-26 RX ADMIN — MIDODRINE HYDROCHLORIDE 10 MG: 10 TABLET ORAL at 08:03

## 2025-03-26 NOTE — ASSESSMENT & PLAN NOTE
Patient looks comfortable right now will consider doing a plug of her trach we need to find out the size of the trach, repeat chest x-ray

## 2025-03-26 NOTE — ASSESSMENT & PLAN NOTE
Noted on prior admission. Cardiology had recommended repeat TTE in 2 weeks. Colchicine was stopped last admission due to spontaneous RLE bleeding.   - interval TTE 03/2025 with small effusion and no tamponade physiology

## 2025-03-26 NOTE — SUBJECTIVE & OBJECTIVE
Interval History:     Review of Systems   Unable to perform ROS: Patient nonverbal     Objective:     Vital Signs (Most Recent):  Temp: 98.8 °F (37.1 °C) (03/25/25 1518)  Pulse: 106 (03/25/25 1518)  Resp: 19 (03/25/25 1518)  BP: 114/62 (03/25/25 1518)  SpO2: 98 % (03/25/25 1518) Vital Signs (24h Range):  Temp:  [98 °F (36.7 °C)-98.8 °F (37.1 °C)] 98.8 °F (37.1 °C)  Pulse:  [] 106  Resp:  [16-20] 19  SpO2:  [95 %-100 %] 98 %  BP: (101-127)/(46-84) 114/62     Weight: 79.8 kg (175 lb 14.8 oz)  Body mass index is 32.18 kg/m².    Intake/Output Summary (Last 24 hours) at 3/25/2025 1912  Last data filed at 3/25/2025 0715  Gross per 24 hour   Intake 40 ml   Output --   Net 40 ml         Physical Exam  Constitutional:       Appearance: She is ill-appearing.   HENT:      Head: Normocephalic and atraumatic.      Nose: Nose normal.      Mouth/Throat:      Mouth: Mucous membranes are moist.      Pharynx: Oropharynx is clear.   Eyes:      Extraocular Movements: Extraocular movements intact.      Conjunctiva/sclera: Conjunctivae normal.      Pupils: Pupils are equal, round, and reactive to light.   Cardiovascular:      Rate and Rhythm: Regular rhythm. Tachycardia present.      Pulses: Normal pulses.      Heart sounds: Normal heart sounds.   Pulmonary:      Effort: Pulmonary effort is normal.      Breath sounds: Normal breath sounds.   Abdominal:      General: Abdomen is flat. Bowel sounds are normal.      Palpations: Abdomen is soft.   Musculoskeletal:      Cervical back: Normal range of motion and neck supple.   Skin:     General: Skin is warm and dry.   Neurological:      Mental Status: She is alert.      Comments: Quadriplegia    Psychiatric:         Mood and Affect: Mood normal.         Behavior: Behavior normal.               Significant Labs: All pertinent labs within the past 24 hours have been reviewed.    Significant Imaging: I have reviewed all pertinent imaging results/findings within the past 24 hours.   Pt reports good appetite and po intake PTA, and endorses drinking ensure supplements at times at home. Pt family does the cooking at home, no therapeutic diet followed. NKFA reported.

## 2025-03-26 NOTE — ASSESSMENT & PLAN NOTE
Patient with Hypoxic Respiratory failure which is Acute on chronic.  she is not on home oxygen. Supplemental oxygen was provided and noted- Oxygen Concentration (%):  [40] 40    .   Signs/symptoms of respiratory failure include- tachypnea and respiratory distress. Contributing diagnoses includes - Aspiration and Pneumonia Labs and images were reviewed. Patient Has not had a recent ABG. Will treat underlying causes and adjust management of respiratory failure as follows-

## 2025-03-26 NOTE — ASSESSMENT & PLAN NOTE
Patient with Hypoxic Respiratory failure which is Acute on chronic.  she is not on home oxygen. Supplemental oxygen was provided and noted- Oxygen Concentration (%):  [40] 40    .   Signs/symptoms of respiratory failure include- increased work of breathing and respiratory distress. Contributing diagnoses includes - Aspiration and Pneumonia Labs and images were reviewed. Patient Has not had a recent ABG. Will treat underlying causes and adjust management of respiratory failure as follows-

## 2025-03-26 NOTE — PROGRESS NOTES
[facility] - [address] [place] [address] Medicine  Progress Note    Patient Name: Karie Denney  MRN: [id number]  Patient Class: IP- Inpatient   Admission Date: 3/23/2025  Length of Stay: 2 days  Attending Physician: Cassie Bar MD  Primary Care Provider: Gonzalo Barrera NP        Subjective     Principal Problem:Acute on chronic respiratory failure with hypoxia    HPI:  Chief Complaint  Transfer for continued management of respiratory failure, tracheostomy care, PEG feeding, and complications of quadriplegia following prolonged hospitalization.    History of Present Illness  Ms. Karie Denney is a 59-year-old woman with a complex medical history including quadriplegia following spinal surgery in 2015 and a cerebrovascular accident (CVA) in 2024 resulting in left-sided paralysis. She was transferred to [facility] from [facility] in [place], [place], for ongoing care following a prolonged ICU course involving intubation, respiratory failure, and significant complications.  She was initially hospitalized on February 15, 2025, for aspiration and dysphagia evaluation, during which she developed aspiration pneumonia and respiratory failure requiring intubation. Her hospital course was complicated by an ESBL E. coli UTI, pericardial effusion (treated with colchicine), and a spontaneous right thigh hematoma concerning for compartment syndrome, leading to transfer to [facility]. She required prolonged mechanical ventilation, tracheostomy placement, and PEG tube insertion.  She has now returned to [facility] for continued respiratory care, tracheostomy management, PEG feeding, management of sacral pressure injury, and rehabilitation planning.    Past Medical History  Quadriplegia post-spinal surgery (2015)  CVA (2024)  Aspiration pneumonia  Acute respiratory failure  UTI (ESBL E. coli)  Depression  GERD  Pharyngoesophageal dysphagia  Pericardial effusion/pericarditis  Chronic  constipation  Pressure ulcer (sacral, unstageable)  Hyperlipidemia  Anemia    Past Surgical History  Spinal surgery (2015)  PEG tube placement (03/11/2025)  Esophageal dilation with biopsy (08/2024)    Medications  Active Medications:  Atorvastatin 20 mg daily  Omeprazole 40 mg daily  Sertraline 50 mg daily  Trazodone 100 mg qHS  Pregabalin 75 mg BID  Hydrocodone-acetaminophen 5-325 mg BID  Lactulose 30 mL daily via PEG  Midodrine 10 mg Q6H via PEG  Levalbuterol 0.63 mg nebulizer Q6H  Polyethylene glycol 17 g daily via PEG  Recent Changes:  Colchicine: Discontinued due to bleeding  Aspirin and ezetimibe: Discontinued    Allergies  Penicillins ? Rash and anaphylaxis    Social History  Never smoker  No alcohol or tobacco use  Lives at home with mother; receives home health weekly  Bedbound, non-ambulatory    Family History  Noncontributory    Review of Systems  Limited due to tracheostomy, quadriplegia, and communication challenges. History obtained from EMR and caregiver.    Physical Examination  General: Chronically ill-appearing woman, alert, tracheostomy in place with T-piece  HEENT: Mild nasal skin necrosis, mucous membranes moist  Eyes: PERRL, EOMI  Neck: Tracheostomy present  CV: RRR, no murmurs  Lungs: Breath sounds diminished at bases; no acute distress; no wheezing  Abdomen: Soft, non-tender, PEG tube in place  Skin: Unstageable sacral ulcer, nasal pressure ulcer  Neuro: Quadriplegia, responsive with eye tracking and blinking  Extremities: RLE hematoma, bilateral edema, no signs of active bleeding    Laboratory Data (Most Recent)  Hgb: 8.5 g/dL  Creatinine: 0.30 mg/dL  Albumin: 3.1 g/dL  WBC: 9.4 K/uL  Ferritin: 265 ng/mL  INR: 0.93  No growth on recent blood and urine cultures    Imaging  CT angio: Large RLE hematoma without active extravasation  Multiple chest X-rays: Stable bilateral pleural effusions, improving atelectasis  Echo: EF 55-60%, small pericardial effusion      Overview/Hospital Course:  No  notes on file    Interval History:     Review of Systems   Unable to perform ROS: Patient nonverbal     Objective:     Vital Signs (Most Recent):  Temp: 98.8 °F (37.1 °C) (03/25/25 1518)  Pulse: 106 (03/25/25 1518)  Resp: 19 (03/25/25 1518)  BP: 114/62 (03/25/25 1518)  SpO2: 98 % (03/25/25 1518) Vital Signs (24h Range):  Temp:  [98 °F (36.7 °C)-98.8 °F (37.1 °C)] 98.8 °F (37.1 °C)  Pulse:  [] 106  Resp:  [16-20] 19  SpO2:  [95 %-100 %] 98 %  BP: (101-127)/(46-84) 114/62     Weight: 79.8 kg (175 lb 14.8 oz)  Body mass index is 32.18 kg/m².    Intake/Output Summary (Last 24 hours) at 3/25/2025 1912  Last data filed at 3/25/2025 0715  Gross per 24 hour   Intake 40 ml   Output --   Net 40 ml         Physical Exam  Constitutional:       Appearance: She is ill-appearing.   HENT:      Head: Normocephalic and atraumatic.      Nose: Nose normal.      Mouth/Throat:      Mouth: Mucous membranes are moist.      Pharynx: Oropharynx is clear.   Eyes:      Extraocular Movements: Extraocular movements intact.      Conjunctiva/sclera: Conjunctivae normal.      Pupils: Pupils are equal, round, and reactive to light.   Cardiovascular:      Rate and Rhythm: Regular rhythm. Tachycardia present.      Pulses: Normal pulses.      Heart sounds: Normal heart sounds.   Pulmonary:      Effort: Pulmonary effort is normal.      Breath sounds: Normal breath sounds.   Abdominal:      General: Abdomen is flat. Bowel sounds are normal.      Palpations: Abdomen is soft.   Musculoskeletal:      Cervical back: Normal range of motion and neck supple.   Skin:     General: Skin is warm and dry.   Neurological:      Mental Status: She is alert.      Comments: Quadriplegia    Psychiatric:         Mood and Affect: Mood normal.         Behavior: Behavior normal.               Significant Labs: All pertinent labs within the past 24 hours have been reviewed.    Significant Imaging: I have reviewed all pertinent imaging results/findings within the past 24  hours.      Assessment & Plan  Acute on chronic respiratory failure with hypoxia  Continue tracheostomy care with T-piece trials  Tracheostomy suctioning ordered due to thick secretions.   Culture tracheostomy secretions.   Monitor oxygenation and readiness for weaning  Pulmonary consult tomorrow.     3/24: mucus plugging requiring frequent suctioning.  Chest physiotherapy, Mucomyst.  Additional recommendations per pulmonology.     3/25: requiring frequent suctioning but otherwise stable.   Severe protein-calorie malnutrition  Nutrition consulted. Most recent weight and BMI monitored-     Measurements:  Wt Readings from Last 1 Encounters:   03/24/25 79.8 kg (175 lb 14.8 oz)   Body mass index is 32.18 kg/m².    Patient has been screened and assessed by RD.    Malnutrition Type:  Context: chronic illness  Level: severe    Continue PEG tube feeding.  Nepro at 40 mL/hr  Maintain bowel regimen (lactulose, PEG, senna)  Dietitian to reassess       Dysphagia  Continue omeprazole  Avoid PO intake  Speech therapy if clinically appropriate    Aspiration pneumonia  Recurrent aspiration pneumonia.  Complete two rounds of antibiotics.    Pressure ulcers of skin of multiple topographic sites  Aquacel Ag + Mepilex dressings  Wound care team consulted    Continue offloading and specialty mattress    Depression  Continue pregabalin, sertraline, trazodone      Mucus plugging of bronchi      S/P percutaneous endoscopic gastrostomy (PEG) tube placement  Patient noted to have a percutaneous endoscopic gastrostomy tube in place. I have personally inspected the tube.Tube was placed prior to this admission There are no signs of drainage or infection around the site. The tube is patent. Medications have converted to liquid form if available.  Routine care to be done by wound care and nursing staff.       Quadriplegia  Maximal support, bedbound  PT/OT as tolerated  DME planning for discharge    Hematoma of lower extremity, right, subsequent  "encounter  No active bleeding; conservative management  Monitor H/H  No anticoagulation  Hyperlipidemia  Continue atorvastatin    Pericardial effusion  Previously on colchicine, now discontinued due to bleeding  Continue low-dose prednisone  Monitor for recurrence    Acute respiratory failure with hypoxia and hypercarbia  Patient with Hypoxic Respiratory failure which is Acute on chronic.  she is not on home oxygen. Supplemental oxygen was provided and noted- Oxygen Concentration (%):  [40] 40    .   Signs/symptoms of respiratory failure include- tachypnea and respiratory distress. Contributing diagnoses includes - Aspiration and Pneumonia Labs and images were reviewed. Patient Has not had a recent ABG. Will treat underlying causes and adjust management of respiratory failure as follows-   Chronic respiratory failure with hypoxia  Patient with Hypoxic Respiratory failure which is Acute on chronic.  she is not on home oxygen. Supplemental oxygen was provided and noted- Oxygen Concentration (%):  [40] 40    .   Signs/symptoms of respiratory failure include- increased work of breathing and respiratory distress. Contributing diagnoses includes - Aspiration and Pneumonia Labs and images were reviewed. Patient Has not had a recent ABG. Will treat underlying causes and adjust management of respiratory failure as follows-   Coag negative Staphylococcus bacteremia  Continue with current antibiotics until sensitivities return.      Antibiotics (From admission, onward)      Start     Stop Route Frequency Ordered    03/25/25 0600  vancomycin (VANCOCIN) 1,500 mg in 0.9% NaCl 250 mL IVPB         -- IV Every 18 hours 03/25/25 0435    03/25/25 0519  vancomycin - pharmacy to dose  (vancomycin IVPB (PEDS and ADULTS))        Placed in "And" Linked Group    -- IV pharmacy to manage frequency 03/25/25 0419    03/23/25 2100  mupirocin 2 % ointment         03/28/25 2059 Nasl 2 times daily 03/23/25 1526              VTE Risk Mitigation " (From admission, onward)           Ordered     enoxaparin injection 40 mg  Daily         03/23/25 1940     IP VTE HIGH RISK PATIENT  Once         03/23/25 1940     Place sequential compression device  Until discontinued         03/23/25 1941                    Discharge Planning   SHRADDHA:      Code Status: Full Code   Medical Readiness for Discharge Date:   Discharge Plan A: Long-term acute care facility (LTAC)                Cassie Bar MD  Department of Hospital Medicine   [facility] - [address] [facility]

## 2025-03-26 NOTE — HOSPITAL COURSE
3/26  - continues on vanc for blood cultures + on 3/24, echo completed at time with no endocarditis seen  - discussed with pulmonary, recs to follow    3/27  - due to overall baseline health will treat coag negative staph bacteremia as a true infection, for now continue vanc and follow cultures and sensitivities  - discussed with pulmonology who plan to continue trach collar as is and see patient May 1st at 1:40 pm, confirmed appointment  - discussed with family at bedside who are very concerned about secretions and states they don't have the suction capabilities at home and do not feel comfortable going home with her in this state, will discuss with social on possible home equipment    3/28  - awaiting micro, continuing vanc  - social setting up suction at home, family plans to return home as before    3/29  - still with secretions  - family requesting voice box    3/30  - doing well today, mentation much improved  - anticipate discharge within the next two days with home health services, family will need education on trach maintenance and home feedings as patient has PEG tube and they have not cared for a PEG tube before, also we will discuss with  getting a speaking told to use with a trach    3/31  - secretions improved today  - working with social for home set up of oxygen and tube feeds  - family needs heavy education on trach care and tube feeds as primary care giver has no experience with either, still wishes to discharge home  4/23 long discussion with mother today.  Open to other facilities if needed for trach weaning.  She apparently just changed over to traditional Medicare.  If this is the case we can take her to specialty  4/24 plan to discharge to specialty once insurance verified to be traditional Medicare  4/25 more awake today after stopping trazodone  4/26 further discussion with family today about placement  4/27 mom planning to speak with children about placement today  4/28  family has agreed to take patient home.  Mother requests that we give her time to get the patient's room ready and discharge tomorrow

## 2025-03-26 NOTE — ASSESSMENT & PLAN NOTE
Patient with Hypoxic Respiratory failure which is Acute on chronic.  she is not on home oxygen. Supplemental oxygen was provided and noted- Oxygen Concentration (%):  [35-40] 35    .   Signs/symptoms of respiratory failure include- tachypnea and respiratory distress. Contributing diagnoses includes - Aspiration and Pneumonia Labs and images were reviewed. Patient Has not had a recent ABG. Will treat underlying causes and adjust management of respiratory failure as follows-

## 2025-03-26 NOTE — NURSING
04:00- Mother called out via call bell notifying this RN that pt is having chest pain in left chest area.     04:09- Obtained EKG. Showed sinus tach but otherwise normal. Sent via secure chat to Dr. Gerber.     04:15-Pt said it feels a little better, and it feels like heart burn. Mylanta given to pt.    04:30- Pt says Mylanta helped and she has full relief.

## 2025-03-26 NOTE — ASSESSMENT & PLAN NOTE
Nutrition consulted. Most recent weight and BMI monitored-     Measurements:  Wt Readings from Last 1 Encounters:   03/24/25 79.8 kg (175 lb 14.8 oz)   Body mass index is 32.18 kg/m².    Patient has been screened and assessed by RD.    Malnutrition Type:  Context: chronic illness  Level: severe    Continue PEG tube feeding.  Nepro at 40 mL/hr  Maintain bowel regimen (lactulose, PEG, senna)  Dietitian to reassess        No

## 2025-03-26 NOTE — ASSESSMENT & PLAN NOTE
Patient with Hypoxic Respiratory failure which is Acute on chronic.  she is not on home oxygen. Supplemental oxygen was provided and noted- Oxygen Concentration (%):  [35-40] 35    .   Signs/symptoms of respiratory failure include- increased work of breathing and respiratory distress. Contributing diagnoses includes - Aspiration and Pneumonia Labs and images were reviewed. Patient Has not had a recent ABG. Will treat underlying causes and adjust management of respiratory failure as follows-

## 2025-03-26 NOTE — ASSESSMENT & PLAN NOTE
Continue tracheostomy care with T-piece trials  Tracheostomy suctioning ordered due to thick secretions.   Culture tracheostomy secretions.   Monitor oxygenation and readiness for weaning  Pulmonary consult tomorrow.     3/24: mucus plugging requiring frequent suctioning.  Chest physiotherapy, Mucomyst.  Additional recommendations per pulmonology.     3/25: requiring frequent suctioning but otherwise stable.

## 2025-03-26 NOTE — SUBJECTIVE & OBJECTIVE
Interval History/Significant Events: no acute overnight events, on trach collar, ongoing efforts with chest PT    Review of Systems  Objective:     Vital Signs (Most Recent):  Temp: 97.8 °F (36.6 °C) (03/26/25 1639)  Pulse: 108 (03/26/25 1639)  Resp: 18 (03/26/25 1639)  BP: 114/62 (03/26/25 1639)  SpO2: 95 % (03/26/25 1639) Vital Signs (24h Range):  Temp:  [97.5 °F (36.4 °C)-98.9 °F (37.2 °C)] 97.8 °F (36.6 °C)  Pulse:  [] 108  Resp:  [16-20] 18  SpO2:  [93 %-99 %] 95 %  BP: (114-124)/(62-82) 114/62   Weight: 79.8 kg (175 lb 14.8 oz)  Body mass index is 32.18 kg/m².      Intake/Output Summary (Last 24 hours) at 3/26/2025 1845  Last data filed at 3/26/2025 0700  Gross per 24 hour   Intake 310 ml   Output --   Net 310 ml          Physical Exam  Constitutional:       General: She is not in acute distress.     Appearance: She is not toxic-appearing.   HENT:      Head: Normocephalic and atraumatic.      Mouth/Throat:      Mouth: Mucous membranes are moist.   Cardiovascular:      Rate and Rhythm: Normal rate and regular rhythm.   Pulmonary:      Effort: Pulmonary effort is normal.      Breath sounds: No wheezing or rhonchi.   Abdominal:      Palpations: Abdomen is soft.      Tenderness: There is no right CVA tenderness or guarding.   Skin:     General: Skin is warm.   Neurological:      Mental Status: She is alert. Mental status is at baseline.      Comments: Functionally quadradiplegic            Vents:  Oxygen Concentration (%): 35 (03/26/25 1403)  Lines/Drains/Airways       Drain  Duration                  Gastrostomy/Enterostomy LUQ -- days              Airway  Duration                  Airway - Non-Surgical 02/24/25 1343 Endotracheal Tube 30 days    Adult Surgical Airway 03/23/25 1544 Shiley Cuffed  3 days              Peripheral Intravenous Line  Duration                  Peripheral IV - Single Lumen Yes Anterior;Right Upper Arm -- days                  Significant Labs:    CBC/Anemia Profile:  Recent Labs    Lab 03/25/25  0452 03/26/25  0441   WBC 8.09 8.43   HGB 7.8* 8.7*   HCT 28.2* 30.7*    216   MCV 93.1 90.6   RDW 19.2* 18.7*        Chemistries:  Recent Labs   Lab 03/25/25  0451 03/26/25  0441    140   K 3.0* 4.6    104   CO2 31 29   BUN 13 9*   CREATININE 0.43* 0.43*   CALCIUM 8.5 8.5   MG 2.1 2.0       All pertinent labs within the past 24 hours have been reviewed.    Significant Imaging:  I have reviewed all pertinent imaging results/findings within the past 24 hours.

## 2025-03-26 NOTE — ASSESSMENT & PLAN NOTE
"Continue with current antibiotics until sensitivities return.      Antibiotics (From admission, onward)      Start     Stop Route Frequency Ordered    03/25/25 0600  vancomycin (VANCOCIN) 1,500 mg in 0.9% NaCl 250 mL IVPB         -- IV Every 18 hours 03/25/25 0435    03/25/25 0519  vancomycin - pharmacy to dose  (vancomycin IVPB (PEDS and ADULTS))        Placed in "And" Linked Group    -- IV pharmacy to manage frequency 03/25/25 0419    03/23/25 2100  mupirocin 2 % ointment         03/28/25 2059 Nasl 2 times daily 03/23/25 1526              "

## 2025-03-26 NOTE — ASSESSMENT & PLAN NOTE
Poor secretion management 2/2 MSK weakness. Improved core support on assessment this time around. CXR with R lower lobe atelectatic changes. Afebrile.   - caution with starting Abx in absence of fever; will f/u respiratory Cxs  - CXR 03/26 with improved aeration of RML  - pulmonary hygiene as follows   -- cont Chest PT for R lower lobar atelectasis to TID   -- cont acetylcysteine neb to TID   -- cont levalbuterol three times daily   -- reposition at least TID for goal postural drainage  - holding on therapeutic aspiration with bronchoscopy at this time for better aeration of lung; will consider for respiratory emergency  - holding on capping trial at this time, outpatient follow up with Pulmonology

## 2025-03-26 NOTE — ASSESSMENT & PLAN NOTE
"Continue with current antibiotics until sensitivities return.      Antibiotics (From admission, onward)      Start     Stop Route Frequency Ordered    03/25/25 0600  vancomycin (VANCOCIN) 1,500 mg in 0.9% NaCl 250 mL IVPB         -- IV Every 18 hours 03/25/25 0435    03/25/25 0519  vancomycin - pharmacy to dose  (vancomycin IVPB (PEDS and ADULTS))        Placed in "And" Linked Group    -- IV pharmacy to manage frequency 03/25/25 0419    03/23/25 2100  mupirocin 2 % ointment         03/28/25 2059 Nasl 2 times daily 03/23/25 1526          3/26  - continues on vanc for blood cultures + on 3/24, echo completed at time with no endocarditis seen  - discussed with pulmonary, recs to follow    "

## 2025-03-26 NOTE — PLAN OF CARE
Problem: Infection  Goal: Absence of Infection Signs and Symptoms  Outcome: Progressing     Problem: Wound  Goal: Optimal Coping  Outcome: Progressing  Goal: Optimal Functional Ability  Outcome: Progressing  Goal: Absence of Infection Signs and Symptoms  Outcome: Progressing  Goal: Improved Oral Intake  Outcome: Progressing  Goal: Optimal Pain Control and Function  Outcome: Progressing  Goal: Skin Health and Integrity  Outcome: Progressing  Goal: Optimal Wound Healing  Outcome: Progressing     Problem: Skin Injury Risk Increased  Goal: Skin Health and Integrity  Outcome: Progressing     Problem: Gas Exchange Impaired  Goal: Optimal Gas Exchange  Outcome: Progressing

## 2025-03-26 NOTE — SUBJECTIVE & OBJECTIVE
Interval History:     Review of Systems   Unable to perform ROS: Patient nonverbal     Objective:     Vital Signs (Most Recent):  Temp: 98.5 °F (36.9 °C) (03/26/25 1126)  Pulse: 101 (03/26/25 1126)  Resp: 18 (03/26/25 1126)  BP: 124/82 (03/26/25 1126)  SpO2: 96 % (03/26/25 1126) Vital Signs (24h Range):  Temp:  [97.5 °F (36.4 °C)-98.9 °F (37.2 °C)] 98.5 °F (36.9 °C)  Pulse:  [101-111] 101  Resp:  [16-20] 18  SpO2:  [93 %-99 %] 96 %  BP: (114-124)/(62-82) 124/82     Weight: 79.8 kg (175 lb 14.8 oz)  Body mass index is 32.18 kg/m².    Intake/Output Summary (Last 24 hours) at 3/26/2025 1258  Last data filed at 3/26/2025 0700  Gross per 24 hour   Intake 310 ml   Output --   Net 310 ml         Physical Exam  Constitutional:       Appearance: She is ill-appearing.   HENT:      Head: Normocephalic and atraumatic.      Nose: Nose normal.      Mouth/Throat:      Mouth: Mucous membranes are moist.      Pharynx: Oropharynx is clear.   Eyes:      Extraocular Movements: Extraocular movements intact.      Conjunctiva/sclera: Conjunctivae normal.      Pupils: Pupils are equal, round, and reactive to light.   Cardiovascular:      Rate and Rhythm: Regular rhythm. Tachycardia present.      Pulses: Normal pulses.      Heart sounds: Normal heart sounds.   Pulmonary:      Effort: Pulmonary effort is normal.      Breath sounds: Normal breath sounds.   Abdominal:      General: Abdomen is flat. Bowel sounds are normal.      Palpations: Abdomen is soft.   Musculoskeletal:      Cervical back: Normal range of motion and neck supple.   Skin:     General: Skin is warm and dry.   Neurological:      Mental Status: She is alert.      Comments: Quadriplegia    Psychiatric:         Mood and Affect: Mood normal.         Behavior: Behavior normal.               Significant Labs: All pertinent labs within the past 24 hours have been reviewed.    Significant Imaging: I have reviewed all pertinent imaging results/findings within the past 24 hours.

## 2025-03-26 NOTE — ASSESSMENT & PLAN NOTE
Continue tracheostomy care with T-piece trials  Tracheostomy suctioning ordered due to thick secretions.   Culture tracheostomy secretions.   Monitor oxygenation and readiness for weaning  Pulmonary consult tomorrow.     3/24: mucus plugging requiring frequent suctioning.  Chest physiotherapy, Mucomyst.  Additional recommendations per pulmonology.     3/25: requiring frequent suctioning but otherwise stable.     3/26  - continues on vanc for blood cultures + on 3/24, echo completed at time with no endocarditis seen  - discussed with pulmonary, recs to follow

## 2025-03-26 NOTE — PROGRESS NOTES
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 She is now transferred back to floor 03/23 s/p tracheostomy tube placement 03/14 for extubation failures now weaned to trach collar. She is also s/p PEG placement 03/11/2025. R spontaneous R thigh hematoma was managed with supportive care. Pulmonary is consulted for tracheostomy tube management recommendations.     At time of my assessment she is awake and participating with assessment. Her mother is at bedside discussing care with Case Management. OSH records reviewed at length.     Hospital/ICU Course:  No notes on file    Interval History/Significant Events: no acute overnight events, on trach collar, ongoing efforts with chest PT    Review of Systems  Objective:     Vital Signs (Most Recent):  Temp: 97.8 °F (36.6 °C) (03/26/25 1639)  Pulse: 108 (03/26/25 1639)  Resp: 18 (03/26/25 1639)  BP: 114/62 (03/26/25 1639)  SpO2: 95 % (03/26/25 1639) Vital Signs (24h Range):  Temp:  [97.5 °F (36.4 °C)-98.9 °F (37.2 °C)] 97.8 °F (36.6 °C)  Pulse:  [] 108  Resp:  [16-20] 18  SpO2:  [93 %-99 %] 95 %  BP: (114-124)/(62-82) 114/62   Weight: 79.8 kg (175 lb 14.8 oz)  Body mass index is 32.18  "kg/m².      Intake/Output Summary (Last 24 hours) at 3/26/2025 1845  Last data filed at 3/26/2025 0700  Gross per 24 hour   Intake 310 ml   Output --   Net 310 ml          Physical Exam  Constitutional:       General: She is not in acute distress.     Appearance: She is not toxic-appearing.   HENT:      Head: Normocephalic and atraumatic.      Mouth/Throat:      Mouth: Mucous membranes are moist.   Cardiovascular:      Rate and Rhythm: Normal rate and regular rhythm.   Pulmonary:      Effort: Pulmonary effort is normal.      Breath sounds: No wheezing or rhonchi.   Abdominal:      Palpations: Abdomen is soft.      Tenderness: There is no right CVA tenderness or guarding.   Skin:     General: Skin is warm.   Neurological:      Mental Status: She is alert. Mental status is at baseline.            Vents:  Oxygen Concentration (%): 35 (03/26/25 1403)  Lines/Drains/Airways       Drain  Duration                  Gastrostomy/Enterostomy LUQ -- days              Airway  Duration                  Airway - Non-Surgical 02/24/25 1343 Endotracheal Tube 30 days    Adult Surgical Airway 03/23/25 1544 Shiley Cuffed  3 days              Peripheral Intravenous Line  Duration                  Peripheral IV - Single Lumen Yes Anterior;Right Upper Arm -- days                  Significant Labs:    CBC/Anemia Profile:  Recent Labs   Lab 03/25/25  0452 03/26/25  0441   WBC 8.09 8.43   HGB 7.8* 8.7*   HCT 28.2* 30.7*    216   MCV 93.1 90.6   RDW 19.2* 18.7*        Chemistries:  Recent Labs   Lab 03/25/25  0451 03/26/25  0441    140   K 3.0* 4.6    104   CO2 31 29   BUN 13 9*   CREATININE 0.43* 0.43*   CALCIUM 8.5 8.5   MG 2.1 2.0       All pertinent labs within the past 24 hours have been reviewed.    Significant Imaging:  I have reviewed all pertinent imaging results/findings within the past 24 hours.    ABG  No results for input(s): "PH", "PO2", "PCO2", "HCO3", "BE" in the last 168 hours.  Assessment/Plan: "     Pulmonary  * Acute on chronic respiratory failure with hypoxia  She has had mechanical respiratory failure prior to transfer to OSH in this patient who is bed bound with severe illness. Difficult intubation worthy mention given limited ROM of neck. She has her respiratory failure with hypoxia further 2/2 mucus plugging and aspiration events. She is now s/p PEG placement as well. Given well defined history of aspiration and previous requirements of esophageal dilation due to stricture, I would favor a STRICT NPO status for this patient.   - s/p tracheostomy tube placement 03/14  - cont trach collar today; cuff down   -- patient to remain on trach collar for now, no capping plan given difficulty with expectoration and need for chest PT and suctioning for expectoration   -- follow up outpatient with pulmonology on discharge and will consider trach exchange to uncuffed trach once tract matures (04/15 thereafter)  - supplement for goal SpO2 >92%  - pulmonary toilet as per mucus plugging plan    Mucus plugging of bronchi  Poor secretion management 2/2 MSK weakness. Improved core support on assessment this time around. CXR with R lower lobe atelectatic changes. Afebrile.   - caution with starting Abx in absence of fever; will f/u respiratory Cxs  - CXR 03/26 with improved aeration of RML  - pulmonary hygiene as follows   -- cont Chest PT for R lower lobar atelectasis to TID   -- cont acetylcysteine neb to TID   -- cont levalbuterol three times daily   -- reposition at least TID for goal postural drainage  - holding on therapeutic aspiration with bronchoscopy at this time for better aeration of lung; will consider for respiratory emergency  - holding on capping trial at this time, outpatient follow up with Pulmonology    Cardiac/Vascular  Pericardial effusion  Noted on prior admission. Cardiology had recommended repeat TTE in 2 weeks. Colchicine was stopped last admission due to spontaneous RLE bleeding.   - interval  TTE 03/2025 with small effusion and no tamponade physiology      Pulmonology will sign off. Kindly contact us for any questions.      Mariah Cunha MD  Pulmonary and Critical Care Medicine  [facility] - [address] [place] [address]

## 2025-03-27 LAB — VANCOMYCIN TROUGH SERPL-MCNC: 22 ΜG/ML (ref 15–20)

## 2025-03-27 PROCEDURE — 25000242 PHARM REV CODE 250 ALT 637 W/ HCPCS: Performed by: INTERNAL MEDICINE

## 2025-03-27 PROCEDURE — 99900035 HC TECH TIME PER 15 MIN (STAT)

## 2025-03-27 PROCEDURE — 25000242 PHARM REV CODE 250 ALT 637 W/ HCPCS: Performed by: STUDENT IN AN ORGANIZED HEALTH CARE EDUCATION/TRAINING PROGRAM

## 2025-03-27 PROCEDURE — 25000003 PHARM REV CODE 250: Performed by: HOSPITALIST

## 2025-03-27 PROCEDURE — 99900026 HC AIRWAY MAINTENANCE (STAT)

## 2025-03-27 PROCEDURE — 94668 MNPJ CHEST WALL SBSQ: CPT

## 2025-03-27 PROCEDURE — 63600175 PHARM REV CODE 636 W HCPCS: Performed by: HOSPITALIST

## 2025-03-27 PROCEDURE — 94761 N-INVAS EAR/PLS OXIMETRY MLT: CPT

## 2025-03-27 PROCEDURE — 25000003 PHARM REV CODE 250

## 2025-03-27 PROCEDURE — 27000207 HC ISOLATION

## 2025-03-27 PROCEDURE — 80202 ASSAY OF VANCOMYCIN: CPT | Performed by: HOSPITALIST

## 2025-03-27 PROCEDURE — 94640 AIRWAY INHALATION TREATMENT: CPT

## 2025-03-27 PROCEDURE — 27000221 HC OXYGEN, UP TO 24 HOURS

## 2025-03-27 PROCEDURE — 99233 SBSQ HOSP IP/OBS HIGH 50: CPT | Mod: ,,,

## 2025-03-27 PROCEDURE — 63600175 PHARM REV CODE 636 W HCPCS

## 2025-03-27 PROCEDURE — 11000001 HC ACUTE MED/SURG PRIVATE ROOM

## 2025-03-27 RX ORDER — LEVALBUTEROL INHALATION SOLUTION 1.25 MG/3ML
1.25 SOLUTION RESPIRATORY (INHALATION) 3 TIMES DAILY
Status: DISCONTINUED | OUTPATIENT
Start: 2025-03-27 | End: 2025-04-30 | Stop reason: HOSPADM

## 2025-03-27 RX ADMIN — LEVALBUTEROL HYDROCHLORIDE 1.25 MG: 1.25 SOLUTION RESPIRATORY (INHALATION) at 02:03

## 2025-03-27 RX ADMIN — ACETYLCYSTEINE 2 ML: 200 SOLUTION ORAL; RESPIRATORY (INHALATION) at 07:03

## 2025-03-27 RX ADMIN — VANCOMYCIN HYDROCHLORIDE 1500 MG: 500 INJECTION, POWDER, LYOPHILIZED, FOR SOLUTION INTRAVENOUS at 11:03

## 2025-03-27 RX ADMIN — Medication 6 MG: at 09:03

## 2025-03-27 RX ADMIN — LEVALBUTEROL HYDROCHLORIDE 1.25 MG: 1.25 SOLUTION RESPIRATORY (INHALATION) at 07:03

## 2025-03-27 RX ADMIN — MIDODRINE HYDROCHLORIDE 10 MG: 10 TABLET ORAL at 09:03

## 2025-03-27 RX ADMIN — PREGABALIN 75 MG: 75 CAPSULE ORAL at 09:03

## 2025-03-27 RX ADMIN — SERTRALINE HYDROCHLORIDE 50 MG: 50 TABLET ORAL at 10:03

## 2025-03-27 RX ADMIN — MIDODRINE HYDROCHLORIDE 10 MG: 10 TABLET ORAL at 10:03

## 2025-03-27 RX ADMIN — PANTOPRAZOLE SODIUM 40 MG: 40 INJECTION, POWDER, FOR SOLUTION INTRAVENOUS at 10:03

## 2025-03-27 RX ADMIN — PREGABALIN 75 MG: 75 CAPSULE ORAL at 10:03

## 2025-03-27 RX ADMIN — MUPIROCIN: 20 OINTMENT TOPICAL at 09:03

## 2025-03-27 RX ADMIN — ACETYLCYSTEINE 2 ML: 200 SOLUTION ORAL; RESPIRATORY (INHALATION) at 02:03

## 2025-03-27 RX ADMIN — ATORVASTATIN CALCIUM 20 MG: 20 TABLET, FILM COATED ORAL at 09:03

## 2025-03-27 RX ADMIN — MIDODRINE HYDROCHLORIDE 10 MG: 10 TABLET ORAL at 02:03

## 2025-03-27 RX ADMIN — ACETAMINOPHEN 650 MG: 325 TABLET ORAL at 09:03

## 2025-03-27 RX ADMIN — HYDROCODONE BITARTRATE AND ACETAMINOPHEN 1 TABLET: 5; 325 TABLET ORAL at 01:03

## 2025-03-27 RX ADMIN — ACETYLCYSTEINE 2 ML: 200 SOLUTION ORAL; RESPIRATORY (INHALATION) at 08:03

## 2025-03-27 RX ADMIN — LEVALBUTEROL HYDROCHLORIDE 1.25 MG: 1.25 SOLUTION RESPIRATORY (INHALATION) at 08:03

## 2025-03-27 RX ADMIN — MUPIROCIN: 20 OINTMENT TOPICAL at 10:03

## 2025-03-27 RX ADMIN — ENOXAPARIN SODIUM 40 MG: 40 INJECTION SUBCUTANEOUS at 05:03

## 2025-03-27 NOTE — PLAN OF CARE
Problem: Adult Inpatient Plan of Care  Goal: Plan of Care Review  Outcome: Progressing  Goal: Patient-Specific Goal (Individualized)  Outcome: Progressing  Goal: Absence of Hospital-Acquired Illness or Injury  Outcome: Progressing  Goal: Optimal Comfort and Wellbeing  Outcome: Progressing  Goal: Readiness for Transition of Care  Outcome: Progressing     Problem: Infection  Goal: Absence of Infection Signs and Symptoms  Outcome: Progressing     Problem: Wound  Goal: Optimal Coping  Outcome: Progressing  Goal: Optimal Functional Ability  Outcome: Progressing  Goal: Absence of Infection Signs and Symptoms  Outcome: Progressing  Goal: Improved Oral Intake  Outcome: Progressing  Goal: Optimal Pain Control and Function  Outcome: Progressing  Goal: Skin Health and Integrity  Outcome: Progressing  Goal: Optimal Wound Healing  Outcome: Progressing     Problem: Skin Injury Risk Increased  Goal: Skin Health and Integrity  Outcome: Progressing     Problem: Gas Exchange Impaired  Goal: Optimal Gas Exchange  Outcome: Progressing

## 2025-03-27 NOTE — ASSESSMENT & PLAN NOTE
Continue tracheostomy care with T-piece trials  Tracheostomy suctioning ordered due to thick secretions.   Culture tracheostomy secretions.   Monitor oxygenation and readiness for weaning  Pulmonary consult tomorrow.     3/24: mucus plugging requiring frequent suctioning.  Chest physiotherapy, Mucomyst.  Additional recommendations per pulmonology.     3/25: requiring frequent suctioning but otherwise stable.     3/26  - continues on vanc for blood cultures + on 3/24, echo completed at time with no endocarditis seen  - discussed with pulmonary, recs to follow    3/27  - discussed with pulmonology who plan to continue trach collar as is and see patient May 1st at 1:40 pm, confirmed appointment  - discussed with family at bedside who are very concerned about secretions and states they don't have the suction capabilities at home and do not feel comfortable going home with her in this state, will discuss with social on possible home equipement

## 2025-03-27 NOTE — ASSESSMENT & PLAN NOTE
Nutrition consulted. Most recent weight and BMI monitored-     Measurements:  Wt Readings from Last 1 Encounters:   03/27/25 79.8 kg (175 lb 14.8 oz)   Body mass index is 32.18 kg/m².    Patient has been screened and assessed by RD.    Malnutrition Type:  Context: chronic illness  Level: severe    Continue PEG tube feeding.  Nepro at 40 mL/hr  Maintain bowel regimen (lactulose, PEG, senna)  Dietitian to reassess  continue with current tube feed regimen

## 2025-03-27 NOTE — PROGRESS NOTES
"[facility] - [address] [place] [address]  Adult Nutrition  Follow Up Note         Reason for Assessment  Reason For Assessment: RD follow-up        Assessment and Plan  3/27/25: RD follow up. Patient remains NPO and on enteral feeds of Isosource 1.5. Patient is at goal rate and tolerating feeds well per flowsheet. Recommend to continue with Isosource 1.5 at 30 mL/hr with FWF 40 mL/hr and Luis Fernando BID. Keep HOB at 30-45 degrees to reduce risk of aspiration. Monitor for tolerance: n/v/d/c. Hold feeding and notify RD if sx of intolerance develop. Monitor electrolytes and replete as needed.    *Per SLP note 3/24: "Patient's mother reports that pt is not asking for anything to eat or drink orally, and she does not wish to feed her orally at this time. Will D/C order for swallow eval at this time."    3/24/25: Consult received and appreciated. Consult for new tube feeding. Patient is a 58 yo female with a complex medical history including quadriplegia following spinal surgery in 2015 and a CVA in 2024 resulting in left-sided paralysis. She was initially hospitalized in February 2025 for aspiration and dysphagia evaluation. Required prolonged mechanical ventilation, tracheostomy placement, and PEG tube insertion. Returned to [facility] on 3/23/25 for continued respiratory care, tracheostomy management, PEG feeding, management of sacral pressure injury, and rehab planning. Additional relevant PMHx includes aspiration pneumonia, respiratory failure, depression, GERD, chronic constipation, HLD, anemia.     Per H&P, patient noted to be on PEG tube feeding of Nepro at 40 mL/hr. Please see below for tube feed recommendations.     Patient is 79.8 kg (175 lb) with a BMI of 32.18 and is obese (Class 1 Obesity). Review of records reveals a >10% (18.6%) significant unintentional weight loss x 6 mo (97.5 kg 8/29/24). She is also noted to have an unhealed sacral wound.     Per ASPEN guidelines patient meets criteria for severe " protein-calorie malnutrition secondary to dysphagia resulting in inadequate PO intakes as evidenced by >10% significant unintentional weight loss x 6 months, unhealed wounds, and consuming <75% estimated energy requirements for >1 month.     ENTERAL FEED RECOMMENDATIONS:    *Needs were adjusted and account for quadriplegia, BMI, and sacral wound*    Initiate continuous infusion of Isosource 1.5 at 10 mL/hr via PEG and advance 10 mL/hr q8h pending tolerance until goal rate of 30 mL/hr is achieved. Do not exceed goal rate. FWF 40 mL/hr. Keep HOB at 30-45 degrees to reduce risk of aspiration. Monitor for tolerance: n/v/d/c. Hold feeding and notify RD if symptoms of intolerance develop.     Recommend addition of Luis Fernando BID via PEG to promote wound healing and provide additional protein. Recommend to continue with bowel regimen (lactulose, PEG, senna) given hx of chronic constipation. Tube feed regimen will also provide patient with ~ 11 g of fiber per 24 hours, which may help to further alleviate constipation. Given patient is receiving < 1 L formula per day, recommend to consider addition of multivitamin/mineral supplement to help meet micronutrient needs.    Last Bowel Movement: 03/26/25    Medications/labs reviewed. RD following.    Learning Needs/Social Determinants of Health    Learning Assessment       03/23/2025 1451 [facility] (3/23/2025 - Present)   Created by Corry Blue RN - RN (Nurse) Status: Complete                 PRIMARY LEARNER     Primary Learner Name:  marina noble  - 03/23/2025 1451    Relationship:  Patient, Family  - 03/23/2025 1451    Does the primary learner have any barriers to learning?:  No Barriers  - 03/23/2025 1451    What is the preferred language of the primary learner?:  English  - 03/23/2025 1451    Is an  required?:  No  - 03/23/2025 1451    How does the primary learner prefer to learn new concepts?:  Listening  -  03/23/2025 1451    How often do you need to have someone help you read instructions, pamphlets, or written material from your doctor or pharmacy?:  Never SS - 03/23/2025 1451        CO-LEARNER #1     No question answered        CO-LEARNER #2     No question answered        SPECIAL TOPICS     No question answered        ANSWERED BY:     No question answered        Comments         Edit History       Corry Blue, RN - RN (Nurse)   03/23/2025 1451                          Social Drivers of Health     Tobacco Use: Low Risk  (3/23/2025)    Patient History     Smoking Tobacco Use: Never     Smokeless Tobacco Use: Never     Passive Exposure: Not on file   Alcohol Use: Not At Risk (3/24/2025)    AUDIT-C     Frequency of Alcohol Consumption: Never     Average Number of Drinks: Patient does not drink     Frequency of Binge Drinking: Never   Financial Resource Strain: Low Risk  (3/25/2025)    Overall Financial Resource Strain (CARDIA)     Difficulty of Paying Living Expenses: Not hard at all   Food Insecurity: No Food Insecurity (3/25/2025)    Hunger Vital Sign     Worried About Running Out of Food in the Last Year: Never true     Ran Out of Food in the Last Year: Never true   Recent Concern: Food Insecurity - Food Insecurity Present (3/23/2025)    Hunger Vital Sign     Worried About Running Out of Food in the Last Year: Often true     Ran Out of Food in the Last Year: Often true   Transportation Needs: No Transportation Needs (3/24/2025)    PRAPARE - Transportation     Lack of Transportation (Medical): No     Lack of Transportation (Non-Medical): No   Physical Activity: Inactive (3/24/2025)    Exercise Vital Sign     Days of Exercise per Week: 0 days     Minutes of Exercise per Session: 0 min   Stress: No Stress Concern Present (3/25/2025)    [employer] [place] [employer] - Occupational Stress Questionnaire     Feeling of Stress : Not at all   Recent Concern: Stress - Stress Concern Present (3/23/2025)     [facility] - Occupational Stress Questionnaire     Feeling of Stress : Rather much   Housing Stability: Low Risk  (3/25/2025)    Housing Stability Vital Sign     Unable to Pay for Housing in the Last Year: No     Number of Times Moved in the Last Year: Not on file     Homeless in the Last Year: No   Depression: Not on file   Utilities: Not At Risk (3/25/2025)    [facility] Utilities     Threatened with loss of utilities: No   Health Literacy: Adequate Health Literacy (3/25/2025)     Health Literacy     Frequency of need for help with medical instructions: Rarely   Recent Concern: Health Literacy - Inadequate Health Literacy (2/16/2025)     Health Literacy     Frequency of need for help with medical instructions: Always   Social Isolation: Socially Integrated (3/24/2025)    Social Isolation     Social Isolation: 1     Malnutrition  Is Patient Malnourished: Yes    Nutrition consulted. Most recent weight and BMI monitored-     Measurements:  Wt Readings from Last 1 Encounters:   03/27/25 79.8 kg (175 lb 14.8 oz)   Body mass index is 32.18 kg/m².    Patient has been screened and assessed by RD.    Malnutrition Type:  Context: chronic illness  Level: severe    Malnutrition Characteristic Summary:  Weight Loss (Malnutrition): greater than 10% in 6 months  Energy Intake (Malnutrition): less than or equal to 75% for greater than or equal to 1 month    Interventions/Recommendations (treatment strategy):  Dependence on PEG for feedings, continuous enteral feedings;  continue with current tube feed regimen     Nutrition Diagnosis  Malnutrition (Severe) related to Dysphagia/ difficulty swallowing as evidenced by >10% significant unintentional weight loss x 6 months, unhealed wounds, and consuming <75% estimated energy requirements for >1 month.   Comments: upon observation, no apparent muscle and fat wasting - pt still meets malnutrition criteria per ASPEN guidelines    Recent Labs   Lab  03/26/25  0441        Comments on Glucose: WNL    Nutrition Prescription / Recommendations  Recommendation/Intervention: continue with current tube feed regimen  Goals: continue to tolerate feeds at goal rate, weight maintenance during admission, improve skin integrity  Nutrition Goal Status: progressing towards goal  Current Diet Order: NPO  Nutrition Order Comments: Enteral Feeds  Chewing or Swallowing Difficulty?: Swallowing difficulty  Recommended Diet: Enteral Nutrition  Recommended Oral Supplement: Luis Fernando [90 kcals, 2.5g Protein, 10g Carbs(3g Sugar), 7g L-Arginine, 7g L-Glutamine, Vitamin C 300mg, 9.5mg Zinc] 2 times a day  Is Nutrition Support Recommended: Yes  Is Nutrition Education Recommended: No    Needs Calculated    Energy Calorie Requirements (kcal): 1,012 kcal (23 kcal/kg adjusted IBW for quadriplegia) (decreased needs 2/2 decreased metabolic activity due to denervated muscle)  Protein Requirements: 53 - 66 g (1.2 - 1.5 g/kg adjusted IBW for quadriplegia) (increased protein requirements 2/2 sacral wound and prevention of muscle atrophy)  Enteral Nutrition   Enteral Nutrition Formula Provides:  1,260 kcals Propofol Rate: No (1,080 kcal Isosource + 180 Luis Fernando)  54 g Protein (49 g Isosource + 5 Luis Fernando)  121 g Carbohydrates  47 g Fat Propofol Rate: No  560 ml Fluid without Flush    960 ml Fluid by flush   1,520 ml Total Fluid  Enteral Nutrition Recommended Order:  Tube feeding via PEG/ Gastrostomy  Tube feeding formula: Isosource 1.5 PEG/ Gastrostomy  Free Water Flush: 40 ml hourly  Modular Supplements: Luis Fernando BID   Enteral Nutrition meets needs?: yes  Enteral Nutrition Status: New Order    Monitor and Evaluation  % current Intake: NPO  % intake to meet estimated needs: Enteral Nutrition   Monitor and Evaluation: Enteral and parenteral nutrition administration, Weight, Electrolyte and renal panel, Gastrointestinal profile, Glucose/endocrine profile, Inflammatory profile, Lipid profile, Nutrition  focused physical findings, Skin    Current Medical Diagnosis and Past Medical History     Past Medical History:   Diagnosis Date    GERD (gastroesophageal reflux disease)     Paraplegia      Nutrition/Diet History  Food Allergies: NKFA  Factors Affecting Nutritional Intake: difficulty/impaired swallowing    Lab/Procedures/Meds  Recent Labs   Lab 03/26/25  0441      K 4.6   BUN 9*   CREATININE 0.43*   CALCIUM 8.5      Note: BUN, Cr low. Possibly related to malnutrition, reduced muscle mass 2/2 atrophy    Last A1c:   Lab Results   Component Value Date    HGBA1C 5.5 03/01/2025   Note: WNL    Lab Results   Component Value Date    RBC 3.39 (L) 03/26/2025    HGB 8.7 (L) 03/26/2025    HCT 30.7 (L) 03/26/2025    MCV 90.6 03/26/2025    MCH 25.7 (L) 03/26/2025    MCHC 28.3 (L) 03/26/2025   Note: H/H low. PMHx of anemia    Pertinent Labs Reviewed: reviewed  Pertinent Medications Reviewed: reviewed    Scheduled Meds:   acetylcysteine 200 mg/ml (20%)  2 mL Nebulization TID    atorvastatin  20 mg Oral QHS    enoxparin  40 mg Subcutaneous Daily    levalbuterol  1.25 mg Nebulization TID    midodrine  10 mg Oral TID    mupirocin   Nasal BID    pantoprazole  40 mg Intravenous Daily    pregabalin  75 mg Oral BID    sertraline  50 mg Oral Daily    vancomycin (VANCOCIN) IV (PEDS and ADULTS)  1,500 mg Intravenous Q18H   Note: atorvastatin (HLD), pantoprazole    Continuous Infusions:      PRN Meds:.  Current Facility-Administered Medications:     acetaminophen, 1,000 mg, Oral, Q8H PRN    acetaminophen, 650 mg, Oral, Q8H PRN    acetaminophen, 650 mg, Oral, Q4H PRN    aluminum-magnesium hydroxide-simethicone, 30 mL, Oral, QID PRN    dextrose 50%, 12.5 g, Intravenous, PRN    dextrose 50%, 12.5 g, Intravenous, PRN    dextrose 50%, 25 g, Intravenous, PRN    glucagon (human recombinant), 1 mg, Intramuscular, PRN    glucose, 16 g, Oral, PRN    glucose, 24 g, Oral, PRN    HYDROcodone-acetaminophen, 1 tablet, Oral, Q6H PRN     "HYDROmorphone, 1 mg, Intravenous, Q6H PRN    magnesium oxide, 800 mg, Oral, PRN    magnesium oxide, 800 mg, Oral, PRN    melatonin, 6 mg, Oral, Nightly PRN    naloxone, 0.02 mg, Intravenous, PRN    ondansetron, 4 mg, Intravenous, Q8H PRN    polyethylene glycol, 17 g, Oral, Daily PRN    potassium bicarbonate, 35 mEq, Oral, PRN    potassium bicarbonate, 50 mEq, Oral, PRN    potassium bicarbonate, 60 mEq, Oral, PRN    potassium, sodium phosphates, 2 packet, Oral, PRN    potassium, sodium phosphates, 2 packet, Oral, PRN    potassium, sodium phosphates, 2 packet, Oral, PRN    sodium chloride 0.9%, 10 mL, Intravenous, PRN    traZODone, 100 mg, Oral, Nightly PRN    Pharmacy to dose Vancomycin consult, , , Once **AND** vancomycin - pharmacy to dose, , Intravenous, pharmacy to manage frequency  Note: magnesium oxide, ondansetron, polyethylene glycol, potassium bicarb    Anthropometrics  Height: 5' 2" (157.5 cm)  Height (inches): 62 in  Height Method: Stated  Weight: 79.8 kg (175 lb 14.8 oz)  Weight (lb): 175.93 lb  Weight Method: Bed Scale  Ideal Body Weight (IBW), Female: 110 lb  BMI (Calculated): 32.2  Tetraplegia (Quadriplegia) Ideal Body Weight (IBW) Adjustment: 97 lb    Estimated/Assessed Needs  RMR ([place]-St. Jeor Equation): 1316.25     Temp: 99.1 °F (37.3 °C)Oral  Weight Used For Calorie Calculations: 44 kg (97 lb)     Energy Calorie Requirements (kcal): 1,012 kcal (23 kcal/kg adjusted IBW for quadriplegia) (decreased needs 2/2 decreased metabolic activity due to denervated muscle)  Weight Used For Protein Calculations: 44 kg (97 lb)  Protein Requirements: 53 - 66 g (1.2 - 1.5 g/kg adjusted IBW for quadriplegia) (increased protein requirements 2/2 sacral wound and prevention of muscle atrophy)       Fluids: 30 - 40 mL/kg normal requirements for patient with quadriplegia   Fluid Requirements: 1, 540 mL (35 mL/kg adjusted IBW for quadriplegia)       Nutrition by Nursing  Diet/Nutrition Received: tube feeding  Intake " (%):  (NPO)     Diet/Feeding Tolerance: other (see comments)       Gastrostomy/Enterostomy LUQ-Feeding Type: continuous       Gastrostomy/Enterostomy LUQ-Current Rate (mL/hr): 30 mL/hr       Gastrostomy/Enterostomy LUQ-Goal Rate (mL/hr): 30 mL/hr       Gastrostomy/Enterostomy LUQ-Formula Name: Isosource    Nutrition Follow-Up  RD Follow-up?: Yes    Nutrition Discharge Planning: Enteral nutrition (comments)       Nidhi Britt MS, RD, LD  Available via Secure Chat

## 2025-03-27 NOTE — PROGRESS NOTES
[facility] - [address] [place] [address] Medicine  Progress Note    Patient Name: Karie Denney  MRN: [id number]  Patient Class: IP- Inpatient   Admission Date: 3/23/2025  Length of Stay: 4 days  Attending Physician: Jordana Cavazos MD  Primary Care Provider: Gonzalo Barrera NP        Subjective     Principal Problem:Acute on chronic respiratory failure with hypoxia        HPI:  Chief Complaint  Transfer for continued management of respiratory failure, tracheostomy care, PEG feeding, and complications of quadriplegia following prolonged hospitalization.    History of Present Illness  Ms. Karie Denney is a 59-year-old woman with a complex medical history including quadriplegia following spinal surgery in 2015 and a cerebrovascular accident (CVA) in 2024 resulting in left-sided paralysis. She was transferred to [facility] from [facility] in [place], [place], for ongoing care following a prolonged ICU course involving intubation, respiratory failure, and significant complications.  She was initially hospitalized on February 15, 2025, for aspiration and dysphagia evaluation, during which she developed aspiration pneumonia and respiratory failure requiring intubation. Her hospital course was complicated by an ESBL E. coli UTI, pericardial effusion (treated with colchicine), and a spontaneous right thigh hematoma concerning for compartment syndrome, leading to transfer to [facility]. She required prolonged mechanical ventilation, tracheostomy placement, and PEG tube insertion.  She has now returned to [facility] for continued respiratory care, tracheostomy management, PEG feeding, management of sacral pressure injury, and rehabilitation planning.    Past Medical History  Quadriplegia post-spinal surgery (2015)  CVA (2024)  Aspiration pneumonia  Acute respiratory failure  UTI (ESBL E. coli)  Depression  GERD  Pharyngoesophageal dysphagia  Pericardial effusion/pericarditis  Chronic  constipation  Pressure ulcer (sacral, unstageable)  Hyperlipidemia  Anemia    Past Surgical History  Spinal surgery (2015)  PEG tube placement (03/11/2025)  Esophageal dilation with biopsy (08/2024)    Medications  Active Medications:  Atorvastatin 20 mg daily  Omeprazole 40 mg daily  Sertraline 50 mg daily  Trazodone 100 mg qHS  Pregabalin 75 mg BID  Hydrocodone-acetaminophen 5-325 mg BID  Lactulose 30 mL daily via PEG  Midodrine 10 mg Q6H via PEG  Levalbuterol 0.63 mg nebulizer Q6H  Polyethylene glycol 17 g daily via PEG  Recent Changes:  Colchicine: Discontinued due to bleeding  Aspirin and ezetimibe: Discontinued    Allergies  Penicillins ? Rash and anaphylaxis    Social History  Never smoker  No alcohol or tobacco use  Lives at home with mother; receives home health weekly  Bedbound, non-ambulatory    Family History  Noncontributory    Review of Systems  Limited due to tracheostomy, quadriplegia, and communication challenges. History obtained from EMR and caregiver.    Physical Examination  General: Chronically ill-appearing woman, alert, tracheostomy in place with T-piece  HEENT: Mild nasal skin necrosis, mucous membranes moist  Eyes: PERRL, EOMI  Neck: Tracheostomy present  CV: RRR, no murmurs  Lungs: Breath sounds diminished at bases; no acute distress; no wheezing  Abdomen: Soft, non-tender, PEG tube in place  Skin: Unstageable sacral ulcer, nasal pressure ulcer  Neuro: Quadriplegia, responsive with eye tracking and blinking  Extremities: RLE hematoma, bilateral edema, no signs of active bleeding    Laboratory Data (Most Recent)  Hgb: 8.5 g/dL  Creatinine: 0.30 mg/dL  Albumin: 3.1 g/dL  WBC: 9.4 K/uL  Ferritin: 265 ng/mL  INR: 0.93  No growth on recent blood and urine cultures    Imaging  CT angio: Large RLE hematoma without active extravasation  Multiple chest X-rays: Stable bilateral pleural effusions, improving atelectasis  Echo: EF 55-60%, small pericardial effusion      Overview/Hospital  Course:  3/26  - continues on vanc for blood cultures + on 3/24, echo completed at time with no endocarditis seen  - discussed with pulmonary, recs to follow    3/27  - due to overall baseline health will treat coag negative staph bacteremia as a true infection, for now continue vanc and follow cultures and sensitivities  - discussed with pulmonology who plan to continue trach collar as is and see patient May 1st at 1:40 pm, confirmed appointment  - discussed with family at bedside who are very concerned about secretions and states they don't have the suction capabilities at home and do not feel comfortable going home with her in this state, will discuss with social on possible home equipement    Interval History:     Review of Systems   Unable to perform ROS: Patient nonverbal     Objective:     Vital Signs (Most Recent):  Temp: 99.1 °F (37.3 °C) (03/27/25 0723)  Pulse: (!) 115 (03/27/25 0749)  Resp: (!) 22 (03/27/25 0749)  BP: (!) 101/55 (03/27/25 0723)  SpO2: 95 % (03/27/25 0749) Vital Signs (24h Range):  Temp:  [97.8 °F (36.6 °C)-99.1 °F (37.3 °C)] 99.1 °F (37.3 °C)  Pulse:  [] 115  Resp:  [18-22] 22  SpO2:  [93 %-98 %] 95 %  BP: ()/(52-82) 101/55     Weight: 79.8 kg (175 lb 14.8 oz)  Body mass index is 32.18 kg/m².    Intake/Output Summary (Last 24 hours) at 3/27/2025 1038  Last data filed at 3/26/2025 1938  Gross per 24 hour   Intake 40 ml   Output --   Net 40 ml         Physical Exam  Constitutional:       Appearance: She is ill-appearing.   HENT:      Head: Normocephalic and atraumatic.      Nose: Nose normal.      Mouth/Throat:      Mouth: Mucous membranes are moist.      Pharynx: Oropharynx is clear.   Eyes:      Extraocular Movements: Extraocular movements intact.      Conjunctiva/sclera: Conjunctivae normal.      Pupils: Pupils are equal, round, and reactive to light.   Cardiovascular:      Rate and Rhythm: Regular rhythm. Tachycardia present.      Pulses: Normal pulses.      Heart sounds:  Normal heart sounds.   Pulmonary:      Effort: Pulmonary effort is normal.      Comments: Secretions  Abdominal:      General: Abdomen is flat. Bowel sounds are normal.      Palpations: Abdomen is soft.   Musculoskeletal:      Cervical back: Normal range of motion and neck supple.   Skin:     General: Skin is warm and dry.   Neurological:      Mental Status: She is alert.      Comments: Quadriplegia    Psychiatric:         Mood and Affect: Mood normal.         Behavior: Behavior normal.               Significant Labs: All pertinent labs within the past 24 hours have been reviewed.    Significant Imaging: I have reviewed all pertinent imaging results/findings within the past 24 hours.      Assessment & Plan  Acute on chronic respiratory failure with hypoxia  Continue tracheostomy care with T-piece trials  Tracheostomy suctioning ordered due to thick secretions.   Culture tracheostomy secretions.   Monitor oxygenation and readiness for weaning  Pulmonary consult tomorrow.     3/24: mucus plugging requiring frequent suctioning.  Chest physiotherapy, Mucomyst.  Additional recommendations per pulmonology.     3/25: requiring frequent suctioning but otherwise stable.     3/26  - continues on vanc for blood cultures + on 3/24, echo completed at time with no endocarditis seen  - discussed with pulmonary, recs to follow    3/27  - discussed with pulmonology who plan to continue trach collar as is and see patient May 1st at 1:40 pm, confirmed appointment  - discussed with family at bedside who are very concerned about secretions and states they don't have the suction capabilities at home and do not feel comfortable going home with her in this state, will discuss with social on possible home equipement  Severe protein-calorie malnutrition  Nutrition consulted. Most recent weight and BMI monitored-     Measurements:  Wt Readings from Last 1 Encounters:   03/27/25 79.8 kg (175 lb 14.8 oz)   Body mass index is 32.18  kg/m².    Patient has been screened and assessed by RD.    Malnutrition Type:  Context: chronic illness  Level: severe    Continue PEG tube feeding.  Nepro at 40 mL/hr  Maintain bowel regimen (lactulose, PEG, senna)  Dietitian to reassess  continue with current tube feed regimen    Dysphagia  Continue omeprazole  Avoid PO intake  Speech therapy if clinically appropriate    Aspiration pneumonia  Recurrent aspiration pneumonia.  Complete two rounds of antibiotics.    Pressure ulcers of skin of multiple topographic sites  Aquacel Ag + Mepilex dressings  Wound care team consulted    Continue offloading and specialty mattress    Depression  Continue pregabalin, sertraline, trazodone      Mucus plugging of bronchi      S/P percutaneous endoscopic gastrostomy (PEG) tube placement  Patient noted to have a percutaneous endoscopic gastrostomy tube in place. I have personally inspected the tube.Tube was placed prior to this admission There are no signs of drainage or infection around the site. The tube is patent. Medications have converted to liquid form if available.  Routine care to be done by wound care and nursing staff.       Quadriplegia  Maximal support, bedbound  PT/OT as tolerated  DME planning for discharge    Hematoma of lower extremity, right, subsequent encounter  No active bleeding; conservative management  Monitor H/H  No anticoagulation  Hyperlipidemia  Continue atorvastatin    Pericardial effusion  Previously on colchicine, now discontinued due to bleeding  Continue low-dose prednisone  Monitor for recurrence    Acute respiratory failure with hypoxia and hypercarbia  Patient with Hypoxic Respiratory failure which is Acute on chronic.  she is not on home oxygen. Supplemental oxygen was provided and noted- Oxygen Concentration (%):  [35] 35    .   Signs/symptoms of respiratory failure include- tachypnea and respiratory distress. Contributing diagnoses includes - Aspiration and Pneumonia Labs and images were  "reviewed. Patient Has not had a recent ABG. Will treat underlying causes and adjust management of respiratory failure as follows-   Chronic respiratory failure with hypoxia  Patient with Hypoxic Respiratory failure which is Acute on chronic.  she is not on home oxygen. Supplemental oxygen was provided and noted- Oxygen Concentration (%):  [35] 35    .   Signs/symptoms of respiratory failure include- increased work of breathing and respiratory distress. Contributing diagnoses includes - Aspiration and Pneumonia Labs and images were reviewed. Patient Has not had a recent ABG. Will treat underlying causes and adjust management of respiratory failure as follows-   Coag negative Staphylococcus bacteremia  Continue with current antibiotics until sensitivities return.      Antibiotics (From admission, onward)      Start     Stop Route Frequency Ordered    03/25/25 0600  vancomycin (VANCOCIN) 1,500 mg in 0.9% NaCl 250 mL IVPB         -- IV Every 18 hours 03/25/25 0435    03/25/25 0519  vancomycin - pharmacy to dose  (vancomycin IVPB (PEDS and ADULTS))        Placed in "And" Linked Group    -- IV pharmacy to manage frequency 03/25/25 0419    03/23/25 2100  mupirocin 2 % ointment         03/28/25 2059 Nasl 2 times daily 03/23/25 1526          3/26  - continues on vanc for blood cultures + on 3/24, echo completed at time with no endocarditis seen  - discussed with pulmonary, recs to follow    3/27  - due to overall baseline health will treat coag negative staph bacteremia as a true infection, for now continue vanc and follow cultures and sensitivities    VTE Risk Mitigation (From admission, onward)           Ordered     enoxaparin injection 40 mg  Daily         03/23/25 1940     IP VTE HIGH RISK PATIENT  Once         03/23/25 1940     Place sequential compression device  Until discontinued         03/23/25 1941                    Discharge Planning   SHRADDHA:      Code Status: Full Code   Medical Readiness for Discharge Date: "   Discharge Plan A: Long-term acute care facility (LTAC)                        Jordana Cavazos MD  Department of Hospital Medicine   [facility] - [facility]

## 2025-03-27 NOTE — PROGRESS NOTES
[facility]  Wound Care    Patient Name:  Karie Denney   MRN:  [id number]  Date: 3/27/2025  Diagnosis: Acute on chronic respiratory failure with hypoxia    History:     Past Medical History:   Diagnosis Date    GERD (gastroesophageal reflux disease)     Paraplegia        Social History[1]    Precautions:     Allergies as of 03/22/2025 - Reviewed 02/26/2025   Allergen Reaction Noted    Penicillins Anaphylaxis 08/25/2022       WOC Assessment Details/Treatment        03/27/25 1027   WOCN Assessment   WOCN Total Time (mins) 60   Visit Date 03/27/25   Visit Time 1015   Consult Type Follow Up   WOCN Speciality Wound   Wound pressure;other   Number of Wounds 3   Continence Type Urinary;Fecal   Intervention chart review;applied;orders   Teaching on-going   Skin Interventions   Device Skin Pressure Protection absorbent pad utilized/changed;adhesive use limited;pressure points protected;positioning supports utilized   Pressure Reduction Devices positioning supports utilized;pressure-redistributing mattress utilized   Pressure Reduction Techniques weight shift assistance provided   Positioning   Body Position turned;right   Head of Bed (HOB) Positioning HOB elevated   Positioning/Transfer Devices pillows;in use   Pressure Injury Prevention    Check Moisture Management Pad Done   Sacral Foam Dressing Peel back sacral foam dressing, assess skin and reapply   Heel protection technique Foam dressing  (Heel Boot)   [REMOVED]      Wound 03/23/25 1540 Traumatic Nose   Final Assessment Date: 03/27/25  Date First Assessed/Time First Assessed: 03/23/25 1540   Present on Original Admission: (c) No  Primary Wound Type: Traumatic  Location: Nose  Removal Indication and Assessment: closed/resurfaced  Wound Outcome: Healed   Wound Image    Dressing Appearance Open to air   Drainage Amount None   Appearance Closed/resurfaced        Wound 03/23/25 1541 Pressure Injury Left anterior Foot #2   Date First  Assessed/Time First Assessed: 03/23/25 1541   Present on Original Admission: Yes  Primary Wound Type: Pressure Injury  Side: Left  Orientation: anterior  Location: Foot  Wound Number: #2  Is this injury device related?: No   Wound Image    Pressure Injury Stage U   Dressing Appearance Open to air   Drainage Amount None   Appearance Red;Maroon;Dry   Tissue loss description Not applicable   Periwound Area Dry   Wound Edges Undefined   Wound Length (cm) 0.3 cm   Wound Width (cm) 0.2 cm   Wound Depth (cm) 0 cm   Wound Volume (cm^3) 0 cm^3   Wound Surface Area (cm^2) 0.05 cm^2   Care Applied:;Povidone iodine        Wound 03/23/25 1542 Pressure Injury Left Buttocks #3   Date First Assessed/Time First Assessed: 03/23/25 1542   Present on Original Admission: Yes  Primary Wound Type: Pressure Injury  Side: Left  Location: Buttocks  Wound Number: #3   Wound Image    Pressure Injury Stage U   Dressing Appearance Intact;Dried drainage   Drainage Amount Scant   Drainage Characteristics/Odor Serosanguineous;Tan   Appearance Pink;Red;Black;Tan;Eschar;Moist   Tissue loss description Full thickness   Periwound Area Dry   Wound Edges Irregular;Undefined   Wound Length (cm) 5 cm   Wound Width (cm) 4 cm   Wound Surface Area (cm^2) 15.71 cm^2   Care Cleansed with:;Antimicrobial agent;Wound cleanser   Dressing Changed;Silver;Hydrofiber;Silicone;Island/border;Foam   Periwound Care Absorptive dressing applied;Cleansed with pH balanced cleanser;Dry periwound area maintained     WOC Team consulted for:  Left buttocks and Left 2nd toe    Narrative:  Patient alert not verbal but can mouth words. NICOLASA Clayton and student nurse in room to assist with turning patient. Patient tolerated wound care well with no complaints.     Active Wounds and Recommendations: Continue POC    Goals for Wound Healing: Manage drainage, Moisture management, Apply antimicrobial, Reduce bioburden, and Educate on proper wound management post D/C     Barriers to Wound  Healing: multiple co-morbidities poor vascular supply decreased granulation tissue necrosis fragile skin no protective sensation    Orders placed.    Thank you for the consult.     We will continue to follow. See additional note under Notes Tab for tentative f/u plan/dates.    03/27/2025       [1]   Social History  Socioeconomic History    Marital status: Single   Tobacco Use    Smoking status: Never    Smokeless tobacco: Never   Substance and Sexual Activity    Alcohol use: Never    Drug use: Never    Sexual activity: Not Currently     Social Drivers of Health     Financial Resource Strain: Low Risk  (3/25/2025)    Overall Financial Resource Strain (CARDIA)     Difficulty of Paying Living Expenses: Not hard at all   Food Insecurity: No Food Insecurity (3/25/2025)    Hunger Vital Sign     Worried About Running Out of Food in the Last Year: Never true     Ran Out of Food in the Last Year: Never true   Recent Concern: Food Insecurity - Food Insecurity Present (3/23/2025)    Hunger Vital Sign     Worried About Running Out of Food in the Last Year: Often true     Ran Out of Food in the Last Year: Often true   Transportation Needs: No Transportation Needs (3/24/2025)    PRAPARE - Transportation     Lack of Transportation (Medical): No     Lack of Transportation (Non-Medical): No   Physical Activity: Inactive (3/24/2025)    Exercise Vital Sign     Days of Exercise per Week: 0 days     Minutes of Exercise per Session: 0 min   Stress: No Stress Concern Present (3/25/2025)    [employer] - Occupational Stress Questionnaire     Feeling of Stress : Not at all   Recent Concern: Stress - Stress Concern Present (3/23/2025)    [employer] - Occupational Stress Questionnaire     Feeling of Stress : Rather much   Housing Stability: Low Risk  (3/25/2025)    Housing Stability Vital Sign     Unable to Pay for Housing in the Last Year: No     Homeless in the Last Year: No

## 2025-03-27 NOTE — PROGRESS NOTES
03/27/25 1211   Wound Care Follow Up   Wound Care Follow-up? Yes   Wound Care- Next Tentative Visit Date 04/04/25   Follow Up Plan Hellen POC

## 2025-03-27 NOTE — PROGRESS NOTES
Pharmacokinetic Assessment Follow Up: IV Vancomycin    Vancomycin serum concentration assessment(s):    The trough level was drawn correctly and can be used to guide therapy at this time. The measurement is above the desired definitive target range of 15 to 20 mcg/mL.    Vancomycin Regimen Plan:    Change regimen to Vancomycin 1500 mg IV every 24 hours with next serum trough concentration measured at 2230 prior to 4th dose on 3/30    Drug levels (last 3 results):  Recent Labs   Lab Result Units 03/27/25  1126   Vancomycin, Trough µg/mL 22.0*       Pharmacy will continue to follow and monitor vancomycin.    Please contact pharmacy at extension [phone] for questions regarding this assessment.    Patient brief summary:  Karie Denney is a 59 y.o. female initiated on antimicrobial therapy with IV Vancomycin for treatment of bacteremia    The patient's current regimen is vanc 1500 mg IV q24h    Drug Allergies:   Review of patient's allergies indicates:   Allergen Reactions    Penicillins Anaphylaxis       Actual Body Weight:   79.8 kg    Renal Function:   Estimated Creatinine Clearance: 134.5 mL/min (A) (based on SCr of 0.43 mg/dL (L)).,     Dialysis Method (if applicable):  N/A    CBC (last 72 hours):  Recent Labs   Lab Result Units 03/25/25  0452 03/26/25  0441   WBC K/uL 8.09 8.43   Hemoglobin g/dL 7.8* 8.7*   Hematocrit % 28.2* 30.7*   Platelet Count K/uL 200 216   Lymphocytes % % 27.4 26.7*   Monocytes % % 6.6* 6.9*   Eosinophils % % 3.6 3.7   Basophils % % 0.5 0.6   Diff Type  Auto Auto       Metabolic Panel (last 72 hours):  Recent Labs   Lab Result Units 03/25/25  0451 03/26/25  0441   Sodium mmol/L 143 140   Potassium mmol/L 3.0* 4.6   Chloride mmol/L 105 104   CO2 mmol/L 31 29   Glucose mg/dL 105 107   BUN mg/dL 13 9*   Creatinine mg/dL 0.43* 0.43*   Magnesium mg/dL 2.1 2.0       Vancomycin Administrations:  vancomycin given in the last 96 hours                     vancomycin (VANCOCIN) 1,500 mg in 0.9% NaCl  250 mL IVPB (mg) 1,500 mg New Bag 03/26/25 1743     1,500 mg New Bag  0013     1,500 mg New Bag 03/25/25 0537                    Microbiologic Results:  Microbiology Results (last 7 days)       Procedure Component Value Units Date/Time    Blood culture (site 2) [[id number]] Collected: 03/24/25 0902    Order Status: Completed Specimen: Blood Updated: 03/27/25 0619     Culture, Blood No Growth At 48 Hours    Blood culture (site 1) [[id number]]  (Abnormal) Collected: 03/24/25 0910    Order Status: Completed Specimen: Blood Updated: 03/26/25 0759     Culture, Blood Coagulase-negative Staphylococcus species     Comment: Identification and Susceptibility to Follow        Gram Stain Result Gram positive cocci     Comment: From Aerobic Bottle       Verigene Gram-positive Blood Culture Test [[id number]]  (Abnormal) Collected: 03/24/25 0910    Order Status: Completed Specimen: Blood Updated: 03/25/25 0655     Verigene Result Coagulase-negative Staphylococcus, other than Staph epidermidis and Staph lugdunensis    Culture, Lower Respiratory [[id number]]     Order Status: Sent Specimen: Respiratory from Tracheal Aspirate     Culture, Lower Respiratory [[id number]]     Order Status: Sent Specimen: Respiratory from Tracheal Aspirate

## 2025-03-27 NOTE — ASSESSMENT & PLAN NOTE
Patient with Hypoxic Respiratory failure which is Acute on chronic.  she is not on home oxygen. Supplemental oxygen was provided and noted- Oxygen Concentration (%):  [35] 35    .   Signs/symptoms of respiratory failure include- increased work of breathing and respiratory distress. Contributing diagnoses includes - Aspiration and Pneumonia Labs and images were reviewed. Patient Has not had a recent ABG. Will treat underlying causes and adjust management of respiratory failure as follows-

## 2025-03-27 NOTE — SUBJECTIVE & OBJECTIVE
Interval History:     Review of Systems   Unable to perform ROS: Patient nonverbal     Objective:     Vital Signs (Most Recent):  Temp: 99.1 °F (37.3 °C) (03/27/25 0723)  Pulse: (!) 115 (03/27/25 0749)  Resp: (!) 22 (03/27/25 0749)  BP: (!) 101/55 (03/27/25 0723)  SpO2: 95 % (03/27/25 0749) Vital Signs (24h Range):  Temp:  [97.8 °F (36.6 °C)-99.1 °F (37.3 °C)] 99.1 °F (37.3 °C)  Pulse:  [] 115  Resp:  [18-22] 22  SpO2:  [93 %-98 %] 95 %  BP: ()/(52-82) 101/55     Weight: 79.8 kg (175 lb 14.8 oz)  Body mass index is 32.18 kg/m².    Intake/Output Summary (Last 24 hours) at 3/27/2025 1038  Last data filed at 3/26/2025 1938  Gross per 24 hour   Intake 40 ml   Output --   Net 40 ml         Physical Exam  Constitutional:       Appearance: She is ill-appearing.   HENT:      Head: Normocephalic and atraumatic.      Nose: Nose normal.      Mouth/Throat:      Mouth: Mucous membranes are moist.      Pharynx: Oropharynx is clear.   Eyes:      Extraocular Movements: Extraocular movements intact.      Conjunctiva/sclera: Conjunctivae normal.      Pupils: Pupils are equal, round, and reactive to light.   Cardiovascular:      Rate and Rhythm: Regular rhythm. Tachycardia present.      Pulses: Normal pulses.      Heart sounds: Normal heart sounds.   Pulmonary:      Effort: Pulmonary effort is normal.      Comments: Secretions  Abdominal:      General: Abdomen is flat. Bowel sounds are normal.      Palpations: Abdomen is soft.   Musculoskeletal:      Cervical back: Normal range of motion and neck supple.   Skin:     General: Skin is warm and dry.   Neurological:      Mental Status: She is alert.      Comments: Quadriplegia    Psychiatric:         Mood and Affect: Mood normal.         Behavior: Behavior normal.               Significant Labs: All pertinent labs within the past 24 hours have been reviewed.    Significant Imaging: I have reviewed all pertinent imaging results/findings within the past 24 hours.

## 2025-03-27 NOTE — PHYSICIAN QUERY
Please clarify  the respiratory diagnosis:    Acute and (on) Chronic Respiratory Failure with Hypoxia

## 2025-03-27 NOTE — ASSESSMENT & PLAN NOTE
"Continue with current antibiotics until sensitivities return.      Antibiotics (From admission, onward)      Start     Stop Route Frequency Ordered    03/25/25 0600  vancomycin (VANCOCIN) 1,500 mg in 0.9% NaCl 250 mL IVPB         -- IV Every 18 hours 03/25/25 0435    03/25/25 0519  vancomycin - pharmacy to dose  (vancomycin IVPB (PEDS and ADULTS))        Placed in "And" Linked Group    -- IV pharmacy to manage frequency 03/25/25 0419    03/23/25 2100  mupirocin 2 % ointment         03/28/25 2059 Nasl 2 times daily 03/23/25 1526          3/26  - continues on vanc for blood cultures + on 3/24, echo completed at time with no endocarditis seen  - discussed with pulmonaryfabians to follow    3/27  - due to overall baseline health will treat coag negative staph bacteremia as a true infection, for now continue vanc and follow cultures and sensitivities    "

## 2025-03-27 NOTE — PLAN OF CARE
Problem: Adult Inpatient Plan of Care  Goal: Plan of Care Review  Outcome: Progressing  Goal: Patient-Specific Goal (Individualized)  Outcome: Progressing  Goal: Absence of Hospital-Acquired Illness or Injury  Outcome: Progressing  Goal: Optimal Comfort and Wellbeing  Outcome: Progressing     Problem: Wound  Goal: Skin Health and Integrity  Outcome: Progressing     Problem: Skin Injury Risk Increased  Goal: Skin Health and Integrity  Outcome: Progressing     Problem: Gas Exchange Impaired  Goal: Optimal Gas Exchange  Outcome: Progressing

## 2025-03-27 NOTE — ASSESSMENT & PLAN NOTE
Patient with Hypoxic Respiratory failure which is Acute on chronic.  she is not on home oxygen. Supplemental oxygen was provided and noted- Oxygen Concentration (%):  [35] 35    .   Signs/symptoms of respiratory failure include- tachypnea and respiratory distress. Contributing diagnoses includes - Aspiration and Pneumonia Labs and images were reviewed. Patient Has not had a recent ABG. Will treat underlying causes and adjust management of respiratory failure as follows-

## 2025-03-27 NOTE — PLAN OF CARE
Problem: Adult Inpatient Plan of Care  Goal: Plan of Care Review  3/26/2025 1944 by Andreina Lozada RN  Outcome: Progressing  3/26/2025 1944 by Andreina Lozada RN  Outcome: Progressing  Goal: Patient-Specific Goal (Individualized)  3/26/2025 1944 by Andreina Lozada RN  Outcome: Progressing  3/26/2025 1944 by Andreina Lozada RN  Outcome: Progressing  Goal: Absence of Hospital-Acquired Illness or Injury  3/26/2025 1944 by Andreina Lozada RN  Outcome: Progressing  3/26/2025 1944 by Andreina Lozada RN  Outcome: Progressing  Goal: Optimal Comfort and Wellbeing  3/26/2025 1944 by Andreina Lozada RN  Outcome: Progressing  3/26/2025 1944 by Andreina Lozada RN  Outcome: Progressing  Goal: Readiness for Transition of Care  3/26/2025 1944 by Andriena Lozada RN  Outcome: Progressing  3/26/2025 1944 by Andreina Lozada RN  Outcome: Progressing     Problem: Infection  Goal: Absence of Infection Signs and Symptoms  3/26/2025 1944 by Andreina Lozada RN  Outcome: Progressing  3/26/2025 1944 by Andreina Lozada RN  Outcome: Progressing     Problem: Wound  Goal: Optimal Coping  3/26/2025 1944 by Andreina Lozada RN  Outcome: Progressing  3/26/2025 1944 by Andreina Lozada RN  Outcome: Progressing  Goal: Optimal Functional Ability  3/26/2025 1944 by Andreina Lozada RN  Outcome: Progressing  3/26/2025 1944 by Andreina Lozada RN  Outcome: Progressing  Goal: Absence of Infection Signs and Symptoms  3/26/2025 1944 by Andreina Lozada RN  Outcome: Progressing  3/26/2025 1944 by Andreina Lozada RN  Outcome: Progressing  Goal: Improved Oral Intake  3/26/2025 1944 by Andreina Lozada RN  Outcome: Progressing  3/26/2025 1944 by Andreina Lozada RN  Outcome: Progressing  Goal: Optimal Pain Control and Function  3/26/2025 1944 by Andreina Lozada, RN  Outcome: Progressing  3/26/2025 1944 by Andreina Lozada, RN  Outcome: Progressing  Goal: Skin Health and Integrity  3/26/2025 1944 by  Andreina Lozada RN  Outcome: Progressing  3/26/2025 1944 by Andreina Lozada RN  Outcome: Progressing  Goal: Optimal Wound Healing  3/26/2025 1944 by Andreina Lozada RN  Outcome: Progressing  3/26/2025 1944 by Andreina Lozada RN  Outcome: Progressing     Problem: Skin Injury Risk Increased  Goal: Skin Health and Integrity  Outcome: Progressing     Problem: Gas Exchange Impaired  Goal: Optimal Gas Exchange  Outcome: Progressing

## 2025-03-28 LAB
BACTERIA BLD CULT: ABNORMAL
BUN SERPL-MCNC: 13 MG/DL (ref 10–20)
BUN/CREAT SERPL: 22 (ref 6–20)
CREAT SERPL-MCNC: 0.59 MG/DL (ref 0.55–1.02)
EGFR (NO RACE VARIABLE) (RUSH/TITUS): 103 ML/MIN/1.73M2
GRAM STN SPEC: ABNORMAL

## 2025-03-28 PROCEDURE — 27000221 HC OXYGEN, UP TO 24 HOURS

## 2025-03-28 PROCEDURE — 25000003 PHARM REV CODE 250: Performed by: HOSPITALIST

## 2025-03-28 PROCEDURE — 94761 N-INVAS EAR/PLS OXIMETRY MLT: CPT

## 2025-03-28 PROCEDURE — 99900035 HC TECH TIME PER 15 MIN (STAT)

## 2025-03-28 PROCEDURE — 94640 AIRWAY INHALATION TREATMENT: CPT

## 2025-03-28 PROCEDURE — 94668 MNPJ CHEST WALL SBSQ: CPT

## 2025-03-28 PROCEDURE — 99232 SBSQ HOSP IP/OBS MODERATE 35: CPT | Mod: ,,,

## 2025-03-28 PROCEDURE — 25000242 PHARM REV CODE 250 ALT 637 W/ HCPCS: Performed by: INTERNAL MEDICINE

## 2025-03-28 PROCEDURE — 25000003 PHARM REV CODE 250

## 2025-03-28 PROCEDURE — 25000242 PHARM REV CODE 250 ALT 637 W/ HCPCS: Performed by: STUDENT IN AN ORGANIZED HEALTH CARE EDUCATION/TRAINING PROGRAM

## 2025-03-28 PROCEDURE — 36415 COLL VENOUS BLD VENIPUNCTURE: CPT

## 2025-03-28 PROCEDURE — 27000207 HC ISOLATION

## 2025-03-28 PROCEDURE — 11000001 HC ACUTE MED/SURG PRIVATE ROOM

## 2025-03-28 PROCEDURE — 99900026 HC AIRWAY MAINTENANCE (STAT)

## 2025-03-28 PROCEDURE — 63600175 PHARM REV CODE 636 W HCPCS: Performed by: HOSPITALIST

## 2025-03-28 PROCEDURE — 82565 ASSAY OF CREATININE: CPT

## 2025-03-28 PROCEDURE — 99900022

## 2025-03-28 PROCEDURE — 63600175 PHARM REV CODE 636 W HCPCS

## 2025-03-28 RX ADMIN — LEVALBUTEROL HYDROCHLORIDE 1.25 MG: 1.25 SOLUTION RESPIRATORY (INHALATION) at 06:03

## 2025-03-28 RX ADMIN — PREGABALIN 75 MG: 75 CAPSULE ORAL at 08:03

## 2025-03-28 RX ADMIN — MIDODRINE HYDROCHLORIDE 10 MG: 10 TABLET ORAL at 08:03

## 2025-03-28 RX ADMIN — MIDODRINE HYDROCHLORIDE 10 MG: 10 TABLET ORAL at 02:03

## 2025-03-28 RX ADMIN — PANTOPRAZOLE SODIUM 40 MG: 40 INJECTION, POWDER, FOR SOLUTION INTRAVENOUS at 08:03

## 2025-03-28 RX ADMIN — ATORVASTATIN CALCIUM 20 MG: 20 TABLET, FILM COATED ORAL at 08:03

## 2025-03-28 RX ADMIN — ACETYLCYSTEINE 2 ML: 200 SOLUTION ORAL; RESPIRATORY (INHALATION) at 01:03

## 2025-03-28 RX ADMIN — VANCOMYCIN HYDROCHLORIDE 1500 MG: 500 INJECTION, POWDER, LYOPHILIZED, FOR SOLUTION INTRAVENOUS at 10:03

## 2025-03-28 RX ADMIN — ACETYLCYSTEINE 2 ML: 200 SOLUTION ORAL; RESPIRATORY (INHALATION) at 07:03

## 2025-03-28 RX ADMIN — LEVALBUTEROL HYDROCHLORIDE 1.25 MG: 1.25 SOLUTION RESPIRATORY (INHALATION) at 01:03

## 2025-03-28 RX ADMIN — HYDROCODONE BITARTRATE AND ACETAMINOPHEN 1 TABLET: 5; 325 TABLET ORAL at 01:03

## 2025-03-28 RX ADMIN — MUPIROCIN: 20 OINTMENT TOPICAL at 08:03

## 2025-03-28 RX ADMIN — ENOXAPARIN SODIUM 40 MG: 40 INJECTION SUBCUTANEOUS at 04:03

## 2025-03-28 RX ADMIN — SERTRALINE HYDROCHLORIDE 50 MG: 50 TABLET ORAL at 08:03

## 2025-03-28 RX ADMIN — ACETYLCYSTEINE 2 ML: 200 SOLUTION ORAL; RESPIRATORY (INHALATION) at 06:03

## 2025-03-28 RX ADMIN — LEVALBUTEROL HYDROCHLORIDE 1.25 MG: 1.25 SOLUTION RESPIRATORY (INHALATION) at 07:03

## 2025-03-28 NOTE — PLAN OF CARE
Ss spoke with pt's mother and confirmed d/c plan of home with mother and [facility] care hh. Ss spoke with lorene at the medically store and they have the suction machine ready for pt at d/c. Met with pt's mother to review discharge recommendation of hh and is agreeable to plan    Patient/family provided list of facilities in-network with patient's payor plan. Providers that are owned, operated, or affiliated with [facility] are included on the list.     Notified that referral sent to below listed facilities from in-network list based on proximity to home/family support:   1.[facility]  2.  3.  4.  5. (can send more than 5)    Patient/family instructed to identify preference.    Preferred Facility: (if more than 1, listed in order of descending preference)  1.          If an additional preferred facility not listed above is identified, additional referral to be sent. If above facilities unable to accept, will send additional referrals to in-network providers.

## 2025-03-28 NOTE — ASSESSMENT & PLAN NOTE
"Continue with current antibiotics until sensitivities return.      Antibiotics (From admission, onward)      Start     Stop Route Frequency Ordered    03/27/25 2300  vancomycin (VANCOCIN) 1,500 mg in 0.9% NaCl 250 mL IVPB         -- IV Every 24 hours (non-standard times) 03/27/25 1248    03/25/25 0519  vancomycin - pharmacy to dose  (vancomycin IVPB (PEDS and ADULTS))        Placed in "And" Linked Group    -- IV pharmacy to manage frequency 03/25/25 8741          3/26  - continues on vanc for blood cultures + on 3/24, echo completed at time with no endocarditis seen  - discussed with pulmonary, recs to follow    3/27  - due to overall baseline health will treat coag negative staph bacteremia as a true infection, for now continue vanc and follow cultures and sensitivities    "

## 2025-03-28 NOTE — PROGRESS NOTES
[facility] - [address] [place] [address] Medicine  Progress Note    Patient Name: Karie Denney  MRN: [id number]  Patient Class: IP- Inpatient   Admission Date: 3/23/2025  Length of Stay: 5 days  Attending Physician: Jordana Cavazos MD  Primary Care Provider: Gonzalo Barrera NP        Subjective     Principal Problem:Acute on chronic respiratory failure with hypoxia        HPI:  Chief Complaint  Transfer for continued management of respiratory failure, tracheostomy care, PEG feeding, and complications of quadriplegia following prolonged hospitalization.    History of Present Illness  Ms. Karie Denney is a 59-year-old woman with a complex medical history including quadriplegia following spinal surgery in 2015 and a cerebrovascular accident (CVA) in 2024 resulting in left-sided paralysis. She was transferred to [facility] from [facility] in [place], [place], for ongoing care following a prolonged ICU course involving intubation, respiratory failure, and significant complications.  She was initially hospitalized on February 15, 2025, for aspiration and dysphagia evaluation, during which she developed aspiration pneumonia and respiratory failure requiring intubation. Her hospital course was complicated by an ESBL E. coli UTI, pericardial effusion (treated with colchicine), and a spontaneous right thigh hematoma concerning for compartment syndrome, leading to transfer to [facility]. She required prolonged mechanical ventilation, tracheostomy placement, and PEG tube insertion.  She has now returned to [facility] for continued respiratory care, tracheostomy management, PEG feeding, management of sacral pressure injury, and rehabilitation planning.    Past Medical History  Quadriplegia post-spinal surgery (2015)  CVA (2024)  Aspiration pneumonia  Acute respiratory failure  UTI (ESBL E. coli)  Depression  GERD  Pharyngoesophageal dysphagia  Pericardial effusion/pericarditis  Chronic  constipation  Pressure ulcer (sacral, unstageable)  Hyperlipidemia  Anemia    Past Surgical History  Spinal surgery (2015)  PEG tube placement (03/11/2025)  Esophageal dilation with biopsy (08/2024)    Medications  Active Medications:  Atorvastatin 20 mg daily  Omeprazole 40 mg daily  Sertraline 50 mg daily  Trazodone 100 mg qHS  Pregabalin 75 mg BID  Hydrocodone-acetaminophen 5-325 mg BID  Lactulose 30 mL daily via PEG  Midodrine 10 mg Q6H via PEG  Levalbuterol 0.63 mg nebulizer Q6H  Polyethylene glycol 17 g daily via PEG  Recent Changes:  Colchicine: Discontinued due to bleeding  Aspirin and ezetimibe: Discontinued    Allergies  Penicillins ? Rash and anaphylaxis    Social History  Never smoker  No alcohol or tobacco use  Lives at home with mother; receives home health weekly  Bedbound, non-ambulatory    Family History  Noncontributory    Review of Systems  Limited due to tracheostomy, quadriplegia, and communication challenges. History obtained from EMR and caregiver.    Physical Examination  General: Chronically ill-appearing woman, alert, tracheostomy in place with T-piece  HEENT: Mild nasal skin necrosis, mucous membranes moist  Eyes: PERRL, EOMI  Neck: Tracheostomy present  CV: RRR, no murmurs  Lungs: Breath sounds diminished at bases; no acute distress; no wheezing  Abdomen: Soft, non-tender, PEG tube in place  Skin: Unstageable sacral ulcer, nasal pressure ulcer  Neuro: Quadriplegia, responsive with eye tracking and blinking  Extremities: RLE hematoma, bilateral edema, no signs of active bleeding    Laboratory Data (Most Recent)  Hgb: 8.5 g/dL  Creatinine: 0.30 mg/dL  Albumin: 3.1 g/dL  WBC: 9.4 K/uL  Ferritin: 265 ng/mL  INR: 0.93  No growth on recent blood and urine cultures    Imaging  CT angio: Large RLE hematoma without active extravasation  Multiple chest X-rays: Stable bilateral pleural effusions, improving atelectasis  Echo: EF 55-60%, small pericardial effusion      Overview/Hospital  Course:  3/26  - continues on vanc for blood cultures + on 3/24, echo completed at time with no endocarditis seen  - discussed with pulmonary, recs to follow    3/27  - due to overall baseline health will treat coag negative staph bacteremia as a true infection, for now continue vanc and follow cultures and sensitivities  - discussed with pulmonology who plan to continue trach collar as is and see patient May 1st at 1:40 pm, confirmed appointment  - discussed with family at bedside who are very concerned about secretions and states they don't have the suction capabilities at home and do not feel comfortable going home with her in this state, will discuss with social on possible home equipment    3/28  - awaiting micro, continuing vanc  - social setting up suction at home, family plans to return home as before    Interval History:     Review of Systems   Unable to perform ROS: Patient nonverbal     Objective:     Vital Signs (Most Recent):  Temp: 97.8 °F (36.6 °C) (03/28/25 1121)  Pulse: 101 (03/28/25 1121)  Resp: 17 (03/28/25 1121)  BP: (!) 97/55 (03/28/25 1121)  SpO2: 96 % (03/28/25 1121) Vital Signs (24h Range):  Temp:  [97.5 °F (36.4 °C)-98.4 °F (36.9 °C)] 97.8 °F (36.6 °C)  Pulse:  [] 101  Resp:  [16-20] 17  SpO2:  [94 %-99 %] 96 %  BP: ()/(55-65) 97/55     Weight: 79.8 kg (175 lb 14.8 oz)  Body mass index is 32.18 kg/m².  No intake or output data in the 24 hours ending 03/28/25 1226        Physical Exam  Constitutional:       Appearance: She is ill-appearing.   HENT:      Head: Normocephalic and atraumatic.      Nose: Nose normal.      Mouth/Throat:      Mouth: Mucous membranes are moist.      Pharynx: Oropharynx is clear.   Eyes:      Extraocular Movements: Extraocular movements intact.      Conjunctiva/sclera: Conjunctivae normal.      Pupils: Pupils are equal, round, and reactive to light.   Cardiovascular:      Rate and Rhythm: Regular rhythm. Tachycardia present.      Pulses: Normal pulses.       Heart sounds: Normal heart sounds.   Pulmonary:      Effort: Pulmonary effort is normal.      Comments: Secretions  Abdominal:      General: Abdomen is flat. Bowel sounds are normal.      Palpations: Abdomen is soft.   Musculoskeletal:      Cervical back: Normal range of motion and neck supple.   Skin:     General: Skin is warm and dry.   Neurological:      Mental Status: She is alert.      Comments: Quadriplegia    Psychiatric:         Mood and Affect: Mood normal.         Behavior: Behavior normal.               Significant Labs: All pertinent labs within the past 24 hours have been reviewed.    Significant Imaging: I have reviewed all pertinent imaging results/findings within the past 24 hours.      Assessment & Plan  Acute on chronic respiratory failure with hypoxia  Continue tracheostomy care with T-piece trials  Tracheostomy suctioning ordered due to thick secretions.   Culture tracheostomy secretions.   Monitor oxygenation and readiness for weaning  Pulmonary consult tomorrow.     3/24: mucus plugging requiring frequent suctioning.  Chest physiotherapy, Mucomyst.  Additional recommendations per pulmonology.     3/25: requiring frequent suctioning but otherwise stable.     3/26  - continues on vanc for blood cultures + on 3/24, echo completed at time with no endocarditis seen  - discussed with pulmonary, recs to follow    3/27  - discussed with pulmonology who plan to continue trach collar as is and see patient May 1st at 1:40 pm, confirmed appointment  - discussed with family at bedside who are very concerned about secretions and states they don't have the suction capabilities at home and do not feel comfortable going home with her in this state, will discuss with social on possible home equipement  Severe protein-calorie malnutrition  Nutrition consulted. Most recent weight and BMI monitored-     Measurements:  Wt Readings from Last 1 Encounters:   03/27/25 79.8 kg (175 lb 14.8 oz)   Body mass index is  32.18 kg/m².    Patient has been screened and assessed by RD.    Malnutrition Type:  Context: chronic illness  Level: severe    Continue PEG tube feeding.  Nepro at 40 mL/hr  Maintain bowel regimen (lactulose, PEG, senna)  Dietitian to reassess  continue with current tube feed regimen    Dysphagia  Continue omeprazole  Avoid PO intake  Speech therapy if clinically appropriate    Aspiration pneumonia  Recurrent aspiration pneumonia.  Complete two rounds of antibiotics.    Pressure ulcers of skin of multiple topographic sites  Aquacel Ag + Mepilex dressings  Wound care team consulted    Continue offloading and specialty mattress    Depression  Continue pregabalin, sertraline, trazodone      Mucus plugging of bronchi      S/P percutaneous endoscopic gastrostomy (PEG) tube placement  Patient noted to have a percutaneous endoscopic gastrostomy tube in place. I have personally inspected the tube.Tube was placed prior to this admission There are no signs of drainage or infection around the site. The tube is patent. Medications have converted to liquid form if available.  Routine care to be done by wound care and nursing staff.       Quadriplegia  Maximal support, bedbound  PT/OT as tolerated  DME planning for discharge    Hematoma of lower extremity, right, subsequent encounter  No active bleeding; conservative management  Monitor H/H  No anticoagulation  Hyperlipidemia  Continue atorvastatin    Pericardial effusion  Previously on colchicine, now discontinued due to bleeding  Continue low-dose prednisone  Monitor for recurrence    Acute respiratory failure with hypoxia and hypercarbia  Patient with Hypoxic Respiratory failure which is Acute on chronic.  she is not on home oxygen. Supplemental oxygen was provided and noted- Oxygen Concentration (%):  [35] 35    .   Signs/symptoms of respiratory failure include- tachypnea and respiratory distress. Contributing diagnoses includes - Aspiration and Pneumonia Labs and images  "were reviewed. Patient Has not had a recent ABG. Will treat underlying causes and adjust management of respiratory failure as follows-   Chronic respiratory failure with hypoxia  Patient with Hypoxic Respiratory failure which is Acute on chronic.  she is not on home oxygen. Supplemental oxygen was provided and noted- Oxygen Concentration (%):  [35] 35    .   Signs/symptoms of respiratory failure include- increased work of breathing and respiratory distress. Contributing diagnoses includes - Aspiration and Pneumonia Labs and images were reviewed. Patient Has not had a recent ABG. Will treat underlying causes and adjust management of respiratory failure as follows-   Coag negative Staphylococcus bacteremia  Continue with current antibiotics until sensitivities return.      Antibiotics (From admission, onward)      Start     Stop Route Frequency Ordered    03/27/25 2300  vancomycin (VANCOCIN) 1,500 mg in 0.9% NaCl 250 mL IVPB         -- IV Every 24 hours (non-standard times) 03/27/25 1248    03/25/25 0519  vancomycin - pharmacy to dose  (vancomycin IVPB (PEDS and ADULTS))        Placed in "And" Linked Group    -- IV pharmacy to manage frequency 03/25/25 0419          3/26  - continues on vanc for blood cultures + on 3/24, echo completed at time with no endocarditis seen  - discussed with pulmonary, recs to follow    3/27  - due to overall baseline health will treat coag negative staph bacteremia as a true infection, for now continue vanc and follow cultures and sensitivities    VTE Risk Mitigation (From admission, onward)           Ordered     enoxaparin injection 40 mg  Daily         03/23/25 1940     IP VTE HIGH RISK PATIENT  Once         03/23/25 1940     Place sequential compression device  Until discontinued         03/23/25 1941                    Discharge Planning   SHRADDHA:      Code Status: Full Code   Medical Readiness for Discharge Date:   Discharge Plan A: Long-term acute care facility (LTAC)    "                     Jordana Caavzos MD  Department of Hospital Medicine   [facility] - [address] [place] [address]

## 2025-03-28 NOTE — SUBJECTIVE & OBJECTIVE
Interval History:     Review of Systems   Unable to perform ROS: Patient nonverbal     Objective:     Vital Signs (Most Recent):  Temp: 97.8 °F (36.6 °C) (03/28/25 1121)  Pulse: 101 (03/28/25 1121)  Resp: 17 (03/28/25 1121)  BP: (!) 97/55 (03/28/25 1121)  SpO2: 96 % (03/28/25 1121) Vital Signs (24h Range):  Temp:  [97.5 °F (36.4 °C)-98.4 °F (36.9 °C)] 97.8 °F (36.6 °C)  Pulse:  [] 101  Resp:  [16-20] 17  SpO2:  [94 %-99 %] 96 %  BP: ()/(55-65) 97/55     Weight: 79.8 kg (175 lb 14.8 oz)  Body mass index is 32.18 kg/m².  No intake or output data in the 24 hours ending 03/28/25 1226        Physical Exam  Constitutional:       Appearance: She is ill-appearing.   HENT:      Head: Normocephalic and atraumatic.      Nose: Nose normal.      Mouth/Throat:      Mouth: Mucous membranes are moist.      Pharynx: Oropharynx is clear.   Eyes:      Extraocular Movements: Extraocular movements intact.      Conjunctiva/sclera: Conjunctivae normal.      Pupils: Pupils are equal, round, and reactive to light.   Cardiovascular:      Rate and Rhythm: Regular rhythm. Tachycardia present.      Pulses: Normal pulses.      Heart sounds: Normal heart sounds.   Pulmonary:      Effort: Pulmonary effort is normal.      Comments: Secretions  Abdominal:      General: Abdomen is flat. Bowel sounds are normal.      Palpations: Abdomen is soft.   Musculoskeletal:      Cervical back: Normal range of motion and neck supple.   Skin:     General: Skin is warm and dry.   Neurological:      Mental Status: She is alert.      Comments: Quadriplegia    Psychiatric:         Mood and Affect: Mood normal.         Behavior: Behavior normal.               Significant Labs: All pertinent labs within the past 24 hours have been reviewed.    Significant Imaging: I have reviewed all pertinent imaging results/findings within the past 24 hours.

## 2025-03-29 LAB
BUN SERPL-MCNC: 16 MG/DL (ref 10–20)
BUN/CREAT SERPL: 25 (ref 6–20)
CREAT SERPL-MCNC: 0.63 MG/DL (ref 0.55–1.02)
EGFR (NO RACE VARIABLE) (RUSH/TITUS): 101 ML/MIN/1.73M2

## 2025-03-29 PROCEDURE — 84520 ASSAY OF UREA NITROGEN: CPT

## 2025-03-29 PROCEDURE — 25000242 PHARM REV CODE 250 ALT 637 W/ HCPCS: Performed by: STUDENT IN AN ORGANIZED HEALTH CARE EDUCATION/TRAINING PROGRAM

## 2025-03-29 PROCEDURE — 94761 N-INVAS EAR/PLS OXIMETRY MLT: CPT

## 2025-03-29 PROCEDURE — 99900035 HC TECH TIME PER 15 MIN (STAT)

## 2025-03-29 PROCEDURE — 93010 ELECTROCARDIOGRAM REPORT: CPT | Mod: ,,, | Performed by: HOSPITALIST

## 2025-03-29 PROCEDURE — 99900026 HC AIRWAY MAINTENANCE (STAT)

## 2025-03-29 PROCEDURE — 27000207 HC ISOLATION

## 2025-03-29 PROCEDURE — 63600175 PHARM REV CODE 636 W HCPCS

## 2025-03-29 PROCEDURE — 94640 AIRWAY INHALATION TREATMENT: CPT

## 2025-03-29 PROCEDURE — 25000003 PHARM REV CODE 250

## 2025-03-29 PROCEDURE — 25000003 PHARM REV CODE 250: Performed by: HOSPITALIST

## 2025-03-29 PROCEDURE — A4216 STERILE WATER/SALINE, 10 ML: HCPCS | Performed by: HOSPITALIST

## 2025-03-29 PROCEDURE — 99900022

## 2025-03-29 PROCEDURE — 99232 SBSQ HOSP IP/OBS MODERATE 35: CPT | Mod: ,,,

## 2025-03-29 PROCEDURE — 25000242 PHARM REV CODE 250 ALT 637 W/ HCPCS: Performed by: INTERNAL MEDICINE

## 2025-03-29 PROCEDURE — 27000221 HC OXYGEN, UP TO 24 HOURS

## 2025-03-29 PROCEDURE — 36415 COLL VENOUS BLD VENIPUNCTURE: CPT

## 2025-03-29 PROCEDURE — 63600175 PHARM REV CODE 636 W HCPCS: Performed by: HOSPITALIST

## 2025-03-29 PROCEDURE — 11000001 HC ACUTE MED/SURG PRIVATE ROOM

## 2025-03-29 RX ADMIN — LEVALBUTEROL HYDROCHLORIDE 1.25 MG: 1.25 SOLUTION RESPIRATORY (INHALATION) at 07:03

## 2025-03-29 RX ADMIN — MIDODRINE HYDROCHLORIDE 10 MG: 10 TABLET ORAL at 09:03

## 2025-03-29 RX ADMIN — LEVALBUTEROL HYDROCHLORIDE 1.25 MG: 1.25 SOLUTION RESPIRATORY (INHALATION) at 02:03

## 2025-03-29 RX ADMIN — SODIUM CHLORIDE 10 ML: 9 INJECTION, SOLUTION INTRAMUSCULAR; INTRAVENOUS; SUBCUTANEOUS at 10:03

## 2025-03-29 RX ADMIN — SERTRALINE HYDROCHLORIDE 50 MG: 50 TABLET ORAL at 10:03

## 2025-03-29 RX ADMIN — PANTOPRAZOLE SODIUM 40 MG: 40 INJECTION, POWDER, FOR SOLUTION INTRAVENOUS at 10:03

## 2025-03-29 RX ADMIN — ACETAMINOPHEN 1000 MG: 500 TABLET ORAL at 12:03

## 2025-03-29 RX ADMIN — VANCOMYCIN HYDROCHLORIDE 1500 MG: 500 INJECTION, POWDER, LYOPHILIZED, FOR SOLUTION INTRAVENOUS at 11:03

## 2025-03-29 RX ADMIN — ACETYLCYSTEINE 2 ML: 200 SOLUTION ORAL; RESPIRATORY (INHALATION) at 07:03

## 2025-03-29 RX ADMIN — ENOXAPARIN SODIUM 40 MG: 40 INJECTION SUBCUTANEOUS at 04:03

## 2025-03-29 RX ADMIN — PREGABALIN 75 MG: 75 CAPSULE ORAL at 09:03

## 2025-03-29 RX ADMIN — MIDODRINE HYDROCHLORIDE 10 MG: 10 TABLET ORAL at 10:03

## 2025-03-29 RX ADMIN — MIDODRINE HYDROCHLORIDE 10 MG: 10 TABLET ORAL at 02:03

## 2025-03-29 RX ADMIN — ACETYLCYSTEINE 2 ML: 200 SOLUTION ORAL; RESPIRATORY (INHALATION) at 02:03

## 2025-03-29 RX ADMIN — ATORVASTATIN CALCIUM 20 MG: 20 TABLET, FILM COATED ORAL at 09:03

## 2025-03-29 NOTE — ASSESSMENT & PLAN NOTE
Patient with Hypoxic Respiratory failure which is Acute on chronic.  she is not on home oxygen. Supplemental oxygen was provided and noted- Oxygen Concentration (%):  [30] 30    .   Signs/symptoms of respiratory failure include- increased work of breathing and respiratory distress. Contributing diagnoses includes - Aspiration and Pneumonia Labs and images were reviewed. Patient Has not had a recent ABG. Will treat underlying causes and adjust management of respiratory failure as follows-

## 2025-03-29 NOTE — PLAN OF CARE
Problem: Adult Inpatient Plan of Care  Goal: Plan of Care Review  Outcome: Progressing     Problem: Skin Injury Risk Increased  Goal: Skin Health and Integrity  Outcome: Progressing     Problem: Gas Exchange Impaired  Goal: Optimal Gas Exchange  Outcome: Progressing     Problem: Airway Clearance Ineffective  Goal: Effective Airway Clearance  Outcome: Progressing

## 2025-03-29 NOTE — ASSESSMENT & PLAN NOTE
Patient with Hypoxic Respiratory failure which is Acute on chronic.  she is not on home oxygen. Supplemental oxygen was provided and noted- Oxygen Concentration (%):  [30] 30    .   Signs/symptoms of respiratory failure include- tachypnea and respiratory distress. Contributing diagnoses includes - Aspiration and Pneumonia Labs and images were reviewed. Patient Has not had a recent ABG. Will treat underlying causes and adjust management of respiratory failure as follows-

## 2025-03-29 NOTE — ASSESSMENT & PLAN NOTE
"Continue with current antibiotics until sensitivities return.      Antibiotics (From admission, onward)      Start     Stop Route Frequency Ordered    03/27/25 2300  vancomycin (VANCOCIN) 1,500 mg in 0.9% NaCl 250 mL IVPB         -- IV Every 24 hours (non-standard times) 03/27/25 1248    03/25/25 0519  vancomycin - pharmacy to dose  (vancomycin IVPB (PEDS and ADULTS))        Placed in "And" Linked Group    -- IV pharmacy to manage frequency 03/25/25 7413          3/26  - continues on vanc for blood cultures + on 3/24, echo completed at time with no endocarditis seen  - discussed with pulmonary, recs to follow    3/27  - due to overall baseline health will treat coag negative staph bacteremia as a true infection, for now continue vanc and follow cultures and sensitivities    " No

## 2025-03-29 NOTE — SUBJECTIVE & OBJECTIVE
Interval History:     Review of Systems   Unable to perform ROS: Patient nonverbal     Objective:     Vital Signs (Most Recent):  Temp: 98.7 °F (37.1 °C) (03/29/25 1115)  Pulse: 106 (03/29/25 1120)  Resp: 20 (03/29/25 1120)  BP: 99/64 (RN aware) (03/29/25 1115)  SpO2: 96 % (03/29/25 1120) Vital Signs (24h Range):  Temp:  [97.4 °F (36.3 °C)-99.7 °F (37.6 °C)] 98.7 °F (37.1 °C)  Pulse:  [] 106  Resp:  [14-20] 20  SpO2:  [95 %-98 %] 96 %  BP: ()/(55-64) 99/64     Weight: 79.8 kg (175 lb 14.8 oz)  Body mass index is 32.18 kg/m².    Intake/Output Summary (Last 24 hours) at 3/29/2025 1319  Last data filed at 3/29/2025 0900  Gross per 24 hour   Intake --   Output 1 ml   Net -1 ml           Physical Exam  Constitutional:       Appearance: She is ill-appearing.   HENT:      Head: Normocephalic and atraumatic.      Nose: Nose normal.      Mouth/Throat:      Mouth: Mucous membranes are moist.      Pharynx: Oropharynx is clear.   Eyes:      Extraocular Movements: Extraocular movements intact.      Conjunctiva/sclera: Conjunctivae normal.      Pupils: Pupils are equal, round, and reactive to light.   Cardiovascular:      Rate and Rhythm: Regular rhythm. Tachycardia present.      Pulses: Normal pulses.      Heart sounds: Normal heart sounds.   Pulmonary:      Effort: Pulmonary effort is normal.      Comments: Secretions  Abdominal:      General: Abdomen is flat. Bowel sounds are normal.      Palpations: Abdomen is soft.   Musculoskeletal:      Cervical back: Normal range of motion and neck supple.   Skin:     General: Skin is warm and dry.   Neurological:      Mental Status: She is alert.      Comments: Quadriplegia    Psychiatric:         Mood and Affect: Mood normal.         Behavior: Behavior normal.               Significant Labs: All pertinent labs within the past 24 hours have been reviewed.    Significant Imaging: I have reviewed all pertinent imaging results/findings within the past 24 hours.

## 2025-03-29 NOTE — PROGRESS NOTES
[facility] - [address] [place] [address] Medicine  Progress Note    Patient Name: Karie Denney  MRN: [id number]  Patient Class: IP- Inpatient   Admission Date: 3/23/2025  Length of Stay: 6 days  Attending Physician: Mesfin Rivera DO  Primary Care Provider: Gonzalo Barrera NP        Subjective     Principal Problem:Acute on chronic respiratory failure with hypoxia        HPI:  Chief Complaint  Transfer for continued management of respiratory failure, tracheostomy care, PEG feeding, and complications of quadriplegia following prolonged hospitalization.    History of Present Illness  Ms. Karie Denney is a 59-year-old woman with a complex medical history including quadriplegia following spinal surgery in 2015 and a cerebrovascular accident (CVA) in 2024 resulting in left-sided paralysis. She was transferred to [facility] from [facility] in [place], [place], for ongoing care following a prolonged ICU course involving intubation, respiratory failure, and significant complications.  She was initially hospitalized on February 15, 2025, for aspiration and dysphagia evaluation, during which she developed aspiration pneumonia and respiratory failure requiring intubation. Her hospital course was complicated by an ESBL E. coli UTI, pericardial effusion (treated with colchicine), and a spontaneous right thigh hematoma concerning for compartment syndrome, leading to transfer to [facility]. She required prolonged mechanical ventilation, tracheostomy placement, and PEG tube insertion.  She has now returned to [facility] for continued respiratory care, tracheostomy management, PEG feeding, management of sacral pressure injury, and rehabilitation planning.    Past Medical History  Quadriplegia post-spinal surgery (2015)  CVA (2024)  Aspiration pneumonia  Acute respiratory failure  UTI (ESBL E. coli)  Depression  GERD  Pharyngoesophageal dysphagia  Pericardial effusion/pericarditis  Chronic  constipation  Pressure ulcer (sacral, unstageable)  Hyperlipidemia  Anemia    Past Surgical History  Spinal surgery (2015)  PEG tube placement (03/11/2025)  Esophageal dilation with biopsy (08/2024)    Medications  Active Medications:  Atorvastatin 20 mg daily  Omeprazole 40 mg daily  Sertraline 50 mg daily  Trazodone 100 mg qHS  Pregabalin 75 mg BID  Hydrocodone-acetaminophen 5-325 mg BID  Lactulose 30 mL daily via PEG  Midodrine 10 mg Q6H via PEG  Levalbuterol 0.63 mg nebulizer Q6H  Polyethylene glycol 17 g daily via PEG  Recent Changes:  Colchicine: Discontinued due to bleeding  Aspirin and ezetimibe: Discontinued    Allergies  Penicillins ? Rash and anaphylaxis    Social History  Never smoker  No alcohol or tobacco use  Lives at home with mother; receives home health weekly  Bedbound, non-ambulatory    Family History  Noncontributory    Review of Systems  Limited due to tracheostomy, quadriplegia, and communication challenges. History obtained from EMR and caregiver.    Physical Examination  General: Chronically ill-appearing woman, alert, tracheostomy in place with T-piece  HEENT: Mild nasal skin necrosis, mucous membranes moist  Eyes: PERRL, EOMI  Neck: Tracheostomy present  CV: RRR, no murmurs  Lungs: Breath sounds diminished at bases; no acute distress; no wheezing  Abdomen: Soft, non-tender, PEG tube in place  Skin: Unstageable sacral ulcer, nasal pressure ulcer  Neuro: Quadriplegia, responsive with eye tracking and blinking  Extremities: RLE hematoma, bilateral edema, no signs of active bleeding    Laboratory Data (Most Recent)  Hgb: 8.5 g/dL  Creatinine: 0.30 mg/dL  Albumin: 3.1 g/dL  WBC: 9.4 K/uL  Ferritin: 265 ng/mL  INR: 0.93  No growth on recent blood and urine cultures    Imaging  CT angio: Large RLE hematoma without active extravasation  Multiple chest X-rays: Stable bilateral pleural effusions, improving atelectasis  Echo: EF 55-60%, small pericardial effusion      Overview/Hospital  Course:  3/26  - continues on vanc for blood cultures + on 3/24, echo completed at time with no endocarditis seen  - discussed with pulmonary, recs to follow    3/27  - due to overall baseline health will treat coag negative staph bacteremia as a true infection, for now continue vanc and follow cultures and sensitivities  - discussed with pulmonology who plan to continue trach collar as is and see patient May 1st at 1:40 pm, confirmed appointment  - discussed with family at bedside who are very concerned about secretions and states they don't have the suction capabilities at home and do not feel comfortable going home with her in this state, will discuss with social on possible home equipment    3/28  - awaiting micro, continuing vanc  - social setting up suction at home, family plans to return home as before    3/29  - still with secretions  - family requesting voice box    Interval History:     Review of Systems   Unable to perform ROS: Patient nonverbal     Objective:     Vital Signs (Most Recent):  Temp: 98.7 °F (37.1 °C) (03/29/25 1115)  Pulse: 106 (03/29/25 1120)  Resp: 20 (03/29/25 1120)  BP: 99/64 (RN aware) (03/29/25 1115)  SpO2: 96 % (03/29/25 1120) Vital Signs (24h Range):  Temp:  [97.4 °F (36.3 °C)-99.7 °F (37.6 °C)] 98.7 °F (37.1 °C)  Pulse:  [] 106  Resp:  [14-20] 20  SpO2:  [95 %-98 %] 96 %  BP: ()/(55-64) 99/64     Weight: 79.8 kg (175 lb 14.8 oz)  Body mass index is 32.18 kg/m².    Intake/Output Summary (Last 24 hours) at 3/29/2025 1319  Last data filed at 3/29/2025 0900  Gross per 24 hour   Intake --   Output 1 ml   Net -1 ml           Physical Exam  Constitutional:       Appearance: She is ill-appearing.   HENT:      Head: Normocephalic and atraumatic.      Nose: Nose normal.      Mouth/Throat:      Mouth: Mucous membranes are moist.      Pharynx: Oropharynx is clear.   Eyes:      Extraocular Movements: Extraocular movements intact.      Conjunctiva/sclera: Conjunctivae normal.       Pupils: Pupils are equal, round, and reactive to light.   Cardiovascular:      Rate and Rhythm: Regular rhythm. Tachycardia present.      Pulses: Normal pulses.      Heart sounds: Normal heart sounds.   Pulmonary:      Effort: Pulmonary effort is normal.      Comments: Secretions  Abdominal:      General: Abdomen is flat. Bowel sounds are normal.      Palpations: Abdomen is soft.   Musculoskeletal:      Cervical back: Normal range of motion and neck supple.   Skin:     General: Skin is warm and dry.   Neurological:      Mental Status: She is alert.      Comments: Quadriplegia    Psychiatric:         Mood and Affect: Mood normal.         Behavior: Behavior normal.               Significant Labs: All pertinent labs within the past 24 hours have been reviewed.    Significant Imaging: I have reviewed all pertinent imaging results/findings within the past 24 hours.      Assessment & Plan  Acute on chronic respiratory failure with hypoxia  Continue tracheostomy care with T-piece trials  Tracheostomy suctioning ordered due to thick secretions.   Culture tracheostomy secretions.   Monitor oxygenation and readiness for weaning  Pulmonary consult tomorrow.     3/24: mucus plugging requiring frequent suctioning.  Chest physiotherapy, Mucomyst.  Additional recommendations per pulmonology.     3/25: requiring frequent suctioning but otherwise stable.     3/26  - continues on vanc for blood cultures + on 3/24, echo completed at time with no endocarditis seen  - discussed with pulmonary, recs to follow    3/27  - discussed with pulmonology who plan to continue trach collar as is and see patient May 1st at 1:40 pm, confirmed appointment  - discussed with family at bedside who are very concerned about secretions and states they don't have the suction capabilities at home and do not feel comfortable going home with her in this state, will discuss with social on possible home equipement    3/29  - continues with secretions, setting up  home suction  - family requesting voice box  Severe protein-calorie malnutrition  Nutrition consulted. Most recent weight and BMI monitored-     Measurements:  Wt Readings from Last 1 Encounters:   03/27/25 79.8 kg (175 lb 14.8 oz)   Body mass index is 32.18 kg/m².    Patient has been screened and assessed by RD.    Malnutrition Type:  Context: chronic illness  Level: severe    Continue PEG tube feeding.  Nepro at 40 mL/hr  Maintain bowel regimen (lactulose, PEG, senna)  Dietitian to reassess  continue with current tube feed regimen    Dysphagia  Continue omeprazole  Avoid PO intake  Speech therapy if clinically appropriate    Aspiration pneumonia  Recurrent aspiration pneumonia.  Complete two rounds of antibiotics.    Pressure ulcers of skin of multiple topographic sites  Aquacel Ag + Mepilex dressings  Wound care team consulted    Continue offloading and specialty mattress    Depression  Continue pregabalin, sertraline, trazodone      Mucus plugging of bronchi      S/P percutaneous endoscopic gastrostomy (PEG) tube placement  Patient noted to have a percutaneous endoscopic gastrostomy tube in place. I have personally inspected the tube.Tube was placed prior to this admission There are no signs of drainage or infection around the site. The tube is patent. Medications have converted to liquid form if available.  Routine care to be done by wound care and nursing staff.       Quadriplegia  Maximal support, bedbound  PT/OT as tolerated  DME planning for discharge    Hematoma of lower extremity, right, subsequent encounter  No active bleeding; conservative management  Monitor H/H  No anticoagulation  Hyperlipidemia  Continue atorvastatin    Pericardial effusion  Previously on colchicine, now discontinued due to bleeding  Continue low-dose prednisone  Monitor for recurrence    Acute respiratory failure with hypoxia and hypercarbia  Patient with Hypoxic Respiratory failure which is Acute on chronic.  she is not on home  "oxygen. Supplemental oxygen was provided and noted- Oxygen Concentration (%):  [30] 30    .   Signs/symptoms of respiratory failure include- tachypnea and respiratory distress. Contributing diagnoses includes - Aspiration and Pneumonia Labs and images were reviewed. Patient Has not had a recent ABG. Will treat underlying causes and adjust management of respiratory failure as follows-   Chronic respiratory failure with hypoxia  Patient with Hypoxic Respiratory failure which is Acute on chronic.  she is not on home oxygen. Supplemental oxygen was provided and noted- Oxygen Concentration (%):  [30] 30    .   Signs/symptoms of respiratory failure include- increased work of breathing and respiratory distress. Contributing diagnoses includes - Aspiration and Pneumonia Labs and images were reviewed. Patient Has not had a recent ABG. Will treat underlying causes and adjust management of respiratory failure as follows-   Coag negative Staphylococcus bacteremia  Continue with current antibiotics until sensitivities return.      Antibiotics (From admission, onward)      Start     Stop Route Frequency Ordered    03/27/25 2300  vancomycin (VANCOCIN) 1,500 mg in 0.9% NaCl 250 mL IVPB         -- IV Every 24 hours (non-standard times) 03/27/25 1248    03/25/25 0519  vancomycin - pharmacy to dose  (vancomycin IVPB (PEDS and ADULTS))        Placed in "And" Linked Group    -- IV pharmacy to manage frequency 03/25/25 0419          3/26  - continues on vanc for blood cultures + on 3/24, echo completed at time with no endocarditis seen  - discussed with pulmonary, recs to follow    3/27  - due to overall baseline health will treat coag negative staph bacteremia as a true infection, for now continue vanc and follow cultures and sensitivities    VTE Risk Mitigation (From admission, onward)           Ordered     enoxaparin injection 40 mg  Daily         03/23/25 1940     IP VTE HIGH RISK PATIENT  Once         03/23/25 1940     Place " sequential compression device  Until discontinued         03/23/25 1941                    Discharge Planning   SHRADDHA:      Code Status: Full Code   Medical Readiness for Discharge Date:   Discharge Plan A: Long-term acute care facility (LTAC)                        Jordana Cavazos MD  Department of Hospital Medicine   [facility] - [facility] Tele[facility]

## 2025-03-29 NOTE — ASSESSMENT & PLAN NOTE
Continue tracheostomy care with T-piece trials  Tracheostomy suctioning ordered due to thick secretions.   Culture tracheostomy secretions.   Monitor oxygenation and readiness for weaning  Pulmonary consult tomorrow.     3/24: mucus plugging requiring frequent suctioning.  Chest physiotherapy, Mucomyst.  Additional recommendations per pulmonology.     3/25: requiring frequent suctioning but otherwise stable.     3/26  - continues on vanc for blood cultures + on 3/24, echo completed at time with no endocarditis seen  - discussed with pulmonary, recs to follow    3/27  - discussed with pulmonology who plan to continue trach collar as is and see patient May 1st at 1:40 pm, confirmed appointment  - discussed with family at bedside who are very concerned about secretions and states they don't have the suction capabilities at home and do not feel comfortable going home with her in this state, will discuss with social on possible home equipement    3/29  - continues with secretions, setting up home suction  - family requesting voice box

## 2025-03-30 LAB
BACTERIA BLD CULT: NORMAL
BUN SERPL-MCNC: 22 MG/DL (ref 10–20)
BUN/CREAT SERPL: 42 (ref 6–20)
CREAT SERPL-MCNC: 0.52 MG/DL (ref 0.55–1.02)
EGFR (NO RACE VARIABLE) (RUSH/TITUS): 106 ML/MIN/1.73M2
VANCOMYCIN TROUGH SERPL-MCNC: 21.5 ΜG/ML (ref 15–20)

## 2025-03-30 PROCEDURE — 99900035 HC TECH TIME PER 15 MIN (STAT)

## 2025-03-30 PROCEDURE — 80202 ASSAY OF VANCOMYCIN: CPT

## 2025-03-30 PROCEDURE — 94640 AIRWAY INHALATION TREATMENT: CPT

## 2025-03-30 PROCEDURE — 94761 N-INVAS EAR/PLS OXIMETRY MLT: CPT

## 2025-03-30 PROCEDURE — 99232 SBSQ HOSP IP/OBS MODERATE 35: CPT | Mod: ,,,

## 2025-03-30 PROCEDURE — 99900026 HC AIRWAY MAINTENANCE (STAT)

## 2025-03-30 PROCEDURE — 11000001 HC ACUTE MED/SURG PRIVATE ROOM

## 2025-03-30 PROCEDURE — 25000242 PHARM REV CODE 250 ALT 637 W/ HCPCS: Performed by: STUDENT IN AN ORGANIZED HEALTH CARE EDUCATION/TRAINING PROGRAM

## 2025-03-30 PROCEDURE — 82565 ASSAY OF CREATININE: CPT

## 2025-03-30 PROCEDURE — 27000221 HC OXYGEN, UP TO 24 HOURS

## 2025-03-30 PROCEDURE — 99900022

## 2025-03-30 PROCEDURE — 27000207 HC ISOLATION

## 2025-03-30 PROCEDURE — 25000003 PHARM REV CODE 250: Performed by: HOSPITALIST

## 2025-03-30 PROCEDURE — 25000242 PHARM REV CODE 250 ALT 637 W/ HCPCS: Performed by: INTERNAL MEDICINE

## 2025-03-30 PROCEDURE — 36415 COLL VENOUS BLD VENIPUNCTURE: CPT

## 2025-03-30 PROCEDURE — 63600175 PHARM REV CODE 636 W HCPCS: Performed by: HOSPITALIST

## 2025-03-30 RX ADMIN — PANTOPRAZOLE SODIUM 40 MG: 40 INJECTION, POWDER, FOR SOLUTION INTRAVENOUS at 08:03

## 2025-03-30 RX ADMIN — LEVALBUTEROL HYDROCHLORIDE 1.25 MG: 1.25 SOLUTION RESPIRATORY (INHALATION) at 02:03

## 2025-03-30 RX ADMIN — PREGABALIN 75 MG: 75 CAPSULE ORAL at 09:03

## 2025-03-30 RX ADMIN — ENOXAPARIN SODIUM 40 MG: 40 INJECTION SUBCUTANEOUS at 05:03

## 2025-03-30 RX ADMIN — ACETYLCYSTEINE 2 ML: 200 SOLUTION ORAL; RESPIRATORY (INHALATION) at 02:03

## 2025-03-30 RX ADMIN — MIDODRINE HYDROCHLORIDE 10 MG: 10 TABLET ORAL at 09:03

## 2025-03-30 RX ADMIN — MIDODRINE HYDROCHLORIDE 10 MG: 10 TABLET ORAL at 08:03

## 2025-03-30 RX ADMIN — PREGABALIN 75 MG: 75 CAPSULE ORAL at 08:03

## 2025-03-30 RX ADMIN — ACETYLCYSTEINE 2 ML: 200 SOLUTION ORAL; RESPIRATORY (INHALATION) at 07:03

## 2025-03-30 RX ADMIN — SERTRALINE HYDROCHLORIDE 50 MG: 50 TABLET ORAL at 08:03

## 2025-03-30 RX ADMIN — ATORVASTATIN CALCIUM 20 MG: 20 TABLET, FILM COATED ORAL at 09:03

## 2025-03-30 RX ADMIN — TRAZODONE HYDROCHLORIDE 100 MG: 50 TABLET ORAL at 09:03

## 2025-03-30 RX ADMIN — HYDROCODONE BITARTRATE AND ACETAMINOPHEN 1 TABLET: 5; 325 TABLET ORAL at 04:03

## 2025-03-30 RX ADMIN — MIDODRINE HYDROCHLORIDE 10 MG: 10 TABLET ORAL at 04:03

## 2025-03-30 RX ADMIN — LEVALBUTEROL HYDROCHLORIDE 1.25 MG: 1.25 SOLUTION RESPIRATORY (INHALATION) at 07:03

## 2025-03-30 NOTE — PROGRESS NOTES
[facility] - [address] [place] [address] Medicine  Progress Note    Patient Name: Karie Denney  MRN: [id number]  Patient Class: IP- Inpatient   Admission Date: 3/23/2025  Length of Stay: 7 days  Attending Physician: Jordana Cavazos MD  Primary Care Provider: Gonzalo Barrera NP        Subjective     Principal Problem:Acute on chronic respiratory failure with hypoxia        HPI:  Chief Complaint  Transfer for continued management of respiratory failure, tracheostomy care, PEG feeding, and complications of quadriplegia following prolonged hospitalization.    History of Present Illness  Ms. Karie Denney is a 59-year-old woman with a complex medical history including quadriplegia following spinal surgery in 2015 and a cerebrovascular accident (CVA) in 2024 resulting in left-sided paralysis. She was transferred to [facility] from [facility] in [place], [place], for ongoing care following a prolonged ICU course involving intubation, respiratory failure, and significant complications.  She was initially hospitalized on February 15, 2025, for aspiration and dysphagia evaluation, during which she developed aspiration pneumonia and respiratory failure requiring intubation. Her hospital course was complicated by an ESBL E. coli UTI, pericardial effusion (treated with colchicine), and a spontaneous right thigh hematoma concerning for compartment syndrome, leading to transfer to [facility]. She required prolonged mechanical ventilation, tracheostomy placement, and PEG tube insertion.  She has now returned to [facility] for continued respiratory care, tracheostomy management, PEG feeding, management of sacral pressure injury, and rehabilitation planning.    Past Medical History  Quadriplegia post-spinal surgery (2015)  CVA (2024)  Aspiration pneumonia  Acute respiratory failure  UTI (ESBL E. coli)  Depression  GERD  Pharyngoesophageal dysphagia  Pericardial effusion/pericarditis  Chronic  constipation  Pressure ulcer (sacral, unstageable)  Hyperlipidemia  Anemia    Past Surgical History  Spinal surgery (2015)  PEG tube placement (03/11/2025)  Esophageal dilation with biopsy (08/2024)    Medications  Active Medications:  Atorvastatin 20 mg daily  Omeprazole 40 mg daily  Sertraline 50 mg daily  Trazodone 100 mg qHS  Pregabalin 75 mg BID  Hydrocodone-acetaminophen 5-325 mg BID  Lactulose 30 mL daily via PEG  Midodrine 10 mg Q6H via PEG  Levalbuterol 0.63 mg nebulizer Q6H  Polyethylene glycol 17 g daily via PEG  Recent Changes:  Colchicine: Discontinued due to bleeding  Aspirin and ezetimibe: Discontinued    Allergies  Penicillins ? Rash and anaphylaxis    Social History  Never smoker  No alcohol or tobacco use  Lives at home with mother; receives home health weekly  Bedbound, non-ambulatory    Family History  Noncontributory    Review of Systems  Limited due to tracheostomy, quadriplegia, and communication challenges. History obtained from EMR and caregiver.    Physical Examination  General: Chronically ill-appearing woman, alert, tracheostomy in place with T-piece  HEENT: Mild nasal skin necrosis, mucous membranes moist  Eyes: PERRL, EOMI  Neck: Tracheostomy present  CV: RRR, no murmurs  Lungs: Breath sounds diminished at bases; no acute distress; no wheezing  Abdomen: Soft, non-tender, PEG tube in place  Skin: Unstageable sacral ulcer, nasal pressure ulcer  Neuro: Quadriplegia, responsive with eye tracking and blinking  Extremities: RLE hematoma, bilateral edema, no signs of active bleeding    Laboratory Data (Most Recent)  Hgb: 8.5 g/dL  Creatinine: 0.30 mg/dL  Albumin: 3.1 g/dL  WBC: 9.4 K/uL  Ferritin: 265 ng/mL  INR: 0.93  No growth on recent blood and urine cultures    Imaging  CT angio: Large RLE hematoma without active extravasation  Multiple chest X-rays: Stable bilateral pleural effusions, improving atelectasis  Echo: EF 55-60%, small pericardial effusion      Overview/Hospital  Course:  3/26  - continues on vanc for blood cultures + on 3/24, echo completed at time with no endocarditis seen  - discussed with pulmonary, recs to follow    3/27  - due to overall baseline health will treat coag negative staph bacteremia as a true infection, for now continue vanc and follow cultures and sensitivities  - discussed with pulmonology who plan to continue trach collar as is and see patient May 1st at 1:40 pm, confirmed appointment  - discussed with family at bedside who are very concerned about secretions and states they don't have the suction capabilities at home and do not feel comfortable going home with her in this state, will discuss with social on possible home equipment    3/28  - awaiting micro, continuing vanc  - social setting up suction at home, family plans to return home as before    3/29  - still with secretions  - family requesting voice box    3/20  - doing well today, mentation much improved  - anticipate discharge within the next two days with home health services, family will need education on trach maintenance and home feedings as patient has PEG tube and they have not cared for a PEG tube before, also we will discuss with  getting a speaking told to use with a trach    Interval History:     Review of Systems   Unable to perform ROS: Patient nonverbal     Objective:     Vital Signs (Most Recent):  Temp: 98.3 °F (36.8 °C) (03/30/25 1138)  Pulse: (!) 114 (03/30/25 1138)  Resp: 18 (03/30/25 1138)  BP: 113/67 (03/30/25 1138)  SpO2: 96 % (03/30/25 1223) Vital Signs (24h Range):  Temp:  [97.4 °F (36.3 °C)-98.5 °F (36.9 °C)] 98.3 °F (36.8 °C)  Pulse:  [] 114  Resp:  [17-20] 18  SpO2:  [85 %-100 %] 96 %  BP: ()/(57-71) 113/67     Weight: 79.8 kg (175 lb 14.8 oz)  Body mass index is 32.18 kg/m².    Intake/Output Summary (Last 24 hours) at 3/30/2025 1349  Last data filed at 3/29/2025 1944  Gross per 24 hour   Intake 640 ml   Output --   Net 640 ml            Physical Exam  Constitutional:       Appearance: She is ill-appearing.   HENT:      Head: Normocephalic and atraumatic.      Nose: Nose normal.      Mouth/Throat:      Mouth: Mucous membranes are moist.      Pharynx: Oropharynx is clear.   Eyes:      Extraocular Movements: Extraocular movements intact.      Conjunctiva/sclera: Conjunctivae normal.      Pupils: Pupils are equal, round, and reactive to light.   Cardiovascular:      Rate and Rhythm: Regular rhythm. Tachycardia present.      Pulses: Normal pulses.      Heart sounds: Normal heart sounds.   Pulmonary:      Effort: Pulmonary effort is normal.      Comments: Secretions  Abdominal:      General: Abdomen is flat. Bowel sounds are normal.      Palpations: Abdomen is soft.   Musculoskeletal:      Cervical back: Normal range of motion and neck supple.   Skin:     General: Skin is warm and dry.   Neurological:      Mental Status: She is alert.      Comments: Quadriplegia    Psychiatric:         Mood and Affect: Mood normal.         Behavior: Behavior normal.               Significant Labs: All pertinent labs within the past 24 hours have been reviewed.    Significant Imaging: I have reviewed all pertinent imaging results/findings within the past 24 hours.      Assessment & Plan  Acute on chronic respiratory failure with hypoxia  Continue tracheostomy care with T-piece trials  Tracheostomy suctioning ordered due to thick secretions.   Culture tracheostomy secretions.   Monitor oxygenation and readiness for weaning  Pulmonary consult tomorrow.     3/24: mucus plugging requiring frequent suctioning.  Chest physiotherapy, Mucomyst.  Additional recommendations per pulmonology.     3/25: requiring frequent suctioning but otherwise stable.     3/26  - continues on vanc for blood cultures + on 3/24, echo completed at time with no endocarditis seen  - discussed with pulmonary, recs to follow    3/27  - discussed with pulmonology who plan to continue trach collar as is  and see patient May 1st at 1:40 pm, confirmed appointment  - discussed with family at bedside who are very concerned about secretions and states they don't have the suction capabilities at home and do not feel comfortable going home with her in this state, will discuss with social on possible home equipement    3/29  - continues with secretions, setting up home suction  - family requesting voice box    3/30  - anticipate discharge in the next few days with home health and set up at home  Severe protein-calorie malnutrition  Nutrition consulted. Most recent weight and BMI monitored-     Measurements:  Wt Readings from Last 1 Encounters:   03/27/25 79.8 kg (175 lb 14.8 oz)   Body mass index is 32.18 kg/m².    Patient has been screened and assessed by RD.    Malnutrition Type:  Context: chronic illness  Level: severe    Continue PEG tube feeding.  Nepro at 40 mL/hr  Maintain bowel regimen (lactulose, PEG, senna)  Dietitian to reassess  continue with current tube feed regimen    Dysphagia  Continue omeprazole  Avoid PO intake  Speech therapy if clinically appropriate    Aspiration pneumonia  Recurrent aspiration pneumonia.  Complete two rounds of antibiotics.    Pressure ulcers of skin of multiple topographic sites  Aquacel Ag + Mepilex dressings  Wound care team consulted    Continue offloading and specialty mattress    Depression  Continue pregabalin, sertraline, trazodone      Mucus plugging of bronchi      S/P percutaneous endoscopic gastrostomy (PEG) tube placement  Patient noted to have a percutaneous endoscopic gastrostomy tube in place. I have personally inspected the tube.Tube was placed prior to this admission There are no signs of drainage or infection around the site. The tube is patent. Medications have converted to liquid form if available.  Routine care to be done by wound care and nursing staff.       Quadriplegia  Maximal support, bedbound  PT/OT as tolerated  DME planning for discharge    Hematoma of  "lower extremity, right, subsequent encounter  No active bleeding; conservative management  Monitor H/H  No anticoagulation  Hyperlipidemia  Continue atorvastatin    Pericardial effusion  Previously on colchicine, now discontinued due to bleeding  Continue low-dose prednisone  Monitor for recurrence    Acute respiratory failure with hypoxia and hypercarbia  Patient with Hypoxic Respiratory failure which is Acute on chronic.  she is not on home oxygen. Supplemental oxygen was provided and noted- Oxygen Concentration (%):  [28] 28    .   Signs/symptoms of respiratory failure include- tachypnea and respiratory distress. Contributing diagnoses includes - Aspiration and Pneumonia Labs and images were reviewed. Patient Has not had a recent ABG. Will treat underlying causes and adjust management of respiratory failure as follows-   Chronic respiratory failure with hypoxia  Patient with Hypoxic Respiratory failure which is Acute on chronic.  she is not on home oxygen. Supplemental oxygen was provided and noted- Oxygen Concentration (%):  [28] 28    .   Signs/symptoms of respiratory failure include- increased work of breathing and respiratory distress. Contributing diagnoses includes - Aspiration and Pneumonia Labs and images were reviewed. Patient Has not had a recent ABG. Will treat underlying causes and adjust management of respiratory failure as follows-   Coag negative Staphylococcus bacteremia  Continue with current antibiotics until sensitivities return.      Antibiotics (From admission, onward)      Start     Stop Route Frequency Ordered    03/27/25 2300  vancomycin (VANCOCIN) 1,500 mg in 0.9% NaCl 250 mL IVPB         -- IV Every 24 hours (non-standard times) 03/27/25 1248    03/25/25 0519  vancomycin - pharmacy to dose  (vancomycin IVPB (PEDS and ADULTS))        Placed in "And" Linked Group    -- IV pharmacy to manage frequency 03/25/25 6716          3/26  - continues on vanc for blood cultures + on 3/24, echo " completed at time with no endocarditis seen  - discussed with pulmonary, recs to follow    3/27  - due to overall baseline health will treat coag negative staph bacteremia as a true infection, for now continue vanc and follow cultures and sensitivities    VTE Risk Mitigation (From admission, onward)           Ordered     enoxaparin injection 40 mg  Daily         03/23/25 1940     IP VTE HIGH RISK PATIENT  Once         03/23/25 1940     Place sequential compression device  Until discontinued         03/23/25 1941                    Discharge Planning   SHRADDHA:      Code Status: Full Code   Medical Readiness for Discharge Date:   Discharge Plan A: Long-term acute care facility (LTAC)                        Jordana Cavazos MD  Department of Hospital Medicine   [facility] - [address] [place] [address]

## 2025-03-30 NOTE — ASSESSMENT & PLAN NOTE
Continue tracheostomy care with T-piece trials  Tracheostomy suctioning ordered due to thick secretions.   Culture tracheostomy secretions.   Monitor oxygenation and readiness for weaning  Pulmonary consult tomorrow.     3/24: mucus plugging requiring frequent suctioning.  Chest physiotherapy, Mucomyst.  Additional recommendations per pulmonology.     3/25: requiring frequent suctioning but otherwise stable.     3/26  - continues on vanc for blood cultures + on 3/24, echo completed at time with no endocarditis seen  - discussed with pulmonary, recs to follow    3/27  - discussed with pulmonology who plan to continue trach collar as is and see patient May 1st at 1:40 pm, confirmed appointment  - discussed with family at bedside who are very concerned about secretions and states they don't have the suction capabilities at home and do not feel comfortable going home with her in this state, will discuss with social on possible home equipement    3/29  - continues with secretions, setting up home suction  - family requesting voice box    3/30  - anticipate discharge in the next few days with home health and set up at home

## 2025-03-30 NOTE — ASSESSMENT & PLAN NOTE
Patient with Hypoxic Respiratory failure which is Acute on chronic.  she is not on home oxygen. Supplemental oxygen was provided and noted- Oxygen Concentration (%):  [28] 28    .   Signs/symptoms of respiratory failure include- increased work of breathing and respiratory distress. Contributing diagnoses includes - Aspiration and Pneumonia Labs and images were reviewed. Patient Has not had a recent ABG. Will treat underlying causes and adjust management of respiratory failure as follows-

## 2025-03-30 NOTE — ASSESSMENT & PLAN NOTE
Patient with Hypoxic Respiratory failure which is Acute on chronic.  she is not on home oxygen. Supplemental oxygen was provided and noted- Oxygen Concentration (%):  [28] 28    .   Signs/symptoms of respiratory failure include- tachypnea and respiratory distress. Contributing diagnoses includes - Aspiration and Pneumonia Labs and images were reviewed. Patient Has not had a recent ABG. Will treat underlying causes and adjust management of respiratory failure as follows-

## 2025-03-30 NOTE — PLAN OF CARE
Problem: Adult Inpatient Plan of Care  Goal: Plan of Care Review  Outcome: Progressing  Goal: Patient-Specific Goal (Individualized)  Outcome: Progressing  Goal: Absence of Hospital-Acquired Illness or Injury  Outcome: Progressing  Goal: Optimal Comfort and Wellbeing  Outcome: Progressing  Goal: Readiness for Transition of Care  Outcome: Progressing     Problem: Infection  Goal: Absence of Infection Signs and Symptoms  Outcome: Progressing     Problem: Wound  Goal: Optimal Coping  Outcome: Progressing  Goal: Optimal Functional Ability  Outcome: Progressing  Goal: Absence of Infection Signs and Symptoms  Outcome: Progressing  Goal: Improved Oral Intake  Outcome: Progressing  Goal: Optimal Pain Control and Function  Outcome: Progressing  Goal: Skin Health and Integrity  Outcome: Progressing  Goal: Optimal Wound Healing  Outcome: Progressing     Problem: Skin Injury Risk Increased  Goal: Skin Health and Integrity  Outcome: Progressing     Problem: Gas Exchange Impaired  Goal: Optimal Gas Exchange  Outcome: Progressing     Problem: Airway Clearance Ineffective  Goal: Effective Airway Clearance  Outcome: Progressing     Problem: Fall Injury Risk  Goal: Absence of Fall and Fall-Related Injury  Outcome: Progressing

## 2025-03-30 NOTE — SUBJECTIVE & OBJECTIVE
Interval History:     Review of Systems   Unable to perform ROS: Patient nonverbal     Objective:     Vital Signs (Most Recent):  Temp: 98.3 °F (36.8 °C) (03/30/25 1138)  Pulse: (!) 114 (03/30/25 1138)  Resp: 18 (03/30/25 1138)  BP: 113/67 (03/30/25 1138)  SpO2: 96 % (03/30/25 1223) Vital Signs (24h Range):  Temp:  [97.4 °F (36.3 °C)-98.5 °F (36.9 °C)] 98.3 °F (36.8 °C)  Pulse:  [] 114  Resp:  [17-20] 18  SpO2:  [85 %-100 %] 96 %  BP: ()/(57-71) 113/67     Weight: 79.8 kg (175 lb 14.8 oz)  Body mass index is 32.18 kg/m².    Intake/Output Summary (Last 24 hours) at 3/30/2025 1349  Last data filed at 3/29/2025 1944  Gross per 24 hour   Intake 640 ml   Output --   Net 640 ml           Physical Exam  Constitutional:       Appearance: She is ill-appearing.   HENT:      Head: Normocephalic and atraumatic.      Nose: Nose normal.      Mouth/Throat:      Mouth: Mucous membranes are moist.      Pharynx: Oropharynx is clear.   Eyes:      Extraocular Movements: Extraocular movements intact.      Conjunctiva/sclera: Conjunctivae normal.      Pupils: Pupils are equal, round, and reactive to light.   Cardiovascular:      Rate and Rhythm: Regular rhythm. Tachycardia present.      Pulses: Normal pulses.      Heart sounds: Normal heart sounds.   Pulmonary:      Effort: Pulmonary effort is normal.      Comments: Secretions  Abdominal:      General: Abdomen is flat. Bowel sounds are normal.      Palpations: Abdomen is soft.   Musculoskeletal:      Cervical back: Normal range of motion and neck supple.   Skin:     General: Skin is warm and dry.   Neurological:      Mental Status: She is alert.      Comments: Quadriplegia    Psychiatric:         Mood and Affect: Mood normal.         Behavior: Behavior normal.               Significant Labs: All pertinent labs within the past 24 hours have been reviewed.    Significant Imaging: I have reviewed all pertinent imaging results/findings within the past 24 hours.

## 2025-03-30 NOTE — ASSESSMENT & PLAN NOTE
"Continue with current antibiotics until sensitivities return.      Antibiotics (From admission, onward)      Start     Stop Route Frequency Ordered    03/27/25 2300  vancomycin (VANCOCIN) 1,500 mg in 0.9% NaCl 250 mL IVPB         -- IV Every 24 hours (non-standard times) 03/27/25 1248    03/25/25 0519  vancomycin - pharmacy to dose  (vancomycin IVPB (PEDS and ADULTS))        Placed in "And" Linked Group    -- IV pharmacy to manage frequency 03/25/25 7575          3/26  - continues on vanc for blood cultures + on 3/24, echo completed at time with no endocarditis seen  - discussed with pulmonary, recs to follow    3/27  - due to overall baseline health will treat coag negative staph bacteremia as a true infection, for now continue vanc and follow cultures and sensitivities    "

## 2025-03-30 NOTE — PLAN OF CARE
Problem: Gas Exchange Impaired  Goal: Optimal Gas Exchange  Outcome: Progressing      Patient would like a letter for today and tomorrow to be off work. Please fax to [phone]

## 2025-03-31 LAB
BUN SERPL-MCNC: 24 MG/DL (ref 10–20)
BUN/CREAT SERPL: 42 (ref 6–20)
CREAT SERPL-MCNC: 0.57 MG/DL (ref 0.55–1.02)
EGFR (NO RACE VARIABLE) (RUSH/TITUS): 104 ML/MIN/1.73M2
VANCOMYCIN SERPL-MCNC: 16.8 ΜG/ML (ref 15–20)

## 2025-03-31 PROCEDURE — 94761 N-INVAS EAR/PLS OXIMETRY MLT: CPT

## 2025-03-31 PROCEDURE — 99232 SBSQ HOSP IP/OBS MODERATE 35: CPT | Mod: ,,,

## 2025-03-31 PROCEDURE — 36415 COLL VENOUS BLD VENIPUNCTURE: CPT | Performed by: HOSPITALIST

## 2025-03-31 PROCEDURE — 84520 ASSAY OF UREA NITROGEN: CPT

## 2025-03-31 PROCEDURE — 94640 AIRWAY INHALATION TREATMENT: CPT

## 2025-03-31 PROCEDURE — 25000242 PHARM REV CODE 250 ALT 637 W/ HCPCS: Performed by: INTERNAL MEDICINE

## 2025-03-31 PROCEDURE — 63600175 PHARM REV CODE 636 W HCPCS: Performed by: HOSPITALIST

## 2025-03-31 PROCEDURE — 11000001 HC ACUTE MED/SURG PRIVATE ROOM

## 2025-03-31 PROCEDURE — 36415 COLL VENOUS BLD VENIPUNCTURE: CPT

## 2025-03-31 PROCEDURE — 25000242 PHARM REV CODE 250 ALT 637 W/ HCPCS: Performed by: STUDENT IN AN ORGANIZED HEALTH CARE EDUCATION/TRAINING PROGRAM

## 2025-03-31 PROCEDURE — 80202 ASSAY OF VANCOMYCIN: CPT | Performed by: HOSPITALIST

## 2025-03-31 PROCEDURE — 99900026 HC AIRWAY MAINTENANCE (STAT)

## 2025-03-31 PROCEDURE — 63600175 PHARM REV CODE 636 W HCPCS

## 2025-03-31 PROCEDURE — 27000207 HC ISOLATION

## 2025-03-31 PROCEDURE — 99900035 HC TECH TIME PER 15 MIN (STAT)

## 2025-03-31 PROCEDURE — 25000003 PHARM REV CODE 250: Performed by: HOSPITALIST

## 2025-03-31 PROCEDURE — 25000003 PHARM REV CODE 250

## 2025-03-31 PROCEDURE — 27000221 HC OXYGEN, UP TO 24 HOURS

## 2025-03-31 RX ADMIN — LEVALBUTEROL HYDROCHLORIDE 1.25 MG: 1.25 SOLUTION RESPIRATORY (INHALATION) at 06:03

## 2025-03-31 RX ADMIN — SERTRALINE HYDROCHLORIDE 50 MG: 50 TABLET ORAL at 09:03

## 2025-03-31 RX ADMIN — ACETYLCYSTEINE 2 ML: 200 SOLUTION ORAL; RESPIRATORY (INHALATION) at 07:03

## 2025-03-31 RX ADMIN — VANCOMYCIN HYDROCHLORIDE 1750 MG: 500 INJECTION, POWDER, LYOPHILIZED, FOR SOLUTION INTRAVENOUS at 02:03

## 2025-03-31 RX ADMIN — PREGABALIN 75 MG: 75 CAPSULE ORAL at 09:03

## 2025-03-31 RX ADMIN — TRAZODONE HYDROCHLORIDE 100 MG: 50 TABLET ORAL at 09:03

## 2025-03-31 RX ADMIN — LEVALBUTEROL HYDROCHLORIDE 1.25 MG: 1.25 SOLUTION RESPIRATORY (INHALATION) at 02:03

## 2025-03-31 RX ADMIN — PANTOPRAZOLE SODIUM 40 MG: 40 INJECTION, POWDER, FOR SOLUTION INTRAVENOUS at 09:03

## 2025-03-31 RX ADMIN — MIDODRINE HYDROCHLORIDE 10 MG: 10 TABLET ORAL at 09:03

## 2025-03-31 RX ADMIN — ACETAMINOPHEN 650 MG: 500 TABLET ORAL at 05:03

## 2025-03-31 RX ADMIN — ATORVASTATIN CALCIUM 20 MG: 20 TABLET, FILM COATED ORAL at 09:03

## 2025-03-31 RX ADMIN — ACETYLCYSTEINE 2 ML: 200 SOLUTION ORAL; RESPIRATORY (INHALATION) at 02:03

## 2025-03-31 RX ADMIN — ENOXAPARIN SODIUM 40 MG: 40 INJECTION SUBCUTANEOUS at 05:03

## 2025-03-31 RX ADMIN — LEVALBUTEROL HYDROCHLORIDE 1.25 MG: 1.25 SOLUTION RESPIRATORY (INHALATION) at 07:03

## 2025-03-31 RX ADMIN — MIDODRINE HYDROCHLORIDE 10 MG: 10 TABLET ORAL at 02:03

## 2025-03-31 NOTE — SUBJECTIVE & OBJECTIVE
Interval History:     Review of Systems   Unable to perform ROS: Patient nonverbal     Objective:     Vital Signs (Most Recent):  Temp: 97.9 °F (36.6 °C) (03/31/25 1146)  Pulse: 106 (03/31/25 1146)  Resp: 20 (03/31/25 0757)  BP: 91/60 (03/31/25 1146)  SpO2: 95 % (03/31/25 1146) Vital Signs (24h Range):  Temp:  [97.7 °F (36.5 °C)-98.7 °F (37.1 °C)] 97.9 °F (36.6 °C)  Pulse:  [] 106  Resp:  [16-20] 20  SpO2:  [93 %-99 %] 95 %  BP: ()/(47-68) 91/60     Weight: 79.8 kg (175 lb 14.8 oz)  Body mass index is 32.18 kg/m².  No intake or output data in the 24 hours ending 03/31/25 1406          Physical Exam  Constitutional:       Appearance: She is ill-appearing.   HENT:      Head: Normocephalic and atraumatic.      Nose: Nose normal.      Mouth/Throat:      Mouth: Mucous membranes are moist.      Pharynx: Oropharynx is clear.   Eyes:      Extraocular Movements: Extraocular movements intact.      Conjunctiva/sclera: Conjunctivae normal.      Pupils: Pupils are equal, round, and reactive to light.   Cardiovascular:      Rate and Rhythm: Regular rhythm. Tachycardia present.      Pulses: Normal pulses.      Heart sounds: Normal heart sounds.   Pulmonary:      Effort: Pulmonary effort is normal.      Comments: Secretions  Abdominal:      General: Abdomen is flat. Bowel sounds are normal.      Palpations: Abdomen is soft.   Musculoskeletal:      Cervical back: Normal range of motion and neck supple.   Skin:     General: Skin is warm and dry.   Neurological:      Mental Status: She is alert.      Comments: Quadriplegia    Psychiatric:         Mood and Affect: Mood normal.         Behavior: Behavior normal.               Significant Labs: All pertinent labs within the past 24 hours have been reviewed.    Significant Imaging: I have reviewed all pertinent imaging results/findings within the past 24 hours.

## 2025-03-31 NOTE — PLAN OF CARE
Problem: Adult Inpatient Plan of Care  Goal: Plan of Care Review  Outcome: Progressing  Goal: Patient-Specific Goal (Individualized)  Outcome: Progressing  Goal: Absence of Hospital-Acquired Illness or Injury  Outcome: Progressing  Goal: Optimal Comfort and Wellbeing  Outcome: Progressing  Goal: Readiness for Transition of Care  Outcome: Progressing     Problem: Gas Exchange Impaired  Goal: Optimal Gas Exchange  Outcome: Progressing     Problem: Airway Clearance Ineffective  Goal: Effective Airway Clearance  Outcome: Progressing

## 2025-03-31 NOTE — PROGRESS NOTES
[facility] - [address] [place] [address] Medicine  Progress Note    Patient Name: Karie Denney  MRN: [id number]  Patient Class: IP- Inpatient   Admission Date: 3/23/2025  Length of Stay: 8 days  Attending Physician: Jordana Cavazos MD  Primary Care Provider: oGnzalo Barrera NP        Subjective     Principal Problem:Acute on chronic respiratory failure with hypoxia        HPI:  Chief Complaint  Transfer for continued management of respiratory failure, tracheostomy care, PEG feeding, and complications of quadriplegia following prolonged hospitalization.    History of Present Illness  Ms. Karie Denney is a 59-year-old woman with a complex medical history including quadriplegia following spinal surgery in 2015 and a cerebrovascular accident (CVA) in 2024 resulting in left-sided paralysis. She was transferred to [facility] from [facility] in [place], [place], for ongoing care following a prolonged ICU course involving intubation, respiratory failure, and significant complications.  She was initially hospitalized on February 15, 2025, for aspiration and dysphagia evaluation, during which she developed aspiration pneumonia and respiratory failure requiring intubation. Her hospital course was complicated by an ESBL E. coli UTI, pericardial effusion (treated with colchicine), and a spontaneous right thigh hematoma concerning for compartment syndrome, leading to transfer to [facility]. She required prolonged mechanical ventilation, tracheostomy placement, and PEG tube insertion.  She has now returned to [facility] for continued respiratory care, tracheostomy management, PEG feeding, management of sacral pressure injury, and rehabilitation planning.    Past Medical History  Quadriplegia post-spinal surgery (2015)  CVA (2024)  Aspiration pneumonia  Acute respiratory failure  UTI (ESBL E. coli)  Depression  GERD  Pharyngoesophageal dysphagia  Pericardial effusion/pericarditis  Chronic  constipation  Pressure ulcer (sacral, unstageable)  Hyperlipidemia  Anemia    Past Surgical History  Spinal surgery (2015)  PEG tube placement (03/11/2025)  Esophageal dilation with biopsy (08/2024)    Medications  Active Medications:  Atorvastatin 20 mg daily  Omeprazole 40 mg daily  Sertraline 50 mg daily  Trazodone 100 mg qHS  Pregabalin 75 mg BID  Hydrocodone-acetaminophen 5-325 mg BID  Lactulose 30 mL daily via PEG  Midodrine 10 mg Q6H via PEG  Levalbuterol 0.63 mg nebulizer Q6H  Polyethylene glycol 17 g daily via PEG  Recent Changes:  Colchicine: Discontinued due to bleeding  Aspirin and ezetimibe: Discontinued    Allergies  Penicillins ? Rash and anaphylaxis    Social History  Never smoker  No alcohol or tobacco use  Lives at home with mother; receives home health weekly  Bedbound, non-ambulatory    Family History  Noncontributory    Review of Systems  Limited due to tracheostomy, quadriplegia, and communication challenges. History obtained from EMR and caregiver.    Physical Examination  General: Chronically ill-appearing woman, alert, tracheostomy in place with T-piece  HEENT: Mild nasal skin necrosis, mucous membranes moist  Eyes: PERRL, EOMI  Neck: Tracheostomy present  CV: RRR, no murmurs  Lungs: Breath sounds diminished at bases; no acute distress; no wheezing  Abdomen: Soft, non-tender, PEG tube in place  Skin: Unstageable sacral ulcer, nasal pressure ulcer  Neuro: Quadriplegia, responsive with eye tracking and blinking  Extremities: RLE hematoma, bilateral edema, no signs of active bleeding    Laboratory Data (Most Recent)  Hgb: 8.5 g/dL  Creatinine: 0.30 mg/dL  Albumin: 3.1 g/dL  WBC: 9.4 K/uL  Ferritin: 265 ng/mL  INR: 0.93  No growth on recent blood and urine cultures    Imaging  CT angio: Large RLE hematoma without active extravasation  Multiple chest X-rays: Stable bilateral pleural effusions, improving atelectasis  Echo: EF 55-60%, small pericardial effusion      Overview/Hospital  Course:  3/26  - continues on vanc for blood cultures + on 3/24, echo completed at time with no endocarditis seen  - discussed with pulmonary, recs to follow    3/27  - due to overall baseline health will treat coag negative staph bacteremia as a true infection, for now continue vanc and follow cultures and sensitivities  - discussed with pulmonology who plan to continue trach collar as is and see patient May 1st at 1:40 pm, confirmed appointment  - discussed with family at bedside who are very concerned about secretions and states they don't have the suction capabilities at home and do not feel comfortable going home with her in this state, will discuss with social on possible home equipment    3/28  - awaiting micro, continuing vanc  - social setting up suction at home, family plans to return home as before    3/29  - still with secretions  - family requesting voice box    3/30  - doing well today, mentation much improved  - anticipate discharge within the next two days with home health services, family will need education on trach maintenance and home feedings as patient has PEG tube and they have not cared for a PEG tube before, also we will discuss with  getting a speaking told to use with a trach    3/31  - secretions improved today  - working with social for home set up of oxygen and tube feeds  - family needs heavy education on trach care and tube feeds as primary care giver has no experience with either, still wishes to discharge home    Interval History:     Review of Systems   Unable to perform ROS: Patient nonverbal     Objective:     Vital Signs (Most Recent):  Temp: 97.9 °F (36.6 °C) (03/31/25 1146)  Pulse: 106 (03/31/25 1146)  Resp: 20 (03/31/25 0757)  BP: 91/60 (03/31/25 1146)  SpO2: 95 % (03/31/25 1146) Vital Signs (24h Range):  Temp:  [97.7 °F (36.5 °C)-98.7 °F (37.1 °C)] 97.9 °F (36.6 °C)  Pulse:  [] 106  Resp:  [16-20] 20  SpO2:  [93 %-99 %] 95 %  BP: ()/(47-68) 91/60      Weight: 79.8 kg (175 lb 14.8 oz)  Body mass index is 32.18 kg/m².  No intake or output data in the 24 hours ending 03/31/25 1406          Physical Exam  Constitutional:       Appearance: She is ill-appearing.   HENT:      Head: Normocephalic and atraumatic.      Nose: Nose normal.      Mouth/Throat:      Mouth: Mucous membranes are moist.      Pharynx: Oropharynx is clear.   Eyes:      Extraocular Movements: Extraocular movements intact.      Conjunctiva/sclera: Conjunctivae normal.      Pupils: Pupils are equal, round, and reactive to light.   Cardiovascular:      Rate and Rhythm: Regular rhythm. Tachycardia present.      Pulses: Normal pulses.      Heart sounds: Normal heart sounds.   Pulmonary:      Effort: Pulmonary effort is normal.      Comments: Secretions  Abdominal:      General: Abdomen is flat. Bowel sounds are normal.      Palpations: Abdomen is soft.   Musculoskeletal:      Cervical back: Normal range of motion and neck supple.   Skin:     General: Skin is warm and dry.   Neurological:      Mental Status: She is alert.      Comments: Quadriplegia    Psychiatric:         Mood and Affect: Mood normal.         Behavior: Behavior normal.               Significant Labs: All pertinent labs within the past 24 hours have been reviewed.    Significant Imaging: I have reviewed all pertinent imaging results/findings within the past 24 hours.      Assessment & Plan  Acute on chronic respiratory failure with hypoxia  Continue tracheostomy care with T-piece trials  Tracheostomy suctioning ordered due to thick secretions.   Culture tracheostomy secretions.   Monitor oxygenation and readiness for weaning  Pulmonary consult tomorrow.     3/24: mucus plugging requiring frequent suctioning.  Chest physiotherapy, Mucomyst.  Additional recommendations per pulmonology.     3/25: requiring frequent suctioning but otherwise stable.     3/26  - continues on vanc for blood cultures + on 3/24, echo completed at time with no  endocarditis seen  - discussed with pulmonary, recs to follow    3/27  - discussed with pulmonology who plan to continue trach collar as is and see patient May 1st at 1:40 pm, confirmed appointment  - discussed with family at bedside who are very concerned about secretions and states they don't have the suction capabilities at home and do not feel comfortable going home with her in this state, will discuss with social on possible home equipement    3/29  - continues with secretions, setting up home suction  - family requesting voice box    3/30  - anticipate discharge in the next few days with home health and set up at home      3/31  - secretions improved today  - working with social for home set up of oxygen and tube feeds  - family needs heavy education on trach care and tube feeds as primary care giver has no experience with either, still wishes to discharge home  Severe protein-calorie malnutrition  Nutrition consulted. Most recent weight and BMI monitored-     Measurements:  Wt Readings from Last 1 Encounters:   03/27/25 79.8 kg (175 lb 14.8 oz)   Body mass index is 32.18 kg/m².    Patient has been screened and assessed by RD.    Malnutrition Type:  Context: chronic illness  Level: severe    Continue PEG tube feeding.  Nepro at 40 mL/hr  Maintain bowel regimen (lactulose, PEG, senna)  Dietitian to reassess  continue with current tube feed regimen    Dysphagia  Continue omeprazole  Avoid PO intake  Speech therapy if clinically appropriate    Aspiration pneumonia  Recurrent aspiration pneumonia.  Complete two rounds of antibiotics.    Pressure ulcers of skin of multiple topographic sites  Aquacel Ag + Mepilex dressings  Wound care team consulted    Continue offloading and specialty mattress    Depression  Continue pregabalin, sertraline, trazodone      Mucus plugging of bronchi      S/P percutaneous endoscopic gastrostomy (PEG) tube placement  Patient noted to have a percutaneous endoscopic gastrostomy tube in  place. I have personally inspected the tube.Tube was placed prior to this admission There are no signs of drainage or infection around the site. The tube is patent. Medications have converted to liquid form if available.  Routine care to be done by wound care and nursing staff.       Quadriplegia  Maximal support, bedbound  PT/OT as tolerated  DME planning for discharge    Hematoma of lower extremity, right, subsequent encounter  No active bleeding; conservative management  Monitor H/H  No anticoagulation  Hyperlipidemia  Continue atorvastatin    Pericardial effusion  Previously on colchicine, now discontinued due to bleeding  Continue low-dose prednisone  Monitor for recurrence    Acute respiratory failure with hypoxia and hypercarbia  Patient with Hypoxic Respiratory failure which is Acute on chronic.  she is not on home oxygen. Supplemental oxygen was provided and noted- Oxygen Concentration (%):  [28] 28    .   Signs/symptoms of respiratory failure include- tachypnea and respiratory distress. Contributing diagnoses includes - Aspiration and Pneumonia Labs and images were reviewed. Patient Has not had a recent ABG. Will treat underlying causes and adjust management of respiratory failure as follows-   Chronic respiratory failure with hypoxia  Patient with Hypoxic Respiratory failure which is Acute on chronic.  she is not on home oxygen. Supplemental oxygen was provided and noted- Oxygen Concentration (%):  [28] 28    .   Signs/symptoms of respiratory failure include- increased work of breathing and respiratory distress. Contributing diagnoses includes - Aspiration and Pneumonia Labs and images were reviewed. Patient Has not had a recent ABG. Will treat underlying causes and adjust management of respiratory failure as follows-   Coag negative Staphylococcus bacteremia  Continue with current antibiotics until sensitivities return.      Antibiotics (From admission, onward)      Start     Stop Route Frequency Ordered  "   03/31/25 1400  vancomycin (VANCOCIN) 1,750 mg in 0.9% NaCl 500 mL IVPB         -- IV Every 36 hours 03/31/25 1237    03/25/25 0519  vancomycin - pharmacy to dose  (vancomycin IVPB (PEDS and ADULTS))        Placed in "And" Linked Group    -- IV pharmacy to manage frequency 03/25/25 0417          3/26  - continues on vanc for blood cultures + on 3/24, echo completed at time with no endocarditis seen  - discussed with pulmonary, recs to follow    3/27  - due to overall baseline health will treat coag negative staph bacteremia as a true infection, for now continue vanc and follow cultures and sensitivities    VTE Risk Mitigation (From admission, onward)           Ordered     enoxaparin injection 40 mg  Daily         03/23/25 1940     IP VTE HIGH RISK PATIENT  Once         03/23/25 1940     Place sequential compression device  Until discontinued         03/23/25 1941                    Discharge Planning   SHRADDHA:      Code Status: Full Code   Medical Readiness for Discharge Date:   Discharge Plan A: Long-term acute care facility (LTAC)                        Jordana Cavazos MD  Department of Hospital Medicine   [facility] - [address] [place], [place] [address]etry    "

## 2025-03-31 NOTE — ASSESSMENT & PLAN NOTE
"Continue with current antibiotics until sensitivities return.      Antibiotics (From admission, onward)      Start     Stop Route Frequency Ordered    03/31/25 1400  vancomycin (VANCOCIN) 1,750 mg in 0.9% NaCl 500 mL IVPB         -- IV Every 36 hours 03/31/25 1237    03/25/25 0519  vancomycin - pharmacy to dose  (vancomycin IVPB (PEDS and ADULTS))        Placed in "And" Linked Group    -- IV pharmacy to manage frequency 03/25/25 1244          3/26  - continues on vanc for blood cultures + on 3/24, echo completed at time with no endocarditis seen  - discussed with pulmonary, recs to follow    3/27  - due to overall baseline health will treat coag negative staph bacteremia as a true infection, for now continue vanc and follow cultures and sensitivities    "

## 2025-03-31 NOTE — DISCHARGE INSTRUCTIONS
Recommend bolus feeding of Isosource 1.5 one carton (237mL) 3x per day with 90mL free water flush before and after each bolus. May use Jevity 1.5 as equivalent substitute. Keep HOB at 30-45 degrees for at least 30 minutes after each bolus to reduce risk of aspiration. Monitor for tolerance: n/v/d/c.

## 2025-03-31 NOTE — PLAN OF CARE
Consult for Recommend bolus feedings of Isosource 1.5 one carton (237mL) 4x per day with 100mL free water flush before and after each bolus. May use Jevity 1.5 as equivalent substitute. Keep HOB at 30-45 degrees for at least 30 minutes after each bolus to reduce risk of aspiration. Monitor for tolerance: n/v/d/c. Ss spoke with timothy at [facility] and they accept pt's ins and provide tube feedings. Order faxed to [facility]. Info faxed to [facility]. Per dr miller d/c tomorrow. Ss following.    Ss spoke with lorene at the medical store and suction machine has been delivered.

## 2025-03-31 NOTE — PLAN OF CARE
Problem: Adult Inpatient Plan of Care  Goal: Absence of Hospital-Acquired Illness or Injury  Outcome: Progressing     Problem: Gas Exchange Impaired  Goal: Optimal Gas Exchange  3/31/2025 0801 by Fabby Velez, OLEGARIO  Outcome: Progressing  3/31/2025 0800 by Fabby Velez, OLEGARIO  Outcome: Progressing     Problem: Airway Clearance Ineffective  Goal: Effective Airway Clearance  Outcome: Progressing

## 2025-03-31 NOTE — CONSULTS
Pharmacokinetic Assessment Follow Up: IV Vancomycin    Vancomycin serum concentration assessment(s):    The trough level was drawn correctly and can be used to guide therapy at this time. The measurement is above the desired definitive target range of 15 to 20 mcg/mL.    Vancomycin Regimen Plan:    Discontinue the scheduled vancomycin regimen and re-dose when the random level is less than 20 mcg/mL, next level to be drawn at  1000 on 3/31.    Drug levels (last 3 results):  Recent Labs   Lab Result Units 03/30/25  2246   Vancomycin, Trough µg/mL 21.5*       Pharmacy will continue to follow and monitor vancomycin.    Please contact pharmacy at [email or web address] [phone] for questions regarding this assessment.    Thank you for the consult,   Manfred Maher

## 2025-03-31 NOTE — ASSESSMENT & PLAN NOTE
Continue tracheostomy care with T-piece trials  Tracheostomy suctioning ordered due to thick secretions.   Culture tracheostomy secretions.   Monitor oxygenation and readiness for weaning  Pulmonary consult tomorrow.     3/24: mucus plugging requiring frequent suctioning.  Chest physiotherapy, Mucomyst.  Additional recommendations per pulmonology.     3/25: requiring frequent suctioning but otherwise stable.     3/26  - continues on vanc for blood cultures + on 3/24, echo completed at time with no endocarditis seen  - discussed with pulmonary, recs to follow    3/27  - discussed with pulmonology who plan to continue trach collar as is and see patient May 1st at 1:40 pm, confirmed appointment  - discussed with family at bedside who are very concerned about secretions and states they don't have the suction capabilities at home and do not feel comfortable going home with her in this state, will discuss with social on possible home equipement    3/29  - continues with secretions, setting up home suction  - family requesting voice box    3/30  - anticipate discharge in the next few days with home health and set up at home      3/31  - secretions improved today  - working with social for home set up of oxygen and tube feeds  - family needs heavy education on trach care and tube feeds as primary care giver has no experience with either, still wishes to discharge home

## 2025-03-31 NOTE — PROGRESS NOTES
Re-started Vancomycin st 1750mg q36hrs. Trough 30 mins prior to the next dose on 4/2 at 0130. Will re-access at that time

## 2025-04-01 LAB
BUN SERPL-MCNC: 23 MG/DL (ref 10–20)
BUN/CREAT SERPL: 40 (ref 6–20)
CREAT SERPL-MCNC: 0.57 MG/DL (ref 0.55–1.02)
EGFR (NO RACE VARIABLE) (RUSH/TITUS): 104 ML/MIN/1.73M2
OHS QRS DURATION: 72 MS
OHS QTC CALCULATION: 453 MS

## 2025-04-01 PROCEDURE — 94761 N-INVAS EAR/PLS OXIMETRY MLT: CPT

## 2025-04-01 PROCEDURE — 25000003 PHARM REV CODE 250: Performed by: HOSPITALIST

## 2025-04-01 PROCEDURE — 99900026 HC AIRWAY MAINTENANCE (STAT)

## 2025-04-01 PROCEDURE — 11000001 HC ACUTE MED/SURG PRIVATE ROOM

## 2025-04-01 PROCEDURE — 94640 AIRWAY INHALATION TREATMENT: CPT

## 2025-04-01 PROCEDURE — 27000221 HC OXYGEN, UP TO 24 HOURS

## 2025-04-01 PROCEDURE — 99233 SBSQ HOSP IP/OBS HIGH 50: CPT | Mod: ,,, | Performed by: HOSPITALIST

## 2025-04-01 PROCEDURE — 36415 COLL VENOUS BLD VENIPUNCTURE: CPT

## 2025-04-01 PROCEDURE — 99900035 HC TECH TIME PER 15 MIN (STAT)

## 2025-04-01 PROCEDURE — 63600175 PHARM REV CODE 636 W HCPCS: Performed by: HOSPITALIST

## 2025-04-01 PROCEDURE — 27000207 HC ISOLATION

## 2025-04-01 PROCEDURE — 25000242 PHARM REV CODE 250 ALT 637 W/ HCPCS: Performed by: STUDENT IN AN ORGANIZED HEALTH CARE EDUCATION/TRAINING PROGRAM

## 2025-04-01 PROCEDURE — 82565 ASSAY OF CREATININE: CPT

## 2025-04-01 PROCEDURE — 25000242 PHARM REV CODE 250 ALT 637 W/ HCPCS: Performed by: INTERNAL MEDICINE

## 2025-04-01 RX ADMIN — HYDROCODONE BITARTRATE AND ACETAMINOPHEN 1 TABLET: 5; 325 TABLET ORAL at 01:04

## 2025-04-01 RX ADMIN — PREGABALIN 75 MG: 75 CAPSULE ORAL at 09:04

## 2025-04-01 RX ADMIN — MIDODRINE HYDROCHLORIDE 10 MG: 10 TABLET ORAL at 03:04

## 2025-04-01 RX ADMIN — ACETYLCYSTEINE 2 ML: 200 SOLUTION ORAL; RESPIRATORY (INHALATION) at 07:04

## 2025-04-01 RX ADMIN — TRAZODONE HYDROCHLORIDE 100 MG: 50 TABLET ORAL at 08:04

## 2025-04-01 RX ADMIN — SERTRALINE HYDROCHLORIDE 50 MG: 50 TABLET ORAL at 09:04

## 2025-04-01 RX ADMIN — PREGABALIN 75 MG: 75 CAPSULE ORAL at 08:04

## 2025-04-01 RX ADMIN — ENOXAPARIN SODIUM 40 MG: 40 INJECTION SUBCUTANEOUS at 05:04

## 2025-04-01 RX ADMIN — MIDODRINE HYDROCHLORIDE 10 MG: 10 TABLET ORAL at 09:04

## 2025-04-01 RX ADMIN — ATORVASTATIN CALCIUM 20 MG: 20 TABLET, FILM COATED ORAL at 08:04

## 2025-04-01 RX ADMIN — ACETYLCYSTEINE 2 ML: 200 SOLUTION ORAL; RESPIRATORY (INHALATION) at 01:04

## 2025-04-01 RX ADMIN — LEVALBUTEROL HYDROCHLORIDE 1.25 MG: 1.25 SOLUTION RESPIRATORY (INHALATION) at 07:04

## 2025-04-01 RX ADMIN — PANTOPRAZOLE SODIUM 40 MG: 40 INJECTION, POWDER, FOR SOLUTION INTRAVENOUS at 09:04

## 2025-04-01 RX ADMIN — MIDODRINE HYDROCHLORIDE 10 MG: 10 TABLET ORAL at 08:04

## 2025-04-01 RX ADMIN — LEVALBUTEROL HYDROCHLORIDE 1.25 MG: 1.25 SOLUTION RESPIRATORY (INHALATION) at 01:04

## 2025-04-01 NOTE — SUBJECTIVE & OBJECTIVE
Interval History:     Review of Systems   Unable to perform ROS: Patient nonverbal     Objective:     Vital Signs (Most Recent):  Temp: 97.4 °F (36.3 °C) (04/01/25 1542)  Pulse: (!) 115 (04/01/25 1542)  Resp: 16 (04/01/25 1542)  BP: 110/72 (04/01/25 1542)  SpO2: 99 % (04/01/25 1542) Vital Signs (24h Range):  Temp:  [97.4 °F (36.3 °C)-98.8 °F (37.1 °C)] 97.4 °F (36.3 °C)  Pulse:  [] 115  Resp:  [14-20] 16  SpO2:  [96 %-99 %] 99 %  BP: ()/(50-72) 110/72     Weight: 79.8 kg (175 lb 14.8 oz)  Body mass index is 32.18 kg/m².    Intake/Output Summary (Last 24 hours) at 4/1/2025 1600  Last data filed at 3/31/2025 1948  Gross per 24 hour   Intake 40 ml   Output --   Net 40 ml         Physical Exam  Constitutional:       Appearance: She is ill-appearing.   HENT:      Head: Normocephalic and atraumatic.      Nose: Nose normal.      Mouth/Throat:      Mouth: Mucous membranes are moist.      Pharynx: Oropharynx is clear.   Eyes:      Extraocular Movements: Extraocular movements intact.      Conjunctiva/sclera: Conjunctivae normal.      Pupils: Pupils are equal, round, and reactive to light.   Cardiovascular:      Rate and Rhythm: Regular rhythm. Tachycardia present.      Pulses: Normal pulses.      Heart sounds: Normal heart sounds.   Pulmonary:      Effort: Pulmonary effort is normal.      Breath sounds: Wheezing present.      Comments: Secretions  Abdominal:      General: Abdomen is flat. Bowel sounds are normal.      Palpations: Abdomen is soft.   Musculoskeletal:      Cervical back: Normal range of motion and neck supple.   Skin:     General: Skin is warm and dry.   Neurological:      Mental Status: She is alert.      Comments: Quadriplegia    Psychiatric:         Mood and Affect: Mood normal.         Behavior: Behavior normal.               Significant Labs: All pertinent labs within the past 24 hours have been reviewed.    Significant Imaging: I have reviewed all pertinent imaging results/findings within the  past 24 hours.

## 2025-04-01 NOTE — PLAN OF CARE
Dr corley and ss spoke with pt's mother and choice given for [facility] ltac. Referral faxed. [facility] is full at this time.  Met with pt's mother to review discharge recommendation of ltac and is agreeable to plan    Patient/family provided list of facilities in-network with patient's payor plan. Providers that are owned, operated, or affiliated with [facility] are included on the list.     Notified that referral sent to below listed facilities from in-network list based on proximity to home/family support:   1.[facility]  2.  3.  4.  5. (can send more than 5)    Patient/family instructed to identify preference.    Preferred Facility: (if more than 1, listed in order of descending preference)  1.    Patient has declined to select a preferred provider and elects placement with the first accepting in network provider that is available to provide services as ordered by the physician       If an additional preferred facility not listed above is identified, additional referral to be sent. If above facilities unable to accept, will send additional referrals to in-network providers.

## 2025-04-01 NOTE — ASSESSMENT & PLAN NOTE
Continue tracheostomy care with T-piece trials  Tracheostomy suctioning ordered due to thick secretions.   Culture tracheostomy secretions.   Monitor oxygenation and readiness for weaning  Pulmonary consult tomorrow.     3/24: mucus plugging requiring frequent suctioning.  Chest physiotherapy, Mucomyst.  Additional recommendations per pulmonology.     3/25: requiring frequent suctioning but otherwise stable.     3/26  - continues on vanc for blood cultures + on 3/24, echo completed at time with no endocarditis seen  - discussed with pulmonary, recs to follow    3/27  - discussed with pulmonology who plan to continue trach collar as is and see patient May 1st at 1:40 pm, confirmed appointment  - discussed with family at bedside who are very concerned about secretions and states they don't have the suction capabilities at home and do not feel comfortable going home with her in this state, will discuss with social on possible home equipement    3/29  - continues with secretions, setting up home suction  - family requesting voice box    3/30  - anticipate discharge in the next few days with home health and set up at home      3/31  - secretions improved today  - working with social for home set up of oxygen and tube feeds  - family needs heavy education on trach care and tube feeds as primary care giver has no experience with either, still wishes to discharge home    4/1: Still frequent suctioning.  Family would like patient to go to LTAC before going home as she still requires frequent suctioning, tube feeds, antibiotics, and additional care.    She has required over a week of ICU care prior to returning to our facility.  She was on the vent and was difficult to wean. She has required intermediate ICU level of care while at our facility.

## 2025-04-01 NOTE — PROGRESS NOTES
"[facility] - [address] [place] [address]  Adult Nutrition  Follow Up Note         Reason for Assessment  Reason For Assessment: RD follow-up        Assessment and Plan    4/1/2025: RD follow up. Patient remains on Isosource 1.5. Feedings at goal with no tolerance issues noted. Discharge bolus feeding recommendations provided. RD available if needed. Recommend continue current POC as tolerated. RD following.    3/27/25: RD follow up. Patient remains NPO and on enteral feeds of Isosource 1.5. Patient is at goal rate and tolerating feeds well per flowsheet. Recommend to continue with Isosource 1.5 at 30 mL/hr with FWF 40 mL/hr and Luis Fernando BID. Keep HOB at 30-45 degrees to reduce risk of aspiration. Monitor for tolerance: n/v/d/c. Hold feeding and notify RD if sx of intolerance develop. Monitor electrolytes and replete as needed.    *Per SLP note 3/24: "Patient's mother reports that pt is not asking for anything to eat or drink orally, and she does not wish to feed her orally at this time. Will D/C order for swallow eval at this time."    3/24/25: Consult received and appreciated. Consult for new tube feeding. Patient is a 58 yo female with a complex medical history including quadriplegia following spinal surgery in 2015 and a CVA in 2024 resulting in left-sided paralysis. She was initially hospitalized in February 2025 for aspiration and dysphagia evaluation. Required prolonged mechanical ventilation, tracheostomy placement, and PEG tube insertion. Returned to [facility] on 3/23/25 for continued respiratory care, tracheostomy management, PEG feeding, management of sacral pressure injury, and rehab planning. Additional relevant PMHx includes aspiration pneumonia, respiratory failure, depression, GERD, chronic constipation, HLD, anemia.     Per H&P, patient noted to be on PEG tube feeding of Nepro at 40 mL/hr. Please see below for tube feed recommendations.     Patient is 79.8 kg (175 lb) with a BMI of 32.18 and " is obese (Class 1 Obesity). Review of records reveals a >10% (18.6%) significant unintentional weight loss x 6 mo (97.5 kg 8/29/24). She is also noted to have an unhealed sacral wound.     Per ASPEN guidelines patient meets criteria for severe protein-calorie malnutrition secondary to dysphagia resulting in inadequate PO intakes as evidenced by >10% significant unintentional weight loss x 6 months, unhealed wounds, and consuming <75% estimated energy requirements for >1 month.     ENTERAL FEED RECOMMENDATIONS:    *Needs were adjusted and account for quadriplegia, BMI, and sacral wound*    Initiate continuous infusion of Isosource 1.5 at 10 mL/hr via PEG and advance 10 mL/hr q8h pending tolerance until goal rate of 30 mL/hr is achieved. Do not exceed goal rate. FWF 40 mL/hr. Keep HOB at 30-45 degrees to reduce risk of aspiration. Monitor for tolerance: n/v/d/c. Hold feeding and notify RD if symptoms of intolerance develop.     Recommend addition of Luis Fernando BID via PEG to promote wound healing and provide additional protein. Recommend to continue with bowel regimen (lactulose, PEG, senna) given hx of chronic constipation. Tube feed regimen will also provide patient with ~ 11 g of fiber per 24 hours, which may help to further alleviate constipation. Given patient is receiving < 1 L formula per day, recommend to consider addition of multivitamin/mineral supplement to help meet micronutrient needs.    Last Bowel Movement: 03/29/25    Medications/labs reviewed. RD following.    Learning Needs/Social Determinants of Health    Learning Assessment       03/23/2025 1451 [facility] Tele[facility] (3/23/2025 - Present)   Created by Corry Blue, RN - RN (Nurse) Status: Complete                 PRIMARY LEARNER     Primary Learner Name:  marina noble  - 03/23/2025 1451    Relationship:  Patient, Family  - 03/23/2025 1451    Does the primary learner have any barriers to learning?:  No Barriers   - 03/23/2025 1451    What is the preferred language of the primary learner?:  English  - 03/23/2025 1451    Is an  required?:  No  - 03/23/2025 1451    How does the primary learner prefer to learn new concepts?:  Listening  - 03/23/2025 1451    How often do you need to have someone help you read instructions, pamphlets, or written material from your doctor or pharmacy?:  Never  - 03/23/2025 1451        CO-LEARNER #1     No question answered        CO-LEARNER #2     No question answered        SPECIAL TOPICS     No question answered        ANSWERED BY:     No question answered        Comments         Edit History       Corry Blue, RN - RN (Nurse)   03/23/2025 1451                          Social Drivers of Health     Tobacco Use: Low Risk  (3/23/2025)    Patient History     Smoking Tobacco Use: Never     Smokeless Tobacco Use: Never     Passive Exposure: Not on file   Alcohol Use: Not At Risk (3/24/2025)    AUDIT-C     Frequency of Alcohol Consumption: Never     Average Number of Drinks: Patient does not drink     Frequency of Binge Drinking: Never   Financial Resource Strain: Low Risk  (3/25/2025)    Overall Financial Resource Strain (CARDIA)     Difficulty of Paying Living Expenses: Not hard at all   Food Insecurity: No Food Insecurity (3/25/2025)    Hunger Vital Sign     Worried About Running Out of Food in the Last Year: Never true     Ran Out of Food in the Last Year: Never true   Recent Concern: Food Insecurity - Food Insecurity Present (3/23/2025)    Hunger Vital Sign     Worried About Running Out of Food in the Last Year: Often true     Ran Out of Food in the Last Year: Often true   Transportation Needs: No Transportation Needs (3/24/2025)    PRAPARE - Transportation     Lack of Transportation (Medical): No     Lack of Transportation (Non-Medical): No   Physical Activity: Inactive (3/24/2025)    Exercise Vital Sign     Days of Exercise per Week: 0 days     Minutes of Exercise per  Session: 0 min   Stress: No Stress Concern Present (3/25/2025)    [employer] - Occupational Stress Questionnaire     Feeling of Stress : Not at all   Recent Concern: Stress - Stress Concern Present (3/23/2025)    [employer] - Occupational Stress Questionnaire     Feeling of Stress : Rather much   Housing Stability: Low Risk  (3/25/2025)    Housing Stability Vital Sign     Unable to Pay for Housing in the Last Year: No     Number of Times Moved in the Last Year: Not on file     Homeless in the Last Year: No   Depression: Not on file   Utilities: Not At Risk (3/25/2025)    [facility] Utilities     Threatened with loss of utilities: No   Health Literacy: Adequate Health Literacy (3/25/2025)     Health Literacy     Frequency of need for help with medical instructions: Rarely   Recent Concern: Health Literacy - Inadequate Health Literacy (2/16/2025)     Health Literacy     Frequency of need for help with medical instructions: Always   Social Isolation: Socially Integrated (3/24/2025)    Social Isolation     Social Isolation: 1     Malnutrition  Is Patient Malnourished: Yes    Nutrition consulted. Most recent weight and BMI monitored-     Measurements:  Wt Readings from Last 1 Encounters:   03/27/25 79.8 kg (175 lb 14.8 oz)   Body mass index is 32.18 kg/m².    Patient has been screened and assessed by RD.    Malnutrition Type:  Context: chronic illness  Level: severe    Malnutrition Characteristic Summary:  Weight Loss (Malnutrition): greater than 10% in 6 months  Energy Intake (Malnutrition): less than or equal to 75% for greater than or equal to 1 month    Interventions/Recommendations (treatment strategy):  Dependence on PEG for feedings, continuous enteral feedings;  continue with current tube feed regimen     Nutrition Diagnosis  Malnutrition (Severe) related to Dysphagia/ difficulty swallowing as evidenced by >10% significant unintentional weight loss x  "6 months, unhealed wounds, and consuming <75% estimated energy requirements for >1 month.   Comments: upon observation, no apparent muscle and fat wasting - pt still meets malnutrition criteria per ASPEN guidelines    No results for input(s): "GLU", "POCGLU" in the last 72 hours.        Nutrition Prescription / Recommendations  Recommendation/Intervention: continue with current tube feed regimen  Goals: continue to tolerate feeds at goal rate, weight maintenance during admission, improve skin integrity  Nutrition Goal Status: goal met  Current Diet Order: NPO  Nutrition Order Comments: Enteral Feeds  Chewing or Swallowing Difficulty?: Swallowing difficulty  Recommended Diet: Enteral Nutrition  Recommended Oral Supplement: Luis Fernando [90 kcals, 2.5g Protein, 10g Carbs(3g Sugar), 7g L-Arginine, 7g L-Glutamine, Vitamin C 300mg, 9.5mg Zinc] 2 times a day  Is Nutrition Support Recommended: Yes  Is Nutrition Education Recommended: No    Needs Calculated    Energy Calorie Requirements (kcal): 1,012 kcal (23 kcal/kg adjusted IBW for quadriplegia) (decreased needs 2/2 decreased metabolic activity due to denervated muscle)  Protein Requirements: 53 - 66 g (1.2 - 1.5 g/kg adjusted IBW for quadriplegia) (increased protein requirements 2/2 sacral wound and prevention of muscle atrophy)  Enteral Nutrition   Enteral Nutrition Formula Provides:  1,260 kcals Propofol Rate: No (1,080 kcal Isosource + 180 Luis Fernando)  54 g Protein (49 g Isosource + 5 Luis Fernando)  121 g Carbohydrates  47 g Fat Propofol Rate: No  560 ml Fluid without Flush    960 ml Fluid by flush   1,520 ml Total Fluid  Enteral Nutrition Recommended Order:  Tube feeding via PEG/ Gastrostomy  Tube feeding formula: Isosource 1.5 PEG/ Gastrostomy  Free Water Flush: 40 ml hourly  Modular Supplements: Luis Fernando BID   Enteral Nutrition meets needs?: yes  Enteral Nutrition Status: Continue Enteral Nutrition    Monitor and Evaluation  % current Intake: Enteral Nutrition at goal  % intake to meet " estimated needs: Enteral Nutrition   Monitor and Evaluation: Enteral and parenteral nutrition administration, Weight, Electrolyte and renal panel, Gastrointestinal profile, Glucose/endocrine profile, Inflammatory profile, Lipid profile, Nutrition focused physical findings, Skin    Current Medical Diagnosis and Past Medical History     Past Medical History:   Diagnosis Date    GERD (gastroesophageal reflux disease)     Paraplegia      Nutrition/Diet History  Food Allergies: NKFA  Factors Affecting Nutritional Intake: difficulty/impaired swallowing    Lab/Procedures/Meds  Recent Labs   Lab 04/01/25  0425   BUN 23*   CREATININE 0.57   Note: BUN, Cr low. Possibly related to malnutrition, reduced muscle mass 2/2 atrophy    Last A1c:   Lab Results   Component Value Date    HGBA1C 5.5 03/01/2025   Note: WNL    Lab Results   Component Value Date    RBC 3.39 (L) 03/26/2025    HGB 8.7 (L) 03/26/2025    HCT 30.7 (L) 03/26/2025    MCV 90.6 03/26/2025    MCH 25.7 (L) 03/26/2025    MCHC 28.3 (L) 03/26/2025   Note: H/H low. PMHx of anemia    Pertinent Labs Reviewed: reviewed  Pertinent Medications Reviewed: reviewed    Scheduled Meds:   acetylcysteine 200 mg/ml (20%)  2 mL Nebulization TID    atorvastatin  20 mg Oral QHS    enoxparin  40 mg Subcutaneous Daily    levalbuterol  1.25 mg Nebulization TID    midodrine  10 mg Oral TID    pantoprazole  40 mg Intravenous Daily    pregabalin  75 mg Oral BID    sertraline  50 mg Oral Daily    vancomycin (VANCOCIN) IV (PEDS and ADULTS)  1,750 mg Intravenous Q36H   Note: atorvastatin (HLD), pantoprazole    Continuous Infusions:      PRN Meds:.  Current Facility-Administered Medications:     acetaminophen, 1,000 mg, Oral, Q8H PRN    acetaminophen, 650 mg, Oral, Q8H PRN    acetaminophen, 650 mg, Oral, Q4H PRN    aluminum-magnesium hydroxide-simethicone, 30 mL, Oral, QID PRN    dextrose 50%, 12.5 g, Intravenous, PRN    dextrose 50%, 12.5 g, Intravenous, PRN    dextrose 50%, 25 g, Intravenous,  "PRN    glucagon (human recombinant), 1 mg, Intramuscular, PRN    glucose, 16 g, Oral, PRN    glucose, 24 g, Oral, PRN    HYDROcodone-acetaminophen, 1 tablet, Oral, Q6H PRN    HYDROmorphone, 1 mg, Intravenous, Q6H PRN    magnesium oxide, 800 mg, Oral, PRN    magnesium oxide, 800 mg, Oral, PRN    melatonin, 6 mg, Oral, Nightly PRN    naloxone, 0.02 mg, Intravenous, PRN    ondansetron, 4 mg, Intravenous, Q8H PRN    polyethylene glycol, 17 g, Oral, Daily PRN    potassium bicarbonate, 35 mEq, Oral, PRN    potassium bicarbonate, 50 mEq, Oral, PRN    potassium bicarbonate, 60 mEq, Oral, PRN    potassium, sodium phosphates, 2 packet, Oral, PRN    potassium, sodium phosphates, 2 packet, Oral, PRN    potassium, sodium phosphates, 2 packet, Oral, PRN    sodium chloride 0.9%, 10 mL, Intravenous, PRN    traZODone, 100 mg, Oral, Nightly PRN    Pharmacy to dose Vancomycin consult, , , Once **AND** vancomycin - pharmacy to dose, , Intravenous, pharmacy to manage frequency  Note: magnesium oxide, ondansetron, polyethylene glycol, potassium bicarb    Anthropometrics  Height: 5' 2" (157.5 cm)  Height (inches): 62 in  Height Method: Stated  Weight: 79.8 kg (175 lb 14.8 oz)  Weight (lb): 175.93 lb  Weight Method: Bed Scale  Ideal Body Weight (IBW), Female: 110 lb  BMI (Calculated): 32.2  Tetraplegia (Quadriplegia) Ideal Body Weight (IBW) Adjustment: 97 lb    Estimated/Assessed Needs  RMR ([employer]-St. Jeor Equation): 1316.25     Temp: 98.2 °F (36.8 °C)Oral  Weight Used For Calorie Calculations: 44 kg (97 lb)     Energy Calorie Requirements (kcal): 1,012 kcal (23 kcal/kg adjusted IBW for quadriplegia) (decreased needs 2/2 decreased metabolic activity due to denervated muscle)  Weight Used For Protein Calculations: 44 kg (97 lb)  Protein Requirements: 53 - 66 g (1.2 - 1.5 g/kg adjusted IBW for quadriplegia) (increased protein requirements 2/2 sacral wound and prevention of muscle atrophy)       Fluids: 30 - 40 mL/kg normal requirements for " patient with quadriplegia   Fluid Requirements: 1, 540 mL (35 mL/kg adjusted IBW for quadriplegia)       Nutrition by Nursing  Diet/Nutrition Received: tube feeding  Intake (%):  (NPO)     Diet/Feeding Tolerance: other (see comments)       Gastrostomy/Enterostomy LUQ-Feeding Type: continuous       Gastrostomy/Enterostomy LUQ-Current Rate (mL/hr): 30 mL/hr       Gastrostomy/Enterostomy LUQ-Goal Rate (mL/hr): 30 mL/hr       Gastrostomy/Enterostomy LUQ-Formula Name: isosource 1.5    Nutrition Follow-Up  RD Follow-up?: Yes    Nutrition Discharge Planning: Enteral nutrition (comments)       Niki Soares, MS, RD, LD  Available via Secure Chat

## 2025-04-01 NOTE — PROGRESS NOTES
[facility] - [address] [place] [address] Medicine  Progress Note    Patient Name: Karie Denney  MRN: [id number]  Patient Class: IP- Inpatient   Admission Date: 3/23/2025  Length of Stay: 9 days  Attending Physician: Cassie Bar MD  Primary Care Provider: Gonzalo Barrera NP        Subjective     Principal Problem:Acute on chronic respiratory failure with hypoxia    HPI:  Chief Complaint  Transfer for continued management of respiratory failure, tracheostomy care, PEG feeding, and complications of quadriplegia following prolonged hospitalization.    History of Present Illness  Ms. Karie Denney is a 59-year-old woman with a complex medical history including quadriplegia following spinal surgery in 2015 and a cerebrovascular accident (CVA) in 2024 resulting in left-sided paralysis. She was transferred to [facility] from [facility] in [place], [place], for ongoing care following a prolonged ICU course involving intubation, respiratory failure, and significant complications.  She was initially hospitalized on February 15, 2025, for aspiration and dysphagia evaluation, during which she developed aspiration pneumonia and respiratory failure requiring intubation. Her hospital course was complicated by an ESBL E. coli UTI, pericardial effusion (treated with colchicine), and a spontaneous right thigh hematoma concerning for compartment syndrome, leading to transfer to [facility]. She required prolonged mechanical ventilation, tracheostomy placement, and PEG tube insertion.  She has now returned to [facility] for continued respiratory care, tracheostomy management, PEG feeding, management of sacral pressure injury, and rehabilitation planning.    Past Medical History  Quadriplegia post-spinal surgery (2015)  CVA (2024)  Aspiration pneumonia  Acute respiratory failure  UTI (ESBL E. coli)  Depression  GERD  Pharyngoesophageal dysphagia  Pericardial effusion/pericarditis  Chronic  constipation  Pressure ulcer (sacral, unstageable)  Hyperlipidemia  Anemia    Past Surgical History  Spinal surgery (2015)  PEG tube placement (03/11/2025)  Esophageal dilation with biopsy (08/2024)    Medications  Active Medications:  Atorvastatin 20 mg daily  Omeprazole 40 mg daily  Sertraline 50 mg daily  Trazodone 100 mg qHS  Pregabalin 75 mg BID  Hydrocodone-acetaminophen 5-325 mg BID  Lactulose 30 mL daily via PEG  Midodrine 10 mg Q6H via PEG  Levalbuterol 0.63 mg nebulizer Q6H  Polyethylene glycol 17 g daily via PEG  Recent Changes:  Colchicine: Discontinued due to bleeding  Aspirin and ezetimibe: Discontinued    Allergies  Penicillins ? Rash and anaphylaxis    Social History  Never smoker  No alcohol or tobacco use  Lives at home with mother; receives home health weekly  Bedbound, non-ambulatory    Family History  Noncontributory    Review of Systems  Limited due to tracheostomy, quadriplegia, and communication challenges. History obtained from EMR and caregiver.    Physical Examination  General: Chronically ill-appearing woman, alert, tracheostomy in place with T-piece  HEENT: Mild nasal skin necrosis, mucous membranes moist  Eyes: PERRL, EOMI  Neck: Tracheostomy present  CV: RRR, no murmurs  Lungs: Breath sounds diminished at bases; no acute distress; no wheezing  Abdomen: Soft, non-tender, PEG tube in place  Skin: Unstageable sacral ulcer, nasal pressure ulcer  Neuro: Quadriplegia, responsive with eye tracking and blinking  Extremities: RLE hematoma, bilateral edema, no signs of active bleeding    Laboratory Data (Most Recent)  Hgb: 8.5 g/dL  Creatinine: 0.30 mg/dL  Albumin: 3.1 g/dL  WBC: 9.4 K/uL  Ferritin: 265 ng/mL  INR: 0.93  No growth on recent blood and urine cultures    Imaging  CT angio: Large RLE hematoma without active extravasation  Multiple chest X-rays: Stable bilateral pleural effusions, improving atelectasis  Echo: EF 55-60%, small pericardial effusion      Overview/Hospital  Course:  3/26  - continues on vanc for blood cultures + on 3/24, echo completed at time with no endocarditis seen  - discussed with pulmonary, recs to follow    3/27  - due to overall baseline health will treat coag negative staph bacteremia as a true infection, for now continue vanc and follow cultures and sensitivities  - discussed with pulmonology who plan to continue trach collar as is and see patient May 1st at 1:40 pm, confirmed appointment  - discussed with family at bedside who are very concerned about secretions and states they don't have the suction capabilities at home and do not feel comfortable going home with her in this state, will discuss with social on possible home equipment    3/28  - awaiting micro, continuing vanc  - social setting up suction at home, family plans to return home as before    3/29  - still with secretions  - family requesting voice box    3/30  - doing well today, mentation much improved  - anticipate discharge within the next two days with home health services, family will need education on trach maintenance and home feedings as patient has PEG tube and they have not cared for a PEG tube before, also we will discuss with  getting a speaking told to use with a trach    3/31  - secretions improved today  - working with social for home set up of oxygen and tube feeds  - family needs heavy education on trach care and tube feeds as primary care giver has no experience with either, still wishes to discharge home    Interval History:     Review of Systems   Unable to perform ROS: Patient nonverbal     Objective:     Vital Signs (Most Recent):  Temp: 97.4 °F (36.3 °C) (04/01/25 1542)  Pulse: (!) 115 (04/01/25 1542)  Resp: 16 (04/01/25 1542)  BP: 110/72 (04/01/25 1542)  SpO2: 99 % (04/01/25 1542) Vital Signs (24h Range):  Temp:  [97.4 °F (36.3 °C)-98.8 °F (37.1 °C)] 97.4 °F (36.3 °C)  Pulse:  [] 115  Resp:  [14-20] 16  SpO2:  [96 %-99 %] 99 %  BP: ()/(50-72)  110/72     Weight: 79.8 kg (175 lb 14.8 oz)  Body mass index is 32.18 kg/m².    Intake/Output Summary (Last 24 hours) at 4/1/2025 1600  Last data filed at 3/31/2025 1948  Gross per 24 hour   Intake 40 ml   Output --   Net 40 ml         Physical Exam  Constitutional:       Appearance: She is ill-appearing.   HENT:      Head: Normocephalic and atraumatic.      Nose: Nose normal.      Mouth/Throat:      Mouth: Mucous membranes are moist.      Pharynx: Oropharynx is clear.   Eyes:      Extraocular Movements: Extraocular movements intact.      Conjunctiva/sclera: Conjunctivae normal.      Pupils: Pupils are equal, round, and reactive to light.   Cardiovascular:      Rate and Rhythm: Regular rhythm. Tachycardia present.      Pulses: Normal pulses.      Heart sounds: Normal heart sounds.   Pulmonary:      Effort: Pulmonary effort is normal.      Breath sounds: Wheezing present.      Comments: Secretions  Abdominal:      General: Abdomen is flat. Bowel sounds are normal.      Palpations: Abdomen is soft.   Musculoskeletal:      Cervical back: Normal range of motion and neck supple.   Skin:     General: Skin is warm and dry.   Neurological:      Mental Status: She is alert.      Comments: Quadriplegia    Psychiatric:         Mood and Affect: Mood normal.         Behavior: Behavior normal.               Significant Labs: All pertinent labs within the past 24 hours have been reviewed.    Significant Imaging: I have reviewed all pertinent imaging results/findings within the past 24 hours.      Assessment & Plan  Acute on chronic respiratory failure with hypoxia  Continue tracheostomy care with T-piece trials  Tracheostomy suctioning ordered due to thick secretions.   Culture tracheostomy secretions.   Monitor oxygenation and readiness for weaning  Pulmonary consult tomorrow.     3/24: mucus plugging requiring frequent suctioning.  Chest physiotherapy, Mucomyst.  Additional recommendations per pulmonology.     3/25: requiring  frequent suctioning but otherwise stable.     3/26  - continues on vanc for blood cultures + on 3/24, echo completed at time with no endocarditis seen  - discussed with pulmonary, recs to follow    3/27  - discussed with pulmonology who plan to continue trach collar as is and see patient May 1st at 1:40 pm, confirmed appointment  - discussed with family at bedside who are very concerned about secretions and states they don't have the suction capabilities at home and do not feel comfortable going home with her in this state, will discuss with social on possible home equipement    3/29  - continues with secretions, setting up home suction  - family requesting voice box    3/30  - anticipate discharge in the next few days with home health and set up at home      3/31  - secretions improved today  - working with social for home set up of oxygen and tube feeds  - family needs heavy education on trach care and tube feeds as primary care giver has no experience with either, still wishes to discharge home    4/1: Still frequent suctioning.  Family would like patient to go to LTAC before going home as she still requires frequent suctioning, tube feeds, antibiotics, and additional care.    She has required over a week of ICU care prior to returning to our facility.  She was on the vent and was difficult to wean. She has required intermediate ICU level of care while at our facility.     Severe protein-calorie malnutrition  Nutrition consulted. Most recent weight and BMI monitored-     Measurements:  Wt Readings from Last 1 Encounters:   03/27/25 79.8 kg (175 lb 14.8 oz)   Body mass index is 32.18 kg/m².    Patient has been screened and assessed by RD.    Malnutrition Type:  Context: chronic illness  Level: severe    Continue PEG tube feeding.  Nepro at 40 mL/hr  Maintain bowel regimen (lactulose, PEG, senna)  Dietitian to reassess  continue with current tube feed regimen    Dysphagia  Continue omeprazole  Avoid PO  intake  Speech therapy if clinically appropriate    Aspiration pneumonia  Recurrent aspiration pneumonia.  Complete two rounds of antibiotics.    Pressure ulcers of skin of multiple topographic sites  Aquacel Ag + Mepilex dressings  Wound care team consulted    Continue offloading and specialty mattress    Depression  Continue pregabalin, sertraline, trazodone      Mucus plugging of bronchi      S/P percutaneous endoscopic gastrostomy (PEG) tube placement  Patient noted to have a percutaneous endoscopic gastrostomy tube in place. I have personally inspected the tube.Tube was placed prior to this admission There are no signs of drainage or infection around the site. The tube is patent. Medications have converted to liquid form if available.  Routine care to be done by wound care and nursing staff.       Quadriplegia  Maximal support, bedbound  PT/OT as tolerated  DME planning for discharge    Hematoma of lower extremity, right, subsequent encounter  No active bleeding; conservative management  Monitor H/H  No anticoagulation  Hyperlipidemia  Continue atorvastatin    Pericardial effusion  Previously on colchicine, now discontinued due to bleeding  Continue low-dose prednisone  Monitor for recurrence    Acute respiratory failure with hypoxia and hypercarbia  Patient with Hypoxic Respiratory failure which is Acute on chronic.  she is not on home oxygen. Supplemental oxygen was provided and noted- Oxygen Concentration (%):  [28] 28    .   Signs/symptoms of respiratory failure include- tachypnea and respiratory distress. Contributing diagnoses includes - Aspiration and Pneumonia Labs and images were reviewed. Patient Has not had a recent ABG. Will treat underlying causes and adjust management of respiratory failure as follows-   Chronic respiratory failure with hypoxia  Patient with Hypoxic Respiratory failure which is Acute on chronic.  she is not on home oxygen. Supplemental oxygen was provided and noted- Oxygen  "Concentration (%):  [28] 28    .   Signs/symptoms of respiratory failure include- increased work of breathing and respiratory distress. Contributing diagnoses includes - Aspiration and Pneumonia Labs and images were reviewed. Patient Has not had a recent ABG. Will treat underlying causes and adjust management of respiratory failure as follows-   Coag negative Staphylococcus bacteremia  Continue with current antibiotics until sensitivities return.      Antibiotics (From admission, onward)      Start     Stop Route Frequency Ordered    03/31/25 1400  vancomycin (VANCOCIN) 1,750 mg in 0.9% NaCl 500 mL IVPB         -- IV Every 36 hours 03/31/25 1237    03/25/25 0519  vancomycin - pharmacy to dose  (vancomycin IVPB (PEDS and ADULTS))        Placed in "And" Linked Group    -- IV pharmacy to manage frequency 03/25/25 8546          3/26  - continues on vanc for blood cultures + on 3/24, echo completed at time with no endocarditis seen  - discussed with pulmonary, recs to follow    3/27  - due to overall baseline health will treat coag negative staph bacteremia as a true infection, for now continue vanc and follow cultures and sensitivities    VTE Risk Mitigation (From admission, onward)           Ordered     enoxaparin injection 40 mg  Daily         03/23/25 1940     IP VTE HIGH RISK PATIENT  Once         03/23/25 1940     Place sequential compression device  Until discontinued         03/23/25 1941                    Discharge Planning   SHRADDHA:      Code Status: Full Code   Medical Readiness for Discharge Date:   Discharge Plan A: Long-term acute care facility (LTAC)                Cassie Bar MD  Department of Hospital Medicine   [facility] - [address] [place] [address]    "

## 2025-04-01 NOTE — ASSESSMENT & PLAN NOTE
"Continue with current antibiotics until sensitivities return.      Antibiotics (From admission, onward)      Start     Stop Route Frequency Ordered    03/31/25 1400  vancomycin (VANCOCIN) 1,750 mg in 0.9% NaCl 500 mL IVPB         -- IV Every 36 hours 03/31/25 1237    03/25/25 0519  vancomycin - pharmacy to dose  (vancomycin IVPB (PEDS and ADULTS))        Placed in "And" Linked Group    -- IV pharmacy to manage frequency 03/25/25 4381          3/26  - continues on vanc for blood cultures + on 3/24, echo completed at time with no endocarditis seen  - discussed with pulmonary, recs to follow    3/27  - due to overall baseline health will treat coag negative staph bacteremia as a true infection, for now continue vanc and follow cultures and sensitivities    "

## 2025-04-02 LAB
ANION GAP SERPL CALCULATED.3IONS-SCNC: 13 MMOL/L (ref 7–16)
BASOPHILS # BLD AUTO: 0.08 K/UL (ref 0–0.2)
BASOPHILS NFR BLD AUTO: 0.8 % (ref 0–1)
BUN SERPL-MCNC: 22 MG/DL (ref 10–20)
BUN/CREAT SERPL: 33 (ref 6–20)
CALCIUM SERPL-MCNC: 9.8 MG/DL (ref 8.4–10.2)
CHLORIDE SERPL-SCNC: 102 MMOL/L (ref 98–107)
CO2 SERPL-SCNC: 28 MMOL/L (ref 23–31)
CREAT SERPL-MCNC: 0.66 MG/DL (ref 0.55–1.02)
DIFFERENTIAL METHOD BLD: ABNORMAL
EGFR (NO RACE VARIABLE) (RUSH/TITUS): 100 ML/MIN/1.73M2
EOSINOPHIL # BLD AUTO: 0.3 K/UL (ref 0–0.5)
EOSINOPHIL NFR BLD AUTO: 2.8 % (ref 1–4)
ERYTHROCYTE [DISTWIDTH] IN BLOOD BY AUTOMATED COUNT: 18.7 % (ref 11.5–14.5)
GLUCOSE SERPL-MCNC: 86 MG/DL (ref 82–115)
HCT VFR BLD AUTO: 34.2 % (ref 38–47)
HGB BLD-MCNC: 9.9 G/DL (ref 12–16)
IMM GRANULOCYTES # BLD AUTO: 0.03 K/UL (ref 0–0.04)
IMM GRANULOCYTES NFR BLD: 0.3 % (ref 0–0.4)
LYMPHOCYTES # BLD AUTO: 3.59 K/UL (ref 1–4.8)
LYMPHOCYTES NFR BLD AUTO: 33.8 % (ref 27–41)
MCH RBC QN AUTO: 25.7 PG (ref 27–31)
MCHC RBC AUTO-ENTMCNC: 28.9 G/DL (ref 32–36)
MCV RBC AUTO: 88.8 FL (ref 80–96)
MONOCYTES # BLD AUTO: 1.08 K/UL (ref 0–0.8)
MONOCYTES NFR BLD AUTO: 10.2 % (ref 2–6)
MPC BLD CALC-MCNC: 11 FL (ref 9.4–12.4)
NEUTROPHILS # BLD AUTO: 5.55 K/UL (ref 1.8–7.7)
NEUTROPHILS NFR BLD AUTO: 52.1 % (ref 53–65)
NRBC # BLD AUTO: 0 X10E3/UL
NRBC, AUTO (.00): 0 %
PLATELET # BLD AUTO: 260 K/UL (ref 150–400)
POTASSIUM SERPL-SCNC: 4.3 MMOL/L (ref 3.5–5.1)
RBC # BLD AUTO: 3.85 M/UL (ref 4.2–5.4)
SODIUM SERPL-SCNC: 139 MMOL/L (ref 136–145)
VANCOMYCIN TROUGH SERPL-MCNC: 14.4 ΜG/ML (ref 15–20)
WBC # BLD AUTO: 10.63 K/UL (ref 4.5–11)

## 2025-04-02 PROCEDURE — 80202 ASSAY OF VANCOMYCIN: CPT

## 2025-04-02 PROCEDURE — 25000003 PHARM REV CODE 250: Performed by: HOSPITALIST

## 2025-04-02 PROCEDURE — 85025 COMPLETE CBC W/AUTO DIFF WBC: CPT | Performed by: HOSPITALIST

## 2025-04-02 PROCEDURE — 99900035 HC TECH TIME PER 15 MIN (STAT)

## 2025-04-02 PROCEDURE — 25000003 PHARM REV CODE 250

## 2025-04-02 PROCEDURE — 27000221 HC OXYGEN, UP TO 24 HOURS

## 2025-04-02 PROCEDURE — 80048 BASIC METABOLIC PNL TOTAL CA: CPT | Performed by: HOSPITALIST

## 2025-04-02 PROCEDURE — 94640 AIRWAY INHALATION TREATMENT: CPT

## 2025-04-02 PROCEDURE — 99900026 HC AIRWAY MAINTENANCE (STAT)

## 2025-04-02 PROCEDURE — 11000001 HC ACUTE MED/SURG PRIVATE ROOM

## 2025-04-02 PROCEDURE — 25000242 PHARM REV CODE 250 ALT 637 W/ HCPCS: Performed by: STUDENT IN AN ORGANIZED HEALTH CARE EDUCATION/TRAINING PROGRAM

## 2025-04-02 PROCEDURE — 99233 SBSQ HOSP IP/OBS HIGH 50: CPT | Mod: ,,, | Performed by: HOSPITALIST

## 2025-04-02 PROCEDURE — 36415 COLL VENOUS BLD VENIPUNCTURE: CPT | Performed by: HOSPITALIST

## 2025-04-02 PROCEDURE — 63600175 PHARM REV CODE 636 W HCPCS: Performed by: HOSPITALIST

## 2025-04-02 PROCEDURE — 63600175 PHARM REV CODE 636 W HCPCS

## 2025-04-02 PROCEDURE — 94761 N-INVAS EAR/PLS OXIMETRY MLT: CPT

## 2025-04-02 PROCEDURE — 25000242 PHARM REV CODE 250 ALT 637 W/ HCPCS: Performed by: INTERNAL MEDICINE

## 2025-04-02 PROCEDURE — 27000207 HC ISOLATION

## 2025-04-02 RX ADMIN — ACETYLCYSTEINE 2 ML: 200 SOLUTION ORAL; RESPIRATORY (INHALATION) at 07:04

## 2025-04-02 RX ADMIN — PREGABALIN 75 MG: 75 CAPSULE ORAL at 09:04

## 2025-04-02 RX ADMIN — VANCOMYCIN HYDROCHLORIDE 1750 MG: 500 INJECTION, POWDER, LYOPHILIZED, FOR SOLUTION INTRAVENOUS at 02:04

## 2025-04-02 RX ADMIN — MIDODRINE HYDROCHLORIDE 10 MG: 10 TABLET ORAL at 03:04

## 2025-04-02 RX ADMIN — TRAZODONE HYDROCHLORIDE 100 MG: 50 TABLET ORAL at 09:04

## 2025-04-02 RX ADMIN — ACETYLCYSTEINE 2 ML: 200 SOLUTION ORAL; RESPIRATORY (INHALATION) at 02:04

## 2025-04-02 RX ADMIN — LEVALBUTEROL HYDROCHLORIDE 1.25 MG: 1.25 SOLUTION RESPIRATORY (INHALATION) at 07:04

## 2025-04-02 RX ADMIN — ACETAMINOPHEN 1000 MG: 500 TABLET ORAL at 03:04

## 2025-04-02 RX ADMIN — ATORVASTATIN CALCIUM 20 MG: 20 TABLET, FILM COATED ORAL at 09:04

## 2025-04-02 RX ADMIN — SERTRALINE HYDROCHLORIDE 50 MG: 50 TABLET ORAL at 09:04

## 2025-04-02 RX ADMIN — MIDODRINE HYDROCHLORIDE 10 MG: 10 TABLET ORAL at 09:04

## 2025-04-02 RX ADMIN — ACETYLCYSTEINE 2 ML: 200 SOLUTION ORAL; RESPIRATORY (INHALATION) at 09:04

## 2025-04-02 RX ADMIN — LEVALBUTEROL HYDROCHLORIDE 1.25 MG: 1.25 SOLUTION RESPIRATORY (INHALATION) at 09:04

## 2025-04-02 RX ADMIN — LEVALBUTEROL HYDROCHLORIDE 1.25 MG: 1.25 SOLUTION RESPIRATORY (INHALATION) at 02:04

## 2025-04-02 RX ADMIN — PANTOPRAZOLE SODIUM 40 MG: 40 INJECTION, POWDER, FOR SOLUTION INTRAVENOUS at 09:04

## 2025-04-02 RX ADMIN — ENOXAPARIN SODIUM 40 MG: 40 INJECTION SUBCUTANEOUS at 05:04

## 2025-04-02 NOTE — SUBJECTIVE & OBJECTIVE
Interval History:     Review of Systems   Unable to perform ROS: Patient nonverbal     Objective:     Vital Signs (Most Recent):  Temp: 99 °F (37.2 °C) (04/02/25 1047)  Pulse: (!) 116 (04/02/25 1423)  Resp: 20 (04/02/25 1423)  BP: 101/61 (04/02/25 1047)  SpO2: (!) 93 % (04/02/25 1423) Vital Signs (24h Range):  Temp:  [97 °F (36.1 °C)-99 °F (37.2 °C)] 99 °F (37.2 °C)  Pulse:  [106-123] 116  Resp:  [16-20] 20  SpO2:  [93 %-99 %] 93 %  BP: ()/(54-72) 101/61     Weight: 79.8 kg (175 lb 14.8 oz)  Body mass index is 32.18 kg/m².    Intake/Output Summary (Last 24 hours) at 4/2/2025 1500  Last data filed at 4/2/2025 0912  Gross per 24 hour   Intake 1098 ml   Output --   Net 1098 ml         Physical Exam  Constitutional:       Appearance: She is ill-appearing.   HENT:      Head: Normocephalic and atraumatic.      Nose: Nose normal.      Mouth/Throat:      Mouth: Mucous membranes are moist.      Pharynx: Oropharynx is clear.   Eyes:      Extraocular Movements: Extraocular movements intact.      Conjunctiva/sclera: Conjunctivae normal.      Pupils: Pupils are equal, round, and reactive to light.   Cardiovascular:      Rate and Rhythm: Regular rhythm. Tachycardia present.      Pulses: Normal pulses.      Heart sounds: Normal heart sounds.   Pulmonary:      Effort: Pulmonary effort is normal.      Breath sounds: Wheezing present.      Comments: Secretions  Abdominal:      General: Abdomen is flat. Bowel sounds are normal.      Palpations: Abdomen is soft.   Musculoskeletal:      Cervical back: Normal range of motion and neck supple.   Skin:     General: Skin is warm and dry.   Neurological:      Mental Status: She is alert.      Comments: Quadriplegia    Psychiatric:         Mood and Affect: Mood normal.         Behavior: Behavior normal.               Significant Labs: All pertinent labs within the past 24 hours have been reviewed.    Significant Imaging: I have reviewed all pertinent imaging results/findings within the  past 24 hours.

## 2025-04-02 NOTE — PLAN OF CARE
Ss spoke with aleyda with [facility] and requested info faxed. Ss following.      Ss spoke with aleyda at [facility] and she is requesting additional documentation from dr. Dr jory parson. Ss following.

## 2025-04-02 NOTE — PROGRESS NOTES
Pharmacokinetic Assessment Follow Up: IV Vancomycin    Vancomycin serum concentration assessment(s):    The trough level was drawn correctly and can be used to guide therapy at this time. The measurement is slightly below the desired definitive target range of 15 to 20 mcg/mL.    Vancomycin Regimen Plan:    Continue vanc 1750 mg IV q36h to see if pt will accumulate since pt has been supratherapeutic on a more frequent dose. A trough has been ordered for 4/6 at 1330.    Drug levels (last 3 results):  Recent Labs   Lab Result Units 03/30/25  2246 03/31/25  0949 04/02/25  0201   Vancomycin, Random µg/mL  --  16.8  --    Vancomycin, Trough µg/mL 21.5*  --  14.4*       Pharmacy will continue to follow and monitor vancomycin.    Please contact pharmacy at extension [phone] for questions regarding this assessment.    Patient brief summary:  Karie Denney is a 59 y.o. female initiated on antimicrobial therapy with IV Vancomycin for treatment of bacteremia    The patient's current regimen is vanc 1750 mg IV q36h    Drug Allergies:   Review of patient's allergies indicates:   Allergen Reactions    Penicillins Anaphylaxis       Actual Body Weight:   79.8 kg    Renal Function:   Estimated Creatinine Clearance: 87.6 mL/min (based on SCr of 0.66 mg/dL).,     Dialysis Method (if applicable):  N/A    CBC (last 72 hours):  Recent Labs   Lab Result Units 04/02/25  0201   WBC K/uL 10.63   Hemoglobin g/dL 9.9*   Hematocrit % 34.2*   Platelet Count K/uL 260   Lymphocytes % % 33.8   Monocytes % % 10.2*   Eosinophils % % 2.8   Basophils % % 0.8   Diff Type  Auto       Metabolic Panel (last 72 hours):  Recent Labs   Lab Result Units 03/30/25  0615 03/31/25  0315 04/01/25  0425 04/02/25  0201   Sodium mmol/L  --   --   --  139   Potassium mmol/L  --   --   --  4.3   Chloride mmol/L  --   --   --  102   CO2 mmol/L  --   --   --  28   Glucose mg/dL  --   --   --  86   BUN mg/dL 22* 24* 23* 22*   Creatinine mg/dL 0.52* 0.57 0.57 0.66        Vancomycin Administrations:  vancomycin given in the last 96 hours                     vancomycin (VANCOCIN) 1,750 mg in 0.9% NaCl 500 mL IVPB (mg) 1,750 mg New Bag 04/02/25 0200     1,750 mg New Bag 03/31/25 1435    vancomycin (VANCOCIN) 1,500 mg in 0.9% NaCl 250 mL IVPB (mg) 1,500 mg New Bag 03/29/25 2311                    Microbiologic Results:  Microbiology Results (last 7 days)       Procedure Component Value Units Date/Time    Blood culture (site 2) [[id number]] Collected: 03/24/25 0902    Order Status: Completed Specimen: Blood Updated: 03/30/25 0607     Culture, Blood No Growth at 5 days    Blood culture (site 1) [[id number]]  (Abnormal)  (Susceptibility) Collected: 03/24/25 0910    Order Status: Completed Specimen: Blood Updated: 03/28/25 0828     Culture, Blood Staphylococcus haemolyticus     Gram Stain Result Gram positive cocci     Comment: From Aerobic Bottle       Narrative:      Site # 1, aerobic and anaerobic  Avoid lines if possible  Site # 1, aerobic and anaerobic  Avoid lines if possible  Site # 1, aerobic and anaerobic  Avoid lines if possible  Site # 1, aerobic and anaerobic  Avoid lines if possible  Site # 1, aerobic and anaerobic  Avoid lines if possible  Site # 1, aerobic and anaerobic  Avoid lines if possible

## 2025-04-02 NOTE — PLAN OF CARE
Pt denied by [facility]. Referral given to chas at [facility]. Awaiting ins approval.    Met with pt to review discharge recommendation of [facility] and is agreeable to plan    Patient/family provided list of facilities in-network with patient's payor plan. Providers that are owned, operated, or affiliated with [facility] are included on the list.     Notified that referral sent to below listed facilities from in-network list based on proximity to home/family support:   1.[facility]  2.  3.  4.  5. (can send more than 5)    Patient/family instructed to identify preference.    Preferred Facility: (if more than 1, listed in order of descending preference)  1.    Patient has declined to select a preferred provider and elects placement with the first accepting in network provider that is available to provide services as ordered by the physician       If an additional preferred facility not listed above is identified, additional referral to be sent. If above facilities unable to accept, will send additional referrals to in-network providers.

## 2025-04-03 LAB
ANION GAP SERPL CALCULATED.3IONS-SCNC: 14 MMOL/L (ref 7–16)
BASOPHILS # BLD AUTO: 0.07 K/UL (ref 0–0.2)
BASOPHILS NFR BLD AUTO: 0.6 % (ref 0–1)
BUN SERPL-MCNC: 18 MG/DL (ref 10–20)
BUN/CREAT SERPL: 33 (ref 6–20)
CALCIUM SERPL-MCNC: 9.6 MG/DL (ref 8.4–10.2)
CHLORIDE SERPL-SCNC: 103 MMOL/L (ref 98–107)
CO2 SERPL-SCNC: 27 MMOL/L (ref 23–31)
CREAT SERPL-MCNC: 0.54 MG/DL (ref 0.55–1.02)
DIFFERENTIAL METHOD BLD: ABNORMAL
EGFR (NO RACE VARIABLE) (RUSH/TITUS): 105 ML/MIN/1.73M2
EOSINOPHIL # BLD AUTO: 0.38 K/UL (ref 0–0.5)
EOSINOPHIL NFR BLD AUTO: 3.1 % (ref 1–4)
ERYTHROCYTE [DISTWIDTH] IN BLOOD BY AUTOMATED COUNT: 18.6 % (ref 11.5–14.5)
GLUCOSE SERPL-MCNC: 97 MG/DL (ref 82–115)
HCT VFR BLD AUTO: 35 % (ref 38–47)
HGB BLD-MCNC: 9.7 G/DL (ref 12–16)
IMM GRANULOCYTES # BLD AUTO: 0.06 K/UL (ref 0–0.04)
IMM GRANULOCYTES NFR BLD: 0.5 % (ref 0–0.4)
LYMPHOCYTES # BLD AUTO: 4.02 K/UL (ref 1–4.8)
LYMPHOCYTES NFR BLD AUTO: 33 % (ref 27–41)
MCH RBC QN AUTO: 25.1 PG (ref 27–31)
MCHC RBC AUTO-ENTMCNC: 27.7 G/DL (ref 32–36)
MCV RBC AUTO: 90.7 FL (ref 80–96)
MONOCYTES # BLD AUTO: 1.25 K/UL (ref 0–0.8)
MONOCYTES NFR BLD AUTO: 10.2 % (ref 2–6)
MPC BLD CALC-MCNC: 11.8 FL (ref 9.4–12.4)
NEUTROPHILS # BLD AUTO: 6.42 K/UL (ref 1.8–7.7)
NEUTROPHILS NFR BLD AUTO: 52.6 % (ref 53–65)
NRBC # BLD AUTO: 0 X10E3/UL
NRBC, AUTO (.00): 0 %
PLATELET # BLD AUTO: 238 K/UL (ref 150–400)
POTASSIUM SERPL-SCNC: 3.9 MMOL/L (ref 3.5–5.1)
RBC # BLD AUTO: 3.86 M/UL (ref 4.2–5.4)
SODIUM SERPL-SCNC: 140 MMOL/L (ref 136–145)
WBC # BLD AUTO: 12.2 K/UL (ref 4.5–11)

## 2025-04-03 PROCEDURE — 27200966 HC CLOSED SUCTION SYSTEM

## 2025-04-03 PROCEDURE — 36415 COLL VENOUS BLD VENIPUNCTURE: CPT | Performed by: HOSPITALIST

## 2025-04-03 PROCEDURE — 85025 COMPLETE CBC W/AUTO DIFF WBC: CPT | Performed by: HOSPITALIST

## 2025-04-03 PROCEDURE — 25000003 PHARM REV CODE 250

## 2025-04-03 PROCEDURE — 94761 N-INVAS EAR/PLS OXIMETRY MLT: CPT

## 2025-04-03 PROCEDURE — 25000242 PHARM REV CODE 250 ALT 637 W/ HCPCS: Performed by: STUDENT IN AN ORGANIZED HEALTH CARE EDUCATION/TRAINING PROGRAM

## 2025-04-03 PROCEDURE — 99233 SBSQ HOSP IP/OBS HIGH 50: CPT | Mod: ,,, | Performed by: HOSPITALIST

## 2025-04-03 PROCEDURE — 25000003 PHARM REV CODE 250: Performed by: HOSPITALIST

## 2025-04-03 PROCEDURE — 99900026 HC AIRWAY MAINTENANCE (STAT)

## 2025-04-03 PROCEDURE — 94640 AIRWAY INHALATION TREATMENT: CPT

## 2025-04-03 PROCEDURE — 63600175 PHARM REV CODE 636 W HCPCS

## 2025-04-03 PROCEDURE — 27000221 HC OXYGEN, UP TO 24 HOURS

## 2025-04-03 PROCEDURE — 63600175 PHARM REV CODE 636 W HCPCS: Performed by: HOSPITALIST

## 2025-04-03 PROCEDURE — 99900035 HC TECH TIME PER 15 MIN (STAT)

## 2025-04-03 PROCEDURE — 27000207 HC ISOLATION

## 2025-04-03 PROCEDURE — 25000242 PHARM REV CODE 250 ALT 637 W/ HCPCS: Performed by: INTERNAL MEDICINE

## 2025-04-03 PROCEDURE — 80048 BASIC METABOLIC PNL TOTAL CA: CPT | Performed by: HOSPITALIST

## 2025-04-03 PROCEDURE — 11000001 HC ACUTE MED/SURG PRIVATE ROOM

## 2025-04-03 RX ADMIN — ACETYLCYSTEINE 2 ML: 200 SOLUTION ORAL; RESPIRATORY (INHALATION) at 09:04

## 2025-04-03 RX ADMIN — ENOXAPARIN SODIUM 40 MG: 40 INJECTION SUBCUTANEOUS at 04:04

## 2025-04-03 RX ADMIN — ACETYLCYSTEINE 2 ML: 200 SOLUTION ORAL; RESPIRATORY (INHALATION) at 01:04

## 2025-04-03 RX ADMIN — TRAZODONE HYDROCHLORIDE 100 MG: 50 TABLET ORAL at 09:04

## 2025-04-03 RX ADMIN — PANTOPRAZOLE SODIUM 40 MG: 40 INJECTION, POWDER, FOR SOLUTION INTRAVENOUS at 10:04

## 2025-04-03 RX ADMIN — LEVALBUTEROL HYDROCHLORIDE 1.25 MG: 1.25 SOLUTION RESPIRATORY (INHALATION) at 09:04

## 2025-04-03 RX ADMIN — ACETYLCYSTEINE 2 ML: 200 SOLUTION ORAL; RESPIRATORY (INHALATION) at 07:04

## 2025-04-03 RX ADMIN — VANCOMYCIN HYDROCHLORIDE 1750 MG: 500 INJECTION, POWDER, LYOPHILIZED, FOR SOLUTION INTRAVENOUS at 02:04

## 2025-04-03 RX ADMIN — ATORVASTATIN CALCIUM 20 MG: 20 TABLET, FILM COATED ORAL at 09:04

## 2025-04-03 RX ADMIN — MIDODRINE HYDROCHLORIDE 10 MG: 10 TABLET ORAL at 02:04

## 2025-04-03 RX ADMIN — LEVALBUTEROL HYDROCHLORIDE 1.25 MG: 1.25 SOLUTION RESPIRATORY (INHALATION) at 01:04

## 2025-04-03 RX ADMIN — LEVALBUTEROL HYDROCHLORIDE 1.25 MG: 1.25 SOLUTION RESPIRATORY (INHALATION) at 07:04

## 2025-04-03 RX ADMIN — PREGABALIN 75 MG: 75 CAPSULE ORAL at 09:04

## 2025-04-03 RX ADMIN — MIDODRINE HYDROCHLORIDE 10 MG: 10 TABLET ORAL at 10:04

## 2025-04-03 RX ADMIN — ACETAMINOPHEN 1000 MG: 500 TABLET ORAL at 02:04

## 2025-04-03 RX ADMIN — PREGABALIN 75 MG: 75 CAPSULE ORAL at 10:04

## 2025-04-03 RX ADMIN — MIDODRINE HYDROCHLORIDE 10 MG: 10 TABLET ORAL at 09:04

## 2025-04-03 RX ADMIN — SERTRALINE HYDROCHLORIDE 50 MG: 50 TABLET ORAL at 10:04

## 2025-04-03 NOTE — PLAN OF CARE
Problem: Adult Inpatient Plan of Care  Goal: Plan of Care Review  Outcome: Progressing  Goal: Absence of Hospital-Acquired Illness or Injury  Outcome: Progressing  Goal: Optimal Comfort and Wellbeing  Outcome: Progressing  Goal: Readiness for Transition of Care  Outcome: Progressing     Problem: Infection  Goal: Absence of Infection Signs and Symptoms  Outcome: Progressing     Problem: Wound  Goal: Optimal Functional Ability  Outcome: Progressing  Goal: Optimal Pain Control and Function  Outcome: Progressing     Problem: Fall Injury Risk  Goal: Absence of Fall and Fall-Related Injury  Outcome: Progressing

## 2025-04-03 NOTE — ASSESSMENT & PLAN NOTE
Patient with Hypoxic Respiratory failure which is Acute on chronic.  she is not on home oxygen. Supplemental oxygen was provided and noted- Oxygen Concentration (%):  [28-82] 28    .   Signs/symptoms of respiratory failure include- increased work of breathing and respiratory distress. Contributing diagnoses includes - Aspiration and Pneumonia Labs and images were reviewed. Patient Has not had a recent ABG. Will treat underlying causes and adjust management of respiratory failure as follows-    [Well Nourished] : well nourished [No Acute Distress] : no acute distress [Well Developed] : well developed [Well-Appearing] : well-appearing [PERRL] : pupils equal round and reactive to light [EOMI] : extraocular movements intact [Normal Sclera/Conjunctiva] : normal sclera/conjunctiva [Normal Outer Ear/Nose] : the outer ears and nose were normal in appearance [Normal Oropharynx] : the oropharynx was normal [No JVD] : no jugular venous distention [No Lymphadenopathy] : no lymphadenopathy [Supple] : supple [Thyroid Normal, No Nodules] : the thyroid was normal and there were no nodules present [Clear to Auscultation] : lungs were clear to auscultation bilaterally [No Respiratory Distress] : no respiratory distress  [Normal Rate] : normal rate  [No Accessory Muscle Use] : no accessory muscle use [Regular Rhythm] : with a regular rhythm [No Murmur] : no murmur heard [Normal S1, S2] : normal S1 and S2 [Pedal Pulses Present] : the pedal pulses are present [No Edema] : there was no peripheral edema [Non Tender] : non-tender [Soft] : abdomen soft [No Masses] : no abdominal mass palpated [Non-distended] : non-distended [No HSM] : no HSM [Normal Bowel Sounds] : normal bowel sounds [Normal Posterior Cervical Nodes] : no posterior cervical lymphadenopathy [Normal Anterior Cervical Nodes] : no anterior cervical lymphadenopathy [No CVA Tenderness] : no CVA  tenderness [No Spinal Tenderness] : no spinal tenderness [Grossly Normal Strength/Tone] : grossly normal strength/tone [No Joint Swelling] : no joint swelling [No Rash] : no rash [Normal Gait] : normal gait [No Focal Deficits] : no focal deficits [Coordination Grossly Intact] : coordination grossly intact [Normal Insight/Judgement] : insight and judgment were intact [Normal Affect] : the affect was normal

## 2025-04-03 NOTE — ASSESSMENT & PLAN NOTE
"Continue with current antibiotics until sensitivities return.      Antibiotics (From admission, onward)      Start     Stop Route Frequency Ordered    03/31/25 1400  vancomycin (VANCOCIN) 1,750 mg in 0.9% NaCl 500 mL IVPB         -- IV Every 36 hours 03/31/25 1237    03/25/25 0519  vancomycin - pharmacy to dose  (vancomycin IVPB (PEDS and ADULTS))        Placed in "And" Linked Group    -- IV pharmacy to manage frequency 03/25/25 6657          3/26  - continues on vanc for blood cultures + on 3/24, echo completed at time with no endocarditis seen  - discussed with pulmonary, recs to follow    3/27  - due to overall baseline health will treat coag negative staph bacteremia as a true infection, for now continue vanc and follow cultures and sensitivities    "

## 2025-04-03 NOTE — ASSESSMENT & PLAN NOTE
Patient with Hypoxic Respiratory failure which is Acute on chronic.  she is not on home oxygen. Supplemental oxygen was provided and noted- Oxygen Concentration (%):  [28-82] 28    .   Signs/symptoms of respiratory failure include- tachypnea and respiratory distress. Contributing diagnoses includes - Aspiration and Pneumonia Labs and images were reviewed. Patient Has not had a recent ABG. Will treat underlying causes and adjust management of respiratory failure as follows-

## 2025-04-03 NOTE — ASSESSMENT & PLAN NOTE
"Continue with current antibiotics until sensitivities return.      Antibiotics (From admission, onward)      Start     Stop Route Frequency Ordered    03/31/25 1400  vancomycin (VANCOCIN) 1,750 mg in 0.9% NaCl 500 mL IVPB         -- IV Every 36 hours 03/31/25 1237    03/25/25 0519  vancomycin - pharmacy to dose  (vancomycin IVPB (PEDS and ADULTS))        Placed in "And" Linked Group    -- IV pharmacy to manage frequency 03/25/25 9844          3/26  - continues on vanc for blood cultures + on 3/24, echo completed at time with no endocarditis seen  - discussed with pulmonary, recs to follow    3/27  - due to overall baseline health will treat coag negative staph bacteremia as a true infection, for now continue vanc and follow cultures and sensitivities    "

## 2025-04-03 NOTE — PROGRESS NOTES
[facility] - [address] [place] [address] Medicine  Progress Note    Patient Name: Karie Denney  MRN: [id number]  Patient Class: IP- Inpatient   Admission Date: 3/23/2025  Length of Stay: 11 days  Attending Physician: Cassie Bar MD  Primary Care Provider: Gonzalo Barrera NP        Subjective     Principal Problem:Acute on chronic respiratory failure with hypoxia    HPI:  Chief Complaint  Transfer for continued management of respiratory failure, tracheostomy care, PEG feeding, and complications of quadriplegia following prolonged hospitalization.    History of Present Illness  Ms. Karie Denney is a 59-year-old woman with a complex medical history including quadriplegia following spinal surgery in 2015 and a cerebrovascular accident (CVA) in 2024 resulting in left-sided paralysis. She was transferred to [facility] from [facility] in [place], [place], for ongoing care following a prolonged ICU course involving intubation, respiratory failure, and significant complications.  She was initially hospitalized on February 15, 2025, for aspiration and dysphagia evaluation, during which she developed aspiration pneumonia and respiratory failure requiring intubation. Her hospital course was complicated by an ESBL E. coli UTI, pericardial effusion (treated with colchicine), and a spontaneous right thigh hematoma concerning for compartment syndrome, leading to transfer to [facility]. She required prolonged mechanical ventilation, tracheostomy placement, and PEG tube insertion.  She has now returned to [facility] for continued respiratory care, tracheostomy management, PEG feeding, management of sacral pressure injury, and rehabilitation planning.    Past Medical History  Quadriplegia post-spinal surgery (2015)  CVA (2024)  Aspiration pneumonia  Acute respiratory failure  UTI (ESBL E. coli)  Depression  GERD  Pharyngoesophageal dysphagia  Pericardial effusion/pericarditis  Chronic  constipation  Pressure ulcer (sacral, unstageable)  Hyperlipidemia  Anemia    Past Surgical History  Spinal surgery (2015)  PEG tube placement (03/11/2025)  Esophageal dilation with biopsy (08/2024)    Medications  Active Medications:  Atorvastatin 20 mg daily  Omeprazole 40 mg daily  Sertraline 50 mg daily  Trazodone 100 mg qHS  Pregabalin 75 mg BID  Hydrocodone-acetaminophen 5-325 mg BID  Lactulose 30 mL daily via PEG  Midodrine 10 mg Q6H via PEG  Levalbuterol 0.63 mg nebulizer Q6H  Polyethylene glycol 17 g daily via PEG  Recent Changes:  Colchicine: Discontinued due to bleeding  Aspirin and ezetimibe: Discontinued    Allergies  Penicillins ? Rash and anaphylaxis    Social History  Never smoker  No alcohol or tobacco use  Lives at home with mother; receives home health weekly  Bedbound, non-ambulatory    Family History  Noncontributory    Review of Systems  Limited due to tracheostomy, quadriplegia, and communication challenges. History obtained from EMR and caregiver.    Physical Examination  General: Chronically ill-appearing woman, alert, tracheostomy in place with T-piece  HEENT: Mild nasal skin necrosis, mucous membranes moist  Eyes: PERRL, EOMI  Neck: Tracheostomy present  CV: RRR, no murmurs  Lungs: Breath sounds diminished at bases; no acute distress; no wheezing  Abdomen: Soft, non-tender, PEG tube in place  Skin: Unstageable sacral ulcer, nasal pressure ulcer  Neuro: Quadriplegia, responsive with eye tracking and blinking  Extremities: RLE hematoma, bilateral edema, no signs of active bleeding    Laboratory Data (Most Recent)  Hgb: 8.5 g/dL  Creatinine: 0.30 mg/dL  Albumin: 3.1 g/dL  WBC: 9.4 K/uL  Ferritin: 265 ng/mL  INR: 0.93  No growth on recent blood and urine cultures    Imaging  CT angio: Large RLE hematoma without active extravasation  Multiple chest X-rays: Stable bilateral pleural effusions, improving atelectasis  Echo: EF 55-60%, small pericardial effusion      Overview/Hospital  Course:  3/26  - continues on vanc for blood cultures + on 3/24, echo completed at time with no endocarditis seen  - discussed with pulmonary, recs to follow    3/27  - due to overall baseline health will treat coag negative staph bacteremia as a true infection, for now continue vanc and follow cultures and sensitivities  - discussed with pulmonology who plan to continue trach collar as is and see patient May 1st at 1:40 pm, confirmed appointment  - discussed with family at bedside who are very concerned about secretions and states they don't have the suction capabilities at home and do not feel comfortable going home with her in this state, will discuss with social on possible home equipment    3/28  - awaiting micro, continuing vanc  - social setting up suction at home, family plans to return home as before    3/29  - still with secretions  - family requesting voice box    3/30  - doing well today, mentation much improved  - anticipate discharge within the next two days with home health services, family will need education on trach maintenance and home feedings as patient has PEG tube and they have not cared for a PEG tube before, also we will discuss with  getting a speaking told to use with a trach    3/31  - secretions improved today  - working with social for home set up of oxygen and tube feeds  - family needs heavy education on trach care and tube feeds as primary care giver has no experience with either, still wishes to discharge home    Interval History:     Review of Systems   Unable to perform ROS: Patient nonverbal     Objective:     Vital Signs (Most Recent):  Temp: 98.8 °F (37.1 °C) (04/03/25 1714)  Pulse: 104 (04/03/25 1714)  Resp: 20 (04/03/25 1714)  BP: (!) 88/50 (04/03/25 1714)  SpO2: 98 % (04/03/25 1714) Vital Signs (24h Range):  Temp:  [97.7 °F (36.5 °C)-98.8 °F (37.1 °C)] 98.8 °F (37.1 °C)  Pulse:  [102-114] 104  Resp:  [16-22] 20  SpO2:  [94 %-99 %] 98 %  BP: ()/(49-64)  88/50     Weight: 79.8 kg (175 lb 14.8 oz)  Body mass index is 32.18 kg/m².    Intake/Output Summary (Last 24 hours) at 4/3/2025 1740  Last data filed at 4/3/2025 0638  Gross per 24 hour   Intake 1316 ml   Output --   Net 1316 ml         Physical Exam  Constitutional:       Appearance: She is ill-appearing.   HENT:      Head: Normocephalic and atraumatic.      Nose: Nose normal.      Mouth/Throat:      Mouth: Mucous membranes are moist.      Pharynx: Oropharynx is clear.   Eyes:      Extraocular Movements: Extraocular movements intact.      Conjunctiva/sclera: Conjunctivae normal.      Pupils: Pupils are equal, round, and reactive to light.   Cardiovascular:      Rate and Rhythm: Regular rhythm. Tachycardia present.      Pulses: Normal pulses.      Heart sounds: Normal heart sounds.   Pulmonary:      Effort: Pulmonary effort is normal.      Breath sounds: Wheezing present.      Comments: Secretions  Abdominal:      General: Abdomen is flat. Bowel sounds are normal.      Palpations: Abdomen is soft.   Musculoskeletal:      Cervical back: Normal range of motion and neck supple.   Skin:     General: Skin is warm and dry.   Neurological:      Mental Status: She is alert.      Comments: Quadriplegia    Psychiatric:         Mood and Affect: Mood normal.         Behavior: Behavior normal.               Significant Labs: All pertinent labs within the past 24 hours have been reviewed.    Significant Imaging: I have reviewed all pertinent imaging results/findings within the past 24 hours.      Assessment & Plan  Acute on chronic respiratory failure with hypoxia  Continue tracheostomy care with T-piece trials  Tracheostomy suctioning ordered due to thick secretions.   Culture tracheostomy secretions.   Monitor oxygenation and readiness for weaning  Pulmonary consult tomorrow.     3/24: mucus plugging requiring frequent suctioning.  Chest physiotherapy, Mucomyst.  Additional recommendations per pulmonology.     3/25: requiring  frequent suctioning but otherwise stable.     3/26  - continues on vanc for blood cultures + on 3/24, echo completed at time with no endocarditis seen  - discussed with pulmonary, recs to follow    3/27  - discussed with pulmonology who plan to continue trach collar as is and see patient May 1st at 1:40 pm, confirmed appointment  - discussed with family at bedside who are very concerned about secretions and states they don't have the suction capabilities at home and do not feel comfortable going home with her in this state, will discuss with social on possible home equipement    3/29  - continues with secretions, setting up home suction  - family requesting voice box    3/30  - anticipate discharge in the next few days with home health and set up at home      3/31  - secretions improved today  - working with social for home set up of oxygen and tube feeds  - family needs heavy education on trach care and tube feeds as primary care giver has no experience with either, still wishes to discharge home    4/1: Still frequent suctioning.  Family would like patient to go to LTAC before going home as she still requires frequent suctioning, tube feeds, antibiotics, and additional care.    She has required over a week of ICU care prior to returning to our facility.  She was on the vent and was difficult to wean. She has required intermediate ICU level of care while at our facility.       4/2: plan to discharge to SNF if possible.  Otherwise, patient will need to go home.  Keep trach without capping until April 15th.   Pulmonology to see patient tomorrow.   Patient certainly has required more than 3 nights of Intermediate ICU level of care.  She now needs management of her wounds, respiratory failure, feeding care and would benefit from post-acute care.     4/3: Plan to discharge to LTAC given complexity of patient's conditions - respiratory failure with very frequent suctioning, bacteremia, tube feeds, etc.    Patient  certainly has required more than 3 nights of Intermediate ICU level of care.  She now needs management of her wounds, respiratory failure, feeding care and would benefit from post-acute care.     Severe protein-calorie malnutrition  Nutrition consulted. Most recent weight and BMI monitored-     Measurements:  Wt Readings from Last 1 Encounters:   03/27/25 79.8 kg (175 lb 14.8 oz)   Body mass index is 32.18 kg/m².    Patient has been screened and assessed by RD.    Malnutrition Type:  Context: chronic illness  Level: severe    Continue PEG tube feeding.  Nepro at 40 mL/hr  Maintain bowel regimen (lactulose, PEG, senna)  Dietitian to reassess  continue with current tube feed regimen    Dysphagia  Continue omeprazole  Avoid PO intake  Speech therapy if clinically appropriate    Aspiration pneumonia  Recurrent aspiration pneumonia.  Complete two rounds of antibiotics.    Pressure ulcers of skin of multiple topographic sites  Aquacel Ag + Mepilex dressings  Wound care team consulted    Continue offloading and specialty mattress    Depression  Continue pregabalin, sertraline, trazodone      Mucus plugging of bronchi      S/P percutaneous endoscopic gastrostomy (PEG) tube placement  Patient noted to have a percutaneous endoscopic gastrostomy tube in place. I have personally inspected the tube.Tube was placed prior to this admission There are no signs of drainage or infection around the site. The tube is patent. Medications have converted to liquid form if available.  Routine care to be done by wound care and nursing staff.       Quadriplegia  Maximal support, bedbound  PT/OT as tolerated  DME planning for discharge    Hematoma of lower extremity, right, subsequent encounter  No active bleeding; conservative management  Monitor H/H  No anticoagulation  Hyperlipidemia  Continue atorvastatin    Pericardial effusion  Previously on colchicine, now discontinued due to bleeding  Continue low-dose prednisone  Monitor for  "recurrence    Acute respiratory failure with hypoxia and hypercarbia  Patient with Hypoxic Respiratory failure which is Acute on chronic.  she is not on home oxygen. Supplemental oxygen was provided and noted- Oxygen Concentration (%):  [28] 28    .   Signs/symptoms of respiratory failure include- tachypnea and respiratory distress. Contributing diagnoses includes - Aspiration and Pneumonia Labs and images were reviewed. Patient Has not had a recent ABG. Will treat underlying causes and adjust management of respiratory failure as follows-   Chronic respiratory failure with hypoxia  Patient with Hypoxic Respiratory failure which is Acute on chronic.  she is not on home oxygen. Supplemental oxygen was provided and noted- Oxygen Concentration (%):  [28] 28    .   Signs/symptoms of respiratory failure include- increased work of breathing and respiratory distress. Contributing diagnoses includes - Aspiration and Pneumonia Labs and images were reviewed. Patient Has not had a recent ABG. Will treat underlying causes and adjust management of respiratory failure as follows-   Coag negative Staphylococcus bacteremia  Continue with current antibiotics until sensitivities return.      Antibiotics (From admission, onward)      Start     Stop Route Frequency Ordered    03/31/25 1400  vancomycin (VANCOCIN) 1,750 mg in 0.9% NaCl 500 mL IVPB         -- IV Every 36 hours 03/31/25 1237    03/25/25 0519  vancomycin - pharmacy to dose  (vancomycin IVPB (PEDS and ADULTS))        Placed in "And" Linked Group    -- IV pharmacy to manage frequency 03/25/25 4828          3/26  - continues on vanc for blood cultures + on 3/24, echo completed at time with no endocarditis seen  - discussed with pulmonary, recs to follow    3/27  - due to overall baseline health will treat coag negative staph bacteremia as a true infection, for now continue vanc and follow cultures and sensitivities    VTE Risk Mitigation (From admission, onward)           " Ordered     enoxaparin injection 40 mg  Daily         03/23/25 1940     IP VTE HIGH RISK PATIENT  Once         03/23/25 1940     Place sequential compression device  Until discontinued         03/23/25 1941                    Discharge Planning   SHRADDHA:      Code Status: Full Code   Medical Readiness for Discharge Date:   Discharge Plan A: Long-term acute care facility (LTAC)            Cassie Bar MD  Department of Hospital Medicine   [facility] - [facility] Tele[facility]

## 2025-04-03 NOTE — PLAN OF CARE
CM spoke with Triny at [facility] and they are submitting for insurance authorization at this time. CM following  Wilda called and informed CM that they are out of network with pt's insurance

## 2025-04-03 NOTE — SUBJECTIVE & OBJECTIVE
Interval History:     Review of Systems   Unable to perform ROS: Patient nonverbal     Objective:     Vital Signs (Most Recent):  Temp: 98.8 °F (37.1 °C) (04/03/25 1714)  Pulse: 104 (04/03/25 1714)  Resp: 20 (04/03/25 1714)  BP: (!) 88/50 (04/03/25 1714)  SpO2: 98 % (04/03/25 1714) Vital Signs (24h Range):  Temp:  [97.7 °F (36.5 °C)-98.8 °F (37.1 °C)] 98.8 °F (37.1 °C)  Pulse:  [102-114] 104  Resp:  [16-22] 20  SpO2:  [94 %-99 %] 98 %  BP: ()/(49-64) 88/50     Weight: 79.8 kg (175 lb 14.8 oz)  Body mass index is 32.18 kg/m².    Intake/Output Summary (Last 24 hours) at 4/3/2025 1740  Last data filed at 4/3/2025 0638  Gross per 24 hour   Intake 1316 ml   Output --   Net 1316 ml         Physical Exam  Constitutional:       Appearance: She is ill-appearing.   HENT:      Head: Normocephalic and atraumatic.      Nose: Nose normal.      Mouth/Throat:      Mouth: Mucous membranes are moist.      Pharynx: Oropharynx is clear.   Eyes:      Extraocular Movements: Extraocular movements intact.      Conjunctiva/sclera: Conjunctivae normal.      Pupils: Pupils are equal, round, and reactive to light.   Cardiovascular:      Rate and Rhythm: Regular rhythm. Tachycardia present.      Pulses: Normal pulses.      Heart sounds: Normal heart sounds.   Pulmonary:      Effort: Pulmonary effort is normal.      Breath sounds: Wheezing present.      Comments: Secretions  Abdominal:      General: Abdomen is flat. Bowel sounds are normal.      Palpations: Abdomen is soft.   Musculoskeletal:      Cervical back: Normal range of motion and neck supple.   Skin:     General: Skin is warm and dry.   Neurological:      Mental Status: She is alert.      Comments: Quadriplegia    Psychiatric:         Mood and Affect: Mood normal.         Behavior: Behavior normal.               Significant Labs: All pertinent labs within the past 24 hours have been reviewed.    Significant Imaging: I have reviewed all pertinent imaging results/findings within the  past 24 hours.

## 2025-04-03 NOTE — ASSESSMENT & PLAN NOTE
Continue tracheostomy care with T-piece trials  Tracheostomy suctioning ordered due to thick secretions.   Culture tracheostomy secretions.   Monitor oxygenation and readiness for weaning  Pulmonary consult tomorrow.     3/24: mucus plugging requiring frequent suctioning.  Chest physiotherapy, Mucomyst.  Additional recommendations per pulmonology.     3/25: requiring frequent suctioning but otherwise stable.     3/26  - continues on vanc for blood cultures + on 3/24, echo completed at time with no endocarditis seen  - discussed with pulmonary, recs to follow    3/27  - discussed with pulmonology who plan to continue trach collar as is and see patient May 1st at 1:40 pm, confirmed appointment  - discussed with family at bedside who are very concerned about secretions and states they don't have the suction capabilities at home and do not feel comfortable going home with her in this state, will discuss with social on possible home equipement    3/29  - continues with secretions, setting up home suction  - family requesting voice box    3/30  - anticipate discharge in the next few days with home health and set up at home      3/31  - secretions improved today  - working with social for home set up of oxygen and tube feeds  - family needs heavy education on trach care and tube feeds as primary care giver has no experience with either, still wishes to discharge home    4/1: Still frequent suctioning.  Family would like patient to go to LTAC before going home as she still requires frequent suctioning, tube feeds, antibiotics, and additional care.    She has required over a week of ICU care prior to returning to our facility.  She was on the vent and was difficult to wean. She has required intermediate ICU level of care while at our facility.       4/2: plan to discharge to SNF if possible.  Otherwise, patient will need to go home.  Keep trach without capping until April 15th.   Pulmonology to see patient tomorrow.    Patient certainly has required more than 3 nights of Intermediate ICU level of care.  She now needs management of her wounds, respiratory failure, feeding care and would benefit from post-acute care.     4/3: Plan to discharge to LTAC given complexity of patient's conditions - respiratory failure with very frequent suctioning, bacteremia, tube feeds, etc.    Patient certainly has required more than 3 nights of Intermediate ICU level of care.  She now needs management of her wounds, respiratory failure, feeding care and would benefit from post-acute care.

## 2025-04-03 NOTE — ASSESSMENT & PLAN NOTE
Continue tracheostomy care with T-piece trials  Tracheostomy suctioning ordered due to thick secretions.   Culture tracheostomy secretions.   Monitor oxygenation and readiness for weaning  Pulmonary consult tomorrow.     3/24: mucus plugging requiring frequent suctioning.  Chest physiotherapy, Mucomyst.  Additional recommendations per pulmonology.     3/25: requiring frequent suctioning but otherwise stable.     3/26  - continues on vanc for blood cultures + on 3/24, echo completed at time with no endocarditis seen  - discussed with pulmonary, recs to follow    3/27  - discussed with pulmonology who plan to continue trach collar as is and see patient May 1st at 1:40 pm, confirmed appointment  - discussed with family at bedside who are very concerned about secretions and states they don't have the suction capabilities at home and do not feel comfortable going home with her in this state, will discuss with social on possible home equipement    3/29  - continues with secretions, setting up home suction  - family requesting voice box    3/30  - anticipate discharge in the next few days with home health and set up at home      3/31  - secretions improved today  - working with social for home set up of oxygen and tube feeds  - family needs heavy education on trach care and tube feeds as primary care giver has no experience with either, still wishes to discharge home    4/1: Still frequent suctioning.  Family would like patient to go to LTAC before going home as she still requires frequent suctioning, tube feeds, antibiotics, and additional care.    She has required over a week of ICU care prior to returning to our facility.  She was on the vent and was difficult to wean. She has required intermediate ICU level of care while at our facility.       4/2: plan to discharge to SNF if possible.  Otherwise, patient will need to go home.  Keep trach without capping until April 15th.   Pulmonology to see patient tomorrow.    Patient certainly has required more than 3 nights of Intermediate ICU level of care.  She now needs management of her wounds, respiratory failure, feeding care and would benefit from post-acute care.

## 2025-04-03 NOTE — PROGRESS NOTES
[facility] - [address] [place] [address] Medicine  Progress Note    Patient Name: Karie Denney  MRN: [id number]  Patient Class: IP- Inpatient   Admission Date: 3/23/2025  Length of Stay: 10 days  Attending Physician: Cassie Bar MD  Primary Care Provider: Gonzalo Barrera NP        Subjective     Principal Problem:Acute on chronic respiratory failure with hypoxia      HPI:  Chief Complaint  Transfer for continued management of respiratory failure, tracheostomy care, PEG feeding, and complications of quadriplegia following prolonged hospitalization.    History of Present Illness  Ms. Karie Denney is a 59-year-old woman with a complex medical history including quadriplegia following spinal surgery in 2015 and a cerebrovascular accident (CVA) in 2024 resulting in left-sided paralysis. She was transferred to [facility] from [facility] in [place], [place], for ongoing care following a prolonged ICU course involving intubation, respiratory failure, and significant complications.  She was initially hospitalized on February 15, 2025, for aspiration and dysphagia evaluation, during which she developed aspiration pneumonia and respiratory failure requiring intubation. Her hospital course was complicated by an ESBL E. coli UTI, pericardial effusion (treated with colchicine), and a spontaneous right thigh hematoma concerning for compartment syndrome, leading to transfer to [facility]. She required prolonged mechanical ventilation, tracheostomy placement, and PEG tube insertion.  She has now returned to [facility] for continued respiratory care, tracheostomy management, PEG feeding, management of sacral pressure injury, and rehabilitation planning.    Past Medical History  Quadriplegia post-spinal surgery (2015)  CVA (2024)  Aspiration pneumonia  Acute respiratory failure  UTI (ESBL E. coli)  Depression  GERD  Pharyngoesophageal dysphagia  Pericardial effusion/pericarditis  Chronic  constipation  Pressure ulcer (sacral, unstageable)  Hyperlipidemia  Anemia    Past Surgical History  Spinal surgery (2015)  PEG tube placement (03/11/2025)  Esophageal dilation with biopsy (08/2024)    Medications  Active Medications:  Atorvastatin 20 mg daily  Omeprazole 40 mg daily  Sertraline 50 mg daily  Trazodone 100 mg qHS  Pregabalin 75 mg BID  Hydrocodone-acetaminophen 5-325 mg BID  Lactulose 30 mL daily via PEG  Midodrine 10 mg Q6H via PEG  Levalbuterol 0.63 mg nebulizer Q6H  Polyethylene glycol 17 g daily via PEG  Recent Changes:  Colchicine: Discontinued due to bleeding  Aspirin and ezetimibe: Discontinued    Allergies  Penicillins ? Rash and anaphylaxis    Social History  Never smoker  No alcohol or tobacco use  Lives at home with mother; receives home health weekly  Bedbound, non-ambulatory    Family History  Noncontributory    Review of Systems  Limited due to tracheostomy, quadriplegia, and communication challenges. History obtained from EMR and caregiver.    Physical Examination  General: Chronically ill-appearing woman, alert, tracheostomy in place with T-piece  HEENT: Mild nasal skin necrosis, mucous membranes moist  Eyes: PERRL, EOMI  Neck: Tracheostomy present  CV: RRR, no murmurs  Lungs: Breath sounds diminished at bases; no acute distress; no wheezing  Abdomen: Soft, non-tender, PEG tube in place  Skin: Unstageable sacral ulcer, nasal pressure ulcer  Neuro: Quadriplegia, responsive with eye tracking and blinking  Extremities: RLE hematoma, bilateral edema, no signs of active bleeding    Laboratory Data (Most Recent)  Hgb: 8.5 g/dL  Creatinine: 0.30 mg/dL  Albumin: 3.1 g/dL  WBC: 9.4 K/uL  Ferritin: 265 ng/mL  INR: 0.93  No growth on recent blood and urine cultures    Imaging  CT angio: Large RLE hematoma without active extravasation  Multiple chest X-rays: Stable bilateral pleural effusions, improving atelectasis  Echo: EF 55-60%, small pericardial effusion      Overview/Hospital  Course:  3/26  - continues on vanc for blood cultures + on 3/24, echo completed at time with no endocarditis seen  - discussed with pulmonary, recs to follow    3/27  - due to overall baseline health will treat coag negative staph bacteremia as a true infection, for now continue vanc and follow cultures and sensitivities  - discussed with pulmonology who plan to continue trach collar as is and see patient May 1st at 1:40 pm, confirmed appointment  - discussed with family at bedside who are very concerned about secretions and states they don't have the suction capabilities at home and do not feel comfortable going home with her in this state, will discuss with social on possible home equipment    3/28  - awaiting micro, continuing vanc  - social setting up suction at home, family plans to return home as before    3/29  - still with secretions  - family requesting voice box    3/30  - doing well today, mentation much improved  - anticipate discharge within the next two days with home health services, family will need education on trach maintenance and home feedings as patient has PEG tube and they have not cared for a PEG tube before, also we will discuss with  getting a speaking told to use with a trach    3/31  - secretions improved today  - working with social for home set up of oxygen and tube feeds  - family needs heavy education on trach care and tube feeds as primary care giver has no experience with either, still wishes to discharge home    Interval History:     Review of Systems   Unable to perform ROS: Patient nonverbal     Objective:     Vital Signs (Most Recent):  Temp: 99 °F (37.2 °C) (04/02/25 1047)  Pulse: (!) 116 (04/02/25 1423)  Resp: 20 (04/02/25 1423)  BP: 101/61 (04/02/25 1047)  SpO2: (!) 93 % (04/02/25 1423) Vital Signs (24h Range):  Temp:  [97 °F (36.1 °C)-99 °F (37.2 °C)] 99 °F (37.2 °C)  Pulse:  [106-123] 116  Resp:  [16-20] 20  SpO2:  [93 %-99 %] 93 %  BP: ()/(54-72)  101/61     Weight: 79.8 kg (175 lb 14.8 oz)  Body mass index is 32.18 kg/m².    Intake/Output Summary (Last 24 hours) at 4/2/2025 1500  Last data filed at 4/2/2025 0912  Gross per 24 hour   Intake 1098 ml   Output --   Net 1098 ml         Physical Exam  Constitutional:       Appearance: She is ill-appearing.   HENT:      Head: Normocephalic and atraumatic.      Nose: Nose normal.      Mouth/Throat:      Mouth: Mucous membranes are moist.      Pharynx: Oropharynx is clear.   Eyes:      Extraocular Movements: Extraocular movements intact.      Conjunctiva/sclera: Conjunctivae normal.      Pupils: Pupils are equal, round, and reactive to light.   Cardiovascular:      Rate and Rhythm: Regular rhythm. Tachycardia present.      Pulses: Normal pulses.      Heart sounds: Normal heart sounds.   Pulmonary:      Effort: Pulmonary effort is normal.      Breath sounds: Wheezing present.      Comments: Secretions  Abdominal:      General: Abdomen is flat. Bowel sounds are normal.      Palpations: Abdomen is soft.   Musculoskeletal:      Cervical back: Normal range of motion and neck supple.   Skin:     General: Skin is warm and dry.   Neurological:      Mental Status: She is alert.      Comments: Quadriplegia    Psychiatric:         Mood and Affect: Mood normal.         Behavior: Behavior normal.               Significant Labs: All pertinent labs within the past 24 hours have been reviewed.    Significant Imaging: I have reviewed all pertinent imaging results/findings within the past 24 hours.      Assessment & Plan  Acute on chronic respiratory failure with hypoxia  Continue tracheostomy care with T-piece trials  Tracheostomy suctioning ordered due to thick secretions.   Culture tracheostomy secretions.   Monitor oxygenation and readiness for weaning  Pulmonary consult tomorrow.     3/24: mucus plugging requiring frequent suctioning.  Chest physiotherapy, Mucomyst.  Additional recommendations per pulmonology.     3/25: requiring  frequent suctioning but otherwise stable.     3/26  - continues on vanc for blood cultures + on 3/24, echo completed at time with no endocarditis seen  - discussed with pulmonary, recs to follow    3/27  - discussed with pulmonology who plan to continue trach collar as is and see patient May 1st at 1:40 pm, confirmed appointment  - discussed with family at bedside who are very concerned about secretions and states they don't have the suction capabilities at home and do not feel comfortable going home with her in this state, will discuss with social on possible home equipement    3/29  - continues with secretions, setting up home suction  - family requesting voice box    3/30  - anticipate discharge in the next few days with home health and set up at home      3/31  - secretions improved today  - working with social for home set up of oxygen and tube feeds  - family needs heavy education on trach care and tube feeds as primary care giver has no experience with either, still wishes to discharge home    4/1: Still frequent suctioning.  Family would like patient to go to LTAC before going home as she still requires frequent suctioning, tube feeds, antibiotics, and additional care.    She has required over a week of ICU care prior to returning to our facility.  She was on the vent and was difficult to wean. She has required intermediate ICU level of care while at our facility.       4/2: plan to discharge to SNF if possible.  Otherwise, patient will need to go home.  Keep trach without capping until April 15th.   Pulmonology to see patient tomorrow.   Patient certainly has required more than 3 nights of Intermediate ICU level of care.  She now needs management of her wounds, respiratory failure, feeding care and would benefit from post-acute care.   Severe protein-calorie malnutrition  Nutrition consulted. Most recent weight and BMI monitored-     Measurements:  Wt Readings from Last 1 Encounters:   03/27/25 79.8 kg (175  lb 14.8 oz)   Body mass index is 32.18 kg/m².    Patient has been screened and assessed by RD.    Malnutrition Type:  Context: chronic illness  Level: severe    Continue PEG tube feeding.  Nepro at 40 mL/hr  Maintain bowel regimen (lactulose, PEG, senna)  Dietitian to reassess  continue with current tube feed regimen    Dysphagia  Continue omeprazole  Avoid PO intake  Speech therapy if clinically appropriate    Aspiration pneumonia  Recurrent aspiration pneumonia.  Complete two rounds of antibiotics.    Pressure ulcers of skin of multiple topographic sites  Aquacel Ag + Mepilex dressings  Wound care team consulted    Continue offloading and specialty mattress    Depression  Continue pregabalin, sertraline, trazodone      Mucus plugging of bronchi      S/P percutaneous endoscopic gastrostomy (PEG) tube placement  Patient noted to have a percutaneous endoscopic gastrostomy tube in place. I have personally inspected the tube.Tube was placed prior to this admission There are no signs of drainage or infection around the site. The tube is patent. Medications have converted to liquid form if available.  Routine care to be done by wound care and nursing staff.       Quadriplegia  Maximal support, bedbound  PT/OT as tolerated  DME planning for discharge    Hematoma of lower extremity, right, subsequent encounter  No active bleeding; conservative management  Monitor H/H  No anticoagulation  Hyperlipidemia  Continue atorvastatin    Pericardial effusion  Previously on colchicine, now discontinued due to bleeding  Continue low-dose prednisone  Monitor for recurrence    Acute respiratory failure with hypoxia and hypercarbia  Patient with Hypoxic Respiratory failure which is Acute on chronic.  she is not on home oxygen. Supplemental oxygen was provided and noted- Oxygen Concentration (%):  [28-82] 28    .   Signs/symptoms of respiratory failure include- tachypnea and respiratory distress. Contributing diagnoses includes -  "Aspiration and Pneumonia Labs and images were reviewed. Patient Has not had a recent ABG. Will treat underlying causes and adjust management of respiratory failure as follows-   Chronic respiratory failure with hypoxia  Patient with Hypoxic Respiratory failure which is Acute on chronic.  she is not on home oxygen. Supplemental oxygen was provided and noted- Oxygen Concentration (%):  [28-82] 28    .   Signs/symptoms of respiratory failure include- increased work of breathing and respiratory distress. Contributing diagnoses includes - Aspiration and Pneumonia Labs and images were reviewed. Patient Has not had a recent ABG. Will treat underlying causes and adjust management of respiratory failure as follows-   Coag negative Staphylococcus bacteremia  Continue with current antibiotics until sensitivities return.      Antibiotics (From admission, onward)      Start     Stop Route Frequency Ordered    03/31/25 1400  vancomycin (VANCOCIN) 1,750 mg in 0.9% NaCl 500 mL IVPB         -- IV Every 36 hours 03/31/25 1237    03/25/25 0519  vancomycin - pharmacy to dose  (vancomycin IVPB (PEDS and ADULTS))        Placed in "And" Linked Group    -- IV pharmacy to manage frequency 03/25/25 3356          3/26  - continues on vanc for blood cultures + on 3/24, echo completed at time with no endocarditis seen  - discussed with pulmonary, recs to follow    3/27  - due to overall baseline health will treat coag negative staph bacteremia as a true infection, for now continue vanc and follow cultures and sensitivities    VTE Risk Mitigation (From admission, onward)           Ordered     enoxaparin injection 40 mg  Daily         03/23/25 1940     IP VTE HIGH RISK PATIENT  Once         03/23/25 1940     Place sequential compression device  Until discontinued         03/23/25 1941                    Discharge Planning   SHRADDHA:      Code Status: Full Code   Medical Readiness for Discharge Date:   Discharge Plan A: Long-term acute care facility " (LTAC)                Cassie Bar MD  Department of Hospital Medicine   [facility] - [address] [place] [address]

## 2025-04-04 LAB
ANION GAP SERPL CALCULATED.3IONS-SCNC: 17 MMOL/L (ref 7–16)
BASOPHILS # BLD AUTO: 0.06 K/UL (ref 0–0.2)
BASOPHILS NFR BLD AUTO: 0.6 % (ref 0–1)
BUN SERPL-MCNC: 18 MG/DL (ref 10–20)
BUN/CREAT SERPL: 35 (ref 6–20)
CALCIUM SERPL-MCNC: 9.9 MG/DL (ref 8.4–10.2)
CHLORIDE SERPL-SCNC: 104 MMOL/L (ref 98–107)
CO2 SERPL-SCNC: 24 MMOL/L (ref 23–31)
CREAT SERPL-MCNC: 0.51 MG/DL (ref 0.55–1.02)
DIFFERENTIAL METHOD BLD: ABNORMAL
EGFR (NO RACE VARIABLE) (RUSH/TITUS): 106 ML/MIN/1.73M2
EOSINOPHIL # BLD AUTO: 0.42 K/UL (ref 0–0.5)
EOSINOPHIL NFR BLD AUTO: 4.3 % (ref 1–4)
ERYTHROCYTE [DISTWIDTH] IN BLOOD BY AUTOMATED COUNT: 18.2 % (ref 11.5–14.5)
GLUCOSE SERPL-MCNC: 100 MG/DL (ref 82–115)
HCT VFR BLD AUTO: 34.8 % (ref 38–47)
HGB BLD-MCNC: 10 G/DL (ref 12–16)
IMM GRANULOCYTES # BLD AUTO: 0.05 K/UL (ref 0–0.04)
IMM GRANULOCYTES NFR BLD: 0.5 % (ref 0–0.4)
LYMPHOCYTES # BLD AUTO: 2.33 K/UL (ref 1–4.8)
LYMPHOCYTES NFR BLD AUTO: 23.9 % (ref 27–41)
MCH RBC QN AUTO: 25.4 PG (ref 27–31)
MCHC RBC AUTO-ENTMCNC: 28.7 G/DL (ref 32–36)
MCV RBC AUTO: 88.3 FL (ref 80–96)
MONOCYTES # BLD AUTO: 0.9 K/UL (ref 0–0.8)
MONOCYTES NFR BLD AUTO: 9.2 % (ref 2–6)
MPC BLD CALC-MCNC: 11.3 FL (ref 9.4–12.4)
NEUTROPHILS # BLD AUTO: 5.99 K/UL (ref 1.8–7.7)
NEUTROPHILS NFR BLD AUTO: 61.5 % (ref 53–65)
NRBC # BLD AUTO: 0 X10E3/UL
NRBC, AUTO (.00): 0 %
PLATELET # BLD AUTO: 203 K/UL (ref 150–400)
POTASSIUM SERPL-SCNC: 4.1 MMOL/L (ref 3.5–5.1)
RBC # BLD AUTO: 3.94 M/UL (ref 4.2–5.4)
SODIUM SERPL-SCNC: 141 MMOL/L (ref 136–145)
WBC # BLD AUTO: 9.75 K/UL (ref 4.5–11)

## 2025-04-04 PROCEDURE — 36415 COLL VENOUS BLD VENIPUNCTURE: CPT | Performed by: HOSPITALIST

## 2025-04-04 PROCEDURE — 63600175 PHARM REV CODE 636 W HCPCS: Performed by: HOSPITALIST

## 2025-04-04 PROCEDURE — 99900026 HC AIRWAY MAINTENANCE (STAT)

## 2025-04-04 PROCEDURE — 85025 COMPLETE CBC W/AUTO DIFF WBC: CPT | Performed by: HOSPITALIST

## 2025-04-04 PROCEDURE — 25000242 PHARM REV CODE 250 ALT 637 W/ HCPCS: Performed by: STUDENT IN AN ORGANIZED HEALTH CARE EDUCATION/TRAINING PROGRAM

## 2025-04-04 PROCEDURE — 25000003 PHARM REV CODE 250: Performed by: HOSPITALIST

## 2025-04-04 PROCEDURE — 99233 SBSQ HOSP IP/OBS HIGH 50: CPT | Mod: ,,, | Performed by: HOSPITALIST

## 2025-04-04 PROCEDURE — 27200966 HC CLOSED SUCTION SYSTEM

## 2025-04-04 PROCEDURE — 25000242 PHARM REV CODE 250 ALT 637 W/ HCPCS: Performed by: INTERNAL MEDICINE

## 2025-04-04 PROCEDURE — 27000221 HC OXYGEN, UP TO 24 HOURS

## 2025-04-04 PROCEDURE — 94761 N-INVAS EAR/PLS OXIMETRY MLT: CPT

## 2025-04-04 PROCEDURE — 11000001 HC ACUTE MED/SURG PRIVATE ROOM

## 2025-04-04 PROCEDURE — 27000207 HC ISOLATION

## 2025-04-04 PROCEDURE — 80048 BASIC METABOLIC PNL TOTAL CA: CPT | Performed by: HOSPITALIST

## 2025-04-04 PROCEDURE — 99900035 HC TECH TIME PER 15 MIN (STAT)

## 2025-04-04 PROCEDURE — 94640 AIRWAY INHALATION TREATMENT: CPT

## 2025-04-04 RX ADMIN — MIDODRINE HYDROCHLORIDE 10 MG: 10 TABLET ORAL at 02:04

## 2025-04-04 RX ADMIN — MIDODRINE HYDROCHLORIDE 10 MG: 10 TABLET ORAL at 08:04

## 2025-04-04 RX ADMIN — SERTRALINE HYDROCHLORIDE 50 MG: 50 TABLET ORAL at 09:04

## 2025-04-04 RX ADMIN — ACETYLCYSTEINE 2 ML: 200 SOLUTION ORAL; RESPIRATORY (INHALATION) at 02:04

## 2025-04-04 RX ADMIN — ENOXAPARIN SODIUM 40 MG: 40 INJECTION SUBCUTANEOUS at 05:04

## 2025-04-04 RX ADMIN — ACETYLCYSTEINE 2 ML: 200 SOLUTION ORAL; RESPIRATORY (INHALATION) at 07:04

## 2025-04-04 RX ADMIN — TRAZODONE HYDROCHLORIDE 100 MG: 50 TABLET ORAL at 08:04

## 2025-04-04 RX ADMIN — LEVALBUTEROL HYDROCHLORIDE 1.25 MG: 1.25 SOLUTION RESPIRATORY (INHALATION) at 02:04

## 2025-04-04 RX ADMIN — PREGABALIN 75 MG: 75 CAPSULE ORAL at 09:04

## 2025-04-04 RX ADMIN — ATORVASTATIN CALCIUM 20 MG: 20 TABLET, FILM COATED ORAL at 08:04

## 2025-04-04 RX ADMIN — LEVALBUTEROL HYDROCHLORIDE 1.25 MG: 1.25 SOLUTION RESPIRATORY (INHALATION) at 07:04

## 2025-04-04 RX ADMIN — MIDODRINE HYDROCHLORIDE 10 MG: 10 TABLET ORAL at 09:04

## 2025-04-04 RX ADMIN — PANTOPRAZOLE SODIUM 40 MG: 40 INJECTION, POWDER, FOR SOLUTION INTRAVENOUS at 09:04

## 2025-04-04 RX ADMIN — ACETAMINOPHEN 1000 MG: 500 TABLET ORAL at 10:04

## 2025-04-04 RX ADMIN — PREGABALIN 75 MG: 75 CAPSULE ORAL at 08:04

## 2025-04-04 NOTE — ASSESSMENT & PLAN NOTE
"Continue with current antibiotics until sensitivities return.      Antibiotics (From admission, onward)      Start     Stop Route Frequency Ordered    03/31/25 1400  vancomycin (VANCOCIN) 1,750 mg in 0.9% NaCl 500 mL IVPB         -- IV Every 36 hours 03/31/25 1237    03/25/25 0519  vancomycin - pharmacy to dose  (vancomycin IVPB (PEDS and ADULTS))        Placed in "And" Linked Group    -- IV pharmacy to manage frequency 03/25/25 8616          3/26  - continues on vanc for blood cultures + on 3/24, echo completed at time with no endocarditis seen  - discussed with pulmonary, recs to follow    3/27  - due to overall baseline health will treat coag negative staph bacteremia as a true infection, for now continue vanc and follow cultures and sensitivities    4/4:  I am continuing with vancomycin given that patient has a severe penicillin allergy.  Otherwise Ancef would have been an option.  Continue with IV antibiotics at least through April 10th.     "

## 2025-04-04 NOTE — PLAN OF CARE
CM spoke with Long from [facility].  They are requesting updates on patient.  Updates faxed at this time as requested.  CM will continue to follow for DC needs as they arise.

## 2025-04-04 NOTE — ASSESSMENT & PLAN NOTE
Continue tracheostomy care with T-piece trials  Tracheostomy suctioning ordered due to thick secretions.   Culture tracheostomy secretions.   Monitor oxygenation and readiness for weaning  Pulmonary consult tomorrow.     3/24: mucus plugging requiring frequent suctioning.  Chest physiotherapy, Mucomyst.  Additional recommendations per pulmonology.     3/25: requiring frequent suctioning but otherwise stable.     3/26  - continues on vanc for blood cultures + on 3/24, echo completed at time with no endocarditis seen  - discussed with pulmonary, recs to follow    3/27  - discussed with pulmonology who plan to continue trach collar as is and see patient May 1st at 1:40 pm, confirmed appointment  - discussed with family at bedside who are very concerned about secretions and states they don't have the suction capabilities at home and do not feel comfortable going home with her in this state, will discuss with social on possible home equipement    3/29  - continues with secretions, setting up home suction  - family requesting voice box    3/30  - anticipate discharge in the next few days with home health and set up at home      3/31  - secretions improved today  - working with social for home set up of oxygen and tube feeds  - family needs heavy education on trach care and tube feeds as primary care giver has no experience with either, still wishes to discharge home    4/1: Still frequent suctioning.  Family would like patient to go to LTAC before going home as she still requires frequent suctioning, tube feeds, antibiotics, and additional care.    She has required over a week of ICU care prior to returning to our facility.  She was on the vent and was difficult to wean. She has required intermediate ICU level of care while at our facility.       4/2: plan to discharge to SNF if possible.  Otherwise, patient will need to go home.  Keep trach without capping until April 15th.   Pulmonology to see patient tomorrow.    Patient certainly has required more than 3 nights of Intermediate ICU level of care.  She now needs management of her wounds, respiratory failure, feeding care and would benefit from post-acute care.     4/3: Plan to discharge to LTAC given complexity of patient's conditions - respiratory failure with very frequent suctioning, bacteremia, tube feeds, etc.    Patient certainly has required more than 3 nights of Intermediate ICU level of care.  She now needs management of her wounds, respiratory failure, feeding care and would benefit from post-acute care.     4/4: continue with current aggressive respiratory care to manage trach and thick secretions.

## 2025-04-04 NOTE — SUBJECTIVE & OBJECTIVE
Interval History:     Review of Systems   Unable to perform ROS: Patient nonverbal     Objective:     Vital Signs (Most Recent):  Temp: 97.9 °F (36.6 °C) (04/04/25 0715)  Pulse: 101 (04/04/25 0735)  Resp: 20 (04/04/25 0735)  BP: 103/68 (04/04/25 0715)  SpO2: 98 % (04/04/25 0735) Vital Signs (24h Range):  Temp:  [97.9 °F (36.6 °C)-98.8 °F (37.1 °C)] 97.9 °F (36.6 °C)  Pulse:  [] 101  Resp:  [16-22] 20  SpO2:  [81 %-99 %] 98 %  BP: ()/(50-68) 103/68     Weight: 79.8 kg (175 lb 14.8 oz)  Body mass index is 32.18 kg/m².    Intake/Output Summary (Last 24 hours) at 4/4/2025 1004  Last data filed at 4/4/2025 0543  Gross per 24 hour   Intake 960 ml   Output --   Net 960 ml         Physical Exam  Constitutional:       Appearance: She is ill-appearing.   HENT:      Head: Normocephalic and atraumatic.      Nose: Nose normal.      Mouth/Throat:      Mouth: Mucous membranes are moist.      Pharynx: Oropharynx is clear.   Eyes:      Extraocular Movements: Extraocular movements intact.      Conjunctiva/sclera: Conjunctivae normal.      Pupils: Pupils are equal, round, and reactive to light.   Cardiovascular:      Rate and Rhythm: Regular rhythm. Tachycardia present.      Pulses: Normal pulses.      Heart sounds: Normal heart sounds.   Pulmonary:      Effort: Pulmonary effort is normal.      Breath sounds: Wheezing present.      Comments: Secretions  Abdominal:      General: Abdomen is flat. Bowel sounds are normal.      Palpations: Abdomen is soft.   Musculoskeletal:      Cervical back: Normal range of motion and neck supple.   Skin:     General: Skin is warm and dry.   Neurological:      Mental Status: She is alert.      Comments: Quadriplegia    Psychiatric:         Mood and Affect: Mood normal.         Behavior: Behavior normal.               Significant Labs: All pertinent labs within the past 24 hours have been reviewed.    Significant Imaging: I have reviewed all pertinent imaging results/findings within the past  24 hours.

## 2025-04-04 NOTE — PROGRESS NOTES
[facility] - [address] [place] [address] Medicine  Progress Note    Patient Name: Karie Denney  MRN: [id number]  Patient Class: IP- Inpatient   Admission Date: 3/23/2025  Length of Stay: 12 days  Attending Physician: Cassie Bar MD  Primary Care Provider: Gonzalo Barrera NP        Subjective     Principal Problem:Acute on chronic respiratory failure with hypoxia    HPI:  Chief Complaint  Transfer for continued management of respiratory failure, tracheostomy care, PEG feeding, and complications of quadriplegia following prolonged hospitalization.    History of Present Illness  Ms. Karie Denney is a 59-year-old woman with a complex medical history including quadriplegia following spinal surgery in 2015 and a cerebrovascular accident (CVA) in 2024 resulting in left-sided paralysis. She was transferred to [facility] from [facility] in [place], [place], for ongoing care following a prolonged ICU course involving intubation, respiratory failure, and significant complications.  She was initially hospitalized on February 15, 2025, for aspiration and dysphagia evaluation, during which she developed aspiration pneumonia and respiratory failure requiring intubation. Her hospital course was complicated by an ESBL E. coli UTI, pericardial effusion (treated with colchicine), and a spontaneous right thigh hematoma concerning for compartment syndrome, leading to transfer to [facility]. She required prolonged mechanical ventilation, tracheostomy placement, and PEG tube insertion.  She has now returned to [facility] for continued respiratory care, tracheostomy management, PEG feeding, management of sacral pressure injury, and rehabilitation planning.    Past Medical History  Quadriplegia post-spinal surgery (2015)  CVA (2024)  Aspiration pneumonia  Acute respiratory failure  UTI (ESBL E. coli)  Depression  GERD  Pharyngoesophageal dysphagia  Pericardial effusion/pericarditis  Chronic  constipation  Pressure ulcer (sacral, unstageable)  Hyperlipidemia  Anemia    Past Surgical History  Spinal surgery (2015)  PEG tube placement (03/11/2025)  Esophageal dilation with biopsy (08/2024)    Medications  Active Medications:  Atorvastatin 20 mg daily  Omeprazole 40 mg daily  Sertraline 50 mg daily  Trazodone 100 mg qHS  Pregabalin 75 mg BID  Hydrocodone-acetaminophen 5-325 mg BID  Lactulose 30 mL daily via PEG  Midodrine 10 mg Q6H via PEG  Levalbuterol 0.63 mg nebulizer Q6H  Polyethylene glycol 17 g daily via PEG  Recent Changes:  Colchicine: Discontinued due to bleeding  Aspirin and ezetimibe: Discontinued    Allergies  Penicillins ? Rash and anaphylaxis    Social History  Never smoker  No alcohol or tobacco use  Lives at home with mother; receives home health weekly  Bedbound, non-ambulatory    Family History  Noncontributory    Review of Systems  Limited due to tracheostomy, quadriplegia, and communication challenges. History obtained from EMR and caregiver.    Physical Examination  General: Chronically ill-appearing woman, alert, tracheostomy in place with T-piece  HEENT: Mild nasal skin necrosis, mucous membranes moist  Eyes: PERRL, EOMI  Neck: Tracheostomy present  CV: RRR, no murmurs  Lungs: Breath sounds diminished at bases; no acute distress; no wheezing  Abdomen: Soft, non-tender, PEG tube in place  Skin: Unstageable sacral ulcer, nasal pressure ulcer  Neuro: Quadriplegia, responsive with eye tracking and blinking  Extremities: RLE hematoma, bilateral edema, no signs of active bleeding    Laboratory Data (Most Recent)  Hgb: 8.5 g/dL  Creatinine: 0.30 mg/dL  Albumin: 3.1 g/dL  WBC: 9.4 K/uL  Ferritin: 265 ng/mL  INR: 0.93  No growth on recent blood and urine cultures    Imaging  CT angio: Large RLE hematoma without active extravasation  Multiple chest X-rays: Stable bilateral pleural effusions, improving atelectasis  Echo: EF 55-60%, small pericardial effusion      Overview/Hospital  Course:  3/26  - continues on vanc for blood cultures + on 3/24, echo completed at time with no endocarditis seen  - discussed with pulmonary, recs to follow    3/27  - due to overall baseline health will treat coag negative staph bacteremia as a true infection, for now continue vanc and follow cultures and sensitivities  - discussed with pulmonology who plan to continue trach collar as is and see patient May 1st at 1:40 pm, confirmed appointment  - discussed with family at bedside who are very concerned about secretions and states they don't have the suction capabilities at home and do not feel comfortable going home with her in this state, will discuss with social on possible home equipment    3/28  - awaiting micro, continuing vanc  - social setting up suction at home, family plans to return home as before    3/29  - still with secretions  - family requesting voice box    3/30  - doing well today, mentation much improved  - anticipate discharge within the next two days with home health services, family will need education on trach maintenance and home feedings as patient has PEG tube and they have not cared for a PEG tube before, also we will discuss with  getting a speaking told to use with a trach    3/31  - secretions improved today  - working with social for home set up of oxygen and tube feeds  - family needs heavy education on trach care and tube feeds as primary care giver has no experience with either, still wishes to discharge home    Interval History:     Review of Systems   Unable to perform ROS: Patient nonverbal     Objective:     Vital Signs (Most Recent):  Temp: 97.9 °F (36.6 °C) (04/04/25 0715)  Pulse: 101 (04/04/25 0735)  Resp: 20 (04/04/25 0735)  BP: 103/68 (04/04/25 0715)  SpO2: 98 % (04/04/25 0735) Vital Signs (24h Range):  Temp:  [97.9 °F (36.6 °C)-98.8 °F (37.1 °C)] 97.9 °F (36.6 °C)  Pulse:  [] 101  Resp:  [16-22] 20  SpO2:  [81 %-99 %] 98 %  BP: ()/(50-68) 103/68      Weight: 79.8 kg (175 lb 14.8 oz)  Body mass index is 32.18 kg/m².    Intake/Output Summary (Last 24 hours) at 4/4/2025 1004  Last data filed at 4/4/2025 0543  Gross per 24 hour   Intake 960 ml   Output --   Net 960 ml         Physical Exam  Constitutional:       Appearance: She is ill-appearing.   HENT:      Head: Normocephalic and atraumatic.      Nose: Nose normal.      Mouth/Throat:      Mouth: Mucous membranes are moist.      Pharynx: Oropharynx is clear.   Eyes:      Extraocular Movements: Extraocular movements intact.      Conjunctiva/sclera: Conjunctivae normal.      Pupils: Pupils are equal, round, and reactive to light.   Cardiovascular:      Rate and Rhythm: Regular rhythm. Tachycardia present.      Pulses: Normal pulses.      Heart sounds: Normal heart sounds.   Pulmonary:      Effort: Pulmonary effort is normal.      Breath sounds: Wheezing present.      Comments: Secretions  Abdominal:      General: Abdomen is flat. Bowel sounds are normal.      Palpations: Abdomen is soft.   Musculoskeletal:      Cervical back: Normal range of motion and neck supple.   Skin:     General: Skin is warm and dry.   Neurological:      Mental Status: She is alert.      Comments: Quadriplegia    Psychiatric:         Mood and Affect: Mood normal.         Behavior: Behavior normal.               Significant Labs: All pertinent labs within the past 24 hours have been reviewed.    Significant Imaging: I have reviewed all pertinent imaging results/findings within the past 24 hours.      Assessment & Plan  Acute on chronic respiratory failure with hypoxia  Continue tracheostomy care with T-piece trials  Tracheostomy suctioning ordered due to thick secretions.   Culture tracheostomy secretions.   Monitor oxygenation and readiness for weaning  Pulmonary consult tomorrow.     3/24: mucus plugging requiring frequent suctioning.  Chest physiotherapy, Mucomyst.  Additional recommendations per pulmonology.     3/25: requiring frequent  suctioning but otherwise stable.     3/26  - continues on vanc for blood cultures + on 3/24, echo completed at time with no endocarditis seen  - discussed with pulmonary, recs to follow    3/27  - discussed with pulmonology who plan to continue trach collar as is and see patient May 1st at 1:40 pm, confirmed appointment  - discussed with family at bedside who are very concerned about secretions and states they don't have the suction capabilities at home and do not feel comfortable going home with her in this state, will discuss with social on possible home equipement    3/29  - continues with secretions, setting up home suction  - family requesting voice box    3/30  - anticipate discharge in the next few days with home health and set up at home      3/31  - secretions improved today  - working with social for home set up of oxygen and tube feeds  - family needs heavy education on trach care and tube feeds as primary care giver has no experience with either, still wishes to discharge home    4/1: Still frequent suctioning.  Family would like patient to go to LTAC before going home as she still requires frequent suctioning, tube feeds, antibiotics, and additional care.    She has required over a week of ICU care prior to returning to our facility.  She was on the vent and was difficult to wean. She has required intermediate ICU level of care while at our facility.       4/2: plan to discharge to SNF if possible.  Otherwise, patient will need to go home.  Keep trach without capping until April 15th.   Pulmonology to see patient tomorrow.   Patient certainly has required more than 3 nights of Intermediate ICU level of care.  She now needs management of her wounds, respiratory failure, feeding care and would benefit from post-acute care.     4/3: Plan to discharge to LTAC given complexity of patient's conditions - respiratory failure with very frequent suctioning, bacteremia, tube feeds, etc.    Patient certainly has  required more than 3 nights of Intermediate ICU level of care.  She now needs management of her wounds, respiratory failure, feeding care and would benefit from post-acute care.     4/4: continue with current aggressive respiratory care to manage trach and thick secretions.     Severe protein-calorie malnutrition  Nutrition consulted. Most recent weight and BMI monitored-     Measurements:  Wt Readings from Last 1 Encounters:   03/27/25 79.8 kg (175 lb 14.8 oz)   Body mass index is 32.18 kg/m².    Patient has been screened and assessed by RD.    Malnutrition Type:  Context: chronic illness  Level: severe    Continue PEG tube feeding.  Nepro at 40 mL/hr  Maintain bowel regimen (lactulose, PEG, senna)  Dietitian to reassess  continue with current tube feed regimen    Dysphagia  Continue omeprazole  Avoid PO intake  Speech therapy if clinically appropriate    Aspiration pneumonia  Recurrent aspiration pneumonia.  Complete two rounds of antibiotics.    Pressure ulcers of skin of multiple topographic sites  Aquacel Ag + Mepilex dressings  Wound care team consulted    Continue offloading and specialty mattress    Depression  Continue pregabalin, sertraline, trazodone      Mucus plugging of bronchi      S/P percutaneous endoscopic gastrostomy (PEG) tube placement  Patient noted to have a percutaneous endoscopic gastrostomy tube in place. I have personally inspected the tube.Tube was placed prior to this admission There are no signs of drainage or infection around the site. The tube is patent. Medications have converted to liquid form if available.  Routine care to be done by wound care and nursing staff.       Quadriplegia  Maximal support, bedbound  PT/OT as tolerated  DME planning for discharge    Hematoma of lower extremity, right, subsequent encounter  No active bleeding; conservative management  Monitor H/H  No anticoagulation  Hyperlipidemia  Continue atorvastatin    Pericardial effusion  Previously on colchicine,  "now discontinued due to bleeding  Continue low-dose prednisone  Monitor for recurrence    Acute respiratory failure with hypoxia and hypercarbia  Patient with Hypoxic Respiratory failure which is Acute on chronic.  she is not on home oxygen. Supplemental oxygen was provided and noted- Oxygen Concentration (%):  [28] 28    .   Signs/symptoms of respiratory failure include- tachypnea and respiratory distress. Contributing diagnoses includes - Aspiration and Pneumonia Labs and images were reviewed. Patient Has not had a recent ABG. Will treat underlying causes and adjust management of respiratory failure as follows-   Chronic respiratory failure with hypoxia  Patient with Hypoxic Respiratory failure which is Acute on chronic.  she is not on home oxygen. Supplemental oxygen was provided and noted- Oxygen Concentration (%):  [28] 28    .   Signs/symptoms of respiratory failure include- increased work of breathing and respiratory distress. Contributing diagnoses includes - Aspiration and Pneumonia Labs and images were reviewed. Patient Has not had a recent ABG. Will treat underlying causes and adjust management of respiratory failure as follows-   Coag negative Staphylococcus bacteremia  Continue with current antibiotics until sensitivities return.      Antibiotics (From admission, onward)      Start     Stop Route Frequency Ordered    03/31/25 1400  vancomycin (VANCOCIN) 1,750 mg in 0.9% NaCl 500 mL IVPB         -- IV Every 36 hours 03/31/25 1237    03/25/25 0519  vancomycin - pharmacy to dose  (vancomycin IVPB (PEDS and ADULTS))        Placed in "And" Linked Group    -- IV pharmacy to manage frequency 03/25/25 9434          3/26  - continues on vanc for blood cultures + on 3/24, echo completed at time with no endocarditis seen  - discussed with pulmonary, recs to follow    3/27  - due to overall baseline health will treat coag negative staph bacteremia as a true infection, for now continue vanc and follow cultures and " sensitivities    4/4:  I am continuing with vancomycin given that patient has a severe penicillin allergy.  Otherwise Ancef would have been an option.  Continue with IV antibiotics at least through April 10th.     VTE Risk Mitigation (From admission, onward)           Ordered     enoxaparin injection 40 mg  Daily         03/23/25 1940     IP VTE HIGH RISK PATIENT  Once         03/23/25 1940     Place sequential compression device  Until discontinued         03/23/25 1941                    Discharge Planning   SHRADDHA:      Code Status: Full Code   Medical Readiness for Discharge Date:   Discharge Plan A: Long-term acute care facility (LTAC)              Cassie Bar MD  Department of Hospital Medicine   [facility] - [facility]

## 2025-04-04 NOTE — PLAN OF CARE
Problem: Adult Inpatient Plan of Care  Goal: Plan of Care Review  Outcome: Progressing  Goal: Optimal Comfort and Wellbeing  Outcome: Progressing  Goal: Readiness for Transition of Care  Outcome: Progressing     Problem: Infection  Goal: Absence of Infection Signs and Symptoms  Outcome: Progressing     Problem: Wound  Goal: Optimal Coping  Outcome: Progressing  Goal: Optimal Functional Ability  Outcome: Progressing  Goal: Optimal Pain Control and Function  Outcome: Progressing     Problem: Gas Exchange Impaired  Goal: Optimal Gas Exchange  Outcome: Progressing     Problem: Airway Clearance Ineffective  Goal: Effective Airway Clearance  Outcome: Progressing

## 2025-04-05 PROCEDURE — 27200966 HC CLOSED SUCTION SYSTEM

## 2025-04-05 PROCEDURE — 63600175 PHARM REV CODE 636 W HCPCS: Performed by: HOSPITALIST

## 2025-04-05 PROCEDURE — 25000003 PHARM REV CODE 250: Performed by: HOSPITALIST

## 2025-04-05 PROCEDURE — 27000221 HC OXYGEN, UP TO 24 HOURS

## 2025-04-05 PROCEDURE — 63600175 PHARM REV CODE 636 W HCPCS

## 2025-04-05 PROCEDURE — 11000001 HC ACUTE MED/SURG PRIVATE ROOM

## 2025-04-05 PROCEDURE — 94761 N-INVAS EAR/PLS OXIMETRY MLT: CPT

## 2025-04-05 PROCEDURE — 25000003 PHARM REV CODE 250

## 2025-04-05 PROCEDURE — 27000207 HC ISOLATION

## 2025-04-05 PROCEDURE — 99233 SBSQ HOSP IP/OBS HIGH 50: CPT | Mod: ,,, | Performed by: HOSPITALIST

## 2025-04-05 PROCEDURE — 94640 AIRWAY INHALATION TREATMENT: CPT

## 2025-04-05 PROCEDURE — 25000242 PHARM REV CODE 250 ALT 637 W/ HCPCS: Performed by: STUDENT IN AN ORGANIZED HEALTH CARE EDUCATION/TRAINING PROGRAM

## 2025-04-05 PROCEDURE — 25000242 PHARM REV CODE 250 ALT 637 W/ HCPCS: Performed by: INTERNAL MEDICINE

## 2025-04-05 PROCEDURE — 99900026 HC AIRWAY MAINTENANCE (STAT)

## 2025-04-05 PROCEDURE — 99900035 HC TECH TIME PER 15 MIN (STAT)

## 2025-04-05 RX ADMIN — LEVALBUTEROL HYDROCHLORIDE 1.25 MG: 1.25 SOLUTION RESPIRATORY (INHALATION) at 07:04

## 2025-04-05 RX ADMIN — TRAZODONE HYDROCHLORIDE 100 MG: 50 TABLET ORAL at 09:04

## 2025-04-05 RX ADMIN — ACETYLCYSTEINE 2 ML: 200 SOLUTION ORAL; RESPIRATORY (INHALATION) at 12:04

## 2025-04-05 RX ADMIN — POLYETHYLENE GLYCOL 3350 17 G: 17 POWDER, FOR SOLUTION ORAL at 05:04

## 2025-04-05 RX ADMIN — ACETYLCYSTEINE 2 ML: 200 SOLUTION ORAL; RESPIRATORY (INHALATION) at 07:04

## 2025-04-05 RX ADMIN — MIDODRINE HYDROCHLORIDE 10 MG: 10 TABLET ORAL at 05:04

## 2025-04-05 RX ADMIN — SERTRALINE HYDROCHLORIDE 50 MG: 50 TABLET ORAL at 08:04

## 2025-04-05 RX ADMIN — MIDODRINE HYDROCHLORIDE 10 MG: 10 TABLET ORAL at 08:04

## 2025-04-05 RX ADMIN — MIDODRINE HYDROCHLORIDE 10 MG: 10 TABLET ORAL at 09:04

## 2025-04-05 RX ADMIN — VANCOMYCIN HYDROCHLORIDE 1750 MG: 500 INJECTION, POWDER, LYOPHILIZED, FOR SOLUTION INTRAVENOUS at 01:04

## 2025-04-05 RX ADMIN — PANTOPRAZOLE SODIUM 40 MG: 40 INJECTION, POWDER, FOR SOLUTION INTRAVENOUS at 08:04

## 2025-04-05 RX ADMIN — ENOXAPARIN SODIUM 40 MG: 40 INJECTION SUBCUTANEOUS at 05:04

## 2025-04-05 RX ADMIN — LEVALBUTEROL HYDROCHLORIDE 1.25 MG: 1.25 SOLUTION RESPIRATORY (INHALATION) at 12:04

## 2025-04-05 RX ADMIN — PREGABALIN 75 MG: 75 CAPSULE ORAL at 09:04

## 2025-04-05 RX ADMIN — PREGABALIN 75 MG: 75 CAPSULE ORAL at 08:04

## 2025-04-05 RX ADMIN — ATORVASTATIN CALCIUM 20 MG: 20 TABLET, FILM COATED ORAL at 09:04

## 2025-04-05 NOTE — ASSESSMENT & PLAN NOTE
"Continue with current antibiotics until sensitivities return.      Antibiotics (From admission, onward)      Start     Stop Route Frequency Ordered    03/31/25 1400  vancomycin (VANCOCIN) 1,750 mg in 0.9% NaCl 500 mL IVPB         -- IV Every 36 hours 03/31/25 1237    03/25/25 0519  vancomycin - pharmacy to dose  (vancomycin IVPB (PEDS and ADULTS))        Placed in "And" Linked Group    -- IV pharmacy to manage frequency 03/25/25 8781          3/26  - continues on vanc for blood cultures + on 3/24, echo completed at time with no endocarditis seen  - discussed with pulmonary, recs to follow    3/27  - due to overall baseline health will treat coag negative staph bacteremia as a true infection, for now continue vanc and follow cultures and sensitivities    4/4:  I am continuing with vancomycin given that patient has a severe penicillin allergy.  Otherwise Ancef would have been an option.  Continue with IV antibiotics at least through April 10th.     4/5: continue with IV antibiotics.       "

## 2025-04-05 NOTE — SUBJECTIVE & OBJECTIVE
Interval History:     Review of Systems   Unable to perform ROS: Patient nonverbal     Objective:     Vital Signs (Most Recent):  Temp: 97.6 °F (36.4 °C) (04/05/25 1610)  Pulse: 107 (04/05/25 1610)  Resp: 18 (04/05/25 1610)  BP: 101/68 (04/05/25 1610)  SpO2: 97 % (04/05/25 1610) Vital Signs (24h Range):  Temp:  [97.6 °F (36.4 °C)-99.5 °F (37.5 °C)] 97.6 °F (36.4 °C)  Pulse:  [] 107  Resp:  [16-20] 18  SpO2:  [90 %-99 %] 97 %  BP: ()/(54-72) 101/68     Weight: 79.8 kg (175 lb 14.8 oz)  Body mass index is 32.18 kg/m².    Intake/Output Summary (Last 24 hours) at 4/5/2025 1641  Last data filed at 4/5/2025 0627  Gross per 24 hour   Intake 960 ml   Output --   Net 960 ml         Physical Exam  Constitutional:       Appearance: She is ill-appearing.   HENT:      Head: Normocephalic and atraumatic.      Nose: Nose normal.      Mouth/Throat:      Mouth: Mucous membranes are moist.      Pharynx: Oropharynx is clear.   Eyes:      Extraocular Movements: Extraocular movements intact.      Conjunctiva/sclera: Conjunctivae normal.      Pupils: Pupils are equal, round, and reactive to light.   Cardiovascular:      Rate and Rhythm: Regular rhythm. Tachycardia present.      Pulses: Normal pulses.      Heart sounds: Normal heart sounds.   Pulmonary:      Effort: Pulmonary effort is normal.      Breath sounds: Wheezing present.      Comments: Secretions  Abdominal:      General: Abdomen is flat. Bowel sounds are normal.      Palpations: Abdomen is soft.   Musculoskeletal:      Cervical back: Normal range of motion and neck supple.   Skin:     General: Skin is warm and dry.   Neurological:      Mental Status: She is alert.      Comments: Quadriplegia    Psychiatric:         Mood and Affect: Mood normal.         Behavior: Behavior normal.               Significant Labs: All pertinent labs within the past 24 hours have been reviewed.    Significant Imaging: I have reviewed all pertinent imaging results/findings within the past  24 hours.

## 2025-04-05 NOTE — ASSESSMENT & PLAN NOTE
Continue tracheostomy care with T-piece trials  Tracheostomy suctioning ordered due to thick secretions.   Culture tracheostomy secretions.   Monitor oxygenation and readiness for weaning  Pulmonary consult tomorrow.     3/24: mucus plugging requiring frequent suctioning.  Chest physiotherapy, Mucomyst.  Additional recommendations per pulmonology.     3/25: requiring frequent suctioning but otherwise stable.     3/26  - continues on vanc for blood cultures + on 3/24, echo completed at time with no endocarditis seen  - discussed with pulmonary, recs to follow    3/27  - discussed with pulmonology who plan to continue trach collar as is and see patient May 1st at 1:40 pm, confirmed appointment  - discussed with family at bedside who are very concerned about secretions and states they don't have the suction capabilities at home and do not feel comfortable going home with her in this state, will discuss with social on possible home equipement    3/29  - continues with secretions, setting up home suction  - family requesting voice box    3/30  - anticipate discharge in the next few days with home health and set up at home      3/31  - secretions improved today  - working with social for home set up of oxygen and tube feeds  - family needs heavy education on trach care and tube feeds as primary care giver has no experience with either, still wishes to discharge home    4/1: Still frequent suctioning.  Family would like patient to go to LTAC before going home as she still requires frequent suctioning, tube feeds, antibiotics, and additional care.    She has required over a week of ICU care prior to returning to our facility.  She was on the vent and was difficult to wean. She has required intermediate ICU level of care while at our facility.       4/2: plan to discharge to SNF if possible.  Otherwise, patient will need to go home.  Keep trach without capping until April 15th.   Pulmonology to see patient tomorrow.    Patient certainly has required more than 3 nights of Intermediate ICU level of care.  She now needs management of her wounds, respiratory failure, feeding care and would benefit from post-acute care.     4/3: Plan to discharge to LTAC given complexity of patient's conditions - respiratory failure with very frequent suctioning, bacteremia, tube feeds, etc.    Patient certainly has required more than 3 nights of Intermediate ICU level of care.  She now needs management of her wounds, respiratory failure, feeding care and would benefit from post-acute care.     4/4: continue with current aggressive respiratory care to manage trach and thick secretions.     4/5: continue with aggressive pulmonary care.

## 2025-04-05 NOTE — PROGRESS NOTES
[facility] - [address] [place] [address] Medicine  Progress Note    Patient Name: Karie Denney  MRN: [id number]  Patient Class: IP- Inpatient   Admission Date: 3/23/2025  Length of Stay: 13 days  Attending Physician: Cassie Bar MD  Primary Care Provider: Gonzalo Barrera NP    Subjective     Principal Problem:Acute on chronic respiratory failure with hypoxia        HPI:  Chief Complaint  Transfer for continued management of respiratory failure, tracheostomy care, PEG feeding, and complications of quadriplegia following prolonged hospitalization.    History of Present Illness  Ms. Karie Denney is a 59-year-old woman with a complex medical history including quadriplegia following spinal surgery in 2015 and a cerebrovascular accident (CVA) in 2024 resulting in left-sided paralysis. She was transferred to [facility] from [facility] in [place], [place], for ongoing care following a prolonged ICU course involving intubation, respiratory failure, and significant complications.  She was initially hospitalized on February 15, 2025, for aspiration and dysphagia evaluation, during which she developed aspiration pneumonia and respiratory failure requiring intubation. Her hospital course was complicated by an ESBL E. coli UTI, pericardial effusion (treated with colchicine), and a spontaneous right thigh hematoma concerning for compartment syndrome, leading to transfer to [facility]. She required prolonged mechanical ventilation, tracheostomy placement, and PEG tube insertion.  She has now returned to [facility] for continued respiratory care, tracheostomy management, PEG feeding, management of sacral pressure injury, and rehabilitation planning.    Past Medical History  Quadriplegia post-spinal surgery (2015)  CVA (2024)  Aspiration pneumonia  Acute respiratory failure  UTI (ESBL E. coli)  Depression  GERD  Pharyngoesophageal dysphagia  Pericardial effusion/pericarditis  Chronic  constipation  Pressure ulcer (sacral, unstageable)  Hyperlipidemia  Anemia    Past Surgical History  Spinal surgery (2015)  PEG tube placement (03/11/2025)  Esophageal dilation with biopsy (08/2024)    Medications  Active Medications:  Atorvastatin 20 mg daily  Omeprazole 40 mg daily  Sertraline 50 mg daily  Trazodone 100 mg qHS  Pregabalin 75 mg BID  Hydrocodone-acetaminophen 5-325 mg BID  Lactulose 30 mL daily via PEG  Midodrine 10 mg Q6H via PEG  Levalbuterol 0.63 mg nebulizer Q6H  Polyethylene glycol 17 g daily via PEG  Recent Changes:  Colchicine: Discontinued due to bleeding  Aspirin and ezetimibe: Discontinued    Allergies  Penicillins ? Rash and anaphylaxis    Social History  Never smoker  No alcohol or tobacco use  Lives at home with mother; receives home health weekly  Bedbound, non-ambulatory    Family History  Noncontributory    Review of Systems  Limited due to tracheostomy, quadriplegia, and communication challenges. History obtained from EMR and caregiver.    Physical Examination  General: Chronically ill-appearing woman, alert, tracheostomy in place with T-piece  HEENT: Mild nasal skin necrosis, mucous membranes moist  Eyes: PERRL, EOMI  Neck: Tracheostomy present  CV: RRR, no murmurs  Lungs: Breath sounds diminished at bases; no acute distress; no wheezing  Abdomen: Soft, non-tender, PEG tube in place  Skin: Unstageable sacral ulcer, nasal pressure ulcer  Neuro: Quadriplegia, responsive with eye tracking and blinking  Extremities: RLE hematoma, bilateral edema, no signs of active bleeding    Laboratory Data (Most Recent)  Hgb: 8.5 g/dL  Creatinine: 0.30 mg/dL  Albumin: 3.1 g/dL  WBC: 9.4 K/uL  Ferritin: 265 ng/mL  INR: 0.93  No growth on recent blood and urine cultures    Imaging  CT angio: Large RLE hematoma without active extravasation  Multiple chest X-rays: Stable bilateral pleural effusions, improving atelectasis  Echo: EF 55-60%, small pericardial effusion      Overview/Hospital  Course:  3/26  - continues on vanc for blood cultures + on 3/24, echo completed at time with no endocarditis seen  - discussed with pulmonary, recs to follow    3/27  - due to overall baseline health will treat coag negative staph bacteremia as a true infection, for now continue vanc and follow cultures and sensitivities  - discussed with pulmonology who plan to continue trach collar as is and see patient May 1st at 1:40 pm, confirmed appointment  - discussed with family at bedside who are very concerned about secretions and states they don't have the suction capabilities at home and do not feel comfortable going home with her in this state, will discuss with social on possible home equipment    3/28  - awaiting micro, continuing vanc  - social setting up suction at home, family plans to return home as before    3/29  - still with secretions  - family requesting voice box    3/30  - doing well today, mentation much improved  - anticipate discharge within the next two days with home health services, family will need education on trach maintenance and home feedings as patient has PEG tube and they have not cared for a PEG tube before, also we will discuss with  getting a speaking told to use with a trach    3/31  - secretions improved today  - working with social for home set up of oxygen and tube feeds  - family needs heavy education on trach care and tube feeds as primary care giver has no experience with either, still wishes to discharge home    Interval History:     Review of Systems   Unable to perform ROS: Patient nonverbal     Objective:     Vital Signs (Most Recent):  Temp: 97.6 °F (36.4 °C) (04/05/25 1610)  Pulse: 107 (04/05/25 1610)  Resp: 18 (04/05/25 1610)  BP: 101/68 (04/05/25 1610)  SpO2: 97 % (04/05/25 1610) Vital Signs (24h Range):  Temp:  [97.6 °F (36.4 °C)-99.5 °F (37.5 °C)] 97.6 °F (36.4 °C)  Pulse:  [] 107  Resp:  [16-20] 18  SpO2:  [90 %-99 %] 97 %  BP: ()/(54-72) 101/68      Weight: 79.8 kg (175 lb 14.8 oz)  Body mass index is 32.18 kg/m².    Intake/Output Summary (Last 24 hours) at 4/5/2025 1649  Last data filed at 4/5/2025 0627  Gross per 24 hour   Intake 960 ml   Output --   Net 960 ml         Physical Exam  Constitutional:       Appearance: She is ill-appearing.   HENT:      Head: Normocephalic and atraumatic.      Nose: Nose normal.      Mouth/Throat:      Mouth: Mucous membranes are moist.      Pharynx: Oropharynx is clear.   Eyes:      Extraocular Movements: Extraocular movements intact.      Conjunctiva/sclera: Conjunctivae normal.      Pupils: Pupils are equal, round, and reactive to light.   Cardiovascular:      Rate and Rhythm: Regular rhythm. Tachycardia present.      Pulses: Normal pulses.      Heart sounds: Normal heart sounds.   Pulmonary:      Effort: Pulmonary effort is normal.      Breath sounds: Wheezing present.      Comments: Secretions  Abdominal:      General: Abdomen is flat. Bowel sounds are normal.      Palpations: Abdomen is soft.   Musculoskeletal:      Cervical back: Normal range of motion and neck supple.   Skin:     General: Skin is warm and dry.   Neurological:      Mental Status: She is alert.      Comments: Quadriplegia    Psychiatric:         Mood and Affect: Mood normal.         Behavior: Behavior normal.               Significant Labs: All pertinent labs within the past 24 hours have been reviewed.    Significant Imaging: I have reviewed all pertinent imaging results/findings within the past 24 hours.      Assessment & Plan  Acute on chronic respiratory failure with hypoxia  Continue tracheostomy care with T-piece trials  Tracheostomy suctioning ordered due to thick secretions.   Culture tracheostomy secretions.   Monitor oxygenation and readiness for weaning  Pulmonary consult tomorrow.     3/24: mucus plugging requiring frequent suctioning.  Chest physiotherapy, Mucomyst.  Additional recommendations per pulmonology.     3/25: requiring frequent  suctioning but otherwise stable.     3/26  - continues on vanc for blood cultures + on 3/24, echo completed at time with no endocarditis seen  - discussed with pulmonary, recs to follow    3/27  - discussed with pulmonology who plan to continue trach collar as is and see patient May 1st at 1:40 pm, confirmed appointment  - discussed with family at bedside who are very concerned about secretions and states they don't have the suction capabilities at home and do not feel comfortable going home with her in this state, will discuss with social on possible home equipement    3/29  - continues with secretions, setting up home suction  - family requesting voice box    3/30  - anticipate discharge in the next few days with home health and set up at home      3/31  - secretions improved today  - working with social for home set up of oxygen and tube feeds  - family needs heavy education on trach care and tube feeds as primary care giver has no experience with either, still wishes to discharge home    4/1: Still frequent suctioning.  Family would like patient to go to LTAC before going home as she still requires frequent suctioning, tube feeds, antibiotics, and additional care.    She has required over a week of ICU care prior to returning to our facility.  She was on the vent and was difficult to wean. She has required intermediate ICU level of care while at our facility.       4/2: plan to discharge to SNF if possible.  Otherwise, patient will need to go home.  Keep trach without capping until April 15th.   Pulmonology to see patient tomorrow.   Patient certainly has required more than 3 nights of Intermediate ICU level of care.  She now needs management of her wounds, respiratory failure, feeding care and would benefit from post-acute care.     4/3: Plan to discharge to LTAC given complexity of patient's conditions - respiratory failure with very frequent suctioning, bacteremia, tube feeds, etc.    Patient certainly has  required more than 3 nights of Intermediate ICU level of care.  She now needs management of her wounds, respiratory failure, feeding care and would benefit from post-acute care.     4/4: continue with current aggressive respiratory care to manage trach and thick secretions.     4/5: continue with aggressive pulmonary care.      Severe protein-calorie malnutrition  Nutrition consulted. Most recent weight and BMI monitored-     Measurements:  Wt Readings from Last 1 Encounters:   03/27/25 79.8 kg (175 lb 14.8 oz)   Body mass index is 32.18 kg/m².    Patient has been screened and assessed by RD.    Malnutrition Type:  Context: chronic illness  Level: severe    Continue PEG tube feeding.  Nepro at 40 mL/hr  Maintain bowel regimen (lactulose, PEG, senna)  Dietitian to reassess  continue with current tube feed regimen    Dysphagia  Continue omeprazole  Avoid PO intake  Speech therapy if clinically appropriate    Aspiration pneumonia  Recurrent aspiration pneumonia.  Complete two rounds of antibiotics.    Pressure ulcers of skin of multiple topographic sites  Aquacel Ag + Mepilex dressings  Wound care team consulted    Continue offloading and specialty mattress    Depression  Continue pregabalin, sertraline, trazodone      Mucus plugging of bronchi      S/P percutaneous endoscopic gastrostomy (PEG) tube placement  Patient noted to have a percutaneous endoscopic gastrostomy tube in place. I have personally inspected the tube.Tube was placed prior to this admission There are no signs of drainage or infection around the site. The tube is patent. Medications have converted to liquid form if available.  Routine care to be done by wound care and nursing staff.       Quadriplegia  Maximal support, bedbound  PT/OT as tolerated  DME planning for discharge    Hematoma of lower extremity, right, subsequent encounter  No active bleeding; conservative management  Monitor H/H  No anticoagulation  Hyperlipidemia  Continue  "atorvastatin    Pericardial effusion  Previously on colchicine, now discontinued due to bleeding  Continue low-dose prednisone  Monitor for recurrence    Acute respiratory failure with hypoxia and hypercarbia  Patient with Hypoxic Respiratory failure which is Acute on chronic.  she is not on home oxygen. Supplemental oxygen was provided and noted- Oxygen Concentration (%):  [28] 28    .   Signs/symptoms of respiratory failure include- tachypnea and respiratory distress. Contributing diagnoses includes - Aspiration and Pneumonia Labs and images were reviewed. Patient Has not had a recent ABG. Will treat underlying causes and adjust management of respiratory failure as follows-   Chronic respiratory failure with hypoxia  Patient with Hypoxic Respiratory failure which is Acute on chronic.  she is not on home oxygen. Supplemental oxygen was provided and noted- Oxygen Concentration (%):  [28] 28    .   Signs/symptoms of respiratory failure include- increased work of breathing and respiratory distress. Contributing diagnoses includes - Aspiration and Pneumonia Labs and images were reviewed. Patient Has not had a recent ABG. Will treat underlying causes and adjust management of respiratory failure as follows-   Coag negative Staphylococcus bacteremia  Continue with current antibiotics until sensitivities return.      Antibiotics (From admission, onward)      Start     Stop Route Frequency Ordered    03/31/25 1400  vancomycin (VANCOCIN) 1,750 mg in 0.9% NaCl 500 mL IVPB         -- IV Every 36 hours 03/31/25 1237    03/25/25 0519  vancomycin - pharmacy to dose  (vancomycin IVPB (PEDS and ADULTS))        Placed in "And" Linked Group    -- IV pharmacy to manage frequency 03/25/25 5339          3/26  - continues on vanc for blood cultures + on 3/24, echo completed at time with no endocarditis seen  - discussed with pulmonary, recs to follow    3/27  - due to overall baseline health will treat coag negative staph bacteremia as a " true infection, for now continue vanc and follow cultures and sensitivities    4/4:  I am continuing with vancomycin given that patient has a severe penicillin allergy.  Otherwise Ancef would have been an option.  Continue with IV antibiotics at least through April 10th.     4/5: continue with IV antibiotics.       VTE Risk Mitigation (From admission, onward)           Ordered     enoxaparin injection 40 mg  Daily         03/23/25 1940     IP VTE HIGH RISK PATIENT  Once         03/23/25 1940     Place sequential compression device  Until discontinued         03/23/25 1941                    Discharge Planning   SHRADDHA:      Code Status: Full Code   Medical Readiness for Discharge Date:   Discharge Plan A: Long-term acute care facility (LTAC)          Cassie Bar MD  Department of Hospital Medicine   [facility] - [facility] Tele[facility]

## 2025-04-05 NOTE — PLAN OF CARE
Problem: Adult Inpatient Plan of Care  Goal: Plan of Care Review  Outcome: Progressing  Goal: Optimal Comfort and Wellbeing  Outcome: Progressing  Goal: Readiness for Transition of Care  Outcome: Progressing     Problem: Infection  Goal: Absence of Infection Signs and Symptoms  Outcome: Progressing     Problem: Gas Exchange Impaired  Goal: Optimal Gas Exchange  Outcome: Progressing     Problem: Airway Clearance Ineffective  Goal: Effective Airway Clearance  Outcome: Progressing     Problem: Fall Injury Risk  Goal: Absence of Fall and Fall-Related Injury  Outcome: Progressing

## 2025-04-06 LAB
ANION GAP SERPL CALCULATED.3IONS-SCNC: 13 MMOL/L (ref 7–16)
BUN SERPL-MCNC: 16 MG/DL (ref 10–20)
BUN/CREAT SERPL: 28 (ref 6–20)
CALCIUM SERPL-MCNC: 9.9 MG/DL (ref 8.4–10.2)
CHLORIDE SERPL-SCNC: 104 MMOL/L (ref 98–107)
CO2 SERPL-SCNC: 28 MMOL/L (ref 23–31)
CREAT SERPL-MCNC: 0.58 MG/DL (ref 0.55–1.02)
EGFR (NO RACE VARIABLE) (RUSH/TITUS): 103 ML/MIN/1.73M2
GLUCOSE SERPL-MCNC: 102 MG/DL (ref 82–115)
POTASSIUM SERPL-SCNC: 4 MMOL/L (ref 3.5–5.1)
SODIUM SERPL-SCNC: 141 MMOL/L (ref 136–145)
VANCOMYCIN TROUGH SERPL-MCNC: 13.9 ΜG/ML (ref 15–20)

## 2025-04-06 PROCEDURE — 27000207 HC ISOLATION

## 2025-04-06 PROCEDURE — 25000003 PHARM REV CODE 250: Performed by: HOSPITALIST

## 2025-04-06 PROCEDURE — 25000242 PHARM REV CODE 250 ALT 637 W/ HCPCS: Performed by: STUDENT IN AN ORGANIZED HEALTH CARE EDUCATION/TRAINING PROGRAM

## 2025-04-06 PROCEDURE — 36415 COLL VENOUS BLD VENIPUNCTURE: CPT | Performed by: HOSPITALIST

## 2025-04-06 PROCEDURE — 25000003 PHARM REV CODE 250

## 2025-04-06 PROCEDURE — 99900026 HC AIRWAY MAINTENANCE (STAT)

## 2025-04-06 PROCEDURE — 11000001 HC ACUTE MED/SURG PRIVATE ROOM

## 2025-04-06 PROCEDURE — 94761 N-INVAS EAR/PLS OXIMETRY MLT: CPT

## 2025-04-06 PROCEDURE — 63600175 PHARM REV CODE 636 W HCPCS

## 2025-04-06 PROCEDURE — 94640 AIRWAY INHALATION TREATMENT: CPT

## 2025-04-06 PROCEDURE — 80202 ASSAY OF VANCOMYCIN: CPT | Performed by: HOSPITALIST

## 2025-04-06 PROCEDURE — 63600175 PHARM REV CODE 636 W HCPCS: Performed by: HOSPITALIST

## 2025-04-06 PROCEDURE — 87070 CULTURE OTHR SPECIMN AEROBIC: CPT | Performed by: HOSPITALIST

## 2025-04-06 PROCEDURE — 27000221 HC OXYGEN, UP TO 24 HOURS

## 2025-04-06 PROCEDURE — 25000242 PHARM REV CODE 250 ALT 637 W/ HCPCS: Performed by: INTERNAL MEDICINE

## 2025-04-06 PROCEDURE — 99233 SBSQ HOSP IP/OBS HIGH 50: CPT | Mod: ,,, | Performed by: HOSPITALIST

## 2025-04-06 PROCEDURE — 80048 BASIC METABOLIC PNL TOTAL CA: CPT | Performed by: HOSPITALIST

## 2025-04-06 PROCEDURE — 99900035 HC TECH TIME PER 15 MIN (STAT)

## 2025-04-06 RX ADMIN — ENOXAPARIN SODIUM 40 MG: 40 INJECTION SUBCUTANEOUS at 04:04

## 2025-04-06 RX ADMIN — MIDODRINE HYDROCHLORIDE 10 MG: 10 TABLET ORAL at 10:04

## 2025-04-06 RX ADMIN — ACETYLCYSTEINE 2 ML: 200 SOLUTION ORAL; RESPIRATORY (INHALATION) at 07:04

## 2025-04-06 RX ADMIN — PREGABALIN 75 MG: 75 CAPSULE ORAL at 10:04

## 2025-04-06 RX ADMIN — PREGABALIN 75 MG: 75 CAPSULE ORAL at 09:04

## 2025-04-06 RX ADMIN — ACETYLCYSTEINE 2 ML: 200 SOLUTION ORAL; RESPIRATORY (INHALATION) at 01:04

## 2025-04-06 RX ADMIN — MIDODRINE HYDROCHLORIDE 10 MG: 10 TABLET ORAL at 09:04

## 2025-04-06 RX ADMIN — VANCOMYCIN HYDROCHLORIDE 1750 MG: 500 INJECTION, POWDER, LYOPHILIZED, FOR SOLUTION INTRAVENOUS at 01:04

## 2025-04-06 RX ADMIN — SERTRALINE HYDROCHLORIDE 50 MG: 50 TABLET ORAL at 09:04

## 2025-04-06 RX ADMIN — ACETAMINOPHEN 1000 MG: 500 TABLET ORAL at 01:04

## 2025-04-06 RX ADMIN — ATORVASTATIN CALCIUM 20 MG: 20 TABLET, FILM COATED ORAL at 10:04

## 2025-04-06 RX ADMIN — PANTOPRAZOLE SODIUM 40 MG: 40 INJECTION, POWDER, FOR SOLUTION INTRAVENOUS at 09:04

## 2025-04-06 RX ADMIN — LEVALBUTEROL HYDROCHLORIDE 1.25 MG: 1.25 SOLUTION RESPIRATORY (INHALATION) at 07:04

## 2025-04-06 RX ADMIN — TRAZODONE HYDROCHLORIDE 100 MG: 50 TABLET ORAL at 10:04

## 2025-04-06 RX ADMIN — LEVALBUTEROL HYDROCHLORIDE 1.25 MG: 1.25 SOLUTION RESPIRATORY (INHALATION) at 01:04

## 2025-04-06 RX ADMIN — MIDODRINE HYDROCHLORIDE 10 MG: 10 TABLET ORAL at 01:04

## 2025-04-06 NOTE — PROGRESS NOTES
[facility] - [address] [place] [address] Medicine  Progress Note    Patient Name: Karie Denney  MRN: [id number]  Patient Class: IP- Inpatient   Admission Date: 3/23/2025  Length of Stay: 14 days  Attending Physician: Cassie Bar MD  Primary Care Provider: Gonzalo Barrera NP      Subjective     Principal Problem:Acute on chronic respiratory failure with hypoxia        HPI:  Chief Complaint  Transfer for continued management of respiratory failure, tracheostomy care, PEG feeding, and complications of quadriplegia following prolonged hospitalization.    History of Present Illness  Ms. Karie Denney is a 59-year-old woman with a complex medical history including quadriplegia following spinal surgery in 2015 and a cerebrovascular accident (CVA) in 2024 resulting in left-sided paralysis. She was transferred to [facility] from [facility], [place], operated by [facility] in [place], [place], for ongoing care following a prolonged ICU course involving intubation, respiratory failure, and significant complications.  She was initially hospitalized on February 15, 2025, for aspiration and dysphagia evaluation, during which she developed aspiration pneumonia and respiratory failure requiring intubation. Her hospital course was complicated by an ESBL E. coli UTI, pericardial effusion (treated with colchicine), and a spontaneous right thigh hematoma concerning for compartment syndrome, leading to transfer to [facility], [place], operated by [facility]. She required prolonged mechanical ventilation, tracheostomy placement, and PEG tube insertion.  She has now returned to [facility] for continued respiratory care, tracheostomy management, PEG feeding, management of sacral pressure injury, and rehabilitation planning.    Past Medical History  Quadriplegia post-spinal surgery (2015)  CVA (2024)  Aspiration pneumonia  Acute respiratory failure  UTI (ESBL E. coli)  Depression  GERD  Pharyngoesophageal dysphagia  Pericardial effusion/pericarditis  Chronic  constipation  Pressure ulcer (sacral, unstageable)  Hyperlipidemia  Anemia    Past Surgical History  Spinal surgery (2015)  PEG tube placement (03/11/2025)  Esophageal dilation with biopsy (08/2024)    Medications  Active Medications:  Atorvastatin 20 mg daily  Omeprazole 40 mg daily  Sertraline 50 mg daily  Trazodone 100 mg qHS  Pregabalin 75 mg BID  Hydrocodone-acetaminophen 5-325 mg BID  Lactulose 30 mL daily via PEG  Midodrine 10 mg Q6H via PEG  Levalbuterol 0.63 mg nebulizer Q6H  Polyethylene glycol 17 g daily via PEG  Recent Changes:  Colchicine: Discontinued due to bleeding  Aspirin and ezetimibe: Discontinued    Allergies  Penicillins ? Rash and anaphylaxis    Social History  Never smoker  No alcohol or tobacco use  Lives at home with mother; receives home health weekly  Bedbound, non-ambulatory    Family History  Noncontributory    Review of Systems  Limited due to tracheostomy, quadriplegia, and communication challenges. History obtained from EMR and caregiver.    Physical Examination  General: Chronically ill-appearing woman, alert, tracheostomy in place with T-piece  HEENT: Mild nasal skin necrosis, mucous membranes moist  Eyes: PERRL, EOMI  Neck: Tracheostomy present  CV: RRR, no murmurs  Lungs: Breath sounds diminished at bases; no acute distress; no wheezing  Abdomen: Soft, non-tender, PEG tube in place  Skin: Unstageable sacral ulcer, nasal pressure ulcer  Neuro: Quadriplegia, responsive with eye tracking and blinking  Extremities: RLE hematoma, bilateral edema, no signs of active bleeding    Laboratory Data (Most Recent)  Hgb: 8.5 g/dL  Creatinine: 0.30 mg/dL  Albumin: 3.1 g/dL  WBC: 9.4 K/uL  Ferritin: 265 ng/mL  INR: 0.93  No growth on recent blood and urine cultures    Imaging  CT angio: Large RLE hematoma without active extravasation  Multiple chest X-rays: Stable bilateral pleural effusions, improving atelectasis  Echo: EF 55-60%, small pericardial effusion      Overview/Hospital  Course:  3/26  - continues on vanc for blood cultures + on 3/24, echo completed at time with no endocarditis seen  - discussed with pulmonary, recs to follow    3/27  - due to overall baseline health will treat coag negative staph bacteremia as a true infection, for now continue vanc and follow cultures and sensitivities  - discussed with pulmonology who plan to continue trach collar as is and see patient May 1st at 1:40 pm, confirmed appointment  - discussed with family at bedside who are very concerned about secretions and states they don't have the suction capabilities at home and do not feel comfortable going home with her in this state, will discuss with social on possible home equipment    3/28  - awaiting micro, continuing vanc  - social setting up suction at home, family plans to return home as before    3/29  - still with secretions  - family requesting voice box    3/30  - doing well today, mentation much improved  - anticipate discharge within the next two days with home health services, family will need education on trach maintenance and home feedings as patient has PEG tube and they have not cared for a PEG tube before, also we will discuss with  getting a speaking told to use with a trach    3/31  - secretions improved today  - working with social for home set up of oxygen and tube feeds  - family needs heavy education on trach care and tube feeds as primary care giver has no experience with either, still wishes to discharge home    Interval History:   Review of Systems   Unable to perform ROS: Patient nonverbal     Objective:     Vital Signs (Most Recent):  Temp: 98.2 °F (36.8 °C) (04/06/25 0400)  Pulse: 100 (04/06/25 1336)  Resp: 20 (04/06/25 1336)  BP: 98/63 (04/06/25 1111)  SpO2: 98 % (04/06/25 1336) Vital Signs (24h Range):  Temp:  [97.6 °F (36.4 °C)-99.2 °F (37.3 °C)] 98.2 °F (36.8 °C)  Pulse:  [100-117] 100  Resp:  [18-20] 20  SpO2:  [94 %-99 %] 98 %  BP: ()/(61-79) 98/63      Weight: 79.8 kg (175 lb 14.8 oz)  Body mass index is 32.18 kg/m².    Intake/Output Summary (Last 24 hours) at 4/6/2025 1500  Last data filed at 4/6/2025 0654  Gross per 24 hour   Intake 840 ml   Output --   Net 840 ml         Physical Exam  Constitutional:       Appearance: She is ill-appearing.   HENT:      Head: Normocephalic and atraumatic.      Nose: Nose normal.      Mouth/Throat:      Mouth: Mucous membranes are moist.      Pharynx: Oropharynx is clear.   Eyes:      Extraocular Movements: Extraocular movements intact.      Conjunctiva/sclera: Conjunctivae normal.      Pupils: Pupils are equal, round, and reactive to light.   Cardiovascular:      Rate and Rhythm: Regular rhythm. Tachycardia present.      Pulses: Normal pulses.      Heart sounds: Normal heart sounds.   Pulmonary:      Effort: Pulmonary effort is normal.      Breath sounds: Wheezing present.      Comments: Secretions  Abdominal:      General: Abdomen is flat. Bowel sounds are normal.      Palpations: Abdomen is soft.   Musculoskeletal:      Cervical back: Normal range of motion and neck supple.   Skin:     General: Skin is warm and dry.   Neurological:      Mental Status: She is alert.      Comments: Quadriplegia    Psychiatric:         Mood and Affect: Mood normal.         Behavior: Behavior normal.               Significant Labs: All pertinent labs within the past 24 hours have been reviewed.    Significant Imaging: I have reviewed all pertinent imaging results/findings within the past 24 hours.      Assessment & Plan  Acute on chronic respiratory failure with hypoxia  Continue tracheostomy care with T-piece trials  Tracheostomy suctioning ordered due to thick secretions.   Culture tracheostomy secretions.   Monitor oxygenation and readiness for weaning  Pulmonary consult tomorrow.     3/24: mucus plugging requiring frequent suctioning.  Chest physiotherapy, Mucomyst.  Additional recommendations per pulmonology.     3/25: requiring frequent  suctioning but otherwise stable.     3/26  - continues on vanc for blood cultures + on 3/24, echo completed at time with no endocarditis seen  - discussed with pulmonary, recs to follow    3/27  - discussed with pulmonology who plan to continue trach collar as is and see patient May 1st at 1:40 pm, confirmed appointment  - discussed with family at bedside who are very concerned about secretions and states they don't have the suction capabilities at home and do not feel comfortable going home with her in this state, will discuss with social on possible home equipement    3/29  - continues with secretions, setting up home suction  - family requesting voice box    3/30  - anticipate discharge in the next few days with home health and set up at home      3/31  - secretions improved today  - working with social for home set up of oxygen and tube feeds  - family needs heavy education on trach care and tube feeds as primary care giver has no experience with either, still wishes to discharge home    4/1: Still frequent suctioning.  Family would like patient to go to LTAC before going home as she still requires frequent suctioning, tube feeds, antibiotics, and additional care.    She has required over a week of ICU care prior to returning to our facility.  She was on the vent and was difficult to wean. She has required intermediate ICU level of care while at our facility.       4/2: plan to discharge to SNF if possible.  Otherwise, patient will need to go home.  Keep trach without capping until April 15th.   Pulmonology to see patient tomorrow.   Patient certainly has required more than 3 nights of Intermediate ICU level of care.  She now needs management of her wounds, respiratory failure, feeding care and would benefit from post-acute care.     4/3: Plan to discharge to LTAC given complexity of patient's conditions - respiratory failure with very frequent suctioning, bacteremia, tube feeds, etc.    Patient certainly has  required more than 3 nights of Intermediate ICU level of care.  She now needs management of her wounds, respiratory failure, feeding care and would benefit from post-acute care.     4/4: continue with current aggressive respiratory care to manage trach and thick secretions.     4/5: continue with aggressive pulmonary care.      4/6: Patient continues to have thick and copious secretions.  Query if she has tracheitis.  Culture ordered for deep aspiration via tracheostomy.  Will initiate antibiotics if indicated.  Continue with aggressive pulmonary care    Severe protein-calorie malnutrition  Nutrition consulted. Most recent weight and BMI monitored-     Measurements:  Wt Readings from Last 1 Encounters:   03/27/25 79.8 kg (175 lb 14.8 oz)   Body mass index is 32.18 kg/m².    Patient has been screened and assessed by RD.    Malnutrition Type:  Context: chronic illness  Level: severe    Continue PEG tube feeding.  Nepro at 40 mL/hr  Maintain bowel regimen (lactulose, PEG, senna)  Dietitian to reassess  continue with current tube feed regimen    Dysphagia  Continue omeprazole  Avoid PO intake  Speech therapy if clinically appropriate    Aspiration pneumonia  Recurrent aspiration pneumonia.  Complete two rounds of antibiotics.    Pressure ulcers of skin of multiple topographic sites  Aquacel Ag + Mepilex dressings  Wound care team consulted    Continue offloading and specialty mattress    Depression  Continue pregabalin, sertraline, trazodone      Mucus plugging of bronchi      S/P percutaneous endoscopic gastrostomy (PEG) tube placement  Patient noted to have a percutaneous endoscopic gastrostomy tube in place. I have personally inspected the tube.Tube was placed prior to this admission There are no signs of drainage or infection around the site. The tube is patent. Medications have converted to liquid form if available.  Routine care to be done by wound care and nursing staff.       Quadriplegia  Maximal support,  "bedbound  PT/OT as tolerated  DME planning for discharge    Hematoma of lower extremity, right, subsequent encounter  No active bleeding; conservative management  Monitor H/H  No anticoagulation  Hyperlipidemia  Continue atorvastatin    Pericardial effusion  Previously on colchicine, now discontinued due to bleeding  Continue low-dose prednisone  Monitor for recurrence    Acute respiratory failure with hypoxia and hypercarbia  Patient with Hypoxic Respiratory failure which is Acute on chronic.  she is not on home oxygen. Supplemental oxygen was provided and noted- Oxygen Concentration (%):  [28] 28    .   Signs/symptoms of respiratory failure include- tachypnea and respiratory distress. Contributing diagnoses includes - Aspiration and Pneumonia Labs and images were reviewed. Patient Has not had a recent ABG. Will treat underlying causes and adjust management of respiratory failure as follows-   Chronic respiratory failure with hypoxia  Patient with Hypoxic Respiratory failure which is Acute on chronic.  she is not on home oxygen. Supplemental oxygen was provided and noted- Oxygen Concentration (%):  [28] 28    .   Signs/symptoms of respiratory failure include- increased work of breathing and respiratory distress. Contributing diagnoses includes - Aspiration and Pneumonia Labs and images were reviewed. Patient Has not had a recent ABG. Will treat underlying causes and adjust management of respiratory failure as follows-   Coag negative Staphylococcus bacteremia  Continue with current antibiotics until sensitivities return.      Antibiotics (From admission, onward)      Start     Stop Route Frequency Ordered    03/31/25 1400  vancomycin (VANCOCIN) 1,750 mg in 0.9% NaCl 500 mL IVPB         -- IV Every 36 hours 03/31/25 1237    03/25/25 0519  vancomycin - pharmacy to dose  (vancomycin IVPB (PEDS and ADULTS))        Placed in "And" Linked Group    -- IV pharmacy to manage frequency 03/25/25 2463          3/26  - " continues on vanc for blood cultures + on 3/24, echo completed at time with no endocarditis seen  - discussed with pulmonary, recs to follow    3/27  - due to overall baseline health will treat coag negative staph bacteremia as a true infection, for now continue vanc and follow cultures and sensitivities    4/4:  I am continuing with vancomycin given that patient has a severe penicillin allergy.  Otherwise Ancef would have been an option.  Continue with IV antibiotics at least through April 10th.     4/5: continue with IV antibiotics.       VTE Risk Mitigation (From admission, onward)           Ordered     enoxaparin injection 40 mg  Daily         03/23/25 1940     IP VTE HIGH RISK PATIENT  Once         03/23/25 1940     Place sequential compression device  Until discontinued         03/23/25 1941                    Discharge Planning   SHRADDHA:      Code Status: Full Code   Medical Readiness for Discharge Date:   Discharge Plan A: Long-term acute care facility (LTAC)            Cassie Bar MD  Department of Hospital Medicine   [facility] - [address] [place] [address]

## 2025-04-06 NOTE — ASSESSMENT & PLAN NOTE
"Continue with current antibiotics until sensitivities return.      Antibiotics (From admission, onward)      Start     Stop Route Frequency Ordered    03/31/25 1400  vancomycin (VANCOCIN) 1,750 mg in 0.9% NaCl 500 mL IVPB         -- IV Every 36 hours 03/31/25 1237    03/25/25 0519  vancomycin - pharmacy to dose  (vancomycin IVPB (PEDS and ADULTS))        Placed in "And" Linked Group    -- IV pharmacy to manage frequency 03/25/25 9349          3/26  - continues on vanc for blood cultures + on 3/24, echo completed at time with no endocarditis seen  - discussed with pulmonary, recs to follow    3/27  - due to overall baseline health will treat coag negative staph bacteremia as a true infection, for now continue vanc and follow cultures and sensitivities    4/4:  I am continuing with vancomycin given that patient has a severe penicillin allergy.  Otherwise Ancef would have been an option.  Continue with IV antibiotics at least through April 10th.     4/5: continue with IV antibiotics.       "

## 2025-04-06 NOTE — PROGRESS NOTES
Sutures removed from tracheostomy as per request of patient's primary team at bedside today.  Sutures removed without any significant difficulty.  No evidence of bleeding, tracheostomy Velcro strap in place.  Patient is stable at this time.  Please continue to follow recommendations given by previous pulmonary consultation and follow up with Dr. Cunha outpatient.      Pierre Knowles MD  Interventional Pulmonary and Critical Care  [facility]

## 2025-04-06 NOTE — ASSESSMENT & PLAN NOTE
Continue tracheostomy care with T-piece trials  Tracheostomy suctioning ordered due to thick secretions.   Culture tracheostomy secretions.   Monitor oxygenation and readiness for weaning  Pulmonary consult tomorrow.     3/24: mucus plugging requiring frequent suctioning.  Chest physiotherapy, Mucomyst.  Additional recommendations per pulmonology.     3/25: requiring frequent suctioning but otherwise stable.     3/26  - continues on vanc for blood cultures + on 3/24, echo completed at time with no endocarditis seen  - discussed with pulmonary, recs to follow    3/27  - discussed with pulmonology who plan to continue trach collar as is and see patient May 1st at 1:40 pm, confirmed appointment  - discussed with family at bedside who are very concerned about secretions and states they don't have the suction capabilities at home and do not feel comfortable going home with her in this state, will discuss with social on possible home equipement    3/29  - continues with secretions, setting up home suction  - family requesting voice box    3/30  - anticipate discharge in the next few days with home health and set up at home      3/31  - secretions improved today  - working with social for home set up of oxygen and tube feeds  - family needs heavy education on trach care and tube feeds as primary care giver has no experience with either, still wishes to discharge home    4/1: Still frequent suctioning.  Family would like patient to go to LTAC before going home as she still requires frequent suctioning, tube feeds, antibiotics, and additional care.    She has required over a week of ICU care prior to returning to our facility.  She was on the vent and was difficult to wean. She has required intermediate ICU level of care while at our facility.       4/2: plan to discharge to SNF if possible.  Otherwise, patient will need to go home.  Keep trach without capping until April 15th.   Pulmonology to see patient tomorrow.    Patient certainly has required more than 3 nights of Intermediate ICU level of care.  She now needs management of her wounds, respiratory failure, feeding care and would benefit from post-acute care.     4/3: Plan to discharge to LTAC given complexity of patient's conditions - respiratory failure with very frequent suctioning, bacteremia, tube feeds, etc.    Patient certainly has required more than 3 nights of Intermediate ICU level of care.  She now needs management of her wounds, respiratory failure, feeding care and would benefit from post-acute care.     4/4: continue with current aggressive respiratory care to manage trach and thick secretions.     4/5: continue with aggressive pulmonary care.      4/6: Patient continues to have thick and copious secretions.  Query if she has tracheitis.  Culture ordered for deep aspiration via tracheostomy.  Will initiate antibiotics if indicated.  Continue with aggressive pulmonary care

## 2025-04-06 NOTE — SUBJECTIVE & OBJECTIVE
Interval History:   Review of Systems   Unable to perform ROS: Patient nonverbal     Objective:     Vital Signs (Most Recent):  Temp: 98.2 °F (36.8 °C) (04/06/25 0400)  Pulse: 100 (04/06/25 1336)  Resp: 20 (04/06/25 1336)  BP: 98/63 (04/06/25 1111)  SpO2: 98 % (04/06/25 1336) Vital Signs (24h Range):  Temp:  [97.6 °F (36.4 °C)-99.2 °F (37.3 °C)] 98.2 °F (36.8 °C)  Pulse:  [100-117] 100  Resp:  [18-20] 20  SpO2:  [94 %-99 %] 98 %  BP: ()/(61-79) 98/63     Weight: 79.8 kg (175 lb 14.8 oz)  Body mass index is 32.18 kg/m².    Intake/Output Summary (Last 24 hours) at 4/6/2025 1500  Last data filed at 4/6/2025 0654  Gross per 24 hour   Intake 840 ml   Output --   Net 840 ml         Physical Exam  Constitutional:       Appearance: She is ill-appearing.   HENT:      Head: Normocephalic and atraumatic.      Nose: Nose normal.      Mouth/Throat:      Mouth: Mucous membranes are moist.      Pharynx: Oropharynx is clear.   Eyes:      Extraocular Movements: Extraocular movements intact.      Conjunctiva/sclera: Conjunctivae normal.      Pupils: Pupils are equal, round, and reactive to light.   Cardiovascular:      Rate and Rhythm: Regular rhythm. Tachycardia present.      Pulses: Normal pulses.      Heart sounds: Normal heart sounds.   Pulmonary:      Effort: Pulmonary effort is normal.      Breath sounds: Wheezing present.      Comments: Secretions  Abdominal:      General: Abdomen is flat. Bowel sounds are normal.      Palpations: Abdomen is soft.   Musculoskeletal:      Cervical back: Normal range of motion and neck supple.   Skin:     General: Skin is warm and dry.   Neurological:      Mental Status: She is alert.      Comments: Quadriplegia    Psychiatric:         Mood and Affect: Mood normal.         Behavior: Behavior normal.               Significant Labs: All pertinent labs within the past 24 hours have been reviewed.    Significant Imaging: I have reviewed all pertinent imaging results/findings within the past 24  hours.

## 2025-04-07 PROCEDURE — 94761 N-INVAS EAR/PLS OXIMETRY MLT: CPT

## 2025-04-07 PROCEDURE — 25000242 PHARM REV CODE 250 ALT 637 W/ HCPCS: Performed by: STUDENT IN AN ORGANIZED HEALTH CARE EDUCATION/TRAINING PROGRAM

## 2025-04-07 PROCEDURE — 27000221 HC OXYGEN, UP TO 24 HOURS

## 2025-04-07 PROCEDURE — 94640 AIRWAY INHALATION TREATMENT: CPT

## 2025-04-07 PROCEDURE — 25000242 PHARM REV CODE 250 ALT 637 W/ HCPCS: Performed by: INTERNAL MEDICINE

## 2025-04-07 PROCEDURE — 11000001 HC ACUTE MED/SURG PRIVATE ROOM

## 2025-04-07 PROCEDURE — 99900022

## 2025-04-07 PROCEDURE — 87205 SMEAR GRAM STAIN: CPT | Performed by: HOSPITALIST

## 2025-04-07 PROCEDURE — 99900026 HC AIRWAY MAINTENANCE (STAT)

## 2025-04-07 PROCEDURE — 99233 SBSQ HOSP IP/OBS HIGH 50: CPT | Mod: ,,, | Performed by: HOSPITALIST

## 2025-04-07 PROCEDURE — 25000003 PHARM REV CODE 250: Performed by: HOSPITALIST

## 2025-04-07 PROCEDURE — 99900035 HC TECH TIME PER 15 MIN (STAT)

## 2025-04-07 PROCEDURE — 27000207 HC ISOLATION

## 2025-04-07 PROCEDURE — 63600175 PHARM REV CODE 636 W HCPCS: Performed by: HOSPITALIST

## 2025-04-07 RX ORDER — BISACODYL 5 MG
10 TABLET, DELAYED RELEASE (ENTERIC COATED) ORAL DAILY
Status: COMPLETED | OUTPATIENT
Start: 2025-04-07 | End: 2025-04-09

## 2025-04-07 RX ORDER — SCOPOLAMINE 1 MG/3D
1 PATCH, EXTENDED RELEASE TRANSDERMAL
Status: DISCONTINUED | OUTPATIENT
Start: 2025-04-07 | End: 2025-04-30 | Stop reason: HOSPADM

## 2025-04-07 RX ADMIN — ACETYLCYSTEINE 2 ML: 200 SOLUTION ORAL; RESPIRATORY (INHALATION) at 08:04

## 2025-04-07 RX ADMIN — BISACODYL 10 MG: 5 TABLET, COATED ORAL at 09:04

## 2025-04-07 RX ADMIN — ACETAMINOPHEN 1000 MG: 500 TABLET ORAL at 03:04

## 2025-04-07 RX ADMIN — ENOXAPARIN SODIUM 40 MG: 40 INJECTION SUBCUTANEOUS at 04:04

## 2025-04-07 RX ADMIN — LEVALBUTEROL HYDROCHLORIDE 1.25 MG: 1.25 SOLUTION RESPIRATORY (INHALATION) at 08:04

## 2025-04-07 RX ADMIN — ATORVASTATIN CALCIUM 20 MG: 20 TABLET, FILM COATED ORAL at 09:04

## 2025-04-07 RX ADMIN — ACETYLCYSTEINE 2 ML: 200 SOLUTION ORAL; RESPIRATORY (INHALATION) at 07:04

## 2025-04-07 RX ADMIN — LEVALBUTEROL HYDROCHLORIDE 1.25 MG: 1.25 SOLUTION RESPIRATORY (INHALATION) at 07:04

## 2025-04-07 RX ADMIN — SCOPOLAMINE 1 PATCH: 1.5 PATCH, EXTENDED RELEASE TRANSDERMAL at 09:04

## 2025-04-07 RX ADMIN — MIDODRINE HYDROCHLORIDE 10 MG: 10 TABLET ORAL at 02:04

## 2025-04-07 RX ADMIN — MIDODRINE HYDROCHLORIDE 10 MG: 10 TABLET ORAL at 09:04

## 2025-04-07 RX ADMIN — TRAZODONE HYDROCHLORIDE 100 MG: 50 TABLET ORAL at 09:04

## 2025-04-07 RX ADMIN — PREGABALIN 75 MG: 75 CAPSULE ORAL at 09:04

## 2025-04-07 RX ADMIN — LACTULOSE 30 G: 20 SOLUTION ORAL at 09:04

## 2025-04-07 RX ADMIN — LEVALBUTEROL HYDROCHLORIDE 1.25 MG: 1.25 SOLUTION RESPIRATORY (INHALATION) at 01:04

## 2025-04-07 RX ADMIN — ACETYLCYSTEINE 2 ML: 200 SOLUTION ORAL; RESPIRATORY (INHALATION) at 01:04

## 2025-04-07 RX ADMIN — SERTRALINE HYDROCHLORIDE 50 MG: 50 TABLET ORAL at 09:04

## 2025-04-07 RX ADMIN — PANTOPRAZOLE SODIUM 40 MG: 40 INJECTION, POWDER, FOR SOLUTION INTRAVENOUS at 09:04

## 2025-04-07 NOTE — ASSESSMENT & PLAN NOTE
Continue tracheostomy care with T-piece trials  Tracheostomy suctioning ordered due to thick secretions.   Culture tracheostomy secretions.   Monitor oxygenation and readiness for weaning  Pulmonary consult tomorrow.     3/24: mucus plugging requiring frequent suctioning.  Chest physiotherapy, Mucomyst.  Additional recommendations per pulmonology.     3/25: requiring frequent suctioning but otherwise stable.     3/26  - continues on vanc for blood cultures + on 3/24, echo completed at time with no endocarditis seen  - discussed with pulmonary, recs to follow    3/27  - discussed with pulmonology who plan to continue trach collar as is and see patient May 1st at 1:40 pm, confirmed appointment  - discussed with family at bedside who are very concerned about secretions and states they don't have the suction capabilities at home and do not feel comfortable going home with her in this state, will discuss with social on possible home equipement    3/29  - continues with secretions, setting up home suction  - family requesting voice box    3/30  - anticipate discharge in the next few days with home health and set up at home      3/31  - secretions improved today  - working with social for home set up of oxygen and tube feeds  - family needs heavy education on trach care and tube feeds as primary care giver has no experience with either, still wishes to discharge home    4/1: Still frequent suctioning.  Family would like patient to go to LTAC before going home as she still requires frequent suctioning, tube feeds, antibiotics, and additional care.    She has required over a week of ICU care prior to returning to our facility.  She was on the vent and was difficult to wean. She has required intermediate ICU level of care while at our facility.       4/2: plan to discharge to SNF if possible.  Otherwise, patient will need to go home.  Keep trach without capping until April 15th.   Pulmonology to see patient tomorrow.    Patient certainly has required more than 3 nights of Intermediate ICU level of care.  She now needs management of her wounds, respiratory failure, feeding care and would benefit from post-acute care.     4/3: Plan to discharge to LTAC given complexity of patient's conditions - respiratory failure with very frequent suctioning, bacteremia, tube feeds, etc.    Patient certainly has required more than 3 nights of Intermediate ICU level of care.  She now needs management of her wounds, respiratory failure, feeding care and would benefit from post-acute care.     4/4: continue with current aggressive respiratory care to manage trach and thick secretions.     4/5: continue with aggressive pulmonary care.      4/6: Patient continues to have thick and copious secretions.  Query if she has tracheitis.  Culture ordered for deep aspiration via tracheostomy.  Will initiate antibiotics if indicated.  Continue with aggressive pulmonary care    4/7: added scopolamine patch to help with secretions.  Two tracheostomy cultures sent.    Denied LTAC by [facility], her insurance provider.   Will look into SNF again.  If denied to SNF, consider appealing LTAC decision.  As last resort, patient will need to go home where her mother takes care of her.

## 2025-04-07 NOTE — ASSESSMENT & PLAN NOTE
"Continue with current antibiotics until sensitivities return.      Antibiotics (From admission, onward)      Start     Stop Route Frequency Ordered    03/31/25 1400  vancomycin (VANCOCIN) 1,750 mg in 0.9% NaCl 500 mL IVPB         -- IV Every 36 hours 03/31/25 1237    03/25/25 0519  vancomycin - pharmacy to dose  (vancomycin IVPB (PEDS and ADULTS))        Placed in "And" Linked Group    -- IV pharmacy to manage frequency 03/25/25 1525          3/26  - continues on vanc for blood cultures + on 3/24, echo completed at time with no endocarditis seen  - discussed with pulmonary, recs to follow    3/27  - due to overall baseline health will treat coag negative staph bacteremia as a true infection, for now continue vanc and follow cultures and sensitivities    4/4:  I am continuing with vancomycin given that patient has a severe penicillin allergy.  Otherwise Ancef would have been an option.  Continue with IV antibiotics at least through April 10th.     4/5: continue with IV antibiotics.       "

## 2025-04-07 NOTE — PROGRESS NOTES
[facility] - [address] [place] [address] Medicine  Progress Note    Patient Name: Karie Denney  MRN: [id number]  Patient Class: IP- Inpatient   Admission Date: 3/23/2025  Length of Stay: 15 days  Attending Physician: Cassie Bar MD  Primary Care Provider: Gonzalo Barrera NP        Subjective     Principal Problem:Acute on chronic respiratory failure with hypoxia    HPI:  Chief Complaint  Transfer for continued management of respiratory failure, tracheostomy care, PEG feeding, and complications of quadriplegia following prolonged hospitalization.    History of Present Illness  Ms. Karie Denney is a 59-year-old woman with a complex medical history including quadriplegia following spinal surgery in 2015 and a cerebrovascular accident (CVA) in 2024 resulting in left-sided paralysis. She was transferred to [facility] from [facility] in [place], [place], for ongoing care following a prolonged ICU course involving intubation, respiratory failure, and significant complications.  She was initially hospitalized on February 15, 2025, for aspiration and dysphagia evaluation, during which she developed aspiration pneumonia and respiratory failure requiring intubation. Her hospital course was complicated by an ESBL E. coli UTI, pericardial effusion (treated with colchicine), and a spontaneous right thigh hematoma concerning for compartment syndrome, leading to transfer to [facility]. She required prolonged mechanical ventilation, tracheostomy placement, and PEG tube insertion.  She has now returned to [facility] for continued respiratory care, tracheostomy management, PEG feeding, management of sacral pressure injury, and rehabilitation planning.    Past Medical History  Quadriplegia post-spinal surgery (2015)  CVA (2024)  Aspiration pneumonia  Acute respiratory failure  UTI (ESBL E. coli)  Depression  GERD  Pharyngoesophageal dysphagia  Pericardial effusion/pericarditis  Chronic  constipation  Pressure ulcer (sacral, unstageable)  Hyperlipidemia  Anemia    Past Surgical History  Spinal surgery (2015)  PEG tube placement (03/11/2025)  Esophageal dilation with biopsy (08/2024)    Medications  Active Medications:  Atorvastatin 20 mg daily  Omeprazole 40 mg daily  Sertraline 50 mg daily  Trazodone 100 mg qHS  Pregabalin 75 mg BID  Hydrocodone-acetaminophen 5-325 mg BID  Lactulose 30 mL daily via PEG  Midodrine 10 mg Q6H via PEG  Levalbuterol 0.63 mg nebulizer Q6H  Polyethylene glycol 17 g daily via PEG  Recent Changes:  Colchicine: Discontinued due to bleeding  Aspirin and ezetimibe: Discontinued    Allergies  Penicillins ? Rash and anaphylaxis    Social History  Never smoker  No alcohol or tobacco use  Lives at home with mother; receives home health weekly  Bedbound, non-ambulatory    Family History  Noncontributory    Review of Systems  Limited due to tracheostomy, quadriplegia, and communication challenges. History obtained from EMR and caregiver.    Physical Examination  General: Chronically ill-appearing woman, alert, tracheostomy in place with T-piece  HEENT: Mild nasal skin necrosis, mucous membranes moist  Eyes: PERRL, EOMI  Neck: Tracheostomy present  CV: RRR, no murmurs  Lungs: Breath sounds diminished at bases; no acute distress; no wheezing  Abdomen: Soft, non-tender, PEG tube in place  Skin: Unstageable sacral ulcer, nasal pressure ulcer  Neuro: Quadriplegia, responsive with eye tracking and blinking  Extremities: RLE hematoma, bilateral edema, no signs of active bleeding    Laboratory Data (Most Recent)  Hgb: 8.5 g/dL  Creatinine: 0.30 mg/dL  Albumin: 3.1 g/dL  WBC: 9.4 K/uL  Ferritin: 265 ng/mL  INR: 0.93  No growth on recent blood and urine cultures    Imaging  CT angio: Large RLE hematoma without active extravasation  Multiple chest X-rays: Stable bilateral pleural effusions, improving atelectasis  Echo: EF 55-60%, small pericardial effusion      Overview/Hospital  Course:  3/26  - continues on vanc for blood cultures + on 3/24, echo completed at time with no endocarditis seen  - discussed with pulmonary, recs to follow    3/27  - due to overall baseline health will treat coag negative staph bacteremia as a true infection, for now continue vanc and follow cultures and sensitivities  - discussed with pulmonology who plan to continue trach collar as is and see patient May 1st at 1:40 pm, confirmed appointment  - discussed with family at bedside who are very concerned about secretions and states they don't have the suction capabilities at home and do not feel comfortable going home with her in this state, will discuss with social on possible home equipment    3/28  - awaiting micro, continuing vanc  - social setting up suction at home, family plans to return home as before    3/29  - still with secretions  - family requesting voice box    3/30  - doing well today, mentation much improved  - anticipate discharge within the next two days with home health services, family will need education on trach maintenance and home feedings as patient has PEG tube and they have not cared for a PEG tube before, also we will discuss with  getting a speaking told to use with a trach    3/31  - secretions improved today  - working with social for home set up of oxygen and tube feeds  - family needs heavy education on trach care and tube feeds as primary care giver has no experience with either, still wishes to discharge home    Interval History:     Review of Systems   Unable to perform ROS: Patient nonverbal     Objective:     Vital Signs (Most Recent):  Temp: 98.2 °F (36.8 °C) (04/07/25 1127)  Pulse: 102 (04/07/25 1337)  Resp: 18 (04/07/25 1337)  BP: (!) 133/94 (04/07/25 1140)  SpO2: 98 % (04/07/25 1337) Vital Signs (24h Range):  Temp:  [97.5 °F (36.4 °C)-98.5 °F (36.9 °C)] 98.2 °F (36.8 °C)  Pulse:  [101-132] 102  Resp:  [16-20] 18  SpO2:  [92 %-99 %] 98 %  BP: ()/(53-94)  133/94     Weight: 79.8 kg (175 lb 14.8 oz)  Body mass index is 32.18 kg/m².  No intake or output data in the 24 hours ending 04/07/25 1559      Physical Exam  Constitutional:       Appearance: She is ill-appearing.   HENT:      Head: Normocephalic and atraumatic.      Nose: Nose normal.      Mouth/Throat:      Mouth: Mucous membranes are moist.      Pharynx: Oropharynx is clear.   Eyes:      Extraocular Movements: Extraocular movements intact.      Conjunctiva/sclera: Conjunctivae normal.      Pupils: Pupils are equal, round, and reactive to light.   Cardiovascular:      Rate and Rhythm: Regular rhythm. Tachycardia present.      Pulses: Normal pulses.      Heart sounds: Normal heart sounds.   Pulmonary:      Effort: Pulmonary effort is normal.      Breath sounds: Wheezing present.      Comments: Secretions  Abdominal:      General: Abdomen is flat. Bowel sounds are normal.      Palpations: Abdomen is soft.   Musculoskeletal:      Cervical back: Normal range of motion and neck supple.   Skin:     General: Skin is warm and dry.   Neurological:      Mental Status: She is alert.      Comments: Quadriplegia    Psychiatric:         Mood and Affect: Mood normal.         Behavior: Behavior normal.               Significant Labs: All pertinent labs within the past 24 hours have been reviewed.    Significant Imaging: I have reviewed all pertinent imaging results/findings within the past 24 hours.      Assessment & Plan  Acute on chronic respiratory failure with hypoxia  Continue tracheostomy care with T-piece trials  Tracheostomy suctioning ordered due to thick secretions.   Culture tracheostomy secretions.   Monitor oxygenation and readiness for weaning  Pulmonary consult tomorrow.     3/24: mucus plugging requiring frequent suctioning.  Chest physiotherapy, Mucomyst.  Additional recommendations per pulmonology.     3/25: requiring frequent suctioning but otherwise stable.     3/26  - continues on vanc for blood cultures +  on 3/24, echo completed at time with no endocarditis seen  - discussed with pulmonary, recs to follow    3/27  - discussed with pulmonology who plan to continue trach collar as is and see patient May 1st at 1:40 pm, confirmed appointment  - discussed with family at bedside who are very concerned about secretions and states they don't have the suction capabilities at home and do not feel comfortable going home with her in this state, will discuss with social on possible home equipement    3/29  - continues with secretions, setting up home suction  - family requesting voice box    3/30  - anticipate discharge in the next few days with home health and set up at home      3/31  - secretions improved today  - working with social for home set up of oxygen and tube feeds  - family needs heavy education on trach care and tube feeds as primary care giver has no experience with either, still wishes to discharge home    4/1: Still frequent suctioning.  Family would like patient to go to LTAC before going home as she still requires frequent suctioning, tube feeds, antibiotics, and additional care.    She has required over a week of ICU care prior to returning to our facility.  She was on the vent and was difficult to wean. She has required intermediate ICU level of care while at our facility.       4/2: plan to discharge to SNF if possible.  Otherwise, patient will need to go home.  Keep trach without capping until April 15th.   Pulmonology to see patient tomorrow.   Patient certainly has required more than 3 nights of Intermediate ICU level of care.  She now needs management of her wounds, respiratory failure, feeding care and would benefit from post-acute care.     4/3: Plan to discharge to LTAC given complexity of patient's conditions - respiratory failure with very frequent suctioning, bacteremia, tube feeds, etc.    Patient certainly has required more than 3 nights of Intermediate ICU level of care.  She now needs  management of her wounds, respiratory failure, feeding care and would benefit from post-acute care.     4/4: continue with current aggressive respiratory care to manage trach and thick secretions.     4/5: continue with aggressive pulmonary care.      4/6: Patient continues to have thick and copious secretions.  Query if she has tracheitis.  Culture ordered for deep aspiration via tracheostomy.  Will initiate antibiotics if indicated.  Continue with aggressive pulmonary care    4/7: added scopolamine patch to help with secretions.  Two tracheostomy cultures sent.    Denied LTAC by [employer], her insurance provider.   Will look into SNF again.  If denied to SNF, consider appealing LTAC decision.  As last resort, patient will need to go home where her mother takes care of her.    Severe protein-calorie malnutrition  Nutrition consulted. Most recent weight and BMI monitored-     Measurements:  Wt Readings from Last 1 Encounters:   03/27/25 79.8 kg (175 lb 14.8 oz)   Body mass index is 32.18 kg/m².    Patient has been screened and assessed by RD.    Malnutrition Type:  Context: chronic illness  Level: severe    Continue PEG tube feeding.  Nepro at 40 mL/hr  Maintain bowel regimen (lactulose, PEG, senna)  Dietitian to reassess  continue with current tube feed regimen    Dysphagia  Continue omeprazole  Avoid PO intake  Speech therapy if clinically appropriate    Aspiration pneumonia  Recurrent aspiration pneumonia.  Complete two rounds of antibiotics.    Pressure ulcers of skin of multiple topographic sites  Aquacel Ag + Mepilex dressings  Wound care team consulted    Continue offloading and specialty mattress    Depression  Continue pregabalin, sertraline, trazodone      Mucus plugging of bronchi      S/P percutaneous endoscopic gastrostomy (PEG) tube placement  Patient noted to have a percutaneous endoscopic gastrostomy tube in place. I have personally inspected the tube.Tube was placed prior to this admission There are no  signs of drainage or infection around the site. The tube is patent. Medications have converted to liquid form if available.  Routine care to be done by wound care and nursing staff.       Quadriplegia  Maximal support, bedbound  PT/OT as tolerated  DME planning for discharge    Hematoma of lower extremity, right, subsequent encounter  No active bleeding; conservative management  Monitor H/H  No anticoagulation  Hyperlipidemia  Continue atorvastatin    Pericardial effusion  Previously on colchicine, now discontinued due to bleeding  Continue low-dose prednisone  Monitor for recurrence    Acute respiratory failure with hypoxia and hypercarbia  Patient with Hypoxic Respiratory failure which is Acute on chronic.  she is not on home oxygen. Supplemental oxygen was provided and noted- Oxygen Concentration (%):  [28] 28    .   Signs/symptoms of respiratory failure include- tachypnea and respiratory distress. Contributing diagnoses includes - Aspiration and Pneumonia Labs and images were reviewed. Patient Has not had a recent ABG. Will treat underlying causes and adjust management of respiratory failure as follows-   Chronic respiratory failure with hypoxia  Patient with Hypoxic Respiratory failure which is Acute on chronic.  she is not on home oxygen. Supplemental oxygen was provided and noted- Oxygen Concentration (%):  [28] 28    .   Signs/symptoms of respiratory failure include- increased work of breathing and respiratory distress. Contributing diagnoses includes - Aspiration and Pneumonia Labs and images were reviewed. Patient Has not had a recent ABG. Will treat underlying causes and adjust management of respiratory failure as follows-   Coag negative Staphylococcus bacteremia  Continue with current antibiotics until sensitivities return.      Antibiotics (From admission, onward)      Start     Stop Route Frequency Ordered    03/31/25 1400  vancomycin (VANCOCIN) 1,750 mg in 0.9% NaCl 500 mL IVPB         -- IV Every 36  "hours 03/31/25 1237    03/25/25 0519  vancomycin - pharmacy to dose  (vancomycin IVPB (PEDS and ADULTS))        Placed in "And" Linked Group    -- IV pharmacy to manage frequency 03/25/25 0419          3/26  - continues on vanc for blood cultures + on 3/24, echo completed at time with no endocarditis seen  - discussed with pulmonary, recs to follow    3/27  - due to overall baseline health will treat coag negative staph bacteremia as a true infection, for now continue vanc and follow cultures and sensitivities    4/4:  I am continuing with vancomycin given that patient has a severe penicillin allergy.  Otherwise Ancef would have been an option.  Continue with IV antibiotics at least through April 10th.     4/5: continue with IV antibiotics.       VTE Risk Mitigation (From admission, onward)           Ordered     enoxaparin injection 40 mg  Daily         03/23/25 1940     IP VTE HIGH RISK PATIENT  Once         03/23/25 1940     Place sequential compression device  Until discontinued         03/23/25 1941                    Discharge Planning   SHRADDHA: 4/14/2025     Code Status: Full Code   Medical Readiness for Discharge Date:   Discharge Plan A: Long-term acute care facility (LTAC)            Cassie Bar MD  Department of Hospital Medicine   [facility] - [address] [place], [place] [address]    "

## 2025-04-07 NOTE — SUBJECTIVE & OBJECTIVE
Interval History:     Review of Systems   Unable to perform ROS: Patient nonverbal     Objective:     Vital Signs (Most Recent):  Temp: 98.2 °F (36.8 °C) (04/07/25 1127)  Pulse: 102 (04/07/25 1337)  Resp: 18 (04/07/25 1337)  BP: (!) 133/94 (04/07/25 1140)  SpO2: 98 % (04/07/25 1337) Vital Signs (24h Range):  Temp:  [97.5 °F (36.4 °C)-98.5 °F (36.9 °C)] 98.2 °F (36.8 °C)  Pulse:  [101-132] 102  Resp:  [16-20] 18  SpO2:  [92 %-99 %] 98 %  BP: ()/(53-94) 133/94     Weight: 79.8 kg (175 lb 14.8 oz)  Body mass index is 32.18 kg/m².  No intake or output data in the 24 hours ending 04/07/25 1559      Physical Exam  Constitutional:       Appearance: She is ill-appearing.   HENT:      Head: Normocephalic and atraumatic.      Nose: Nose normal.      Mouth/Throat:      Mouth: Mucous membranes are moist.      Pharynx: Oropharynx is clear.   Eyes:      Extraocular Movements: Extraocular movements intact.      Conjunctiva/sclera: Conjunctivae normal.      Pupils: Pupils are equal, round, and reactive to light.   Cardiovascular:      Rate and Rhythm: Regular rhythm. Tachycardia present.      Pulses: Normal pulses.      Heart sounds: Normal heart sounds.   Pulmonary:      Effort: Pulmonary effort is normal.      Breath sounds: Wheezing present.      Comments: Secretions  Abdominal:      General: Abdomen is flat. Bowel sounds are normal.      Palpations: Abdomen is soft.   Musculoskeletal:      Cervical back: Normal range of motion and neck supple.   Skin:     General: Skin is warm and dry.   Neurological:      Mental Status: She is alert.      Comments: Quadriplegia    Psychiatric:         Mood and Affect: Mood normal.         Behavior: Behavior normal.               Significant Labs: All pertinent labs within the past 24 hours have been reviewed.    Significant Imaging: I have reviewed all pertinent imaging results/findings within the past 24 hours.

## 2025-04-07 NOTE — PROGRESS NOTES
"[facility] - [address] [place] [address]  Adult Nutrition  Follow Up Note         Reason for Assessment  Reason For Assessment: RD follow-up        Assessment and Plan  4/7/2025: RD follow up. Patient remains on Isosource 1.5. Feedings at goal with no tolerance issues noted. Recommend to continue current tube feed regimen with Luis Fernando BID as tolerated. Awaiting placement -- a peer to peer was offered for LTAC placement. RD following.     *Recommend to reweigh patient so tube feed regimen can be adjusted as appropriate prior to discharge iso weight loss or gain*    4/1/2025: RD follow up. Patient remains on Isosource 1.5. Feedings at goal with no tolerance issues noted. Discharge bolus feeding recommendations provided. RD available if needed. Recommend continue current POC as tolerated. RD following.    3/27/25: RD follow up. Patient remains NPO and on enteral feeds of Isosource 1.5. Patient is at goal rate and tolerating feeds well per flowsheet. Recommend to continue with Isosource 1.5 at 30 mL/hr with FWF 40 mL/hr and Luis Fernando BID. Keep HOB at 30-45 degrees to reduce risk of aspiration. Monitor for tolerance: n/v/d/c. Hold feeding and notify RD if sx of intolerance develop. Monitor electrolytes and replete as needed.    *Per SLP note 3/24: "Patient's mother reports that pt is not asking for anything to eat or drink orally, and she does not wish to feed her orally at this time. Will D/C order for swallow eval at this time."    3/24/25: Consult received and appreciated. Consult for new tube feeding. Patient is a 58 yo female with a complex medical history including quadriplegia following spinal surgery in 2015 and a CVA in 2024 resulting in left-sided paralysis. She was initially hospitalized in February 2025 for aspiration and dysphagia evaluation. Required prolonged mechanical ventilation, tracheostomy placement, and PEG tube insertion. Returned to [facility] on 3/23/25 for continued respiratory care, " tracheostomy management, PEG feeding, management of sacral pressure injury, and rehab planning. Additional relevant PMHx includes aspiration pneumonia, respiratory failure, depression, GERD, chronic constipation, HLD, anemia.     Per H&P, patient noted to be on PEG tube feeding of Nepro at 40 mL/hr. Please see below for tube feed recommendations.     Patient is 79.8 kg (175 lb) with a BMI of 32.18 and is obese (Class 1 Obesity). Review of records reveals a >10% (18.6%) significant unintentional weight loss x 6 mo (97.5 kg 8/29/24). She is also noted to have an unhealed sacral wound.     Per ASPEN guidelines patient meets criteria for severe protein-calorie malnutrition secondary to dysphagia resulting in inadequate PO intakes as evidenced by >10% significant unintentional weight loss x 6 months, unhealed wounds, and consuming <75% estimated energy requirements for >1 month.     ENTERAL FEED RECOMMENDATIONS:    *Needs were adjusted and account for quadriplegia, BMI, and sacral wound*    Initiate continuous infusion of Isosource 1.5 at 10 mL/hr via PEG and advance 10 mL/hr q8h pending tolerance until goal rate of 30 mL/hr is achieved. Do not exceed goal rate. FWF 40 mL/hr. Keep HOB at 30-45 degrees to reduce risk of aspiration. Monitor for tolerance: n/v/d/c. Hold feeding and notify RD if symptoms of intolerance develop.     Recommend addition of Luis Fernando BID via PEG to promote wound healing and provide additional protein. Recommend to continue with bowel regimen (lactulose, PEG, senna) given hx of chronic constipation. Tube feed regimen will also provide patient with ~ 11 g of fiber per 24 hours, which may help to further alleviate constipation. Given patient is receiving < 1 L formula per day, recommend to consider addition of multivitamin/mineral supplement to help meet micronutrient needs.    Last Bowel Movement: 04/06/25    Medications/labs reviewed. RD following.    Learning Needs/Social Determinants of  Health    Learning Assessment       03/23/2025 1451 [facility] - [address] [place] [address] Telemetry (3/23/2025 - Present)   Created by Corry Blue RN - RN (Nurse) Status: Complete                 PRIMARY LEARNER     Primary Learner Name:  marina noble SS - 03/23/2025 1451    Relationship:  Patient, Family SS - 03/23/2025 1451    Does the primary learner have any barriers to learning?:  No Barriers  - 03/23/2025 1451    What is the preferred language of the primary learner?:  English  - 03/23/2025 1451    Is an  required?:  No  - 03/23/2025 1451    How does the primary learner prefer to learn new concepts?:  Listening SS - 03/23/2025 1451    How often do you need to have someone help you read instructions, pamphlets, or written material from your doctor or pharmacy?:  Never  - 03/23/2025 1451        CO-LEARNER #1     No question answered        CO-LEARNER #2     No question answered        SPECIAL TOPICS     No question answered        ANSWERED BY:     No question answered        Comments         Edit History       Corry Blue, RN - RN (Nurse)   03/23/2025 1451                          Social Drivers of Health     Tobacco Use: Low Risk  (3/23/2025)    Patient History     Smoking Tobacco Use: Never     Smokeless Tobacco Use: Never     Passive Exposure: Not on file   Alcohol Use: Not At Risk (3/24/2025)    AUDIT-C     Frequency of Alcohol Consumption: Never     Average Number of Drinks: Patient does not drink     Frequency of Binge Drinking: Never   Financial Resource Strain: Low Risk  (3/25/2025)    Overall Financial Resource Strain (CARDIA)     Difficulty of Paying Living Expenses: Not hard at all   Food Insecurity: No Food Insecurity (3/25/2025)    Hunger Vital Sign     Worried About Running Out of Food in the Last Year: Never true     Ran Out of Food in the Last Year: Never true   Recent Concern: Food Insecurity - Food Insecurity Present (3/23/2025)    Hunger Vital Sign      Worried About Running Out of Food in the Last Year: Often true     Ran Out of Food in the Last Year: Often true   Transportation Needs: No Transportation Needs (3/24/2025)    PRAPARE - Transportation     Lack of Transportation (Medical): No     Lack of Transportation (Non-Medical): No   Physical Activity: Inactive (3/24/2025)    Exercise Vital Sign     Days of Exercise per Week: 0 days     Minutes of Exercise per Session: 0 min   Stress: No Stress Concern Present (3/25/2025)    [employer] - Occupational Stress Questionnaire     Feeling of Stress : Not at all   Recent Concern: Stress - Stress Concern Present (3/23/2025)    [employer] - Occupational Stress Questionnaire     Feeling of Stress : Rather much   Housing Stability: Low Risk  (3/25/2025)    Housing Stability Vital Sign     Unable to Pay for Housing in the Last Year: No     Number of Times Moved in the Last Year: Not on file     Homeless in the Last Year: No   Depression: Not on file   Utilities: Not At Risk (3/25/2025)    [facility] Utilities     Threatened with loss of utilities: No   Health Literacy: Adequate Health Literacy (3/25/2025)     Health Literacy     Frequency of need for help with medical instructions: Rarely   Recent Concern: Health Literacy - Inadequate Health Literacy (2/16/2025)     Health Literacy     Frequency of need for help with medical instructions: Always   Social Isolation: Socially Integrated (3/24/2025)    Social Isolation     Social Isolation: 1     Malnutrition  Is Patient Malnourished: Yes    Nutrition consulted. Most recent weight and BMI monitored-     Measurements:  Wt Readings from Last 1 Encounters:   03/27/25 79.8 kg (175 lb 14.8 oz)   Body mass index is 32.18 kg/m².    Patient has been screened and assessed by RD.    Malnutrition Type:  Context: chronic illness  Level: severe    Malnutrition Characteristic Summary:  Weight Loss (Malnutrition): greater than 10%  in 6 months  Energy Intake (Malnutrition): less than or equal to 75% for greater than or equal to 1 month    Interventions/Recommendations (treatment strategy):  Dependence on PEG for feedings, continuous enteral feedings;  continue with current tube feed regimen     Nutrition Diagnosis  Malnutrition (Severe) related to Dysphagia/ difficulty swallowing as evidenced by >10% significant unintentional weight loss x 6 months, unhealed wounds, and consuming <75% estimated energy requirements for >1 month.   Comments: upon observation, no apparent muscle and fat wasting - pt still meets malnutrition criteria per ASPEN guidelines    Recent Labs   Lab 04/06/25  1343        Comments on glucose: WNL    Nutrition Prescription / Recommendations  Recommendation/Intervention: continue with current tube feed regimen  Goals: continue to tolerate feeds at goal rate, weight maintenance during admission, improve skin integrity  Nutrition Goal Status: goal met  Current Diet Order: NPO  Nutrition Order Comments: Enteral Feeds  Chewing or Swallowing Difficulty?: Swallowing difficulty  Recommended Diet: Enteral Nutrition  Recommended Oral Supplement: Luis Fernando [90 kcals, 2.5g Protein, 10g Carbs(3g Sugar), 7g L-Arginine, 7g L-Glutamine, Vitamin C 300mg, 9.5mg Zinc] 2 times a day  Is Nutrition Support Recommended: Yes  Is Nutrition Education Recommended: No    Needs Calculated    Energy Calorie Requirements (kcal): 1,012 kcal (23 kcal/kg adjusted IBW for quadriplegia) (decreased needs 2/2 decreased metabolic activity due to denervated muscle)  Protein Requirements: 53 - 66 g (1.2 - 1.5 g/kg adjusted IBW for quadriplegia) (increased protein requirements 2/2 sacral wound and prevention of muscle atrophy)  Enteral Nutrition   Enteral Nutrition Formula Provides:  1,260 kcals Propofol Rate: No (1,080 kcal Isosource + 180 Luis Fernando)  54 g Protein (49 g Isosource + 5 Luis Fernando)  121 g Carbohydrates  47 g Fat Propofol Rate: No  560 ml Fluid without  Flush    960 ml Fluid by flush   1,520 ml Total Fluid  Enteral Nutrition Recommended Order:  Tube feeding via PEG/ Gastrostomy  Tube feeding formula: Isosource 1.5 PEG/ Gastrostomy  Free Water Flush: 40 ml hourly  Modular Supplements: Luis Fernando BID   Enteral Nutrition meets needs?: yes  Enteral Nutrition Status: Continue Enteral Nutrition    Monitor and Evaluation  % current Intake: Enteral Nutrition at goal  % intake to meet estimated needs: Enteral Nutrition   Monitor and Evaluation: Enteral and parenteral nutrition administration, Weight, Electrolyte and renal panel, Gastrointestinal profile, Glucose/endocrine profile, Inflammatory profile, Lipid profile, Nutrition focused physical findings, Skin    Current Medical Diagnosis and Past Medical History     Past Medical History:   Diagnosis Date    GERD (gastroesophageal reflux disease)     Paraplegia      Nutrition/Diet History  Food Allergies: NKFA  Factors Affecting Nutritional Intake: difficulty/impaired swallowing    Lab/Procedures/Meds  Recent Labs   Lab 04/06/25  1343      K 4.0   BUN 16   CREATININE 0.58   CALCIUM 9.9      Note: WNL    Last A1c:   Lab Results   Component Value Date    HGBA1C 5.5 03/01/2025   Note: WNL    Lab Results   Component Value Date    RBC 3.94 (L) 04/04/2025    HGB 10.0 (L) 04/04/2025    HCT 34.8 (L) 04/04/2025    MCV 88.3 04/04/2025    MCH 25.4 (L) 04/04/2025    MCHC 28.7 (L) 04/04/2025   Note: H/H low. PMHx of anemia    Pertinent Labs Reviewed: reviewed  Pertinent Medications Reviewed: reviewed    Scheduled Meds:   acetylcysteine 200 mg/ml (20%)  2 mL Nebulization TID    atorvastatin  20 mg Oral QHS    enoxparin  40 mg Subcutaneous Daily    levalbuterol  1.25 mg Nebulization TID    midodrine  10 mg Oral TID    pantoprazole  40 mg Intravenous Daily    pregabalin  75 mg Oral BID    scopolamine  1 patch Transdermal Q3 Days    sertraline  50 mg Oral Daily    vancomycin (VANCOCIN) IV (PEDS and ADULTS)  1,750 mg Intravenous Q36H  "  Note: atorvastatin, pantoprazole, setraline    Continuous Infusions:    PRN Meds:.  Current Facility-Administered Medications:     acetaminophen, 1,000 mg, Oral, Q8H PRN    acetaminophen, 650 mg, Oral, Q8H PRN    acetaminophen, 650 mg, Oral, Q4H PRN    aluminum-magnesium hydroxide-simethicone, 30 mL, Oral, QID PRN    dextrose 50%, 12.5 g, Intravenous, PRN    dextrose 50%, 12.5 g, Intravenous, PRN    dextrose 50%, 25 g, Intravenous, PRN    glucagon (human recombinant), 1 mg, Intramuscular, PRN    glucose, 16 g, Oral, PRN    glucose, 24 g, Oral, PRN    HYDROcodone-acetaminophen, 1 tablet, Oral, Q6H PRN    HYDROmorphone, 1 mg, Intravenous, Q6H PRN    magnesium oxide, 800 mg, Oral, PRN    magnesium oxide, 800 mg, Oral, PRN    melatonin, 6 mg, Oral, Nightly PRN    naloxone, 0.02 mg, Intravenous, PRN    ondansetron, 4 mg, Intravenous, Q8H PRN    polyethylene glycol, 17 g, Oral, Daily PRN    potassium bicarbonate, 35 mEq, Oral, PRN    potassium bicarbonate, 50 mEq, Oral, PRN    potassium bicarbonate, 60 mEq, Oral, PRN    potassium, sodium phosphates, 2 packet, Oral, PRN    potassium, sodium phosphates, 2 packet, Oral, PRN    potassium, sodium phosphates, 2 packet, Oral, PRN    sodium chloride 0.9%, 10 mL, Intravenous, PRN    traZODone, 100 mg, Oral, Nightly PRN    Pharmacy to dose Vancomycin consult, , , Once **AND** vancomycin - pharmacy to dose, , Intravenous, pharmacy to manage frequency  Note: magnesium oxide, ondansetron, polyethylene glycol, potassium bicarb    Anthropometrics  Height: 5' 2" (157.5 cm)  Height (inches): 62 in  Height Method: Stated  Weight: 79.8 kg (175 lb 14.8 oz)  Weight (lb): 175.93 lb  Weight Method: Bed Scale  Ideal Body Weight (IBW), Female: 110 lb  BMI (Calculated): 32.2  Tetraplegia (Quadriplegia) Ideal Body Weight (IBW) Adjustment: 97 lb    Estimated/Assessed Needs  RMR (Durham-St. Jeor Equation): 1316.25     Temp: 98.5 °F (36.9 °C)Oral  Weight Used For Calorie Calculations: 44 kg (97 lb) "     Energy Calorie Requirements (kcal): 1,012 kcal (23 kcal/kg adjusted IBW for quadriplegia) (decreased needs 2/2 decreased metabolic activity due to denervated muscle)  Weight Used For Protein Calculations: 44 kg (97 lb)  Protein Requirements: 53 - 66 g (1.2 - 1.5 g/kg adjusted IBW for quadriplegia) (increased protein requirements 2/2 sacral wound and prevention of muscle atrophy)       Fluids: 30 - 40 mL/kg normal requirements for patient with quadriplegia   Fluid Requirements: 1, 540 mL (35 mL/kg adjusted IBW for quadriplegia)       Nutrition by Nursing  Diet/Nutrition Received: tube feeding  Intake (%): other (see comments) (tubefeeding documented)     Diet/Feeding Tolerance: other (see comments) (pegtube)       Gastrostomy/Enterostomy LUQ-Feeding Type: continuous       Gastrostomy/Enterostomy LUQ-Current Rate (mL/hr): 30 mL/hr       Gastrostomy/Enterostomy LUQ-Goal Rate (mL/hr): 30 mL/hr       Gastrostomy/Enterostomy LUQ-Formula Name: isosource    Nutrition Follow-Up  RD Follow-up?: Yes    Nutrition Discharge Planning: Enteral nutrition (comments)       Nidhi Britt, MS, RD, LD  Available via Secure Chat

## 2025-04-08 LAB
BUN SERPL-MCNC: 15 MG/DL (ref 10–20)
BUN/CREAT SERPL: 31 (ref 6–20)
CREAT SERPL-MCNC: 0.49 MG/DL (ref 0.55–1.02)
EGFR (NO RACE VARIABLE) (RUSH/TITUS): 107 ML/MIN/1.73M2

## 2025-04-08 PROCEDURE — 27000207 HC ISOLATION

## 2025-04-08 PROCEDURE — 36415 COLL VENOUS BLD VENIPUNCTURE: CPT | Performed by: HOSPITALIST

## 2025-04-08 PROCEDURE — 27000221 HC OXYGEN, UP TO 24 HOURS

## 2025-04-08 PROCEDURE — 99233 SBSQ HOSP IP/OBS HIGH 50: CPT | Mod: ,,, | Performed by: HOSPITALIST

## 2025-04-08 PROCEDURE — 94761 N-INVAS EAR/PLS OXIMETRY MLT: CPT

## 2025-04-08 PROCEDURE — 82565 ASSAY OF CREATININE: CPT | Performed by: HOSPITALIST

## 2025-04-08 PROCEDURE — 63600175 PHARM REV CODE 636 W HCPCS: Performed by: HOSPITALIST

## 2025-04-08 PROCEDURE — 94640 AIRWAY INHALATION TREATMENT: CPT

## 2025-04-08 PROCEDURE — 99900035 HC TECH TIME PER 15 MIN (STAT)

## 2025-04-08 PROCEDURE — 99900026 HC AIRWAY MAINTENANCE (STAT)

## 2025-04-08 PROCEDURE — 11000001 HC ACUTE MED/SURG PRIVATE ROOM

## 2025-04-08 PROCEDURE — 99900022

## 2025-04-08 PROCEDURE — 25000003 PHARM REV CODE 250: Performed by: HOSPITALIST

## 2025-04-08 PROCEDURE — 25000242 PHARM REV CODE 250 ALT 637 W/ HCPCS: Performed by: INTERNAL MEDICINE

## 2025-04-08 PROCEDURE — 25000003 PHARM REV CODE 250

## 2025-04-08 PROCEDURE — 25000242 PHARM REV CODE 250 ALT 637 W/ HCPCS: Performed by: STUDENT IN AN ORGANIZED HEALTH CARE EDUCATION/TRAINING PROGRAM

## 2025-04-08 PROCEDURE — 63600175 PHARM REV CODE 636 W HCPCS

## 2025-04-08 RX ADMIN — LACTULOSE 30 G: 20 SOLUTION ORAL at 09:04

## 2025-04-08 RX ADMIN — ATORVASTATIN CALCIUM 20 MG: 20 TABLET, FILM COATED ORAL at 09:04

## 2025-04-08 RX ADMIN — LEVALBUTEROL HYDROCHLORIDE 1.25 MG: 1.25 SOLUTION RESPIRATORY (INHALATION) at 01:04

## 2025-04-08 RX ADMIN — LEVALBUTEROL HYDROCHLORIDE 1.25 MG: 1.25 SOLUTION RESPIRATORY (INHALATION) at 07:04

## 2025-04-08 RX ADMIN — MIDODRINE HYDROCHLORIDE 10 MG: 10 TABLET ORAL at 02:04

## 2025-04-08 RX ADMIN — ACETYLCYSTEINE 2 ML: 200 SOLUTION ORAL; RESPIRATORY (INHALATION) at 07:04

## 2025-04-08 RX ADMIN — TRAZODONE HYDROCHLORIDE 100 MG: 50 TABLET ORAL at 09:04

## 2025-04-08 RX ADMIN — SERTRALINE HYDROCHLORIDE 50 MG: 50 TABLET ORAL at 09:04

## 2025-04-08 RX ADMIN — BISACODYL 10 MG: 5 TABLET, COATED ORAL at 09:04

## 2025-04-08 RX ADMIN — ACETAMINOPHEN 1000 MG: 500 TABLET ORAL at 12:04

## 2025-04-08 RX ADMIN — ACETYLCYSTEINE 2 ML: 200 SOLUTION ORAL; RESPIRATORY (INHALATION) at 01:04

## 2025-04-08 RX ADMIN — PANTOPRAZOLE SODIUM 40 MG: 40 INJECTION, POWDER, FOR SOLUTION INTRAVENOUS at 09:04

## 2025-04-08 RX ADMIN — MIDODRINE HYDROCHLORIDE 10 MG: 10 TABLET ORAL at 09:04

## 2025-04-08 RX ADMIN — VANCOMYCIN HYDROCHLORIDE 1750 MG: 500 INJECTION, POWDER, LYOPHILIZED, FOR SOLUTION INTRAVENOUS at 03:04

## 2025-04-08 RX ADMIN — ENOXAPARIN SODIUM 40 MG: 40 INJECTION SUBCUTANEOUS at 05:04

## 2025-04-08 RX ADMIN — PREGABALIN 75 MG: 75 CAPSULE ORAL at 09:04

## 2025-04-08 NOTE — PLAN OF CARE
Long from [facility] sent over an appeals form for Dr. Bar to sign to appeal the LTAC denial. Dr. Bar signed form and CM faxed back to Long. CM following     1400- Pt denied for [facility] per Micaela. Long at [facility] is still waiting to hear if pt's insurance overturned the LTAC denial. CM following

## 2025-04-09 LAB
B-LACTAMASE USUAL SUSC ISLT: NEGATIVE
BASOPHILS # BLD AUTO: 0.05 K/UL (ref 0–0.2)
BASOPHILS NFR BLD AUTO: 0.5 % (ref 0–1)
BUN SERPL-MCNC: 12 MG/DL (ref 10–20)
BUN/CREAT SERPL: 26 (ref 6–20)
CREAT SERPL-MCNC: 0.47 MG/DL (ref 0.55–1.02)
CULTURE, LOWER RESPIRATORY: ABNORMAL
DIFFERENTIAL METHOD BLD: ABNORMAL
EGFR (NO RACE VARIABLE) (RUSH/TITUS): 108 ML/MIN/1.73M2
EOSINOPHIL # BLD AUTO: 0.34 K/UL (ref 0–0.5)
EOSINOPHIL NFR BLD AUTO: 3.2 % (ref 1–4)
ERYTHROCYTE [DISTWIDTH] IN BLOOD BY AUTOMATED COUNT: 17.9 % (ref 11.5–14.5)
GRAM STN SPEC: ABNORMAL
HCT VFR BLD AUTO: 35.1 % (ref 38–47)
HGB BLD-MCNC: 9.9 G/DL (ref 12–16)
IMM GRANULOCYTES # BLD AUTO: 0.04 K/UL (ref 0–0.04)
IMM GRANULOCYTES NFR BLD: 0.4 % (ref 0–0.4)
LYMPHOCYTES # BLD AUTO: 2.57 K/UL (ref 1–4.8)
LYMPHOCYTES NFR BLD AUTO: 24.5 % (ref 27–41)
MCH RBC QN AUTO: 25.1 PG (ref 27–31)
MCHC RBC AUTO-ENTMCNC: 28.2 G/DL (ref 32–36)
MCV RBC AUTO: 88.9 FL (ref 80–96)
MONOCYTES # BLD AUTO: 0.53 K/UL (ref 0–0.8)
MONOCYTES NFR BLD AUTO: 5.1 % (ref 2–6)
MPC BLD CALC-MCNC: 11.4 FL (ref 9.4–12.4)
NEUTROPHILS # BLD AUTO: 6.95 K/UL (ref 1.8–7.7)
NEUTROPHILS NFR BLD AUTO: 66.3 % (ref 53–65)
NRBC # BLD AUTO: 0 X10E3/UL
NRBC, AUTO (.00): 0 %
PLATELET # BLD AUTO: 193 K/UL (ref 150–400)
RBC # BLD AUTO: 3.95 M/UL (ref 4.2–5.4)
VANCOMYCIN TROUGH SERPL-MCNC: 11.3 ΜG/ML (ref 15–20)
WBC # BLD AUTO: 10.48 K/UL (ref 4.5–11)

## 2025-04-09 PROCEDURE — 80202 ASSAY OF VANCOMYCIN: CPT | Performed by: HOSPITALIST

## 2025-04-09 PROCEDURE — 94761 N-INVAS EAR/PLS OXIMETRY MLT: CPT

## 2025-04-09 PROCEDURE — 63600175 PHARM REV CODE 636 W HCPCS

## 2025-04-09 PROCEDURE — 85025 COMPLETE CBC W/AUTO DIFF WBC: CPT | Performed by: HOSPITALIST

## 2025-04-09 PROCEDURE — 25000003 PHARM REV CODE 250: Performed by: HOSPITALIST

## 2025-04-09 PROCEDURE — 36415 COLL VENOUS BLD VENIPUNCTURE: CPT | Performed by: HOSPITALIST

## 2025-04-09 PROCEDURE — 99900035 HC TECH TIME PER 15 MIN (STAT)

## 2025-04-09 PROCEDURE — 94640 AIRWAY INHALATION TREATMENT: CPT

## 2025-04-09 PROCEDURE — 25000242 PHARM REV CODE 250 ALT 637 W/ HCPCS: Performed by: STUDENT IN AN ORGANIZED HEALTH CARE EDUCATION/TRAINING PROGRAM

## 2025-04-09 PROCEDURE — 11000001 HC ACUTE MED/SURG PRIVATE ROOM

## 2025-04-09 PROCEDURE — 27000221 HC OXYGEN, UP TO 24 HOURS

## 2025-04-09 PROCEDURE — 99232 SBSQ HOSP IP/OBS MODERATE 35: CPT | Mod: ,,, | Performed by: FAMILY MEDICINE

## 2025-04-09 PROCEDURE — 63600175 PHARM REV CODE 636 W HCPCS: Performed by: HOSPITALIST

## 2025-04-09 PROCEDURE — 25000003 PHARM REV CODE 250

## 2025-04-09 PROCEDURE — 25000242 PHARM REV CODE 250 ALT 637 W/ HCPCS: Performed by: INTERNAL MEDICINE

## 2025-04-09 PROCEDURE — 84520 ASSAY OF UREA NITROGEN: CPT | Performed by: HOSPITALIST

## 2025-04-09 PROCEDURE — 99900026 HC AIRWAY MAINTENANCE (STAT)

## 2025-04-09 PROCEDURE — 27000207 HC ISOLATION

## 2025-04-09 RX ADMIN — LEVALBUTEROL HYDROCHLORIDE 1.25 MG: 1.25 SOLUTION RESPIRATORY (INHALATION) at 02:04

## 2025-04-09 RX ADMIN — ACETAMINOPHEN 1000 MG: 500 TABLET ORAL at 10:04

## 2025-04-09 RX ADMIN — LACTULOSE 30 G: 20 SOLUTION ORAL at 08:04

## 2025-04-09 RX ADMIN — LEVALBUTEROL HYDROCHLORIDE 1.25 MG: 1.25 SOLUTION RESPIRATORY (INHALATION) at 07:04

## 2025-04-09 RX ADMIN — PREGABALIN 75 MG: 75 CAPSULE ORAL at 09:04

## 2025-04-09 RX ADMIN — ACETYLCYSTEINE 2 ML: 200 SOLUTION ORAL; RESPIRATORY (INHALATION) at 07:04

## 2025-04-09 RX ADMIN — TRAZODONE HYDROCHLORIDE 100 MG: 50 TABLET ORAL at 09:04

## 2025-04-09 RX ADMIN — MIDODRINE HYDROCHLORIDE 10 MG: 10 TABLET ORAL at 09:04

## 2025-04-09 RX ADMIN — VANCOMYCIN HYDROCHLORIDE 1750 MG: 500 INJECTION, POWDER, LYOPHILIZED, FOR SOLUTION INTRAVENOUS at 02:04

## 2025-04-09 RX ADMIN — SERTRALINE HYDROCHLORIDE 50 MG: 50 TABLET ORAL at 08:04

## 2025-04-09 RX ADMIN — BISACODYL 10 MG: 5 TABLET, COATED ORAL at 08:04

## 2025-04-09 RX ADMIN — ACETYLCYSTEINE 2 ML: 200 SOLUTION ORAL; RESPIRATORY (INHALATION) at 02:04

## 2025-04-09 RX ADMIN — MIDODRINE HYDROCHLORIDE 10 MG: 10 TABLET ORAL at 02:04

## 2025-04-09 RX ADMIN — PREGABALIN 75 MG: 75 CAPSULE ORAL at 08:04

## 2025-04-09 RX ADMIN — MIDODRINE HYDROCHLORIDE 10 MG: 10 TABLET ORAL at 08:04

## 2025-04-09 RX ADMIN — PANTOPRAZOLE SODIUM 40 MG: 40 INJECTION, POWDER, FOR SOLUTION INTRAVENOUS at 08:04

## 2025-04-09 RX ADMIN — ENOXAPARIN SODIUM 40 MG: 40 INJECTION SUBCUTANEOUS at 04:04

## 2025-04-09 RX ADMIN — ATORVASTATIN CALCIUM 20 MG: 20 TABLET, FILM COATED ORAL at 09:04

## 2025-04-09 NOTE — ASSESSMENT & PLAN NOTE
Patient with Hypoxic Respiratory failure which is Acute on chronic.  she is not on home oxygen. Supplemental oxygen was provided and noted- Oxygen Concentration (%):  [28-30] 28    .   Signs/symptoms of respiratory failure include- increased work of breathing and respiratory distress. Contributing diagnoses includes - Aspiration and Pneumonia Labs and images were reviewed. Patient Has not had a recent ABG. Will treat underlying causes and adjust management of respiratory failure as follows-

## 2025-04-09 NOTE — ASSESSMENT & PLAN NOTE
"Continue with current antibiotics until sensitivities return.      Antibiotics (From admission, onward)      Start     Stop Route Frequency Ordered    03/31/25 1400  vancomycin (VANCOCIN) 1,750 mg in 0.9% NaCl 500 mL IVPB         -- IV Every 36 hours 03/31/25 1237    03/25/25 0519  vancomycin - pharmacy to dose  (vancomycin IVPB (PEDS and ADULTS))        Placed in "And" Linked Group    -- IV pharmacy to manage frequency 03/25/25 3800          3/26  - continues on vanc for blood cultures + on 3/24, echo completed at time with no endocarditis seen  - discussed with pulmonary, recs to follow    3/27  - due to overall baseline health will treat coag negative staph bacteremia as a true infection, for now continue vanc and follow cultures and sensitivities    4/4:  I am continuing with vancomycin given that patient has a severe penicillin allergy.  Otherwise Ancef would have been an option.  Continue with IV antibiotics at least through April 10th.     4/5: continue with IV antibiotics.       "

## 2025-04-09 NOTE — ASSESSMENT & PLAN NOTE
"Continue with current antibiotics until sensitivities return.      Antibiotics (From admission, onward)      Start     Stop Route Frequency Ordered    03/31/25 1400  vancomycin (VANCOCIN) 1,750 mg in 0.9% NaCl 500 mL IVPB         -- IV Every 36 hours 03/31/25 1237    03/25/25 0519  vancomycin - pharmacy to dose  (vancomycin IVPB (PEDS and ADULTS))        Placed in "And" Linked Group    -- IV pharmacy to manage frequency 03/25/25 9836          3/26  - continues on vanc for blood cultures + on 3/24, echo completed at time with no endocarditis seen  - discussed with pulmonary, recs to follow    3/27  - due to overall baseline health will treat coag negative staph bacteremia as a true infection, for now continue vanc and follow cultures and sensitivities    4/4:  I am continuing with vancomycin given that patient has a severe penicillin allergy.  Otherwise Ancef would have been an option.  Continue with IV antibiotics at least through April 10th.     4/5: continue with IV antibiotics.       "

## 2025-04-09 NOTE — ASSESSMENT & PLAN NOTE
Patient with Hypoxic Respiratory failure which is Acute on chronic.  she is not on home oxygen. Supplemental oxygen was provided and noted- Oxygen Concentration (%):  [28-30] 28    .   Signs/symptoms of respiratory failure include- tachypnea and respiratory distress. Contributing diagnoses includes - Aspiration and Pneumonia Labs and images were reviewed. Patient Has not had a recent ABG. Will treat underlying causes and adjust management of respiratory failure as follows-

## 2025-04-09 NOTE — ASSESSMENT & PLAN NOTE
"Recurrent aspiration pneumonia.  Completed two rounds of antibiotics.      Antibiotics (From admission, onward)      Start     Stop Route Frequency Ordered    03/31/25 1400  vancomycin (VANCOCIN) 1,750 mg in 0.9% NaCl 500 mL IVPB         -- IV Every 36 hours 03/31/25 1237    03/25/25 0513  vancomycin - pharmacy to dose  (vancomycin IVPB (PEDS and ADULTS))        Placed in "And" Linked Group    -- IV pharmacy to manage frequency 03/25/25 4211        \  "

## 2025-04-09 NOTE — SUBJECTIVE & OBJECTIVE
Interval History:     No significant events overnight, no new complaints or concerns. Awaiting placement.      Objective:     Vital Signs (Most Recent):  Temp: 97.9 °F (36.6 °C) (04/09/25 1656)  Pulse: 99 (04/09/25 1656)  Resp: 20 (04/09/25 1426)  BP: 105/62 (04/09/25 1656)  SpO2: 100 % (04/09/25 1656) Vital Signs (24h Range):  Temp:  [97.6 °F (36.4 °C)-98.7 °F (37.1 °C)] 97.9 °F (36.6 °C)  Pulse:  [] 99  Resp:  [15-20] 20  SpO2:  [95 %-100 %] 100 %  BP: ()/(56-74) 105/62     Weight: 79.8 kg (175 lb 14.8 oz)  Body mass index is 32.18 kg/m².  No intake or output data in the 24 hours ending 04/09/25 1728      Physical Exam  Constitutional:       General: She is not in acute distress.     Appearance: She is ill-appearing.   HENT:      Head: Normocephalic and atraumatic.      Nose: Nose normal.      Mouth/Throat:      Mouth: Mucous membranes are moist.      Pharynx: Oropharynx is clear.   Eyes:      Extraocular Movements: Extraocular movements intact.      Conjunctiva/sclera: Conjunctivae normal.      Pupils: Pupils are equal, round, and reactive to light.   Cardiovascular:      Rate and Rhythm: Normal rate and regular rhythm.   Pulmonary:      Effort: Pulmonary effort is normal. No respiratory distress.      Comments: Secretions  Abdominal:      General: There is no distension.      Palpations: Abdomen is soft.      Tenderness: There is no abdominal tenderness.   Skin:     General: Skin is warm and dry.   Neurological:      Mental Status: She is alert. Mental status is at baseline.      Comments: Quadriplegia    Psychiatric:         Mood and Affect: Mood normal.         Behavior: Behavior normal.               Significant Labs: All pertinent labs within the past 24 hours have been reviewed.    Significant Imaging: I have reviewed all pertinent imaging results/findings within the past 24 hours.

## 2025-04-09 NOTE — ASSESSMENT & PLAN NOTE
Patient has persistent depression which is unknown and is currently controlled. Will Continue anti-depressant medications. We will not consult psychiatry at this time. Patient does not display psychosis at this time. Continue to monitor closely and adjust plan of care as needed.

## 2025-04-09 NOTE — SUBJECTIVE & OBJECTIVE
Interval History:     Review of Systems   Unable to perform ROS: Patient nonverbal     Objective:     Vital Signs (Most Recent):  Temp: 98.1 °F (36.7 °C) (04/08/25 1924)  Pulse: 99 (04/08/25 1924)  Resp: 16 (04/08/25 1549)  BP: 109/62 (04/08/25 1924)  SpO2: 95 % (04/08/25 1924) Vital Signs (24h Range):  Temp:  [97.4 °F (36.3 °C)-99 °F (37.2 °C)] 98.1 °F (36.7 °C)  Pulse:  [] 99  Resp:  [16-18] 16  SpO2:  [95 %-99 %] 95 %  BP: ()/(45-67) 109/62     Weight: 79.8 kg (175 lb 14.8 oz)  Body mass index is 32.18 kg/m².  No intake or output data in the 24 hours ending 04/08/25 1942      Physical Exam  Constitutional:       Appearance: She is ill-appearing.   HENT:      Head: Normocephalic and atraumatic.      Nose: Nose normal.      Mouth/Throat:      Mouth: Mucous membranes are moist.      Pharynx: Oropharynx is clear.   Eyes:      Extraocular Movements: Extraocular movements intact.      Conjunctiva/sclera: Conjunctivae normal.      Pupils: Pupils are equal, round, and reactive to light.   Cardiovascular:      Rate and Rhythm: Regular rhythm. Tachycardia present.      Pulses: Normal pulses.      Heart sounds: Normal heart sounds.   Pulmonary:      Effort: Pulmonary effort is normal.      Breath sounds: Wheezing present.      Comments: Secretions  Abdominal:      General: Abdomen is flat. Bowel sounds are normal.      Palpations: Abdomen is soft.   Musculoskeletal:      Cervical back: Normal range of motion and neck supple.   Skin:     General: Skin is warm and dry.   Neurological:      Mental Status: She is alert.      Comments: Quadriplegia    Psychiatric:         Mood and Affect: Mood normal.         Behavior: Behavior normal.               Significant Labs: All pertinent labs within the past 24 hours have been reviewed.    Significant Imaging: I have reviewed all pertinent imaging results/findings within the past 24 hours.

## 2025-04-09 NOTE — PROGRESS NOTES
[facility] - [address] [place] [address] Medicine  Progress Note    Patient Name: Karie Denney  MRN: [id number]  Patient Class: IP- Inpatient   Admission Date: 3/23/2025  Length of Stay: 17 days  Attending Physician: Jovanna Hoffman DO  Primary Care Provider: Gonzalo Barrera NP        Subjective     Principal Problem:Acute on chronic respiratory failure with hypoxia        HPI:  Chief Complaint  Transfer for continued management of respiratory failure, tracheostomy care, PEG feeding, and complications of quadriplegia following prolonged hospitalization.    History of Present Illness  Ms. Karie Denney is a 59-year-old woman with a complex medical history including quadriplegia following spinal surgery in 2015 and a cerebrovascular accident (CVA) in 2024 resulting in left-sided paralysis. She was transferred to [facility] from [facility] in [place], [place], for ongoing care following a prolonged ICU course involving intubation, respiratory failure, and significant complications.  She was initially hospitalized on February 15, 2025, for aspiration and dysphagia evaluation, during which she developed aspiration pneumonia and respiratory failure requiring intubation. Her hospital course was complicated by an ESBL E. coli UTI, pericardial effusion (treated with colchicine), and a spontaneous right thigh hematoma concerning for compartment syndrome, leading to transfer to [facility]. She required prolonged mechanical ventilation, tracheostomy placement, and PEG tube insertion.  She has now returned to [facility] for continued respiratory care, tracheostomy management, PEG feeding, management of sacral pressure injury, and rehabilitation planning.    Past Medical History  Quadriplegia post-spinal surgery (2015)  CVA (2024)  Aspiration pneumonia  Acute respiratory failure  UTI (ESBL E. coli)  Depression  GERD  Pharyngoesophageal dysphagia  Pericardial effusion/pericarditis  Chronic  constipation  Pressure ulcer (sacral, unstageable)  Hyperlipidemia  Anemia    Past Surgical History  Spinal surgery (2015)  PEG tube placement (03/11/2025)  Esophageal dilation with biopsy (08/2024)    Medications  Active Medications:  Atorvastatin 20 mg daily  Omeprazole 40 mg daily  Sertraline 50 mg daily  Trazodone 100 mg qHS  Pregabalin 75 mg BID  Hydrocodone-acetaminophen 5-325 mg BID  Lactulose 30 mL daily via PEG  Midodrine 10 mg Q6H via PEG  Levalbuterol 0.63 mg nebulizer Q6H  Polyethylene glycol 17 g daily via PEG  Recent Changes:  Colchicine: Discontinued due to bleeding  Aspirin and ezetimibe: Discontinued    Allergies  Penicillins ? Rash and anaphylaxis    Social History  Never smoker  No alcohol or tobacco use  Lives at home with mother; receives home health weekly  Bedbound, non-ambulatory    Family History  Noncontributory    Review of Systems  Limited due to tracheostomy, quadriplegia, and communication challenges. History obtained from EMR and caregiver.    Physical Examination  General: Chronically ill-appearing woman, alert, tracheostomy in place with T-piece  HEENT: Mild nasal skin necrosis, mucous membranes moist  Eyes: PERRL, EOMI  Neck: Tracheostomy present  CV: RRR, no murmurs  Lungs: Breath sounds diminished at bases; no acute distress; no wheezing  Abdomen: Soft, non-tender, PEG tube in place  Skin: Unstageable sacral ulcer, nasal pressure ulcer  Neuro: Quadriplegia, responsive with eye tracking and blinking  Extremities: RLE hematoma, bilateral edema, no signs of active bleeding    Laboratory Data (Most Recent)  Hgb: 8.5 g/dL  Creatinine: 0.30 mg/dL  Albumin: 3.1 g/dL  WBC: 9.4 K/uL  Ferritin: 265 ng/mL  INR: 0.93  No growth on recent blood and urine cultures    Imaging  CT angio: Large RLE hematoma without active extravasation  Multiple chest X-rays: Stable bilateral pleural effusions, improving atelectasis  Echo: EF 55-60%, small pericardial effusion      Overview/Hospital  Course:  3/26  - continues on vanc for blood cultures + on 3/24, echo completed at time with no endocarditis seen  - discussed with pulmonary, recs to follow    3/27  - due to overall baseline health will treat coag negative staph bacteremia as a true infection, for now continue vanc and follow cultures and sensitivities  - discussed with pulmonology who plan to continue trach collar as is and see patient May 1st at 1:40 pm, confirmed appointment  - discussed with family at bedside who are very concerned about secretions and states they don't have the suction capabilities at home and do not feel comfortable going home with her in this state, will discuss with social on possible home equipment    3/28  - awaiting micro, continuing vanc  - social setting up suction at home, family plans to return home as before    3/29  - still with secretions  - family requesting voice box    3/30  - doing well today, mentation much improved  - anticipate discharge within the next two days with home health services, family will need education on trach maintenance and home feedings as patient has PEG tube and they have not cared for a PEG tube before, also we will discuss with  getting a speaking told to use with a trach    3/31  - secretions improved today  - working with social for home set up of oxygen and tube feeds  - family needs heavy education on trach care and tube feeds as primary care giver has no experience with either, still wishes to discharge home    Interval History:     No significant events overnight, no new complaints or concerns. Awaiting placement.      Objective:     Vital Signs (Most Recent):  Temp: 97.9 °F (36.6 °C) (04/09/25 1656)  Pulse: 99 (04/09/25 1656)  Resp: 20 (04/09/25 1426)  BP: 105/62 (04/09/25 1656)  SpO2: 100 % (04/09/25 1656) Vital Signs (24h Range):  Temp:  [97.6 °F (36.4 °C)-98.7 °F (37.1 °C)] 97.9 °F (36.6 °C)  Pulse:  [] 99  Resp:  [15-20] 20  SpO2:  [95 %-100 %] 100  %  BP: ()/(56-74) 105/62     Weight: 79.8 kg (175 lb 14.8 oz)  Body mass index is 32.18 kg/m².  No intake or output data in the 24 hours ending 04/09/25 1728      Physical Exam  Constitutional:       General: She is not in acute distress.     Appearance: She is ill-appearing.   HENT:      Head: Normocephalic and atraumatic.      Nose: Nose normal.      Mouth/Throat:      Mouth: Mucous membranes are moist.      Pharynx: Oropharynx is clear.   Eyes:      Extraocular Movements: Extraocular movements intact.      Conjunctiva/sclera: Conjunctivae normal.      Pupils: Pupils are equal, round, and reactive to light.   Cardiovascular:      Rate and Rhythm: Normal rate and regular rhythm.   Pulmonary:      Effort: Pulmonary effort is normal. No respiratory distress.      Comments: Secretions  Abdominal:      General: There is no distension.      Palpations: Abdomen is soft.      Tenderness: There is no abdominal tenderness.   Skin:     General: Skin is warm and dry.   Neurological:      Mental Status: She is alert. Mental status is at baseline.      Comments: Quadriplegia    Psychiatric:         Mood and Affect: Mood normal.         Behavior: Behavior normal.               Significant Labs: All pertinent labs within the past 24 hours have been reviewed.    Significant Imaging: I have reviewed all pertinent imaging results/findings within the past 24 hours.      Assessment & Plan  Acute on chronic respiratory failure with hypoxia  Continue tracheostomy care with T-piece trials  Tracheostomy suctioning ordered due to thick secretions.   Culture tracheostomy secretions.   Monitor oxygenation and readiness for weaning  Pulmonary consult tomorrow.     3/24: mucus plugging requiring frequent suctioning.  Chest physiotherapy, Mucomyst.  Additional recommendations per pulmonology.     3/25: requiring frequent suctioning but otherwise stable.     3/26  - continues on vanc for blood cultures + on 3/24, echo completed at time  with no endocarditis seen  - discussed with pulmonary, recs to follow    3/27  - discussed with pulmonology who plan to continue trach collar as is and see patient May 1st at 1:40 pm, confirmed appointment  - discussed with family at bedside who are very concerned about secretions and states they don't have the suction capabilities at home and do not feel comfortable going home with her in this state, will discuss with social on possible home equipement    3/29  - continues with secretions, setting up home suction  - family requesting voice box    3/30  - anticipate discharge in the next few days with home health and set up at home      3/31  - secretions improved today  - working with social for home set up of oxygen and tube feeds  - family needs heavy education on trach care and tube feeds as primary care giver has no experience with either, still wishes to discharge home    4/1: Still frequent suctioning.  Family would like patient to go to LTAC before going home as she still requires frequent suctioning, tube feeds, antibiotics, and additional care.    She has required over a week of ICU care prior to returning to our facility.  She was on the vent and was difficult to wean. She has required intermediate ICU level of care while at our facility.       4/2: plan to discharge to SNF if possible.  Otherwise, patient will need to go home.  Keep trach without capping until April 15th.   Pulmonology to see patient tomorrow.   Patient certainly has required more than 3 nights of Intermediate ICU level of care.  She now needs management of her wounds, respiratory failure, feeding care and would benefit from post-acute care.     4/3: Plan to discharge to LTAC given complexity of patient's conditions - respiratory failure with very frequent suctioning, bacteremia, tube feeds, etc.    Patient certainly has required more than 3 nights of Intermediate ICU level of care.  She now needs management of her wounds, respiratory  "failure, feeding care and would benefit from post-acute care.     4/4: continue with current aggressive respiratory care to manage trach and thick secretions.     4/5: continue with aggressive pulmonary care.      4/6: Patient continues to have thick and copious secretions.  Query if she has tracheitis.  Culture ordered for deep aspiration via tracheostomy.  Will initiate antibiotics if indicated.  Continue with aggressive pulmonary care    4/7: added scopolamine patch to help with secretions.  Two tracheostomy cultures sent.    Denied LTAC by Leandro, her insurance provider.   Will look into SNF again.  If denied to SNF, consider appealing LTAC decision.  As last resort, patient will need to go home where her mother takes care of her.      4/8: f/u tracheostomy cultures.   F/u LTAC. Appeal sent today.     4/9: Awaiting placement  Severe protein-calorie malnutrition  Nutrition consulted. Most recent weight and BMI monitored-     Measurements:  Wt Readings from Last 1 Encounters:   03/27/25 79.8 kg (175 lb 14.8 oz)   Body mass index is 32.18 kg/m².    Patient has been screened and assessed by RD.    Malnutrition Type:  Context: chronic illness  Level: severe    Continue PEG tube feeding.  Nepro at 40 mL/hr  Maintain bowel regimen (lactulose, PEG, senna)  Dietitian to reassess  continue with current tube feed regimen    Dysphagia  With PEG in place. Continue tube feeds.   Continue omeprazole  Avoid PO intake  Speech therapy if clinically appropriate    Aspiration pneumonia  Recurrent aspiration pneumonia.  Completed two rounds of antibiotics.      Antibiotics (From admission, onward)      Start     Stop Route Frequency Ordered    03/31/25 1400  vancomycin (VANCOCIN) 1,750 mg in 0.9% NaCl 500 mL IVPB         -- IV Every 36 hours 03/31/25 1237    03/25/25 0519  vancomycin - pharmacy to dose  (vancomycin IVPB (PEDS and ADULTS))        Placed in "And" Linked Group    -- IV pharmacy to manage frequency 03/25/25 4460      "   \  Pressure ulcers of skin of multiple topographic sites  Aquacel Ag + Mepilex dressings  Wound care team consulted    Continue offloading and specialty mattress    Depression  Patient has persistent depression which is unknown and is currently controlled. Will Continue anti-depressant medications. We will not consult psychiatry at this time. Patient does not display psychosis at this time. Continue to monitor closely and adjust plan of care as needed.          Mucus plugging of bronchi      S/P percutaneous endoscopic gastrostomy (PEG) tube placement  Patient noted to have a percutaneous endoscopic gastrostomy tube in place. I have personally inspected the tube.Tube was placed prior to this admission There are no signs of drainage or infection around the site. The tube is patent. Medications have converted to liquid form if available.  Routine care to be done by wound care and nursing staff.       Quadriplegia  Maximal support, bedbound  PT/OT as tolerated  DME planning for discharge    Hematoma of lower extremity, right, subsequent encounter  No active bleeding; conservative management  Monitor H/H  No anticoagulation  Hyperlipidemia  Continue atorvastatin    Pericardial effusion  Previously on colchicine, now discontinued due to bleeding  Continue low-dose prednisone  Monitor for recurrence    Acute respiratory failure with hypoxia and hypercarbia  Patient with Hypoxic Respiratory failure which is Acute on chronic.  she is not on home oxygen. Supplemental oxygen was provided and noted- Oxygen Concentration (%):  [28] 28    .   Signs/symptoms of respiratory failure include- tachypnea and respiratory distress. Contributing diagnoses includes - Aspiration and Pneumonia Labs and images were reviewed. Patient Has not had a recent ABG. Will treat underlying causes and adjust management of respiratory failure as follows-   Chronic respiratory failure with hypoxia  Patient with Hypoxic Respiratory failure which is  "Acute on chronic.  she is not on home oxygen. Supplemental oxygen was provided and noted- Oxygen Concentration (%):  [28] 28    .   Signs/symptoms of respiratory failure include- increased work of breathing and respiratory distress. Contributing diagnoses includes - Aspiration and Pneumonia Labs and images were reviewed. Patient Has not had a recent ABG. Will treat underlying causes and adjust management of respiratory failure as follows-   Coag negative Staphylococcus bacteremia  Continue with current antibiotics until sensitivities return.      Antibiotics (From admission, onward)      Start     Stop Route Frequency Ordered    03/31/25 1400  vancomycin (VANCOCIN) 1,750 mg in 0.9% NaCl 500 mL IVPB         -- IV Every 36 hours 03/31/25 1237    03/25/25 0519  vancomycin - pharmacy to dose  (vancomycin IVPB (PEDS and ADULTS))        Placed in "And" Linked Group    -- IV pharmacy to manage frequency 03/25/25 4771          3/26  - continues on vanc for blood cultures + on 3/24, echo completed at time with no endocarditis seen  - discussed with pulmonary, recs to follow    3/27  - due to overall baseline health will treat coag negative staph bacteremia as a true infection, for now continue vanc and follow cultures and sensitivities    4/4:  I am continuing with vancomycin given that patient has a severe penicillin allergy.  Otherwise Ancef would have been an option.  Continue with IV antibiotics at least through April 10th.     4/5: continue with IV antibiotics.       VTE Risk Mitigation (From admission, onward)           Ordered     enoxaparin injection 40 mg  Daily         03/23/25 1940     IP VTE HIGH RISK PATIENT  Once         03/23/25 1940     Place sequential compression device  Until discontinued         03/23/25 1941                    Discharge Planning   SHRADDHA: 4/14/2025     Code Status: Full Code   Medical Readiness for Discharge Date:   Discharge Plan A: Long-term acute care facility (LTAC)    "                     Jovanna Hoffman DO  Department of Hospital Medicine   [facility] - [facility]

## 2025-04-09 NOTE — ASSESSMENT & PLAN NOTE
Continue tracheostomy care with T-piece trials  Tracheostomy suctioning ordered due to thick secretions.   Culture tracheostomy secretions.   Monitor oxygenation and readiness for weaning  Pulmonary consult tomorrow.     3/24: mucus plugging requiring frequent suctioning.  Chest physiotherapy, Mucomyst.  Additional recommendations per pulmonology.     3/25: requiring frequent suctioning but otherwise stable.     3/26  - continues on vanc for blood cultures + on 3/24, echo completed at time with no endocarditis seen  - discussed with pulmonary, recs to follow    3/27  - discussed with pulmonology who plan to continue trach collar as is and see patient May 1st at 1:40 pm, confirmed appointment  - discussed with family at bedside who are very concerned about secretions and states they don't have the suction capabilities at home and do not feel comfortable going home with her in this state, will discuss with social on possible home equipement    3/29  - continues with secretions, setting up home suction  - family requesting voice box    3/30  - anticipate discharge in the next few days with home health and set up at home      3/31  - secretions improved today  - working with social for home set up of oxygen and tube feeds  - family needs heavy education on trach care and tube feeds as primary care giver has no experience with either, still wishes to discharge home    4/1: Still frequent suctioning.  Family would like patient to go to LTAC before going home as she still requires frequent suctioning, tube feeds, antibiotics, and additional care.    She has required over a week of ICU care prior to returning to our facility.  She was on the vent and was difficult to wean. She has required intermediate ICU level of care while at our facility.       4/2: plan to discharge to SNF if possible.  Otherwise, patient will need to go home.  Keep trach without capping until April 15th.   Pulmonology to see patient tomorrow.    Patient certainly has required more than 3 nights of Intermediate ICU level of care.  She now needs management of her wounds, respiratory failure, feeding care and would benefit from post-acute care.     4/3: Plan to discharge to LTAC given complexity of patient's conditions - respiratory failure with very frequent suctioning, bacteremia, tube feeds, etc.    Patient certainly has required more than 3 nights of Intermediate ICU level of care.  She now needs management of her wounds, respiratory failure, feeding care and would benefit from post-acute care.     4/4: continue with current aggressive respiratory care to manage trach and thick secretions.     4/5: continue with aggressive pulmonary care.      4/6: Patient continues to have thick and copious secretions.  Query if she has tracheitis.  Culture ordered for deep aspiration via tracheostomy.  Will initiate antibiotics if indicated.  Continue with aggressive pulmonary care    4/7: added scopolamine patch to help with secretions.  Two tracheostomy cultures sent.    Denied LTAC by Leandro, her insurance provider.   Will look into SNF again.  If denied to SNF, consider appealing LTAC decision.  As last resort, patient will need to go home where her mother takes care of her.      4/8: f/u tracheostomy cultures.   F/u LTAC. Appeal sent today.

## 2025-04-09 NOTE — ASSESSMENT & PLAN NOTE
With PEG in place. Continue tube feeds.   Continue omeprazole  Avoid PO intake  Speech therapy if clinically appropriate

## 2025-04-09 NOTE — PROGRESS NOTES
[facility] - [address] [place] [address] Medicine  Progress Note    Patient Name: Karie Denney  MRN: [id number]  Patient Class: IP- Inpatient   Admission Date: 3/23/2025  Length of Stay: 16 days  Attending Physician: Cassie Bar MD  Primary Care Provider: Gonzalo Barrera NP        Subjective     Principal Problem:Acute on chronic respiratory failure with hypoxia    HPI:  Chief Complaint  Transfer for continued management of respiratory failure, tracheostomy care, PEG feeding, and complications of quadriplegia following prolonged hospitalization.    History of Present Illness  Ms. Karie Denney is a 59-year-old woman with a complex medical history including quadriplegia following spinal surgery in 2015 and a cerebrovascular accident (CVA) in 2024 resulting in left-sided paralysis. She was transferred to [facility] from [facility] in [place], [place], for ongoing care following a prolonged ICU course involving intubation, respiratory failure, and significant complications.  She was initially hospitalized on February 15, 2025, for aspiration and dysphagia evaluation, during which she developed aspiration pneumonia and respiratory failure requiring intubation. Her hospital course was complicated by an ESBL E. coli UTI, pericardial effusion (treated with colchicine), and a spontaneous right thigh hematoma concerning for compartment syndrome, leading to transfer to [facility]. She required prolonged mechanical ventilation, tracheostomy placement, and PEG tube insertion.  She has now returned to [facility] for continued respiratory care, tracheostomy management, PEG feeding, management of sacral pressure injury, and rehabilitation planning.    Past Medical History  Quadriplegia post-spinal surgery (2015)  CVA (2024)  Aspiration pneumonia  Acute respiratory failure  UTI (ESBL E. coli)  Depression  GERD  Pharyngoesophageal dysphagia  Pericardial effusion/pericarditis  Chronic  constipation  Pressure ulcer (sacral, unstageable)  Hyperlipidemia  Anemia    Past Surgical History  Spinal surgery (2015)  PEG tube placement (03/11/2025)  Esophageal dilation with biopsy (08/2024)    Medications  Active Medications:  Atorvastatin 20 mg daily  Omeprazole 40 mg daily  Sertraline 50 mg daily  Trazodone 100 mg qHS  Pregabalin 75 mg BID  Hydrocodone-acetaminophen 5-325 mg BID  Lactulose 30 mL daily via PEG  Midodrine 10 mg Q6H via PEG  Levalbuterol 0.63 mg nebulizer Q6H  Polyethylene glycol 17 g daily via PEG  Recent Changes:  Colchicine: Discontinued due to bleeding  Aspirin and ezetimibe: Discontinued    Allergies  Penicillins ? Rash and anaphylaxis    Social History  Never smoker  No alcohol or tobacco use  Lives at home with mother; receives home health weekly  Bedbound, non-ambulatory    Family History  Noncontributory    Review of Systems  Limited due to tracheostomy, quadriplegia, and communication challenges. History obtained from EMR and caregiver.    Physical Examination  General: Chronically ill-appearing woman, alert, tracheostomy in place with T-piece  HEENT: Mild nasal skin necrosis, mucous membranes moist  Eyes: PERRL, EOMI  Neck: Tracheostomy present  CV: RRR, no murmurs  Lungs: Breath sounds diminished at bases; no acute distress; no wheezing  Abdomen: Soft, non-tender, PEG tube in place  Skin: Unstageable sacral ulcer, nasal pressure ulcer  Neuro: Quadriplegia, responsive with eye tracking and blinking  Extremities: RLE hematoma, bilateral edema, no signs of active bleeding    Laboratory Data (Most Recent)  Hgb: 8.5 g/dL  Creatinine: 0.30 mg/dL  Albumin: 3.1 g/dL  WBC: 9.4 K/uL  Ferritin: 265 ng/mL  INR: 0.93  No growth on recent blood and urine cultures    Imaging  CT angio: Large RLE hematoma without active extravasation  Multiple chest X-rays: Stable bilateral pleural effusions, improving atelectasis  Echo: EF 55-60%, small pericardial effusion      Overview/Hospital  Course:  3/26  - continues on vanc for blood cultures + on 3/24, echo completed at time with no endocarditis seen  - discussed with pulmonary, recs to follow    3/27  - due to overall baseline health will treat coag negative staph bacteremia as a true infection, for now continue vanc and follow cultures and sensitivities  - discussed with pulmonology who plan to continue trach collar as is and see patient May 1st at 1:40 pm, confirmed appointment  - discussed with family at bedside who are very concerned about secretions and states they don't have the suction capabilities at home and do not feel comfortable going home with her in this state, will discuss with social on possible home equipment    3/28  - awaiting micro, continuing vanc  - social setting up suction at home, family plans to return home as before    3/29  - still with secretions  - family requesting voice box    3/30  - doing well today, mentation much improved  - anticipate discharge within the next two days with home health services, family will need education on trach maintenance and home feedings as patient has PEG tube and they have not cared for a PEG tube before, also we will discuss with  getting a speaking told to use with a trach    3/31  - secretions improved today  - working with social for home set up of oxygen and tube feeds  - family needs heavy education on trach care and tube feeds as primary care giver has no experience with either, still wishes to discharge home    Interval History:     Review of Systems   Unable to perform ROS: Patient nonverbal     Objective:     Vital Signs (Most Recent):  Temp: 98.1 °F (36.7 °C) (04/08/25 1924)  Pulse: 99 (04/08/25 1924)  Resp: 16 (04/08/25 1549)  BP: 109/62 (04/08/25 1924)  SpO2: 95 % (04/08/25 1924) Vital Signs (24h Range):  Temp:  [97.4 °F (36.3 °C)-99 °F (37.2 °C)] 98.1 °F (36.7 °C)  Pulse:  [] 99  Resp:  [16-18] 16  SpO2:  [95 %-99 %] 95 %  BP: ()/(45-67) 109/62      Weight: 79.8 kg (175 lb 14.8 oz)  Body mass index is 32.18 kg/m².  No intake or output data in the 24 hours ending 04/08/25 1942      Physical Exam  Constitutional:       Appearance: She is ill-appearing.   HENT:      Head: Normocephalic and atraumatic.      Nose: Nose normal.      Mouth/Throat:      Mouth: Mucous membranes are moist.      Pharynx: Oropharynx is clear.   Eyes:      Extraocular Movements: Extraocular movements intact.      Conjunctiva/sclera: Conjunctivae normal.      Pupils: Pupils are equal, round, and reactive to light.   Cardiovascular:      Rate and Rhythm: Regular rhythm. Tachycardia present.      Pulses: Normal pulses.      Heart sounds: Normal heart sounds.   Pulmonary:      Effort: Pulmonary effort is normal.      Breath sounds: Wheezing present.      Comments: Secretions  Abdominal:      General: Abdomen is flat. Bowel sounds are normal.      Palpations: Abdomen is soft.   Musculoskeletal:      Cervical back: Normal range of motion and neck supple.   Skin:     General: Skin is warm and dry.   Neurological:      Mental Status: She is alert.      Comments: Quadriplegia    Psychiatric:         Mood and Affect: Mood normal.         Behavior: Behavior normal.               Significant Labs: All pertinent labs within the past 24 hours have been reviewed.    Significant Imaging: I have reviewed all pertinent imaging results/findings within the past 24 hours.      Assessment & Plan  Acute on chronic respiratory failure with hypoxia  Continue tracheostomy care with T-piece trials  Tracheostomy suctioning ordered due to thick secretions.   Culture tracheostomy secretions.   Monitor oxygenation and readiness for weaning  Pulmonary consult tomorrow.     3/24: mucus plugging requiring frequent suctioning.  Chest physiotherapy, Mucomyst.  Additional recommendations per pulmonology.     3/25: requiring frequent suctioning but otherwise stable.     3/26  - continues on vanc for blood cultures + on  3/24, echo completed at time with no endocarditis seen  - discussed with pulmonary, recs to follow    3/27  - discussed with pulmonology who plan to continue trach collar as is and see patient May 1st at 1:40 pm, confirmed appointment  - discussed with family at bedside who are very concerned about secretions and states they don't have the suction capabilities at home and do not feel comfortable going home with her in this state, will discuss with social on possible home equipement    3/29  - continues with secretions, setting up home suction  - family requesting voice box    3/30  - anticipate discharge in the next few days with home health and set up at home      3/31  - secretions improved today  - working with social for home set up of oxygen and tube feeds  - family needs heavy education on trach care and tube feeds as primary care giver has no experience with either, still wishes to discharge home    4/1: Still frequent suctioning.  Family would like patient to go to LTAC before going home as she still requires frequent suctioning, tube feeds, antibiotics, and additional care.    She has required over a week of ICU care prior to returning to our facility.  She was on the vent and was difficult to wean. She has required intermediate ICU level of care while at our facility.       4/2: plan to discharge to SNF if possible.  Otherwise, patient will need to go home.  Keep trach without capping until April 15th.   Pulmonology to see patient tomorrow.   Patient certainly has required more than 3 nights of Intermediate ICU level of care.  She now needs management of her wounds, respiratory failure, feeding care and would benefit from post-acute care.     4/3: Plan to discharge to LTAC given complexity of patient's conditions - respiratory failure with very frequent suctioning, bacteremia, tube feeds, etc.    Patient certainly has required more than 3 nights of Intermediate ICU level of care.  She now needs management  of her wounds, respiratory failure, feeding care and would benefit from post-acute care.     4/4: continue with current aggressive respiratory care to manage trach and thick secretions.     4/5: continue with aggressive pulmonary care.      4/6: Patient continues to have thick and copious secretions.  Query if she has tracheitis.  Culture ordered for deep aspiration via tracheostomy.  Will initiate antibiotics if indicated.  Continue with aggressive pulmonary care    4/7: added scopolamine patch to help with secretions.  Two tracheostomy cultures sent.    Denied LTAC by Leandro, her insurance provider.   Will look into SNF again.  If denied to SNF, consider appealing LTAC decision.  As last resort, patient will need to go home where her mother takes care of her.      4/8: f/u tracheostomy cultures.   F/u LTAC. Appeal sent today.   Severe protein-calorie malnutrition  Nutrition consulted. Most recent weight and BMI monitored-     Measurements:  Wt Readings from Last 1 Encounters:   03/27/25 79.8 kg (175 lb 14.8 oz)   Body mass index is 32.18 kg/m².    Patient has been screened and assessed by RD.    Malnutrition Type:  Context: chronic illness  Level: severe    Continue PEG tube feeding.  Nepro at 40 mL/hr  Maintain bowel regimen (lactulose, PEG, senna)  Dietitian to reassess  continue with current tube feed regimen    Dysphagia  Continue omeprazole  Avoid PO intake  Speech therapy if clinically appropriate    Aspiration pneumonia  Recurrent aspiration pneumonia.  Complete two rounds of antibiotics.    Pressure ulcers of skin of multiple topographic sites  Aquacel Ag + Mepilex dressings  Wound care team consulted    Continue offloading and specialty mattress    Depression  Continue pregabalin, sertraline, trazodone      Mucus plugging of bronchi      S/P percutaneous endoscopic gastrostomy (PEG) tube placement  Patient noted to have a percutaneous endoscopic gastrostomy tube in place. I have personally inspected the  tube.Tube was placed prior to this admission There are no signs of drainage or infection around the site. The tube is patent. Medications have converted to liquid form if available.  Routine care to be done by wound care and nursing staff.       Quadriplegia  Maximal support, bedbound  PT/OT as tolerated  DME planning for discharge    Hematoma of lower extremity, right, subsequent encounter  No active bleeding; conservative management  Monitor H/H  No anticoagulation  Hyperlipidemia  Continue atorvastatin    Pericardial effusion  Previously on colchicine, now discontinued due to bleeding  Continue low-dose prednisone  Monitor for recurrence    Acute respiratory failure with hypoxia and hypercarbia  Patient with Hypoxic Respiratory failure which is Acute on chronic.  she is not on home oxygen. Supplemental oxygen was provided and noted- Oxygen Concentration (%):  [28-30] 28    .   Signs/symptoms of respiratory failure include- tachypnea and respiratory distress. Contributing diagnoses includes - Aspiration and Pneumonia Labs and images were reviewed. Patient Has not had a recent ABG. Will treat underlying causes and adjust management of respiratory failure as follows-   Chronic respiratory failure with hypoxia  Patient with Hypoxic Respiratory failure which is Acute on chronic.  she is not on home oxygen. Supplemental oxygen was provided and noted- Oxygen Concentration (%):  [28-30] 28    .   Signs/symptoms of respiratory failure include- increased work of breathing and respiratory distress. Contributing diagnoses includes - Aspiration and Pneumonia Labs and images were reviewed. Patient Has not had a recent ABG. Will treat underlying causes and adjust management of respiratory failure as follows-   Coag negative Staphylococcus bacteremia  Continue with current antibiotics until sensitivities return.      Antibiotics (From admission, onward)      Start     Stop Route Frequency Ordered    03/31/25 1400  vancomycin  "(VANCOCIN) 1,750 mg in 0.9% NaCl 500 mL IVPB         -- IV Every 36 hours 03/31/25 1237    03/25/25 0519  vancomycin - pharmacy to dose  (vancomycin IVPB (PEDS and ADULTS))        Placed in "And" Linked Group    -- IV pharmacy to manage frequency 03/25/25 0413          3/26  - continues on vanc for blood cultures + on 3/24, echo completed at time with no endocarditis seen  - discussed with pulmonary, recs to follow    3/27  - due to overall baseline health will treat coag negative staph bacteremia as a true infection, for now continue vanc and follow cultures and sensitivities    4/4:  I am continuing with vancomycin given that patient has a severe penicillin allergy.  Otherwise Ancef would have been an option.  Continue with IV antibiotics at least through April 10th.     4/5: continue with IV antibiotics.       VTE Risk Mitigation (From admission, onward)           Ordered     enoxaparin injection 40 mg  Daily         03/23/25 1940     IP VTE HIGH RISK PATIENT  Once         03/23/25 1940     Place sequential compression device  Until discontinued         03/23/25 1941                    Discharge Planning   SHRADDHA: 4/14/2025     Code Status: Full Code   Medical Readiness for Discharge Date:   Discharge Plan A: Long-term acute care facility (LTAC)          Cassie Bar MD  Department of Hospital Medicine   [facility] - [facility]    "

## 2025-04-09 NOTE — ASSESSMENT & PLAN NOTE
Continue tracheostomy care with T-piece trials  Tracheostomy suctioning ordered due to thick secretions.   Culture tracheostomy secretions.   Monitor oxygenation and readiness for weaning  Pulmonary consult tomorrow.     3/24: mucus plugging requiring frequent suctioning.  Chest physiotherapy, Mucomyst.  Additional recommendations per pulmonology.     3/25: requiring frequent suctioning but otherwise stable.     3/26  - continues on vanc for blood cultures + on 3/24, echo completed at time with no endocarditis seen  - discussed with pulmonary, recs to follow    3/27  - discussed with pulmonology who plan to continue trach collar as is and see patient May 1st at 1:40 pm, confirmed appointment  - discussed with family at bedside who are very concerned about secretions and states they don't have the suction capabilities at home and do not feel comfortable going home with her in this state, will discuss with social on possible home equipement    3/29  - continues with secretions, setting up home suction  - family requesting voice box    3/30  - anticipate discharge in the next few days with home health and set up at home      3/31  - secretions improved today  - working with social for home set up of oxygen and tube feeds  - family needs heavy education on trach care and tube feeds as primary care giver has no experience with either, still wishes to discharge home    4/1: Still frequent suctioning.  Family would like patient to go to LTAC before going home as she still requires frequent suctioning, tube feeds, antibiotics, and additional care.    She has required over a week of ICU care prior to returning to our facility.  She was on the vent and was difficult to wean. She has required intermediate ICU level of care while at our facility.       4/2: plan to discharge to SNF if possible.  Otherwise, patient will need to go home.  Keep trach without capping until April 15th.   Pulmonology to see patient tomorrow.    Patient certainly has required more than 3 nights of Intermediate ICU level of care.  She now needs management of her wounds, respiratory failure, feeding care and would benefit from post-acute care.     4/3: Plan to discharge to LTAC given complexity of patient's conditions - respiratory failure with very frequent suctioning, bacteremia, tube feeds, etc.    Patient certainly has required more than 3 nights of Intermediate ICU level of care.  She now needs management of her wounds, respiratory failure, feeding care and would benefit from post-acute care.     4/4: continue with current aggressive respiratory care to manage trach and thick secretions.     4/5: continue with aggressive pulmonary care.      4/6: Patient continues to have thick and copious secretions.  Query if she has tracheitis.  Culture ordered for deep aspiration via tracheostomy.  Will initiate antibiotics if indicated.  Continue with aggressive pulmonary care    4/7: added scopolamine patch to help with secretions.  Two tracheostomy cultures sent.    Denied LTAC by Leandro, her insurance provider.   Will look into SNF again.  If denied to SNF, consider appealing LTAC decision.  As last resort, patient will need to go home where her mother takes care of her.      4/8: f/u tracheostomy cultures.   F/u LTAC. Appeal sent today.     4/9: Awaiting placement

## 2025-04-10 LAB
BUN SERPL-MCNC: 12 MG/DL (ref 10–20)
BUN/CREAT SERPL: 23 (ref 6–20)
CREAT SERPL-MCNC: 0.53 MG/DL (ref 0.55–1.02)
EGFR (NO RACE VARIABLE) (RUSH/TITUS): 105 ML/MIN/1.73M2

## 2025-04-10 PROCEDURE — 63600175 PHARM REV CODE 636 W HCPCS: Performed by: FAMILY MEDICINE

## 2025-04-10 PROCEDURE — 63700000 PHARM REV CODE 250 ALT 637 W/O HCPCS: Performed by: FAMILY MEDICINE

## 2025-04-10 PROCEDURE — 11000001 HC ACUTE MED/SURG PRIVATE ROOM

## 2025-04-10 PROCEDURE — 25000003 PHARM REV CODE 250: Performed by: HOSPITALIST

## 2025-04-10 PROCEDURE — 99900026 HC AIRWAY MAINTENANCE (STAT)

## 2025-04-10 PROCEDURE — 99233 SBSQ HOSP IP/OBS HIGH 50: CPT | Mod: ,,, | Performed by: FAMILY MEDICINE

## 2025-04-10 PROCEDURE — 63600175 PHARM REV CODE 636 W HCPCS: Performed by: HOSPITALIST

## 2025-04-10 PROCEDURE — 25000003 PHARM REV CODE 250: Performed by: FAMILY MEDICINE

## 2025-04-10 PROCEDURE — 94761 N-INVAS EAR/PLS OXIMETRY MLT: CPT

## 2025-04-10 PROCEDURE — 25000242 PHARM REV CODE 250 ALT 637 W/ HCPCS: Performed by: STUDENT IN AN ORGANIZED HEALTH CARE EDUCATION/TRAINING PROGRAM

## 2025-04-10 PROCEDURE — 94640 AIRWAY INHALATION TREATMENT: CPT

## 2025-04-10 PROCEDURE — 27000207 HC ISOLATION

## 2025-04-10 PROCEDURE — 36415 COLL VENOUS BLD VENIPUNCTURE: CPT | Performed by: HOSPITALIST

## 2025-04-10 PROCEDURE — 99900035 HC TECH TIME PER 15 MIN (STAT)

## 2025-04-10 PROCEDURE — 25000242 PHARM REV CODE 250 ALT 637 W/ HCPCS: Performed by: INTERNAL MEDICINE

## 2025-04-10 PROCEDURE — 27000221 HC OXYGEN, UP TO 24 HOURS

## 2025-04-10 PROCEDURE — 84520 ASSAY OF UREA NITROGEN: CPT | Performed by: HOSPITALIST

## 2025-04-10 RX ORDER — CEFTRIAXONE 1 G/1
1 INJECTION, POWDER, FOR SOLUTION INTRAMUSCULAR; INTRAVENOUS
Status: DISCONTINUED | OUTPATIENT
Start: 2025-04-10 | End: 2025-04-20

## 2025-04-10 RX ORDER — FLUCONAZOLE 100 MG/1
200 TABLET ORAL ONCE
Status: COMPLETED | OUTPATIENT
Start: 2025-04-10 | End: 2025-04-10

## 2025-04-10 RX ADMIN — PREGABALIN 75 MG: 75 CAPSULE ORAL at 08:04

## 2025-04-10 RX ADMIN — TRAZODONE HYDROCHLORIDE 100 MG: 50 TABLET ORAL at 09:04

## 2025-04-10 RX ADMIN — MIDODRINE HYDROCHLORIDE 10 MG: 10 TABLET ORAL at 09:04

## 2025-04-10 RX ADMIN — ATORVASTATIN CALCIUM 20 MG: 20 TABLET, FILM COATED ORAL at 09:04

## 2025-04-10 RX ADMIN — LEVALBUTEROL HYDROCHLORIDE 1.25 MG: 1.25 SOLUTION RESPIRATORY (INHALATION) at 07:04

## 2025-04-10 RX ADMIN — LEVALBUTEROL HYDROCHLORIDE 1.25 MG: 1.25 SOLUTION RESPIRATORY (INHALATION) at 03:04

## 2025-04-10 RX ADMIN — MIDODRINE HYDROCHLORIDE 10 MG: 10 TABLET ORAL at 08:04

## 2025-04-10 RX ADMIN — MIDODRINE HYDROCHLORIDE 10 MG: 10 TABLET ORAL at 02:04

## 2025-04-10 RX ADMIN — VANCOMYCIN HYDROCHLORIDE 1500 MG: 500 INJECTION, POWDER, LYOPHILIZED, FOR SOLUTION INTRAVENOUS at 02:04

## 2025-04-10 RX ADMIN — SERTRALINE HYDROCHLORIDE 50 MG: 50 TABLET ORAL at 08:04

## 2025-04-10 RX ADMIN — ACETYLCYSTEINE 2 ML: 200 SOLUTION ORAL; RESPIRATORY (INHALATION) at 03:04

## 2025-04-10 RX ADMIN — ACETYLCYSTEINE 2 ML: 200 SOLUTION ORAL; RESPIRATORY (INHALATION) at 07:04

## 2025-04-10 RX ADMIN — FLUCONAZOLE 200 MG: 100 TABLET ORAL at 05:04

## 2025-04-10 RX ADMIN — PREGABALIN 75 MG: 75 CAPSULE ORAL at 09:04

## 2025-04-10 RX ADMIN — ENOXAPARIN SODIUM 40 MG: 40 INJECTION SUBCUTANEOUS at 05:04

## 2025-04-10 RX ADMIN — CEFTRIAXONE SODIUM 1 G: 1 INJECTION, POWDER, FOR SOLUTION INTRAMUSCULAR; INTRAVENOUS at 05:04

## 2025-04-10 RX ADMIN — SCOPOLAMINE 1 PATCH: 1.5 PATCH, EXTENDED RELEASE TRANSDERMAL at 08:04

## 2025-04-10 RX ADMIN — PANTOPRAZOLE SODIUM 40 MG: 40 INJECTION, POWDER, FOR SOLUTION INTRAVENOUS at 08:04

## 2025-04-10 NOTE — PLAN OF CARE
Problem: Adult Inpatient Plan of Care  Goal: Plan of Care Review  Outcome: Progressing  Goal: Patient-Specific Goal (Individualized)  Outcome: Progressing  Goal: Absence of Hospital-Acquired Illness or Injury  Outcome: Progressing  Goal: Optimal Comfort and Wellbeing  Outcome: Progressing  Goal: Readiness for Transition of Care  Outcome: Progressing     Problem: Wound  Goal: Optimal Coping  Outcome: Progressing  Goal: Optimal Functional Ability  Outcome: Progressing  Goal: Absence of Infection Signs and Symptoms  Outcome: Progressing  Goal: Optimal Pain Control and Function  Outcome: Progressing     Problem: Gas Exchange Impaired  Goal: Optimal Gas Exchange  Outcome: Progressing     Problem: Airway Clearance Ineffective  Goal: Effective Airway Clearance  Outcome: Progressing

## 2025-04-10 NOTE — PROGRESS NOTES
Pharmacokinetic Assessment Follow Up: IV Vancomycin    Vancomycin serum concentration assessment(s):    The trough level was drawn correctly and can be used to guide therapy at this time. The measurement is below the desired definitive target range of 15 to 20 mcg/mL.    Vancomycin Regimen Plan:    Change regimen to Vancomycin 1500 mg IV every 24 hours with next serum trough concentration measured at 30 prior to 3rd dose on 4/12    Drug levels (last 3 results):  Recent Labs   Lab Result Units 04/09/25  1330   Vancomycin, Trough µg/mL 11.3*        Patient brief summary:  Karie Denney is a 59 y.o. female initiated on antimicrobial therapy with IV Vancomycin for treatment of bacteremia    The patient's current regimen is 1500mg IV q24h    Drug Allergies:   Review of patient's allergies indicates:   Allergen Reactions    Penicillins Anaphylaxis       Actual Body Weight:   79.8kg    Renal Function:   Estimated Creatinine Clearance: 123 mL/min (A) (based on SCr of 0.47 mg/dL (L)).,     Dialysis Method (if applicable):  N/A    CBC (last 72 hours):  Recent Labs   Lab Result Units 04/09/25  0719   WBC K/uL 10.48   Hemoglobin g/dL 9.9*   Hematocrit % 35.1*   Platelet Count K/uL 193   Lymphocytes % % 24.5*   Monocytes % % 5.1   Eosinophils % % 3.2   Basophils % % 0.5   Diff Type  Auto       Metabolic Panel (last 72 hours):  Recent Labs   Lab Result Units 04/08/25  0350 04/09/25  0440   BUN mg/dL 15 12   Creatinine mg/dL 0.49* 0.47*       Vancomycin Administrations:  vancomycin given in the last 96 hours                     vancomycin (VANCOCIN) 1,750 mg in 0.9% NaCl 500 mL IVPB (mg) 1,750 mg New Bag 04/09/25 1403     1,750 mg New Bag 04/08/25 0306     1,750 mg New Bag 04/06/25 1359                    Microbiologic Results:  Microbiology Results (last 7 days)       Procedure Component Value Units Date/Time    Culture, Lower Respiratory [[id number]]  (Abnormal) Collected: 04/07/25 1115    Order Status: Completed Specimen:  Respiratory from Tracheal Aspirate Updated: 04/09/25 1147     Culture, Lower Respiratory Heavy Growth Haemophilus parainfluenzae     Comment: Haemophilus species- if positive beta lactamase resistant to ampicillin and amoxicillin , if negative sensitive to ampicillin and amoxicillin.        Beta Lactamase Negative     Culture, Lower Respiratory Moderate Growth Yeast     Comment: For further identification, fungus culture recommended. Isolate will be held for 7 days. Notify Micro department at [phone] if fungus culture ordered.        Gram Stain Result Moderate WBC observed      Few Epithelial cells      No organisms seen    Culture, Lower Respiratory [[id number]]  (Abnormal) Collected: 04/06/25 1252    Order Status: Completed Specimen: Respiratory from Tracheal Aspirate Updated: 04/09/25 0651     Culture, Lower Respiratory Light Growth Yeast     Comment: For further identification, fungus culture recommended. Isolate will be held for 7 days. Notify Micro department at [phone] if fungus culture ordered.        Gram Stain Result Few Epithelial cells      Rare WBC seen      No bacteria seen          Pharmacy will continue to follow and monitor vancomycin.    Please contact pharmacy at [email or web address] [phone] for questions regarding this assessment.    Thank you for the consult,   Patricia Garcia, PharmD.

## 2025-04-10 NOTE — PROGRESS NOTES
[facility] - [facility]  Wound Care    Patient Name:  Karie Denney   MRN:  [id number]  Date: 4/08/2025  Diagnosis: Acute on chronic respiratory failure with hypoxia    History:     Past Medical History:   Diagnosis Date    GERD (gastroesophageal reflux disease)     Paraplegia        Social History[1]    Precautions:     Allergies as of 03/22/2025 - Reviewed 02/26/2025   Allergen Reaction Noted    Penicillins Anaphylaxis 08/25/2022       WOC Assessment Details/Treatment        04/08/25 1425   WOCN Assessment   WOCN Total Time (mins) 60   Visit Date 04/10/25   Visit Time 1415   Consult Type Follow Up   WOCN Speciality Wound   Wound skin tear;moisture   Number of Wounds 1   Continence Type Urinary;Fecal   Intervention chart review;applied;orders   Teaching on-going   Skin Interventions   Device Skin Pressure Protection absorbent pad utilized/changed;tubing/devices free from skin contact   Pressure Reduction Devices pressure-redistributing mattress utilized   Pressure Reduction Techniques weight shift assistance provided;positioned off wounds;heels elevated off bed   Skin Protection incontinence pads utilized   Positioning   Body Position turned;right   Head of Bed (HOB) Positioning HOB elevated   Positioning/Transfer Devices pillows;in use   Pressure Injury Prevention    Check Moisture Management Pad Done   Sacral Foam Dressing Peel back sacral foam dressing, assess skin and reapply   Heel protection technique Foam dressing        Wound 03/23/25 1541 Pressure Injury Left anterior Foot #2   Date First Assessed/Time First Assessed: 03/23/25 1541   Present on Original Admission: Yes  Primary Wound Type: Pressure Injury  Side: Left  Orientation: anterior  Location: Foot  Wound Number: #2  Is this injury device related?: No   Wound Image    Pressure Injury Stage U   Dressing Appearance Open to air;Dry   Drainage Amount None   Appearance Red;Maroon;Black;Dry   Periwound Area Dry   Wound Edges  Undefined   Care Applied:;Povidone iodine   Periwound Care Cleansed with pH balanced cleanser;Dry periwound area maintained        Wound 03/23/25 1542 Pressure Injury Left Buttocks #3   Date First Assessed/Time First Assessed: 03/23/25 1542   Present on Original Admission: Yes  Primary Wound Type: Pressure Injury  Side: Left  Location: Buttocks  Wound Number: #3   Wound Image    Pressure Injury Stage U   Dressing Appearance Intact;Clean   Drainage Amount None   Appearance Pink;Slough;Eschar   Tissue loss description Full thickness   Periwound Area Dry   Wound Edges Undefined;Irregular   Wound Length (cm) 4 cm   Wound Width (cm) 1.5 cm   Wound Depth (cm) 0.1 cm   Wound Volume (cm^3) 0.314 cm^3   Wound Surface Area (cm^2) 4.71 cm^2   Care Cleansed with:;Wound cleanser;Antimicrobial agent   Dressing Changed;Silicone;Island/border;Foam   Periwound Care Absorptive dressing applied;Cleansed with pH balanced cleanser;Dry periwound area maintained;Skin barrier film applied     WOC Team consulted for:  Buttocks and L foot    Narrative:   Patient alert, non verbal. Patient tolerated wound care well with no signs grimace or frowning.    Active Wounds and Recommendations: Continue POC    Goals for Wound Healing: Manage drainage, Moisture management, Apply antimicrobial, Removal of slough/eschar, Reduce bioburden, and Educate on proper wound management post D/C     Barriers to Wound Healing: multiple co-morbidities poor vascular supply diabetes dry wound bed necrosis advanced age fragile skin    Orders placed.    Thank you for the consult.     We will continue to follow. See additional note under Notes Tab for tentative f/u plan/dates.      04/10/2025       [1]   Social History  Socioeconomic History    Marital status: Single   Tobacco Use    Smoking status: Never    Smokeless tobacco: Never   Substance and Sexual Activity    Alcohol use: Never    Drug use: Never    Sexual activity: Not Currently     Social Drivers of Health      Financial Resource Strain: Low Risk  (3/25/2025)    Overall Financial Resource Strain (CARDIA)     Difficulty of Paying Living Expenses: Not hard at all   Food Insecurity: No Food Insecurity (3/25/2025)    Hunger Vital Sign     Worried About Running Out of Food in the Last Year: Never true     Ran Out of Food in the Last Year: Never true   Recent Concern: Food Insecurity - Food Insecurity Present (3/23/2025)    Hunger Vital Sign     Worried About Running Out of Food in the Last Year: Often true     Ran Out of Food in the Last Year: Often true   Transportation Needs: No Transportation Needs (3/24/2025)    PRAPARE - Transportation     Lack of Transportation (Medical): No     Lack of Transportation (Non-Medical): No   Physical Activity: Inactive (3/24/2025)    Exercise Vital Sign     Days of Exercise per Week: 0 days     Minutes of Exercise per Session: 0 min   Stress: No Stress Concern Present (3/25/2025)    [employer] - Occupational Stress Questionnaire     Feeling of Stress : Not at all   Recent Concern: Stress - Stress Concern Present (3/23/2025)    [employer] - Occupational Stress Questionnaire     Feeling of Stress : Rather much   Housing Stability: Low Risk  (3/25/2025)    Housing Stability Vital Sign     Unable to Pay for Housing in the Last Year: No     Homeless in the Last Year: No

## 2025-04-10 NOTE — ASSESSMENT & PLAN NOTE
Recurrent aspiration pneumonia.  Completed two rounds of antibiotics.      Antibiotics (From admission, onward)      Start     Stop Route Frequency Ordered    04/11/25 1400  vancomycin (VANCOCIN) 1,500 mg in 0.9% NaCl 250 mL IVPB         -- IV Every 24 hours (non-standard times) 04/10/25 1554    04/10/25 1700  cefTRIAXone injection 1 g         -- IV Every 24 hours (non-standard times) 04/10/25 1601    04/10/25 1654  vancomycin - pharmacy to dose         -- IV pharmacy to manage frequency 04/10/25 1554        \

## 2025-04-10 NOTE — PROGRESS NOTES
[facility] - [address] [place] [address] Medicine  Progress Note    Patient Name: Karie Denney  MRN: [id number]  Patient Class: IP- Inpatient   Admission Date: 3/23/2025  Length of Stay: 18 days  Attending Physician: Jovanna Hoffman DO  Primary Care Provider: Gonzalo Barrera NP        Subjective     Principal Problem:Acute on chronic respiratory failure with hypoxia        HPI:  Chief Complaint  Transfer for continued management of respiratory failure, tracheostomy care, PEG feeding, and complications of quadriplegia following prolonged hospitalization.    History of Present Illness  Ms. Karie Denney is a 59-year-old woman with a complex medical history including quadriplegia following spinal surgery in 2015 and a cerebrovascular accident (CVA) in 2024 resulting in left-sided paralysis. She was transferred to [facility] from [facility] in [place], [place], for ongoing care following a prolonged ICU course involving intubation, respiratory failure, and significant complications.  She was initially hospitalized on February 15, 2025, for aspiration and dysphagia evaluation, during which she developed aspiration pneumonia and respiratory failure requiring intubation. Her hospital course was complicated by an ESBL E. coli UTI, pericardial effusion (treated with colchicine), and a spontaneous right thigh hematoma concerning for compartment syndrome, leading to transfer to [facility]. She required prolonged mechanical ventilation, tracheostomy placement, and PEG tube insertion.  She has now returned to [facility] for continued respiratory care, tracheostomy management, PEG feeding, management of sacral pressure injury, and rehabilitation planning.    Past Medical History  Quadriplegia post-spinal surgery (2015)  CVA (2024)  Aspiration pneumonia  Acute respiratory failure  UTI (ESBL E. coli)  Depression  GERD  Pharyngoesophageal dysphagia  Pericardial effusion/pericarditis  Chronic  constipation  Pressure ulcer (sacral, unstageable)  Hyperlipidemia  Anemia    Past Surgical History  Spinal surgery (2015)  PEG tube placement (03/11/2025)  Esophageal dilation with biopsy (08/2024)    Medications  Active Medications:  Atorvastatin 20 mg daily  Omeprazole 40 mg daily  Sertraline 50 mg daily  Trazodone 100 mg qHS  Pregabalin 75 mg BID  Hydrocodone-acetaminophen 5-325 mg BID  Lactulose 30 mL daily via PEG  Midodrine 10 mg Q6H via PEG  Levalbuterol 0.63 mg nebulizer Q6H  Polyethylene glycol 17 g daily via PEG  Recent Changes:  Colchicine: Discontinued due to bleeding  Aspirin and ezetimibe: Discontinued    Allergies  Penicillins ? Rash and anaphylaxis    Social History  Never smoker  No alcohol or tobacco use  Lives at home with mother; receives home health weekly  Bedbound, non-ambulatory    Family History  Noncontributory    Review of Systems  Limited due to tracheostomy, quadriplegia, and communication challenges. History obtained from EMR and caregiver.    Physical Examination  General: Chronically ill-appearing woman, alert, tracheostomy in place with T-piece  HEENT: Mild nasal skin necrosis, mucous membranes moist  Eyes: PERRL, EOMI  Neck: Tracheostomy present  CV: RRR, no murmurs  Lungs: Breath sounds diminished at bases; no acute distress; no wheezing  Abdomen: Soft, non-tender, PEG tube in place  Skin: Unstageable sacral ulcer, nasal pressure ulcer  Neuro: Quadriplegia, responsive with eye tracking and blinking  Extremities: RLE hematoma, bilateral edema, no signs of active bleeding    Laboratory Data (Most Recent)  Hgb: 8.5 g/dL  Creatinine: 0.30 mg/dL  Albumin: 3.1 g/dL  WBC: 9.4 K/uL  Ferritin: 265 ng/mL  INR: 0.93  No growth on recent blood and urine cultures    Imaging  CT angio: Large RLE hematoma without active extravasation  Multiple chest X-rays: Stable bilateral pleural effusions, improving atelectasis  Echo: EF 55-60%, small pericardial effusion      Overview/Hospital  Course:  3/26  - continues on vanc for blood cultures + on 3/24, echo completed at time with no endocarditis seen  - discussed with pulmonary, recs to follow    3/27  - due to overall baseline health will treat coag negative staph bacteremia as a true infection, for now continue vanc and follow cultures and sensitivities  - discussed with pulmonology who plan to continue trach collar as is and see patient May 1st at 1:40 pm, confirmed appointment  - discussed with family at bedside who are very concerned about secretions and states they don't have the suction capabilities at home and do not feel comfortable going home with her in this state, will discuss with social on possible home equipment    3/28  - awaiting micro, continuing vanc  - social setting up suction at home, family plans to return home as before    3/29  - still with secretions  - family requesting voice box    3/30  - doing well today, mentation much improved  - anticipate discharge within the next two days with home health services, family will need education on trach maintenance and home feedings as patient has PEG tube and they have not cared for a PEG tube before, also we will discuss with  getting a speaking told to use with a trach    3/31  - secretions improved today  - working with social for home set up of oxygen and tube feeds  - family needs heavy education on trach care and tube feeds as primary care giver has no experience with either, still wishes to discharge home    Interval History:     No significant events overnight, no new complaints or concerns. Awaiting placement.    Lower respiratory culture with Haemophilus species. Discussed treatment options with pharmacy, patient, and family considering patient's history of penicillin allergy. All in agreement to trial dose of ceftriaxone due to low risk of cross-reactivity. Advised of risks of allergic reaction, including anaphylaxis. All express understanding and wish to  proceed with treatment. Patient's nurse notified as well and will monitor closely.     Objective:     Vital Signs (Most Recent):  Temp: 98 °F (36.7 °C) (04/10/25 1143)  Pulse: 103 (04/10/25 1143)  Resp: 18 (04/10/25 1143)  BP: (!) 103/59 (04/10/25 1143)  SpO2: 95 % (04/10/25 1143) Vital Signs (24h Range):  Temp:  [97.5 °F (36.4 °C)-98.2 °F (36.8 °C)] 98 °F (36.7 °C)  Pulse:  [] 103  Resp:  [15-20] 18  SpO2:  [95 %-100 %] 95 %  BP: ()/(55-67) 103/59     Weight: 79.8 kg (175 lb 14.8 oz)  Body mass index is 32.18 kg/m².    Intake/Output Summary (Last 24 hours) at 4/10/2025 1534  Last data filed at 4/10/2025 0652  Gross per 24 hour   Intake 960 ml   Output --   Net 960 ml         Physical Exam  Constitutional:       General: She is not in acute distress.     Appearance: She is ill-appearing.   HENT:      Head: Normocephalic and atraumatic.      Nose: Nose normal.      Mouth/Throat:      Mouth: Mucous membranes are moist.      Pharynx: Oropharynx is clear.   Eyes:      Extraocular Movements: Extraocular movements intact.      Conjunctiva/sclera: Conjunctivae normal.      Pupils: Pupils are equal, round, and reactive to light.   Cardiovascular:      Rate and Rhythm: Normal rate and regular rhythm.   Pulmonary:      Effort: Pulmonary effort is normal. No respiratory distress.      Comments: Secretions  Abdominal:      General: There is no distension.      Palpations: Abdomen is soft.      Tenderness: There is no abdominal tenderness.   Skin:     General: Skin is warm and dry.   Neurological:      Mental Status: She is alert. Mental status is at baseline.      Comments: Quadriplegia    Psychiatric:         Mood and Affect: Mood normal.         Behavior: Behavior normal.               Significant Labs: All pertinent labs within the past 24 hours have been reviewed.    Significant Imaging: I have reviewed all pertinent imaging results/findings within the past 24 hours.        Assessment & Plan  Acute on chronic  respiratory failure with hypoxia  Continue tracheostomy care with T-piece trials  Tracheostomy suctioning ordered due to thick secretions.   Culture tracheostomy secretions.   Monitor oxygenation and readiness for weaning  Pulmonary consult tomorrow.     3/24: mucus plugging requiring frequent suctioning.  Chest physiotherapy, Mucomyst.  Additional recommendations per pulmonology.     3/25: requiring frequent suctioning but otherwise stable.     3/26  - continues on vanc for blood cultures + on 3/24, echo completed at time with no endocarditis seen  - discussed with pulmonary, recs to follow    3/27  - discussed with pulmonology who plan to continue trach collar as is and see patient May 1st at 1:40 pm, confirmed appointment  - discussed with family at bedside who are very concerned about secretions and states they don't have the suction capabilities at home and do not feel comfortable going home with her in this state, will discuss with social on possible home equipement    3/29  - continues with secretions, setting up home suction  - family requesting voice box    3/30  - anticipate discharge in the next few days with home health and set up at home      3/31  - secretions improved today  - working with social for home set up of oxygen and tube feeds  - family needs heavy education on trach care and tube feeds as primary care giver has no experience with either, still wishes to discharge home    4/1: Still frequent suctioning.  Family would like patient to go to LTAC before going home as she still requires frequent suctioning, tube feeds, antibiotics, and additional care.    She has required over a week of ICU care prior to returning to our facility.  She was on the vent and was difficult to wean. She has required intermediate ICU level of care while at our facility.       4/2: plan to discharge to SNF if possible.  Otherwise, patient will need to go home.  Keep trach without capping until April 15th.   Pulmonology  to see patient tomorrow.   Patient certainly has required more than 3 nights of Intermediate ICU level of care.  She now needs management of her wounds, respiratory failure, feeding care and would benefit from post-acute care.     4/3: Plan to discharge to LTAC given complexity of patient's conditions - respiratory failure with very frequent suctioning, bacteremia, tube feeds, etc.    Patient certainly has required more than 3 nights of Intermediate ICU level of care.  She now needs management of her wounds, respiratory failure, feeding care and would benefit from post-acute care.     4/4: continue with current aggressive respiratory care to manage trach and thick secretions.     4/5: continue with aggressive pulmonary care.      4/6: Patient continues to have thick and copious secretions.  Query if she has tracheitis.  Culture ordered for deep aspiration via tracheostomy.  Will initiate antibiotics if indicated.  Continue with aggressive pulmonary care    4/7: added scopolamine patch to help with secretions.  Two tracheostomy cultures sent.    Denied LTAC by [email or web address], her insurance provider.   Will look into SNF again.  If denied to SNF, consider appealing LTAC decision.  As last resort, patient will need to go home where her mother takes care of her.      4/8: f/u tracheostomy cultures.   F/u LTAC. Appeal sent today.     4/9: Awaiting placement  Severe protein-calorie malnutrition  Nutrition consulted. Most recent weight and BMI monitored-     Measurements:  Wt Readings from Last 1 Encounters:   03/27/25 79.8 kg (175 lb 14.8 oz)   Body mass index is 32.18 kg/m².    Patient has been screened and assessed by RD.    Malnutrition Type:  Context: chronic illness  Level: severe    Continue PEG tube feeding.  Nepro at 40 mL/hr  Maintain bowel regimen (lactulose, PEG, senna)  Dietitian to reassess  continue with current tube feed regimen    Dysphagia  With PEG in place. Continue tube feeds.   Continue omeprazole  Avoid PO  intake  Speech therapy if clinically appropriate    Aspiration pneumonia  Recurrent aspiration pneumonia.  Completed two rounds of antibiotics.      Antibiotics (From admission, onward)      Start     Stop Route Frequency Ordered    04/11/25 1400  vancomycin (VANCOCIN) 1,500 mg in 0.9% NaCl 250 mL IVPB         -- IV Every 24 hours (non-standard times) 04/10/25 1554    04/10/25 1700  cefTRIAXone injection 1 g         -- IV Every 24 hours (non-standard times) 04/10/25 1601    04/10/25 1654  vancomycin - pharmacy to dose         -- IV pharmacy to manage frequency 04/10/25 1554        \  Pressure ulcers of skin of multiple topographic sites  Aquacel Ag + Mepilex dressings  Wound care team consulted    Continue offloading and specialty mattress    Depression  Patient has persistent depression which is unknown and is currently controlled. Will Continue anti-depressant medications. We will not consult psychiatry at this time. Patient does not display psychosis at this time. Continue to monitor closely and adjust plan of care as needed.          Mucus plugging of bronchi      S/P percutaneous endoscopic gastrostomy (PEG) tube placement  Patient noted to have a percutaneous endoscopic gastrostomy tube in place. I have personally inspected the tube.Tube was placed prior to this admission There are no signs of drainage or infection around the site. The tube is patent. Medications have converted to liquid form if available.  Routine care to be done by wound care and nursing staff.       Quadriplegia  Maximal support, bedbound  PT/OT as tolerated  DME planning for discharge    Hematoma of lower extremity, right, subsequent encounter  No active bleeding; conservative management  Monitor H/H  No anticoagulation  Hyperlipidemia  Continue atorvastatin    Pericardial effusion  Previously on colchicine, now discontinued due to bleeding  Continue low-dose prednisone  Monitor for recurrence    Acute respiratory failure with hypoxia and  hypercarbia  Patient with Hypoxic Respiratory failure which is Acute on chronic.  she is not on home oxygen. Supplemental oxygen was provided and noted- Oxygen Concentration (%):  [28] 28    .   Signs/symptoms of respiratory failure include- tachypnea and respiratory distress. Contributing diagnoses includes - Aspiration and Pneumonia Labs and images were reviewed. Patient Has not had a recent ABG. Will treat underlying causes and adjust management of respiratory failure as follows-   Chronic respiratory failure with hypoxia  Patient with Hypoxic Respiratory failure which is Acute on chronic.  she is not on home oxygen. Supplemental oxygen was provided and noted- Oxygen Concentration (%):  [28] 28    .   Signs/symptoms of respiratory failure include- increased work of breathing and respiratory distress. Contributing diagnoses includes - Aspiration and Pneumonia Labs and images were reviewed. Patient Has not had a recent ABG. Will treat underlying causes and adjust management of respiratory failure as follows-   Coag negative Staphylococcus bacteremia  Continue with current antibiotics until sensitivities return.      Antibiotics (From admission, onward)      Start     Stop Route Frequency Ordered    04/11/25 1400  vancomycin (VANCOCIN) 1,500 mg in 0.9% NaCl 250 mL IVPB         -- IV Every 24 hours (non-standard times) 04/10/25 1554    04/10/25 1700  cefTRIAXone injection 1 g         -- IV Every 24 hours (non-standard times) 04/10/25 1601    04/10/25 1654  vancomycin - pharmacy to dose         -- IV pharmacy to manage frequency 04/10/25 1554          3/26  - continues on vanc for blood cultures + on 3/24, echo completed at time with no endocarditis seen  - discussed with pulmonary, recs to follow    3/27  - due to overall baseline health will treat coag negative staph bacteremia as a true infection, for now continue vanc and follow cultures and sensitivities    4/4:  I am continuing with vancomycin given that patient  has a severe penicillin allergy.  Otherwise Ancef would have been an option.  Continue with IV antibiotics at least through April 10th.     4/5: continue with IV antibiotics.       VTE Risk Mitigation (From admission, onward)           Ordered     enoxaparin injection 40 mg  Daily         03/23/25 1940     IP VTE HIGH RISK PATIENT  Once         03/23/25 1940     Place sequential compression device  Until discontinued         03/23/25 1941                    Discharge Planning   SHRADDHA: 4/14/2025     Code Status: Full Code   Medical Readiness for Discharge Date:   Discharge Plan A: Long-term acute care facility (LTAC)                        Jovanna Hoffman DO  Department of Hospital Medicine   [facility] - [facility] Tele[facility]

## 2025-04-10 NOTE — ASSESSMENT & PLAN NOTE
[facility]– [place] BEHAVIORAL HEALTH SERVICES  [facility] [place] [facility]AL HE[facility] [address] [place] [address]      Carina Samson :2002 MRN:[id number]    2019 Time Session Began: 6:15 p.m. Time Session Ended: 7:15 p.m.    Session Type:Group Therapy (84359)    Others Present: Group members    Intervention: DBT    Suicide/Homicide/Violence Ideation: No    If Yes, explain: N/A    Current Outpatient Medications   Medication Sig   • escitalopram (LEXAPRO) 20 MG tablet Take 1 tablet by mouth nightly.   • lamoTRIgine (LAMICTAL) 25 MG tablet Take 3 tablets by mouth 2 times daily. For depression and anxiety.   • hydrOXYzine (ATARAX) 25 MG tablet Take 0.5 tablets by mouth 3 times daily as needed for Anxiety (sleep).   • Blood Glucose Monitoring Suppl (FREESTYLE LITE) Device Use to check blood sugars 4 times daily.   • blood glucose test strip Use 1 strip to test blood sugars 4 times daily as directed.   • Lancets (FREESTYLE) Misc Use 1 lancet 4 times daily to test blood sugars as directed.   • Vitamin D, Ergocalciferol, 06443 units capsule 1 capsule once a week for 1 month then 2000 over the counter daily     No current facility-administered medications for this visit.        Change in Medication(s) Reported: No  If Yes, explain: N/A    Patient/Family Education Provided: Yes  Patient/Family Displays Understanding: Yes    If No, explain: N/A    Chief complaint in patient's own words: \"anxiety.\"    Progress Note containing chief complaint and symptoms/problems related to the complaint:    (Data/Action/Response/Plan)    D: Client was present for DBT mindfulness group focused on Module 4 -- fight/flight and DBT what skills.  A: Provided materials on fight or flight/amygdala hijack and beginning of what skills. Practiced mindfulness exercises.  R: This was client's first group. Client was attentive and participated in mindfulness exercises and discussion of fight or flight and what skills, asking appropriate  Patient with Hypoxic Respiratory failure which is Acute on chronic.  she is not on home oxygen. Supplemental oxygen was provided and noted- Oxygen Concentration (%):  [28] 28    .   Signs/symptoms of respiratory failure include- tachypnea and respiratory distress. Contributing diagnoses includes - Aspiration and Pneumonia Labs and images were reviewed. Patient Has not had a recent ABG. Will treat underlying causes and adjust management of respiratory failure as follows-    questions.   P: Return next week for group    Need for Community Resources Assessed: Yes    Resources Needed: No    If Yes, what resources: N/A    Primary Diagnosis: F33.0 Major depressive disorder, recurrent episode, mild (CMS/HCC)  (primary encounter diagnosis)  F41.1 EDIS (generalized anxiety disorder)    Treatment Plan: Unchanged    Discharge Plan: Strategies Discussed to Maintain Gains    Next Appointment: 1 week  LWA DBT ORIENTATION-MINDFULNESS, [facility]

## 2025-04-10 NOTE — PROGRESS NOTES
04/08/25 1425   Wound Care Follow Up   Wound Care Follow-up? Yes   Wound Care- Next Tentative Visit Date 04/15/25   Follow Up Plan Hellen POC

## 2025-04-10 NOTE — ASSESSMENT & PLAN NOTE
Continue with current antibiotics until sensitivities return.      Antibiotics (From admission, onward)      Start     Stop Route Frequency Ordered    04/11/25 1400  vancomycin (VANCOCIN) 1,500 mg in 0.9% NaCl 250 mL IVPB         -- IV Every 24 hours (non-standard times) 04/10/25 1554    04/10/25 1700  cefTRIAXone injection 1 g         -- IV Every 24 hours (non-standard times) 04/10/25 1601    04/10/25 1654  vancomycin - pharmacy to dose         -- IV pharmacy to manage frequency 04/10/25 1554          3/26  - continues on vanc for blood cultures + on 3/24, echo completed at time with no endocarditis seen  - discussed with pulmonary, recs to follow    3/27  - due to overall baseline health will treat coag negative staph bacteremia as a true infection, for now continue vanc and follow cultures and sensitivities    4/4:  I am continuing with vancomycin given that patient has a severe penicillin allergy.  Otherwise Ancef would have been an option.  Continue with IV antibiotics at least through April 10th.     4/5: continue with IV antibiotics.

## 2025-04-10 NOTE — SUBJECTIVE & OBJECTIVE
Interval History:     No significant events overnight, no new complaints or concerns. Awaiting placement.    Lower respiratory culture with Haemophilus species. Discussed treatment options with pharmacy, patient, and family considering patient's history of penicillin allergy. All in agreement to trial dose of ceftriaxone due to low risk of cross-reactivity. Advised of risks of allergic reaction, including anaphylaxis. All express understanding and wish to proceed with treatment. Patient's nurse notified as well and will monitor closely.     Objective:     Vital Signs (Most Recent):  Temp: 98 °F (36.7 °C) (04/10/25 1143)  Pulse: 103 (04/10/25 1143)  Resp: 18 (04/10/25 1143)  BP: (!) 103/59 (04/10/25 1143)  SpO2: 95 % (04/10/25 1143) Vital Signs (24h Range):  Temp:  [97.5 °F (36.4 °C)-98.2 °F (36.8 °C)] 98 °F (36.7 °C)  Pulse:  [] 103  Resp:  [15-20] 18  SpO2:  [95 %-100 %] 95 %  BP: ()/(55-67) 103/59     Weight: 79.8 kg (175 lb 14.8 oz)  Body mass index is 32.18 kg/m².    Intake/Output Summary (Last 24 hours) at 4/10/2025 1534  Last data filed at 4/10/2025 0652  Gross per 24 hour   Intake 960 ml   Output --   Net 960 ml         Physical Exam  Constitutional:       General: She is not in acute distress.     Appearance: She is ill-appearing.   HENT:      Head: Normocephalic and atraumatic.      Nose: Nose normal.      Mouth/Throat:      Mouth: Mucous membranes are moist.      Pharynx: Oropharynx is clear.   Eyes:      Extraocular Movements: Extraocular movements intact.      Conjunctiva/sclera: Conjunctivae normal.      Pupils: Pupils are equal, round, and reactive to light.   Cardiovascular:      Rate and Rhythm: Normal rate and regular rhythm.   Pulmonary:      Effort: Pulmonary effort is normal. No respiratory distress.      Comments: Secretions  Abdominal:      General: There is no distension.      Palpations: Abdomen is soft.      Tenderness: There is no abdominal tenderness.   Skin:     General: Skin  is warm and dry.   Neurological:      Mental Status: She is alert. Mental status is at baseline.      Comments: Quadriplegia    Psychiatric:         Mood and Affect: Mood normal.         Behavior: Behavior normal.               Significant Labs: All pertinent labs within the past 24 hours have been reviewed.    Significant Imaging: I have reviewed all pertinent imaging results/findings within the past 24 hours.

## 2025-04-11 LAB
BUN SERPL-MCNC: 15 MG/DL (ref 10–20)
BUN/CREAT SERPL: 30 (ref 6–20)
CREAT SERPL-MCNC: 0.5 MG/DL (ref 0.55–1.02)
EGFR (NO RACE VARIABLE) (RUSH/TITUS): 107 ML/MIN/1.73M2

## 2025-04-11 PROCEDURE — 25000242 PHARM REV CODE 250 ALT 637 W/ HCPCS: Performed by: INTERNAL MEDICINE

## 2025-04-11 PROCEDURE — 99900026 HC AIRWAY MAINTENANCE (STAT)

## 2025-04-11 PROCEDURE — 25000242 PHARM REV CODE 250 ALT 637 W/ HCPCS: Performed by: STUDENT IN AN ORGANIZED HEALTH CARE EDUCATION/TRAINING PROGRAM

## 2025-04-11 PROCEDURE — 94761 N-INVAS EAR/PLS OXIMETRY MLT: CPT

## 2025-04-11 PROCEDURE — 99900035 HC TECH TIME PER 15 MIN (STAT)

## 2025-04-11 PROCEDURE — 99233 SBSQ HOSP IP/OBS HIGH 50: CPT | Mod: ,,, | Performed by: FAMILY MEDICINE

## 2025-04-11 PROCEDURE — 27000221 HC OXYGEN, UP TO 24 HOURS

## 2025-04-11 PROCEDURE — 36415 COLL VENOUS BLD VENIPUNCTURE: CPT | Performed by: HOSPITALIST

## 2025-04-11 PROCEDURE — 63600175 PHARM REV CODE 636 W HCPCS: Performed by: FAMILY MEDICINE

## 2025-04-11 PROCEDURE — 94640 AIRWAY INHALATION TREATMENT: CPT

## 2025-04-11 PROCEDURE — 36415 COLL VENOUS BLD VENIPUNCTURE: CPT | Performed by: FAMILY MEDICINE

## 2025-04-11 PROCEDURE — 63600175 PHARM REV CODE 636 W HCPCS: Performed by: HOSPITALIST

## 2025-04-11 PROCEDURE — 84520 ASSAY OF UREA NITROGEN: CPT | Performed by: HOSPITALIST

## 2025-04-11 PROCEDURE — 27000207 HC ISOLATION

## 2025-04-11 PROCEDURE — 11000001 HC ACUTE MED/SURG PRIVATE ROOM

## 2025-04-11 PROCEDURE — 25000003 PHARM REV CODE 250: Performed by: FAMILY MEDICINE

## 2025-04-11 PROCEDURE — 25000003 PHARM REV CODE 250: Performed by: HOSPITALIST

## 2025-04-11 RX ADMIN — VANCOMYCIN HYDROCHLORIDE 1500 MG: 500 INJECTION, POWDER, LYOPHILIZED, FOR SOLUTION INTRAVENOUS at 02:04

## 2025-04-11 RX ADMIN — ACETYLCYSTEINE 2 ML: 200 SOLUTION ORAL; RESPIRATORY (INHALATION) at 07:04

## 2025-04-11 RX ADMIN — CEFTRIAXONE SODIUM 1 G: 1 INJECTION, POWDER, FOR SOLUTION INTRAMUSCULAR; INTRAVENOUS at 03:04

## 2025-04-11 RX ADMIN — MIDODRINE HYDROCHLORIDE 10 MG: 10 TABLET ORAL at 09:04

## 2025-04-11 RX ADMIN — LEVALBUTEROL HYDROCHLORIDE 1.25 MG: 1.25 SOLUTION RESPIRATORY (INHALATION) at 07:04

## 2025-04-11 RX ADMIN — SERTRALINE HYDROCHLORIDE 50 MG: 50 TABLET ORAL at 09:04

## 2025-04-11 RX ADMIN — MIDODRINE HYDROCHLORIDE 10 MG: 10 TABLET ORAL at 03:04

## 2025-04-11 RX ADMIN — LEVALBUTEROL HYDROCHLORIDE 1.25 MG: 1.25 SOLUTION RESPIRATORY (INHALATION) at 01:04

## 2025-04-11 RX ADMIN — ENOXAPARIN SODIUM 40 MG: 40 INJECTION SUBCUTANEOUS at 03:04

## 2025-04-11 RX ADMIN — ACETAMINOPHEN 1000 MG: 500 TABLET ORAL at 11:04

## 2025-04-11 RX ADMIN — PANTOPRAZOLE SODIUM 40 MG: 40 INJECTION, POWDER, FOR SOLUTION INTRAVENOUS at 09:04

## 2025-04-11 RX ADMIN — TRAZODONE HYDROCHLORIDE 100 MG: 50 TABLET ORAL at 10:04

## 2025-04-11 RX ADMIN — PREGABALIN 75 MG: 75 CAPSULE ORAL at 10:04

## 2025-04-11 RX ADMIN — ACETYLCYSTEINE 2 ML: 200 SOLUTION ORAL; RESPIRATORY (INHALATION) at 01:04

## 2025-04-11 RX ADMIN — MIDODRINE HYDROCHLORIDE 10 MG: 10 TABLET ORAL at 10:04

## 2025-04-11 RX ADMIN — ATORVASTATIN CALCIUM 20 MG: 20 TABLET, FILM COATED ORAL at 10:04

## 2025-04-11 RX ADMIN — PREGABALIN 75 MG: 75 CAPSULE ORAL at 09:04

## 2025-04-11 RX ADMIN — ONDANSETRON 4 MG: 2 INJECTION INTRAMUSCULAR; INTRAVENOUS at 10:04

## 2025-04-11 RX ADMIN — ONDANSETRON 4 MG: 2 INJECTION INTRAMUSCULAR; INTRAVENOUS at 03:04

## 2025-04-11 RX ADMIN — ACETAMINOPHEN 1000 MG: 500 TABLET ORAL at 10:04

## 2025-04-11 NOTE — ASSESSMENT & PLAN NOTE
Continue tracheostomy care with T-piece trials  Continue tracheostomy suctioning ordered due to thick secretions. Continue scheduled mucolytic and scopolamine.    Culture with Haemophilus and yeast, started on fluconazole a ceftriaxone.     Pulmonology involved with care this admission.     Family would like patient to go to LTAC before going home as she still requires frequent suctioning, tube feeds, antibiotics, and additional care.  She has required over a week of ICU care prior to returning to our facility.  She was on the vent and was difficult to wean. She has required more than three midnights intermediate ICU level of care while at our facility.       Keep trach without capping until April 15th.   Pulmonology to see patient tomorrow.     Denied LTAC by [employer], her insurance provider.  Appeal sent 4/8. Awaiting decision.

## 2025-04-11 NOTE — PROGRESS NOTES
"[facility] - [address] [place] [address]  Adult Nutrition  Follow Up Note         Reason for Assessment  Reason For Assessment: RD follow-up        Assessment and Plan      4/11/2025: RD follow up. Patient remains on Isosource 1.5. Feeding at goal with no tolerance issues noted. Recommend continue current POC as tolerated. Last weight obtained 3/27. Recommend reweigh. RD following.     Last Bowel Movement: 04/10/25    4/7/2025: RD follow up. Patient remains on Isosource 1.5. Feedings at goal with no tolerance issues noted. Recommend to continue current tube feed regimen with Luis Fernando BID as tolerated. Awaiting placement -- a peer to peer was offered for LTAC placement. RD following.     *Recommend to reweigh patient so tube feed regimen can be adjusted as appropriate prior to discharge iso weight loss or gain*    4/1/2025: RD follow up. Patient remains on Isosource 1.5. Feedings at goal with no tolerance issues noted. Discharge bolus feeding recommendations provided. RD available if needed. Recommend continue current POC as tolerated. RD following.    3/27/25: RD follow up. Patient remains NPO and on enteral feeds of Isosource 1.5. Patient is at goal rate and tolerating feeds well per flowsheet. Recommend to continue with Isosource 1.5 at 30 mL/hr with FWF 40 mL/hr and Luis Fernando BID. Keep HOB at 30-45 degrees to reduce risk of aspiration. Monitor for tolerance: n/v/d/c. Hold feeding and notify RD if sx of intolerance develop. Monitor electrolytes and replete as needed.    *Per SLP note 3/24: "Patient's mother reports that pt is not asking for anything to eat or drink orally, and she does not wish to feed her orally at this time. Will D/C order for swallow eval at this time."    3/24/25: Consult received and appreciated. Consult for new tube feeding. Patient is a 60 yo female with a complex medical history including quadriplegia following spinal surgery in 2015 and a CVA in 2024 resulting in left-sided paralysis. " She was initially hospitalized in February 2025 for aspiration and dysphagia evaluation. Required prolonged mechanical ventilation, tracheostomy placement, and PEG tube insertion. Returned to [facility] on 3/23/25 for continued respiratory care, tracheostomy management, PEG feeding, management of sacral pressure injury, and rehab planning. Additional relevant PMHx includes aspiration pneumonia, respiratory failure, depression, GERD, chronic constipation, HLD, anemia.     Per H&P, patient noted to be on PEG tube feeding of Nepro at 40 mL/hr. Please see below for tube feed recommendations.     Patient is 79.8 kg (175 lb) with a BMI of 32.18 and is obese (Class 1 Obesity). Review of records reveals a >10% (18.6%) significant unintentional weight loss x 6 mo (97.5 kg 8/29/24). She is also noted to have an unhealed sacral wound.     Per ASPEN guidelines patient meets criteria for severe protein-calorie malnutrition secondary to dysphagia resulting in inadequate PO intakes as evidenced by >10% significant unintentional weight loss x 6 months, unhealed wounds, and consuming <75% estimated energy requirements for >1 month.     ENTERAL FEED RECOMMENDATIONS:    *Needs were adjusted and account for quadriplegia, BMI, and sacral wound*    Initiate continuous infusion of Isosource 1.5 at 10 mL/hr via PEG and advance 10 mL/hr q8h pending tolerance until goal rate of 30 mL/hr is achieved. Do not exceed goal rate. FWF 40 mL/hr. Keep HOB at 30-45 degrees to reduce risk of aspiration. Monitor for tolerance: n/v/d/c. Hold feeding and notify RD if symptoms of intolerance develop.     Recommend addition of Luis Fernando BID via PEG to promote wound healing and provide additional protein. Recommend to continue with bowel regimen (lactulose, PEG, senna) given hx of chronic constipation. Tube feed regimen will also provide patient with ~ 11 g of fiber per 24 hours, which may help to further alleviate constipation. Given patient is receiving < 1  L formula per day, recommend to consider addition of multivitamin/mineral supplement to help meet micronutrient needs.        Medications/labs reviewed. RD following.    Learning Needs/Social Determinants of Health    Learning Assessment       03/23/2025 1451 [facility] Tele[facility] (3/23/2025 - Present)   Created by Corry Blue RN - RN (Nurse) Status: Complete                 PRIMARY LEARNER     Primary Learner Name:  marina noble  - 03/23/2025 1451    Relationship:  Patient, Family SS - 03/23/2025 1451    Does the primary learner have any barriers to learning?:  No Barriers SS - 03/23/2025 1451    What is the preferred language of the primary learner?:  English  - 03/23/2025 1451    Is an  required?:  No  - 03/23/2025 1451    How does the primary learner prefer to learn new concepts?:  Listening  - 03/23/2025 1451    How often do you need to have someone help you read instructions, pamphlets, or written material from your doctor or pharmacy?:  Never  - 03/23/2025 1451        CO-LEARNER #1     No question answered        CO-LEARNER #2     No question answered        SPECIAL TOPICS     No question answered        ANSWERED BY:     No question answered        Comments         Edit History       Corry Blue, RN - RN (Nurse)   03/23/2025 1451                          Social Drivers of Health     Tobacco Use: Low Risk  (3/23/2025)    Patient History     Smoking Tobacco Use: Never     Smokeless Tobacco Use: Never     Passive Exposure: Not on file   Alcohol Use: Not At Risk (3/24/2025)    AUDIT-C     Frequency of Alcohol Consumption: Never     Average Number of Drinks: Patient does not drink     Frequency of Binge Drinking: Never   Financial Resource Strain: Low Risk  (3/25/2025)    Overall Financial Resource Strain (CARDIA)     Difficulty of Paying Living Expenses: Not hard at all   Food Insecurity: No Food Insecurity (3/25/2025)    Hunger Vital Sign      Worried About Running Out of Food in the Last Year: Never true     Ran Out of Food in the Last Year: Never true   Recent Concern: Food Insecurity - Food Insecurity Present (3/23/2025)    Hunger Vital Sign     Worried About Running Out of Food in the Last Year: Often true     Ran Out of Food in the Last Year: Often true   Transportation Needs: No Transportation Needs (3/24/2025)    PRAPARE - Transportation     Lack of Transportation (Medical): No     Lack of Transportation (Non-Medical): No   Physical Activity: Inactive (3/24/2025)    Exercise Vital Sign     Days of Exercise per Week: 0 days     Minutes of Exercise per Session: 0 min   Stress: No Stress Concern Present (3/25/2025)    [employer] - Occupational Stress Questionnaire     Feeling of Stress : Not at all   Recent Concern: Stress - Stress Concern Present (3/23/2025)    [employer] - Occupational Stress Questionnaire     Feeling of Stress : Rather much   Housing Stability: Low Risk  (3/25/2025)    Housing Stability Vital Sign     Unable to Pay for Housing in the Last Year: No     Number of Times Moved in the Last Year: Not on file     Homeless in the Last Year: No   Depression: Not on file   Utilities: Not At Risk (3/25/2025)    [facility] Utilities     Threatened with loss of utilities: No   Health Literacy: Adequate Health Literacy (3/25/2025)     Health Literacy     Frequency of need for help with medical instructions: Rarely   Recent Concern: Health Literacy - Inadequate Health Literacy (2/16/2025)     Health Literacy     Frequency of need for help with medical instructions: Always   Social Isolation: Socially Integrated (3/24/2025)    Social Isolation     Social Isolation: 1     Malnutrition  Is Patient Malnourished: Yes    Nutrition consulted. Most recent weight and BMI monitored-     Measurements:  Wt Readings from Last 1 Encounters:   03/27/25 79.8 kg (175 lb 14.8 oz)   Body mass index is  "32.18 kg/m².    Patient has been screened and assessed by RD.    Malnutrition Type:  Context: chronic illness  Level: severe    Malnutrition Characteristic Summary:  Weight Loss (Malnutrition): greater than 10% in 6 months  Energy Intake (Malnutrition): less than or equal to 75% for greater than or equal to 1 month    Interventions/Recommendations (treatment strategy):  Dependence on PEG for feedings, continuous enteral feedings;  continue with current tube feed regimen     Nutrition Diagnosis  Malnutrition (Severe) related to Dysphagia/ difficulty swallowing as evidenced by >10% significant unintentional weight loss x 6 months, unhealed wounds, and consuming <75% estimated energy requirements for >1 month.   Comments: upon observation, no apparent muscle and fat wasting - pt still meets malnutrition criteria per ASPEN guidelines    No results for input(s): "GLU", "POCGLU" in the last 72 hours.      Nutrition Prescription / Recommendations  Recommendation/Intervention: continue with current tube feed regimen  Goals: continue to tolerate feeds at goal rate, weight maintenance during admission, improve skin integrity  Nutrition Goal Status: goal met  Current Diet Order: NPO  Nutrition Order Comments: Enteral Feeds  Chewing or Swallowing Difficulty?: Swallowing difficulty  Recommended Diet: Enteral Nutrition  Recommended Oral Supplement: Luis Fernando [90 kcals, 2.5g Protein, 10g Carbs(3g Sugar), 7g L-Arginine, 7g L-Glutamine, Vitamin C 300mg, 9.5mg Zinc] 2 times a day  Is Nutrition Support Recommended: Yes  Is Nutrition Education Recommended: No    Needs Calculated    Energy Calorie Requirements (kcal): 1,012 kcal (23 kcal/kg adjusted IBW for quadriplegia) (decreased needs 2/2 decreased metabolic activity due to denervated muscle)  Protein Requirements: 53 - 66 g (1.2 - 1.5 g/kg adjusted IBW for quadriplegia) (increased protein requirements 2/2 sacral wound and prevention of muscle atrophy)  Enteral Nutrition   Enteral " Nutrition Formula Provides:  1,260 kcals Propofol Rate: No (1,080 kcal Isosource + 180 Luis Fernando)  54 g Protein (49 g Isosource + 5 Luis Fernando)  121 g Carbohydrates  47 g Fat Propofol Rate: No  560 ml Fluid without Flush    960 ml Fluid by flush   1,520 ml Total Fluid  Enteral Nutrition Recommended Order:  Tube feeding via PEG/ Gastrostomy  Tube feeding formula: Isosource 1.5 PEG/ Gastrostomy  Free Water Flush: 40 ml hourly  Modular Supplements: Luis Fernando BID   Enteral Nutrition meets needs?: yes  Enteral Nutrition Status: Continue Enteral Nutrition    Monitor and Evaluation  % current Intake: Enteral Nutrition at goal  % intake to meet estimated needs: Enteral Nutrition   Monitor and Evaluation: Enteral and parenteral nutrition administration, Weight, Electrolyte and renal panel, Gastrointestinal profile, Glucose/endocrine profile, Inflammatory profile, Lipid profile, Nutrition focused physical findings, Skin    Current Medical Diagnosis and Past Medical History     Past Medical History:   Diagnosis Date    GERD (gastroesophageal reflux disease)     Paraplegia      Nutrition/Diet History  Food Allergies: NKFA  Factors Affecting Nutritional Intake: difficulty/impaired swallowing    Lab/Procedures/Meds  Recent Labs   Lab 04/11/25  0424   BUN 15   CREATININE 0.50*   Note: Cr low      Last A1c:   Lab Results   Component Value Date    HGBA1C 5.5 03/01/2025   Note: WNL    Lab Results   Component Value Date    RBC 3.95 (L) 04/09/2025    HGB 9.9 (L) 04/09/2025    HCT 35.1 (L) 04/09/2025    MCV 88.9 04/09/2025    MCH 25.1 (L) 04/09/2025    MCHC 28.2 (L) 04/09/2025   Note: H/H low. PMHx of anemia    Pertinent Labs Reviewed: reviewed  Pertinent Medications Reviewed: reviewed    Scheduled Meds:   acetylcysteine 200 mg/ml (20%)  2 mL Nebulization TID    atorvastatin  20 mg Oral QHS    cefTRIAXone (Rocephin) IV (PEDS and ADULTS)  1 g Intravenous Q24H    enoxparin  40 mg Subcutaneous Daily    levalbuterol  1.25 mg Nebulization TID     "midodrine  10 mg Oral TID    pantoprazole  40 mg Intravenous Daily    pregabalin  75 mg Oral BID    scopolamine  1 patch Transdermal Q3 Days    sertraline  50 mg Oral Daily    vancomycin (VANCOCIN) IV (PEDS and ADULTS)  1,500 mg Intravenous Q24H   Note: atorvastatin, pantoprazole, setraline    Continuous Infusions:    PRN Meds:.  Current Facility-Administered Medications:     acetaminophen, 1,000 mg, Oral, Q8H PRN    acetaminophen, 650 mg, Oral, Q8H PRN    acetaminophen, 650 mg, Oral, Q4H PRN    aluminum-magnesium hydroxide-simethicone, 30 mL, Oral, QID PRN    dextrose 50%, 12.5 g, Intravenous, PRN    dextrose 50%, 12.5 g, Intravenous, PRN    dextrose 50%, 25 g, Intravenous, PRN    glucagon (human recombinant), 1 mg, Intramuscular, PRN    glucose, 16 g, Oral, PRN    glucose, 24 g, Oral, PRN    HYDROcodone-acetaminophen, 1 tablet, Oral, Q6H PRN    HYDROmorphone, 1 mg, Intravenous, Q6H PRN    magnesium oxide, 800 mg, Oral, PRN    magnesium oxide, 800 mg, Oral, PRN    melatonin, 6 mg, Oral, Nightly PRN    naloxone, 0.02 mg, Intravenous, PRN    ondansetron, 4 mg, Intravenous, Q8H PRN    polyethylene glycol, 17 g, Oral, Daily PRN    potassium bicarbonate, 35 mEq, Oral, PRN    potassium bicarbonate, 50 mEq, Oral, PRN    potassium bicarbonate, 60 mEq, Oral, PRN    potassium, sodium phosphates, 2 packet, Oral, PRN    potassium, sodium phosphates, 2 packet, Oral, PRN    potassium, sodium phosphates, 2 packet, Oral, PRN    sodium chloride 0.9%, 10 mL, Intravenous, PRN    traZODone, 100 mg, Oral, Nightly PRN    vancomycin - pharmacy to dose, , Intravenous, pharmacy to manage frequency  Note: magnesium oxide, ondansetron, polyethylene glycol, potassium bicarb    Anthropometrics  Height: 5' 2" (157.5 cm)  Height (inches): 62 in  Height Method: Stated  Weight: 79.8 kg (175 lb 14.8 oz)  Weight (lb): 175.93 lb  Weight Method: Bed Scale  Ideal Body Weight (IBW), Female: 110 lb  BMI (Calculated): 32.2  Tetraplegia (Quadriplegia) " Ideal Body Weight (IBW) Adjustment: 97 lb    Estimated/Assessed Needs  RMR ([place]-St. Jeor Equation): 1316.25     Temp: 97.9 °F (36.6 °C)Oral  Weight Used For Calorie Calculations: 44 kg (97 lb)     Energy Calorie Requirements (kcal): 1,012 kcal (23 kcal/kg adjusted IBW for quadriplegia) (decreased needs 2/2 decreased metabolic activity due to denervated muscle)  Weight Used For Protein Calculations: 44 kg (97 lb)  Protein Requirements: 53 - 66 g (1.2 - 1.5 g/kg adjusted IBW for quadriplegia) (increased protein requirements 2/2 sacral wound and prevention of muscle atrophy)       Fluids: 30 - 40 mL/kg normal requirements for patient with quadriplegia   Fluid Requirements: 1, 540 mL (35 mL/kg adjusted IBW for quadriplegia)       Nutrition by Nursing  Diet/Nutrition Received: tube feeding  Intake (%): other (see comments) (tubefeeding documented)     Diet/Feeding Tolerance: good, other (see comments) (tubefeeding)       Gastrostomy/Enterostomy LUQ-Feeding Type: continuous       Gastrostomy/Enterostomy LUQ-Current Rate (mL/hr): 30 mL/hr       Gastrostomy/Enterostomy LUQ-Goal Rate (mL/hr): 30 mL/hr       Gastrostomy/Enterostomy LUQ-Formula Name: isosource 1.5    Nutrition Follow-Up  RD Follow-up?: Yes    Nutrition Discharge Planning: Enteral nutrition (comments)       Niki Soares, MS, RD, LD  Available via Secure Chat

## 2025-04-11 NOTE — SUBJECTIVE & OBJECTIVE
Interval History:     No significant events overnight, no new complaints or concerns. Awaiting placement.    Objective:     Vital Signs (Most Recent):  Temp: 98.3 °F (36.8 °C) (04/11/25 1140)  Pulse: 104 (04/11/25 1140)  Resp: 18 (04/11/25 1140)  BP: (!) 174/152 (04/11/25 1140)  SpO2: 99 % (04/11/25 1140) Vital Signs (24h Range):  Temp:  [97.4 °F (36.3 °C)-98.9 °F (37.2 °C)] 98.3 °F (36.8 °C)  Pulse:  [] 104  Resp:  [16-22] 18  SpO2:  [91 %-99 %] 99 %  BP: ()/() 174/152     Weight: 79.8 kg (175 lb 14.8 oz)  Body mass index is 32.18 kg/m².    Intake/Output Summary (Last 24 hours) at 4/11/2025 1257  Last data filed at 4/11/2025 0548  Gross per 24 hour   Intake 1250 ml   Output --   Net 1250 ml         Physical Exam  Constitutional:       General: She is not in acute distress.     Appearance: She is ill-appearing.   HENT:      Head: Normocephalic and atraumatic.      Nose: Nose normal.      Mouth/Throat:      Mouth: Mucous membranes are moist.      Pharynx: Oropharynx is clear.   Eyes:      Extraocular Movements: Extraocular movements intact.      Conjunctiva/sclera: Conjunctivae normal.      Pupils: Pupils are equal, round, and reactive to light.   Cardiovascular:      Rate and Rhythm: Normal rate and regular rhythm.   Pulmonary:      Effort: Pulmonary effort is normal. No respiratory distress.      Comments: Secretions  Abdominal:      General: There is no distension.      Palpations: Abdomen is soft.      Tenderness: There is no abdominal tenderness.   Skin:     General: Skin is warm and dry.   Neurological:      Mental Status: She is alert. Mental status is at baseline.      Comments: Quadriplegia    Psychiatric:         Mood and Affect: Mood normal.         Behavior: Behavior normal.               Significant Labs: All pertinent labs within the past 24 hours have been reviewed.    Significant Imaging: I have reviewed all pertinent imaging results/findings within the past 24 hours.

## 2025-04-11 NOTE — PLAN OF CARE
Ss received call from pt's [employer] and they were requesting updated info. Ss faxed to [phone]. Jules spoek with aleyda with [employer] and she stated we are still awaiting ins approval. Ss following.

## 2025-04-11 NOTE — PROGRESS NOTES
[facility] - [address] [place] [address] Medicine  Progress Note    Patient Name: Karie Denney  MRN: [id number]  Patient Class: IP- Inpatient   Admission Date: 3/23/2025  Length of Stay: 19 days  Attending Physician: Jovanna Hoffman DO  Primary Care Provider: Gonzalo Barrera NP        Subjective     Principal Problem:Acute on chronic respiratory failure with hypoxia        HPI:  Chief Complaint  Transfer for continued management of respiratory failure, tracheostomy care, PEG feeding, and complications of quadriplegia following prolonged hospitalization.    History of Present Illness  Ms. Karie Denney is a 59-year-old woman with a complex medical history including quadriplegia following spinal surgery in 2015 and a cerebrovascular accident (CVA) in 2024 resulting in left-sided paralysis. She was transferred to [facility] from [facility], [place], operated by [facility] in [place], [place], for ongoing care following a prolonged ICU course involving intubation, respiratory failure, and significant complications.  She was initially hospitalized on February 15, 2025, for aspiration and dysphagia evaluation, during which she developed aspiration pneumonia and respiratory failure requiring intubation. Her hospital course was complicated by an ESBL E. coli UTI, pericardial effusion (treated with colchicine), and a spontaneous right thigh hematoma concerning for compartment syndrome, leading to transfer to [facility], [place], operated by [facility]. She required prolonged mechanical ventilation, tracheostomy placement, and PEG tube insertion.  She has now returned to [facility] for continued respiratory care, tracheostomy management, PEG feeding, management of sacral pressure injury, and rehabilitation planning.    Past Medical History  Quadriplegia post-spinal surgery (2015)  CVA (2024)  Aspiration pneumonia  Acute respiratory failure  UTI (ESBL E. coli)  Depression  GERD  Pharyngoesophageal dysphagia  Pericardial effusion/pericarditis  Chronic  constipation  Pressure ulcer (sacral, unstageable)  Hyperlipidemia  Anemia    Past Surgical History  Spinal surgery (2015)  PEG tube placement (03/11/2025)  Esophageal dilation with biopsy (08/2024)    Medications  Active Medications:  Atorvastatin 20 mg daily  Omeprazole 40 mg daily  Sertraline 50 mg daily  Trazodone 100 mg qHS  Pregabalin 75 mg BID  Hydrocodone-acetaminophen 5-325 mg BID  Lactulose 30 mL daily via PEG  Midodrine 10 mg Q6H via PEG  Levalbuterol 0.63 mg nebulizer Q6H  Polyethylene glycol 17 g daily via PEG  Recent Changes:  Colchicine: Discontinued due to bleeding  Aspirin and ezetimibe: Discontinued    Allergies  Penicillins ? Rash and anaphylaxis    Social History  Never smoker  No alcohol or tobacco use  Lives at home with mother; receives home health weekly  Bedbound, non-ambulatory    Family History  Noncontributory    Review of Systems  Limited due to tracheostomy, quadriplegia, and communication challenges. History obtained from EMR and caregiver.    Physical Examination  General: Chronically ill-appearing woman, alert, tracheostomy in place with T-piece  HEENT: Mild nasal skin necrosis, mucous membranes moist  Eyes: PERRL, EOMI  Neck: Tracheostomy present  CV: RRR, no murmurs  Lungs: Breath sounds diminished at bases; no acute distress; no wheezing  Abdomen: Soft, non-tender, PEG tube in place  Skin: Unstageable sacral ulcer, nasal pressure ulcer  Neuro: Quadriplegia, responsive with eye tracking and blinking  Extremities: RLE hematoma, bilateral edema, no signs of active bleeding    Laboratory Data (Most Recent)  Hgb: 8.5 g/dL  Creatinine: 0.30 mg/dL  Albumin: 3.1 g/dL  WBC: 9.4 K/uL  Ferritin: 265 ng/mL  INR: 0.93  No growth on recent blood and urine cultures    Imaging  CT angio: Large RLE hematoma without active extravasation  Multiple chest X-rays: Stable bilateral pleural effusions, improving atelectasis  Echo: EF 55-60%, small pericardial effusion      Overview/Hospital  Course:  3/26  - continues on vanc for blood cultures + on 3/24, echo completed at time with no endocarditis seen  - discussed with pulmonary, recs to follow    3/27  - due to overall baseline health will treat coag negative staph bacteremia as a true infection, for now continue vanc and follow cultures and sensitivities  - discussed with pulmonology who plan to continue trach collar as is and see patient May 1st at 1:40 pm, confirmed appointment  - discussed with family at bedside who are very concerned about secretions and states they don't have the suction capabilities at home and do not feel comfortable going home with her in this state, will discuss with social on possible home equipment    3/28  - awaiting micro, continuing vanc  - social setting up suction at home, family plans to return home as before    3/29  - still with secretions  - family requesting voice box    3/30  - doing well today, mentation much improved  - anticipate discharge within the next two days with home health services, family will need education on trach maintenance and home feedings as patient has PEG tube and they have not cared for a PEG tube before, also we will discuss with  getting a speaking told to use with a trach    3/31  - secretions improved today  - working with social for home set up of oxygen and tube feeds  - family needs heavy education on trach care and tube feeds as primary care giver has no experience with either, still wishes to discharge home    Interval History:     No significant events overnight, no new complaints or concerns. Awaiting placement.    Objective:     Vital Signs (Most Recent):  Temp: 98.3 °F (36.8 °C) (04/11/25 1140)  Pulse: 104 (04/11/25 1140)  Resp: 18 (04/11/25 1140)  BP: (!) 174/152 (04/11/25 1140)  SpO2: 99 % (04/11/25 1140) Vital Signs (24h Range):  Temp:  [97.4 °F (36.3 °C)-98.9 °F (37.2 °C)] 98.3 °F (36.8 °C)  Pulse:  [] 104  Resp:  [16-22] 18  SpO2:  [91 %-99 %] 99  %  BP: ()/() 174/152     Weight: 79.8 kg (175 lb 14.8 oz)  Body mass index is 32.18 kg/m².    Intake/Output Summary (Last 24 hours) at 4/11/2025 1257  Last data filed at 4/11/2025 0548  Gross per 24 hour   Intake 1250 ml   Output --   Net 1250 ml         Physical Exam  Constitutional:       General: She is not in acute distress.     Appearance: She is ill-appearing.   HENT:      Head: Normocephalic and atraumatic.      Nose: Nose normal.      Mouth/Throat:      Mouth: Mucous membranes are moist.      Pharynx: Oropharynx is clear.   Eyes:      Extraocular Movements: Extraocular movements intact.      Conjunctiva/sclera: Conjunctivae normal.      Pupils: Pupils are equal, round, and reactive to light.   Cardiovascular:      Rate and Rhythm: Normal rate and regular rhythm.   Pulmonary:      Effort: Pulmonary effort is normal. No respiratory distress.      Comments: Secretions  Abdominal:      General: There is no distension.      Palpations: Abdomen is soft.      Tenderness: There is no abdominal tenderness.   Skin:     General: Skin is warm and dry.   Neurological:      Mental Status: She is alert. Mental status is at baseline.      Comments: Quadriplegia    Psychiatric:         Mood and Affect: Mood normal.         Behavior: Behavior normal.               Significant Labs: All pertinent labs within the past 24 hours have been reviewed.    Significant Imaging: I have reviewed all pertinent imaging results/findings within the past 24 hours.          Assessment & Plan  Acute on chronic respiratory failure with hypoxia  Continue tracheostomy care with T-piece trials  Continue tracheostomy suctioning ordered due to thick secretions. Continue scheduled mucolytic and scopolamine.    Culture with Haemophilus and yeast, started on fluconazole a ceftriaxone.     Pulmonology involved with care this admission.     Family would like patient to go to LTAC before going home as she still requires frequent suctioning,  tube feeds, antibiotics, and additional care.  She has required over a week of ICU care prior to returning to our facility.  She was on the vent and was difficult to wean. She has required more than three midnights intermediate ICU level of care while at our facility.       Keep trach without capping until April 15th.   Pulmonology to see patient tomorrow.     Denied LTAC by [employer], her insurance provider.  Appeal sent 4/8. Awaiting decision.   Severe protein-calorie malnutrition  Nutrition consulted. Most recent weight and BMI monitored-     Measurements:  Wt Readings from Last 1 Encounters:   03/27/25 79.8 kg (175 lb 14.8 oz)   Body mass index is 32.18 kg/m².    Patient has been screened and assessed by RD.    Malnutrition Type:  Context: chronic illness  Level: severe    Continue PEG tube feeding.  Nepro at 40 mL/hr  Maintain bowel regimen (lactulose, PEG, senna)  Dietitian to reassess  continue with current tube feed regimen    Dysphagia  With PEG in place. Continue tube feeds.   Continue omeprazole  Avoid PO intake  Speech therapy if clinically appropriate    Aspiration pneumonia  Recurrent aspiration pneumonia.  Completed two rounds of antibiotics.      Antibiotics (From admission, onward)      Start     Stop Route Frequency Ordered    04/11/25 1400  vancomycin (VANCOCIN) 1,500 mg in 0.9% NaCl 250 mL IVPB         -- IV Every 24 hours (non-standard times) 04/10/25 1554    04/10/25 1700  cefTRIAXone injection 1 g         -- IV Every 24 hours (non-standard times) 04/10/25 1601    04/10/25 1654  vancomycin - pharmacy to dose         -- IV pharmacy to manage frequency 04/10/25 1554        \  Pressure ulcers of skin of multiple topographic sites  Aquacel Ag + Mepilex dressings  Wound care team consulted    Continue offloading and specialty mattress    Depression  Patient has persistent depression which is unknown and is currently controlled. Will Continue anti-depressant medications. We will not consult psychiatry  at this time. Patient does not display psychosis at this time. Continue to monitor closely and adjust plan of care as needed.          Mucus plugging of bronchi      S/P percutaneous endoscopic gastrostomy (PEG) tube placement  Patient noted to have a percutaneous endoscopic gastrostomy tube in place. I have personally inspected the tube.Tube was placed prior to this admission There are no signs of drainage or infection around the site. The tube is patent. Medications have converted to liquid form if available.  Routine care to be done by wound care and nursing staff.       Quadriplegia  Maximal support, bedbound  PT/OT as tolerated  DME planning for discharge    Hematoma of lower extremity, right, subsequent encounter  No active bleeding; conservative management  Monitor H/H  No anticoagulation  Hyperlipidemia  Continue atorvastatin    Pericardial effusion  Previously on colchicine, now discontinued due to bleeding  Continue low-dose prednisone  Monitor for recurrence    Acute respiratory failure with hypoxia and hypercarbia  Patient with Hypoxic Respiratory failure which is Acute on chronic.  she is not on home oxygen. Supplemental oxygen was provided and noted- Oxygen Concentration (%):  [28] 28    .   Signs/symptoms of respiratory failure include- tachypnea and respiratory distress. Contributing diagnoses includes - Aspiration and Pneumonia Labs and images were reviewed. Patient Has not had a recent ABG. Will treat underlying causes and adjust management of respiratory failure as follows-   Chronic respiratory failure with hypoxia  Patient with Hypoxic Respiratory failure which is Acute on chronic.  she is not on home oxygen. Supplemental oxygen was provided and noted- Oxygen Concentration (%):  [28] 28    .   Signs/symptoms of respiratory failure include- increased work of breathing and respiratory distress. Contributing diagnoses includes - Aspiration and Pneumonia Labs and images were reviewed. Patient Has  not had a recent ABG. Will treat underlying causes and adjust management of respiratory failure as follows-   Coag negative Staphylococcus bacteremia  Continue with current antibiotics until sensitivities return.      Antibiotics (From admission, onward)      Start     Stop Route Frequency Ordered    04/11/25 1400  vancomycin (VANCOCIN) 1,500 mg in 0.9% NaCl 250 mL IVPB         -- IV Every 24 hours (non-standard times) 04/10/25 1554    04/10/25 1700  cefTRIAXone injection 1 g         -- IV Every 24 hours (non-standard times) 04/10/25 1601    04/10/25 1654  vancomycin - pharmacy to dose         -- IV pharmacy to manage frequency 04/10/25 1554          3/26  - continues on vanc for blood cultures + on 3/24, echo completed at time with no endocarditis seen  - discussed with pulmonary, recs to follow    3/27  - due to overall baseline health will treat coag negative staph bacteremia as a true infection, for now continue vanc and follow cultures and sensitivities    4/4:  I am continuing with vancomycin given that patient has a severe penicillin allergy.  Otherwise Ancef would have been an option.  Continue with IV antibiotics at least through April 10th.     4/5: continue with IV antibiotics.       VTE Risk Mitigation (From admission, onward)           Ordered     enoxaparin injection 40 mg  Daily         03/23/25 1940     IP VTE HIGH RISK PATIENT  Once         03/23/25 1940     Place sequential compression device  Until discontinued         03/23/25 1941                    Discharge Planning   SHRADDHA: 4/14/2025     Code Status: Full Code   Medical Readiness for Discharge Date:   Discharge Plan A: Long-term acute care facility (LTAC)                        Jovanna Hoffman DO  Department of Hospital Medicine   [facility] - [facility] [address] [facility] Telemetry

## 2025-04-11 NOTE — PLAN OF CARE
Problem: Adult Inpatient Plan of Care  Goal: Plan of Care Review  Outcome: Progressing  Goal: Patient-Specific Goal (Individualized)  Outcome: Progressing  Goal: Absence of Hospital-Acquired Illness or Injury  Outcome: Progressing  Goal: Optimal Comfort and Wellbeing  Outcome: Progressing  Goal: Readiness for Transition of Care  Outcome: Progressing     Problem: Infection  Goal: Absence of Infection Signs and Symptoms  Outcome: Progressing     Problem: Wound  Goal: Optimal Coping  Outcome: Progressing  Goal: Optimal Functional Ability  Outcome: Progressing  Goal: Absence of Infection Signs and Symptoms  Outcome: Progressing  Goal: Improved Oral Intake  Outcome: Progressing  Goal: Optimal Pain Control and Function  Outcome: Progressing     Problem: Skin Injury Risk Increased  Goal: Skin Health and Integrity  Outcome: Progressing     Problem: Gas Exchange Impaired  Goal: Optimal Gas Exchange  Outcome: Progressing     Problem: Airway Clearance Ineffective  Goal: Effective Airway Clearance  Outcome: Progressing

## 2025-04-12 PROBLEM — J04.10 TRACHEITIS: Status: ACTIVE | Noted: 2025-04-12

## 2025-04-12 LAB
BUN SERPL-MCNC: 15 MG/DL (ref 10–20)
BUN/CREAT SERPL: 30 (ref 6–20)
CREAT SERPL-MCNC: 0.5 MG/DL (ref 0.55–1.02)
EGFR (NO RACE VARIABLE) (RUSH/TITUS): 107 ML/MIN/1.73M2
GLUCOSE SERPL-MCNC: 89 MG/DL (ref 70–105)
VANCOMYCIN TROUGH SERPL-MCNC: 16.4 ΜG/ML (ref 15–20)

## 2025-04-12 PROCEDURE — 94761 N-INVAS EAR/PLS OXIMETRY MLT: CPT

## 2025-04-12 PROCEDURE — 25000242 PHARM REV CODE 250 ALT 637 W/ HCPCS: Performed by: INTERNAL MEDICINE

## 2025-04-12 PROCEDURE — 82962 GLUCOSE BLOOD TEST: CPT

## 2025-04-12 PROCEDURE — 27000207 HC ISOLATION

## 2025-04-12 PROCEDURE — 25000003 PHARM REV CODE 250: Performed by: HOSPITALIST

## 2025-04-12 PROCEDURE — 11000001 HC ACUTE MED/SURG PRIVATE ROOM

## 2025-04-12 PROCEDURE — 36415 COLL VENOUS BLD VENIPUNCTURE: CPT | Performed by: HOSPITALIST

## 2025-04-12 PROCEDURE — 99900026 HC AIRWAY MAINTENANCE (STAT)

## 2025-04-12 PROCEDURE — 99232 SBSQ HOSP IP/OBS MODERATE 35: CPT | Mod: ,,, | Performed by: FAMILY MEDICINE

## 2025-04-12 PROCEDURE — 63600175 PHARM REV CODE 636 W HCPCS: Performed by: FAMILY MEDICINE

## 2025-04-12 PROCEDURE — 84520 ASSAY OF UREA NITROGEN: CPT | Performed by: HOSPITALIST

## 2025-04-12 PROCEDURE — 25000242 PHARM REV CODE 250 ALT 637 W/ HCPCS: Performed by: STUDENT IN AN ORGANIZED HEALTH CARE EDUCATION/TRAINING PROGRAM

## 2025-04-12 PROCEDURE — 27000221 HC OXYGEN, UP TO 24 HOURS

## 2025-04-12 PROCEDURE — 25000003 PHARM REV CODE 250: Performed by: FAMILY MEDICINE

## 2025-04-12 PROCEDURE — 94640 AIRWAY INHALATION TREATMENT: CPT

## 2025-04-12 PROCEDURE — 80202 ASSAY OF VANCOMYCIN: CPT | Performed by: FAMILY MEDICINE

## 2025-04-12 PROCEDURE — 99900035 HC TECH TIME PER 15 MIN (STAT)

## 2025-04-12 PROCEDURE — 63600175 PHARM REV CODE 636 W HCPCS: Performed by: HOSPITALIST

## 2025-04-12 RX ORDER — GUAIFENESIN 100 MG/5ML
200 LIQUID ORAL
Status: COMPLETED | OUTPATIENT
Start: 2025-04-12 | End: 2025-04-14

## 2025-04-12 RX ORDER — GUAIFENESIN 100 MG/5ML
200 LIQUID ORAL EVERY 4 HOURS PRN
Status: DISCONTINUED | OUTPATIENT
Start: 2025-04-12 | End: 2025-04-12

## 2025-04-12 RX ADMIN — ACETYLCYSTEINE 2 ML: 200 SOLUTION ORAL; RESPIRATORY (INHALATION) at 07:04

## 2025-04-12 RX ADMIN — CEFTRIAXONE SODIUM 1 G: 1 INJECTION, POWDER, FOR SOLUTION INTRAMUSCULAR; INTRAVENOUS at 05:04

## 2025-04-12 RX ADMIN — LEVALBUTEROL HYDROCHLORIDE 1.25 MG: 1.25 SOLUTION RESPIRATORY (INHALATION) at 12:04

## 2025-04-12 RX ADMIN — TRAZODONE HYDROCHLORIDE 100 MG: 50 TABLET ORAL at 09:04

## 2025-04-12 RX ADMIN — MIDODRINE HYDROCHLORIDE 10 MG: 10 TABLET ORAL at 02:04

## 2025-04-12 RX ADMIN — ENOXAPARIN SODIUM 40 MG: 40 INJECTION SUBCUTANEOUS at 05:04

## 2025-04-12 RX ADMIN — GUAIFENESIN 200 MG: 200 SOLUTION ORAL at 09:04

## 2025-04-12 RX ADMIN — PREGABALIN 75 MG: 75 CAPSULE ORAL at 09:04

## 2025-04-12 RX ADMIN — ONDANSETRON 4 MG: 2 INJECTION INTRAMUSCULAR; INTRAVENOUS at 02:04

## 2025-04-12 RX ADMIN — VANCOMYCIN HYDROCHLORIDE 1500 MG: 500 INJECTION, POWDER, LYOPHILIZED, FOR SOLUTION INTRAVENOUS at 02:04

## 2025-04-12 RX ADMIN — MIDODRINE HYDROCHLORIDE 10 MG: 10 TABLET ORAL at 08:04

## 2025-04-12 RX ADMIN — PANTOPRAZOLE SODIUM 40 MG: 40 INJECTION, POWDER, FOR SOLUTION INTRAVENOUS at 08:04

## 2025-04-12 RX ADMIN — LEVALBUTEROL HYDROCHLORIDE 1.25 MG: 1.25 SOLUTION RESPIRATORY (INHALATION) at 08:04

## 2025-04-12 RX ADMIN — LEVALBUTEROL HYDROCHLORIDE 1.25 MG: 1.25 SOLUTION RESPIRATORY (INHALATION) at 07:04

## 2025-04-12 RX ADMIN — PREGABALIN 75 MG: 75 CAPSULE ORAL at 08:04

## 2025-04-12 RX ADMIN — ACETYLCYSTEINE 2 ML: 200 SOLUTION ORAL; RESPIRATORY (INHALATION) at 08:04

## 2025-04-12 RX ADMIN — ACETYLCYSTEINE 2 ML: 200 SOLUTION ORAL; RESPIRATORY (INHALATION) at 12:04

## 2025-04-12 RX ADMIN — GUAIFENESIN 200 MG: 200 SOLUTION ORAL at 02:04

## 2025-04-12 RX ADMIN — ATORVASTATIN CALCIUM 20 MG: 20 TABLET, FILM COATED ORAL at 09:04

## 2025-04-12 RX ADMIN — SERTRALINE HYDROCHLORIDE 50 MG: 50 TABLET ORAL at 08:04

## 2025-04-12 RX ADMIN — MIDODRINE HYDROCHLORIDE 10 MG: 10 TABLET ORAL at 09:04

## 2025-04-12 NOTE — ASSESSMENT & PLAN NOTE
Continue with current antibiotics until sensitivities return.      Antibiotics (From admission, onward)      Start     Stop Route Frequency Ordered    04/10/25 1700  cefTRIAXone injection 1 g         -- IV Every 24 hours (non-standard times) 04/10/25 1601          3/26  - continues on vanc for blood cultures + on 3/24, echo completed at time with no endocarditis seen  - discussed with pulmonary, recs to follow    3/27  - due to overall baseline health will treat coag negative staph bacteremia as a true infection, for now continue vanc and follow cultures and sensitivities    4/4:  I am continuing with vancomycin given that patient has a severe penicillin allergy.  Otherwise Ancef would have been an option.  Continue with IV antibiotics at least through April 10th.     4/5: continue with IV antibiotics.

## 2025-04-12 NOTE — PROGRESS NOTES
"Pharmacokinetic Assessment Follow Up: IV Vancomycin    Vancomycin serum concentration assessment(s):    The trough level was drawn correctly and can be used to guide therapy at this time. The measurement is within the desired definitive target range of 15 to 20 mcg/mL.    Vancomycin Regimen Plan:    Continue regimen to Vancomycin 1500 mg IV every 24 hours with next serum trough concentration measured at 1330 prior to 4th dose on 4/15    Drug levels (last 3 results):  Recent Labs   Lab Result Units 04/12/25  1324   Vancomycin, Trough µg/mL 16.4       Pharmacy will continue to follow and monitor vancomycin.    Please contact pharmacy at extension [phone] for questions regarding this assessment.    Thank you for the consult,   Lainey Curry       Patient brief summary:  Karie Denney is a 59 y.o. female initiated on antimicrobial therapy with IV Vancomycin for treatment of bacteremia        Drug Allergies:   Review of patient's allergies indicates:   Allergen Reactions    Penicillins Anaphylaxis       Actual Body Weight:   79 kg     Renal Function:   Estimated Creatinine Clearance: 115.6 mL/min (A) (based on SCr of 0.5 mg/dL (L)).,     Dialysis Method (if applicable):  N/A    CBC (last 72 hours):  No results for input(s): "WHITE BLOOD CELL COUNT", "HEMOGLOBIN", "HEMATOCRIT", "PLATELETS", "GRAN%", "LYMPH%", "MONO%", "EOSINOPHIL%", "BASOPHIL%", "DIFFERENTIAL METHOD" in the last 72 hours.    Metabolic Panel (last 72 hours):  Recent Labs   Lab Result Units 04/10/25  0532 04/11/25  0424 04/12/25  0438   BUN mg/dL 12 15 15   Creatinine mg/dL 0.53* 0.50* 0.50*       Vancomycin Administrations:  vancomycin given in the last 96 hours                     vancomycin (VANCOCIN) 1,500 mg in 0.9% NaCl 250 mL IVPB (mg) 1,500 mg New Bag 04/12/25 1448     1,500 mg New Bag 04/11/25 1400    vancomycin (VANCOCIN) 1,500 mg in 0.9% NaCl 250 mL IVPB (mg) 1,500 mg New Bag 04/10/25 1455    vancomycin (VANCOCIN) 1,750 mg in 0.9% NaCl 500 mL " IVPB (mg) 1,750 mg New Bag 04/09/25 1403                    Microbiologic Results:  Microbiology Results (last 7 days)       Procedure Component Value Units Date/Time    Culture, Lower Respiratory [[id number]]  (Abnormal) Collected: 04/07/25 1115    Order Status: Completed Specimen: Respiratory from Tracheal Aspirate Updated: 04/09/25 1147     Culture, Lower Respiratory Heavy Growth Haemophilus parainfluenzae     Comment: Haemophilus species- if positive beta lactamase resistant to ampicillin and amoxicillin , if negative sensitive to ampicillin and amoxicillin.        Beta Lactamase Negative     Culture, Lower Respiratory Moderate Growth Yeast     Comment: For further identification, fungus culture recommended. Isolate will be held for 7 days. Notify Micro department at [phone] if fungus culture ordered.        Gram Stain Result Moderate WBC observed      Few Epithelial cells      No organisms seen    Culture, Lower Respiratory [[id number]]  (Abnormal) Collected: 04/06/25 1252    Order Status: Completed Specimen: Respiratory from Tracheal Aspirate Updated: 04/09/25 0651     Culture, Lower Respiratory Light Growth Yeast     Comment: For further identification, fungus culture recommended. Isolate will be held for 7 days. Notify Micro department at [phone] if fungus culture ordered.        Gram Stain Result Few Epithelial cells      Rare WBC seen      No bacteria seen

## 2025-04-12 NOTE — PROGRESS NOTES
[facility] - [address] [place] [address] Medicine  Progress Note    Patient Name: Karie Denney  MRN: [id number]  Patient Class: IP- Inpatient   Admission Date: 3/23/2025  Length of Stay: 20 days  Attending Physician: Jovanna Hoffman DO  Primary Care Provider: Gonzalo Barrera NP        Subjective     Principal Problem:Acute on chronic respiratory failure with hypoxia        HPI:  Chief Complaint  Transfer for continued management of respiratory failure, tracheostomy care, PEG feeding, and complications of quadriplegia following prolonged hospitalization.    History of Present Illness  Ms. Karie Denney is a 59-year-old woman with a complex medical history including quadriplegia following spinal surgery in 2015 and a cerebrovascular accident (CVA) in 2024 resulting in left-sided paralysis. She was transferred to [facility] from [facility] in [place], [place], for ongoing care following a prolonged ICU course involving intubation, respiratory failure, and significant complications.  She was initially hospitalized on February 15, 2025, for aspiration and dysphagia evaluation, during which she developed aspiration pneumonia and respiratory failure requiring intubation. Her hospital course was complicated by an ESBL E. coli UTI, pericardial effusion (treated with colchicine), and a spontaneous right thigh hematoma concerning for compartment syndrome, leading to transfer to [facility]. She required prolonged mechanical ventilation, tracheostomy placement, and PEG tube insertion.  She has now returned to [facility] for continued respiratory care, tracheostomy management, PEG feeding, management of sacral pressure injury, and rehabilitation planning.    Past Medical History  Quadriplegia post-spinal surgery (2015)  CVA (2024)  Aspiration pneumonia  Acute respiratory failure  UTI (ESBL E. coli)  Depression  GERD  Pharyngoesophageal dysphagia  Pericardial effusion/pericarditis  Chronic  constipation  Pressure ulcer (sacral, unstageable)  Hyperlipidemia  Anemia    Past Surgical History  Spinal surgery (2015)  PEG tube placement (03/11/2025)  Esophageal dilation with biopsy (08/2024)    Medications  Active Medications:  Atorvastatin 20 mg daily  Omeprazole 40 mg daily  Sertraline 50 mg daily  Trazodone 100 mg qHS  Pregabalin 75 mg BID  Hydrocodone-acetaminophen 5-325 mg BID  Lactulose 30 mL daily via PEG  Midodrine 10 mg Q6H via PEG  Levalbuterol 0.63 mg nebulizer Q6H  Polyethylene glycol 17 g daily via PEG  Recent Changes:  Colchicine: Discontinued due to bleeding  Aspirin and ezetimibe: Discontinued    Allergies  Penicillins ? Rash and anaphylaxis    Social History  Never smoker  No alcohol or tobacco use  Lives at home with mother; receives home health weekly  Bedbound, non-ambulatory    Family History  Noncontributory    Review of Systems  Limited due to tracheostomy, quadriplegia, and communication challenges. History obtained from EMR and caregiver.    Physical Examination  General: Chronically ill-appearing woman, alert, tracheostomy in place with T-piece  HEENT: Mild nasal skin necrosis, mucous membranes moist  Eyes: PERRL, EOMI  Neck: Tracheostomy present  CV: RRR, no murmurs  Lungs: Breath sounds diminished at bases; no acute distress; no wheezing  Abdomen: Soft, non-tender, PEG tube in place  Skin: Unstageable sacral ulcer, nasal pressure ulcer  Neuro: Quadriplegia, responsive with eye tracking and blinking  Extremities: RLE hematoma, bilateral edema, no signs of active bleeding    Laboratory Data (Most Recent)  Hgb: 8.5 g/dL  Creatinine: 0.30 mg/dL  Albumin: 3.1 g/dL  WBC: 9.4 K/uL  Ferritin: 265 ng/mL  INR: 0.93  No growth on recent blood and urine cultures    Imaging  CT angio: Large RLE hematoma without active extravasation  Multiple chest X-rays: Stable bilateral pleural effusions, improving atelectasis  Echo: EF 55-60%, small pericardial effusion      Overview/Hospital  Course:  3/26  - continues on vanc for blood cultures + on 3/24, echo completed at time with no endocarditis seen  - discussed with pulmonary, recs to follow    3/27  - due to overall baseline health will treat coag negative staph bacteremia as a true infection, for now continue vanc and follow cultures and sensitivities  - discussed with pulmonology who plan to continue trach collar as is and see patient May 1st at 1:40 pm, confirmed appointment  - discussed with family at bedside who are very concerned about secretions and states they don't have the suction capabilities at home and do not feel comfortable going home with her in this state, will discuss with social on possible home equipment    3/28  - awaiting micro, continuing vanc  - social setting up suction at home, family plans to return home as before    3/29  - still with secretions  - family requesting voice box    3/30  - doing well today, mentation much improved  - anticipate discharge within the next two days with home health services, family will need education on trach maintenance and home feedings as patient has PEG tube and they have not cared for a PEG tube before, also we will discuss with  getting a speaking told to use with a trach    3/31  - secretions improved today  - working with social for home set up of oxygen and tube feeds  - family needs heavy education on trach care and tube feeds as primary care giver has no experience with either, still wishes to discharge home    Interval History:     No significant events overnight, no new complaints or concerns. Discontinue vancomycin today. Awaiting placement.     Objective:     Vital Signs (Most Recent):  Temp: 98 °F (36.7 °C) (04/12/25 1556)  Pulse: 77 (04/12/25 1556)  Resp: 18 (04/12/25 1250)  BP: 137/70 (04/12/25 1556)  SpO2: 98 % (04/12/25 1556) Vital Signs (24h Range):  Temp:  [98 °F (36.7 °C)-98.6 °F (37 °C)] 98 °F (36.7 °C)  Pulse:  [77-96] 77  Resp:  [18-20] 18  SpO2:  [95  %-99 %] 98 %  BP: ()/() 137/70     Weight: 79.8 kg (175 lb 14.8 oz)  Body mass index is 32.18 kg/m².    Intake/Output Summary (Last 24 hours) at 4/12/2025 1719  Last data filed at 4/12/2025 0908  Gross per 24 hour   Intake 540 ml   Output --   Net 540 ml         Physical Exam  Constitutional:       General: She is not in acute distress.     Appearance: She is ill-appearing.   HENT:      Head: Normocephalic and atraumatic.      Nose: Nose normal.      Mouth/Throat:      Mouth: Mucous membranes are moist.      Pharynx: Oropharynx is clear.   Eyes:      Extraocular Movements: Extraocular movements intact.      Conjunctiva/sclera: Conjunctivae normal.      Pupils: Pupils are equal, round, and reactive to light.   Cardiovascular:      Rate and Rhythm: Normal rate and regular rhythm.   Pulmonary:      Effort: Pulmonary effort is normal. No respiratory distress.      Comments: Secretions  Abdominal:      General: There is no distension.      Palpations: Abdomen is soft.      Tenderness: There is no abdominal tenderness.   Skin:     General: Skin is warm and dry.   Neurological:      Mental Status: She is alert. Mental status is at baseline.      Comments: Quadriplegia    Psychiatric:         Mood and Affect: Mood normal.         Behavior: Behavior normal.               Significant Labs: All pertinent labs within the past 24 hours have been reviewed.    Significant Imaging: I have reviewed all pertinent imaging results/findings within the past 24 hours.            Assessment & Plan  Acute on chronic respiratory failure with hypoxia  Continue tracheostomy care with T-piece trials  Continue tracheostomy suctioning ordered due to thick secretions. Continue scheduled mucolytic and scopolamine.    Culture with Haemophilus and yeast, started on fluconazole a ceftriaxone.     Pulmonology involved with care this admission.     Family would like patient to go to LTAC before going home as she still requires frequent  suctioning, tube feeds, antibiotics, and additional care.  She has required over a week of ICU care prior to returning to our facility.  She was on the vent and was difficult to wean. She has required more than three midnights intermediate ICU level of care while at our facility.       Keep trach without capping until April 15th.   Pulmonology to see patient tomorrow.     Denied LTAC by [employer], her insurance provider.  Appeal sent 4/8. Awaiting decision.   Severe protein-calorie malnutrition  Nutrition consulted. Most recent weight and BMI monitored-     Measurements:  Wt Readings from Last 1 Encounters:   03/27/25 79.8 kg (175 lb 14.8 oz)   Body mass index is 32.18 kg/m².    Patient has been screened and assessed by RD.    Malnutrition Type:  Context: chronic illness  Level: severe    Continue PEG tube feeding.  Nepro at 40 mL/hr  Maintain bowel regimen (lactulose, PEG, senna)  Dietitian to reassess  continue with current tube feed regimen    Dysphagia  With PEG in place. Continue tube feeds.   Continue omeprazole  Avoid PO intake  Speech therapy if clinically appropriate    Aspiration pneumonia  Recurrent aspiration pneumonia.  Completed two rounds of antibiotics.      Antibiotics (From admission, onward)      Start     Stop Route Frequency Ordered    04/10/25 1700  cefTRIAXone injection 1 g         -- IV Every 24 hours (non-standard times) 04/10/25 1601        \  Pressure ulcers of skin of multiple topographic sites  Aquacel Ag + Mepilex dressings  Wound care team consulted    Continue offloading and specialty mattress    Depression  Patient has persistent depression which is unknown and is currently controlled. Will Continue anti-depressant medications. We will not consult psychiatry at this time. Patient does not display psychosis at this time. Continue to monitor closely and adjust plan of care as needed.          Mucus plugging of bronchi      S/P percutaneous endoscopic gastrostomy (PEG) tube  placement  Patient noted to have a percutaneous endoscopic gastrostomy tube in place. I have personally inspected the tube.Tube was placed prior to this admission There are no signs of drainage or infection around the site. The tube is patent. Medications have converted to liquid form if available.  Routine care to be done by wound care and nursing staff.       Quadriplegia  Maximal support, bedbound  PT/OT as tolerated  DME planning for discharge    Hematoma of lower extremity, right, subsequent encounter  No active bleeding; conservative management  Monitor H/H  No anticoagulation  Hyperlipidemia  Continue atorvastatin    Pericardial effusion  Previously on colchicine, now discontinued due to bleeding  Continue low-dose prednisone  Monitor for recurrence    Acute respiratory failure with hypoxia and hypercarbia  Patient with Hypoxic Respiratory failure which is Acute on chronic.  she is not on home oxygen. Supplemental oxygen was provided and noted- Oxygen Concentration (%):  [28] 28    .   Signs/symptoms of respiratory failure include- tachypnea and respiratory distress. Contributing diagnoses includes - Aspiration and Pneumonia Labs and images were reviewed. Patient Has not had a recent ABG. Will treat underlying causes and adjust management of respiratory failure as follows-   Chronic respiratory failure with hypoxia  Patient with Hypoxic Respiratory failure which is Acute on chronic.  she is not on home oxygen. Supplemental oxygen was provided and noted- Oxygen Concentration (%):  [28] 28    .   Signs/symptoms of respiratory failure include- increased work of breathing and respiratory distress. Contributing diagnoses includes - Aspiration and Pneumonia Labs and images were reviewed. Patient Has not had a recent ABG. Will treat underlying causes and adjust management of respiratory failure as follows-   Coag negative Staphylococcus bacteremia  Continue with current antibiotics until sensitivities return.       Antibiotics (From admission, onward)      Start     Stop Route Frequency Ordered    04/10/25 1700  cefTRIAXone injection 1 g         -- IV Every 24 hours (non-standard times) 04/10/25 1601          3/26  - continues on vanc for blood cultures + on 3/24, echo completed at time with no endocarditis seen  - discussed with pulmonary, recs to follow    3/27  - due to overall baseline health will treat coag negative staph bacteremia as a true infection, for now continue vanc and follow cultures and sensitivities    4/4:  I am continuing with vancomycin given that patient has a severe penicillin allergy.  Otherwise Ancef would have been an option.  Continue with IV antibiotics at least through April 10th.     4/5: continue with IV antibiotics.       Tracheitis  Antibiotics (From admission, onward)      Start     Stop Route Frequency Ordered    04/10/25 1700  cefTRIAXone injection 1 g         -- IV Every 24 hours (non-standard times) 04/10/25 1601              VTE Risk Mitigation (From admission, onward)           Ordered     enoxaparin injection 40 mg  Daily         03/23/25 1940     IP VTE HIGH RISK PATIENT  Once         03/23/25 1940     Place sequential compression device  Until discontinued         03/23/25 1941                    Discharge Planning   SHRADDHA: 4/14/2025     Code Status: Full Code   Medical Readiness for Discharge Date:   Discharge Plan A: Long-term acute care facility (LTAC)                        Jovanna Hoffman DO  Department of Hospital Medicine   [facility] - [facility] [address] [facility]

## 2025-04-12 NOTE — SUBJECTIVE & OBJECTIVE
Interval History:     No significant events overnight, no new complaints or concerns. Discontinue vancomycin today. Awaiting placement.     Objective:     Vital Signs (Most Recent):  Temp: 98 °F (36.7 °C) (04/12/25 1556)  Pulse: 77 (04/12/25 1556)  Resp: 18 (04/12/25 1250)  BP: 137/70 (04/12/25 1556)  SpO2: 98 % (04/12/25 1556) Vital Signs (24h Range):  Temp:  [98 °F (36.7 °C)-98.6 °F (37 °C)] 98 °F (36.7 °C)  Pulse:  [77-96] 77  Resp:  [18-20] 18  SpO2:  [95 %-99 %] 98 %  BP: ()/() 137/70     Weight: 79.8 kg (175 lb 14.8 oz)  Body mass index is 32.18 kg/m².    Intake/Output Summary (Last 24 hours) at 4/12/2025 1717  Last data filed at 4/12/2025 0908  Gross per 24 hour   Intake 540 ml   Output --   Net 540 ml         Physical Exam  Constitutional:       General: She is not in acute distress.     Appearance: She is ill-appearing.   HENT:      Head: Normocephalic and atraumatic.      Nose: Nose normal.      Mouth/Throat:      Mouth: Mucous membranes are moist.      Pharynx: Oropharynx is clear.   Eyes:      Extraocular Movements: Extraocular movements intact.      Conjunctiva/sclera: Conjunctivae normal.      Pupils: Pupils are equal, round, and reactive to light.   Cardiovascular:      Rate and Rhythm: Normal rate and regular rhythm.   Pulmonary:      Effort: Pulmonary effort is normal. No respiratory distress.      Comments: Secretions  Abdominal:      General: There is no distension.      Palpations: Abdomen is soft.      Tenderness: There is no abdominal tenderness.   Skin:     General: Skin is warm and dry.   Neurological:      Mental Status: She is alert. Mental status is at baseline.      Comments: Quadriplegia    Psychiatric:         Mood and Affect: Mood normal.         Behavior: Behavior normal.               Significant Labs: All pertinent labs within the past 24 hours have been reviewed.    Significant Imaging: I have reviewed all pertinent imaging results/findings within the past 24  hours.

## 2025-04-12 NOTE — ASSESSMENT & PLAN NOTE
Continue tracheostomy care with T-piece trials  Continue tracheostomy suctioning ordered due to thick secretions. Continue scheduled mucolytic and scopolamine.    Culture with Haemophilus and yeast, started on fluconazole a ceftriaxone.     Pulmonology involved with care this admission.     Family would like patient to go to LTAC before going home as she still requires frequent suctioning, tube feeds, antibiotics, and additional care.  She has required over a week of ICU care prior to returning to our facility.  She was on the vent and was difficult to wean. She has required more than three midnights intermediate ICU level of care while at our facility.       Keep trach without capping until April 15th.   Pulmonology to see patient tomorrow.     Denied LTAC by [employer], her insurance provider.  Appeal sent 4/8. Awaiting decision.

## 2025-04-12 NOTE — ASSESSMENT & PLAN NOTE
Recurrent aspiration pneumonia.  Completed two rounds of antibiotics.      Antibiotics (From admission, onward)      Start     Stop Route Frequency Ordered    04/10/25 1700  cefTRIAXone injection 1 g         -- IV Every 24 hours (non-standard times) 04/10/25 1601        \

## 2025-04-12 NOTE — ASSESSMENT & PLAN NOTE
Antibiotics (From admission, onward)      Start     Stop Route Frequency Ordered    04/10/25 1700  cefTRIAXone injection 1 g         -- IV Every 24 hours (non-standard times) 04/10/25 1601

## 2025-04-13 LAB
BUN SERPL-MCNC: 14 MG/DL (ref 10–20)
BUN/CREAT SERPL: 25 (ref 6–20)
CREAT SERPL-MCNC: 0.55 MG/DL (ref 0.55–1.02)
EGFR (NO RACE VARIABLE) (RUSH/TITUS): 104 ML/MIN/1.73M2
GLUCOSE SERPL-MCNC: 106 MG/DL (ref 70–105)
GLUCOSE SERPL-MCNC: 109 MG/DL (ref 70–105)
GLUCOSE SERPL-MCNC: 111 MG/DL (ref 70–105)

## 2025-04-13 PROCEDURE — 25000242 PHARM REV CODE 250 ALT 637 W/ HCPCS: Performed by: FAMILY MEDICINE

## 2025-04-13 PROCEDURE — 25000003 PHARM REV CODE 250: Performed by: HOSPITALIST

## 2025-04-13 PROCEDURE — 25000242 PHARM REV CODE 250 ALT 637 W/ HCPCS: Performed by: STUDENT IN AN ORGANIZED HEALTH CARE EDUCATION/TRAINING PROGRAM

## 2025-04-13 PROCEDURE — 25000003 PHARM REV CODE 250: Performed by: FAMILY MEDICINE

## 2025-04-13 PROCEDURE — 82565 ASSAY OF CREATININE: CPT | Performed by: HOSPITALIST

## 2025-04-13 PROCEDURE — 82962 GLUCOSE BLOOD TEST: CPT

## 2025-04-13 PROCEDURE — 27000221 HC OXYGEN, UP TO 24 HOURS

## 2025-04-13 PROCEDURE — 36415 COLL VENOUS BLD VENIPUNCTURE: CPT | Performed by: HOSPITALIST

## 2025-04-13 PROCEDURE — 63600175 PHARM REV CODE 636 W HCPCS: Performed by: FAMILY MEDICINE

## 2025-04-13 PROCEDURE — 96372 THER/PROPH/DIAG INJ SC/IM: CPT

## 2025-04-13 PROCEDURE — 94761 N-INVAS EAR/PLS OXIMETRY MLT: CPT

## 2025-04-13 PROCEDURE — 99900035 HC TECH TIME PER 15 MIN (STAT)

## 2025-04-13 PROCEDURE — 94640 AIRWAY INHALATION TREATMENT: CPT

## 2025-04-13 PROCEDURE — 63600175 PHARM REV CODE 636 W HCPCS: Performed by: HOSPITALIST

## 2025-04-13 PROCEDURE — 99900026 HC AIRWAY MAINTENANCE (STAT)

## 2025-04-13 PROCEDURE — 11000001 HC ACUTE MED/SURG PRIVATE ROOM

## 2025-04-13 PROCEDURE — 25000242 PHARM REV CODE 250 ALT 637 W/ HCPCS: Performed by: INTERNAL MEDICINE

## 2025-04-13 PROCEDURE — 27000207 HC ISOLATION

## 2025-04-13 PROCEDURE — 99232 SBSQ HOSP IP/OBS MODERATE 35: CPT | Mod: ,,, | Performed by: FAMILY MEDICINE

## 2025-04-13 RX ORDER — SODIUM CHLORIDE FOR INHALATION 3 %
4 VIAL, NEBULIZER (ML) INHALATION
Status: DISCONTINUED | OUTPATIENT
Start: 2025-04-13 | End: 2025-04-14

## 2025-04-13 RX ADMIN — TRAZODONE HYDROCHLORIDE 100 MG: 50 TABLET ORAL at 08:04

## 2025-04-13 RX ADMIN — PREGABALIN 75 MG: 75 CAPSULE ORAL at 08:04

## 2025-04-13 RX ADMIN — CEFTRIAXONE SODIUM 1 G: 1 INJECTION, POWDER, FOR SOLUTION INTRAMUSCULAR; INTRAVENOUS at 05:04

## 2025-04-13 RX ADMIN — SODIUM CHLORIDE SOLN NEBU 3% 4 ML: 3 NEBU SOLN at 08:04

## 2025-04-13 RX ADMIN — LEVALBUTEROL HYDROCHLORIDE 1.25 MG: 1.25 SOLUTION RESPIRATORY (INHALATION) at 01:04

## 2025-04-13 RX ADMIN — GUAIFENESIN 200 MG: 200 SOLUTION ORAL at 09:04

## 2025-04-13 RX ADMIN — ENOXAPARIN SODIUM 40 MG: 40 INJECTION SUBCUTANEOUS at 05:04

## 2025-04-13 RX ADMIN — SODIUM CHLORIDE SOLN NEBU 3% 4 ML: 3 NEBU SOLN at 01:04

## 2025-04-13 RX ADMIN — PANTOPRAZOLE SODIUM 40 MG: 40 INJECTION, POWDER, FOR SOLUTION INTRAVENOUS at 09:04

## 2025-04-13 RX ADMIN — LEVALBUTEROL HYDROCHLORIDE 1.25 MG: 1.25 SOLUTION RESPIRATORY (INHALATION) at 08:04

## 2025-04-13 RX ADMIN — MIDODRINE HYDROCHLORIDE 10 MG: 10 TABLET ORAL at 09:04

## 2025-04-13 RX ADMIN — ACETYLCYSTEINE 2 ML: 200 SOLUTION ORAL; RESPIRATORY (INHALATION) at 07:04

## 2025-04-13 RX ADMIN — MIDODRINE HYDROCHLORIDE 10 MG: 10 TABLET ORAL at 08:04

## 2025-04-13 RX ADMIN — PREGABALIN 75 MG: 75 CAPSULE ORAL at 09:04

## 2025-04-13 RX ADMIN — GUAIFENESIN 200 MG: 200 SOLUTION ORAL at 08:04

## 2025-04-13 RX ADMIN — ATORVASTATIN CALCIUM 20 MG: 20 TABLET, FILM COATED ORAL at 08:04

## 2025-04-13 RX ADMIN — GUAIFENESIN 200 MG: 200 SOLUTION ORAL at 02:04

## 2025-04-13 RX ADMIN — SERTRALINE HYDROCHLORIDE 50 MG: 50 TABLET ORAL at 09:04

## 2025-04-13 RX ADMIN — MIDODRINE HYDROCHLORIDE 10 MG: 10 TABLET ORAL at 02:04

## 2025-04-13 RX ADMIN — SCOPOLAMINE 1 PATCH: 1.5 PATCH, EXTENDED RELEASE TRANSDERMAL at 10:04

## 2025-04-13 RX ADMIN — LEVALBUTEROL HYDROCHLORIDE 1.25 MG: 1.25 SOLUTION RESPIRATORY (INHALATION) at 07:04

## 2025-04-13 RX ADMIN — ACETAMINOPHEN 1000 MG: 500 TABLET ORAL at 09:04

## 2025-04-13 NOTE — ASSESSMENT & PLAN NOTE
Resolved. Treatment with vancomycin completed 4/12.     No endocarditis noted on echocardiogram.

## 2025-04-13 NOTE — ASSESSMENT & PLAN NOTE
Continue tracheostomy care with T-piece trials  Continue tracheostomy suctioning ordered due to thick secretions. Continue scheduled mucolytic and scopolamine.    Culture with Haemophilus and yeast, started on fluconazole and ceftriaxone.     Pulmonology involved with care this admission.     Family would like patient to go to LTAC before going home as she still requires frequent suctioning, tube feeds, antibiotics, and additional care.  She has required over a week of ICU care prior to returning to our facility.  She was on the vent and was difficult to wean. She has required more than three midnights intermediate ICU level of care while at our facility.       Keep trach without capping until April 15th.       Denied LTAC by [employer], her insurance provider.  Appeal sent 4/8. Awaiting decision.

## 2025-04-13 NOTE — SUBJECTIVE & OBJECTIVE
Interval History:     No significant events overnight, no new complaints or concerns. Resting comfortably this morning.     Objective:     Vital Signs (Most Recent):  Temp: 99.7 °F (37.6 °C) (04/13/25 0706)  Pulse: 95 (04/13/25 0735)  Resp: 18 (04/13/25 0735)  BP: 99/61 (04/13/25 0706)  SpO2: 98 % (04/13/25 0735) Vital Signs (24h Range):  Temp:  [97.4 °F (36.3 °C)-99.7 °F (37.6 °C)] 99.7 °F (37.6 °C)  Pulse:  [77-98] 95  Resp:  [18] 18  SpO2:  [95 %-100 %] 98 %  BP: ()/(59-71) 99/61     Weight: 79.8 kg (175 lb 14.8 oz)  Body mass index is 32.18 kg/m².  No intake or output data in the 24 hours ending 04/13/25 1051        Physical Exam  Constitutional:       General: She is not in acute distress.     Appearance: She is ill-appearing.   HENT:      Head: Normocephalic and atraumatic.      Nose: Nose normal.      Mouth/Throat:      Mouth: Mucous membranes are moist.      Pharynx: Oropharynx is clear.   Eyes:      Extraocular Movements: Extraocular movements intact.      Conjunctiva/sclera: Conjunctivae normal.      Pupils: Pupils are equal, round, and reactive to light.   Cardiovascular:      Rate and Rhythm: Normal rate and regular rhythm.   Pulmonary:      Effort: Pulmonary effort is normal. No respiratory distress.      Comments: Secretions  Abdominal:      General: There is no distension.      Palpations: Abdomen is soft.      Tenderness: There is no abdominal tenderness.   Skin:     General: Skin is warm and dry.   Neurological:      Mental Status: She is alert. Mental status is at baseline.      Comments: Quadriplegia    Psychiatric:         Mood and Affect: Mood normal.         Behavior: Behavior normal.               Significant Labs: All pertinent labs within the past 24 hours have been reviewed.    Significant Imaging: I have reviewed all pertinent imaging results/findings within the past 24 hours.

## 2025-04-13 NOTE — PROGRESS NOTES
[facility] - [address] [place] [address] Medicine  Progress Note    Patient Name: Karie Denney  MRN: [id number]  Patient Class: IP- Inpatient   Admission Date: 3/23/2025  Length of Stay: 21 days  Attending Physician: Jovanna Hoffman DO  Primary Care Provider: Gonzalo Barrera NP        Subjective     Principal Problem:Acute on chronic respiratory failure with hypoxia        HPI:  Chief Complaint  Transfer for continued management of respiratory failure, tracheostomy care, PEG feeding, and complications of quadriplegia following prolonged hospitalization.    History of Present Illness  Ms. Karie Denney is a 59-year-old woman with a complex medical history including quadriplegia following spinal surgery in 2015 and a cerebrovascular accident (CVA) in 2024 resulting in left-sided paralysis. She was transferred to [facility] from [facility] in [place], [place], for ongoing care following a prolonged ICU course involving intubation, respiratory failure, and significant complications.  She was initially hospitalized on February 15, 2025, for aspiration and dysphagia evaluation, during which she developed aspiration pneumonia and respiratory failure requiring intubation. Her hospital course was complicated by an ESBL E. coli UTI, pericardial effusion (treated with colchicine), and a spontaneous right thigh hematoma concerning for compartment syndrome, leading to transfer to [facility]. She required prolonged mechanical ventilation, tracheostomy placement, and PEG tube insertion.  She has now returned to [facility] for continued respiratory care, tracheostomy management, PEG feeding, management of sacral pressure injury, and rehabilitation planning.    Past Medical History  Quadriplegia post-spinal surgery (2015)  CVA (2024)  Aspiration pneumonia  Acute respiratory failure  UTI (ESBL E. coli)  Depression  GERD  Pharyngoesophageal dysphagia  Pericardial effusion/pericarditis  Chronic  constipation  Pressure ulcer (sacral, unstageable)  Hyperlipidemia  Anemia    Past Surgical History  Spinal surgery (2015)  PEG tube placement (03/11/2025)  Esophageal dilation with biopsy (08/2024)    Medications  Active Medications:  Atorvastatin 20 mg daily  Omeprazole 40 mg daily  Sertraline 50 mg daily  Trazodone 100 mg qHS  Pregabalin 75 mg BID  Hydrocodone-acetaminophen 5-325 mg BID  Lactulose 30 mL daily via PEG  Midodrine 10 mg Q6H via PEG  Levalbuterol 0.63 mg nebulizer Q6H  Polyethylene glycol 17 g daily via PEG  Recent Changes:  Colchicine: Discontinued due to bleeding  Aspirin and ezetimibe: Discontinued    Allergies  Penicillins ? Rash and anaphylaxis    Social History  Never smoker  No alcohol or tobacco use  Lives at home with mother; receives home health weekly  Bedbound, non-ambulatory    Family History  Noncontributory    Review of Systems  Limited due to tracheostomy, quadriplegia, and communication challenges. History obtained from EMR and caregiver.    Physical Examination  General: Chronically ill-appearing woman, alert, tracheostomy in place with T-piece  HEENT: Mild nasal skin necrosis, mucous membranes moist  Eyes: PERRL, EOMI  Neck: Tracheostomy present  CV: RRR, no murmurs  Lungs: Breath sounds diminished at bases; no acute distress; no wheezing  Abdomen: Soft, non-tender, PEG tube in place  Skin: Unstageable sacral ulcer, nasal pressure ulcer  Neuro: Quadriplegia, responsive with eye tracking and blinking  Extremities: RLE hematoma, bilateral edema, no signs of active bleeding    Laboratory Data (Most Recent)  Hgb: 8.5 g/dL  Creatinine: 0.30 mg/dL  Albumin: 3.1 g/dL  WBC: 9.4 K/uL  Ferritin: 265 ng/mL  INR: 0.93  No growth on recent blood and urine cultures    Imaging  CT angio: Large RLE hematoma without active extravasation  Multiple chest X-rays: Stable bilateral pleural effusions, improving atelectasis  Echo: EF 55-60%, small pericardial effusion      Overview/Hospital  Course:  3/26  - continues on vanc for blood cultures + on 3/24, echo completed at time with no endocarditis seen  - discussed with pulmonary, recs to follow    3/27  - due to overall baseline health will treat coag negative staph bacteremia as a true infection, for now continue vanc and follow cultures and sensitivities  - discussed with pulmonology who plan to continue trach collar as is and see patient May 1st at 1:40 pm, confirmed appointment  - discussed with family at bedside who are very concerned about secretions and states they don't have the suction capabilities at home and do not feel comfortable going home with her in this state, will discuss with social on possible home equipment    3/28  - awaiting micro, continuing vanc  - social setting up suction at home, family plans to return home as before    3/29  - still with secretions  - family requesting voice box    3/30  - doing well today, mentation much improved  - anticipate discharge within the next two days with home health services, family will need education on trach maintenance and home feedings as patient has PEG tube and they have not cared for a PEG tube before, also we will discuss with  getting a speaking told to use with a trach    3/31  - secretions improved today  - working with social for home set up of oxygen and tube feeds  - family needs heavy education on trach care and tube feeds as primary care giver has no experience with either, still wishes to discharge home    Interval History:     No significant events overnight, no new complaints or concerns. Resting comfortably this morning.     Objective:     Vital Signs (Most Recent):  Temp: 99.7 °F (37.6 °C) (04/13/25 0706)  Pulse: 95 (04/13/25 0735)  Resp: 18 (04/13/25 0735)  BP: 99/61 (04/13/25 0706)  SpO2: 98 % (04/13/25 0735) Vital Signs (24h Range):  Temp:  [97.4 °F (36.3 °C)-99.7 °F (37.6 °C)] 99.7 °F (37.6 °C)  Pulse:  [77-98] 95  Resp:  [18] 18  SpO2:  [95 %-100 %] 98  %  BP: ()/(59-71) 99/61     Weight: 79.8 kg (175 lb 14.8 oz)  Body mass index is 32.18 kg/m².  No intake or output data in the 24 hours ending 04/13/25 1051        Physical Exam  Constitutional:       General: She is not in acute distress.     Appearance: She is ill-appearing.   HENT:      Head: Normocephalic and atraumatic.      Nose: Nose normal.      Mouth/Throat:      Mouth: Mucous membranes are moist.      Pharynx: Oropharynx is clear.   Eyes:      Extraocular Movements: Extraocular movements intact.      Conjunctiva/sclera: Conjunctivae normal.      Pupils: Pupils are equal, round, and reactive to light.   Cardiovascular:      Rate and Rhythm: Normal rate and regular rhythm.   Pulmonary:      Effort: Pulmonary effort is normal. No respiratory distress.      Comments: Secretions  Abdominal:      General: There is no distension.      Palpations: Abdomen is soft.      Tenderness: There is no abdominal tenderness.   Skin:     General: Skin is warm and dry.   Neurological:      Mental Status: She is alert. Mental status is at baseline.      Comments: Quadriplegia    Psychiatric:         Mood and Affect: Mood normal.         Behavior: Behavior normal.               Significant Labs: All pertinent labs within the past 24 hours have been reviewed.    Significant Imaging: I have reviewed all pertinent imaging results/findings within the past 24 hours.              Assessment & Plan  Acute on chronic respiratory failure with hypoxia  Continue tracheostomy care with T-piece trials  Continue tracheostomy suctioning ordered due to thick secretions. Continue scheduled mucolytic and scopolamine.    Culture with Haemophilus and yeast, started on fluconazole and ceftriaxone.     Pulmonology involved with care this admission.     Family would like patient to go to LTAC before going home as she still requires frequent suctioning, tube feeds, antibiotics, and additional care.  She has required over a week of ICU care prior  to returning to our facility.  She was on the vent and was difficult to wean. She has required more than three midnights intermediate ICU level of care while at our facility.       Keep trach without capping until April 15th.       Denied LTAC by [employer], her insurance provider.  Appeal sent 4/8. Awaiting decision.   Severe protein-calorie malnutrition  Nutrition consulted. Most recent weight and BMI monitored-     Measurements:  Wt Readings from Last 1 Encounters:   03/27/25 79.8 kg (175 lb 14.8 oz)   Body mass index is 32.18 kg/m².    Patient has been screened and assessed by RD.    Malnutrition Type:  Context: chronic illness  Level: severe    Continue PEG tube feeding.  Nepro at 40 mL/hr  Maintain bowel regimen (lactulose, PEG, senna)  Dietitian to reassess  continue with current tube feed regimen    Dysphagia  With PEG in place. Continue tube feeds.   Continue omeprazole  Avoid PO intake  Speech therapy if clinically appropriate    Aspiration pneumonia  Recurrent aspiration pneumonia.  Completed two rounds of antibiotics. Now on ceftriaxone for tracheitis.    Antibiotics (From admission, onward)      Start     Stop Route Frequency Ordered    04/10/25 1700  cefTRIAXone injection 1 g         -- IV Every 24 hours (non-standard times) 04/10/25 1601        \  Pressure ulcers of skin of multiple topographic sites  Aquacel Ag + Mepilex dressings  Wound care team consulted    Continue offloading and specialty mattress    Depression  Patient has persistent depression which is unknown and is currently controlled. Will Continue anti-depressant medications. We will not consult psychiatry at this time. Patient does not display psychosis at this time. Continue to monitor closely and adjust plan of care as needed.          Mucus plugging of bronchi      S/P percutaneous endoscopic gastrostomy (PEG) tube placement  Patient noted to have a percutaneous endoscopic gastrostomy tube in place. I have personally inspected the  tube.Tube was placed prior to this admission There are no signs of drainage or infection around the site. The tube is patent. Medications have converted to liquid form if available.  Routine care to be done by wound care and nursing staff.       Quadriplegia  Maximal support, bedbound  PT/OT as tolerated  DME planning for discharge    Hematoma of lower extremity, right, subsequent encounter  No active bleeding; conservative management  Monitor H/H  No anticoagulation  Hyperlipidemia  Continue atorvastatin    Pericardial effusion  Previously on colchicine, now discontinued due to bleeding  Continue low-dose prednisone  Monitor for recurrence    Acute respiratory failure with hypoxia and hypercarbia  Patient with Hypoxic Respiratory failure which is Acute on chronic.  she is not on home oxygen. Supplemental oxygen was provided and noted- Oxygen Concentration (%):  [28] 28    .   Signs/symptoms of respiratory failure include- tachypnea and respiratory distress. Contributing diagnoses includes - Aspiration and Pneumonia Labs and images were reviewed. Patient Has not had a recent ABG. Will treat underlying causes and adjust management of respiratory failure as follows-   Chronic respiratory failure with hypoxia  Patient with Hypoxic Respiratory failure which is Acute on chronic.  she is not on home oxygen. Supplemental oxygen was provided and noted- Oxygen Concentration (%):  [28] 28    .   Signs/symptoms of respiratory failure include- increased work of breathing and respiratory distress. Contributing diagnoses includes - Aspiration and Pneumonia Labs and images were reviewed. Patient Has not had a recent ABG. Will treat underlying causes and adjust management of respiratory failure as follows-   Coag negative Staphylococcus bacteremia  Resolved. Treatment with vancomycin completed 4/12.     No endocarditis noted on echocardiogram.     Tracheitis  Antibiotics (From admission, onward)      Start     Stop Route Frequency  Ordered    04/10/25 1700  cefTRIAXone injection 1 g         -- IV Every 24 hours (non-standard times) 04/10/25 1601              VTE Risk Mitigation (From admission, onward)           Ordered     enoxaparin injection 40 mg  Daily         03/23/25 1940     IP VTE HIGH RISK PATIENT  Once         03/23/25 1940     Place sequential compression device  Until discontinued         03/23/25 1941                    Discharge Planning   SHRADDHA: 4/14/2025     Code Status: Full Code   Medical Readiness for Discharge Date:   Discharge Plan A: Long-term acute care facility (LTAC)                        Jovanna Hoffman DO  Department of Hospital Medicine   [facility] - [address] [facility]

## 2025-04-13 NOTE — PLAN OF CARE
Problem: Fall Injury Risk  Goal: Absence of Fall and Fall-Related Injury  Outcome: Progressing  Intervention: Identify and Manage Contributors  Flowsheets (Taken 4/13/2025 1538)  Self-Care Promotion:   independence encouraged   adaptive equipment use encouraged   BADL personal objects within reach   BADL personal routines maintained   meal set-up provided  Medication Review/Management: medications reviewed  Intervention: Promote Injury-Free Environment  Flowsheets (Taken 4/13/2025 1538)  Safety Promotion/Fall Prevention:   assistive device/personal item within reach   bed alarm set   Fall Risk reviewed with patient/family   instructed to call staff for mobility   lighting adjusted   medications reviewed   muscle strengthening facilitated   nonskid shoes/socks when out of bed   side rails raised x 3   room near unit station   /camera at bedside

## 2025-04-13 NOTE — ASSESSMENT & PLAN NOTE
Recurrent aspiration pneumonia.  Completed two rounds of antibiotics. Now on ceftriaxone for tracheitis.    Antibiotics (From admission, onward)      Start     Stop Route Frequency Ordered    04/10/25 1700  cefTRIAXone injection 1 g         -- IV Every 24 hours (non-standard times) 04/10/25 1601        \

## 2025-04-13 NOTE — CONSULTS
Consult received and appreciated. Consult for wounds. Patient ordered Luis Fernando BID with enteral feed on 3/24.  See previous note on that date.

## 2025-04-14 LAB
ANION GAP SERPL CALCULATED.3IONS-SCNC: 14 MMOL/L (ref 7–16)
BASOPHILS # BLD AUTO: 0.04 K/UL (ref 0–0.2)
BASOPHILS NFR BLD AUTO: 0.4 % (ref 0–1)
BUN SERPL-MCNC: 14 MG/DL (ref 10–20)
BUN SERPL-MCNC: 14 MG/DL (ref 10–20)
BUN/CREAT SERPL: 30 (ref 6–20)
BUN/CREAT SERPL: 30 (ref 6–20)
CALCIUM SERPL-MCNC: 9.4 MG/DL (ref 8.4–10.2)
CHLORIDE SERPL-SCNC: 104 MMOL/L (ref 98–107)
CO2 SERPL-SCNC: 24 MMOL/L (ref 23–31)
CREAT SERPL-MCNC: 0.46 MG/DL (ref 0.55–1.02)
CREAT SERPL-MCNC: 0.46 MG/DL (ref 0.55–1.02)
DIFFERENTIAL METHOD BLD: ABNORMAL
EGFR (NO RACE VARIABLE) (RUSH/TITUS): 109 ML/MIN/1.73M2
EGFR (NO RACE VARIABLE) (RUSH/TITUS): 109 ML/MIN/1.73M2
EOSINOPHIL # BLD AUTO: 0.37 K/UL (ref 0–0.5)
EOSINOPHIL NFR BLD AUTO: 3.9 % (ref 1–4)
ERYTHROCYTE [DISTWIDTH] IN BLOOD BY AUTOMATED COUNT: 17.3 % (ref 11.5–14.5)
GLUCOSE SERPL-MCNC: 94 MG/DL (ref 82–115)
HCT VFR BLD AUTO: 34.1 % (ref 38–47)
HGB BLD-MCNC: 9.7 G/DL (ref 12–16)
IMM GRANULOCYTES # BLD AUTO: 0.02 K/UL (ref 0–0.04)
IMM GRANULOCYTES NFR BLD: 0.2 % (ref 0–0.4)
LYMPHOCYTES # BLD AUTO: 2.59 K/UL (ref 1–4.8)
LYMPHOCYTES NFR BLD AUTO: 27.3 % (ref 27–41)
MCH RBC QN AUTO: 24.6 PG (ref 27–31)
MCHC RBC AUTO-ENTMCNC: 28.4 G/DL (ref 32–36)
MCV RBC AUTO: 86.3 FL (ref 80–96)
MONOCYTES # BLD AUTO: 0.72 K/UL (ref 0–0.8)
MONOCYTES NFR BLD AUTO: 7.6 % (ref 2–6)
MPC BLD CALC-MCNC: 11.8 FL (ref 9.4–12.4)
NEUTROPHILS # BLD AUTO: 5.74 K/UL (ref 1.8–7.7)
NEUTROPHILS NFR BLD AUTO: 60.6 % (ref 53–65)
NRBC # BLD AUTO: 0 X10E3/UL
NRBC, AUTO (.00): 0 %
PLATELET # BLD AUTO: 191 K/UL (ref 150–400)
POTASSIUM SERPL-SCNC: 3.2 MMOL/L (ref 3.5–5.1)
RBC # BLD AUTO: 3.95 M/UL (ref 4.2–5.4)
SODIUM SERPL-SCNC: 139 MMOL/L (ref 136–145)
WBC # BLD AUTO: 9.48 K/UL (ref 4.5–11)

## 2025-04-14 PROCEDURE — 11000001 HC ACUTE MED/SURG PRIVATE ROOM

## 2025-04-14 PROCEDURE — 82565 ASSAY OF CREATININE: CPT | Performed by: HOSPITALIST

## 2025-04-14 PROCEDURE — 36415 COLL VENOUS BLD VENIPUNCTURE: CPT | Performed by: FAMILY MEDICINE

## 2025-04-14 PROCEDURE — 25000003 PHARM REV CODE 250: Performed by: FAMILY MEDICINE

## 2025-04-14 PROCEDURE — 27000221 HC OXYGEN, UP TO 24 HOURS

## 2025-04-14 PROCEDURE — 25000003 PHARM REV CODE 250: Performed by: HOSPITALIST

## 2025-04-14 PROCEDURE — 94761 N-INVAS EAR/PLS OXIMETRY MLT: CPT

## 2025-04-14 PROCEDURE — 25000242 PHARM REV CODE 250 ALT 637 W/ HCPCS: Performed by: FAMILY MEDICINE

## 2025-04-14 PROCEDURE — 63600175 PHARM REV CODE 636 W HCPCS: Performed by: FAMILY MEDICINE

## 2025-04-14 PROCEDURE — 25000242 PHARM REV CODE 250 ALT 637 W/ HCPCS: Performed by: INTERNAL MEDICINE

## 2025-04-14 PROCEDURE — 27000207 HC ISOLATION

## 2025-04-14 PROCEDURE — 99232 SBSQ HOSP IP/OBS MODERATE 35: CPT | Mod: ,,, | Performed by: FAMILY MEDICINE

## 2025-04-14 PROCEDURE — 85025 COMPLETE CBC W/AUTO DIFF WBC: CPT | Performed by: FAMILY MEDICINE

## 2025-04-14 PROCEDURE — 99900026 HC AIRWAY MAINTENANCE (STAT)

## 2025-04-14 PROCEDURE — 94640 AIRWAY INHALATION TREATMENT: CPT

## 2025-04-14 PROCEDURE — 63600175 PHARM REV CODE 636 W HCPCS: Performed by: HOSPITALIST

## 2025-04-14 PROCEDURE — 80048 BASIC METABOLIC PNL TOTAL CA: CPT | Performed by: FAMILY MEDICINE

## 2025-04-14 PROCEDURE — 99900035 HC TECH TIME PER 15 MIN (STAT)

## 2025-04-14 RX ORDER — ACETYLCYSTEINE 200 MG/ML
2 SOLUTION ORAL; RESPIRATORY (INHALATION) 3 TIMES DAILY
Status: DISCONTINUED | OUTPATIENT
Start: 2025-04-14 | End: 2025-04-30 | Stop reason: HOSPADM

## 2025-04-14 RX ADMIN — PREGABALIN 75 MG: 75 CAPSULE ORAL at 09:04

## 2025-04-14 RX ADMIN — LEVALBUTEROL HYDROCHLORIDE 1.25 MG: 1.25 SOLUTION RESPIRATORY (INHALATION) at 02:04

## 2025-04-14 RX ADMIN — MIDODRINE HYDROCHLORIDE 10 MG: 10 TABLET ORAL at 09:04

## 2025-04-14 RX ADMIN — MIDODRINE HYDROCHLORIDE 10 MG: 10 TABLET ORAL at 02:04

## 2025-04-14 RX ADMIN — ACETYLCYSTEINE 2 ML: 200 SOLUTION ORAL; RESPIRATORY (INHALATION) at 02:04

## 2025-04-14 RX ADMIN — ACETYLCYSTEINE 2 ML: 200 SOLUTION ORAL; RESPIRATORY (INHALATION) at 07:04

## 2025-04-14 RX ADMIN — SODIUM CHLORIDE SOLN NEBU 3% 4 ML: 3 NEBU SOLN at 07:04

## 2025-04-14 RX ADMIN — PANTOPRAZOLE SODIUM 40 MG: 40 INJECTION, POWDER, FOR SOLUTION INTRAVENOUS at 09:04

## 2025-04-14 RX ADMIN — TRAZODONE HYDROCHLORIDE 100 MG: 50 TABLET ORAL at 09:04

## 2025-04-14 RX ADMIN — CEFTRIAXONE SODIUM 1 G: 1 INJECTION, POWDER, FOR SOLUTION INTRAMUSCULAR; INTRAVENOUS at 04:04

## 2025-04-14 RX ADMIN — ATORVASTATIN CALCIUM 20 MG: 20 TABLET, FILM COATED ORAL at 09:04

## 2025-04-14 RX ADMIN — ENOXAPARIN SODIUM 40 MG: 40 INJECTION SUBCUTANEOUS at 04:04

## 2025-04-14 RX ADMIN — LEVALBUTEROL HYDROCHLORIDE 1.25 MG: 1.25 SOLUTION RESPIRATORY (INHALATION) at 07:04

## 2025-04-14 RX ADMIN — GUAIFENESIN 200 MG: 200 SOLUTION ORAL at 09:04

## 2025-04-14 RX ADMIN — SERTRALINE HYDROCHLORIDE 50 MG: 50 TABLET ORAL at 09:04

## 2025-04-14 RX ADMIN — POTASSIUM BICARBONATE 35 MEQ: 391 TABLET, EFFERVESCENT ORAL at 02:04

## 2025-04-14 NOTE — ASSESSMENT & PLAN NOTE
Continue tracheostomy care with T-piece trials  Continue tracheostomy suctioning ordered due to thick secretions. Continue scheduled mucolytic and scopolamine.    Culture with Haemophilus and yeast, started on fluconazole and ceftriaxone.     Pulmonology involved with care this admission.     Family would like patient to go to LTAC before going home as she still requires frequent suctioning, tube feeds, antibiotics, and additional care.  She has required over a week of ICU care prior to returning to our facility.  She was on the vent and was difficult to wean. She has required more than three midnights intermediate ICU level of care while at our facility.       Keep trach without capping until April 15th.       Denied LTAC by [employer], her insurance provider.  Appeal sent 4/8. Awaiting decision.

## 2025-04-14 NOTE — SUBJECTIVE & OBJECTIVE
Interval History:     No significant events overnight, no new complaints or concerns. Resting comfortably this morning. Feels as if she is not doing as well with trial of 3% Na+ nebs so will resume Mucomyst.     Objective:     Vital Signs (Most Recent):  Temp: 98.1 °F (36.7 °C) (04/14/25 1157)  Pulse: 82 (04/14/25 1157)  Resp: 20 (04/14/25 0736)  BP: (!) 144/73 (04/14/25 1157)  SpO2: 99 % (04/14/25 1157) Vital Signs (24h Range):  Temp:  [98 °F (36.7 °C)-99.2 °F (37.3 °C)] 98.1 °F (36.7 °C)  Pulse:  [69-95] 82  Resp:  [16-20] 20  SpO2:  [96 %-100 %] 99 %  BP: ()/(53-73) 144/73     Weight: 79.8 kg (175 lb 14.8 oz)  Body mass index is 32.18 kg/m².    Intake/Output Summary (Last 24 hours) at 4/14/2025 1206  Last data filed at 4/13/2025 1845  Gross per 24 hour   Intake 565 ml   Output --   Net 565 ml           Physical Exam  Constitutional:       General: She is not in acute distress.     Appearance: She is ill-appearing.   HENT:      Head: Normocephalic and atraumatic.      Nose: Nose normal.      Mouth/Throat:      Mouth: Mucous membranes are moist.      Pharynx: Oropharynx is clear.   Eyes:      Extraocular Movements: Extraocular movements intact.      Conjunctiva/sclera: Conjunctivae normal.      Pupils: Pupils are equal, round, and reactive to light.   Cardiovascular:      Rate and Rhythm: Normal rate and regular rhythm.   Pulmonary:      Effort: Pulmonary effort is normal. No respiratory distress.      Comments: Secretions  Abdominal:      General: There is no distension.      Palpations: Abdomen is soft.      Tenderness: There is no abdominal tenderness.   Skin:     General: Skin is warm and dry.   Neurological:      Mental Status: She is alert. Mental status is at baseline.      Comments: Quadriplegia    Psychiatric:         Mood and Affect: Mood normal.         Behavior: Behavior normal.               Significant Labs: All pertinent labs within the past 24 hours have been reviewed.    Significant Imaging:  I have reviewed all pertinent imaging results/findings within the past 24 hours.

## 2025-04-14 NOTE — PROGRESS NOTES
[facility] - [address] [place] [address] Medicine  Progress Note    Patient Name: Karie Denney  MRN: [id number]  Patient Class: IP- Inpatient   Admission Date: 3/23/2025  Length of Stay: 22 days  Attending Physician: Jovanna Hoffman DO  Primary Care Provider: Gonzalo Barrera NP        Subjective     Principal Problem:Acute on chronic respiratory failure with hypoxia        HPI:  Chief Complaint  Transfer for continued management of respiratory failure, tracheostomy care, PEG feeding, and complications of quadriplegia following prolonged hospitalization.    History of Present Illness  Ms. Karie Denney is a 59-year-old woman with a complex medical history including quadriplegia following spinal surgery in 2015 and a cerebrovascular accident (CVA) in 2024 resulting in left-sided paralysis. She was transferred to [facility] from [facility] in [place], [place], for ongoing care following a prolonged ICU course involving intubation, respiratory failure, and significant complications.  She was initially hospitalized on February 15, 2025, for aspiration and dysphagia evaluation, during which she developed aspiration pneumonia and respiratory failure requiring intubation. Her hospital course was complicated by an ESBL E. coli UTI, pericardial effusion (treated with colchicine), and a spontaneous right thigh hematoma concerning for compartment syndrome, leading to transfer to [facility]. She required prolonged mechanical ventilation, tracheostomy placement, and PEG tube insertion.  She has now returned to [facility] for continued respiratory care, tracheostomy management, PEG feeding, management of sacral pressure injury, and rehabilitation planning.    Past Medical History  Quadriplegia post-spinal surgery (2015)  CVA (2024)  Aspiration pneumonia  Acute respiratory failure  UTI (ESBL E. coli)  Depression  GERD  Pharyngoesophageal dysphagia  Pericardial effusion/pericarditis  Chronic  constipation  Pressure ulcer (sacral, unstageable)  Hyperlipidemia  Anemia    Past Surgical History  Spinal surgery (2015)  PEG tube placement (03/11/2025)  Esophageal dilation with biopsy (08/2024)    Medications  Active Medications:  Atorvastatin 20 mg daily  Omeprazole 40 mg daily  Sertraline 50 mg daily  Trazodone 100 mg qHS  Pregabalin 75 mg BID  Hydrocodone-acetaminophen 5-325 mg BID  Lactulose 30 mL daily via PEG  Midodrine 10 mg Q6H via PEG  Levalbuterol 0.63 mg nebulizer Q6H  Polyethylene glycol 17 g daily via PEG  Recent Changes:  Colchicine: Discontinued due to bleeding  Aspirin and ezetimibe: Discontinued    Allergies  Penicillins ? Rash and anaphylaxis    Social History  Never smoker  No alcohol or tobacco use  Lives at home with mother; receives home health weekly  Bedbound, non-ambulatory    Family History  Noncontributory    Review of Systems  Limited due to tracheostomy, quadriplegia, and communication challenges. History obtained from EMR and caregiver.    Physical Examination  General: Chronically ill-appearing woman, alert, tracheostomy in place with T-piece  HEENT: Mild nasal skin necrosis, mucous membranes moist  Eyes: PERRL, EOMI  Neck: Tracheostomy present  CV: RRR, no murmurs  Lungs: Breath sounds diminished at bases; no acute distress; no wheezing  Abdomen: Soft, non-tender, PEG tube in place  Skin: Unstageable sacral ulcer, nasal pressure ulcer  Neuro: Quadriplegia, responsive with eye tracking and blinking  Extremities: RLE hematoma, bilateral edema, no signs of active bleeding    Laboratory Data (Most Recent)  Hgb: 8.5 g/dL  Creatinine: 0.30 mg/dL  Albumin: 3.1 g/dL  WBC: 9.4 K/uL  Ferritin: 265 ng/mL  INR: 0.93  No growth on recent blood and urine cultures    Imaging  CT angio: Large RLE hematoma without active extravasation  Multiple chest X-rays: Stable bilateral pleural effusions, improving atelectasis  Echo: EF 55-60%, small pericardial effusion      Overview/Hospital  Course:  3/26  - continues on vanc for blood cultures + on 3/24, echo completed at time with no endocarditis seen  - discussed with pulmonary, recs to follow    3/27  - due to overall baseline health will treat coag negative staph bacteremia as a true infection, for now continue vanc and follow cultures and sensitivities  - discussed with pulmonology who plan to continue trach collar as is and see patient May 1st at 1:40 pm, confirmed appointment  - discussed with family at bedside who are very concerned about secretions and states they don't have the suction capabilities at home and do not feel comfortable going home with her in this state, will discuss with social on possible home equipment    3/28  - awaiting micro, continuing vanc  - social setting up suction at home, family plans to return home as before    3/29  - still with secretions  - family requesting voice box    3/30  - doing well today, mentation much improved  - anticipate discharge within the next two days with home health services, family will need education on trach maintenance and home feedings as patient has PEG tube and they have not cared for a PEG tube before, also we will discuss with  getting a speaking told to use with a trach    3/31  - secretions improved today  - working with social for home set up of oxygen and tube feeds  - family needs heavy education on trach care and tube feeds as primary care giver has no experience with either, still wishes to discharge home    Interval History:     No significant events overnight, no new complaints or concerns. Resting comfortably this morning. Feels as if she is not doing as well with trial of 3% Na+ nebs so will resume Mucomyst.     Objective:     Vital Signs (Most Recent):  Temp: 98.1 °F (36.7 °C) (04/14/25 1157)  Pulse: 82 (04/14/25 1157)  Resp: 20 (04/14/25 0736)  BP: (!) 144/73 (04/14/25 1157)  SpO2: 99 % (04/14/25 1157) Vital Signs (24h Range):  Temp:  [98 °F (36.7 °C)-99.2  °F (37.3 °C)] 98.1 °F (36.7 °C)  Pulse:  [69-95] 82  Resp:  [16-20] 20  SpO2:  [96 %-100 %] 99 %  BP: ()/(53-73) 144/73     Weight: 79.8 kg (175 lb 14.8 oz)  Body mass index is 32.18 kg/m².    Intake/Output Summary (Last 24 hours) at 4/14/2025 1206  Last data filed at 4/13/2025 1845  Gross per 24 hour   Intake 565 ml   Output --   Net 565 ml           Physical Exam  Constitutional:       General: She is not in acute distress.     Appearance: She is ill-appearing.   HENT:      Head: Normocephalic and atraumatic.      Nose: Nose normal.      Mouth/Throat:      Mouth: Mucous membranes are moist.      Pharynx: Oropharynx is clear.   Eyes:      Extraocular Movements: Extraocular movements intact.      Conjunctiva/sclera: Conjunctivae normal.      Pupils: Pupils are equal, round, and reactive to light.   Cardiovascular:      Rate and Rhythm: Normal rate and regular rhythm.   Pulmonary:      Effort: Pulmonary effort is normal. No respiratory distress.      Comments: Secretions  Abdominal:      General: There is no distension.      Palpations: Abdomen is soft.      Tenderness: There is no abdominal tenderness.   Skin:     General: Skin is warm and dry.   Neurological:      Mental Status: She is alert. Mental status is at baseline.      Comments: Quadriplegia    Psychiatric:         Mood and Affect: Mood normal.         Behavior: Behavior normal.               Significant Labs: All pertinent labs within the past 24 hours have been reviewed.    Significant Imaging: I have reviewed all pertinent imaging results/findings within the past 24 hours.            Assessment & Plan  Acute on chronic respiratory failure with hypoxia  Continue tracheostomy care with T-piece trials  Continue tracheostomy suctioning ordered due to thick secretions. Continue scheduled mucolytic and scopolamine.    Culture with Haemophilus and yeast, started on fluconazole and ceftriaxone.     Pulmonology involved with care this admission.     Family  would like patient to go to LTAC before going home as she still requires frequent suctioning, tube feeds, antibiotics, and additional care.  She has required over a week of ICU care prior to returning to our facility.  She was on the vent and was difficult to wean. She has required more than three midnights intermediate ICU level of care while at our facility.       Keep trach without capping until April 15th.       Denied LTAC by [employer], her insurance provider.  Appeal sent 4/8. Awaiting decision.   Severe protein-calorie malnutrition  Nutrition consulted. Most recent weight and BMI monitored-     Measurements:  Wt Readings from Last 1 Encounters:   03/27/25 79.8 kg (175 lb 14.8 oz)   Body mass index is 32.18 kg/m².    Patient has been screened and assessed by RD.    Malnutrition Type:  Context: chronic illness  Level: severe    Continue PEG tube feeding.  Nepro at 40 mL/hr  Maintain bowel regimen (lactulose, PEG, senna)  Dietitian to reassess  continue with current tube feed regimen    Dysphagia  With PEG in place. Continue tube feeds.   Continue omeprazole  Avoid PO intake  Speech therapy if clinically appropriate    Aspiration pneumonia  Recurrent aspiration pneumonia.  Completed two rounds of antibiotics. Now on ceftriaxone for tracheitis.    Antibiotics (From admission, onward)      Start     Stop Route Frequency Ordered    04/10/25 1700  cefTRIAXone injection 1 g         -- IV Every 24 hours (non-standard times) 04/10/25 1601        \  Pressure ulcers of skin of multiple topographic sites  Aquacel Ag + Mepilex dressings  Wound care team consulted    Continue offloading and specialty mattress    Depression  Patient has persistent depression which is unknown and is currently controlled. Will Continue anti-depressant medications. We will not consult psychiatry at this time. Patient does not display psychosis at this time. Continue to monitor closely and adjust plan of care as needed.          Mucus plugging of  bronchi      S/P percutaneous endoscopic gastrostomy (PEG) tube placement  Patient noted to have a percutaneous endoscopic gastrostomy tube in place. I have personally inspected the tube.Tube was placed prior to this admission There are no signs of drainage or infection around the site. The tube is patent. Medications have converted to liquid form if available.  Routine care to be done by wound care and nursing staff.       Quadriplegia  Maximal support, bedbound  PT/OT as tolerated  DME planning for discharge    Hematoma of lower extremity, right, subsequent encounter  No active bleeding; conservative management  Monitor H/H  No anticoagulation  Hyperlipidemia  Continue atorvastatin    Pericardial effusion  Previously on colchicine, now discontinued due to bleeding  Continue low-dose prednisone  Monitor for recurrence    Acute respiratory failure with hypoxia and hypercarbia  Patient with Hypoxic Respiratory failure which is Acute on chronic.  she is not on home oxygen. Supplemental oxygen was provided and noted- Oxygen Concentration (%):  [28] 28    .   Signs/symptoms of respiratory failure include- tachypnea and respiratory distress. Contributing diagnoses includes - Aspiration and Pneumonia Labs and images were reviewed. Patient Has not had a recent ABG. Will treat underlying causes and adjust management of respiratory failure as follows-   Chronic respiratory failure with hypoxia  Patient with Hypoxic Respiratory failure which is Acute on chronic.  she is not on home oxygen. Supplemental oxygen was provided and noted- Oxygen Concentration (%):  [28] 28    .   Signs/symptoms of respiratory failure include- increased work of breathing and respiratory distress. Contributing diagnoses includes - Aspiration and Pneumonia Labs and images were reviewed. Patient Has not had a recent ABG. Will treat underlying causes and adjust management of respiratory failure as follows-   Coag negative Staphylococcus  bacteremia  Resolved. Treatment with vancomycin completed 4/12.     No endocarditis noted on echocardiogram.     Tracheitis  Antibiotics (From admission, onward)      Start     Stop Route Frequency Ordered    04/10/25 1700  cefTRIAXone injection 1 g         -- IV Every 24 hours (non-standard times) 04/10/25 1601              VTE Risk Mitigation (From admission, onward)           Ordered     enoxaparin injection 40 mg  Daily         03/23/25 1940     IP VTE HIGH RISK PATIENT  Once         03/23/25 1940     Place sequential compression device  Until discontinued         03/23/25 1941                    Discharge Planning   SHRADDHA: 4/14/2025     Code Status: Full Code   Medical Readiness for Discharge Date:   Discharge Plan A: Long-term acute care facility (LTAC)                        Jovanna Hoffman DO  Department of Hospital Medicine   [facility] - [address] [facility]

## 2025-04-14 NOTE — PLAN OF CARE
Jules spoke with aleyda with [facility] and the case is still under review by pt's ins. Ss following.

## 2025-04-14 NOTE — PLAN OF CARE
Problem: Adult Inpatient Plan of Care  Goal: Plan of Care Review  4/14/2025 1816 by Matilde Bearden RN  Outcome: Progressing  4/14/2025 1816 by Matilde Bearden RN  Outcome: Progressing  Goal: Patient-Specific Goal (Individualized)  4/14/2025 1816 by Matilde Bearden RN  Outcome: Progressing  4/14/2025 1816 by Matilde Bearden, RN  Outcome: Progressing  Goal: Absence of Hospital-Acquired Illness or Injury  4/14/2025 1816 by Matilde Bearden, RN  Outcome: Progressing  4/14/2025 1816 by Matilde Bearden RN  Outcome: Progressing  Goal: Optimal Comfort and Wellbeing  4/14/2025 1816 by Matilde Bearden, RN  Outcome: Progressing  4/14/2025 1816 by Matilde Bearden, RN  Outcome: Progressing  Goal: Readiness for Transition of Care  4/14/2025 1816 by Matilde Bearden, RN  Outcome: Progressing  4/14/2025 1816 by Matilde Bearden RN  Outcome: Progressing     Problem: Infection  Goal: Absence of Infection Signs and Symptoms  4/14/2025 1816 by Matilde Bearden, RN  Outcome: Progressing  4/14/2025 1816 by Matilde Bearden RN  Outcome: Progressing     Problem: Wound  Goal: Optimal Coping  4/14/2025 1816 by Matilde Bearden, RN  Outcome: Progressing  4/14/2025 1816 by Matilde Bearden, RN  Outcome: Progressing  Goal: Optimal Functional Ability  4/14/2025 1816 by Matilde Bearden, RN  Outcome: Progressing  4/14/2025 1816 by Matilde Bearden RN  Outcome: Progressing  Goal: Absence of Infection Signs and Symptoms  4/14/2025 1816 by Matilde Bearden, RN  Outcome: Progressing  4/14/2025 1816 by Matilde Bearden, RN  Outcome: Progressing  Goal: Improved Oral Intake  4/14/2025 1816 by Matilde Bearden, RN  Outcome: Progressing  4/14/2025 1816 by Matilde Bearden, RN  Outcome: Progressing  Goal: Optimal Pain Control and Function  4/14/2025 1816 by Matilde Bearden, RN  Outcome: Progressing  4/14/2025 1816 by Matilde Bearden RN  Outcome: Progressing  Goal: Skin Health and Integrity  4/14/2025 1816 by Matilde Bearden, NICOLASA  Outcome:  Progressing  4/14/2025 1816 by Matilde Bearden RN  Outcome: Progressing  Goal: Optimal Wound Healing  4/14/2025 1816 by Matilde Bearden RN  Outcome: Progressing  4/14/2025 1816 by Matilde Bearden RN  Outcome: Progressing     Problem: Skin Injury Risk Increased  Goal: Skin Health and Integrity  4/14/2025 1816 by Matilde Bearden, RN  Outcome: Progressing  4/14/2025 1816 by Matilde Bearden, RN  Outcome: Progressing     Problem: Gas Exchange Impaired  Goal: Optimal Gas Exchange  4/14/2025 1816 by Matilde Bearden RN  Outcome: Progressing  4/14/2025 1816 by Matilde Bearden RN  Outcome: Progressing     Problem: Airway Clearance Ineffective  Goal: Effective Airway Clearance  4/14/2025 1816 by Matilde Bearden, RN  Outcome: Progressing  4/14/2025 1816 by Matilde Bearden, RN  Outcome: Progressing     Problem: Fall Injury Risk  Goal: Absence of Fall and Fall-Related Injury  4/14/2025 1816 by Matilde Bearden RN  Outcome: Progressing  4/14/2025 1816 by Matilde Bearden RN  Outcome: Progressing

## 2025-04-15 LAB
BUN SERPL-MCNC: 12 MG/DL (ref 10–20)
BUN/CREAT SERPL: 24 (ref 6–20)
CREAT SERPL-MCNC: 0.51 MG/DL (ref 0.55–1.02)
EGFR (NO RACE VARIABLE) (RUSH/TITUS): 106 ML/MIN/1.73M2

## 2025-04-15 PROCEDURE — 25000003 PHARM REV CODE 250: Performed by: HOSPITALIST

## 2025-04-15 PROCEDURE — 36415 COLL VENOUS BLD VENIPUNCTURE: CPT | Performed by: FAMILY MEDICINE

## 2025-04-15 PROCEDURE — 94640 AIRWAY INHALATION TREATMENT: CPT

## 2025-04-15 PROCEDURE — 99232 SBSQ HOSP IP/OBS MODERATE 35: CPT | Mod: ,,, | Performed by: FAMILY MEDICINE

## 2025-04-15 PROCEDURE — 27000207 HC ISOLATION

## 2025-04-15 PROCEDURE — 99900026 HC AIRWAY MAINTENANCE (STAT)

## 2025-04-15 PROCEDURE — 27000221 HC OXYGEN, UP TO 24 HOURS

## 2025-04-15 PROCEDURE — 84520 ASSAY OF UREA NITROGEN: CPT | Performed by: HOSPITALIST

## 2025-04-15 PROCEDURE — 25000242 PHARM REV CODE 250 ALT 637 W/ HCPCS: Performed by: INTERNAL MEDICINE

## 2025-04-15 PROCEDURE — 25000003 PHARM REV CODE 250: Performed by: FAMILY MEDICINE

## 2025-04-15 PROCEDURE — 99900022

## 2025-04-15 PROCEDURE — 94761 N-INVAS EAR/PLS OXIMETRY MLT: CPT

## 2025-04-15 PROCEDURE — 63600175 PHARM REV CODE 636 W HCPCS: Performed by: FAMILY MEDICINE

## 2025-04-15 PROCEDURE — 63600175 PHARM REV CODE 636 W HCPCS: Performed by: HOSPITALIST

## 2025-04-15 PROCEDURE — 36415 COLL VENOUS BLD VENIPUNCTURE: CPT | Performed by: HOSPITALIST

## 2025-04-15 PROCEDURE — 11000001 HC ACUTE MED/SURG PRIVATE ROOM

## 2025-04-15 PROCEDURE — 25000242 PHARM REV CODE 250 ALT 637 W/ HCPCS: Performed by: FAMILY MEDICINE

## 2025-04-15 PROCEDURE — 99900035 HC TECH TIME PER 15 MIN (STAT)

## 2025-04-15 RX ADMIN — ENOXAPARIN SODIUM 40 MG: 40 INJECTION SUBCUTANEOUS at 04:04

## 2025-04-15 RX ADMIN — ONDANSETRON 4 MG: 2 INJECTION INTRAMUSCULAR; INTRAVENOUS at 04:04

## 2025-04-15 RX ADMIN — POTASSIUM BICARBONATE 40 MEQ: 782 TABLET, EFFERVESCENT ORAL at 09:04

## 2025-04-15 RX ADMIN — ATORVASTATIN CALCIUM 20 MG: 20 TABLET, FILM COATED ORAL at 09:04

## 2025-04-15 RX ADMIN — CEFTRIAXONE SODIUM 1 G: 1 INJECTION, POWDER, FOR SOLUTION INTRAMUSCULAR; INTRAVENOUS at 04:04

## 2025-04-15 RX ADMIN — PANTOPRAZOLE SODIUM 40 MG: 40 INJECTION, POWDER, FOR SOLUTION INTRAVENOUS at 08:04

## 2025-04-15 RX ADMIN — ACETYLCYSTEINE 2 ML: 200 SOLUTION ORAL; RESPIRATORY (INHALATION) at 01:04

## 2025-04-15 RX ADMIN — LEVALBUTEROL HYDROCHLORIDE 1.25 MG: 1.25 SOLUTION RESPIRATORY (INHALATION) at 07:04

## 2025-04-15 RX ADMIN — ACETYLCYSTEINE 2 ML: 200 SOLUTION ORAL; RESPIRATORY (INHALATION) at 07:04

## 2025-04-15 RX ADMIN — ACETAMINOPHEN 1000 MG: 500 TABLET ORAL at 09:04

## 2025-04-15 RX ADMIN — MIDODRINE HYDROCHLORIDE 10 MG: 10 TABLET ORAL at 08:04

## 2025-04-15 RX ADMIN — PREGABALIN 75 MG: 75 CAPSULE ORAL at 08:04

## 2025-04-15 RX ADMIN — LEVALBUTEROL HYDROCHLORIDE 1.25 MG: 1.25 SOLUTION RESPIRATORY (INHALATION) at 01:04

## 2025-04-15 RX ADMIN — MIDODRINE HYDROCHLORIDE 10 MG: 10 TABLET ORAL at 09:04

## 2025-04-15 RX ADMIN — PREGABALIN 75 MG: 75 CAPSULE ORAL at 09:04

## 2025-04-15 RX ADMIN — SERTRALINE HYDROCHLORIDE 50 MG: 50 TABLET ORAL at 08:04

## 2025-04-15 RX ADMIN — MIDODRINE HYDROCHLORIDE 10 MG: 10 TABLET ORAL at 02:04

## 2025-04-15 NOTE — SUBJECTIVE & OBJECTIVE
Interval History:     No significant events overnight, no new complaints or concerns. Resting comfortably this morning. Awaiting placement, continues to require frequent suctioning and respiratory therapy beyond what family is comfortable managing in the home setting.     Objective:     Vital Signs (Most Recent):  Temp: 98.6 °F (37 °C) (04/15/25 1140)  Pulse: 89 (04/15/25 1310)  Resp: 17 (04/15/25 1310)  BP: (!) 112/56 (04/15/25 1140)  SpO2: 98 % (04/15/25 1305) Vital Signs (24h Range):  Temp:  [97.4 °F (36.3 °C)-98.6 °F (37 °C)] 98.6 °F (37 °C)  Pulse:  [84-93] 89  Resp:  [14-20] 17  SpO2:  [94 %-99 %] 98 %  BP: ()/(55-74) 112/56     Weight: 79.8 kg (175 lb 14.8 oz)  Body mass index is 32.18 kg/m².  No intake or output data in the 24 hours ending 04/15/25 1423          Physical Exam  Constitutional:       General: She is not in acute distress.     Appearance: She is ill-appearing.   HENT:      Head: Normocephalic and atraumatic.      Nose: Nose normal.      Mouth/Throat:      Mouth: Mucous membranes are moist.      Pharynx: Oropharynx is clear.   Eyes:      Extraocular Movements: Extraocular movements intact.      Conjunctiva/sclera: Conjunctivae normal.      Pupils: Pupils are equal, round, and reactive to light.   Cardiovascular:      Rate and Rhythm: Normal rate and regular rhythm.   Pulmonary:      Effort: Pulmonary effort is normal. No respiratory distress.      Comments: Secretions  Abdominal:      General: There is no distension.      Palpations: Abdomen is soft.      Tenderness: There is no abdominal tenderness.   Skin:     General: Skin is warm and dry.   Neurological:      Mental Status: She is alert. Mental status is at baseline.      Comments: Quadriplegia    Psychiatric:         Mood and Affect: Mood normal.         Behavior: Behavior normal.               Significant Labs: All pertinent labs within the past 24 hours have been reviewed.    Significant Imaging: I have reviewed all pertinent  imaging results/findings within the past 24 hours.

## 2025-04-15 NOTE — PLAN OF CARE
Problem: Adult Inpatient Plan of Care  Goal: Plan of Care Review  Outcome: Progressing  Goal: Patient-Specific Goal (Individualized)  Outcome: Progressing  Goal: Optimal Comfort and Wellbeing  Outcome: Progressing     Problem: Wound  Goal: Optimal Coping  Outcome: Progressing  Goal: Optimal Pain Control and Function  Outcome: Progressing  Goal: Skin Health and Integrity  Outcome: Progressing  Goal: Optimal Wound Healing  Outcome: Progressing     Problem: Airway Clearance Ineffective  Goal: Effective Airway Clearance  Outcome: Progressing

## 2025-04-15 NOTE — PLAN OF CARE
Ss spoke with [employer] with [employer] and pt's ins has denied for ltac at this time. Dr informed. Per dr attempting to cap pt's trach. Ss following.

## 2025-04-15 NOTE — PROGRESS NOTES
[facility] - [address] [place] [address] Medicine  Progress Note    Patient Name: Karie Denney  MRN: [id number]  Patient Class: IP- Inpatient   Admission Date: 3/23/2025  Length of Stay: 23 days  Attending Physician: Jovanna Hoffman DO  Primary Care Provider: Gonzalo Barrera NP        Subjective     Principal Problem:Acute on chronic respiratory failure with hypoxia        HPI:  Chief Complaint  Transfer for continued management of respiratory failure, tracheostomy care, PEG feeding, and complications of quadriplegia following prolonged hospitalization.    History of Present Illness  Ms. Karie Denney is a 59-year-old woman with a complex medical history including quadriplegia following spinal surgery in 2015 and a cerebrovascular accident (CVA) in 2024 resulting in left-sided paralysis. She was transferred to [facility] from [facility], operated by [facility] in [place], [place], for ongoing care following a prolonged ICU course involving intubation, respiratory failure, and significant complications.  She was initially hospitalized on February 15, 2025, for aspiration and dysphagia evaluation, during which she developed aspiration pneumonia and respiratory failure requiring intubation. Her hospital course was complicated by an ESBL E. coli UTI, pericardial effusion (treated with colchicine), and a spontaneous right thigh hematoma concerning for compartment syndrome, leading to transfer to [facility], operated by [facility]. She required prolonged mechanical ventilation, tracheostomy placement, and PEG tube insertion.  She has now returned to [facility] for continued respiratory care, tracheostomy management, PEG feeding, management of sacral pressure injury, and rehabilitation planning.    Past Medical History  Quadriplegia post-spinal surgery (2015)  CVA (2024)  Aspiration pneumonia  Acute respiratory failure  UTI (ESBL E. coli)  Depression  GERD  Pharyngoesophageal dysphagia  Pericardial effusion/pericarditis  Chronic  constipation  Pressure ulcer (sacral, unstageable)  Hyperlipidemia  Anemia    Past Surgical History  Spinal surgery (2015)  PEG tube placement (03/11/2025)  Esophageal dilation with biopsy (08/2024)    Medications  Active Medications:  Atorvastatin 20 mg daily  Omeprazole 40 mg daily  Sertraline 50 mg daily  Trazodone 100 mg qHS  Pregabalin 75 mg BID  Hydrocodone-acetaminophen 5-325 mg BID  Lactulose 30 mL daily via PEG  Midodrine 10 mg Q6H via PEG  Levalbuterol 0.63 mg nebulizer Q6H  Polyethylene glycol 17 g daily via PEG  Recent Changes:  Colchicine: Discontinued due to bleeding  Aspirin and ezetimibe: Discontinued    Allergies  Penicillins ? Rash and anaphylaxis    Social History  Never smoker  No alcohol or tobacco use  Lives at home with mother; receives home health weekly  Bedbound, non-ambulatory    Family History  Noncontributory    Review of Systems  Limited due to tracheostomy, quadriplegia, and communication challenges. History obtained from EMR and caregiver.    Physical Examination  General: Chronically ill-appearing woman, alert, tracheostomy in place with T-piece  HEENT: Mild nasal skin necrosis, mucous membranes moist  Eyes: PERRL, EOMI  Neck: Tracheostomy present  CV: RRR, no murmurs  Lungs: Breath sounds diminished at bases; no acute distress; no wheezing  Abdomen: Soft, non-tender, PEG tube in place  Skin: Unstageable sacral ulcer, nasal pressure ulcer  Neuro: Quadriplegia, responsive with eye tracking and blinking  Extremities: RLE hematoma, bilateral edema, no signs of active bleeding    Laboratory Data (Most Recent)  Hgb: 8.5 g/dL  Creatinine: 0.30 mg/dL  Albumin: 3.1 g/dL  WBC: 9.4 K/uL  Ferritin: 265 ng/mL  INR: 0.93  No growth on recent blood and urine cultures    Imaging  CT angio: Large RLE hematoma without active extravasation  Multiple chest X-rays: Stable bilateral pleural effusions, improving atelectasis  Echo: EF 55-60%, small pericardial effusion      Overview/Hospital  Course:  3/26  - continues on vanc for blood cultures + on 3/24, echo completed at time with no endocarditis seen  - discussed with pulmonary, recs to follow    3/27  - due to overall baseline health will treat coag negative staph bacteremia as a true infection, for now continue vanc and follow cultures and sensitivities  - discussed with pulmonology who plan to continue trach collar as is and see patient May 1st at 1:40 pm, confirmed appointment  - discussed with family at bedside who are very concerned about secretions and states they don't have the suction capabilities at home and do not feel comfortable going home with her in this state, will discuss with social on possible home equipment    3/28  - awaiting micro, continuing vanc  - social setting up suction at home, family plans to return home as before    3/29  - still with secretions  - family requesting voice box    3/30  - doing well today, mentation much improved  - anticipate discharge within the next two days with home health services, family will need education on trach maintenance and home feedings as patient has PEG tube and they have not cared for a PEG tube before, also we will discuss with  getting a speaking told to use with a trach    3/31  - secretions improved today  - working with social for home set up of oxygen and tube feeds  - family needs heavy education on trach care and tube feeds as primary care giver has no experience with either, still wishes to discharge home    Interval History:     No significant events overnight, no new complaints or concerns. Resting comfortably this morning. Awaiting placement, continues to require frequent suctioning and respiratory therapy beyond what family is comfortable managing in the home setting.     Objective:     Vital Signs (Most Recent):  Temp: 98.6 °F (37 °C) (04/15/25 1140)  Pulse: 89 (04/15/25 1310)  Resp: 17 (04/15/25 1310)  BP: (!) 112/56 (04/15/25 1140)  SpO2: 98 % (04/15/25  1305) Vital Signs (24h Range):  Temp:  [97.4 °F (36.3 °C)-98.6 °F (37 °C)] 98.6 °F (37 °C)  Pulse:  [84-93] 89  Resp:  [14-20] 17  SpO2:  [94 %-99 %] 98 %  BP: ()/(55-74) 112/56     Weight: 79.8 kg (175 lb 14.8 oz)  Body mass index is 32.18 kg/m².  No intake or output data in the 24 hours ending 04/15/25 1423          Physical Exam  Constitutional:       General: She is not in acute distress.     Appearance: She is ill-appearing.   HENT:      Head: Normocephalic and atraumatic.      Nose: Nose normal.      Mouth/Throat:      Mouth: Mucous membranes are moist.      Pharynx: Oropharynx is clear.   Eyes:      Extraocular Movements: Extraocular movements intact.      Conjunctiva/sclera: Conjunctivae normal.      Pupils: Pupils are equal, round, and reactive to light.   Cardiovascular:      Rate and Rhythm: Normal rate and regular rhythm.   Pulmonary:      Effort: Pulmonary effort is normal. No respiratory distress.      Comments: Secretions  Abdominal:      General: There is no distension.      Palpations: Abdomen is soft.      Tenderness: There is no abdominal tenderness.   Skin:     General: Skin is warm and dry.   Neurological:      Mental Status: She is alert. Mental status is at baseline.      Comments: Quadriplegia    Psychiatric:         Mood and Affect: Mood normal.         Behavior: Behavior normal.               Significant Labs: All pertinent labs within the past 24 hours have been reviewed.    Significant Imaging: I have reviewed all pertinent imaging results/findings within the past 24 hours.              Assessment & Plan  Acute on chronic respiratory failure with hypoxia  Continue tracheostomy care with T-piece trials  Continue tracheostomy suctioning ordered due to thick secretions. Continue scheduled mucolytic and scopolamine.    Culture with Haemophilus and yeast, started on fluconazole and ceftriaxone.     Pulmonology involved with care this admission.     Family would like patient to go to LTAC  before going home as she still requires frequent suctioning, tube feeds, antibiotics, and additional care.  She has required over a week of ICU care prior to returning to our facility.  She was on the vent and was difficult to wean. She has required more than three midnights intermediate ICU level of care while at our facility.       Keep trach without capping until at least April 15th.       Denied LTAC by [employer], her insurance provider.  Appeal sent 4/8. Awaiting decision.   Severe protein-calorie malnutrition  Nutrition consulted. Most recent weight and BMI monitored-     Measurements:  Wt Readings from Last 1 Encounters:   03/27/25 79.8 kg (175 lb 14.8 oz)   Body mass index is 32.18 kg/m².    Patient has been screened and assessed by RD.    Malnutrition Type:  Context: chronic illness  Level: severe    Continue PEG tube feeding.  Nepro at 40 mL/hr  Maintain bowel regimen (lactulose, PEG, senna)  Dietitian to reassess  continue with current tube feed regimen    Dysphagia  With PEG in place. Continue tube feeds.   Continue omeprazole  Avoid PO intake  Speech therapy if clinically appropriate    Aspiration pneumonia  Recurrent aspiration pneumonia.  Completed two rounds of antibiotics. Now on ceftriaxone for tracheitis.    Antibiotics (From admission, onward)      Start     Stop Route Frequency Ordered    04/10/25 1700  cefTRIAXone injection 1 g         -- IV Every 24 hours (non-standard times) 04/10/25 1601        \  Pressure ulcers of skin of multiple topographic sites  Aquacel Ag + Mepilex dressings  Wound care team consulted    Continue offloading and specialty mattress    Depression  Patient has persistent depression which is unknown and is currently controlled. Will Continue anti-depressant medications. We will not consult psychiatry at this time. Patient does not display psychosis at this time. Continue to monitor closely and adjust plan of care as needed.          Mucus plugging of bronchi      S/P  percutaneous endoscopic gastrostomy (PEG) tube placement  Patient noted to have a percutaneous endoscopic gastrostomy tube in place. I have personally inspected the tube.Tube was placed prior to this admission There are no signs of drainage or infection around the site. The tube is patent. Medications have converted to liquid form if available.  Routine care to be done by wound care and nursing staff.       Quadriplegia  Maximal support, bedbound  PT/OT as tolerated  DME planning for discharge    Hematoma of lower extremity, right, subsequent encounter  No active bleeding; conservative management  Monitor H/H  No anticoagulation  Hyperlipidemia  Continue atorvastatin    Pericardial effusion  Previously on colchicine, now discontinued due to bleeding  Continue low-dose prednisone  Monitor for recurrence    Acute respiratory failure with hypoxia and hypercarbia  Patient with Hypoxic Respiratory failure which is Acute on chronic.  she is not on home oxygen. Supplemental oxygen was provided and noted- Oxygen Concentration (%):  [28] 28    .   Signs/symptoms of respiratory failure include- tachypnea and respiratory distress. Contributing diagnoses includes - Aspiration and Pneumonia Labs and images were reviewed. Patient Has not had a recent ABG. Will treat underlying causes and adjust management of respiratory failure as follows-   Chronic respiratory failure with hypoxia  Patient with Hypoxic Respiratory failure which is Acute on chronic.  she is not on home oxygen. Supplemental oxygen was provided and noted- Oxygen Concentration (%):  [28] 28    .   Signs/symptoms of respiratory failure include- increased work of breathing and respiratory distress. Contributing diagnoses includes - Aspiration and Pneumonia Labs and images were reviewed. Patient Has not had a recent ABG. Will treat underlying causes and adjust management of respiratory failure as follows-   Coag negative Staphylococcus bacteremia  Resolved. Treatment  with vancomycin completed 4/12.     No endocarditis noted on echocardiogram.     Tracheitis  Antibiotics (From admission, onward)      Start     Stop Route Frequency Ordered    04/10/25 1700  cefTRIAXone injection 1 g         -- IV Every 24 hours (non-standard times) 04/10/25 1601              VTE Risk Mitigation (From admission, onward)           Ordered     enoxaparin injection 40 mg  Daily         03/23/25 1940     IP VTE HIGH RISK PATIENT  Once         03/23/25 1940     Place sequential compression device  Until discontinued         03/23/25 1941                    Discharge Planning   SHRADDHA: 4/14/2025     Code Status: Full Code   Medical Readiness for Discharge Date:   Discharge Plan A: Long-term acute care facility (LTAC)                        Jovanna Hoffman DO  Department of Hospital Medicine   [facility] - [facility] [address] [facility]

## 2025-04-15 NOTE — ASSESSMENT & PLAN NOTE
Continue tracheostomy care with T-piece trials  Continue tracheostomy suctioning ordered due to thick secretions. Continue scheduled mucolytic and scopolamine.    Culture with Haemophilus and yeast, started on fluconazole and ceftriaxone.     Pulmonology involved with care this admission.     Family would like patient to go to LTAC before going home as she still requires frequent suctioning, tube feeds, antibiotics, and additional care.  She has required over a week of ICU care prior to returning to our facility.  She was on the vent and was difficult to wean. She has required more than three midnights intermediate ICU level of care while at our facility.       Keep trach without capping until at least April 15th.       Denied LTAC by [employer], her insurance provider.  Appeal sent 4/8. Awaiting decision.

## 2025-04-16 LAB
BUN SERPL-MCNC: 15 MG/DL (ref 10–20)
BUN/CREAT SERPL: 29 (ref 6–20)
CREAT SERPL-MCNC: 0.51 MG/DL (ref 0.55–1.02)
EGFR (NO RACE VARIABLE) (RUSH/TITUS): 106 ML/MIN/1.73M2

## 2025-04-16 PROCEDURE — 99900026 HC AIRWAY MAINTENANCE (STAT)

## 2025-04-16 PROCEDURE — 99900035 HC TECH TIME PER 15 MIN (STAT)

## 2025-04-16 PROCEDURE — 63600175 PHARM REV CODE 636 W HCPCS: Performed by: HOSPITALIST

## 2025-04-16 PROCEDURE — 36415 COLL VENOUS BLD VENIPUNCTURE: CPT | Performed by: HOSPITALIST

## 2025-04-16 PROCEDURE — 25000003 PHARM REV CODE 250: Performed by: HOSPITALIST

## 2025-04-16 PROCEDURE — 99232 SBSQ HOSP IP/OBS MODERATE 35: CPT | Mod: ,,, | Performed by: FAMILY MEDICINE

## 2025-04-16 PROCEDURE — 94761 N-INVAS EAR/PLS OXIMETRY MLT: CPT

## 2025-04-16 PROCEDURE — 11000001 HC ACUTE MED/SURG PRIVATE ROOM

## 2025-04-16 PROCEDURE — 25000242 PHARM REV CODE 250 ALT 637 W/ HCPCS: Performed by: FAMILY MEDICINE

## 2025-04-16 PROCEDURE — 82565 ASSAY OF CREATININE: CPT | Performed by: HOSPITALIST

## 2025-04-16 PROCEDURE — 27000221 HC OXYGEN, UP TO 24 HOURS

## 2025-04-16 PROCEDURE — 99900022

## 2025-04-16 PROCEDURE — 94640 AIRWAY INHALATION TREATMENT: CPT

## 2025-04-16 PROCEDURE — 27000207 HC ISOLATION

## 2025-04-16 PROCEDURE — 63600175 PHARM REV CODE 636 W HCPCS: Performed by: FAMILY MEDICINE

## 2025-04-16 PROCEDURE — 25000242 PHARM REV CODE 250 ALT 637 W/ HCPCS: Performed by: INTERNAL MEDICINE

## 2025-04-16 RX ADMIN — PANTOPRAZOLE SODIUM 40 MG: 40 INJECTION, POWDER, FOR SOLUTION INTRAVENOUS at 07:04

## 2025-04-16 RX ADMIN — Medication 6 MG: at 08:04

## 2025-04-16 RX ADMIN — ATORVASTATIN CALCIUM 20 MG: 20 TABLET, FILM COATED ORAL at 08:04

## 2025-04-16 RX ADMIN — ENOXAPARIN SODIUM 40 MG: 40 INJECTION SUBCUTANEOUS at 04:04

## 2025-04-16 RX ADMIN — PREGABALIN 75 MG: 75 CAPSULE ORAL at 07:04

## 2025-04-16 RX ADMIN — ACETYLCYSTEINE 2 ML: 200 SOLUTION ORAL; RESPIRATORY (INHALATION) at 07:04

## 2025-04-16 RX ADMIN — MIDODRINE HYDROCHLORIDE 10 MG: 10 TABLET ORAL at 02:04

## 2025-04-16 RX ADMIN — ACETAMINOPHEN 1000 MG: 500 TABLET ORAL at 04:04

## 2025-04-16 RX ADMIN — ACETAMINOPHEN 1000 MG: 500 TABLET ORAL at 02:04

## 2025-04-16 RX ADMIN — ACETYLCYSTEINE 2 ML: 200 SOLUTION ORAL; RESPIRATORY (INHALATION) at 08:04

## 2025-04-16 RX ADMIN — LEVALBUTEROL HYDROCHLORIDE 1.25 MG: 1.25 SOLUTION RESPIRATORY (INHALATION) at 02:04

## 2025-04-16 RX ADMIN — MIDODRINE HYDROCHLORIDE 10 MG: 10 TABLET ORAL at 08:04

## 2025-04-16 RX ADMIN — TRAZODONE HYDROCHLORIDE 100 MG: 50 TABLET ORAL at 08:04

## 2025-04-16 RX ADMIN — PREGABALIN 75 MG: 75 CAPSULE ORAL at 08:04

## 2025-04-16 RX ADMIN — LEVALBUTEROL HYDROCHLORIDE 1.25 MG: 1.25 SOLUTION RESPIRATORY (INHALATION) at 08:04

## 2025-04-16 RX ADMIN — ONDANSETRON 4 MG: 2 INJECTION INTRAMUSCULAR; INTRAVENOUS at 03:04

## 2025-04-16 RX ADMIN — MIDODRINE HYDROCHLORIDE 10 MG: 10 TABLET ORAL at 07:04

## 2025-04-16 RX ADMIN — LEVALBUTEROL HYDROCHLORIDE 1.25 MG: 1.25 SOLUTION RESPIRATORY (INHALATION) at 07:04

## 2025-04-16 RX ADMIN — CEFTRIAXONE SODIUM 1 G: 1 INJECTION, POWDER, FOR SOLUTION INTRAMUSCULAR; INTRAVENOUS at 04:04

## 2025-04-16 RX ADMIN — ACETYLCYSTEINE 2 ML: 200 SOLUTION ORAL; RESPIRATORY (INHALATION) at 02:04

## 2025-04-16 RX ADMIN — SCOPOLAMINE 1 PATCH: 1.5 PATCH, EXTENDED RELEASE TRANSDERMAL at 08:04

## 2025-04-16 RX ADMIN — SERTRALINE HYDROCHLORIDE 50 MG: 50 TABLET ORAL at 07:04

## 2025-04-16 NOTE — RESPIRATORY THERAPY
Suctioned thick, clear secretions PRN. CTC done. Inner cannula clean and patent. No signs nor symptoms of respiratory distress.

## 2025-04-16 NOTE — PLAN OF CARE
Ss spoke with pt's mother in room re: d/c planning. Mother informed of different facilities that accept pt's with trachs. Mother expressed concern with distance and stated she would like to speak with pt's dgtr and for ss to f/u tomorrow. Dr parson. Mother voiced desire to take pt home when able to care for her safely. Ss following.

## 2025-04-17 LAB
BUN SERPL-MCNC: 16 MG/DL (ref 10–20)
BUN/CREAT SERPL: 32 (ref 6–20)
CREAT SERPL-MCNC: 0.5 MG/DL (ref 0.55–1.02)
EGFR (NO RACE VARIABLE) (RUSH/TITUS): 107 ML/MIN/1.73M2

## 2025-04-17 PROCEDURE — 99232 SBSQ HOSP IP/OBS MODERATE 35: CPT | Mod: ,,, | Performed by: FAMILY MEDICINE

## 2025-04-17 PROCEDURE — 25000242 PHARM REV CODE 250 ALT 637 W/ HCPCS: Performed by: FAMILY MEDICINE

## 2025-04-17 PROCEDURE — 25000003 PHARM REV CODE 250: Performed by: HOSPITALIST

## 2025-04-17 PROCEDURE — 99900026 HC AIRWAY MAINTENANCE (STAT)

## 2025-04-17 PROCEDURE — 94761 N-INVAS EAR/PLS OXIMETRY MLT: CPT

## 2025-04-17 PROCEDURE — 63600175 PHARM REV CODE 636 W HCPCS: Performed by: HOSPITALIST

## 2025-04-17 PROCEDURE — 63600175 PHARM REV CODE 636 W HCPCS: Performed by: FAMILY MEDICINE

## 2025-04-17 PROCEDURE — 27000221 HC OXYGEN, UP TO 24 HOURS

## 2025-04-17 PROCEDURE — 11000001 HC ACUTE MED/SURG PRIVATE ROOM

## 2025-04-17 PROCEDURE — 94640 AIRWAY INHALATION TREATMENT: CPT

## 2025-04-17 PROCEDURE — 36415 COLL VENOUS BLD VENIPUNCTURE: CPT | Performed by: HOSPITALIST

## 2025-04-17 PROCEDURE — 99900035 HC TECH TIME PER 15 MIN (STAT)

## 2025-04-17 PROCEDURE — 84520 ASSAY OF UREA NITROGEN: CPT | Performed by: HOSPITALIST

## 2025-04-17 PROCEDURE — 27000207 HC ISOLATION

## 2025-04-17 PROCEDURE — 25000242 PHARM REV CODE 250 ALT 637 W/ HCPCS: Performed by: INTERNAL MEDICINE

## 2025-04-17 RX ADMIN — ACETAMINOPHEN 1000 MG: 500 TABLET ORAL at 02:04

## 2025-04-17 RX ADMIN — CEFTRIAXONE SODIUM 1 G: 1 INJECTION, POWDER, FOR SOLUTION INTRAMUSCULAR; INTRAVENOUS at 04:04

## 2025-04-17 RX ADMIN — MIDODRINE HYDROCHLORIDE 10 MG: 10 TABLET ORAL at 09:04

## 2025-04-17 RX ADMIN — Medication 6 MG: at 09:04

## 2025-04-17 RX ADMIN — TRAZODONE HYDROCHLORIDE 100 MG: 50 TABLET ORAL at 09:04

## 2025-04-17 RX ADMIN — MIDODRINE HYDROCHLORIDE 10 MG: 10 TABLET ORAL at 03:04

## 2025-04-17 RX ADMIN — ACETAMINOPHEN 1000 MG: 500 TABLET ORAL at 09:04

## 2025-04-17 RX ADMIN — ACETYLCYSTEINE 2 ML: 200 SOLUTION ORAL; RESPIRATORY (INHALATION) at 07:04

## 2025-04-17 RX ADMIN — ENOXAPARIN SODIUM 40 MG: 40 INJECTION SUBCUTANEOUS at 04:04

## 2025-04-17 RX ADMIN — LEVALBUTEROL HYDROCHLORIDE 1.25 MG: 1.25 SOLUTION RESPIRATORY (INHALATION) at 07:04

## 2025-04-17 RX ADMIN — LEVALBUTEROL HYDROCHLORIDE 1.25 MG: 1.25 SOLUTION RESPIRATORY (INHALATION) at 01:04

## 2025-04-17 RX ADMIN — ONDANSETRON 4 MG: 2 INJECTION INTRAMUSCULAR; INTRAVENOUS at 05:04

## 2025-04-17 RX ADMIN — SERTRALINE HYDROCHLORIDE 50 MG: 50 TABLET ORAL at 10:04

## 2025-04-17 RX ADMIN — PANTOPRAZOLE SODIUM 40 MG: 40 INJECTION, POWDER, FOR SOLUTION INTRAVENOUS at 10:04

## 2025-04-17 RX ADMIN — PREGABALIN 75 MG: 75 CAPSULE ORAL at 09:04

## 2025-04-17 RX ADMIN — ATORVASTATIN CALCIUM 20 MG: 20 TABLET, FILM COATED ORAL at 09:04

## 2025-04-17 RX ADMIN — ACETYLCYSTEINE 2 ML: 200 SOLUTION ORAL; RESPIRATORY (INHALATION) at 01:04

## 2025-04-17 NOTE — ASSESSMENT & PLAN NOTE
Continue tracheostomy care with T-piece trials  Continue tracheostomy suctioning ordered due to thick secretions. Continue scheduled mucolytic and scopolamine.    Culture with Haemophilus and yeast, started on fluconazole and ceftriaxone.     Pulmonology involved with care this admission.     Family would like patient to go to LTAC before going home as she still requires frequent suctioning, tube feeds, antibiotics, and additional care.  She has required over a week of ICU care prior to returning to our facility.  She was on the vent and was difficult to wean. She has required more than three midnights intermediate ICU level of care while at our facility.       Keep trach without capping until at least April 15th.       Denied LTAC by [employer], her insurance provider.  Appeal sent 4/8. Awaiting decision.

## 2025-04-17 NOTE — ASSESSMENT & PLAN NOTE
Recurrent aspiration pneumonia.  Completed two rounds of antibiotics. Now on ceftriaxone for tracheitis.    4/16  - Improving.  As secretions improve will ask pulmonary to advise regarding trach downsizing/capping.

## 2025-04-17 NOTE — PLAN OF CARE
04/17/25 1410   Rounds   Attendance Provider;Nurse ;Charge nurse;Physical therapist;Pharmacist   Discharge Plan A Home with family;Home Health   Why the patient remains in the hospital Requires continued medical care   Transition of Care Barriers None     Chart reviewed. Per SIBR rounds pt is not medically ready for d/c. They are consulting Dr. Santizo and trying to reduce pt's secretions to make trach care more manageable. Pt has been denied for LTAC. CM spoke with pt and her family at bedside. Family does not wish to send pt to a care facility out of town. Their wish is to get pt to a level that they can manage at home with HH. CM following.

## 2025-04-17 NOTE — RESPIRATORY THERAPY
Suction thick, clear secretions from patient PRN. CTC done. Inner cannula cleaned with sterile water and brush. Inner cannula is clean and patent. Trach tie changed. Patient has been resting comfortably with no signs nor symptoms of respiratory distress.

## 2025-04-17 NOTE — SUBJECTIVE & OBJECTIVE
"Interval History: Patient non verbal due to trach but mouths words. RT reports secretions clearing .    Review of Systems  Objective:     Vital Signs (Most Recent):  Temp: 98.2 °F (36.8 °C) (04/16/25 1936)  Pulse: 97 (04/16/25 2016)  Resp: 16 (04/16/25 2016)  BP: 104/61 (04/16/25 1936)  SpO2: 99 % (04/16/25 2016) Vital Signs (24h Range):  Temp:  [97.8 °F (36.6 °C)-99.2 °F (37.3 °C)] 98.2 °F (36.8 °C)  Pulse:  [82-98] 97  Resp:  [14-19] 16  SpO2:  [95 %-100 %] 99 %  BP: ()/(54-71) 104/61     Weight: 79.8 kg (175 lb 14.8 oz)  Body mass index is 32.18 kg/m².    Intake/Output Summary (Last 24 hours) at 4/16/2025 2100  Last data filed at 4/16/2025 1910  Gross per 24 hour   Intake 80 ml   Output --   Net 80 ml         Physical Exam  Constitutional:       General: She is not in acute distress.     Appearance: She is not ill-appearing.   HENT:      Head: Normocephalic and atraumatic.      Nose: Nose normal.      Mouth/Throat:      Mouth: Mucous membranes are moist.      Pharynx: Oropharynx is clear.   Eyes:      Extraocular Movements: Extraocular movements intact.      Conjunctiva/sclera: Conjunctivae normal.      Pupils: Pupils are equal, round, and reactive to light.   Cardiovascular:      Rate and Rhythm: Normal rate and regular rhythm.   Pulmonary:      Effort: Pulmonary effort is normal. No respiratory distress.      Comments: Secretions  Abdominal:      General: There is no distension.      Palpations: Abdomen is soft.      Tenderness: There is no abdominal tenderness.   Skin:     General: Skin is warm and dry.   Neurological:      Mental Status: She is alert. Mental status is at baseline.      Comments: Quadriplegia    Psychiatric:         Mood and Affect: Mood normal.         Behavior: Behavior normal.               Significant Labs: All pertinent labs within the past 24 hours have been reviewed.  BMP:   Recent Labs   Lab 04/16/25  0434   BUN 15   CREATININE 0.51*     CBC: No results for input(s): "WBC", " ""HGB", "HCT", "PLT" in the last 48 hours.    Significant Imaging: I have reviewed all pertinent imaging results/findings within the past 24 hours.  "

## 2025-04-17 NOTE — PROGRESS NOTES
[facility] - [address] [place] [address] Medicine  Progress Note    Patient Name: Karie Denney  MRN: [id number]  Patient Class: IP- Inpatient   Admission Date: 3/23/2025  Length of Stay: 24 days  Attending Physician: Stephen Elaine Jr., MD  Primary Care Provider: Gonzalo Barrera NP        Subjective     Principal Problem:Acute on chronic respiratory failure with hypoxia        HPI:  Chief Complaint  Transfer for continued management of respiratory failure, tracheostomy care, PEG feeding, and complications of quadriplegia following prolonged hospitalization.    History of Present Illness  Ms. Karie Denney is a 59-year-old woman with a complex medical history including quadriplegia following spinal surgery in 2015 and a cerebrovascular accident (CVA) in 2024 resulting in left-sided paralysis. She was transferred to [facility] from [facility] in [place], [place], for ongoing care following a prolonged ICU course involving intubation, respiratory failure, and significant complications.  She was initially hospitalized on February 15, 2025, for aspiration and dysphagia evaluation, during which she developed aspiration pneumonia and respiratory failure requiring intubation. Her hospital course was complicated by an ESBL E. coli UTI, pericardial effusion (treated with colchicine), and a spontaneous right thigh hematoma concerning for compartment syndrome, leading to transfer to [facility]. She required prolonged mechanical ventilation, tracheostomy placement, and PEG tube insertion.  She has now returned to [facility] for continued respiratory care, tracheostomy management, PEG feeding, management of sacral pressure injury, and rehabilitation planning.    Past Medical History  Quadriplegia post-spinal surgery (2015)  CVA (2024)  Aspiration pneumonia  Acute respiratory failure  UTI (ESBL E. coli)  Depression  GERD  Pharyngoesophageal dysphagia  Pericardial  effusion/pericarditis  Chronic constipation  Pressure ulcer (sacral, unstageable)  Hyperlipidemia  Anemia    Past Surgical History  Spinal surgery (2015)  PEG tube placement (03/11/2025)  Esophageal dilation with biopsy (08/2024)    Medications  Active Medications:  Atorvastatin 20 mg daily  Omeprazole 40 mg daily  Sertraline 50 mg daily  Trazodone 100 mg qHS  Pregabalin 75 mg BID  Hydrocodone-acetaminophen 5-325 mg BID  Lactulose 30 mL daily via PEG  Midodrine 10 mg Q6H via PEG  Levalbuterol 0.63 mg nebulizer Q6H  Polyethylene glycol 17 g daily via PEG  Recent Changes:  Colchicine: Discontinued due to bleeding  Aspirin and ezetimibe: Discontinued    Allergies  Penicillins ? Rash and anaphylaxis    Social History  Never smoker  No alcohol or tobacco use  Lives at home with mother; receives home health weekly  Bedbound, non-ambulatory    Family History  Noncontributory    Review of Systems  Limited due to tracheostomy, quadriplegia, and communication challenges. History obtained from EMR and caregiver.    Physical Examination  General: Chronically ill-appearing woman, alert, tracheostomy in place with T-piece  HEENT: Mild nasal skin necrosis, mucous membranes moist  Eyes: PERRL, EOMI  Neck: Tracheostomy present  CV: RRR, no murmurs  Lungs: Breath sounds diminished at bases; no acute distress; no wheezing  Abdomen: Soft, non-tender, PEG tube in place  Skin: Unstageable sacral ulcer, nasal pressure ulcer  Neuro: Quadriplegia, responsive with eye tracking and blinking  Extremities: RLE hematoma, bilateral edema, no signs of active bleeding    Laboratory Data (Most Recent)  Hgb: 8.5 g/dL  Creatinine: 0.30 mg/dL  Albumin: 3.1 g/dL  WBC: 9.4 K/uL  Ferritin: 265 ng/mL  INR: 0.93  No growth on recent blood and urine cultures    Imaging  CT angio: Large RLE hematoma without active extravasation  Multiple chest X-rays: Stable bilateral pleural effusions, improving atelectasis  Echo: EF 55-60%, small pericardial  effusion      Overview/Hospital Course:  3/26  - continues on vanc for blood cultures + on 3/24, echo completed at time with no endocarditis seen  - discussed with pulmonary, recs to follow    3/27  - due to overall baseline health will treat coag negative staph bacteremia as a true infection, for now continue vanc and follow cultures and sensitivities  - discussed with pulmonology who plan to continue trach collar as is and see patient May 1st at 1:40 pm, confirmed appointment  - discussed with family at bedside who are very concerned about secretions and states they don't have the suction capabilities at home and do not feel comfortable going home with her in this state, will discuss with social on possible home equipment    3/28  - awaiting micro, continuing vanc  - social setting up suction at home, family plans to return home as before    3/29  - still with secretions  - family requesting voice box    3/30  - doing well today, mentation much improved  - anticipate discharge within the next two days with home health services, family will need education on trach maintenance and home feedings as patient has PEG tube and they have not cared for a PEG tube before, also we will discuss with  getting a speaking told to use with a trach    3/31  - secretions improved today  - working with social for home set up of oxygen and tube feeds  - family needs heavy education on trach care and tube feeds as primary care giver has no experience with either, still wishes to discharge home    Interval History: Patient non verbal due to trach but mouths words. RT reports secretions clearing .    Review of Systems  Objective:     Vital Signs (Most Recent):  Temp: 98.2 °F (36.8 °C) (04/16/25 1936)  Pulse: 97 (04/16/25 2016)  Resp: 16 (04/16/25 2016)  BP: 104/61 (04/16/25 1936)  SpO2: 99 % (04/16/25 2016) Vital Signs (24h Range):  Temp:  [97.8 °F (36.6 °C)-99.2 °F (37.3 °C)] 98.2 °F (36.8 °C)  Pulse:  [82-98] 97  Resp:   "[14-19] 16  SpO2:  [95 %-100 %] 99 %  BP: ()/(54-71) 104/61     Weight: 79.8 kg (175 lb 14.8 oz)  Body mass index is 32.18 kg/m².    Intake/Output Summary (Last 24 hours) at 4/16/2025 2100  Last data filed at 4/16/2025 1910  Gross per 24 hour   Intake 80 ml   Output --   Net 80 ml         Physical Exam  Constitutional:       General: She is not in acute distress.     Appearance: She is not ill-appearing.   HENT:      Head: Normocephalic and atraumatic.      Nose: Nose normal.      Mouth/Throat:      Mouth: Mucous membranes are moist.      Pharynx: Oropharynx is clear.   Eyes:      Extraocular Movements: Extraocular movements intact.      Conjunctiva/sclera: Conjunctivae normal.      Pupils: Pupils are equal, round, and reactive to light.   Cardiovascular:      Rate and Rhythm: Normal rate and regular rhythm.   Pulmonary:      Effort: Pulmonary effort is normal. No respiratory distress.      Comments: Secretions  Abdominal:      General: There is no distension.      Palpations: Abdomen is soft.      Tenderness: There is no abdominal tenderness.   Skin:     General: Skin is warm and dry.   Neurological:      Mental Status: She is alert. Mental status is at baseline.      Comments: Quadriplegia    Psychiatric:         Mood and Affect: Mood normal.         Behavior: Behavior normal.               Significant Labs: All pertinent labs within the past 24 hours have been reviewed.  BMP:   Recent Labs   Lab 04/16/25  0434   BUN 15   CREATININE 0.51*     CBC: No results for input(s): "WBC", "HGB", "HCT", "PLT" in the last 48 hours.    Significant Imaging: I have reviewed all pertinent imaging results/findings within the past 24 hours.      Assessment & Plan  Acute on chronic respiratory failure with hypoxia  Continue tracheostomy care with T-piece trials  Continue tracheostomy suctioning ordered due to thick secretions. Continue scheduled mucolytic and scopolamine.    Culture with Haemophilus and yeast, started on " fluconazole and ceftriaxone.     Pulmonology involved with care this admission.     Family would like patient to go to LTAC before going home as she still requires frequent suctioning, tube feeds, antibiotics, and additional care.  She has required over a week of ICU care prior to returning to our facility.  She was on the vent and was difficult to wean. She has required more than three midnights intermediate ICU level of care while at our facility.       Keep trach without capping until at least April 15th.       Denied LTAC by [employer], her insurance provider.  Appeal sent 4/8. Awaiting decision.   Severe protein-calorie malnutrition  Nutrition consulted. Most recent weight and BMI monitored-     Measurements:  Wt Readings from Last 1 Encounters:   03/27/25 79.8 kg (175 lb 14.8 oz)   Body mass index is 32.18 kg/m².    Patient has been screened and assessed by RD.    Malnutrition Type:  Context: chronic illness  Level: severe    Continue PEG tube feeding.  Nepro at 40 mL/hr  Maintain bowel regimen (lactulose, PEG, senna)  Dietitian to reassess  continue with current tube feed regimen    Dysphagia  With PEG in place. Continue tube feeds.   Continue omeprazole  Avoid PO intake  Speech therapy if clinically appropriate    Aspiration pneumonia  Recurrent aspiration pneumonia.  Completed two rounds of antibiotics. Now on ceftriaxone for tracheitis.    4/16  - Improving.  As secretions improve will ask pulmonary to advise regarding trach downsizing/capping.     Pressure ulcers of skin of multiple topographic sites  Aquacel Ag + Mepilex dressings  Wound care team consulted    Continue offloading and specialty mattress    Depression  Patient has persistent depression which is unknown and is currently controlled. Will Continue anti-depressant medications. We will not consult psychiatry at this time. Patient does not display psychosis at this time. Continue to monitor closely and adjust plan of care as needed.          Mucus  plugging of bronchi      S/P percutaneous endoscopic gastrostomy (PEG) tube placement  Patient noted to have a percutaneous endoscopic gastrostomy tube in place. I have personally inspected the tube.Tube was placed prior to this admission There are no signs of drainage or infection around the site. The tube is patent. Medications have converted to liquid form if available.  Routine care to be done by wound care and nursing staff.       Quadriplegia  Maximal support, bedbound  PT/OT as tolerated  DME planning for discharge    Hematoma of lower extremity, right, subsequent encounter  No active bleeding; conservative management  Monitor H/H  No anticoagulation  Hyperlipidemia  Continue atorvastatin    Pericardial effusion  Previously on colchicine, now discontinued due to bleeding  Continue low-dose prednisone  Monitor for recurrence    Acute respiratory failure with hypoxia and hypercarbia  Patient with Hypoxic Respiratory failure which is Acute on chronic.  she is not on home oxygen. Supplemental oxygen was provided and noted- Oxygen Concentration (%):  [28] 28    .   Signs/symptoms of respiratory failure include- tachypnea and respiratory distress. Contributing diagnoses includes - Aspiration and Pneumonia Labs and images were reviewed. Patient Has not had a recent ABG. Will treat underlying causes and adjust management of respiratory failure as follows-   Chronic respiratory failure with hypoxia  Patient with Hypoxic Respiratory failure which is Acute on chronic.  she is not on home oxygen. Supplemental oxygen was provided and noted- Oxygen Concentration (%):  [28] 28    .   Signs/symptoms of respiratory failure include- increased work of breathing and respiratory distress. Contributing diagnoses includes - Aspiration and Pneumonia Labs and images were reviewed. Patient Has not had a recent ABG. Will treat underlying causes and adjust management of respiratory failure as follows-   Coag negative Staphylococcus  bacteremia  Resolved. Treatment with vancomycin completed 4/12.     No endocarditis noted on echocardiogram.     Tracheitis  Antibiotics (From admission, onward)      Start     Stop Route Frequency Ordered    04/10/25 1700  cefTRIAXone injection 1 g         -- IV Every 24 hours (non-standard times) 04/10/25 1601              VTE Risk Mitigation (From admission, onward)           Ordered     enoxaparin injection 40 mg  Daily         03/23/25 1940     IP VTE HIGH RISK PATIENT  Once         03/23/25 1940     Place sequential compression device  Until discontinued         03/23/25 1941                    Discharge Planning   SHRADDHA: 4/14/2025     Code Status: Full Code   Medical Readiness for Discharge Date:   Discharge Plan A: Long-term acute care facility (LTAC)                        Stephen Elaine Jr, MD  Department of Hospital Medicine   [facility] - [facility] [address] [facility]

## 2025-04-18 PROBLEM — T17.500A MUCUS PLUGGING OF BRONCHI: Status: RESOLVED | Noted: 2025-02-24 | Resolved: 2025-04-18

## 2025-04-18 LAB
BUN SERPL-MCNC: 18 MG/DL (ref 10–20)
BUN/CREAT SERPL: 34 (ref 6–20)
CREAT SERPL-MCNC: 0.53 MG/DL (ref 0.55–1.02)
EGFR (NO RACE VARIABLE) (RUSH/TITUS): 105 ML/MIN/1.73M2

## 2025-04-18 PROCEDURE — 36415 COLL VENOUS BLD VENIPUNCTURE: CPT | Performed by: HOSPITALIST

## 2025-04-18 PROCEDURE — 25000242 PHARM REV CODE 250 ALT 637 W/ HCPCS: Performed by: INTERNAL MEDICINE

## 2025-04-18 PROCEDURE — 99900026 HC AIRWAY MAINTENANCE (STAT)

## 2025-04-18 PROCEDURE — 99900035 HC TECH TIME PER 15 MIN (STAT)

## 2025-04-18 PROCEDURE — 94640 AIRWAY INHALATION TREATMENT: CPT

## 2025-04-18 PROCEDURE — 25000003 PHARM REV CODE 250: Performed by: HOSPITALIST

## 2025-04-18 PROCEDURE — 99232 SBSQ HOSP IP/OBS MODERATE 35: CPT | Mod: ,,, | Performed by: FAMILY MEDICINE

## 2025-04-18 PROCEDURE — 27000221 HC OXYGEN, UP TO 24 HOURS

## 2025-04-18 PROCEDURE — 27000207 HC ISOLATION

## 2025-04-18 PROCEDURE — 99232 SBSQ HOSP IP/OBS MODERATE 35: CPT | Mod: ,,, | Performed by: INTERNAL MEDICINE

## 2025-04-18 PROCEDURE — 11000001 HC ACUTE MED/SURG PRIVATE ROOM

## 2025-04-18 PROCEDURE — 84520 ASSAY OF UREA NITROGEN: CPT | Performed by: HOSPITALIST

## 2025-04-18 PROCEDURE — 63600175 PHARM REV CODE 636 W HCPCS: Performed by: HOSPITALIST

## 2025-04-18 PROCEDURE — 94761 N-INVAS EAR/PLS OXIMETRY MLT: CPT

## 2025-04-18 PROCEDURE — 99900022

## 2025-04-18 PROCEDURE — 25000242 PHARM REV CODE 250 ALT 637 W/ HCPCS: Performed by: FAMILY MEDICINE

## 2025-04-18 PROCEDURE — 63600175 PHARM REV CODE 636 W HCPCS: Performed by: FAMILY MEDICINE

## 2025-04-18 RX ADMIN — PREGABALIN 75 MG: 75 CAPSULE ORAL at 09:04

## 2025-04-18 RX ADMIN — ENOXAPARIN SODIUM 40 MG: 40 INJECTION SUBCUTANEOUS at 05:04

## 2025-04-18 RX ADMIN — ACETYLCYSTEINE 2 ML: 200 SOLUTION ORAL; RESPIRATORY (INHALATION) at 07:04

## 2025-04-18 RX ADMIN — MIDODRINE HYDROCHLORIDE 10 MG: 10 TABLET ORAL at 09:04

## 2025-04-18 RX ADMIN — LEVALBUTEROL HYDROCHLORIDE 1.25 MG: 1.25 SOLUTION RESPIRATORY (INHALATION) at 08:04

## 2025-04-18 RX ADMIN — ACETAMINOPHEN 1000 MG: 500 TABLET ORAL at 09:04

## 2025-04-18 RX ADMIN — SERTRALINE HYDROCHLORIDE 50 MG: 50 TABLET ORAL at 09:04

## 2025-04-18 RX ADMIN — MIDODRINE HYDROCHLORIDE 10 MG: 10 TABLET ORAL at 02:04

## 2025-04-18 RX ADMIN — CEFTRIAXONE SODIUM 1 G: 1 INJECTION, POWDER, FOR SOLUTION INTRAMUSCULAR; INTRAVENOUS at 05:04

## 2025-04-18 RX ADMIN — LEVALBUTEROL HYDROCHLORIDE 1.25 MG: 1.25 SOLUTION RESPIRATORY (INHALATION) at 07:04

## 2025-04-18 RX ADMIN — PANTOPRAZOLE SODIUM 40 MG: 40 INJECTION, POWDER, FOR SOLUTION INTRAVENOUS at 09:04

## 2025-04-18 RX ADMIN — ATORVASTATIN CALCIUM 20 MG: 20 TABLET, FILM COATED ORAL at 09:04

## 2025-04-18 RX ADMIN — LEVALBUTEROL HYDROCHLORIDE 1.25 MG: 1.25 SOLUTION RESPIRATORY (INHALATION) at 01:04

## 2025-04-18 RX ADMIN — ONDANSETRON 4 MG: 2 INJECTION INTRAMUSCULAR; INTRAVENOUS at 05:04

## 2025-04-18 RX ADMIN — TRAZODONE HYDROCHLORIDE 100 MG: 50 TABLET ORAL at 09:04

## 2025-04-18 RX ADMIN — Medication 6 MG: at 09:04

## 2025-04-18 RX ADMIN — ACETYLCYSTEINE 2 ML: 200 SOLUTION ORAL; RESPIRATORY (INHALATION) at 08:04

## 2025-04-18 RX ADMIN — ACETYLCYSTEINE 2 ML: 200 SOLUTION ORAL; RESPIRATORY (INHALATION) at 01:04

## 2025-04-18 NOTE — RESPIRATORY THERAPY
1117- cuff deflated, trach is capped, place pt on nasal cannula 2 lpm; SpO2 97%; pt is tolerating well at this time.

## 2025-04-18 NOTE — PROGRESS NOTES
"[facility] - [address] [place] [address]  Adult Nutrition  Follow Up Note         Reason for Assessment  Reason For Assessment: RD follow-up        Assessment and Plan  4/18/2025: RD follow up. Pt remains on Isosource 1.5. Feeding at goal with no tolerance issues noted. Recommend continue current tube feed regimen as tolerated. Recommend to consider addition of multivitamin/mineral via PEG given pt is receiving <1 L formula/day. Last weight obtained 3/27. Recommend reweigh via bed scale to adjust EN as appropriate iso weight loss or gain prior to discharge. MD trying to reduce pt's secretions to make trach care more manageable. Family wants pt to level they can manage at home with HH. Last BM 4/16 per flowsheet. RD following.    4/11/2025: RD follow up. Patient remains on Isosource 1.5. Feeding at goal with no tolerance issues noted. Recommend continue current POC as tolerated. Last weight obtained 3/27. Recommend reweigh. RD following.     4/7/2025: RD follow up. Patient remains on Isosource 1.5. Feedings at goal with no tolerance issues noted. Recommend to continue current tube feed regimen with Luis Fernando BID as tolerated. Awaiting placement -- a peer to peer was offered for LTAC placement. RD following.     4/1/2025: RD follow up. Patient remains on Isosource 1.5. Feedings at goal with no tolerance issues noted. Discharge bolus feeding recommendations provided. RD available if needed. Recommend continue current POC as tolerated. RD following.    3/27/25: RD follow up. Patient remains NPO and on enteral feeds of Isosource 1.5. Patient is at goal rate and tolerating feeds well per flowsheet. Recommend to continue with Isosource 1.5 at 30 mL/hr with FWF 40 mL/hr and Luis Fernando BID. Keep HOB at 30-45 degrees to reduce risk of aspiration. Monitor for tolerance: n/v/d/c. Hold feeding and notify RD if sx of intolerance develop. Monitor electrolytes and replete as needed.    *Per SLP note 3/24: "Patient's mother reports " "that pt is not asking for anything to eat or drink orally, and she does not wish to feed her orally at this time. Will D/C order for swallow eval at this time."    3/24/25: Consult received and appreciated. Consult for new tube feeding. Patient is a 60 yo female with a complex medical history including quadriplegia following spinal surgery in 2015 and a CVA in 2024 resulting in left-sided paralysis. She was initially hospitalized in February 2025 for aspiration and dysphagia evaluation. Required prolonged mechanical ventilation, tracheostomy placement, and PEG tube insertion. Returned to [facility] on 3/23/25 for continued respiratory care, tracheostomy management, PEG feeding, management of sacral pressure injury, and rehab planning. Additional relevant PMHx includes aspiration pneumonia, respiratory failure, depression, GERD, chronic constipation, HLD, anemia.     Per H&P, patient noted to be on PEG tube feeding of Nepro at 40 mL/hr. Please see below for tube feed recommendations.     Patient is 79.8 kg (175 lb) with a BMI of 32.18 and is obese (Class 1 Obesity). Review of records reveals a >10% (18.6%) significant unintentional weight loss x 6 mo (97.5 kg 8/29/24). She is also noted to have an unhealed sacral wound.     Per ASPEN guidelines patient meets criteria for severe protein-calorie malnutrition secondary to dysphagia resulting in inadequate PO intakes as evidenced by >10% significant unintentional weight loss x 6 months, unhealed wounds, and consuming <75% estimated energy requirements for >1 month.     ENTERAL FEED RECOMMENDATIONS:    *Needs were adjusted and account for quadriplegia, BMI, and sacral wound*    Initiate continuous infusion of Isosource 1.5 at 10 mL/hr via PEG and advance 10 mL/hr q8h pending tolerance until goal rate of 30 mL/hr is achieved. Do not exceed goal rate. FWF 40 mL/hr. Keep HOB at 30-45 degrees to reduce risk of aspiration. Monitor for tolerance: n/v/d/c. Hold feeding and " notify RD if symptoms of intolerance develop.     Recommend addition of Luis Fernando BID via PEG to promote wound healing and provide additional protein. Recommend to continue with bowel regimen (lactulose, PEG, senna) given hx of chronic constipation. Tube feed regimen will also provide patient with ~ 11 g of fiber per 24 hours, which may help to further alleviate constipation. Given patient is receiving < 1 L formula per day, recommend to consider addition of multivitamin/mineral supplement to help meet micronutrient needs.    Medications/labs reviewed. RD following.    Learning Needs/Social Determinants of Health    Learning Assessment       03/23/2025 1451 [facility] Tele[facility] (3/23/2025 - Present)   Created by Corry Blue, RN - RN (Nurse) Status: Complete                 PRIMARY LEARNER     Primary Learner Name:  marina noble  - 03/23/2025 1451    Relationship:  Patient, Family SS - 03/23/2025 1451    Does the primary learner have any barriers to learning?:  No Barriers  - 03/23/2025 1451    What is the preferred language of the primary learner?:  English  - 03/23/2025 1451    Is an  required?:  No  - 03/23/2025 1451    How does the primary learner prefer to learn new concepts?:  Listening  - 03/23/2025 1451    How often do you need to have someone help you read instructions, pamphlets, or written material from your doctor or pharmacy?:  Never  - 03/23/2025 1451        CO-LEARNER #1     No question answered        CO-LEARNER #2     No question answered        SPECIAL TOPICS     No question answered        ANSWERED BY:     No question answered        Comments         Edit History       Corry Blue, RN - RN (Nurse)   03/23/2025 1451                          Social Drivers of Health     Tobacco Use: Low Risk  (3/23/2025)    Patient History     Smoking Tobacco Use: Never     Smokeless Tobacco Use: Never     Passive Exposure: Not on file   Alcohol Use: Not  At Risk (3/24/2025)    AUDIT-C     Frequency of Alcohol Consumption: Never     Average Number of Drinks: Patient does not drink     Frequency of Binge Drinking: Never   Financial Resource Strain: Low Risk  (3/25/2025)    Overall Financial Resource Strain (CARDIA)     Difficulty of Paying Living Expenses: Not hard at all   Food Insecurity: No Food Insecurity (3/25/2025)    Hunger Vital Sign     Worried About Running Out of Food in the Last Year: Never true     Ran Out of Food in the Last Year: Never true   Recent Concern: Food Insecurity - Food Insecurity Present (3/23/2025)    Hunger Vital Sign     Worried About Running Out of Food in the Last Year: Often true     Ran Out of Food in the Last Year: Often true   Transportation Needs: No Transportation Needs (3/24/2025)    PRAPARE - Transportation     Lack of Transportation (Medical): No     Lack of Transportation (Non-Medical): No   Physical Activity: Inactive (3/24/2025)    Exercise Vital Sign     Days of Exercise per Week: 0 days     Minutes of Exercise per Session: 0 min   Stress: No Stress Concern Present (3/25/2025)    [employer] - Occupational Stress Questionnaire     Feeling of Stress : Not at all   Recent Concern: Stress - Stress Concern Present (3/23/2025)    [employer] - Occupational Stress Questionnaire     Feeling of Stress : Rather much   Housing Stability: Low Risk  (3/25/2025)    Housing Stability Vital Sign     Unable to Pay for Housing in the Last Year: No     Number of Times Moved in the Last Year: Not on file     Homeless in the Last Year: No   Depression: Not on file   Utilities: Not At Risk (3/25/2025)    [facility] Utilities     Threatened with loss of utilities: No   Health Literacy: Adequate Health Literacy (3/25/2025)     Health Literacy     Frequency of need for help with medical instructions: Rarely   Recent Concern: Health Literacy - Inadequate Health Literacy (2/16/2025)      "Health Literacy     Frequency of need for help with medical instructions: Always   Social Isolation: Socially Integrated (3/24/2025)    Social Isolation     Social Isolation: 1     Malnutrition  Is Patient Malnourished: Yes    Nutrition consulted. Most recent weight and BMI monitored-     Measurements:  Wt Readings from Last 1 Encounters:   03/27/25 79.8 kg (175 lb 14.8 oz)   Body mass index is 32.18 kg/m².    Patient has been screened and assessed by RD.    Malnutrition Type:  Context: chronic illness  Level: severe    Malnutrition Characteristic Summary:  Weight Loss (Malnutrition): greater than 10% in 6 months  Energy Intake (Malnutrition): less than or equal to 75% for greater than or equal to 1 month    Interventions/Recommendations (treatment strategy):  Dependence on PEG for feedings, continuous enteral feedings;  Continue with current tube feed regimen as tolerated with Luis Fernando BID to facilitate wound healing. Recommend to reweigh patient prior to discharge to adjust EN iso weight loss or weight gain. Recommend to consider addition of daily multivitamin/mineral via PEG given patient receiving low volume of formula per day 2/2 decreased calorie needs     Nutrition Diagnosis  Malnutrition (Severe) related to Dysphagia/ difficulty swallowing as evidenced by >10% significant unintentional weight loss x 6 months, unhealed wounds, and consuming <75% estimated energy requirements for >1 month.   Comments: upon observation, no apparent muscle and fat wasting - pt still meets malnutrition criteria per ASPEN guidelines    No results for input(s): "GLU", "POCGLU" in the last 72 hours.    Nutrition Prescription / Recommendations  Recommendation/Intervention: Continue with current tube feed regimen as tolerated with Luis Fernando BID to facilitate wound healing. Recommend to reweigh patient prior to discharge to adjust EN iso weight loss or weight gain. Recommend to consider addition of daily multivitamin/mineral via PEG given " patient receiving low volume of formula per day 2/2 decreased calorie needs  Goals: Continue to tolerate feeds at goal rate, improve skin integrity, weight maintenance during admission  Nutrition Goal Status: progressing towards goal  Current Diet Order: NPO  Nutrition Order Comments: Enteral Feeds  Chewing or Swallowing Difficulty?: Swallowing difficulty  Recommended Diet: Enteral Nutrition  Recommended Oral Supplement: Luis Fernando [90 kcals, 2.5g Protein, 10g Carbs(3g Sugar), 7g L-Arginine, 7g L-Glutamine, Vitamin C 300mg, 9.5mg Zinc] 2 times a day  Is Nutrition Support Recommended: Yes  Is Nutrition Education Recommended: No    Needs Calculated    Energy Calorie Requirements (kcal): 1,012 kcal (23 kcal/kg adjusted IBW for quadriplegia) (decreased needs 2/2 decreased metabolic activity due to denervated muscle)  Protein Requirements: 53 - 66 g (1.2 - 1.5 g/kg adjusted IBW for quadriplegia) (increased protein requirements 2/2 sacral wound and prevention of muscle atrophy)  Enteral Nutrition   Enteral Nutrition Formula Provides:  1,260 kcals Propofol Rate: No (1,080 kcal Isosource + 180 Luis Fernando)  54 g Protein (49 g Isosource + 5 Luis Fernando)  121 g Carbohydrates  47 g Fat Propofol Rate: No  560 ml Fluid without Flush    960 ml Fluid by flush   1,520 ml Total Fluid  Enteral Nutrition Recommended Order:  Tube feeding via PEG/ Gastrostomy  Tube feeding formula: Isosource 1.5 PEG/ Gastrostomy  Free Water Flush: 40 ml hourly  Modular Supplements: Luisf Ernando BID   Enteral Nutrition meets needs?: yes  Enteral Nutrition Status: Continue Enteral Nutrition    Monitor and Evaluation  % current Intake: Enteral Nutrition at goal  % intake to meet estimated needs: Enteral Nutrition   Monitor and Evaluation: Enteral and parenteral nutrition administration, Weight, Electrolyte and renal panel, Gastrointestinal profile, Glucose/endocrine profile, Inflammatory profile, Lipid profile, Nutrition focused physical findings, Skin    Current Medical  Diagnosis and Past Medical History     Past Medical History:   Diagnosis Date    GERD (gastroesophageal reflux disease)     Paraplegia      Nutrition/Diet History  Food Allergies: NKFA  Factors Affecting Nutritional Intake: difficulty/impaired swallowing    Lab/Procedures/Meds  Recent Labs   Lab 04/18/25  0328   BUN 18   CREATININE 0.53*   Note: Cr low. Low Cr levels in respiratory failure can be linked to decreased muscle mass, reduced blood flow to kidneys, meds    Last A1c:   Lab Results   Component Value Date    HGBA1C 5.5 03/01/2025   Note: WNL    Lab Results   Component Value Date    RBC 3.95 (L) 04/14/2025    HGB 9.7 (L) 04/14/2025    HCT 34.1 (L) 04/14/2025    MCV 86.3 04/14/2025    MCH 24.6 (L) 04/14/2025    MCHC 28.4 (L) 04/14/2025   Note: H/H low. PMHx of anemia    Pertinent Labs Reviewed: reviewed  Pertinent Medications Reviewed: reviewed    Scheduled Meds:   acetylcysteine 200 mg/ml (20%)  2 mL Nebulization TID    atorvastatin  20 mg Oral QHS    cefTRIAXone (Rocephin) IV (PEDS and ADULTS)  1 g Intravenous Q24H    enoxparin  40 mg Subcutaneous Daily    levalbuterol  1.25 mg Nebulization TID    midodrine  10 mg Oral TID    pantoprazole  40 mg Intravenous Daily    pregabalin  75 mg Oral BID    scopolamine  1 patch Transdermal Q3 Days    sertraline  50 mg Oral Daily   Note: atorvastatin, pantoprazole, setraline    Continuous Infusions:    PRN Meds:.  Current Facility-Administered Medications:     acetaminophen, 1,000 mg, Oral, Q8H PRN    acetaminophen, 650 mg, Oral, Q8H PRN    acetaminophen, 650 mg, Oral, Q4H PRN    aluminum-magnesium hydroxide-simethicone, 30 mL, Oral, QID PRN    dextrose 50%, 12.5 g, Intravenous, PRN    dextrose 50%, 12.5 g, Intravenous, PRN    dextrose 50%, 25 g, Intravenous, PRN    glucagon (human recombinant), 1 mg, Intramuscular, PRN    glucose, 16 g, Oral, PRN    glucose, 24 g, Oral, PRN    HYDROcodone-acetaminophen, 1 tablet, Oral, Q6H PRN    HYDROmorphone, 1 mg, Intravenous, Q6H  "PRN    magnesium oxide, 800 mg, Oral, PRN    magnesium oxide, 800 mg, Oral, PRN    melatonin, 6 mg, Oral, Nightly PRN    naloxone, 0.02 mg, Intravenous, PRN    ondansetron, 4 mg, Intravenous, Q8H PRN    polyethylene glycol, 17 g, Oral, Daily PRN    potassium bicarbonate, 35 mEq, Per G Tube, PRN    potassium bicarbonate, 50 mEq, Per G Tube, PRN    potassium bicarbonate, 60 mEq, Per G Tube, PRN    potassium, sodium phosphates, 2 packet, Oral, PRN    potassium, sodium phosphates, 2 packet, Oral, PRN    potassium, sodium phosphates, 2 packet, Oral, PRN    sodium chloride 0.9%, 10 mL, Intravenous, PRN    traZODone, 100 mg, Oral, Nightly PRN    Anthropometrics  Height: 5' 2" (157.5 cm)  Height (inches): 62 in  Height Method: Stated  Weight: 79.8 kg (175 lb 14.8 oz)  Weight (lb): 175.93 lb  Weight Method: Bed Scale  Ideal Body Weight (IBW), Female: 110 lb  BMI (Calculated): 32.2  Tetraplegia (Quadriplegia) Ideal Body Weight (IBW) Adjustment: 97 lb    Estimated/Assessed Needs  RMR ([place]-St. Jeor Equation): 1316.25     Temp: 98.1 °F (36.7 °C)Oral  Weight Used For Calorie Calculations: 44 kg (97 lb)     Energy Calorie Requirements (kcal): 1,012 kcal (23 kcal/kg adjusted IBW for quadriplegia) (decreased needs 2/2 decreased metabolic activity due to denervated muscle)  Weight Used For Protein Calculations: 44 kg (97 lb)  Protein Requirements: 53 - 66 g (1.2 - 1.5 g/kg adjusted IBW for quadriplegia) (increased protein requirements 2/2 sacral wound and prevention of muscle atrophy)       Fluids: 30 - 40 mL/kg normal requirements for patient with quadriplegia   Fluid Requirements: 1, 540 mL (35 mL/kg adjusted IBW for quadriplegia)     Nutrition by Nursing  Diet/Nutrition Received: NPO, tube feeding  Intake (%):  (NPO)     Diet/Feeding Tolerance: other (see comments)       Gastrostomy/Enterostomy LUQ-Feeding Type: continuous, by pump       Gastrostomy/Enterostomy LUQ-Current Rate (mL/hr): 30 mL/hr       Gastrostomy/Enterostomy " LUQ-Goal Rate (mL/hr): 30 mL/hr       Gastrostomy/Enterostomy LUQ-Formula Name: iSOSOURCE    Nutrition Follow-Up  RD Follow-up?: Yes    Nutrition Discharge Planning: Enteral nutrition (comments)       Nidhi Britt MS, RD, LD  Available via Secure Chat

## 2025-04-18 NOTE — PLAN OF CARE
Problem: Adult Inpatient Plan of Care  Goal: Plan of Care Review  Outcome: Progressing  Goal: Patient-Specific Goal (Individualized)  Outcome: Progressing  Goal: Absence of Hospital-Acquired Illness or Injury  Outcome: Progressing  Goal: Optimal Comfort and Wellbeing  Outcome: Progressing     Problem: Wound  Goal: Skin Health and Integrity  Outcome: Progressing  Goal: Optimal Wound Healing  Outcome: Progressing     Problem: Skin Injury Risk Increased  Goal: Skin Health and Integrity  Outcome: Progressing     Problem: Gas Exchange Impaired  Goal: Optimal Gas Exchange  Outcome: Progressing     Problem: Airway Clearance Ineffective  Goal: Effective Airway Clearance  Outcome: Progressing

## 2025-04-18 NOTE — SUBJECTIVE & OBJECTIVE
Interval History: No new complaints. Insurance denied LTAC.  D/w family, I will ask pulmonary to advise on trach.     Review of Systems  Objective:     Vital Signs (Most Recent):  Temp: 98.5 °F (36.9 °C) (04/17/25 1714)  Pulse: 106 (04/17/25 1714)  Resp: 17 (04/17/25 1714)  BP: 98/64 (04/17/25 1714)  SpO2: 95 % (04/17/25 1714) Vital Signs (24h Range):  Temp:  [97.7 °F (36.5 °C)-98.8 °F (37.1 °C)] 98.5 °F (36.9 °C)  Pulse:  [] 106  Resp:  [14-17] 17  SpO2:  [95 %-100 %] 95 %  BP: ()/(55-75) 98/64     Weight: 79.8 kg (175 lb 14.8 oz)  Body mass index is 32.18 kg/m².    Intake/Output Summary (Last 24 hours) at 4/17/2025 1940  Last data filed at 4/17/2025 0430  Gross per 24 hour   Intake 1540 ml   Output --   Net 1540 ml         Physical Exam  Constitutional:       General: She is not in acute distress.     Appearance: She is not ill-appearing.   HENT:      Head: Normocephalic and atraumatic.      Nose: Nose normal.      Mouth/Throat:      Mouth: Mucous membranes are moist.      Pharynx: Oropharynx is clear.   Eyes:      Extraocular Movements: Extraocular movements intact.      Conjunctiva/sclera: Conjunctivae normal.      Pupils: Pupils are equal, round, and reactive to light.   Cardiovascular:      Rate and Rhythm: Normal rate and regular rhythm.   Pulmonary:      Effort: Pulmonary effort is normal. No respiratory distress.      Comments: Secretions  Abdominal:      General: There is no distension.      Palpations: Abdomen is soft.      Tenderness: There is no abdominal tenderness.   Skin:     General: Skin is warm and dry.   Neurological:      Mental Status: She is alert. Mental status is at baseline.      Comments: Quadriplegia    Psychiatric:         Mood and Affect: Mood normal.         Behavior: Behavior normal.               Significant Labs: All pertinent labs within the past 24 hours have been reviewed.  BMP:   Recent Labs   Lab 04/17/25  0346   BUN 16   CREATININE 0.50*     CBC: No results for  "input(s): "WBC", "HGB", "HCT", "PLT" in the last 48 hours.    Significant Imaging: I have reviewed all pertinent imaging results/findings within the past 24 hours.  "

## 2025-04-18 NOTE — SUBJECTIVE & OBJECTIVE
"Interval History: No new complaints. Feeling ok. Appreciate pulmonary input on trach management.     Review of Systems  Objective:     Vital Signs (Most Recent):  Temp: 98.1 °F (36.7 °C) (04/18/25 1628)  Pulse: 98 (04/18/25 1628)  Resp: 18 (04/18/25 1628)  BP: 120/81 (04/18/25 1628)  SpO2: 99 % (04/18/25 1628) Vital Signs (24h Range):  Temp:  [97.9 °F (36.6 °C)-99.5 °F (37.5 °C)] 98.1 °F (36.7 °C)  Pulse:  [] 98  Resp:  [18-19] 18  SpO2:  [94 %-99 %] 99 %  BP: ()/(58-81) 120/81     Weight: 79.8 kg (175 lb 14.8 oz)  Body mass index is 32.18 kg/m².  No intake or output data in the 24 hours ending 04/18/25 1859      Physical Exam  Constitutional:       General: She is not in acute distress.     Appearance: She is not ill-appearing.   HENT:      Head: Normocephalic and atraumatic.      Nose: Nose normal.      Mouth/Throat:      Mouth: Mucous membranes are moist.      Pharynx: Oropharynx is clear.   Eyes:      Extraocular Movements: Extraocular movements intact.      Conjunctiva/sclera: Conjunctivae normal.      Pupils: Pupils are equal, round, and reactive to light.   Cardiovascular:      Rate and Rhythm: Normal rate and regular rhythm.   Pulmonary:      Effort: Pulmonary effort is normal. No respiratory distress.      Comments: Secretions  Abdominal:      General: There is no distension.      Palpations: Abdomen is soft.      Tenderness: There is no abdominal tenderness.   Skin:     General: Skin is warm and dry.   Neurological:      Mental Status: She is alert. Mental status is at baseline.      Comments: Quadriplegia    Psychiatric:         Mood and Affect: Mood normal.         Behavior: Behavior normal.               Significant Labs: All pertinent labs within the past 24 hours have been reviewed.  BMP:   Recent Labs   Lab 04/18/25  0328   BUN 18   CREATININE 0.53*     CBC: No results for input(s): "WBC", "HGB", "HCT", "PLT" in the last 48 hours.    Significant Imaging: I have reviewed all pertinent " imaging results/findings within the past 24 hours.

## 2025-04-18 NOTE — ASSESSMENT & PLAN NOTE
Continue tracheostomy care with T-piece trials  Continue tracheostomy suctioning ordered due to thick secretions. Continue scheduled mucolytic and scopolamine.    Culture with Haemophilus and yeast, started on fluconazole and ceftriaxone.     Pulmonology involved with care this admission.     Family would like patient to go to LTAC before going home as she still requires frequent suctioning, tube feeds, antibiotics, and additional care.  She has required over a week of ICU care prior to returning to our facility.  She was on the vent and was difficult to wean. She has required more than three midnights intermediate ICU level of care while at our facility.       Keep trach without capping until at least April 15th.       Denied LTAC by [employer], her insurance provider.  Appeal sent 4/8. Awaiting decision.

## 2025-04-18 NOTE — PROGRESS NOTES
[facility]  Critical Care Medicine  Progress Note    Patient Name: Karie Denney  MRN: [id number]  Admission Date: 3/23/2025  Hospital Length of Stay: 26 days  Code Status: Full Code  Attending Provider: Stephen Elaine Jr., MD  Primary Care Provider: Gonzalo Barrera NP   Principal Problem: Acute on chronic respiratory failure with hypoxia    Subjective:     HPI:  58 yo F who is bed bound (back surgery 2015 c/b b/l LE weakness, CVA 2024 with L paralysis), GERD and previous hospitalization 02/2025 course with progressive respiratory failure 2/2 aspiration, mucus plugging and mechanical respiratory failure requiring intubation 02/24 (difficult 2/2 reduced ROM cervical spine) with ICU course c/b spontaneous RLE compartment bleed with transfer to OSH for IR services. She is now transferred back to floor 03/23 s/p tracheostomy tube placement 03/14 for extubation failures now weaned to trach collar. She is also s/p PEG placement 03/11/2025. R spontaneous R thigh hematoma was managed with supportive care. Pulmonary is consulted for tracheostomy tube management recommendations.       Hospital/ICU Course:  No notes on file    Interval History/Significant Events: no acute overnight events, on trach collar, ongoing efforts with chest PT    Review of Systems  Objective:     Vital Signs (Most Recent):  Temp: 97.8 °F (36.6 °C) (03/26/25 1639)  Pulse: 108 (03/26/25 1639)  Resp: 18 (03/26/25 1639)  BP: 114/62 (03/26/25 1639)  SpO2: 95 % (03/26/25 1639) Vital Signs (24h Range):  Temp:  [97.5 °F (36.4 °C)-98.9 °F (37.2 °C)] 97.8 °F (36.6 °C)  Pulse:  [] 108  Resp:  [16-20] 18  SpO2:  [93 %-99 %] 95 %  BP: (114-124)/(62-82) 114/62   Weight: 79.8 kg (175 lb 14.8 oz)  Body mass index is 32.18 kg/m².      Intake/Output Summary (Last 24 hours) at 3/26/2025 1845  Last data filed at 3/26/2025 0700  Gross per 24 hour   Intake 310 ml   Output --   Net 310 ml          Physical  "Exam  Constitutional:       General: She is not in acute distress.     Appearance: She is not toxic-appearing.   HENT:      Head: Normocephalic and atraumatic.      Mouth/Throat:      Mouth: Mucous membranes are moist.   Cardiovascular:      Rate and Rhythm: Normal rate and regular rhythm.   Pulmonary:      Effort: Pulmonary effort is normal.      Breath sounds: No wheezing or rhonchi.   Abdominal:      Palpations: Abdomen is soft.      Tenderness: There is no right CVA tenderness or guarding.   Skin:     General: Skin is warm.   Neurological:      Mental Status: She is alert. Mental status is at baseline.      Comments: Functionally quadradiplegic            Vents:  Oxygen Concentration (%): 35 (03/26/25 1403)  Lines/Drains/Airways       Drain  Duration                  Gastrostomy/Enterostomy LUQ -- days              Airway  Duration                  Airway - Non-Surgical 02/24/25 1343 Endotracheal Tube 30 days    Adult Surgical Airway 03/23/25 1544 Shiley Cuffed  3 days              Peripheral Intravenous Line  Duration                  Peripheral IV - Single Lumen Yes Anterior;Right Upper Arm -- days                  Significant Labs:    CBC/Anemia Profile:  Recent Labs   Lab 03/25/25  0452 03/26/25  0441   WBC 8.09 8.43   HGB 7.8* 8.7*   HCT 28.2* 30.7*    216   MCV 93.1 90.6   RDW 19.2* 18.7*        Chemistries:  Recent Labs   Lab 03/25/25  0451 03/26/25  0441    140   K 3.0* 4.6    104   CO2 31 29   BUN 13 9*   CREATININE 0.43* 0.43*   CALCIUM 8.5 8.5   MG 2.1 2.0       All pertinent labs within the past 24 hours have been reviewed.    Significant Imaging:  I have reviewed all pertinent imaging results/findings within the past 24 hours.    ABG  No results for input(s): "PH", "PO2", "PCO2", "HCO3", "BE" in the last 168 hours.  Assessment/Plan:     Neuro  Quadriplegia  Noted impacting weaning    Pulmonary  * Acute on chronic respiratory failure with hypoxia  Patient looks comfortable right " now will consider doing a plug of her trach we need to find out the size of the trach, repeat chest x-ray    Cardiac/Vascular  Pericardial effusion  Noted on prior admission. Cardiology had recommended repeat TTE in 2 weeks. Colchicine was stopped last admission due to spontaneous RLE bleeding.   - interval TTE 03/2025 with small effusion and no tamponade physiology             Luke Santizo MD  Critical Care Medicine  [facility] - [address] [place] [address]

## 2025-04-18 NOTE — PROGRESS NOTES
04/18/25 0930   Wound Care Follow Up   Wound Care Follow-up? Yes   Wound Care- Next Tentative Visit Date 04/24/25   Follow Up Plan Hellen POC

## 2025-04-18 NOTE — PROGRESS NOTES
[facility] - [address] [place] [address] Medicine  Progress Note    Patient Name: Karie Denney  MRN: [id number]  Patient Class: IP- Inpatient   Admission Date: 3/23/2025  Length of Stay: 25 days  Attending Physician: Stephen Elaine Jr., MD  Primary Care Provider: Gonzalo Barrera NP        Subjective     Principal Problem:Acute on chronic respiratory failure with hypoxia        HPI:  Chief Complaint  Transfer for continued management of respiratory failure, tracheostomy care, PEG feeding, and complications of quadriplegia following prolonged hospitalization.    History of Present Illness  Ms. Karie Denney is a 59-year-old woman with a complex medical history including quadriplegia following spinal surgery in 2015 and a cerebrovascular accident (CVA) in 2024 resulting in left-sided paralysis. She was transferred to [facility] from [facility] in [place], [place], for ongoing care following a prolonged ICU course involving intubation, respiratory failure, and significant complications.  She was initially hospitalized on February 15, 2025, for aspiration and dysphagia evaluation, during which she developed aspiration pneumonia and respiratory failure requiring intubation. Her hospital course was complicated by an ESBL E. coli UTI, pericardial effusion (treated with colchicine), and a spontaneous right thigh hematoma concerning for compartment syndrome, leading to transfer to [facility]. She required prolonged mechanical ventilation, tracheostomy placement, and PEG tube insertion.  She has now returned to [facility] for continued respiratory care, tracheostomy management, PEG feeding, management of sacral pressure injury, and rehabilitation planning.    Past Medical History  Quadriplegia post-spinal surgery (2015)  CVA (2024)  Aspiration pneumonia  Acute respiratory failure  UTI (ESBL E. coli)  Depression  GERD  Pharyngoesophageal dysphagia  Pericardial  effusion/pericarditis  Chronic constipation  Pressure ulcer (sacral, unstageable)  Hyperlipidemia  Anemia    Past Surgical History  Spinal surgery (2015)  PEG tube placement (03/11/2025)  Esophageal dilation with biopsy (08/2024)    Medications  Active Medications:  Atorvastatin 20 mg daily  Omeprazole 40 mg daily  Sertraline 50 mg daily  Trazodone 100 mg qHS  Pregabalin 75 mg BID  Hydrocodone-acetaminophen 5-325 mg BID  Lactulose 30 mL daily via PEG  Midodrine 10 mg Q6H via PEG  Levalbuterol 0.63 mg nebulizer Q6H  Polyethylene glycol 17 g daily via PEG  Recent Changes:  Colchicine: Discontinued due to bleeding  Aspirin and ezetimibe: Discontinued    Allergies  Penicillins ? Rash and anaphylaxis    Social History  Never smoker  No alcohol or tobacco use  Lives at home with mother; receives home health weekly  Bedbound, non-ambulatory    Family History  Noncontributory    Review of Systems  Limited due to tracheostomy, quadriplegia, and communication challenges. History obtained from EMR and caregiver.    Physical Examination  General: Chronically ill-appearing woman, alert, tracheostomy in place with T-piece  HEENT: Mild nasal skin necrosis, mucous membranes moist  Eyes: PERRL, EOMI  Neck: Tracheostomy present  CV: RRR, no murmurs  Lungs: Breath sounds diminished at bases; no acute distress; no wheezing  Abdomen: Soft, non-tender, PEG tube in place  Skin: Unstageable sacral ulcer, nasal pressure ulcer  Neuro: Quadriplegia, responsive with eye tracking and blinking  Extremities: RLE hematoma, bilateral edema, no signs of active bleeding    Laboratory Data (Most Recent)  Hgb: 8.5 g/dL  Creatinine: 0.30 mg/dL  Albumin: 3.1 g/dL  WBC: 9.4 K/uL  Ferritin: 265 ng/mL  INR: 0.93  No growth on recent blood and urine cultures    Imaging  CT angio: Large RLE hematoma without active extravasation  Multiple chest X-rays: Stable bilateral pleural effusions, improving atelectasis  Echo: EF 55-60%, small pericardial  effusion      Overview/Hospital Course:  3/26  - continues on vanc for blood cultures + on 3/24, echo completed at time with no endocarditis seen  - discussed with pulmonary, recs to follow    3/27  - due to overall baseline health will treat coag negative staph bacteremia as a true infection, for now continue vanc and follow cultures and sensitivities  - discussed with pulmonology who plan to continue trach collar as is and see patient May 1st at 1:40 pm, confirmed appointment  - discussed with family at bedside who are very concerned about secretions and states they don't have the suction capabilities at home and do not feel comfortable going home with her in this state, will discuss with social on possible home equipment    3/28  - awaiting micro, continuing vanc  - social setting up suction at home, family plans to return home as before    3/29  - still with secretions  - family requesting voice box    3/30  - doing well today, mentation much improved  - anticipate discharge within the next two days with home health services, family will need education on trach maintenance and home feedings as patient has PEG tube and they have not cared for a PEG tube before, also we will discuss with  getting a speaking told to use with a trach    3/31  - secretions improved today  - working with social for home set up of oxygen and tube feeds  - family needs heavy education on trach care and tube feeds as primary care giver has no experience with either, still wishes to discharge home    Interval History: No new complaints. Insurance denied LTAC.  D/w family, I will ask pulmonary to advise on trach.     Review of Systems  Objective:     Vital Signs (Most Recent):  Temp: 98.5 °F (36.9 °C) (04/17/25 1714)  Pulse: 106 (04/17/25 1714)  Resp: 17 (04/17/25 1714)  BP: 98/64 (04/17/25 1714)  SpO2: 95 % (04/17/25 1714) Vital Signs (24h Range):  Temp:  [97.7 °F (36.5 °C)-98.8 °F (37.1 °C)] 98.5 °F (36.9 °C)  Pulse:   "[] 106  Resp:  [14-17] 17  SpO2:  [95 %-100 %] 95 %  BP: ()/(55-75) 98/64     Weight: 79.8 kg (175 lb 14.8 oz)  Body mass index is 32.18 kg/m².    Intake/Output Summary (Last 24 hours) at 4/17/2025 1940  Last data filed at 4/17/2025 0430  Gross per 24 hour   Intake 1540 ml   Output --   Net 1540 ml         Physical Exam  Constitutional:       General: She is not in acute distress.     Appearance: She is not ill-appearing.   HENT:      Head: Normocephalic and atraumatic.      Nose: Nose normal.      Mouth/Throat:      Mouth: Mucous membranes are moist.      Pharynx: Oropharynx is clear.   Eyes:      Extraocular Movements: Extraocular movements intact.      Conjunctiva/sclera: Conjunctivae normal.      Pupils: Pupils are equal, round, and reactive to light.   Cardiovascular:      Rate and Rhythm: Normal rate and regular rhythm.   Pulmonary:      Effort: Pulmonary effort is normal. No respiratory distress.      Comments: Secretions  Abdominal:      General: There is no distension.      Palpations: Abdomen is soft.      Tenderness: There is no abdominal tenderness.   Skin:     General: Skin is warm and dry.   Neurological:      Mental Status: She is alert. Mental status is at baseline.      Comments: Quadriplegia    Psychiatric:         Mood and Affect: Mood normal.         Behavior: Behavior normal.               Significant Labs: All pertinent labs within the past 24 hours have been reviewed.  BMP:   Recent Labs   Lab 04/17/25  0346   BUN 16   CREATININE 0.50*     CBC: No results for input(s): "WBC", "HGB", "HCT", "PLT" in the last 48 hours.    Significant Imaging: I have reviewed all pertinent imaging results/findings within the past 24 hours.      Assessment & Plan  Acute on chronic respiratory failure with hypoxia  Continue tracheostomy care with T-piece trials  Continue tracheostomy suctioning ordered due to thick secretions. Continue scheduled mucolytic and scopolamine.    Culture with Haemophilus and " yeast, started on fluconazole and ceftriaxone.     Pulmonology involved with care this admission.     Family would like patient to go to LTAC before going home as she still requires frequent suctioning, tube feeds, antibiotics, and additional care.  She has required over a week of ICU care prior to returning to our facility.  She was on the vent and was difficult to wean. She has required more than three midnights intermediate ICU level of care while at our facility.       Keep trach without capping until at least April 15th.       Denied LTAC by [employer], her insurance provider.  Appeal sent 4/8. Awaiting decision.   Severe protein-calorie malnutrition  Nutrition consulted. Most recent weight and BMI monitored-     Measurements:  Wt Readings from Last 1 Encounters:   03/27/25 79.8 kg (175 lb 14.8 oz)   Body mass index is 32.18 kg/m².    Patient has been screened and assessed by RD.    Malnutrition Type:  Context: chronic illness  Level: severe    Continue PEG tube feeding.  Nepro at 40 mL/hr  Maintain bowel regimen (lactulose, PEG, senna)  Dietitian to reassess  continue with current tube feed regimen    Dysphagia  With PEG in place. Continue tube feeds.   Continue omeprazole  Avoid PO intake  Speech therapy if clinically appropriate    Aspiration pneumonia  Recurrent aspiration pneumonia.  Completed two rounds of antibiotics. Now on ceftriaxone for tracheitis.    4/16  - Improving.  As secretions improve will ask pulmonary to advise regarding trach downsizing/capping.   4/17 - Continue Rocephin. Moderate yeast, normally not treated. Will see what pulmonary thinks.     Pressure ulcers of skin of multiple topographic sites  Aquacel Ag + Mepilex dressings  Wound care team consulted    Continue offloading and specialty mattress    Depression  Patient has persistent depression which is unknown and is currently controlled. Will Continue anti-depressant medications. We will not consult psychiatry at this time. Patient does  not display psychosis at this time. Continue to monitor closely and adjust plan of care as needed.          Mucus plugging of bronchi      S/P percutaneous endoscopic gastrostomy (PEG) tube placement  Patient noted to have a percutaneous endoscopic gastrostomy tube in place. I have personally inspected the tube.Tube was placed prior to this admission There are no signs of drainage or infection around the site. The tube is patent. Medications have converted to liquid form if available.  Routine care to be done by wound care and nursing staff.       Quadriplegia  Maximal support, bedbound  PT/OT as tolerated  DME planning for discharge    Hematoma of lower extremity, right, subsequent encounter  No active bleeding; conservative management  Monitor H/H  No anticoagulation  Hyperlipidemia  Continue atorvastatin    Pericardial effusion  Previously on colchicine, now discontinued due to bleeding  Continue low-dose prednisone  Monitor for recurrence    Acute respiratory failure with hypoxia and hypercarbia  Patient with Hypoxic Respiratory failure which is Acute on chronic.  she is not on home oxygen. Supplemental oxygen was provided and noted- Oxygen Concentration (%):  [28] 28    .   Signs/symptoms of respiratory failure include- tachypnea and respiratory distress. Contributing diagnoses includes - Aspiration and Pneumonia Labs and images were reviewed. Patient Has not had a recent ABG. Will treat underlying causes and adjust management of respiratory failure as follows-   Chronic respiratory failure with hypoxia  Patient with Hypoxic Respiratory failure which is Acute on chronic.  she is not on home oxygen. Supplemental oxygen was provided and noted- Oxygen Concentration (%):  [28] 28    .   Signs/symptoms of respiratory failure include- increased work of breathing and respiratory distress. Contributing diagnoses includes - Aspiration and Pneumonia Labs and images were reviewed. Patient Has not had a recent ABG. Will  treat underlying causes and adjust management of respiratory failure as follows-   Coag negative Staphylococcus bacteremia  Resolved. Treatment with vancomycin completed 4/12.     No endocarditis noted on echocardiogram.     Tracheitis  Antibiotics (From admission, onward)      Start     Stop Route Frequency Ordered    04/10/25 1700  cefTRIAXone injection 1 g         -- IV Every 24 hours (non-standard times) 04/10/25 1601              VTE Risk Mitigation (From admission, onward)           Ordered     enoxaparin injection 40 mg  Daily         03/23/25 1940     IP VTE HIGH RISK PATIENT  Once         03/23/25 1940     Place sequential compression device  Until discontinued         03/23/25 1941                    Discharge Planning   SHRADDHA: 4/14/2025     Code Status: Full Code   Medical Readiness for Discharge Date:   Discharge Plan A: Home with family, Home Health                        Stephen Elaine Jr, MD  Department of Hospital Medicine   [facility]

## 2025-04-18 NOTE — ASSESSMENT & PLAN NOTE
Recurrent aspiration pneumonia.  Completed two rounds of antibiotics. Now on ceftriaxone for tracheitis.    4/16  - Improving.  As secretions improve will ask pulmonary to advise regarding trach downsizing/capping.   4/17 - Continue Rocephin. Moderate yeast, normally not treated. Will see what pulmonary thinks.

## 2025-04-18 NOTE — PROGRESS NOTES
[facility]  Wound Care    Patient Name:  Karie Denney   MRN:  [id number]  Date: 4/18/2025  Diagnosis: Acute on chronic respiratory failure with hypoxia    History:     Past Medical History:   Diagnosis Date    GERD (gastroesophageal reflux disease)     Paraplegia        Social History[1]    Precautions:     Allergies as of 03/22/2025 - Reviewed 02/26/2025   Allergen Reaction Noted    Penicillins Anaphylaxis 08/25/2022       WOC Assessment Details/Treatment        04/18/25 0940   WOCN Assessment   WOCN Total Time (mins) 90   Visit Date 04/18/25   Visit Time 0930   Consult Type Follow Up   WOCN Speciality Wound   Wound moisture;skin tear   Number of Wounds 1   Continence Type Urinary;Fecal   Intervention chart review;applied;orders   Teaching on-going   Skin Interventions   Device Skin Pressure Protection positioning supports utilized;pressure points protected   Pressure Reduction Devices heel offloading device utilized;positioning supports utilized;pressure-redistributing mattress utilized   Pressure Reduction Techniques heels elevated off bed;positioned off wounds;weight shift assistance provided   Skin Protection incontinence pads utilized   Positioning   Body Position supine   Head of Bed (HOB) Positioning HOB at 30-45 degrees   Positioning/Transfer Devices pillows;in use   Pressure Injury Prevention    Check Moisture Management Pad Done   Sacral Foam Dressing Peel back sacral foam dressing, assess skin and reapply   Heel protection technique Heel boot        Wound 03/23/25 1541 Pressure Injury Left anterior Foot #2   Date First Assessed/Time First Assessed: 03/23/25 1541   Present on Original Admission: Yes  Primary Wound Type: Pressure Injury  Side: Left  Orientation: anterior  Location: Foot  Wound Number: #2  Is this injury device related?: No   Wound Image   (No picture taken this visit)   Pressure Injury Stage U   Dressing Appearance Open to air   Drainage Amount None    Appearance Black;Dry   Periwound Area Dry        Wound 03/23/25 1542 Pressure Injury Left Buttocks #3   Date First Assessed/Time First Assessed: 03/23/25 1542   Present on Original Admission: Yes  Primary Wound Type: Pressure Injury  Side: Left  Location: Buttocks  Wound Number: #3   Wound Image    Pressure Injury Stage U   Dressing Appearance Intact;Clean   Drainage Amount None   Appearance Black;Tan;Pink;Dry   Tissue loss description Full thickness   Periwound Area Dry   Wound Edges Irregular;Undefined   Care Cleansed with:;Antimicrobial agent;Wound cleanser   Periwound Care Absorptive dressing applied;Cleansed with pH balanced cleanser;Dry periwound area maintained       WOC Team consulted for:  Left buttocks    Narrative:   patient alert and non verbal but will mouth understanding. Patient family at bedside. Patient tolerated wound care well.    Active Wounds and Recommendations: Continue POC    Goals for Wound Healing: Apply antimicrobial, Reduce bioburden, and Educate on proper wound management post D/C     Barriers to Wound Healing: multiple co-morbidities poor vascular supply advanced age fragile skin infection    Orders placed.    Thank you for the consult.     We will continue to follow. See additional note under Notes Tab for tentative f/u plan/dates.      04/18/2025       [1]   Social History  Socioeconomic History    Marital status: Single   Tobacco Use    Smoking status: Never    Smokeless tobacco: Never   Substance and Sexual Activity    Alcohol use: Never    Drug use: Never    Sexual activity: Not Currently     Social Drivers of Health     Financial Resource Strain: Low Risk  (3/25/2025)    Overall Financial Resource Strain (CARDIA)     Difficulty of Paying Living Expenses: Not hard at all   Food Insecurity: No Food Insecurity (3/25/2025)    Hunger Vital Sign     Worried About Running Out of Food in the Last Year: Never true     Ran Out of Food in the Last Year: Never true   Recent Concern: Food  Insecurity - Food Insecurity Present (3/23/2025)    Hunger Vital Sign     Worried About Running Out of Food in the Last Year: Often true     Ran Out of Food in the Last Year: Often true   Transportation Needs: No Transportation Needs (3/24/2025)    PRAPARE - Transportation     Lack of Transportation (Medical): No     Lack of Transportation (Non-Medical): No   Physical Activity: Inactive (3/24/2025)    Exercise Vital Sign     Days of Exercise per Week: 0 days     Minutes of Exercise per Session: 0 min   Stress: No Stress Concern Present (3/25/2025)    [employer] - Occupational Stress Questionnaire     Feeling of Stress : Not at all   Recent Concern: Stress - Stress Concern Present (3/23/2025)    [employer] - Occupational Stress Questionnaire     Feeling of Stress : Rather much   Housing Stability: Low Risk  (3/25/2025)    Housing Stability Vital Sign     Unable to Pay for Housing in the Last Year: No     Homeless in the Last Year: No

## 2025-04-19 LAB
BUN SERPL-MCNC: 21 MG/DL (ref 10–20)
BUN/CREAT SERPL: 39 (ref 6–20)
CREAT SERPL-MCNC: 0.54 MG/DL (ref 0.55–1.02)
EGFR (NO RACE VARIABLE) (RUSH/TITUS): 105 ML/MIN/1.73M2

## 2025-04-19 PROCEDURE — 11000001 HC ACUTE MED/SURG PRIVATE ROOM

## 2025-04-19 PROCEDURE — 25000242 PHARM REV CODE 250 ALT 637 W/ HCPCS: Performed by: FAMILY MEDICINE

## 2025-04-19 PROCEDURE — 99900026 HC AIRWAY MAINTENANCE (STAT)

## 2025-04-19 PROCEDURE — 27000207 HC ISOLATION

## 2025-04-19 PROCEDURE — 25000003 PHARM REV CODE 250: Performed by: HOSPITALIST

## 2025-04-19 PROCEDURE — 36415 COLL VENOUS BLD VENIPUNCTURE: CPT | Performed by: HOSPITALIST

## 2025-04-19 PROCEDURE — 99232 SBSQ HOSP IP/OBS MODERATE 35: CPT | Mod: ,,, | Performed by: FAMILY MEDICINE

## 2025-04-19 PROCEDURE — 99231 SBSQ HOSP IP/OBS SF/LOW 25: CPT | Mod: ,,, | Performed by: INTERNAL MEDICINE

## 2025-04-19 PROCEDURE — 94761 N-INVAS EAR/PLS OXIMETRY MLT: CPT

## 2025-04-19 PROCEDURE — 63600175 PHARM REV CODE 636 W HCPCS: Performed by: FAMILY MEDICINE

## 2025-04-19 PROCEDURE — 25000242 PHARM REV CODE 250 ALT 637 W/ HCPCS: Performed by: INTERNAL MEDICINE

## 2025-04-19 PROCEDURE — 84520 ASSAY OF UREA NITROGEN: CPT | Performed by: HOSPITALIST

## 2025-04-19 PROCEDURE — 99900035 HC TECH TIME PER 15 MIN (STAT)

## 2025-04-19 PROCEDURE — 63600175 PHARM REV CODE 636 W HCPCS: Performed by: HOSPITALIST

## 2025-04-19 PROCEDURE — 27000221 HC OXYGEN, UP TO 24 HOURS

## 2025-04-19 PROCEDURE — 94640 AIRWAY INHALATION TREATMENT: CPT

## 2025-04-19 RX ADMIN — MIDODRINE HYDROCHLORIDE 10 MG: 10 TABLET ORAL at 04:04

## 2025-04-19 RX ADMIN — TRAZODONE HYDROCHLORIDE 100 MG: 50 TABLET ORAL at 09:04

## 2025-04-19 RX ADMIN — ACETYLCYSTEINE 2 ML: 200 SOLUTION ORAL; RESPIRATORY (INHALATION) at 07:04

## 2025-04-19 RX ADMIN — LEVALBUTEROL HYDROCHLORIDE 1.25 MG: 1.25 SOLUTION RESPIRATORY (INHALATION) at 07:04

## 2025-04-19 RX ADMIN — PREGABALIN 75 MG: 75 CAPSULE ORAL at 09:04

## 2025-04-19 RX ADMIN — CEFTRIAXONE SODIUM 1 G: 1 INJECTION, POWDER, FOR SOLUTION INTRAMUSCULAR; INTRAVENOUS at 04:04

## 2025-04-19 RX ADMIN — SERTRALINE HYDROCHLORIDE 50 MG: 50 TABLET ORAL at 09:04

## 2025-04-19 RX ADMIN — LEVALBUTEROL HYDROCHLORIDE 1.25 MG: 1.25 SOLUTION RESPIRATORY (INHALATION) at 01:04

## 2025-04-19 RX ADMIN — ACETYLCYSTEINE 2 ML: 200 SOLUTION ORAL; RESPIRATORY (INHALATION) at 01:04

## 2025-04-19 RX ADMIN — PANTOPRAZOLE SODIUM 40 MG: 40 INJECTION, POWDER, FOR SOLUTION INTRAVENOUS at 09:04

## 2025-04-19 RX ADMIN — ATORVASTATIN CALCIUM 20 MG: 20 TABLET, FILM COATED ORAL at 09:04

## 2025-04-19 RX ADMIN — ENOXAPARIN SODIUM 40 MG: 40 INJECTION SUBCUTANEOUS at 04:04

## 2025-04-19 RX ADMIN — MIDODRINE HYDROCHLORIDE 10 MG: 10 TABLET ORAL at 09:04

## 2025-04-19 RX ADMIN — Medication 6 MG: at 09:04

## 2025-04-19 RX ADMIN — SCOPOLAMINE 1 PATCH: 1.5 PATCH, EXTENDED RELEASE TRANSDERMAL at 09:04

## 2025-04-19 RX ADMIN — ONDANSETRON 4 MG: 2 INJECTION INTRAMUSCULAR; INTRAVENOUS at 04:04

## 2025-04-19 NOTE — ASSESSMENT & PLAN NOTE
Recurrent aspiration pneumonia.  Completed two rounds of antibiotics. Now on ceftriaxone for tracheitis.    4/16  - Improving.  As secretions improve will ask pulmonary to advise regarding trach downsizing/capping.   4/17 - Continue Rocephin. Moderate yeast, normally not treated. Will see what pulmonary thinks.   4/18 - Improved - Continue abx through weekend.

## 2025-04-19 NOTE — ASSESSMENT & PLAN NOTE
Recurrent aspiration pneumonia.  Completed two rounds of antibiotics. Now on ceftriaxone for tracheitis.    4/16  - Improving.  As secretions improve will ask pulmonary to advise regarding trach downsizing/capping.   4/17 - Continue Rocephin. Moderate yeast, normally not treated. Will see what pulmonary thinks.   4/18 - Improved - Continue abx through weekend.   4/19 - Tomorrow is last day of planned abx.

## 2025-04-19 NOTE — ASSESSMENT & PLAN NOTE
Continue tracheostomy care with T-piece trials  Continue tracheostomy suctioning ordered due to thick secretions. Continue scheduled mucolytic and scopolamine.    Culture with Haemophilus and yeast, started on fluconazole and ceftriaxone.     Pulmonology involved with care this admission.     Family would like patient to go to LTAC before going home as she still requires frequent suctioning, tube feeds, antibiotics, and additional care.  She has required over a week of ICU care prior to returning to our facility.  She was on the vent and was difficult to wean. She has required more than three midnights intermediate ICU level of care while at our facility.       Keep trach without capping until at least April 15th.       Denied LTAC by [employer], her insurance provider.  Appeal sent 4/8. Awaiting decision.

## 2025-04-19 NOTE — ASSESSMENT & PLAN NOTE
Continue tracheostomy care with T-piece trials  Continue tracheostomy suctioning ordered due to thick secretions. Continue scheduled mucolytic and scopolamine.    Culture with Haemophilus and yeast, started on fluconazole and ceftriaxone.     Pulmonology involved with care this admission.     Family would like patient to go to LTAC before going home as she still requires frequent suctioning, tube feeds, antibiotics, and additional care.  She has required over a week of ICU care prior to returning to our facility.  She was on the vent and was difficult to wean. She has required more than three midnights intermediate ICU level of care while at our facility.       Keep trach without capping until at least April 15th.       Denied LTAC by [email or web address], her insurance provider.  Appeal sent 4/8. Awaiting decision.

## 2025-04-19 NOTE — ASSESSMENT & PLAN NOTE
Antibiotics (From admission, onward)      Start     Stop Route Frequency Ordered    04/10/25 1700  cefTRIAXone injection 1 g         -- IV Every 24 hours (non-standard times) 04/10/25 1601            4/18 - complete abx this weekend.

## 2025-04-19 NOTE — PROGRESS NOTES
[facility] - [facility] Tele[facility]  Critical Care Medicine  Progress Note    Patient Name: Karie Denney  MRN: [id number]  Admission Date: 3/23/2025  Hospital Length of Stay: 27 days  Code Status: Full Code  Attending Provider: Stephen Elaine Jr., MD  Primary Care Provider: Gonzalo Barrera NP   Principal Problem: Acute on chronic respiratory failure with hypoxia    Subjective:     HPI:  58 yo F who is bed bound (back surgery 2015 c/b b/l LE weakness, CVA 2024 with L paralysis), GERD and previous hospitalization 02/2025 course with progressive respiratory failure 2/2 aspiration, mucus plugging and mechanical respiratory failure requiring intubation 02/24 (difficult 2/2 reduced ROM cervical spine) with ICU course c/b spontaneous RLE compartment bleed with transfer to OSH for IR services. She is now transferred back to floor 03/23 s/p tracheostomy tube placement 03/14 for extubation failures now weaned to trach collar. She is also s/p PEG placement 03/11/2025. R spontaneous R thigh hematoma was managed with supportive care. Pulmonary is consulted for tracheostomy tube management recommendations.       Hospital/ICU Course:  No notes on file    Interval History/Significant Events:  Patient without complaints tolerating plug trach    Review of Systems  Objective:     Vital Signs (Most Recent):  Temp: 98.4 °F (36.9 °C) (04/19/25 0801)  Pulse: (!) 121 (04/19/25 0801)  Resp: 20 (04/19/25 0733)  BP: 107/71 (04/19/25 0801)  SpO2: 98 % (04/19/25 0801) Vital Signs (24h Range):  Temp:  [98.1 °F (36.7 °C)-99.3 °F (37.4 °C)] 98.4 °F (36.9 °C)  Pulse:  [] 121  Resp:  [16-20] 20  SpO2:  [94 %-99 %] 98 %  BP: ()/(56-81) 107/71   Weight: 79.8 kg (175 lb 14.8 oz)  Body mass index is 32.18 kg/m².    No intake or output data in the 24 hours ending 04/19/25 0817       Physical Exam  Vitals reviewed.   Constitutional:       Appearance: Normal appearance.      Interventions: She is not intubated.  HENT:       "Head: Normocephalic and atraumatic.      Nose: Nose normal.      Mouth/Throat:      Mouth: Mucous membranes are dry.      Pharynx: Oropharynx is clear.   Eyes:      Extraocular Movements: Extraocular movements intact.      Conjunctiva/sclera: Conjunctivae normal.      Pupils: Pupils are equal, round, and reactive to light.   Cardiovascular:      Rate and Rhythm: Normal rate.      Heart sounds: Normal heart sounds. No murmur heard.  Pulmonary:      Effort: Pulmonary effort is normal. She is not intubated.      Breath sounds: Normal breath sounds.   Abdominal:      General: Abdomen is flat. Bowel sounds are normal.      Palpations: Abdomen is soft.   Musculoskeletal:         General: Normal range of motion.      Cervical back: Normal range of motion and neck supple.      Right lower leg: No edema.      Left lower leg: No edema.   Skin:     General: Skin is warm and dry.      Capillary Refill: Capillary refill takes less than 2 seconds.   Neurological:      General: No focal deficit present.      Mental Status: She is alert and oriented to person, place, and time.      Comments: Functionally quadriplegic   Psychiatric:         Mood and Affect: Mood normal.         Behavior: Behavior normal.            Vents:  Oxygen Concentration (%): 28 (04/19/25 0733)  Lines/Drains/Airways       Drain  Duration                  Gastrostomy/Enterostomy LUQ -- days              Airway  Duration             Adult Surgical Airway 03/23/25 1544 Shiley Cuffed  26 days              Peripheral Intravenous Line  Duration                  Peripheral IV - Single Lumen Yes Anterior;Right Upper Arm -- days                  Significant Labs:    CBC/Anemia Profile:  No results for input(s): "WBC", "HGB", "HCT", "PLT", "MCV", "RDW", "IRON", "FERRITIN", "RETIC", "FOLATE", "[id number]", "OCCULTBLOOD" in the last 48 hours.     Chemistries:  Recent Labs   Lab 04/18/25  0328 04/19/25  0450   BUN 18 21*   CREATININE 0.53* 0.54*       Recent Lab Results  " "       04/19/25  0450        BUN 21       BUN/CREAT RATIO 39       Creatinine 0.54       eGFR 105  Comment: Estimated GFR calculated using the CKD-EPI creatinine (2021) equation.               Significant Imaging:  I have reviewed all pertinent imaging results/findings within the past 24 hours.    ABG  No results for input(s): "PH", "PO2", "PCO2", "HCO3", "BE" in the last 168 hours.  Assessment/Plan:     Neuro  Quadriplegia  Noted impacting weaning    Pulmonary  * Acute on chronic respiratory failure with hypoxia  Patient looks good today I believe we can pull trach next week 6. Trach    Cardiac/Vascular  Pericardial effusion  Noted on prior admission. Cardiology had recommended repeat TTE in 2 weeks. Colchicine was stopped last admission due to spontaneous RLE bleeding.   - interval TTE 03/2025 with small effusion and no tamponade physiology             Luke Santizo MD  Critical Care Medicine  [facility] - [address] [place] [address]  "

## 2025-04-19 NOTE — ASSESSMENT & PLAN NOTE
Nutrition consulted. Most recent weight and BMI monitored-     Measurements:  Wt Readings from Last 1 Encounters:   03/27/25 79.8 kg (175 lb 14.8 oz)   Body mass index is 32.18 kg/m².    Patient has been screened and assessed by RD.    Malnutrition Type:  Context: chronic illness  Level: severe    Continue PEG tube feeding.  Nepro at 40 mL/hr  Maintain bowel regimen (lactulose, PEG, senna)  Dietitian to reassess  Continue with current tube feed regimen as tolerated with Luis Fernando BID to facilitate wound healing. Recommend to reweigh patient prior to discharge to adjust EN iso weight loss or weight gain. Recommend to consider addition of daily multivitamin/mineral via PEG given patient receiving low volume of formula per day 2/2 decreased calorie needs

## 2025-04-19 NOTE — PROGRESS NOTES
[facility] - [address] [place] [address] Medicine  Progress Note    Patient Name: Karie Denney  MRN: [id number]  Patient Class: IP- Inpatient   Admission Date: 3/23/2025  Length of Stay: 27 days  Attending Physician: Stephen Elaine Jr., MD  Primary Care Provider: Gonzalo Barrera NP        Subjective     Principal Problem:Acute on chronic respiratory failure with hypoxia        HPI:  Chief Complaint  Transfer for continued management of respiratory failure, tracheostomy care, PEG feeding, and complications of quadriplegia following prolonged hospitalization.    History of Present Illness  Ms. Karie Denney is a 59-year-old woman with a complex medical history including quadriplegia following spinal surgery in 2015 and a cerebrovascular accident (CVA) in 2024 resulting in left-sided paralysis. She was transferred to [facility] from [facility] in [place], [place], for ongoing care following a prolonged ICU course involving intubation, respiratory failure, and significant complications.  She was initially hospitalized on February 15, 2025, for aspiration and dysphagia evaluation, during which she developed aspiration pneumonia and respiratory failure requiring intubation. Her hospital course was complicated by an ESBL E. coli UTI, pericardial effusion (treated with colchicine), and a spontaneous right thigh hematoma concerning for compartment syndrome, leading to transfer to [facility]. She required prolonged mechanical ventilation, tracheostomy placement, and PEG tube insertion.  She has now returned to [facility] for continued respiratory care, tracheostomy management, PEG feeding, management of sacral pressure injury, and rehabilitation planning.    Past Medical History  Quadriplegia post-spinal surgery (2015)  CVA (2024)  Aspiration pneumonia  Acute respiratory failure  UTI (ESBL E. coli)  Depression  GERD  Pharyngoesophageal dysphagia  Pericardial  effusion/pericarditis  Chronic constipation  Pressure ulcer (sacral, unstageable)  Hyperlipidemia  Anemia    Past Surgical History  Spinal surgery (2015)  PEG tube placement (03/11/2025)  Esophageal dilation with biopsy (08/2024)    Medications  Active Medications:  Atorvastatin 20 mg daily  Omeprazole 40 mg daily  Sertraline 50 mg daily  Trazodone 100 mg qHS  Pregabalin 75 mg BID  Hydrocodone-acetaminophen 5-325 mg BID  Lactulose 30 mL daily via PEG  Midodrine 10 mg Q6H via PEG  Levalbuterol 0.63 mg nebulizer Q6H  Polyethylene glycol 17 g daily via PEG  Recent Changes:  Colchicine: Discontinued due to bleeding  Aspirin and ezetimibe: Discontinued    Allergies  Penicillins ? Rash and anaphylaxis    Social History  Never smoker  No alcohol or tobacco use  Lives at home with mother; receives home health weekly  Bedbound, non-ambulatory    Family History  Noncontributory    Review of Systems  Limited due to tracheostomy, quadriplegia, and communication challenges. History obtained from EMR and caregiver.    Physical Examination  General: Chronically ill-appearing woman, alert, tracheostomy in place with T-piece  HEENT: Mild nasal skin necrosis, mucous membranes moist  Eyes: PERRL, EOMI  Neck: Tracheostomy present  CV: RRR, no murmurs  Lungs: Breath sounds diminished at bases; no acute distress; no wheezing  Abdomen: Soft, non-tender, PEG tube in place  Skin: Unstageable sacral ulcer, nasal pressure ulcer  Neuro: Quadriplegia, responsive with eye tracking and blinking  Extremities: RLE hematoma, bilateral edema, no signs of active bleeding    Laboratory Data (Most Recent)  Hgb: 8.5 g/dL  Creatinine: 0.30 mg/dL  Albumin: 3.1 g/dL  WBC: 9.4 K/uL  Ferritin: 265 ng/mL  INR: 0.93  No growth on recent blood and urine cultures    Imaging  CT angio: Large RLE hematoma without active extravasation  Multiple chest X-rays: Stable bilateral pleural effusions, improving atelectasis  Echo: EF 55-60%, small pericardial  effusion      Overview/Hospital Course:  3/26  - continues on vanc for blood cultures + on 3/24, echo completed at time with no endocarditis seen  - discussed with pulmonary, recs to follow    3/27  - due to overall baseline health will treat coag negative staph bacteremia as a true infection, for now continue vanc and follow cultures and sensitivities  - discussed with pulmonology who plan to continue trach collar as is and see patient May 1st at 1:40 pm, confirmed appointment  - discussed with family at bedside who are very concerned about secretions and states they don't have the suction capabilities at home and do not feel comfortable going home with her in this state, will discuss with social on possible home equipment    3/28  - awaiting micro, continuing vanc  - social setting up suction at home, family plans to return home as before    3/29  - still with secretions  - family requesting voice box    3/30  - doing well today, mentation much improved  - anticipate discharge within the next two days with home health services, family will need education on trach maintenance and home feedings as patient has PEG tube and they have not cared for a PEG tube before, also we will discuss with  getting a speaking told to use with a trach    3/31  - secretions improved today  - working with social for home set up of oxygen and tube feeds  - family needs heavy education on trach care and tube feeds as primary care giver has no experience with either, still wishes to discharge home    Interval History: No acute complaints. Slept well.     Review of Systems  Objective:     Vital Signs (Most Recent):  Temp: 98.6 °F (37 °C) (04/19/25 1134)  Pulse: 100 (04/19/25 1317)  Resp: 18 (04/19/25 1317)  BP: 114/71 (04/19/25 1134)  SpO2: 99 % (04/19/25 1317) Vital Signs (24h Range):  Temp:  [98.1 °F (36.7 °C)-99.3 °F (37.4 °C)] 98.6 °F (37 °C)  Pulse:  [] 100  Resp:  [16-20] 18  SpO2:  [98 %-100 %] 99 %  BP:  "()/(56-81) 114/71     Weight: 79.8 kg (175 lb 14.8 oz)  Body mass index is 32.18 kg/m².  No intake or output data in the 24 hours ending 04/19/25 1507      Physical Exam  Constitutional:       General: She is not in acute distress.     Appearance: She is not ill-appearing.   HENT:      Head: Normocephalic and atraumatic.      Nose: Nose normal.      Mouth/Throat:      Mouth: Mucous membranes are moist.      Pharynx: Oropharynx is clear.   Eyes:      Extraocular Movements: Extraocular movements intact.      Conjunctiva/sclera: Conjunctivae normal.      Pupils: Pupils are equal, round, and reactive to light.   Cardiovascular:      Rate and Rhythm: Normal rate and regular rhythm.   Pulmonary:      Effort: Pulmonary effort is normal. No respiratory distress.      Comments: Secretions  Abdominal:      General: There is no distension.      Palpations: Abdomen is soft.      Tenderness: There is no abdominal tenderness.   Skin:     General: Skin is warm and dry.   Neurological:      Mental Status: She is alert. Mental status is at baseline.      Comments: Quadriplegia    Psychiatric:         Mood and Affect: Mood normal.         Behavior: Behavior normal.               Significant Labs: All pertinent labs within the past 24 hours have been reviewed.  BMP:   Recent Labs   Lab 04/19/25  0450   BUN 21*   CREATININE 0.54*     CBC: No results for input(s): "WBC", "HGB", "HCT", "PLT" in the last 48 hours.    Significant Imaging: I have reviewed all pertinent imaging results/findings within the past 24 hours.      Assessment & Plan  Acute on chronic respiratory failure with hypoxia  Continue tracheostomy care with T-piece trials  Continue tracheostomy suctioning ordered due to thick secretions. Continue scheduled mucolytic and scopolamine.    Culture with Haemophilus and yeast, started on fluconazole and ceftriaxone.     Pulmonology involved with care this admission.     Family would like patient to go to LTAC before going " home as she still requires frequent suctioning, tube feeds, antibiotics, and additional care.  She has required over a week of ICU care prior to returning to our facility.  She was on the vent and was difficult to wean. She has required more than three midnights intermediate ICU level of care while at our facility.       Keep trach without capping until at least April 15th.       Denied LTAC by [employer], her insurance provider.  Appeal sent 4/8. Awaiting decision.   Severe protein-calorie malnutrition  Nutrition consulted. Most recent weight and BMI monitored-     Measurements:  Wt Readings from Last 1 Encounters:   03/27/25 79.8 kg (175 lb 14.8 oz)   Body mass index is 32.18 kg/m².    Patient has been screened and assessed by RD.    Malnutrition Type:  Context: chronic illness  Level: severe    Continue PEG tube feeding.  Nepro at 40 mL/hr  Maintain bowel regimen (lactulose, PEG, senna)  Dietitian to reassess  Continue with current tube feed regimen as tolerated with Lui Sfernando BID to facilitate wound healing. Recommend to reweigh patient prior to discharge to adjust EN iso weight loss or weight gain. Recommend to consider addition of daily multivitamin/mineral via PEG given patient receiving low volume of formula per day 2/2 decreased calorie needs    Dysphagia  With PEG in place. Continue tube feeds.   Continue omeprazole  Avoid PO intake  Speech therapy if clinically appropriate    Aspiration pneumonia  Recurrent aspiration pneumonia.  Completed two rounds of antibiotics. Now on ceftriaxone for tracheitis.    4/16  - Improving.  As secretions improve will ask pulmonary to advise regarding trach downsizing/capping.   4/17 - Continue Rocephin. Moderate yeast, normally not treated. Will see what pulmonary thinks.   4/18 - Improved - Continue abx through weekend.   4/19 - Tomorrow is last day of planned abx.     Pressure ulcers of skin of multiple topographic sites  Aquacel Ag + Mepilex dressings  Wound care team  consulted    Continue offloading and specialty mattress    Depression  Patient has persistent depression which is unknown and is currently controlled. Will Continue anti-depressant medications. We will not consult psychiatry at this time. Patient does not display psychosis at this time. Continue to monitor closely and adjust plan of care as needed.          S/P percutaneous endoscopic gastrostomy (PEG) tube placement  Patient noted to have a percutaneous endoscopic gastrostomy tube in place. I have personally inspected the tube.Tube was placed prior to this admission There are no signs of drainage or infection around the site. The tube is patent. Medications have converted to liquid form if available.  Routine care to be done by wound care and nursing staff.       Quadriplegia  Maximal support, bedbound  PT/OT as tolerated  DME planning for discharge    Hematoma of lower extremity, right, subsequent encounter  No active bleeding; conservative management  Monitor H/H  No anticoagulation  Hyperlipidemia  Continue atorvastatin    Pericardial effusion  Previously on colchicine, now discontinued due to bleeding  Continue low-dose prednisone  Monitor for recurrence    Acute respiratory failure with hypoxia and hypercarbia  Patient with Hypoxic Respiratory failure which is Acute on chronic.  she is not on home oxygen. Supplemental oxygen was provided and noted- Oxygen Concentration (%):  [28] 28    .   Signs/symptoms of respiratory failure include- tachypnea and respiratory distress. Contributing diagnoses includes - Aspiration and Pneumonia Labs and images were reviewed. Patient Has not had a recent ABG. Will treat underlying causes and adjust management of respiratory failure as follows-   Chronic respiratory failure with hypoxia  Patient with Hypoxic Respiratory failure which is Acute on chronic.  she is not on home oxygen. Supplemental oxygen was provided and noted- Oxygen Concentration (%):  [28] 28    .    Signs/symptoms of respiratory failure include- increased work of breathing and respiratory distress. Contributing diagnoses includes - Aspiration and Pneumonia Labs and images were reviewed. Patient Has not had a recent ABG. Will treat underlying causes and adjust management of respiratory failure as follows-   Coag negative Staphylococcus bacteremia  Resolved. Treatment with vancomycin completed 4/12.     No endocarditis noted on echocardiogram.     Tracheitis  Antibiotics (From admission, onward)      Start     Stop Route Frequency Ordered    04/10/25 1700  cefTRIAXone injection 1 g         -- IV Every 24 hours (non-standard times) 04/10/25 1601            4/18 - complete abx this weekend.   VTE Risk Mitigation (From admission, onward)           Ordered     enoxaparin injection 40 mg  Daily         03/23/25 1940     IP VTE HIGH RISK PATIENT  Once         03/23/25 1940     Place sequential compression device  Until discontinued         03/23/25 1941                    Discharge Planning   SHRADDHA: 4/14/2025     Code Status: Full Code   Medical Readiness for Discharge Date:   Discharge Plan A: Home with family, Home Health                        Stephen Elaine Jr, MD  Department of Hospital Medicine   [facility] - [address] [place] [address]

## 2025-04-19 NOTE — PROGRESS NOTES
[facility] - [address] [place] [address] Medicine  Progress Note    Patient Name: Karie Denney  MRN: [id number]  Patient Class: IP- Inpatient   Admission Date: 3/23/2025  Length of Stay: 26 days  Attending Physician: Stephen Elaine Jr., MD  Primary Care Provider: Gonzalo Barrera NP        Subjective     Principal Problem:Acute on chronic respiratory failure with hypoxia        HPI:  Chief Complaint  Transfer for continued management of respiratory failure, tracheostomy care, PEG feeding, and complications of quadriplegia following prolonged hospitalization.    History of Present Illness  Ms. Karie Denney is a 59-year-old woman with a complex medical history including quadriplegia following spinal surgery in 2015 and a cerebrovascular accident (CVA) in 2024 resulting in left-sided paralysis. She was transferred to [facility] from [facility] in [place], [place], for ongoing care following a prolonged ICU course involving intubation, respiratory failure, and significant complications.  She was initially hospitalized on February 15, 2025, for aspiration and dysphagia evaluation, during which she developed aspiration pneumonia and respiratory failure requiring intubation. Her hospital course was complicated by an ESBL E. coli UTI, pericardial effusion (treated with colchicine), and a spontaneous right thigh hematoma concerning for compartment syndrome, leading to transfer to [facility]. She required prolonged mechanical ventilation, tracheostomy placement, and PEG tube insertion.  She has now returned to [facility] for continued respiratory care, tracheostomy management, PEG feeding, management of sacral pressure injury, and rehabilitation planning.    Past Medical History  Quadriplegia post-spinal surgery (2015)  CVA (2024)  Aspiration pneumonia  Acute respiratory failure  UTI (ESBL E. coli)  Depression  GERD  Pharyngoesophageal dysphagia  Pericardial  effusion/pericarditis  Chronic constipation  Pressure ulcer (sacral, unstageable)  Hyperlipidemia  Anemia    Past Surgical History  Spinal surgery (2015)  PEG tube placement (03/11/2025)  Esophageal dilation with biopsy (08/2024)    Medications  Active Medications:  Atorvastatin 20 mg daily  Omeprazole 40 mg daily  Sertraline 50 mg daily  Trazodone 100 mg qHS  Pregabalin 75 mg BID  Hydrocodone-acetaminophen 5-325 mg BID  Lactulose 30 mL daily via PEG  Midodrine 10 mg Q6H via PEG  Levalbuterol 0.63 mg nebulizer Q6H  Polyethylene glycol 17 g daily via PEG  Recent Changes:  Colchicine: Discontinued due to bleeding  Aspirin and ezetimibe: Discontinued    Allergies  Penicillins ? Rash and anaphylaxis    Social History  Never smoker  No alcohol or tobacco use  Lives at home with mother; receives home health weekly  Bedbound, non-ambulatory    Family History  Noncontributory    Review of Systems  Limited due to tracheostomy, quadriplegia, and communication challenges. History obtained from EMR and caregiver.    Physical Examination  General: Chronically ill-appearing woman, alert, tracheostomy in place with T-piece  HEENT: Mild nasal skin necrosis, mucous membranes moist  Eyes: PERRL, EOMI  Neck: Tracheostomy present  CV: RRR, no murmurs  Lungs: Breath sounds diminished at bases; no acute distress; no wheezing  Abdomen: Soft, non-tender, PEG tube in place  Skin: Unstageable sacral ulcer, nasal pressure ulcer  Neuro: Quadriplegia, responsive with eye tracking and blinking  Extremities: RLE hematoma, bilateral edema, no signs of active bleeding    Laboratory Data (Most Recent)  Hgb: 8.5 g/dL  Creatinine: 0.30 mg/dL  Albumin: 3.1 g/dL  WBC: 9.4 K/uL  Ferritin: 265 ng/mL  INR: 0.93  No growth on recent blood and urine cultures    Imaging  CT angio: Large RLE hematoma without active extravasation  Multiple chest X-rays: Stable bilateral pleural effusions, improving atelectasis  Echo: EF 55-60%, small pericardial  effusion      Overview/Hospital Course:  3/26  - continues on vanc for blood cultures + on 3/24, echo completed at time with no endocarditis seen  - discussed with pulmonary, recs to follow    3/27  - due to overall baseline health will treat coag negative staph bacteremia as a true infection, for now continue vanc and follow cultures and sensitivities  - discussed with pulmonology who plan to continue trach collar as is and see patient May 1st at 1:40 pm, confirmed appointment  - discussed with family at bedside who are very concerned about secretions and states they don't have the suction capabilities at home and do not feel comfortable going home with her in this state, will discuss with social on possible home equipment    3/28  - awaiting micro, continuing vanc  - social setting up suction at home, family plans to return home as before    3/29  - still with secretions  - family requesting voice box    3/30  - doing well today, mentation much improved  - anticipate discharge within the next two days with home health services, family will need education on trach maintenance and home feedings as patient has PEG tube and they have not cared for a PEG tube before, also we will discuss with  getting a speaking told to use with a trach    3/31  - secretions improved today  - working with social for home set up of oxygen and tube feeds  - family needs heavy education on trach care and tube feeds as primary care giver has no experience with either, still wishes to discharge home    Interval History: No new complaints. Feeling ok. Appreciate pulmonary input on trach management.     Review of Systems  Objective:     Vital Signs (Most Recent):  Temp: 98.1 °F (36.7 °C) (04/18/25 1628)  Pulse: 98 (04/18/25 1628)  Resp: 18 (04/18/25 1628)  BP: 120/81 (04/18/25 1628)  SpO2: 99 % (04/18/25 1628) Vital Signs (24h Range):  Temp:  [97.9 °F (36.6 °C)-99.5 °F (37.5 °C)] 98.1 °F (36.7 °C)  Pulse:  [] 98  Resp:   "[18-19] 18  SpO2:  [94 %-99 %] 99 %  BP: ()/(58-81) 120/81     Weight: 79.8 kg (175 lb 14.8 oz)  Body mass index is 32.18 kg/m².  No intake or output data in the 24 hours ending 04/18/25 3229      Physical Exam  Constitutional:       General: She is not in acute distress.     Appearance: She is not ill-appearing.   HENT:      Head: Normocephalic and atraumatic.      Nose: Nose normal.      Mouth/Throat:      Mouth: Mucous membranes are moist.      Pharynx: Oropharynx is clear.   Eyes:      Extraocular Movements: Extraocular movements intact.      Conjunctiva/sclera: Conjunctivae normal.      Pupils: Pupils are equal, round, and reactive to light.   Cardiovascular:      Rate and Rhythm: Normal rate and regular rhythm.   Pulmonary:      Effort: Pulmonary effort is normal. No respiratory distress.      Comments: Secretions  Abdominal:      General: There is no distension.      Palpations: Abdomen is soft.      Tenderness: There is no abdominal tenderness.   Skin:     General: Skin is warm and dry.   Neurological:      Mental Status: She is alert. Mental status is at baseline.      Comments: Quadriplegia    Psychiatric:         Mood and Affect: Mood normal.         Behavior: Behavior normal.               Significant Labs: All pertinent labs within the past 24 hours have been reviewed.  BMP:   Recent Labs   Lab 04/18/25  0328   BUN 18   CREATININE 0.53*     CBC: No results for input(s): "WBC", "HGB", "HCT", "PLT" in the last 48 hours.    Significant Imaging: I have reviewed all pertinent imaging results/findings within the past 24 hours.      Assessment & Plan  Acute on chronic respiratory failure with hypoxia  Continue tracheostomy care with T-piece trials  Continue tracheostomy suctioning ordered due to thick secretions. Continue scheduled mucolytic and scopolamine.    Culture with Haemophilus and yeast, started on fluconazole and ceftriaxone.     Pulmonology involved with care this admission.     Family would " like patient to go to LTAC before going home as she still requires frequent suctioning, tube feeds, antibiotics, and additional care.  She has required over a week of ICU care prior to returning to our facility.  She was on the vent and was difficult to wean. She has required more than three midnights intermediate ICU level of care while at our facility.       Keep trach without capping until at least April 15th.       Denied LTAC by [employer], her insurance provider.  Appeal sent 4/8. Awaiting decision.   Severe protein-calorie malnutrition  Nutrition consulted. Most recent weight and BMI monitored-     Measurements:  Wt Readings from Last 1 Encounters:   03/27/25 79.8 kg (175 lb 14.8 oz)   Body mass index is 32.18 kg/m².    Patient has been screened and assessed by RD.    Malnutrition Type:  Context: chronic illness  Level: severe    Continue PEG tube feeding.  Nepro at 40 mL/hr  Maintain bowel regimen (lactulose, PEG, senna)  Dietitian to reassess  Continue with current tube feed regimen as tolerated with Luis Fernando BID to facilitate wound healing. Recommend to reweigh patient prior to discharge to adjust EN iso weight loss or weight gain. Recommend to consider addition of daily multivitamin/mineral via PEG given patient receiving low volume of formula per day 2/2 decreased calorie needs    Dysphagia  With PEG in place. Continue tube feeds.   Continue omeprazole  Avoid PO intake  Speech therapy if clinically appropriate    Aspiration pneumonia  Recurrent aspiration pneumonia.  Completed two rounds of antibiotics. Now on ceftriaxone for tracheitis.    4/16  - Improving.  As secretions improve will ask pulmonary to advise regarding trach downsizing/capping.   4/17 - Continue Rocephin. Moderate yeast, normally not treated. Will see what pulmonary thinks.   4/18 - Improved - Continue abx through weekend.     Pressure ulcers of skin of multiple topographic sites  Aquacel Ag + Mepilex dressings  Wound care team  consulted    Continue offloading and specialty mattress    Depression  Patient has persistent depression which is unknown and is currently controlled. Will Continue anti-depressant medications. We will not consult psychiatry at this time. Patient does not display psychosis at this time. Continue to monitor closely and adjust plan of care as needed.          S/P percutaneous endoscopic gastrostomy (PEG) tube placement  Patient noted to have a percutaneous endoscopic gastrostomy tube in place. I have personally inspected the tube.Tube was placed prior to this admission There are no signs of drainage or infection around the site. The tube is patent. Medications have converted to liquid form if available.  Routine care to be done by wound care and nursing staff.       Quadriplegia  Maximal support, bedbound  PT/OT as tolerated  DME planning for discharge    Hematoma of lower extremity, right, subsequent encounter  No active bleeding; conservative management  Monitor H/H  No anticoagulation  Hyperlipidemia  Continue atorvastatin    Pericardial effusion  Previously on colchicine, now discontinued due to bleeding  Continue low-dose prednisone  Monitor for recurrence    Acute respiratory failure with hypoxia and hypercarbia  Patient with Hypoxic Respiratory failure which is Acute on chronic.  she is not on home oxygen. Supplemental oxygen was provided and noted- Oxygen Concentration (%):  [28] 28    .   Signs/symptoms of respiratory failure include- tachypnea and respiratory distress. Contributing diagnoses includes - Aspiration and Pneumonia Labs and images were reviewed. Patient Has not had a recent ABG. Will treat underlying causes and adjust management of respiratory failure as follows-   Chronic respiratory failure with hypoxia  Patient with Hypoxic Respiratory failure which is Acute on chronic.  she is not on home oxygen. Supplemental oxygen was provided and noted- Oxygen Concentration (%):  [28] 28    .    Signs/symptoms of respiratory failure include- increased work of breathing and respiratory distress. Contributing diagnoses includes - Aspiration and Pneumonia Labs and images were reviewed. Patient Has not had a recent ABG. Will treat underlying causes and adjust management of respiratory failure as follows-   Coag negative Staphylococcus bacteremia  Resolved. Treatment with vancomycin completed 4/12.     No endocarditis noted on echocardiogram.     Tracheitis  Antibiotics (From admission, onward)      Start     Stop Route Frequency Ordered    04/10/25 1700  cefTRIAXone injection 1 g         -- IV Every 24 hours (non-standard times) 04/10/25 1601            4/18 - complete abx this weekend.   VTE Risk Mitigation (From admission, onward)           Ordered     enoxaparin injection 40 mg  Daily         03/23/25 1940     IP VTE HIGH RISK PATIENT  Once         03/23/25 1940     Place sequential compression device  Until discontinued         03/23/25 1941                    Discharge Planning   SHRADDHA: 4/14/2025     Code Status: Full Code   Medical Readiness for Discharge Date:   Discharge Plan A: Home with family, Home Health                        Stephen Elaine Jr, MD  Department of Hospital Medicine   [facility] - [address] [place] [address]

## 2025-04-19 NOTE — SUBJECTIVE & OBJECTIVE
Interval History/Significant Events:  Patient without complaints tolerating plug trach    Review of Systems  Objective:     Vital Signs (Most Recent):  Temp: 98.4 °F (36.9 °C) (04/19/25 0801)  Pulse: (!) 121 (04/19/25 0801)  Resp: 20 (04/19/25 0733)  BP: 107/71 (04/19/25 0801)  SpO2: 98 % (04/19/25 0801) Vital Signs (24h Range):  Temp:  [98.1 °F (36.7 °C)-99.3 °F (37.4 °C)] 98.4 °F (36.9 °C)  Pulse:  [] 121  Resp:  [16-20] 20  SpO2:  [94 %-99 %] 98 %  BP: ()/(56-81) 107/71   Weight: 79.8 kg (175 lb 14.8 oz)  Body mass index is 32.18 kg/m².    No intake or output data in the 24 hours ending 04/19/25 0817       Physical Exam  Vitals reviewed.   Constitutional:       Appearance: Normal appearance.      Interventions: She is not intubated.  HENT:      Head: Normocephalic and atraumatic.      Nose: Nose normal.      Mouth/Throat:      Mouth: Mucous membranes are dry.      Pharynx: Oropharynx is clear.   Eyes:      Extraocular Movements: Extraocular movements intact.      Conjunctiva/sclera: Conjunctivae normal.      Pupils: Pupils are equal, round, and reactive to light.   Cardiovascular:      Rate and Rhythm: Normal rate.      Heart sounds: Normal heart sounds. No murmur heard.  Pulmonary:      Effort: Pulmonary effort is normal. She is not intubated.      Breath sounds: Normal breath sounds.   Abdominal:      General: Abdomen is flat. Bowel sounds are normal.      Palpations: Abdomen is soft.   Musculoskeletal:         General: Normal range of motion.      Cervical back: Normal range of motion and neck supple.      Right lower leg: No edema.      Left lower leg: No edema.   Skin:     General: Skin is warm and dry.      Capillary Refill: Capillary refill takes less than 2 seconds.   Neurological:      General: No focal deficit present.      Mental Status: She is alert and oriented to person, place, and time.      Comments: Functionally quadriplegic   Psychiatric:         Mood and Affect: Mood normal.          "Behavior: Behavior normal.            Vents:  Oxygen Concentration (%): 28 (04/19/25 0733)  Lines/Drains/Airways       Drain  Duration                  Gastrostomy/Enterostomy LUQ -- days              Airway  Duration             Adult Surgical Airway 03/23/25 1544 Shiley Cuffed  26 days              Peripheral Intravenous Line  Duration                  Peripheral IV - Single Lumen Yes Anterior;Right Upper Arm -- days                  Significant Labs:    CBC/Anemia Profile:  No results for input(s): "WBC", "HGB", "HCT", "PLT", "MCV", "RDW", "IRON", "FERRITIN", "RETIC", "FOLATE", "[id number]", "OCCULTBLOOD" in the last 48 hours.     Chemistries:  Recent Labs   Lab 04/18/25  0328 04/19/25  0450   BUN 18 21*   CREATININE 0.53* 0.54*       Recent Lab Results         04/19/25  0450        BUN 21       BUN/CREAT RATIO 39       Creatinine 0.54       eGFR 105  Comment: Estimated GFR calculated using the CKD-EPI creatinine (2021) equation.               Significant Imaging:  I have reviewed all pertinent imaging results/findings within the past 24 hours.  "

## 2025-04-19 NOTE — SUBJECTIVE & OBJECTIVE
"Interval History: No acute complaints. Slept well.     Review of Systems  Objective:     Vital Signs (Most Recent):  Temp: 98.6 °F (37 °C) (04/19/25 1134)  Pulse: 100 (04/19/25 1317)  Resp: 18 (04/19/25 1317)  BP: 114/71 (04/19/25 1134)  SpO2: 99 % (04/19/25 1317) Vital Signs (24h Range):  Temp:  [98.1 °F (36.7 °C)-99.3 °F (37.4 °C)] 98.6 °F (37 °C)  Pulse:  [] 100  Resp:  [16-20] 18  SpO2:  [98 %-100 %] 99 %  BP: ()/(56-81) 114/71     Weight: 79.8 kg (175 lb 14.8 oz)  Body mass index is 32.18 kg/m².  No intake or output data in the 24 hours ending 04/19/25 1507      Physical Exam  Constitutional:       General: She is not in acute distress.     Appearance: She is not ill-appearing.   HENT:      Head: Normocephalic and atraumatic.      Nose: Nose normal.      Mouth/Throat:      Mouth: Mucous membranes are moist.      Pharynx: Oropharynx is clear.   Eyes:      Extraocular Movements: Extraocular movements intact.      Conjunctiva/sclera: Conjunctivae normal.      Pupils: Pupils are equal, round, and reactive to light.   Cardiovascular:      Rate and Rhythm: Normal rate and regular rhythm.   Pulmonary:      Effort: Pulmonary effort is normal. No respiratory distress.      Comments: Secretions  Abdominal:      General: There is no distension.      Palpations: Abdomen is soft.      Tenderness: There is no abdominal tenderness.   Skin:     General: Skin is warm and dry.   Neurological:      Mental Status: She is alert. Mental status is at baseline.      Comments: Quadriplegia    Psychiatric:         Mood and Affect: Mood normal.         Behavior: Behavior normal.               Significant Labs: All pertinent labs within the past 24 hours have been reviewed.  BMP:   Recent Labs   Lab 04/19/25  0450   BUN 21*   CREATININE 0.54*     CBC: No results for input(s): "WBC", "HGB", "HCT", "PLT" in the last 48 hours.    Significant Imaging: I have reviewed all pertinent imaging results/findings within the past 24 " hours.

## 2025-04-20 LAB
BUN SERPL-MCNC: 21 MG/DL (ref 10–20)
BUN/CREAT SERPL: 35 (ref 6–20)
CREAT SERPL-MCNC: 0.6 MG/DL (ref 0.55–1.02)
EGFR (NO RACE VARIABLE) (RUSH/TITUS): 102 ML/MIN/1.73M2
OHS QRS DURATION: 80 MS
OHS QTC CALCULATION: 444 MS

## 2025-04-20 PROCEDURE — 27000207 HC ISOLATION

## 2025-04-20 PROCEDURE — 27000221 HC OXYGEN, UP TO 24 HOURS

## 2025-04-20 PROCEDURE — 94761 N-INVAS EAR/PLS OXIMETRY MLT: CPT

## 2025-04-20 PROCEDURE — 25000003 PHARM REV CODE 250: Performed by: HOSPITALIST

## 2025-04-20 PROCEDURE — 99232 SBSQ HOSP IP/OBS MODERATE 35: CPT | Mod: ,,, | Performed by: FAMILY MEDICINE

## 2025-04-20 PROCEDURE — 63600175 PHARM REV CODE 636 W HCPCS: Performed by: HOSPITALIST

## 2025-04-20 PROCEDURE — 36415 COLL VENOUS BLD VENIPUNCTURE: CPT | Performed by: HOSPITALIST

## 2025-04-20 PROCEDURE — 25000242 PHARM REV CODE 250 ALT 637 W/ HCPCS: Performed by: INTERNAL MEDICINE

## 2025-04-20 PROCEDURE — 84520 ASSAY OF UREA NITROGEN: CPT | Performed by: HOSPITALIST

## 2025-04-20 PROCEDURE — 11000001 HC ACUTE MED/SURG PRIVATE ROOM

## 2025-04-20 PROCEDURE — 99900035 HC TECH TIME PER 15 MIN (STAT)

## 2025-04-20 PROCEDURE — 99900022

## 2025-04-20 PROCEDURE — 63600175 PHARM REV CODE 636 W HCPCS: Performed by: FAMILY MEDICINE

## 2025-04-20 PROCEDURE — 25000242 PHARM REV CODE 250 ALT 637 W/ HCPCS: Performed by: FAMILY MEDICINE

## 2025-04-20 PROCEDURE — 99900026 HC AIRWAY MAINTENANCE (STAT)

## 2025-04-20 PROCEDURE — 94640 AIRWAY INHALATION TREATMENT: CPT

## 2025-04-20 PROCEDURE — 99231 SBSQ HOSP IP/OBS SF/LOW 25: CPT | Mod: ,,, | Performed by: INTERNAL MEDICINE

## 2025-04-20 RX ADMIN — ATORVASTATIN CALCIUM 20 MG: 20 TABLET, FILM COATED ORAL at 09:04

## 2025-04-20 RX ADMIN — MIDODRINE HYDROCHLORIDE 10 MG: 10 TABLET ORAL at 09:04

## 2025-04-20 RX ADMIN — ACETYLCYSTEINE 2 ML: 200 SOLUTION ORAL; RESPIRATORY (INHALATION) at 07:04

## 2025-04-20 RX ADMIN — LEVALBUTEROL HYDROCHLORIDE 1.25 MG: 1.25 SOLUTION RESPIRATORY (INHALATION) at 01:04

## 2025-04-20 RX ADMIN — PREGABALIN 75 MG: 75 CAPSULE ORAL at 09:04

## 2025-04-20 RX ADMIN — PREGABALIN 75 MG: 75 CAPSULE ORAL at 08:04

## 2025-04-20 RX ADMIN — Medication 6 MG: at 09:04

## 2025-04-20 RX ADMIN — MIDODRINE HYDROCHLORIDE 10 MG: 10 TABLET ORAL at 08:04

## 2025-04-20 RX ADMIN — LEVALBUTEROL HYDROCHLORIDE 1.25 MG: 1.25 SOLUTION RESPIRATORY (INHALATION) at 07:04

## 2025-04-20 RX ADMIN — PANTOPRAZOLE SODIUM 40 MG: 40 INJECTION, POWDER, FOR SOLUTION INTRAVENOUS at 08:04

## 2025-04-20 RX ADMIN — TRAZODONE HYDROCHLORIDE 100 MG: 50 TABLET ORAL at 09:04

## 2025-04-20 RX ADMIN — CEFTRIAXONE SODIUM 1 G: 1 INJECTION, POWDER, FOR SOLUTION INTRAMUSCULAR; INTRAVENOUS at 04:04

## 2025-04-20 RX ADMIN — ENOXAPARIN SODIUM 40 MG: 40 INJECTION SUBCUTANEOUS at 04:04

## 2025-04-20 RX ADMIN — MIDODRINE HYDROCHLORIDE 10 MG: 10 TABLET ORAL at 01:04

## 2025-04-20 RX ADMIN — SERTRALINE HYDROCHLORIDE 50 MG: 50 TABLET ORAL at 08:04

## 2025-04-20 NOTE — SUBJECTIVE & OBJECTIVE
Interval History/Significant Events:  Patient without complaints    Review of Systems  Objective:     Vital Signs (Most Recent):  Temp: (!) 100.5 °F (38.1 °C) (04/20/25 0403)  Pulse: 110 (04/20/25 0627)  Resp: 20 (04/20/25 0627)  BP: 97/60 (04/20/25 0403)  SpO2: 98 % (04/20/25 0627) Vital Signs (24h Range):  Temp:  [97.5 °F (36.4 °C)-100.5 °F (38.1 °C)] 100.5 °F (38.1 °C)  Pulse:  [100-121] 110  Resp:  [18-24] 20  SpO2:  [96 %-100 %] 98 %  BP: ()/(60-71) 97/60   Weight: 79.9 kg (176 lb 2.4 oz)  Body mass index is 32.22 kg/m².    No intake or output data in the 24 hours ending 04/20/25 0740       Physical Exam  Vitals reviewed.   Constitutional:       Appearance: Normal appearance.      Interventions: She is not intubated.  HENT:      Head: Normocephalic and atraumatic.      Nose: Nose normal.      Mouth/Throat:      Mouth: Mucous membranes are dry.      Pharynx: Oropharynx is clear.   Eyes:      Extraocular Movements: Extraocular movements intact.      Conjunctiva/sclera: Conjunctivae normal.      Pupils: Pupils are equal, round, and reactive to light.   Cardiovascular:      Rate and Rhythm: Normal rate.      Heart sounds: Normal heart sounds. No murmur heard.  Pulmonary:      Effort: Pulmonary effort is normal. She is not intubated.      Breath sounds: Normal breath sounds.   Abdominal:      General: Abdomen is flat. Bowel sounds are normal.      Palpations: Abdomen is soft.   Musculoskeletal:         General: Normal range of motion.      Cervical back: Normal range of motion and neck supple.      Right lower leg: No edema.      Left lower leg: No edema.   Skin:     General: Skin is warm and dry.      Capillary Refill: Capillary refill takes less than 2 seconds.   Neurological:      General: No focal deficit present.      Mental Status: She is alert and oriented to person, place, and time.   Psychiatric:         Mood and Affect: Mood normal.         Behavior: Behavior normal.            Vents:  Oxygen  "Concentration (%): 28 (04/20/25 0627)  Lines/Drains/Airways       Drain  Duration                  Gastrostomy/Enterostomy LUQ -- days              Airway  Duration             Adult Surgical Airway 03/23/25 1544 Shiley Cuffed  27 days              Peripheral Intravenous Line  Duration                  Peripheral IV - Single Lumen Yes Anterior;Right Upper Arm -- days                  Significant Labs:    CBC/Anemia Profile:  No results for input(s): "WBC", "HGB", "HCT", "PLT", "MCV", "RDW", "IRON", "FERRITIN", "RETIC", "FOLATE", "[id number]", "OCCULTBLOOD" in the last 48 hours.     Chemistries:  Recent Labs   Lab 04/19/25  0450 04/20/25  0307   BUN 21* 21*   CREATININE 0.54* 0.60       Recent Lab Results         04/20/25  0307        BUN 21       BUN/CREAT RATIO 35       Creatinine 0.60       eGFR 102  Comment: Estimated GFR calculated using the CKD-EPI creatinine (2021) equation.               Significant Imaging:  I have reviewed all pertinent imaging results/findings within the past 24 hours.  "

## 2025-04-20 NOTE — ASSESSMENT & PLAN NOTE
Patient has had to be suctioned little bit more now that she has been capped for 3 days my initial plan was to remove trach this week we may need to downsize to a 4. Will defer to

## 2025-04-20 NOTE — PLAN OF CARE
Problem: Adult Inpatient Plan of Care  Goal: Plan of Care Review  Outcome: Progressing  Goal: Patient-Specific Goal (Individualized)  Outcome: Progressing  Goal: Absence of Hospital-Acquired Illness or Injury  Outcome: Progressing  Goal: Optimal Comfort and Wellbeing  Outcome: Progressing     Problem: Wound  Goal: Skin Health and Integrity  Outcome: Progressing     Problem: Skin Injury Risk Increased  Goal: Skin Health and Integrity  Outcome: Progressing     Problem: Gas Exchange Impaired  Goal: Optimal Gas Exchange  Outcome: Progressing     Problem: Airway Clearance Ineffective  Goal: Effective Airway Clearance  Outcome: Progressing

## 2025-04-20 NOTE — PROGRESS NOTES
[facility] - [facility] Telemetry  Critical Care Medicine  Progress Note    Patient Name: Karie Denney  MRN: [id number]  Admission Date: 3/23/2025  Hospital Length of Stay: 28 days  Code Status: Full Code  Attending Provider: Stephen Elaine Jr., MD  Primary Care Provider: Gonzalo Barrera NP   Principal Problem: Acute on chronic respiratory failure with hypoxia    Subjective:     HPI:  60 yo F who is bed bound (back surgery 2015 c/b b/l LE weakness, CVA 2024 with L paralysis), GERD and previous hospitalization 02/2025 course with progressive respiratory failure 2/2 aspiration, mucus plugging and mechanical respiratory failure requiring intubation 02/24 (difficult 2/2 reduced ROM cervical spine) with ICU course c/b spontaneous RLE compartment bleed with transfer to OSH for IR services. She is now transferred back to floor 03/23 s/p tracheostomy tube placement 03/14 for extubation failures now weaned to trach collar. She is also s/p PEG placement 03/11/2025. R spontaneous R thigh hematoma was managed with supportive care. Pulmonary is consulted for tracheostomy tube management recommendations.       Hospital/ICU Course:  No notes on file    Interval History/Significant Events:  Patient without complaints    Review of Systems  Objective:     Vital Signs (Most Recent):  Temp: (!) 100.5 °F (38.1 °C) (04/20/25 0403)  Pulse: 110 (04/20/25 0627)  Resp: 20 (04/20/25 0627)  BP: 97/60 (04/20/25 0403)  SpO2: 98 % (04/20/25 0627) Vital Signs (24h Range):  Temp:  [97.5 °F (36.4 °C)-100.5 °F (38.1 °C)] 100.5 °F (38.1 °C)  Pulse:  [100-121] 110  Resp:  [18-24] 20  SpO2:  [96 %-100 %] 98 %  BP: ()/(60-71) 97/60   Weight: 79.9 kg (176 lb 2.4 oz)  Body mass index is 32.22 kg/m².    No intake or output data in the 24 hours ending 04/20/25 0740       Physical Exam  Vitals reviewed.   Constitutional:       Appearance: Normal appearance.      Interventions: She is not intubated.  HENT:      Head: Normocephalic  "and atraumatic.      Nose: Nose normal.      Mouth/Throat:      Mouth: Mucous membranes are dry.      Pharynx: Oropharynx is clear.   Eyes:      Extraocular Movements: Extraocular movements intact.      Conjunctiva/sclera: Conjunctivae normal.      Pupils: Pupils are equal, round, and reactive to light.   Cardiovascular:      Rate and Rhythm: Normal rate.      Heart sounds: Normal heart sounds. No murmur heard.  Pulmonary:      Effort: Pulmonary effort is normal. She is not intubated.      Breath sounds: Normal breath sounds.   Abdominal:      General: Abdomen is flat. Bowel sounds are normal.      Palpations: Abdomen is soft.   Musculoskeletal:         General: Normal range of motion.      Cervical back: Normal range of motion and neck supple.      Right lower leg: No edema.      Left lower leg: No edema.   Skin:     General: Skin is warm and dry.      Capillary Refill: Capillary refill takes less than 2 seconds.   Neurological:      General: No focal deficit present.      Mental Status: She is alert and oriented to person, place, and time.   Psychiatric:         Mood and Affect: Mood normal.         Behavior: Behavior normal.            Vents:  Oxygen Concentration (%): 28 (04/20/25 0627)  Lines/Drains/Airways       Drain  Duration                  Gastrostomy/Enterostomy LUQ -- days              Airway  Duration             Adult Surgical Airway 03/23/25 1544 Shiley Cuffed  27 days              Peripheral Intravenous Line  Duration                  Peripheral IV - Single Lumen Yes Anterior;Right Upper Arm -- days                  Significant Labs:    CBC/Anemia Profile:  No results for input(s): "WBC", "HGB", "HCT", "PLT", "MCV", "RDW", "IRON", "FERRITIN", "RETIC", "FOLATE", "LQSBOLHR31", "OCCULTBLOOD" in the last 48 hours.     Chemistries:  Recent Labs   Lab 04/19/25  0450 04/20/25  0307   BUN 21* 21*   CREATININE 0.54* 0.60       Recent Lab Results         04/20/25  0307        BUN 21       BUN/CREAT RATIO 35   " "    Creatinine 0.60       eGFR 102  Comment: Estimated GFR calculated using the CKD-EPI creatinine (2021) equation.               Significant Imaging:  I have reviewed all pertinent imaging results/findings within the past 24 hours.    ABG  No results for input(s): "PH", "PO2", "PCO2", "HCO3", "BE" in the last 168 hours.  Assessment/Plan:     Neuro  Quadriplegia  Noted impacting weaning    Pulmonary  * Acute on chronic respiratory failure with hypoxia  Patient has had to be suctioned little bit more now that she has been capped for 3 days my initial plan was to remove trach this week we may need to downsize to a 4. Will defer to     Cardiac/Vascular  Pericardial effusion  Noted on prior admission. Cardiology had recommended repeat TTE in 2 weeks. Colchicine was stopped last admission due to spontaneous RLE bleeding.   - interval TTE 03/2025 with small effusion and no tamponade physiology             Luke Santizo MD  Critical Care Medicine  [facility] - [address] [place], [place] [address]etry  "

## 2025-04-21 LAB
BUN SERPL-MCNC: 33 MG/DL (ref 10–20)
BUN/CREAT SERPL: 53 (ref 6–20)
CREAT SERPL-MCNC: 0.62 MG/DL (ref 0.55–1.02)
EGFR (NO RACE VARIABLE) (RUSH/TITUS): 101 ML/MIN/1.73M2

## 2025-04-21 PROCEDURE — 25000242 PHARM REV CODE 250 ALT 637 W/ HCPCS: Performed by: FAMILY MEDICINE

## 2025-04-21 PROCEDURE — 99900022

## 2025-04-21 PROCEDURE — 94640 AIRWAY INHALATION TREATMENT: CPT

## 2025-04-21 PROCEDURE — 11000001 HC ACUTE MED/SURG PRIVATE ROOM

## 2025-04-21 PROCEDURE — 94761 N-INVAS EAR/PLS OXIMETRY MLT: CPT

## 2025-04-21 PROCEDURE — 25000242 PHARM REV CODE 250 ALT 637 W/ HCPCS: Performed by: INTERNAL MEDICINE

## 2025-04-21 PROCEDURE — 99900035 HC TECH TIME PER 15 MIN (STAT)

## 2025-04-21 PROCEDURE — 27000207 HC ISOLATION

## 2025-04-21 PROCEDURE — 99232 SBSQ HOSP IP/OBS MODERATE 35: CPT | Mod: ,,, | Performed by: FAMILY MEDICINE

## 2025-04-21 PROCEDURE — 27000221 HC OXYGEN, UP TO 24 HOURS

## 2025-04-21 PROCEDURE — 27200966 HC CLOSED SUCTION SYSTEM

## 2025-04-21 PROCEDURE — 36415 COLL VENOUS BLD VENIPUNCTURE: CPT | Performed by: HOSPITALIST

## 2025-04-21 PROCEDURE — 63600175 PHARM REV CODE 636 W HCPCS: Performed by: HOSPITALIST

## 2025-04-21 PROCEDURE — 25000003 PHARM REV CODE 250: Performed by: HOSPITALIST

## 2025-04-21 PROCEDURE — 84520 ASSAY OF UREA NITROGEN: CPT | Performed by: HOSPITALIST

## 2025-04-21 PROCEDURE — 99900026 HC AIRWAY MAINTENANCE (STAT)

## 2025-04-21 RX ADMIN — PREGABALIN 75 MG: 75 CAPSULE ORAL at 08:04

## 2025-04-21 RX ADMIN — ONDANSETRON 4 MG: 2 INJECTION INTRAMUSCULAR; INTRAVENOUS at 08:04

## 2025-04-21 RX ADMIN — MIDODRINE HYDROCHLORIDE 10 MG: 10 TABLET ORAL at 04:04

## 2025-04-21 RX ADMIN — MIDODRINE HYDROCHLORIDE 10 MG: 10 TABLET ORAL at 08:04

## 2025-04-21 RX ADMIN — ACETYLCYSTEINE 2 ML: 200 SOLUTION ORAL; RESPIRATORY (INHALATION) at 08:04

## 2025-04-21 RX ADMIN — LEVALBUTEROL HYDROCHLORIDE 1.25 MG: 1.25 SOLUTION RESPIRATORY (INHALATION) at 01:04

## 2025-04-21 RX ADMIN — TRAZODONE HYDROCHLORIDE 100 MG: 50 TABLET ORAL at 08:04

## 2025-04-21 RX ADMIN — SERTRALINE HYDROCHLORIDE 50 MG: 50 TABLET ORAL at 08:04

## 2025-04-21 RX ADMIN — ACETYLCYSTEINE 2 ML: 200 SOLUTION ORAL; RESPIRATORY (INHALATION) at 01:04

## 2025-04-21 RX ADMIN — Medication 6 MG: at 08:04

## 2025-04-21 RX ADMIN — ATORVASTATIN CALCIUM 20 MG: 20 TABLET, FILM COATED ORAL at 08:04

## 2025-04-21 RX ADMIN — LEVALBUTEROL HYDROCHLORIDE 1.25 MG: 1.25 SOLUTION RESPIRATORY (INHALATION) at 08:04

## 2025-04-21 RX ADMIN — ACETAMINOPHEN 1000 MG: 500 TABLET ORAL at 08:04

## 2025-04-21 RX ADMIN — ENOXAPARIN SODIUM 40 MG: 40 INJECTION SUBCUTANEOUS at 04:04

## 2025-04-21 RX ADMIN — PANTOPRAZOLE SODIUM 40 MG: 40 INJECTION, POWDER, FOR SOLUTION INTRAVENOUS at 08:04

## 2025-04-21 NOTE — PROGRESS NOTES
[facility] - [address] [place] [address] Medicine  Progress Note    Patient Name: Karie Denney  MRN: [id number]  Patient Class: IP- Inpatient   Admission Date: 3/23/2025  Length of Stay: 28 days  Attending Physician: Stephen Elaine Jr., MD  Primary Care Provider: Gonzalo Barrera NP        Subjective     Principal Problem:Acute on chronic respiratory failure with hypoxia        HPI:  Chief Complaint  Transfer for continued management of respiratory failure, tracheostomy care, PEG feeding, and complications of quadriplegia following prolonged hospitalization.    History of Present Illness  Ms. Karie Denney is a 59-year-old woman with a complex medical history including quadriplegia following spinal surgery in 2015 and a cerebrovascular accident (CVA) in 2024 resulting in left-sided paralysis. She was transferred to [facility] from [facility] in [place], [place], for ongoing care following a prolonged ICU course involving intubation, respiratory failure, and significant complications.  She was initially hospitalized on February 15, 2025, for aspiration and dysphagia evaluation, during which she developed aspiration pneumonia and respiratory failure requiring intubation. Her hospital course was complicated by an ESBL E. coli UTI, pericardial effusion (treated with colchicine), and a spontaneous right thigh hematoma concerning for compartment syndrome, leading to transfer to [facility]. She required prolonged mechanical ventilation, tracheostomy placement, and PEG tube insertion.  She has now returned to [facility] for continued respiratory care, tracheostomy management, PEG feeding, management of sacral pressure injury, and rehabilitation planning.    Past Medical History  Quadriplegia post-spinal surgery (2015)  CVA (2024)  Aspiration pneumonia  Acute respiratory failure  UTI (ESBL E. coli)  Depression  GERD  Pharyngoesophageal dysphagia  Pericardial  effusion/pericarditis  Chronic constipation  Pressure ulcer (sacral, unstageable)  Hyperlipidemia  Anemia    Past Surgical History  Spinal surgery (2015)  PEG tube placement (03/11/2025)  Esophageal dilation with biopsy (08/2024)    Medications  Active Medications:  Atorvastatin 20 mg daily  Omeprazole 40 mg daily  Sertraline 50 mg daily  Trazodone 100 mg qHS  Pregabalin 75 mg BID  Hydrocodone-acetaminophen 5-325 mg BID  Lactulose 30 mL daily via PEG  Midodrine 10 mg Q6H via PEG  Levalbuterol 0.63 mg nebulizer Q6H  Polyethylene glycol 17 g daily via PEG  Recent Changes:  Colchicine: Discontinued due to bleeding  Aspirin and ezetimibe: Discontinued    Allergies  Penicillins ? Rash and anaphylaxis    Social History  Never smoker  No alcohol or tobacco use  Lives at home with mother; receives home health weekly  Bedbound, non-ambulatory    Family History  Noncontributory    Review of Systems  Limited due to tracheostomy, quadriplegia, and communication challenges. History obtained from EMR and caregiver.    Physical Examination  General: Chronically ill-appearing woman, alert, tracheostomy in place with T-piece  HEENT: Mild nasal skin necrosis, mucous membranes moist  Eyes: PERRL, EOMI  Neck: Tracheostomy present  CV: RRR, no murmurs  Lungs: Breath sounds diminished at bases; no acute distress; no wheezing  Abdomen: Soft, non-tender, PEG tube in place  Skin: Unstageable sacral ulcer, nasal pressure ulcer  Neuro: Quadriplegia, responsive with eye tracking and blinking  Extremities: RLE hematoma, bilateral edema, no signs of active bleeding    Laboratory Data (Most Recent)  Hgb: 8.5 g/dL  Creatinine: 0.30 mg/dL  Albumin: 3.1 g/dL  WBC: 9.4 K/uL  Ferritin: 265 ng/mL  INR: 0.93  No growth on recent blood and urine cultures    Imaging  CT angio: Large RLE hematoma without active extravasation  Multiple chest X-rays: Stable bilateral pleural effusions, improving atelectasis  Echo: EF 55-60%, small pericardial  effusion      Overview/Hospital Course:  3/26  - continues on vanc for blood cultures + on 3/24, echo completed at time with no endocarditis seen  - discussed with pulmonary, recs to follow    3/27  - due to overall baseline health will treat coag negative staph bacteremia as a true infection, for now continue vanc and follow cultures and sensitivities  - discussed with pulmonology who plan to continue trach collar as is and see patient May 1st at 1:40 pm, confirmed appointment  - discussed with family at bedside who are very concerned about secretions and states they don't have the suction capabilities at home and do not feel comfortable going home with her in this state, will discuss with social on possible home equipment    3/28  - awaiting micro, continuing vanc  - social setting up suction at home, family plans to return home as before    3/29  - still with secretions  - family requesting voice box    3/30  - doing well today, mentation much improved  - anticipate discharge within the next two days with home health services, family will need education on trach maintenance and home feedings as patient has PEG tube and they have not cared for a PEG tube before, also we will discuss with  getting a speaking told to use with a trach    3/31  - secretions improved today  - working with social for home set up of oxygen and tube feeds  - family needs heavy education on trach care and tube feeds as primary care giver has no experience with either, still wishes to discharge home    Interval History: No acute complaints. Trach has been capped this weekend. Requiring some suctioning.     Review of Systems  Objective:     Vital Signs (Most Recent):  Temp: 98.4 °F (36.9 °C) (04/20/25 1559)  Pulse: 98 (04/20/25 1559)  Resp: 18 (04/20/25 1312)  BP: 111/76 (04/20/25 1559)  SpO2: 98 % (04/20/25 1559) Vital Signs (24h Range):  Temp:  [97.5 °F (36.4 °C)-100.5 °F (38.1 °C)] 98.4 °F (36.9 °C)  Pulse:  []  "98  Resp:  [18-24] 18  SpO2:  [96 %-100 %] 98 %  BP: ()/(60-76) 111/76     Weight: 79.9 kg (176 lb 2.4 oz)  Body mass index is 32.22 kg/m².  No intake or output data in the 24 hours ending 04/20/25 1919      Physical Exam  Constitutional:       General: She is not in acute distress.     Appearance: She is not ill-appearing.   HENT:      Head: Normocephalic and atraumatic.      Nose: Nose normal.      Mouth/Throat:      Mouth: Mucous membranes are moist.      Pharynx: Oropharynx is clear.   Eyes:      Extraocular Movements: Extraocular movements intact.      Conjunctiva/sclera: Conjunctivae normal.      Pupils: Pupils are equal, round, and reactive to light.   Cardiovascular:      Rate and Rhythm: Normal rate and regular rhythm.   Pulmonary:      Effort: Pulmonary effort is normal. No respiratory distress.      Comments: Secretions  Abdominal:      General: There is no distension.      Palpations: Abdomen is soft.      Tenderness: There is no abdominal tenderness.   Skin:     General: Skin is warm and dry.   Neurological:      Mental Status: She is alert. Mental status is at baseline.      Comments: Quadriplegia    Psychiatric:         Mood and Affect: Mood normal.         Behavior: Behavior normal.               Significant Labs: All pertinent labs within the past 24 hours have been reviewed.  BMP:   Recent Labs   Lab 04/20/25  0307   BUN 21*   CREATININE 0.60     CBC: No results for input(s): "WBC", "HGB", "HCT", "PLT" in the last 48 hours.    Significant Imaging: I have reviewed all pertinent imaging results/findings within the past 24 hours.      Assessment & Plan  Acute on chronic respiratory failure with hypoxia  Continue tracheostomy care with T-piece trials  Continue tracheostomy suctioning ordered due to thick secretions. Continue scheduled mucolytic and scopolamine.    Culture with Haemophilus and yeast, started on fluconazole and ceftriaxone.     Pulmonology involved with care this admission. "     Family would like patient to go to LTAC before going home as she still requires frequent suctioning, tube feeds, antibiotics, and additional care.  She has required over a week of ICU care prior to returning to our facility.  She was on the vent and was difficult to wean. She has required more than three midnights intermediate ICU level of care while at our facility.       Keep trach without capping until at least April 15th.       Denied LTAC by [employer], her insurance provider.  Appeal sent 4/8. Awaiting decision.   Severe protein-calorie malnutrition  Nutrition consulted. Most recent weight and BMI monitored-     Measurements:  Wt Readings from Last 1 Encounters:   04/20/25 79.9 kg (176 lb 2.4 oz)   Body mass index is 32.22 kg/m².    Patient has been screened and assessed by RD.    Malnutrition Type:  Context: chronic illness  Level: severe    Continue PEG tube feeding.  Nepro at 40 mL/hr  Maintain bowel regimen (lactulose, PEG, senna)  Dietitian to reassess  Continue with current tube feed regimen as tolerated with Luis Fernando BID to facilitate wound healing. Recommend to reweigh patient prior to discharge to adjust EN iso weight loss or weight gain. Recommend to consider addition of daily multivitamin/mineral via PEG given patient receiving low volume of formula per day 2/2 decreased calorie needs    Dysphagia  With PEG in place. Continue tube feeds.   Continue omeprazole  Avoid PO intake  Speech therapy if clinically appropriate    Aspiration pneumonia  Recurrent aspiration pneumonia.  Completed two rounds of antibiotics. Now on ceftriaxone for tracheitis.    4/16  - Improving.  As secretions improve will ask pulmonary to advise regarding trach downsizing/capping.   4/17 - Continue Rocephin. Moderate yeast, normally not treated. Will see what pulmonary thinks.   4/18 - Improved - Continue abx through weekend.   4/19 - Tomorrow is last day of planned abx.   4/20 - D/c abx    Pressure ulcers of skin of multiple  topographic sites  Aquacel Ag + Mepilex dressings  Wound care team consulted    Continue offloading and specialty mattress    Depression  Patient has persistent depression which is unknown and is currently controlled. Will Continue anti-depressant medications. We will not consult psychiatry at this time. Patient does not display psychosis at this time. Continue to monitor closely and adjust plan of care as needed.          S/P percutaneous endoscopic gastrostomy (PEG) tube placement  Patient noted to have a percutaneous endoscopic gastrostomy tube in place. I have personally inspected the tube.Tube was placed prior to this admission There are no signs of drainage or infection around the site. The tube is patent. Medications have converted to liquid form if available.  Routine care to be done by wound care and nursing staff.       Quadriplegia  Maximal support, bedbound  PT/OT as tolerated  DME planning for discharge    Hematoma of lower extremity, right, subsequent encounter  No active bleeding; conservative management  Monitor H/H  No anticoagulation  Hyperlipidemia  Continue atorvastatin    Pericardial effusion  Previously on colchicine, now discontinued due to bleeding  Continue low-dose prednisone  Monitor for recurrence    Acute respiratory failure with hypoxia and hypercarbia  Patient with Hypoxic Respiratory failure which is Acute on chronic.  she is not on home oxygen. Supplemental oxygen was provided and noted- Oxygen Concentration (%):  [28] 28    .   Signs/symptoms of respiratory failure include- tachypnea and respiratory distress. Contributing diagnoses includes - Aspiration and Pneumonia Labs and images were reviewed. Patient Has not had a recent ABG. Will treat underlying causes and adjust management of respiratory failure as follows-   Chronic respiratory failure with hypoxia  Patient with Hypoxic Respiratory failure which is Acute on chronic.  she is not on home oxygen. Supplemental oxygen was  provided and noted- Oxygen Concentration (%):  [28] 28    .   Signs/symptoms of respiratory failure include- increased work of breathing and respiratory distress. Contributing diagnoses includes - Aspiration and Pneumonia Labs and images were reviewed. Patient Has not had a recent ABG. Will treat underlying causes and adjust management of respiratory failure as follows-   Coag negative Staphylococcus bacteremia  Resolved. Treatment with vancomycin completed 4/12.     No endocarditis noted on echocardiogram.     Tracheitis  Antibiotics (From admission, onward)      Start     Stop Route Frequency Ordered    04/10/25 1700  cefTRIAXone injection 1 g         -- IV Every 24 hours (non-standard times) 04/10/25 1601            4/18 - complete abx this weekend.   4/20 - d/c abx  VTE Risk Mitigation (From admission, onward)           Ordered     enoxaparin injection 40 mg  Daily         03/23/25 1940     IP VTE HIGH RISK PATIENT  Once         03/23/25 1940     Place sequential compression device  Until discontinued         03/23/25 1941                    Discharge Planning   SHRADDHA: 4/14/2025     Code Status: Full Code   Medical Readiness for Discharge Date:   Discharge Plan A: Home with family, Home Health                        Stephen Elaine Jr, MD  Department of Hospital Medicine   [facility] - [address] [place] [address]

## 2025-04-21 NOTE — RESPIRATORY THERAPY
Suctioned patient PRN. Inner cannula checked and is clean and patent. Patient remains on  with cuff deflated on a 2L nasal cannula. She is awake and alert with no signs nor symptoms of respiratory distress.

## 2025-04-21 NOTE — ASSESSMENT & PLAN NOTE
Nutrition consulted. Most recent weight and BMI monitored-     Measurements:  Wt Readings from Last 1 Encounters:   04/20/25 79.9 kg (176 lb 2.4 oz)   Body mass index is 32.22 kg/m².    Patient has been screened and assessed by RD.    Malnutrition Type:  Context: chronic illness  Level: severe    Continue PEG tube feeding.  Nepro at 40 mL/hr  Maintain bowel regimen (lactulose, PEG, senna)  Dietitian to reassess  Continue with current tube feed regimen as tolerated with Luis Fernando BID to facilitate wound healing. Recommend to reweigh patient prior to discharge to adjust EN iso weight loss or weight gain. Recommend to consider addition of daily multivitamin/mineral via PEG given patient receiving low volume of formula per day 2/2 decreased calorie needs

## 2025-04-21 NOTE — ASSESSMENT & PLAN NOTE
Antibiotics (From admission, onward)      Start     Stop Route Frequency Ordered    04/10/25 1700  cefTRIAXone injection 1 g         -- IV Every 24 hours (non-standard times) 04/10/25 1601            4/18 - complete abx this weekend.   4/20 - d/c abx

## 2025-04-21 NOTE — ASSESSMENT & PLAN NOTE
Continue tracheostomy care with T-piece trials  Continue tracheostomy suctioning ordered due to thick secretions. Continue scheduled mucolytic and scopolamine.    Culture with Haemophilus and yeast, started on fluconazole and ceftriaxone.     Pulmonology involved with care this admission.     Family would like patient to go to LTAC before going home as she still requires frequent suctioning, tube feeds, antibiotics, and additional care.  She has required over a week of ICU care prior to returning to our facility.  She was on the vent and was difficult to wean. She has required more than three midnights intermediate ICU level of care while at our facility.       Keep trach without capping until at least April 15th.       Denied LTAC by [employer], her insurance provider.  Appeal sent 4/8. Awaiting decision.    Eucrisa Counseling: Patient may experience a mild burning sensation during topical application. Eucrisa is not approved in children less than 2 years of age.

## 2025-04-21 NOTE — SUBJECTIVE & OBJECTIVE
"Interval History: No acute complaints. Trach has been capped this weekend. Requiring some suctioning.     Review of Systems  Objective:     Vital Signs (Most Recent):  Temp: 98.4 °F (36.9 °C) (04/20/25 1559)  Pulse: 98 (04/20/25 1559)  Resp: 18 (04/20/25 1312)  BP: 111/76 (04/20/25 1559)  SpO2: 98 % (04/20/25 1559) Vital Signs (24h Range):  Temp:  [97.5 °F (36.4 °C)-100.5 °F (38.1 °C)] 98.4 °F (36.9 °C)  Pulse:  [] 98  Resp:  [18-24] 18  SpO2:  [96 %-100 %] 98 %  BP: ()/(60-76) 111/76     Weight: 79.9 kg (176 lb 2.4 oz)  Body mass index is 32.22 kg/m².  No intake or output data in the 24 hours ending 04/20/25 1919      Physical Exam  Constitutional:       General: She is not in acute distress.     Appearance: She is not ill-appearing.   HENT:      Head: Normocephalic and atraumatic.      Nose: Nose normal.      Mouth/Throat:      Mouth: Mucous membranes are moist.      Pharynx: Oropharynx is clear.   Eyes:      Extraocular Movements: Extraocular movements intact.      Conjunctiva/sclera: Conjunctivae normal.      Pupils: Pupils are equal, round, and reactive to light.   Cardiovascular:      Rate and Rhythm: Normal rate and regular rhythm.   Pulmonary:      Effort: Pulmonary effort is normal. No respiratory distress.      Comments: Secretions  Abdominal:      General: There is no distension.      Palpations: Abdomen is soft.      Tenderness: There is no abdominal tenderness.   Skin:     General: Skin is warm and dry.   Neurological:      Mental Status: She is alert. Mental status is at baseline.      Comments: Quadriplegia    Psychiatric:         Mood and Affect: Mood normal.         Behavior: Behavior normal.               Significant Labs: All pertinent labs within the past 24 hours have been reviewed.  BMP:   Recent Labs   Lab 04/20/25  0307   BUN 21*   CREATININE 0.60     CBC: No results for input(s): "WBC", "HGB", "HCT", "PLT" in the last 48 hours.    Significant Imaging: I have reviewed all " pertinent imaging results/findings within the past 24 hours.

## 2025-04-21 NOTE — ASSESSMENT & PLAN NOTE
Recurrent aspiration pneumonia.  Completed two rounds of antibiotics. Now on ceftriaxone for tracheitis.    4/16  - Improving.  As secretions improve will ask pulmonary to advise regarding trach downsizing/capping.   4/17 - Continue Rocephin. Moderate yeast, normally not treated. Will see what pulmonary thinks.   4/18 - Improved - Continue abx through weekend.   4/19 - Tomorrow is last day of planned abx.   4/20 - D/c abx

## 2025-04-22 PROBLEM — J96.01 ACUTE RESPIRATORY FAILURE WITH HYPOXIA AND HYPERCARBIA: Status: RESOLVED | Noted: 2025-03-24 | Resolved: 2025-04-22

## 2025-04-22 PROBLEM — J96.02 ACUTE RESPIRATORY FAILURE WITH HYPOXIA AND HYPERCARBIA: Status: RESOLVED | Noted: 2025-03-24 | Resolved: 2025-04-22

## 2025-04-22 PROBLEM — J96.21 ACUTE ON CHRONIC RESPIRATORY FAILURE WITH HYPOXIA: Status: RESOLVED | Noted: 2025-02-15 | Resolved: 2025-04-22

## 2025-04-22 PROBLEM — I31.39 PERICARDIAL EFFUSION: Status: RESOLVED | Noted: 2025-03-23 | Resolved: 2025-04-22

## 2025-04-22 PROBLEM — J69.0 ASPIRATION PNEUMONIA: Status: RESOLVED | Noted: 2025-02-12 | Resolved: 2025-04-22

## 2025-04-22 LAB
BUN SERPL-MCNC: 42 MG/DL (ref 10–20)
BUN/CREAT SERPL: 69 (ref 6–20)
CREAT SERPL-MCNC: 0.61 MG/DL (ref 0.55–1.02)
EGFR (NO RACE VARIABLE) (RUSH/TITUS): 102 ML/MIN/1.73M2

## 2025-04-22 PROCEDURE — 99900026 HC AIRWAY MAINTENANCE (STAT)

## 2025-04-22 PROCEDURE — 82565 ASSAY OF CREATININE: CPT | Performed by: HOSPITALIST

## 2025-04-22 PROCEDURE — 36415 COLL VENOUS BLD VENIPUNCTURE: CPT | Performed by: HOSPITALIST

## 2025-04-22 PROCEDURE — 99232 SBSQ HOSP IP/OBS MODERATE 35: CPT | Mod: ,,, | Performed by: FAMILY MEDICINE

## 2025-04-22 PROCEDURE — 99900022

## 2025-04-22 PROCEDURE — 11000001 HC ACUTE MED/SURG PRIVATE ROOM

## 2025-04-22 PROCEDURE — 25000242 PHARM REV CODE 250 ALT 637 W/ HCPCS: Performed by: FAMILY MEDICINE

## 2025-04-22 PROCEDURE — 25000003 PHARM REV CODE 250: Performed by: HOSPITALIST

## 2025-04-22 PROCEDURE — 63600175 PHARM REV CODE 636 W HCPCS: Performed by: HOSPITALIST

## 2025-04-22 PROCEDURE — 25000242 PHARM REV CODE 250 ALT 637 W/ HCPCS: Performed by: INTERNAL MEDICINE

## 2025-04-22 PROCEDURE — 94761 N-INVAS EAR/PLS OXIMETRY MLT: CPT

## 2025-04-22 PROCEDURE — 99900035 HC TECH TIME PER 15 MIN (STAT)

## 2025-04-22 PROCEDURE — 27000207 HC ISOLATION

## 2025-04-22 PROCEDURE — 99233 SBSQ HOSP IP/OBS HIGH 50: CPT | Mod: ,,, | Performed by: STUDENT IN AN ORGANIZED HEALTH CARE EDUCATION/TRAINING PROGRAM

## 2025-04-22 PROCEDURE — 27000221 HC OXYGEN, UP TO 24 HOURS

## 2025-04-22 PROCEDURE — 94640 AIRWAY INHALATION TREATMENT: CPT

## 2025-04-22 RX ORDER — SERTRALINE HYDROCHLORIDE 50 MG/1
100 TABLET, FILM COATED ORAL DAILY
Status: DISCONTINUED | OUTPATIENT
Start: 2025-04-23 | End: 2025-04-25

## 2025-04-22 RX ADMIN — ACETAMINOPHEN 1000 MG: 500 TABLET ORAL at 09:04

## 2025-04-22 RX ADMIN — ATORVASTATIN CALCIUM 20 MG: 20 TABLET, FILM COATED ORAL at 09:04

## 2025-04-22 RX ADMIN — ACETAMINOPHEN 1000 MG: 500 TABLET ORAL at 06:04

## 2025-04-22 RX ADMIN — ACETYLCYSTEINE 2 ML: 200 SOLUTION ORAL; RESPIRATORY (INHALATION) at 07:04

## 2025-04-22 RX ADMIN — MIDODRINE HYDROCHLORIDE 10 MG: 10 TABLET ORAL at 09:04

## 2025-04-22 RX ADMIN — MIDODRINE HYDROCHLORIDE 10 MG: 10 TABLET ORAL at 04:04

## 2025-04-22 RX ADMIN — PREGABALIN 75 MG: 75 CAPSULE ORAL at 09:04

## 2025-04-22 RX ADMIN — LEVALBUTEROL HYDROCHLORIDE 1.25 MG: 1.25 SOLUTION RESPIRATORY (INHALATION) at 07:04

## 2025-04-22 RX ADMIN — SCOPOLAMINE 1 PATCH: 1.5 PATCH, EXTENDED RELEASE TRANSDERMAL at 09:04

## 2025-04-22 RX ADMIN — PANTOPRAZOLE SODIUM 40 MG: 40 INJECTION, POWDER, FOR SOLUTION INTRAVENOUS at 09:04

## 2025-04-22 RX ADMIN — SERTRALINE HYDROCHLORIDE 50 MG: 50 TABLET ORAL at 09:04

## 2025-04-22 RX ADMIN — Medication 6 MG: at 09:04

## 2025-04-22 RX ADMIN — ENOXAPARIN SODIUM 40 MG: 40 INJECTION SUBCUTANEOUS at 04:04

## 2025-04-22 RX ADMIN — LEVALBUTEROL HYDROCHLORIDE 1.25 MG: 1.25 SOLUTION RESPIRATORY (INHALATION) at 01:04

## 2025-04-22 RX ADMIN — TRAZODONE HYDROCHLORIDE 100 MG: 50 TABLET ORAL at 09:04

## 2025-04-22 RX ADMIN — ACETYLCYSTEINE 2 ML: 200 SOLUTION ORAL; RESPIRATORY (INHALATION) at 01:04

## 2025-04-22 NOTE — ASSESSMENT & PLAN NOTE
Chronic respiratory failure s/p tracheostomy tube placement 03/14 for extubation failures now weaned to capped tracheostomy; cuff deflated since end March. She has ongoing suctioning requirements despite low volume secretions with feeling of dyspnea. Completed course of Ceftriaxone 04/20 for Haemophilus parainfluenzae pneumonia.   - cont trach capped and suctioning PRN   -- requiring respiratory and nursing suctioning efforts due to dyspnea  - currently with Shiley 8CN cuffed; tract considered mature and s/p suture removal   -- plan for trach exchange to uncuffed with downsize in future; holding at present for subjective dyspnea and frequent suctioning with recent completion of Abx   -- ongoing system wide tracheostomy tube transition, noted  - supplement for goal SpO2 >92%  - CXR in am

## 2025-04-22 NOTE — PROGRESS NOTES
[facility] - [facility] Telemetry  Critical Care Medicine  Progress Note    Patient Name: Karie Denney  MRN: [id number]  Admission Date: 3/23/2025  Hospital Length of Stay: 30 days  Code Status: Full Code  Attending Provider: Stephen Elaine Jr., MD  Primary Care Provider: Gonzalo Barrera NP   Principal Problem: Acute on chronic respiratory failure with hypoxia    Subjective:     HPI:  58 yo F who is bed bound (back surgery 2015 c/b b/l LE weakness, CVA 2024 with L paralysis), GERD and previous hospitalization 02/2025 course with progressive respiratory failure 2/2 aspiration, mucus plugging and mechanical respiratory failure requiring intubation 02/24 (difficult 2/2 reduced ROM cervical spine) with ICU course c/b spontaneous RLE compartment bleed with transfer to OSH for IR services. She is now transferred back to floor 03/23 s/p tracheostomy tube placement 03/14 for extubation failures now weaned to trach collar. She is also s/p PEG placement 03/11/2025. R spontaneous R thigh hematoma was managed with supportive care. Pulmonary is consulted for tracheostomy tube management recommendations.       Hospital/ICU Course:  No notes on file    Interval History/Significant Events: endorses episodic shortness of breath, continues with capping and intermittent secretion clearance by care team    Review of Systems  Objective:     Vital Signs (Most Recent):  Temp: 97.9 °F (36.6 °C) (04/22/25 1544)  Pulse: 108 (04/22/25 1544)  Resp: 19 (04/22/25 1544)  BP: 109/71 (04/22/25 1544)  SpO2: 100 % (04/22/25 1616) Vital Signs (24h Range):  Temp:  [97.6 °F (36.4 °C)-99.5 °F (37.5 °C)] 97.9 °F (36.6 °C)  Pulse:  [104-118] 108  Resp:  [16-20] 19  SpO2:  [96 %-100 %] 100 %  BP: (108-122)/(69-79) 109/71   Weight: 80.6 kg (177 lb 11.1 oz)  Body mass index is 32.5 kg/m².      Intake/Output Summary (Last 24 hours) at 4/22/2025 1751  Last data filed at 4/22/2025 0622  Gross per 24 hour   Intake 1557 ml   Output --   Net  "1557 ml          Physical Exam  Constitutional:       General: She is not in acute distress.     Appearance: She is not toxic-appearing.   HENT:      Head: Normocephalic and atraumatic.      Mouth/Throat:      Mouth: Mucous membranes are moist.   Cardiovascular:      Rate and Rhythm: Normal rate and regular rhythm.   Pulmonary:      Effort: Pulmonary effort is normal.      Breath sounds: No wheezing or rhonchi.      Comments: Capped tracheostomy tube.  Abdominal:      Palpations: Abdomen is soft.      Tenderness: There is no right CVA tenderness or guarding.   Skin:     General: Skin is warm.   Neurological:      Mental Status: She is alert. Mental status is at baseline.            Vents:  Oxygen Concentration (%): 28 (04/22/25 0720)  Lines/Drains/Airways       Drain  Duration                  Gastrostomy/Enterostomy LUQ -- days              Airway  Duration             Adult Surgical Airway 03/23/25 1544 Shiley Cuffed  30 days              Peripheral Intravenous Line  Duration                  Peripheral IV - Single Lumen Yes Anterior;Right Upper Arm -- days                  Significant Labs:    CBC/Anemia Profile:  No results for input(s): "WBC", "HGB", "HCT", "PLT", "MCV", "RDW", "IRON", "FERRITIN", "RETIC", "FOLATE", "[id number]", "OCCULTBLOOD" in the last 48 hours.       Chemistries:  Recent Labs   Lab 04/21/25  0358 04/22/25  0443   BUN 33* 42*   CREATININE 0.62 0.61       All pertinent labs within the past 24 hours have been reviewed.    Significant Imaging:  I have reviewed all pertinent imaging results/findings within the past 24 hours.    ABG  No results for input(s): "PH", "PO2", "PCO2", "HCO3", "BE" in the last 168 hours.  Assessment/Plan:     Neuro  Quadriplegia  Chronic. Bed bound and requiring assistance with repositioning (full assist) and secretion management. Contributed to respiratory failure.    Pulmonary  Chronic respiratory failure with hypoxia  Chronic respiratory failure s/p tracheostomy tube " placement 03/14 for extubation failures now weaned to capped tracheostomy; cuff deflated since end March. She has ongoing suctioning requirements despite low volume secretions with feeling of dyspnea. Completed course of Ceftriaxone 04/20 for Haemophilus parainfluenzae pneumonia.   - cont trach capped and suctioning PRN   -- requiring respiratory and nursing suctioning efforts due to dyspnea  - currently with Shiley 8CN cuffed; tract considered mature and s/p suture removal   -- plan for trach exchange to uncuffed with downsize in future; holding at present for subjective dyspnea and frequent suctioning with recent completion of Abx   -- ongoing system wide tracheostomy tube transition, noted  - supplement for goal SpO2 >92%  - CXR in am         June MD Guerline  Critical Care Medicine  [facility] - [address] [place] [address]

## 2025-04-22 NOTE — PLAN OF CARE
MARCE spoke with Lesli at [facility] for possible SWB placement. [facility] can only accept stable trachs and since the pt's trach is still being adjusted they are unable to manage her care at this time. CM following.

## 2025-04-22 NOTE — SUBJECTIVE & OBJECTIVE
"Interval History/Significant Events: endorses episodic shortness of breath, continues with capping and intermittent secretion clearance by care team    Review of Systems  Objective:     Vital Signs (Most Recent):  Temp: 97.9 °F (36.6 °C) (04/22/25 1544)  Pulse: 108 (04/22/25 1544)  Resp: 19 (04/22/25 1544)  BP: 109/71 (04/22/25 1544)  SpO2: 100 % (04/22/25 1616) Vital Signs (24h Range):  Temp:  [97.6 °F (36.4 °C)-99.5 °F (37.5 °C)] 97.9 °F (36.6 °C)  Pulse:  [104-118] 108  Resp:  [16-20] 19  SpO2:  [96 %-100 %] 100 %  BP: (108-122)/(69-79) 109/71   Weight: 80.6 kg (177 lb 11.1 oz)  Body mass index is 32.5 kg/m².      Intake/Output Summary (Last 24 hours) at 4/22/2025 1751  Last data filed at 4/22/2025 0622  Gross per 24 hour   Intake 1557 ml   Output --   Net 1557 ml          Physical Exam  Constitutional:       General: She is not in acute distress.     Appearance: She is not toxic-appearing.   HENT:      Head: Normocephalic and atraumatic.      Mouth/Throat:      Mouth: Mucous membranes are moist.   Cardiovascular:      Rate and Rhythm: Normal rate and regular rhythm.   Pulmonary:      Effort: Pulmonary effort is normal.      Breath sounds: No wheezing or rhonchi.      Comments: Capped tracheostomy tube.  Abdominal:      Palpations: Abdomen is soft.      Tenderness: There is no right CVA tenderness or guarding.   Skin:     General: Skin is warm.   Neurological:      Mental Status: She is alert. Mental status is at baseline.            Vents:  Oxygen Concentration (%): 28 (04/22/25 0720)  Lines/Drains/Airways       Drain  Duration                  Gastrostomy/Enterostomy LUQ -- days              Airway  Duration             Adult Surgical Airway 03/23/25 1544 Shiley Cuffed  30 days              Peripheral Intravenous Line  Duration                  Peripheral IV - Single Lumen Yes Anterior;Right Upper Arm -- days                  Significant Labs:    CBC/Anemia Profile:  No results for input(s): "WBC", "HGB", " ""HCT", "PLT", "MCV", "RDW", "IRON", "FERRITIN", "RETIC", "FOLATE", "[id number]", "OCCULTBLOOD" in the last 48 hours.       Chemistries:  Recent Labs   Lab 04/21/25  0358 04/22/25  0443   BUN 33* 42*   CREATININE 0.62 0.61       All pertinent labs within the past 24 hours have been reviewed.    Significant Imaging:  I have reviewed all pertinent imaging results/findings within the past 24 hours.  "

## 2025-04-22 NOTE — ASSESSMENT & PLAN NOTE
Antibiotics (From admission, onward)      None            4/18 - complete abx this weekend.   4/20 - d/c abx

## 2025-04-22 NOTE — PLAN OF CARE
Problem: Gas Exchange Impaired  Goal: Optimal Gas Exchange  Outcome: Progressing     P  Problem: Airway Clearance Ineffective  Goal: Effective Airway Clearance  Outcome: Progressing

## 2025-04-22 NOTE — ASSESSMENT & PLAN NOTE
Continue tracheostomy care with T-piece trials  Continue tracheostomy suctioning ordered due to thick secretions. Continue scheduled mucolytic and scopolamine.    Culture with Haemophilus and yeast, started on fluconazole and ceftriaxone.     Pulmonology involved with care this admission.     Family would like patient to go to LTAC before going home as she still requires frequent suctioning, tube feeds, antibiotics, and additional care.  She has required over a week of ICU care prior to returning to our facility.  She was on the vent and was difficult to wean. She has required more than three midnights intermediate ICU level of care while at our facility.       Keep trach without capping until at least April 15th.       Denied LTAC by [employer], her insurance provider.  Appeal sent 4/8. Awaiting decision.

## 2025-04-22 NOTE — SUBJECTIVE & OBJECTIVE
"Interval History:  No new complaints.  Trach has been capped since Friday    Review of Systems  Objective:     Vital Signs (Most Recent):  Temp: 97.6 °F (36.4 °C) (04/21/25 2012)  Pulse: 107 (04/21/25 2029)  Resp: 20 (04/21/25 2029)  BP: 117/76 (04/21/25 2012)  SpO2: 100 % (04/21/25 2029) Vital Signs (24h Range):  Temp:  [97.6 °F (36.4 °C)-98.8 °F (37.1 °C)] 97.6 °F (36.4 °C)  Pulse:  [104-114] 107  Resp:  [16-20] 20  SpO2:  [97 %-100 %] 100 %  BP: ()/(60-76) 117/76     Weight: 79.9 kg (176 lb 2.4 oz)  Body mass index is 32.22 kg/m².    Intake/Output Summary (Last 24 hours) at 4/21/2025 2040  Last data filed at 4/21/2025 0510  Gross per 24 hour   Intake 4403 ml   Output --   Net 4403 ml         Physical Exam  Constitutional:       General: She is not in acute distress.     Appearance: She is not ill-appearing.   HENT:      Head: Normocephalic and atraumatic.      Nose: Nose normal.      Mouth/Throat:      Mouth: Mucous membranes are moist.      Pharynx: Oropharynx is clear.   Eyes:      Extraocular Movements: Extraocular movements intact.      Conjunctiva/sclera: Conjunctivae normal.      Pupils: Pupils are equal, round, and reactive to light.   Cardiovascular:      Rate and Rhythm: Normal rate and regular rhythm.   Pulmonary:      Effort: Pulmonary effort is normal. No respiratory distress.      Comments: Secretions  Abdominal:      General: There is no distension.      Palpations: Abdomen is soft.      Tenderness: There is no abdominal tenderness.   Skin:     General: Skin is warm and dry.   Neurological:      Mental Status: She is alert. Mental status is at baseline.      Comments: Quadriplegia    Psychiatric:         Mood and Affect: Mood normal.         Behavior: Behavior normal.               Significant Labs: All pertinent labs within the past 24 hours have been reviewed.  BMP:   Recent Labs   Lab 04/21/25  0358   BUN 33*   CREATININE 0.62     CBC: No results for input(s): "WBC", "HGB", "HCT", "PLT" in " the last 48 hours.    Significant Imaging: I have reviewed all pertinent imaging results/findings within the past 24 hours.

## 2025-04-22 NOTE — PROGRESS NOTES
[facility] - [address] [place] [address] Medicine  Progress Note    Patient Name: Karie Denney  MRN: [id number]  Patient Class: IP- Inpatient   Admission Date: 3/23/2025  Length of Stay: 29 days  Attending Physician: Stephen Elaine Jr., MD  Primary Care Provider: Gonzalo Barrera NP        Subjective     Principal Problem:Acute on chronic respiratory failure with hypoxia        HPI:  Chief Complaint  Transfer for continued management of respiratory failure, tracheostomy care, PEG feeding, and complications of quadriplegia following prolonged hospitalization.    History of Present Illness  Ms. Karie Denney is a 59-year-old woman with a complex medical history including quadriplegia following spinal surgery in 2015 and a cerebrovascular accident (CVA) in 2024 resulting in left-sided paralysis. She was transferred to [facility] from [facility] in [place], [place], for ongoing care following a prolonged ICU course involving intubation, respiratory failure, and significant complications.  She was initially hospitalized on February 15, 2025, for aspiration and dysphagia evaluation, during which she developed aspiration pneumonia and respiratory failure requiring intubation. Her hospital course was complicated by an ESBL E. coli UTI, pericardial effusion (treated with colchicine), and a spontaneous right thigh hematoma concerning for compartment syndrome, leading to transfer to [facility]. She required prolonged mechanical ventilation, tracheostomy placement, and PEG tube insertion.  She has now returned to [facility] for continued respiratory care, tracheostomy management, PEG feeding, management of sacral pressure injury, and rehabilitation planning.    Past Medical History  Quadriplegia post-spinal surgery (2015)  CVA (2024)  Aspiration pneumonia  Acute respiratory failure  UTI (ESBL E. coli)  Depression  GERD  Pharyngoesophageal dysphagia  Pericardial  effusion/pericarditis  Chronic constipation  Pressure ulcer (sacral, unstageable)  Hyperlipidemia  Anemia    Past Surgical History  Spinal surgery (2015)  PEG tube placement (03/11/2025)  Esophageal dilation with biopsy (08/2024)    Medications  Active Medications:  Atorvastatin 20 mg daily  Omeprazole 40 mg daily  Sertraline 50 mg daily  Trazodone 100 mg qHS  Pregabalin 75 mg BID  Hydrocodone-acetaminophen 5-325 mg BID  Lactulose 30 mL daily via PEG  Midodrine 10 mg Q6H via PEG  Levalbuterol 0.63 mg nebulizer Q6H  Polyethylene glycol 17 g daily via PEG  Recent Changes:  Colchicine: Discontinued due to bleeding  Aspirin and ezetimibe: Discontinued    Allergies  Penicillins ? Rash and anaphylaxis    Social History  Never smoker  No alcohol or tobacco use  Lives at home with mother; receives home health weekly  Bedbound, non-ambulatory    Family History  Noncontributory    Review of Systems  Limited due to tracheostomy, quadriplegia, and communication challenges. History obtained from EMR and caregiver.    Physical Examination  General: Chronically ill-appearing woman, alert, tracheostomy in place with T-piece  HEENT: Mild nasal skin necrosis, mucous membranes moist  Eyes: PERRL, EOMI  Neck: Tracheostomy present  CV: RRR, no murmurs  Lungs: Breath sounds diminished at bases; no acute distress; no wheezing  Abdomen: Soft, non-tender, PEG tube in place  Skin: Unstageable sacral ulcer, nasal pressure ulcer  Neuro: Quadriplegia, responsive with eye tracking and blinking  Extremities: RLE hematoma, bilateral edema, no signs of active bleeding    Laboratory Data (Most Recent)  Hgb: 8.5 g/dL  Creatinine: 0.30 mg/dL  Albumin: 3.1 g/dL  WBC: 9.4 K/uL  Ferritin: 265 ng/mL  INR: 0.93  No growth on recent blood and urine cultures    Imaging  CT angio: Large RLE hematoma without active extravasation  Multiple chest X-rays: Stable bilateral pleural effusions, improving atelectasis  Echo: EF 55-60%, small pericardial  effusion      Overview/Hospital Course:  3/26  - continues on vanc for blood cultures + on 3/24, echo completed at time with no endocarditis seen  - discussed with pulmonary, recs to follow    3/27  - due to overall baseline health will treat coag negative staph bacteremia as a true infection, for now continue vanc and follow cultures and sensitivities  - discussed with pulmonology who plan to continue trach collar as is and see patient May 1st at 1:40 pm, confirmed appointment  - discussed with family at bedside who are very concerned about secretions and states they don't have the suction capabilities at home and do not feel comfortable going home with her in this state, will discuss with social on possible home equipment    3/28  - awaiting micro, continuing vanc  - social setting up suction at home, family plans to return home as before    3/29  - still with secretions  - family requesting voice box    3/30  - doing well today, mentation much improved  - anticipate discharge within the next two days with home health services, family will need education on trach maintenance and home feedings as patient has PEG tube and they have not cared for a PEG tube before, also we will discuss with  getting a speaking told to use with a trach    3/31  - secretions improved today  - working with social for home set up of oxygen and tube feeds  - family needs heavy education on trach care and tube feeds as primary care giver has no experience with either, still wishes to discharge home    Interval History:  No new complaints.  Trach has been capped since Friday    Review of Systems  Objective:     Vital Signs (Most Recent):  Temp: 97.6 °F (36.4 °C) (04/21/25 2012)  Pulse: 107 (04/21/25 2029)  Resp: 20 (04/21/25 2029)  BP: 117/76 (04/21/25 2012)  SpO2: 100 % (04/21/25 2029) Vital Signs (24h Range):  Temp:  [97.6 °F (36.4 °C)-98.8 °F (37.1 °C)] 97.6 °F (36.4 °C)  Pulse:  [104-114] 107  Resp:  [16-20] 20  SpO2:   "[97 %-100 %] 100 %  BP: ()/(60-76) 117/76     Weight: 79.9 kg (176 lb 2.4 oz)  Body mass index is 32.22 kg/m².    Intake/Output Summary (Last 24 hours) at 4/21/2025 2040  Last data filed at 4/21/2025 0510  Gross per 24 hour   Intake 4403 ml   Output --   Net 4403 ml         Physical Exam  Constitutional:       General: She is not in acute distress.     Appearance: She is not ill-appearing.   HENT:      Head: Normocephalic and atraumatic.      Nose: Nose normal.      Mouth/Throat:      Mouth: Mucous membranes are moist.      Pharynx: Oropharynx is clear.   Eyes:      Extraocular Movements: Extraocular movements intact.      Conjunctiva/sclera: Conjunctivae normal.      Pupils: Pupils are equal, round, and reactive to light.   Cardiovascular:      Rate and Rhythm: Normal rate and regular rhythm.   Pulmonary:      Effort: Pulmonary effort is normal. No respiratory distress.      Comments: Secretions  Abdominal:      General: There is no distension.      Palpations: Abdomen is soft.      Tenderness: There is no abdominal tenderness.   Skin:     General: Skin is warm and dry.   Neurological:      Mental Status: She is alert. Mental status is at baseline.      Comments: Quadriplegia    Psychiatric:         Mood and Affect: Mood normal.         Behavior: Behavior normal.               Significant Labs: All pertinent labs within the past 24 hours have been reviewed.  BMP:   Recent Labs   Lab 04/21/25  0358   BUN 33*   CREATININE 0.62     CBC: No results for input(s): "WBC", "HGB", "HCT", "PLT" in the last 48 hours.    Significant Imaging: I have reviewed all pertinent imaging results/findings within the past 24 hours.      Assessment & Plan  Acute on chronic respiratory failure with hypoxia  Continue tracheostomy care with T-piece trials  Continue tracheostomy suctioning ordered due to thick secretions. Continue scheduled mucolytic and scopolamine.    Culture with Haemophilus and yeast, started on fluconazole and " ceftriaxone.     Pulmonology involved with care this admission.     Family would like patient to go to LTAC before going home as she still requires frequent suctioning, tube feeds, antibiotics, and additional care.  She has required over a week of ICU care prior to returning to our facility.  She was on the vent and was difficult to wean. She has required more than three midnights intermediate ICU level of care while at our facility.       Keep trach without capping until at least April 15th.       Denied LTAC by [employer], her insurance provider.  Appeal sent 4/8. Awaiting decision.   Severe protein-calorie malnutrition  Nutrition consulted. Most recent weight and BMI monitored-     Measurements:  Wt Readings from Last 1 Encounters:   04/20/25 79.9 kg (176 lb 2.4 oz)   Body mass index is 32.22 kg/m².    Patient has been screened and assessed by RD.    Malnutrition Type:  Context: chronic illness  Level: severe    Continue PEG tube feeding.  Nepro at 40 mL/hr  Maintain bowel regimen (lactulose, PEG, senna)  Dietitian to reassess  Continue with current tube feed regimen as tolerated with Luis Fernando BID to facilitate wound healing. Recommend to reweigh patient prior to discharge to adjust EN iso weight loss or weight gain. Recommend to consider addition of daily multivitamin/mineral via PEG given patient receiving low volume of formula per day 2/2 decreased calorie needs    Dysphagia  With PEG in place. Continue tube feeds.   Continue omeprazole  Avoid PO intake  Speech therapy if clinically appropriate    Aspiration pneumonia  Recurrent aspiration pneumonia.  Completed two rounds of antibiotics. Now on ceftriaxone for tracheitis.    4/16  - Improving.  As secretions improve will ask pulmonary to advise regarding trach downsizing/capping.   4/17 - Continue Rocephin. Moderate yeast, normally not treated. Will see what pulmonary thinks.   4/18 - Improved - Continue abx through weekend.   4/19 - Tomorrow is last day of planned  abx.   4/20 - D/c abx    Pressure ulcers of skin of multiple topographic sites  Aquacel Ag + Mepilex dressings  Wound care team consulted    Continue offloading and specialty mattress    Depression  Patient has persistent depression which is unknown and is currently controlled. Will Continue anti-depressant medications. We will not consult psychiatry at this time. Patient does not display psychosis at this time. Continue to monitor closely and adjust plan of care as needed.          S/P percutaneous endoscopic gastrostomy (PEG) tube placement  Patient noted to have a percutaneous endoscopic gastrostomy tube in place. I have personally inspected the tube.Tube was placed prior to this admission There are no signs of drainage or infection around the site. The tube is patent. Medications have converted to liquid form if available.  Routine care to be done by wound care and nursing staff.       Quadriplegia  Maximal support, bedbound  PT/OT as tolerated  DME planning for discharge    Hematoma of lower extremity, right, subsequent encounter  No active bleeding; conservative management  Monitor H/H  No anticoagulation  Hyperlipidemia  Continue atorvastatin    Pericardial effusion  Previously on colchicine, now discontinued due to bleeding  Continue low-dose prednisone  Monitor for recurrence    Acute respiratory failure with hypoxia and hypercarbia  Patient with Hypoxic Respiratory failure which is Acute on chronic.  she is not on home oxygen. Supplemental oxygen was provided and noted- Oxygen Concentration (%):  [28] 28    .   Signs/symptoms of respiratory failure include- tachypnea and respiratory distress. Contributing diagnoses includes - Aspiration and Pneumonia Labs and images were reviewed. Patient Has not had a recent ABG. Will treat underlying causes and adjust management of respiratory failure as follows-   Chronic respiratory failure with hypoxia  Patient with Hypoxic Respiratory failure which is Acute on  chronic.  she is not on home oxygen. Supplemental oxygen was provided and noted- Oxygen Concentration (%):  [28] 28    .   Signs/symptoms of respiratory failure include- increased work of breathing and respiratory distress. Contributing diagnoses includes - Aspiration and Pneumonia Labs and images were reviewed. Patient Has not had a recent ABG. Will treat underlying causes and adjust management of respiratory failure as follows-   Coag negative Staphylococcus bacteremia  Resolved. Treatment with vancomycin completed 4/12.     No endocarditis noted on echocardiogram.     Tracheitis  Antibiotics (From admission, onward)      None            4/18 - complete abx this weekend.   4/20 - d/c abx  VTE Risk Mitigation (From admission, onward)           Ordered     enoxaparin injection 40 mg  Daily         03/23/25 1940     IP VTE HIGH RISK PATIENT  Once         03/23/25 1940     Place sequential compression device  Until discontinued         03/23/25 1941                    Discharge Planning   SHRADDHA: 4/28/2025     Code Status: Full Code   Medical Readiness for Discharge Date:   Discharge Plan A: Home with family, Home Health                        Stephen Elaine Jr, MD  Department of Hospital Medicine   [facility] - [address] [place] [address]

## 2025-04-22 NOTE — ASSESSMENT & PLAN NOTE
Chronic. Bed bound and requiring assistance with repositioning (full assist) and secretion management. Contributed to respiratory failure.

## 2025-04-23 LAB
BUN SERPL-MCNC: 46 MG/DL (ref 10–20)
BUN/CREAT SERPL: 82 (ref 6–20)
CREAT SERPL-MCNC: 0.56 MG/DL (ref 0.55–1.02)
EGFR (NO RACE VARIABLE) (RUSH/TITUS): 104 ML/MIN/1.73M2

## 2025-04-23 PROCEDURE — 63600175 PHARM REV CODE 636 W HCPCS: Performed by: HOSPITALIST

## 2025-04-23 PROCEDURE — 25000242 PHARM REV CODE 250 ALT 637 W/ HCPCS: Performed by: INTERNAL MEDICINE

## 2025-04-23 PROCEDURE — 11000001 HC ACUTE MED/SURG PRIVATE ROOM

## 2025-04-23 PROCEDURE — 99233 SBSQ HOSP IP/OBS HIGH 50: CPT | Mod: ,,, | Performed by: STUDENT IN AN ORGANIZED HEALTH CARE EDUCATION/TRAINING PROGRAM

## 2025-04-23 PROCEDURE — 82565 ASSAY OF CREATININE: CPT | Performed by: HOSPITALIST

## 2025-04-23 PROCEDURE — 27000207 HC ISOLATION

## 2025-04-23 PROCEDURE — 25000003 PHARM REV CODE 250: Performed by: HOSPITALIST

## 2025-04-23 PROCEDURE — 25000242 PHARM REV CODE 250 ALT 637 W/ HCPCS: Performed by: FAMILY MEDICINE

## 2025-04-23 PROCEDURE — 99900035 HC TECH TIME PER 15 MIN (STAT)

## 2025-04-23 PROCEDURE — 27000221 HC OXYGEN, UP TO 24 HOURS

## 2025-04-23 PROCEDURE — 36415 COLL VENOUS BLD VENIPUNCTURE: CPT | Performed by: HOSPITALIST

## 2025-04-23 PROCEDURE — 94640 AIRWAY INHALATION TREATMENT: CPT

## 2025-04-23 PROCEDURE — 25000003 PHARM REV CODE 250: Performed by: FAMILY MEDICINE

## 2025-04-23 PROCEDURE — 94761 N-INVAS EAR/PLS OXIMETRY MLT: CPT

## 2025-04-23 PROCEDURE — 99900026 HC AIRWAY MAINTENANCE (STAT)

## 2025-04-23 RX ADMIN — TRAZODONE HYDROCHLORIDE 100 MG: 50 TABLET ORAL at 08:04

## 2025-04-23 RX ADMIN — ENOXAPARIN SODIUM 40 MG: 40 INJECTION SUBCUTANEOUS at 04:04

## 2025-04-23 RX ADMIN — PREGABALIN 75 MG: 75 CAPSULE ORAL at 08:04

## 2025-04-23 RX ADMIN — ACETYLCYSTEINE 2 ML: 200 SOLUTION ORAL; RESPIRATORY (INHALATION) at 08:04

## 2025-04-23 RX ADMIN — LEVALBUTEROL HYDROCHLORIDE 1.25 MG: 1.25 SOLUTION RESPIRATORY (INHALATION) at 07:04

## 2025-04-23 RX ADMIN — ACETYLCYSTEINE 2 ML: 200 SOLUTION ORAL; RESPIRATORY (INHALATION) at 02:04

## 2025-04-23 RX ADMIN — PANTOPRAZOLE SODIUM 40 MG: 40 INJECTION, POWDER, FOR SOLUTION INTRAVENOUS at 09:04

## 2025-04-23 RX ADMIN — LEVALBUTEROL HYDROCHLORIDE 1.25 MG: 1.25 SOLUTION RESPIRATORY (INHALATION) at 08:04

## 2025-04-23 RX ADMIN — SERTRALINE HYDROCHLORIDE 100 MG: 50 TABLET ORAL at 09:04

## 2025-04-23 RX ADMIN — MIDODRINE HYDROCHLORIDE 10 MG: 10 TABLET ORAL at 08:04

## 2025-04-23 RX ADMIN — ACETYLCYSTEINE 2 ML: 200 SOLUTION ORAL; RESPIRATORY (INHALATION) at 07:04

## 2025-04-23 RX ADMIN — Medication 6 MG: at 08:04

## 2025-04-23 RX ADMIN — MIDODRINE HYDROCHLORIDE 10 MG: 10 TABLET ORAL at 09:04

## 2025-04-23 RX ADMIN — ATORVASTATIN CALCIUM 20 MG: 20 TABLET, FILM COATED ORAL at 08:04

## 2025-04-23 RX ADMIN — PREGABALIN 75 MG: 75 CAPSULE ORAL at 09:04

## 2025-04-23 RX ADMIN — LEVALBUTEROL HYDROCHLORIDE 1.25 MG: 1.25 SOLUTION RESPIRATORY (INHALATION) at 02:04

## 2025-04-23 RX ADMIN — MIDODRINE HYDROCHLORIDE 10 MG: 10 TABLET ORAL at 02:04

## 2025-04-23 NOTE — ASSESSMENT & PLAN NOTE
Nutrition consulted. Most recent weight and BMI monitored-     Measurements:  Wt Readings from Last 1 Encounters:   04/22/25 80.6 kg (177 lb 11.1 oz)   Body mass index is 32.5 kg/m².    Patient has been screened and assessed by RD.    Malnutrition Type:  Context: chronic illness  Level: severe    Continue PEG tube feeding.  Nepro at 40 mL/hr  Maintain bowel regimen (lactulose, PEG, senna)  Dietitian to reassess  Continue with current tube feed regimen as tolerated with Luis Fernando BID to facilitate wound healing. Recommend to reweigh patient prior to discharge to adjust EN iso weight loss or weight gain. Recommend to consider addition of daily multivitamin/mineral via PEG given patient receiving low volume of formula per day 2/2 decreased calorie needs

## 2025-04-23 NOTE — ASSESSMENT & PLAN NOTE
Patient has persistent depression which is unknown and is currently controlled. Will Continue anti-depressant medications. We will not consult psychiatry at this time. Patient does not display psychosis at this time. Continue to monitor closely and adjust plan of care as needed.      4/22 - Will increase sertraline to 100.   4/23 stable

## 2025-04-23 NOTE — PROGRESS NOTES
[facility] - [address] [place] [address] Medicine  Progress Note    Patient Name: Karie Denney  MRN: [id number]  Patient Class: IP- Inpatient   Admission Date: 3/23/2025  Length of Stay: 30 days  Attending Physician: Stephen Elaine Jr., MD  Primary Care Provider: Gonzalo Barrera NP        Subjective     Principal Problem:Acute on chronic respiratory failure with hypoxia        HPI:  Chief Complaint  Transfer for continued management of respiratory failure, tracheostomy care, PEG feeding, and complications of quadriplegia following prolonged hospitalization.    History of Present Illness  Ms. Karie Denney is a 59-year-old woman with a complex medical history including quadriplegia following spinal surgery in 2015 and a cerebrovascular accident (CVA) in 2024 resulting in left-sided paralysis. She was transferred to [facility] from [facility] in [place], [place], for ongoing care following a prolonged ICU course involving intubation, respiratory failure, and significant complications.  She was initially hospitalized on February 15, 2025, for aspiration and dysphagia evaluation, during which she developed aspiration pneumonia and respiratory failure requiring intubation. Her hospital course was complicated by an ESBL E. coli UTI, pericardial effusion (treated with colchicine), and a spontaneous right thigh hematoma concerning for compartment syndrome, leading to transfer to [facility]. She required prolonged mechanical ventilation, tracheostomy placement, and PEG tube insertion.  She has now returned to [facility] for continued respiratory care, tracheostomy management, PEG feeding, management of sacral pressure injury, and rehabilitation planning.    Past Medical History  Quadriplegia post-spinal surgery (2015)  CVA (2024)  Aspiration pneumonia  Acute respiratory failure  UTI (ESBL E. coli)  Depression  GERD  Pharyngoesophageal dysphagia  Pericardial  effusion/pericarditis  Chronic constipation  Pressure ulcer (sacral, unstageable)  Hyperlipidemia  Anemia    Past Surgical History  Spinal surgery (2015)  PEG tube placement (03/11/2025)  Esophageal dilation with biopsy (08/2024)    Medications  Active Medications:  Atorvastatin 20 mg daily  Omeprazole 40 mg daily  Sertraline 50 mg daily  Trazodone 100 mg qHS  Pregabalin 75 mg BID  Hydrocodone-acetaminophen 5-325 mg BID  Lactulose 30 mL daily via PEG  Midodrine 10 mg Q6H via PEG  Levalbuterol 0.63 mg nebulizer Q6H  Polyethylene glycol 17 g daily via PEG  Recent Changes:  Colchicine: Discontinued due to bleeding  Aspirin and ezetimibe: Discontinued    Allergies  Penicillins ? Rash and anaphylaxis    Social History  Never smoker  No alcohol or tobacco use  Lives at home with mother; receives home health weekly  Bedbound, non-ambulatory    Family History  Noncontributory    Review of Systems  Limited due to tracheostomy, quadriplegia, and communication challenges. History obtained from EMR and caregiver.    Physical Examination  General: Chronically ill-appearing woman, alert, tracheostomy in place with T-piece  HEENT: Mild nasal skin necrosis, mucous membranes moist  Eyes: PERRL, EOMI  Neck: Tracheostomy present  CV: RRR, no murmurs  Lungs: Breath sounds diminished at bases; no acute distress; no wheezing  Abdomen: Soft, non-tender, PEG tube in place  Skin: Unstageable sacral ulcer, nasal pressure ulcer  Neuro: Quadriplegia, responsive with eye tracking and blinking  Extremities: RLE hematoma, bilateral edema, no signs of active bleeding    Laboratory Data (Most Recent)  Hgb: 8.5 g/dL  Creatinine: 0.30 mg/dL  Albumin: 3.1 g/dL  WBC: 9.4 K/uL  Ferritin: 265 ng/mL  INR: 0.93  No growth on recent blood and urine cultures    Imaging  CT angio: Large RLE hematoma without active extravasation  Multiple chest X-rays: Stable bilateral pleural effusions, improving atelectasis  Echo: EF 55-60%, small pericardial  effusion      Overview/Hospital Course:  3/26  - continues on vanc for blood cultures + on 3/24, echo completed at time with no endocarditis seen  - discussed with pulmonary, recs to follow    3/27  - due to overall baseline health will treat coag negative staph bacteremia as a true infection, for now continue vanc and follow cultures and sensitivities  - discussed with pulmonology who plan to continue trach collar as is and see patient May 1st at 1:40 pm, confirmed appointment  - discussed with family at bedside who are very concerned about secretions and states they don't have the suction capabilities at home and do not feel comfortable going home with her in this state, will discuss with social on possible home equipment    3/28  - awaiting micro, continuing vanc  - social setting up suction at home, family plans to return home as before    3/29  - still with secretions  - family requesting voice box    3/30  - doing well today, mentation much improved  - anticipate discharge within the next two days with home health services, family will need education on trach maintenance and home feedings as patient has PEG tube and they have not cared for a PEG tube before, also we will discuss with  getting a speaking told to use with a trach    3/31  - secretions improved today  - working with social for home set up of oxygen and tube feeds  - family needs heavy education on trach care and tube feeds as primary care giver has no experience with either, still wishes to discharge home    Interval History: Appreciate Dr. Cunha's assistance. Patient with a bit of anxiety.     Review of Systems  Objective:     Vital Signs (Most Recent):  Temp: 98.5 °F (36.9 °C) (04/22/25 1938)  Pulse: (!) 111 (04/22/25 1938)  Resp: 16 (04/22/25 1938)  BP: 110/72 (04/22/25 1938)  SpO2: 100 % (04/22/25 1938) Vital Signs (24h Range):  Temp:  [97.9 °F (36.6 °C)-99.5 °F (37.5 °C)] 98.5 °F (36.9 °C)  Pulse:  [] 111  Resp:  [16-20]  "16  SpO2:  [96 %-100 %] 100 %  BP: (108-122)/(69-79) 110/72     Weight: 80.6 kg (177 lb 11.1 oz)  Body mass index is 32.5 kg/m².    Intake/Output Summary (Last 24 hours) at 4/22/2025 2150  Last data filed at 4/22/2025 2117  Gross per 24 hour   Intake 1587 ml   Output --   Net 1587 ml         Physical Exam  Constitutional:       General: She is not in acute distress.     Appearance: She is not ill-appearing.   HENT:      Head: Normocephalic and atraumatic.      Nose: Nose normal.      Mouth/Throat:      Mouth: Mucous membranes are moist.      Pharynx: Oropharynx is clear.   Eyes:      Extraocular Movements: Extraocular movements intact.      Conjunctiva/sclera: Conjunctivae normal.      Pupils: Pupils are equal, round, and reactive to light.   Cardiovascular:      Rate and Rhythm: Normal rate and regular rhythm.   Pulmonary:      Effort: Pulmonary effort is normal. No respiratory distress.      Comments: Secretions  Abdominal:      General: There is no distension.      Palpations: Abdomen is soft.      Tenderness: There is no abdominal tenderness.   Skin:     General: Skin is warm and dry.   Neurological:      Mental Status: She is alert. Mental status is at baseline.      Comments: Quadriplegia    Psychiatric:         Mood and Affect: Mood normal.         Behavior: Behavior normal.               Significant Labs: All pertinent labs within the past 24 hours have been reviewed.  BMP:   Recent Labs   Lab 04/22/25  0443   BUN 42*   CREATININE 0.61     CBC: No results for input(s): "WBC", "HGB", "HCT", "PLT" in the last 48 hours.    Significant Imaging: I have reviewed all pertinent imaging results/findings within the past 24 hours.      Assessment & Plan  Severe protein-calorie malnutrition  Nutrition consulted. Most recent weight and BMI monitored-     Measurements:  Wt Readings from Last 1 Encounters:   04/22/25 80.6 kg (177 lb 11.1 oz)   Body mass index is 32.5 kg/m².    Patient has been screened and assessed by " RD.    Malnutrition Type:  Context: chronic illness  Level: severe    Continue PEG tube feeding.  Nepro at 40 mL/hr  Maintain bowel regimen (lactulose, PEG, senna)  Dietitian to reassess  Continue with current tube feed regimen as tolerated with Luis Fernando BID to facilitate wound healing. Recommend to reweigh patient prior to discharge to adjust EN iso weight loss or weight gain. Recommend to consider addition of daily multivitamin/mineral via PEG given patient receiving low volume of formula per day 2/2 decreased calorie needs    Dysphagia  With PEG in place. Continue tube feeds.   Continue omeprazole  Avoid PO intake  Speech therapy if clinically appropriate    Pressure ulcers of skin of multiple topographic sites  Aquacel Ag + Mepilex dressings  Wound care team consulted    Continue offloading and specialty mattress    Depression  Patient has persistent depression which is unknown and is currently controlled. Will Continue anti-depressant medications. We will not consult psychiatry at this time. Patient does not display psychosis at this time. Continue to monitor closely and adjust plan of care as needed.      4/22 - Will increase sertraline to 100.         S/P percutaneous endoscopic gastrostomy (PEG) tube placement  Patient noted to have a percutaneous endoscopic gastrostomy tube in place. I have personally inspected the tube.Tube was placed prior to this admission There are no signs of drainage or infection around the site. The tube is patent. Medications have converted to liquid form if available.  Routine care to be done by wound care and nursing staff.       Quadriplegia  Maximal support, bedbound  PT/OT as tolerated  DME planning for discharge    Hematoma of lower extremity, right, subsequent encounter  No active bleeding; conservative management  Monitor H/H  No anticoagulation  Hyperlipidemia  Continue atorvastatin    Chronic respiratory failure with hypoxia  Patient with Hypoxic Respiratory failure which is  Acute on chronic.  she is not on home oxygen. Supplemental oxygen was provided and noted- Oxygen Concentration (%):  [28] 28    .   Signs/symptoms of respiratory failure include- increased work of breathing and respiratory distress. Contributing diagnoses includes - Aspiration and Pneumonia Labs and images were reviewed. Patient Has not had a recent ABG. Will treat underlying causes and adjust management of respiratory failure as follows-   Coag negative Staphylococcus bacteremia  Resolved. Treatment with vancomycin completed 4/12.     No endocarditis noted on echocardiogram.     Tracheitis  Antibiotics (From admission, onward)      None            4/18 - complete abx this weekend.   4/20 - d/c abx  VTE Risk Mitigation (From admission, onward)           Ordered     enoxaparin injection 40 mg  Daily         03/23/25 1940     IP VTE HIGH RISK PATIENT  Once         03/23/25 1940     Place sequential compression device  Until discontinued         03/23/25 1941                    Discharge Planning   SHRADDHA: 4/28/2025     Code Status: Full Code   Medical Readiness for Discharge Date:   Discharge Plan A: Home with family, Home Health                        Stephen Elaine Jr, MD  Department of Hospital Medicine   [facility] - [facility] [address] [facility] Tele[facility]

## 2025-04-23 NOTE — ASSESSMENT & PLAN NOTE
Patient with Hypoxic Respiratory failure which is Acute on chronic.  she is not on home oxygen. Supplemental oxygen was provided and noted- Oxygen Concentration (%):  [28] 28    .   Signs/symptoms of respiratory failure include- increased work of breathing and respiratory distress. Contributing diagnoses includes - Aspiration and Pneumonia Labs and images were reviewed. Patient Has not had a recent ABG. Will treat underlying causes and adjust management of respiratory failure as follows-   Appreciate pulmonology assistance

## 2025-04-23 NOTE — ASSESSMENT & PLAN NOTE
Chronic respiratory failure s/p tracheostomy tube placement 03/14 for extubation failures now weaned to capped tracheostomy; cuff deflated since end March. She has ongoing suctioning requirements despite low volume secretions with feeling of dyspnea. Completed course of Ceftriaxone 04/20 for Haemophilus parainfluenzae pneumonia.   - cont trach capped and suctioning PRN   -- she is not mobilizing secretions to mouth and requiring suctioning   -- requiring respiratory and nursing suctioning efforts due to dyspnea, low volume secretions  - currently with Shiley 8CN cuffed; tract considered mature and s/p suture removal   -- plan for trach exchange to uncuffed with downsize; holding at present for subjective dyspnea and frequent suctioning with recent completion of Abx   -- ongoing system wide tracheostomy tube transition, noted; will reassess for trach exchange which will be considered for 04/25  - supplement for goal SpO2 >92%  - CXR 04/23 with clear lung fields, tracheostomy appropriately positioned

## 2025-04-23 NOTE — PROGRESS NOTES
[facility] - [address] [place] [address] Medicine  Progress Note    Patient Name: Karie Denney  MRN: [id number]  Patient Class: IP- Inpatient   Admission Date: 3/23/2025  Length of Stay: 31 days  Attending Physician: Mesfin Rivera DO  Primary Care Provider: Gonzalo Barrera NP        Subjective     Principal Problem:Acute on chronic respiratory failure with hypoxia        HPI:  Chief Complaint  Transfer for continued management of respiratory failure, tracheostomy care, PEG feeding, and complications of quadriplegia following prolonged hospitalization.    History of Present Illness  Ms. Karie Denney is a 59-year-old woman with a complex medical history including quadriplegia following spinal surgery in 2015 and a cerebrovascular accident (CVA) in 2024 resulting in left-sided paralysis. She was transferred to [facility] from [facility] in [place], [place], for ongoing care following a prolonged ICU course involving intubation, respiratory failure, and significant complications.  She was initially hospitalized on February 15, 2025, for aspiration and dysphagia evaluation, during which she developed aspiration pneumonia and respiratory failure requiring intubation. Her hospital course was complicated by an ESBL E. coli UTI, pericardial effusion (treated with colchicine), and a spontaneous right thigh hematoma concerning for compartment syndrome, leading to transfer to [facility]. She required prolonged mechanical ventilation, tracheostomy placement, and PEG tube insertion.  She has now returned to [facility] for continued respiratory care, tracheostomy management, PEG feeding, management of sacral pressure injury, and rehabilitation planning.    Past Medical History  Quadriplegia post-spinal surgery (2015)  CVA (2024)  Aspiration pneumonia  Acute respiratory failure  UTI (ESBL E. coli)  Depression  GERD  Pharyngoesophageal dysphagia  Pericardial effusion/pericarditis  Chronic  constipation  Pressure ulcer (sacral, unstageable)  Hyperlipidemia  Anemia    Past Surgical History  Spinal surgery (2015)  PEG tube placement (03/11/2025)  Esophageal dilation with biopsy (08/2024)    Medications  Active Medications:  Atorvastatin 20 mg daily  Omeprazole 40 mg daily  Sertraline 50 mg daily  Trazodone 100 mg qHS  Pregabalin 75 mg BID  Hydrocodone-acetaminophen 5-325 mg BID  Lactulose 30 mL daily via PEG  Midodrine 10 mg Q6H via PEG  Levalbuterol 0.63 mg nebulizer Q6H  Polyethylene glycol 17 g daily via PEG  Recent Changes:  Colchicine: Discontinued due to bleeding  Aspirin and ezetimibe: Discontinued    Allergies  Penicillins ? Rash and anaphylaxis    Social History  Never smoker  No alcohol or tobacco use  Lives at home with mother; receives home health weekly  Bedbound, non-ambulatory    Family History  Noncontributory    Review of Systems  Limited due to tracheostomy, quadriplegia, and communication challenges. History obtained from EMR and caregiver.    Physical Examination  General: Chronically ill-appearing woman, alert, tracheostomy in place with T-piece  HEENT: Mild nasal skin necrosis, mucous membranes moist  Eyes: PERRL, EOMI  Neck: Tracheostomy present  CV: RRR, no murmurs  Lungs: Breath sounds diminished at bases; no acute distress; no wheezing  Abdomen: Soft, non-tender, PEG tube in place  Skin: Unstageable sacral ulcer, nasal pressure ulcer  Neuro: Quadriplegia, responsive with eye tracking and blinking  Extremities: RLE hematoma, bilateral edema, no signs of active bleeding    Laboratory Data (Most Recent)  Hgb: 8.5 g/dL  Creatinine: 0.30 mg/dL  Albumin: 3.1 g/dL  WBC: 9.4 K/uL  Ferritin: 265 ng/mL  INR: 0.93  No growth on recent blood and urine cultures    Imaging  CT angio: Large RLE hematoma without active extravasation  Multiple chest X-rays: Stable bilateral pleural effusions, improving atelectasis  Echo: EF 55-60%, small pericardial effusion      Overview/Hospital  Course:  3/26  - continues on vanc for blood cultures + on 3/24, echo completed at time with no endocarditis seen  - discussed with pulmonary, recs to follow    3/27  - due to overall baseline health will treat coag negative staph bacteremia as a true infection, for now continue vanc and follow cultures and sensitivities  - discussed with pulmonology who plan to continue trach collar as is and see patient May 1st at 1:40 pm, confirmed appointment  - discussed with family at bedside who are very concerned about secretions and states they don't have the suction capabilities at home and do not feel comfortable going home with her in this state, will discuss with social on possible home equipment    3/28  - awaiting micro, continuing vanc  - social setting up suction at home, family plans to return home as before    3/29  - still with secretions  - family requesting voice box    3/30  - doing well today, mentation much improved  - anticipate discharge within the next two days with home health services, family will need education on trach maintenance and home feedings as patient has PEG tube and they have not cared for a PEG tube before, also we will discuss with  getting a speaking told to use with a trach    3/31  - secretions improved today  - working with social for home set up of oxygen and tube feeds  - family needs heavy education on trach care and tube feeds as primary care giver has no experience with either, still wishes to discharge home  4/23 long discussion with mother today.  Open to other facilities if needed for trach weaning.  She apparently just changed over to traditional Medicare.  If this is the case we can take her to specialty    Interval History:  No acute events overnight    Review of Systems  Objective:     Vital Signs (Most Recent):  Temp: 97.8 °F (36.6 °C) (04/23/25 1110)  Pulse: 106 (04/23/25 1110)  Resp: 20 (04/23/25 1110)  BP: 109/71 (04/23/25 1110)  SpO2: 99 % (04/23/25  "1415) Vital Signs (24h Range):  Temp:  [97.8 °F (36.6 °C)-99.8 °F (37.7 °C)] 97.8 °F (36.6 °C)  Pulse:  [] 106  Resp:  [16-21] 20  SpO2:  [97 %-100 %] 99 %  BP: (101-113)/(63-73) 109/71     Weight: 81 kg (178 lb 9.2 oz)  Body mass index is 32.66 kg/m².    Intake/Output Summary (Last 24 hours) at 4/23/2025 1426  Last data filed at 4/22/2025 2117  Gross per 24 hour   Intake 250 ml   Output --   Net 250 ml         Physical Exam  Constitutional:       General: She is not in acute distress.     Appearance: She is not ill-appearing.   HENT:      Head: Normocephalic and atraumatic.      Nose: Nose normal.      Mouth/Throat:      Mouth: Mucous membranes are moist.      Pharynx: Oropharynx is clear.   Eyes:      Extraocular Movements: Extraocular movements intact.      Conjunctiva/sclera: Conjunctivae normal.      Pupils: Pupils are equal, round, and reactive to light.   Cardiovascular:      Rate and Rhythm: Normal rate and regular rhythm.   Pulmonary:      Effort: Pulmonary effort is normal. No respiratory distress.      Comments: Secretions  Abdominal:      General: There is no distension.      Palpations: Abdomen is soft.      Tenderness: There is no abdominal tenderness.   Skin:     General: Skin is warm and dry.   Neurological:      Mental Status: She is alert. Mental status is at baseline.      Comments: Quadriplegia    Psychiatric:         Mood and Affect: Mood normal.         Behavior: Behavior normal.               Significant Labs: All pertinent labs within the past 24 hours have been reviewed.  BMP:   Recent Labs   Lab 04/23/25  0428   BUN 46*   CREATININE 0.56     CBC: No results for input(s): "WBC", "HGB", "HCT", "PLT" in the last 48 hours.    Significant Imaging: I have reviewed all pertinent imaging results/findings within the past 24 hours.      Assessment & Plan  Severe protein-calorie malnutrition  Nutrition consulted. Most recent weight and BMI monitored-     Measurements:  Wt Readings from Last 1 " Encounters:   04/23/25 81 kg (178 lb 9.2 oz)   Body mass index is 32.66 kg/m².    Patient has been screened and assessed by RD.    Malnutrition Type:  Context: chronic illness  Level: severe    Continue PEG tube feeding.  Nepro at 40 mL/hr  Maintain bowel regimen (lactulose, PEG, senna)  Dietitian to reassess  Recommend continue current POC as tolerated.  Continue tube feeds  Dysphagia  With PEG in place. Continue tube feeds.   Continue omeprazole  Avoid PO intake  Speech therapy if clinically appropriate  Continue tube feeds  Pressure ulcers of skin of multiple topographic sites  Aquacel Ag + Mepilex dressings  Wound care team consulted    Continue offloading and specialty mattress    Depression  Patient has persistent depression which is unknown and is currently controlled. Will Continue anti-depressant medications. We will not consult psychiatry at this time. Patient does not display psychosis at this time. Continue to monitor closely and adjust plan of care as needed.      4/22 - Will increase sertraline to 100.   4/23 stable      S/P percutaneous endoscopic gastrostomy (PEG) tube placement  Patient noted to have a percutaneous endoscopic gastrostomy tube in place. I have personally inspected the tube.Tube was placed prior to this admission There are no signs of drainage or infection around the site. The tube is patent. Medications have converted to liquid form if available.  Routine care to be done by wound care and nursing staff.       Quadriplegia  Maximal support, bedbound  PT/OT as tolerated  DME planning for discharge    Hematoma of lower extremity, right, subsequent encounter  No active bleeding; conservative management  Monitor H/H  No anticoagulation  Hyperlipidemia  Continue atorvastatin    Chronic respiratory failure with hypoxia  Patient with Hypoxic Respiratory failure which is Acute on chronic.  she is not on home oxygen. Supplemental oxygen was provided and noted- Oxygen Concentration (%):  [28]  28    .   Signs/symptoms of respiratory failure include- increased work of breathing and respiratory distress. Contributing diagnoses includes - Aspiration and Pneumonia Labs and images were reviewed. Patient Has not had a recent ABG. Will treat underlying causes and adjust management of respiratory failure as follows-   Appreciate pulmonology assistance  Coag negative Staphylococcus bacteremia  Resolved. Treatment with vancomycin completed 4/12.     No endocarditis noted on echocardiogram.     Tracheitis  Antibiotics (From admission, onward)      None            4/18 - complete abx this weekend.   4/20 - d/c abx  VTE Risk Mitigation (From admission, onward)           Ordered     enoxaparin injection 40 mg  Daily         03/23/25 1940     IP VTE HIGH RISK PATIENT  Once         03/23/25 1940     Place sequential compression device  Until discontinued         03/23/25 1941                    Discharge Planning   SHRADDHA: 4/28/2025     Code Status: Full Code   Medical Readiness for Discharge Date:   Discharge Plan A: Home with family, Home Health            51 minutes spent on face to face patient interaction, discussion with family, ancillary staff, and/or other physicians as well as review of pertinent labs, images, and notes.               Mesfin Rivera DO  Department of Hospital Medicine   [facility] - [address] [place] [address]

## 2025-04-23 NOTE — PLAN OF CARE
Ss and dr lopez spoke with pt's mother in room re: d/c planning. Pt's mother stated she has switched pt's [employer] to regular medicare. If done pt will have regular medicare starting may 1, 2025. Admissions notified. Jessie with [facility] notified. Ss following.

## 2025-04-23 NOTE — ASSESSMENT & PLAN NOTE
Patient has persistent depression which is unknown and is currently controlled. Will Continue anti-depressant medications. We will not consult psychiatry at this time. Patient does not display psychosis at this time. Continue to monitor closely and adjust plan of care as needed.      4/22 - Will increase sertraline to 100.

## 2025-04-23 NOTE — ASSESSMENT & PLAN NOTE
Aquacel Ag + Mepilex dressings  Wound care team consulted    Continue offloading and specialty mattress     Here for consultation with Dr. Guru Wiley.  Patient is a 66 year old female with history of right breast cancer.   Referred by Dr. Borrero to discuss radiation treatment options    Primary Physician: Vinod Reynolds MD  Referring Physician:  Denise Borrero MD         Diagnosis: ER/NC positive, HER2 non-amplified by FISH invasive ductal carcinoma with an extensive intraductal component of the lower outer quadrant RIGHT breast. Pathologic stage IA: pT1a N0(sn) cM0.    History of Prior Radiation Therapy:   None    Patient is here today with daughter.     Social History:  Patient lives alone (with dog and cat) in a house.  Occupation: [employer], retired therapist  Transportation concerns: None    Past Medical History:  History of Connective Tissue Disorder: negative  History of Inflammatory Bowel Disease: negative  Pacemaker / AICD: No  Pregnancy Status: Not pregnant     REVIEW OF SYSTEMS: GENERAL:  Patient denies fevers, chills, night sweats, excessive fatigue, anorexia, weight loss  ALLERGIC/IMMUNOLOGIC: no reactions  EYES:  Patient denies significant visual difficulties, diplopia, but complains of: wears galsses  ENT/MOUTH:  Patient denies problems with hearing, sore throat, mucositis or mouth sores, but complains of: sinus drainage  ENDOCRINE:  Patient denies diabetes, thyroid disease, hormone replacement, but complains of: hot flashes or night sweats  HEMATOLOGIC/LYMPHATIC: Patient denies easy bruising or bleeding, tender or palpable lymph nodes  BREASTS: Patient denies abnormal masses of breast, nipple discharge, but complains of:  Pain from surgery on right breast through armpit  RESPIRATORY:  Patient denies  dyspnea on exertion, chest pain, cough, hemoptysis  CARDIOVASCULAR:  Patient denies anginal chest pain, palpitations, orthopnea, peripheral edema   GASTROINTESTINAL: Patient denies nausea, vomiting, diarrhea, GI bleeding, constipation, change in bowel habits, heartburn, early satiety    (FEMALE):  Patient denies  abnormal genital masses, hematuria, hesitancy, incontinence, vaginal bleeding, discharge, other problems with urination, problems with sexual activity  MUSCULOSKELETAL:  Patient denies  swelling or redness, decreased range of motion, but complains of: joint pain in right knee   SKIN:  Patient denies chronic rashes, inflammation, ulcerations or skin changes  NEUROLOGIC:  Patient denies headache, blurred vision, areas of focal weakness or numbness, sensory problems, but complains of: abnormal gait, uses cane for long distances  PSYCHIATRIC: Patient denies anxiety, depression, jane or mood swings, psychotropic drugs, but complains of: insomnia due to breast pain  post-menopausal

## 2025-04-23 NOTE — SUBJECTIVE & OBJECTIVE
"Interval History:  No acute events overnight    Review of Systems  Objective:     Vital Signs (Most Recent):  Temp: 97.8 °F (36.6 °C) (04/23/25 1110)  Pulse: 106 (04/23/25 1110)  Resp: 20 (04/23/25 1110)  BP: 109/71 (04/23/25 1110)  SpO2: 99 % (04/23/25 1415) Vital Signs (24h Range):  Temp:  [97.8 °F (36.6 °C)-99.8 °F (37.7 °C)] 97.8 °F (36.6 °C)  Pulse:  [] 106  Resp:  [16-21] 20  SpO2:  [97 %-100 %] 99 %  BP: (101-113)/(63-73) 109/71     Weight: 81 kg (178 lb 9.2 oz)  Body mass index is 32.66 kg/m².    Intake/Output Summary (Last 24 hours) at 4/23/2025 1426  Last data filed at 4/22/2025 2117  Gross per 24 hour   Intake 250 ml   Output --   Net 250 ml         Physical Exam  Constitutional:       General: She is not in acute distress.     Appearance: She is not ill-appearing.   HENT:      Head: Normocephalic and atraumatic.      Nose: Nose normal.      Mouth/Throat:      Mouth: Mucous membranes are moist.      Pharynx: Oropharynx is clear.   Eyes:      Extraocular Movements: Extraocular movements intact.      Conjunctiva/sclera: Conjunctivae normal.      Pupils: Pupils are equal, round, and reactive to light.   Cardiovascular:      Rate and Rhythm: Normal rate and regular rhythm.   Pulmonary:      Effort: Pulmonary effort is normal. No respiratory distress.      Comments: Secretions  Abdominal:      General: There is no distension.      Palpations: Abdomen is soft.      Tenderness: There is no abdominal tenderness.   Skin:     General: Skin is warm and dry.   Neurological:      Mental Status: She is alert. Mental status is at baseline.      Comments: Quadriplegia    Psychiatric:         Mood and Affect: Mood normal.         Behavior: Behavior normal.               Significant Labs: All pertinent labs within the past 24 hours have been reviewed.  BMP:   Recent Labs   Lab 04/23/25  0428   BUN 46*   CREATININE 0.56     CBC: No results for input(s): "WBC", "HGB", "HCT", "PLT" in the last 48 hours.    Significant " Imaging: I have reviewed all pertinent imaging results/findings within the past 24 hours.

## 2025-04-23 NOTE — ASSESSMENT & PLAN NOTE
Nutrition consulted. Most recent weight and BMI monitored-     Measurements:  Wt Readings from Last 1 Encounters:   04/23/25 81 kg (178 lb 9.2 oz)   Body mass index is 32.66 kg/m².    Patient has been screened and assessed by RD.    Malnutrition Type:  Context: chronic illness  Level: severe    Continue PEG tube feeding.  Nepro at 40 mL/hr  Maintain bowel regimen (lactulose, PEG, senna)  Dietitian to reassess  Recommend continue current POC as tolerated.  Continue tube feeds

## 2025-04-23 NOTE — PROGRESS NOTES
[facility]  Critical Care Medicine  Progress Note    Patient Name: Karie Denney  MRN: [id number]  Admission Date: 3/23/2025  Hospital Length of Stay: 31 days  Code Status: Full Code  Attending Provider: Mesfin Rivera DO  Primary Care Provider: Gonzalo Barrera NP   Principal Problem: Acute on chronic respiratory failure with hypoxia    Subjective:     HPI:  58 yo F who is bed bound (back surgery 2015 c/b b/l LE weakness, CVA 2024 with L paralysis), GERD and previous hospitalization 02/2025 course with progressive respiratory failure 2/2 aspiration, mucus plugging and mechanical respiratory failure requiring intubation 02/24 (difficult 2/2 reduced ROM cervical spine) with ICU course c/b spontaneous RLE compartment bleed with transfer to OSH for IR services. She is now transferred back to floor 03/23 s/p tracheostomy tube placement 03/14 for extubation failures now weaned to trach collar. She is also s/p PEG placement 03/11/2025. R spontaneous R thigh hematoma was managed with supportive care. Pulmonary is consulted for tracheostomy tube management recommendations.       Hospital/ICU Course:  No notes on file    Interval History/Significant Events: no acute complaints, remains on capped tracheostomy tube    Review of Systems  Objective:     Vital Signs (Most Recent):  Temp: 99.1 °F (37.3 °C) (04/23/25 1607)  Pulse: 101 (04/23/25 1607)  Resp: 18 (04/23/25 1607)  BP: 109/74 (04/23/25 1607)  SpO2: 100 % (04/23/25 1607) Vital Signs (24h Range):  Temp:  [97.8 °F (36.6 °C)-99.8 °F (37.7 °C)] 99.1 °F (37.3 °C)  Pulse:  [] 101  Resp:  [16-21] 18  SpO2:  [97 %-100 %] 100 %  BP: (101-113)/(63-74) 109/74   Weight: 81 kg (178 lb 9.2 oz)  Body mass index is 32.66 kg/m².      Intake/Output Summary (Last 24 hours) at 4/23/2025 1716  Last data filed at 4/22/2025 2117  Gross per 24 hour   Intake 250 ml   Output --   Net 250 ml          Physical Exam  Constitutional:       General: She  "is not in acute distress.     Appearance: She is not toxic-appearing.   HENT:      Head: Normocephalic and atraumatic.      Mouth/Throat:      Mouth: Mucous membranes are moist.   Cardiovascular:      Rate and Rhythm: Normal rate and regular rhythm.   Pulmonary:      Effort: Pulmonary effort is normal.      Breath sounds: No wheezing or rhonchi.      Comments: Capped tracheostomy tube.  Abdominal:      Palpations: Abdomen is soft.      Tenderness: There is no right CVA tenderness or guarding.   Skin:     General: Skin is warm.   Neurological:      Mental Status: She is alert. Mental status is at baseline.            Vents:  Oxygen Concentration (%): 28 (04/23/25 0746)  Lines/Drains/Airways       Drain  Duration                  Gastrostomy/Enterostomy LUQ -- days              Airway  Duration             Adult Surgical Airway 03/23/25 1544 Shiley Cuffed  31 days              Peripheral Intravenous Line  Duration                  Peripheral IV - Single Lumen Yes Anterior;Right Upper Arm -- days                  Significant Labs:    CBC/Anemia Profile:  No results for input(s): "WBC", "HGB", "HCT", "PLT", "MCV", "RDW", "IRON", "FERRITIN", "RETIC", "FOLATE", "[id number]", "OCCULTBLOOD" in the last 48 hours.       Chemistries:  Recent Labs   Lab 04/22/25  0443 04/23/25  0428   BUN 42* 46*   CREATININE 0.61 0.56       All pertinent labs within the past 24 hours have been reviewed.    Significant Imaging:  I have reviewed all pertinent imaging results/findings within the past 24 hours.    ABG  No results for input(s): "PH", "PO2", "PCO2", "HCO3", "BE" in the last 168 hours.  Assessment/Plan:     Neuro  Quadriplegia  Chronic. Bed bound and requiring assistance with repositioning (full assist) and secretion management. Contributed to respiratory failure.    Pulmonary  Chronic respiratory failure with hypoxia  Chronic respiratory failure s/p tracheostomy tube placement 03/14 for extubation failures now weaned to capped " tracheostomy; cuff deflated since end March. She has ongoing suctioning requirements despite low volume secretions with feeling of dyspnea. Completed course of Ceftriaxone 04/20 for Haemophilus parainfluenzae pneumonia.   - cont trach capped and suctioning PRN   -- she is not mobilizing secretions to mouth and requiring suctioning   -- requiring respiratory and nursing suctioning efforts due to dyspnea, low volume secretions  - currently with Shiley 8CN cuffed; tract considered mature and s/p suture removal   -- plan for trach exchange to uncuffed with downsize; holding at present for subjective dyspnea and frequent suctioning with recent completion of Abx   -- ongoing system wide tracheostomy tube transition, noted; will reassess for trach exchange which will be considered for 04/25  - supplement for goal SpO2 >92%  - CXR 04/23 with clear lung fields, tracheostomy appropriately positioned         Mariah Cunha MD  Critical Care Medicine  [facility] - [address] [place] [address]

## 2025-04-23 NOTE — ASSESSMENT & PLAN NOTE
With PEG in place. Continue tube feeds.   Continue omeprazole  Avoid PO intake  Speech therapy if clinically appropriate  Continue tube feeds

## 2025-04-23 NOTE — SUBJECTIVE & OBJECTIVE
"Interval History: Appreciate Dr. Cunha's assistance. Patient with a bit of anxiety.     Review of Systems  Objective:     Vital Signs (Most Recent):  Temp: 98.5 °F (36.9 °C) (04/22/25 1938)  Pulse: (!) 111 (04/22/25 1938)  Resp: 16 (04/22/25 1938)  BP: 110/72 (04/22/25 1938)  SpO2: 100 % (04/22/25 1938) Vital Signs (24h Range):  Temp:  [97.9 °F (36.6 °C)-99.5 °F (37.5 °C)] 98.5 °F (36.9 °C)  Pulse:  [] 111  Resp:  [16-20] 16  SpO2:  [96 %-100 %] 100 %  BP: (108-122)/(69-79) 110/72     Weight: 80.6 kg (177 lb 11.1 oz)  Body mass index is 32.5 kg/m².    Intake/Output Summary (Last 24 hours) at 4/22/2025 2150  Last data filed at 4/22/2025 2117  Gross per 24 hour   Intake 1587 ml   Output --   Net 1587 ml         Physical Exam  Constitutional:       General: She is not in acute distress.     Appearance: She is not ill-appearing.   HENT:      Head: Normocephalic and atraumatic.      Nose: Nose normal.      Mouth/Throat:      Mouth: Mucous membranes are moist.      Pharynx: Oropharynx is clear.   Eyes:      Extraocular Movements: Extraocular movements intact.      Conjunctiva/sclera: Conjunctivae normal.      Pupils: Pupils are equal, round, and reactive to light.   Cardiovascular:      Rate and Rhythm: Normal rate and regular rhythm.   Pulmonary:      Effort: Pulmonary effort is normal. No respiratory distress.      Comments: Secretions  Abdominal:      General: There is no distension.      Palpations: Abdomen is soft.      Tenderness: There is no abdominal tenderness.   Skin:     General: Skin is warm and dry.   Neurological:      Mental Status: She is alert. Mental status is at baseline.      Comments: Quadriplegia    Psychiatric:         Mood and Affect: Mood normal.         Behavior: Behavior normal.               Significant Labs: All pertinent labs within the past 24 hours have been reviewed.  BMP:   Recent Labs   Lab 04/22/25  0443   BUN 42*   CREATININE 0.61     CBC: No results for input(s): "WBC", "HGB", " ""HCT", "PLT" in the last 48 hours.    Significant Imaging: I have reviewed all pertinent imaging results/findings within the past 24 hours.  "

## 2025-04-23 NOTE — SUBJECTIVE & OBJECTIVE
"Interval History/Significant Events: no acute complaints, remains on capped tracheostomy tube    Review of Systems  Objective:     Vital Signs (Most Recent):  Temp: 99.1 °F (37.3 °C) (04/23/25 1607)  Pulse: 101 (04/23/25 1607)  Resp: 18 (04/23/25 1607)  BP: 109/74 (04/23/25 1607)  SpO2: 100 % (04/23/25 1607) Vital Signs (24h Range):  Temp:  [97.8 °F (36.6 °C)-99.8 °F (37.7 °C)] 99.1 °F (37.3 °C)  Pulse:  [] 101  Resp:  [16-21] 18  SpO2:  [97 %-100 %] 100 %  BP: (101-113)/(63-74) 109/74   Weight: 81 kg (178 lb 9.2 oz)  Body mass index is 32.66 kg/m².      Intake/Output Summary (Last 24 hours) at 4/23/2025 1716  Last data filed at 4/22/2025 2117  Gross per 24 hour   Intake 250 ml   Output --   Net 250 ml          Physical Exam  Constitutional:       General: She is not in acute distress.     Appearance: She is not toxic-appearing.   HENT:      Head: Normocephalic and atraumatic.      Mouth/Throat:      Mouth: Mucous membranes are moist.   Cardiovascular:      Rate and Rhythm: Normal rate and regular rhythm.   Pulmonary:      Effort: Pulmonary effort is normal.      Breath sounds: No wheezing or rhonchi.      Comments: Capped tracheostomy tube.  Abdominal:      Palpations: Abdomen is soft.      Tenderness: There is no right CVA tenderness or guarding.   Skin:     General: Skin is warm.   Neurological:      Mental Status: She is alert. Mental status is at baseline.            Vents:  Oxygen Concentration (%): 28 (04/23/25 0746)  Lines/Drains/Airways       Drain  Duration                  Gastrostomy/Enterostomy LUQ -- days              Airway  Duration             Adult Surgical Airway 03/23/25 1544 Shiley Cuffed  31 days              Peripheral Intravenous Line  Duration                  Peripheral IV - Single Lumen Yes Anterior;Right Upper Arm -- days                  Significant Labs:    CBC/Anemia Profile:  No results for input(s): "WBC", "HGB", "HCT", "PLT", "MCV", "RDW", "IRON", "FERRITIN", "RETIC", " ""FOLATE", "[id number]", "OCCULTBLOOD" in the last 48 hours.       Chemistries:  Recent Labs   Lab 04/22/25  0443 04/23/25  0428   BUN 42* 46*   CREATININE 0.61 0.56       All pertinent labs within the past 24 hours have been reviewed.    Significant Imaging:  I have reviewed all pertinent imaging results/findings within the past 24 hours.  "

## 2025-04-23 NOTE — PROGRESS NOTES
[facility]  Adult Nutrition  Follow Up Note         Reason for Assessment  Reason For Assessment: RD follow-up        Assessment and Plan        4/23/2025: RD follow up. Pt remains on Isosource 1.5. Feeding at goal with no tolerance issues noted. Recommend continue current tube feed regimen as tolerated. Current weight 81kg. Last BM 4/22. RD following.    4/18/2025: RD follow up. Pt remains on Isosource 1.5. Feeding at goal with no tolerance issues noted. Recommend continue current tube feed regimen as tolerated. Recommend to consider addition of multivitamin/mineral via PEG given pt is receiving <1 L formula/day. Last weight obtained 3/27. Recommend reweigh via bed scale to adjust EN as appropriate iso weight loss or gain prior to discharge. MD trying to reduce pt's secretions to make trach care more manageable. Family wants pt to level they can manage at home with HH. Last BM 4/16 per flowsheet. RD following.    4/11/2025: RD follow up. Patient remains on Isosource 1.5. Feeding at goal with no tolerance issues noted. Recommend continue current POC as tolerated. Last weight obtained 3/27. Recommend reweigh. RD following.     4/7/2025: RD follow up. Patient remains on Isosource 1.5. Feedings at goal with no tolerance issues noted. Recommend to continue current tube feed regimen with Luis Fernando BID as tolerated. Awaiting placement -- a peer to peer was offered for LTAC placement. RD following.     4/1/2025: RD follow up. Patient remains on Isosource 1.5. Feedings at goal with no tolerance issues noted. Discharge bolus feeding recommendations provided. RD available if needed. Recommend continue current POC as tolerated. RD following.    3/27/25: RD follow up. Patient remains NPO and on enteral feeds of Isosource 1.5. Patient is at goal rate and tolerating feeds well per flowsheet. Recommend to continue with Isosource 1.5 at 30 mL/hr with FWF 40 mL/hr and Luis Fernando BID. Keep HOB at 30-45  "degrees to reduce risk of aspiration. Monitor for tolerance: n/v/d/c. Hold feeding and notify RD if sx of intolerance develop. Monitor electrolytes and replete as needed.    *Per SLP note 3/24: "Patient's mother reports that pt is not asking for anything to eat or drink orally, and she does not wish to feed her orally at this time. Will D/C order for swallow eval at this time."    3/24/25: Consult received and appreciated. Consult for new tube feeding. Patient is a 60 yo female with a complex medical history including quadriplegia following spinal surgery in 2015 and a CVA in 2024 resulting in left-sided paralysis. She was initially hospitalized in February 2025 for aspiration and dysphagia evaluation. Required prolonged mechanical ventilation, tracheostomy placement, and PEG tube insertion. Returned to [facility] on 3/23/25 for continued respiratory care, tracheostomy management, PEG feeding, management of sacral pressure injury, and rehab planning. Additional relevant PMHx includes aspiration pneumonia, respiratory failure, depression, GERD, chronic constipation, HLD, anemia.     Per H&P, patient noted to be on PEG tube feeding of Nepro at 40 mL/hr. Please see below for tube feed recommendations.     Patient is 79.8 kg (175 lb) with a BMI of 32.18 and is obese (Class 1 Obesity). Review of records reveals a >10% (18.6%) significant unintentional weight loss x 6 mo (97.5 kg 8/29/24). She is also noted to have an unhealed sacral wound.     Per ASPEN guidelines patient meets criteria for severe protein-calorie malnutrition secondary to dysphagia resulting in inadequate PO intakes as evidenced by >10% significant unintentional weight loss x 6 months, unhealed wounds, and consuming <75% estimated energy requirements for >1 month.     ENTERAL FEED RECOMMENDATIONS:    *Needs were adjusted and account for quadriplegia, BMI, and sacral wound*    Initiate continuous infusion of Isosource 1.5 at 10 mL/hr via PEG and advance " 10 mL/hr q8h pending tolerance until goal rate of 30 mL/hr is achieved. Do not exceed goal rate. FWF 40 mL/hr. Keep HOB at 30-45 degrees to reduce risk of aspiration. Monitor for tolerance: n/v/d/c. Hold feeding and notify RD if symptoms of intolerance develop.     Recommend addition of Luis Fernando BID via PEG to promote wound healing and provide additional protein. Recommend to continue with bowel regimen (lactulose, PEG, senna) given hx of chronic constipation. Tube feed regimen will also provide patient with ~ 11 g of fiber per 24 hours, which may help to further alleviate constipation. Given patient is receiving < 1 L formula per day, recommend to consider addition of multivitamin/mineral supplement to help meet micronutrient needs.    Medications/labs reviewed. RD following.    Learning Needs/Social Determinants of Health    Learning Assessment       03/23/2025 1451 [facility] (3/23/2025 - Present)   Created by Corry Blue RN - RN (Nurse) Status: Complete                 PRIMARY LEARNER     Primary Learner Name:  marina noble  - 03/23/2025 1451    Relationship:  Patient, Family SS - 03/23/2025 1451    Does the primary learner have any barriers to learning?:  No Barriers  - 03/23/2025 1451    What is the preferred language of the primary learner?:  English  - 03/23/2025 1451    Is an  required?:  No  - 03/23/2025 1451    How does the primary learner prefer to learn new concepts?:  Listening  - 03/23/2025 1451    How often do you need to have someone help you read instructions, pamphlets, or written material from your doctor or pharmacy?:  Never  - 03/23/2025 1451        CO-LEARNER #1     No question answered        CO-LEARNER #2     No question answered        SPECIAL TOPICS     No question answered        ANSWERED BY:     No question answered        Comments         Edit History       Corry Blue, RN - RN (Nurse)   03/23/2025 1451                           Social Drivers of Health     Tobacco Use: Low Risk  (3/23/2025)    Patient History     Smoking Tobacco Use: Never     Smokeless Tobacco Use: Never     Passive Exposure: Not on file   Alcohol Use: Not At Risk (3/24/2025)    AUDIT-C     Frequency of Alcohol Consumption: Never     Average Number of Drinks: Patient does not drink     Frequency of Binge Drinking: Never   Financial Resource Strain: Low Risk  (3/25/2025)    Overall Financial Resource Strain (CARDIA)     Difficulty of Paying Living Expenses: Not hard at all   Food Insecurity: No Food Insecurity (3/25/2025)    Hunger Vital Sign     Worried About Running Out of Food in the Last Year: Never true     Ran Out of Food in the Last Year: Never true   Recent Concern: Food Insecurity - Food Insecurity Present (3/23/2025)    Hunger Vital Sign     Worried About Running Out of Food in the Last Year: Often true     Ran Out of Food in the Last Year: Often true   Transportation Needs: No Transportation Needs (3/24/2025)    PRAPARE - Transportation     Lack of Transportation (Medical): No     Lack of Transportation (Non-Medical): No   Physical Activity: Inactive (3/24/2025)    Exercise Vital Sign     Days of Exercise per Week: 0 days     Minutes of Exercise per Session: 0 min   Stress: No Stress Concern Present (3/25/2025)    [employer] - Occupational Stress Questionnaire     Feeling of Stress : Not at all   Recent Concern: Stress - Stress Concern Present (3/23/2025)    [employer] - Occupational Stress Questionnaire     Feeling of Stress : Rather much   Housing Stability: Low Risk  (3/25/2025)    Housing Stability Vital Sign     Unable to Pay for Housing in the Last Year: No     Number of Times Moved in the Last Year: Not on file     Homeless in the Last Year: No   Depression: Not on file   Utilities: Not At Risk (3/25/2025)    [facility] Utilities     Threatened with loss of utilities: No   Health  "Literacy: Adequate Health Literacy (3/25/2025)     Health Literacy     Frequency of need for help with medical instructions: Rarely   Recent Concern: Health Literacy - Inadequate Health Literacy (2/16/2025)     Health Literacy     Frequency of need for help with medical instructions: Always   Social Isolation: Socially Integrated (3/24/2025)    Social Isolation     Social Isolation: 1     Malnutrition  Is Patient Malnourished: Yes    Nutrition consulted. Most recent weight and BMI monitored-     Measurements:  Wt Readings from Last 1 Encounters:   04/23/25 81 kg (178 lb 9.2 oz)   Body mass index is 32.66 kg/m².    Patient has been screened and assessed by RD.    Malnutrition Type:  Context: chronic illness  Level: severe    Malnutrition Characteristic Summary:  Weight Loss (Malnutrition): greater than 10% in 6 months  Energy Intake (Malnutrition): less than or equal to 75% for greater than or equal to 1 month    Interventions/Recommendations (treatment strategy):  Dependence on PEG for feedings, continuous enteral feedings;  Recommend continue current POC as tolerated.     Nutrition Diagnosis  Malnutrition (Severe) related to Dysphagia/ difficulty swallowing as evidenced by >10% significant unintentional weight loss x 6 months, unhealed wounds, and consuming <75% estimated energy requirements for >1 month.   Comments: upon observation, no apparent muscle and fat wasting - pt still meets malnutrition criteria per ASPEN guidelines    No results for input(s): "GLU", "POCGLU" in the last 72 hours.    Nutrition Prescription / Recommendations  Recommendation/Intervention: Recommend continue current POC as tolerated.  Goals: Continue to tolerate feeds at goal rate, improve skin integrity, weight maintenance during admission  Nutrition Goal Status: progressing towards goal  Current Diet Order: NPO  Nutrition Order Comments: Enteral Feeds  Chewing or Swallowing Difficulty?: Swallowing difficulty  Recommended Diet: " Enteral Nutrition  Recommended Oral Supplement: Luis Fernando [90 kcals, 2.5g Protein, 10g Carbs(3g Sugar), 7g L-Arginine, 7g L-Glutamine, Vitamin C 300mg, 9.5mg Zinc] 2 times a day  Is Nutrition Support Recommended: Yes  Is Nutrition Education Recommended: No    Needs Calculated    Energy Calorie Requirements (kcal): 1,012 kcal (23 kcal/kg adjusted IBW for quadriplegia) (decreased needs 2/2 decreased metabolic activity due to denervated muscle)  Protein Requirements: 53 - 66 g (1.2 - 1.5 g/kg adjusted IBW for quadriplegia) (increased protein requirements 2/2 sacral wound and prevention of muscle atrophy)  Enteral Nutrition   Enteral Nutrition Formula Provides:  1,260 kcals Propofol Rate: No (1,080 kcal Isosource + 180 Luis Fernando)  54 g Protein (49 g Isosource + 5 Luis Fernando)  121 g Carbohydrates  47 g Fat Propofol Rate: No  560 ml Fluid without Flush    960 ml Fluid by flush   1,520 ml Total Fluid  Enteral Nutrition Recommended Order:  Tube feeding via PEG/ Gastrostomy  Tube feeding formula: Isosource 1.5 PEG/ Gastrostomy  Free Water Flush: 40 ml hourly  Modular Supplements: Luis Fernando BID   Enteral Nutrition meets needs?: yes  Enteral Nutrition Status: Continue Enteral Nutrition    Monitor and Evaluation  % current Intake: Enteral Nutrition at goal  % intake to meet estimated needs: Enteral Nutrition   Monitor and Evaluation: Enteral and parenteral nutrition administration, Weight, Electrolyte and renal panel, Gastrointestinal profile, Glucose/endocrine profile, Inflammatory profile, Lipid profile, Nutrition focused physical findings, Skin    Current Medical Diagnosis and Past Medical History     Past Medical History:   Diagnosis Date    GERD (gastroesophageal reflux disease)     Paraplegia      Nutrition/Diet History  Food Allergies: NKFA  Factors Affecting Nutritional Intake: difficulty/impaired swallowing    Lab/Procedures/Meds  Recent Labs   Lab 04/23/25  0428   BUN 46*   CREATININE 0.56   Note: BUN elevated.    Last A1c:   Lab  Results   Component Value Date    HGBA1C 5.5 03/01/2025   Note: WNL    Lab Results   Component Value Date    RBC 3.95 (L) 04/14/2025    HGB 9.7 (L) 04/14/2025    HCT 34.1 (L) 04/14/2025    MCV 86.3 04/14/2025    MCH 24.6 (L) 04/14/2025    MCHC 28.4 (L) 04/14/2025   Note: H/H low. PMHx of anemia    Pertinent Labs Reviewed: reviewed  Pertinent Medications Reviewed: reviewed    Scheduled Meds:   acetylcysteine 200 mg/ml (20%)  2 mL Nebulization TID    atorvastatin  20 mg Oral QHS    enoxparin  40 mg Subcutaneous Daily    levalbuterol  1.25 mg Nebulization TID    midodrine  10 mg Oral TID    pantoprazole  40 mg Intravenous Daily    pregabalin  75 mg Oral BID    scopolamine  1 patch Transdermal Q3 Days    sertraline  100 mg Oral Daily   Note: atorvastatin, pantoprazole, setraline    Continuous Infusions:    PRN Meds:.  Current Facility-Administered Medications:     acetaminophen, 1,000 mg, Oral, Q8H PRN    acetaminophen, 650 mg, Oral, Q8H PRN    acetaminophen, 650 mg, Oral, Q4H PRN    aluminum-magnesium hydroxide-simethicone, 30 mL, Oral, QID PRN    dextrose 50%, 12.5 g, Intravenous, PRN    dextrose 50%, 12.5 g, Intravenous, PRN    dextrose 50%, 25 g, Intravenous, PRN    glucagon (human recombinant), 1 mg, Intramuscular, PRN    glucose, 16 g, Oral, PRN    glucose, 24 g, Oral, PRN    HYDROcodone-acetaminophen, 1 tablet, Oral, Q6H PRN    HYDROmorphone, 1 mg, Intravenous, Q6H PRN    magnesium oxide, 800 mg, Oral, PRN    magnesium oxide, 800 mg, Oral, PRN    melatonin, 6 mg, Oral, Nightly PRN    naloxone, 0.02 mg, Intravenous, PRN    ondansetron, 4 mg, Intravenous, Q8H PRN    polyethylene glycol, 17 g, Oral, Daily PRN    potassium bicarbonate, 35 mEq, Oral, PRN    potassium bicarbonate, 50 mEq, Oral, PRN    potassium bicarbonate, 60 mEq, Oral, PRN    potassium, sodium phosphates, 2 packet, Oral, PRN    potassium, sodium phosphates, 2 packet, Oral, PRN    potassium, sodium phosphates, 2 packet, Oral, PRN    sodium chloride  "0.9%, 10 mL, Intravenous, PRN    traZODone, 100 mg, Oral, Nightly PRN    Anthropometrics  Height: 5' 2" (157.5 cm)  Height (inches): 62 in  Height Method: Stated  Weight: 81 kg (178 lb 9.2 oz)  Weight (lb): 178.57 lb  Weight Method: Bed Scale  Ideal Body Weight (IBW), Female: 110 lb  BMI (Calculated): 32.5  Tetraplegia (Quadriplegia) Ideal Body Weight (IBW) Adjustment: 97 lb    Estimated/Assessed Needs  RMR (Rocky-St. Jeor Equation): 1328.25     Temp: 97.8 °F (36.6 °C)Oral  Weight Used For Calorie Calculations: 44 kg (97 lb)     Energy Calorie Requirements (kcal): 1,012 kcal (23 kcal/kg adjusted IBW for quadriplegia) (decreased needs 2/2 decreased metabolic activity due to denervated muscle)  Weight Used For Protein Calculations: 44 kg (97 lb)  Protein Requirements: 53 - 66 g (1.2 - 1.5 g/kg adjusted IBW for quadriplegia) (increased protein requirements 2/2 sacral wound and prevention of muscle atrophy)       Fluids: 30 - 40 mL/kg normal requirements for patient with quadriplegia   Fluid Requirements: 1, 540 mL (35 mL/kg adjusted IBW for quadriplegia)     Nutrition by Nursing  Diet/Nutrition Received: NPO, tube feeding  Intake (%):  (Tube)     Diet/Feeding Tolerance: other (see comments)       Gastrostomy/Enterostomy LUQ-Feeding Type: continuous, by pump       Gastrostomy/Enterostomy LUQ-Current Rate (mL/hr): 30 mL/hr       Gastrostomy/Enterostomy LUQ-Goal Rate (mL/hr): 30 mL/hr       Gastrostomy/Enterostomy LUQ-Formula Name: isosource    Nutrition Follow-Up  RD Follow-up?: Yes    Nutrition Discharge Planning: Enteral nutrition (comments)       Niki Soares MS, RD, LD  Available via Secure Chat  "

## 2025-04-24 LAB
BUN SERPL-MCNC: 39 MG/DL (ref 10–20)
BUN/CREAT SERPL: 63 (ref 6–20)
CREAT SERPL-MCNC: 0.62 MG/DL (ref 0.55–1.02)
EGFR (NO RACE VARIABLE) (RUSH/TITUS): 101 ML/MIN/1.73M2

## 2025-04-24 PROCEDURE — 25000003 PHARM REV CODE 250: Performed by: STUDENT IN AN ORGANIZED HEALTH CARE EDUCATION/TRAINING PROGRAM

## 2025-04-24 PROCEDURE — 27000207 HC ISOLATION

## 2025-04-24 PROCEDURE — 94761 N-INVAS EAR/PLS OXIMETRY MLT: CPT

## 2025-04-24 PROCEDURE — 99233 SBSQ HOSP IP/OBS HIGH 50: CPT | Mod: ,,, | Performed by: STUDENT IN AN ORGANIZED HEALTH CARE EDUCATION/TRAINING PROGRAM

## 2025-04-24 PROCEDURE — 99900026 HC AIRWAY MAINTENANCE (STAT)

## 2025-04-24 PROCEDURE — 27000221 HC OXYGEN, UP TO 24 HOURS

## 2025-04-24 PROCEDURE — 11000001 HC ACUTE MED/SURG PRIVATE ROOM

## 2025-04-24 PROCEDURE — 25000242 PHARM REV CODE 250 ALT 637 W/ HCPCS: Performed by: INTERNAL MEDICINE

## 2025-04-24 PROCEDURE — 82565 ASSAY OF CREATININE: CPT | Performed by: HOSPITALIST

## 2025-04-24 PROCEDURE — 63600175 PHARM REV CODE 636 W HCPCS: Performed by: HOSPITALIST

## 2025-04-24 PROCEDURE — 25000242 PHARM REV CODE 250 ALT 637 W/ HCPCS: Performed by: FAMILY MEDICINE

## 2025-04-24 PROCEDURE — 25000003 PHARM REV CODE 250: Performed by: HOSPITALIST

## 2025-04-24 PROCEDURE — 25000003 PHARM REV CODE 250: Performed by: FAMILY MEDICINE

## 2025-04-24 PROCEDURE — 94640 AIRWAY INHALATION TREATMENT: CPT

## 2025-04-24 PROCEDURE — 36415 COLL VENOUS BLD VENIPUNCTURE: CPT | Performed by: HOSPITALIST

## 2025-04-24 PROCEDURE — 99900035 HC TECH TIME PER 15 MIN (STAT)

## 2025-04-24 RX ORDER — TALC
6 POWDER (GRAM) TOPICAL NIGHTLY PRN
Status: DISCONTINUED | OUTPATIENT
Start: 2025-04-24 | End: 2025-04-25

## 2025-04-24 RX ADMIN — PREGABALIN 75 MG: 75 CAPSULE ORAL at 08:04

## 2025-04-24 RX ADMIN — PANTOPRAZOLE SODIUM 40 MG: 40 INJECTION, POWDER, FOR SOLUTION INTRAVENOUS at 08:04

## 2025-04-24 RX ADMIN — ATORVASTATIN CALCIUM 20 MG: 20 TABLET, FILM COATED ORAL at 08:04

## 2025-04-24 RX ADMIN — LEVALBUTEROL HYDROCHLORIDE 1.25 MG: 1.25 SOLUTION RESPIRATORY (INHALATION) at 02:04

## 2025-04-24 RX ADMIN — MIDODRINE HYDROCHLORIDE 10 MG: 10 TABLET ORAL at 08:04

## 2025-04-24 RX ADMIN — LEVALBUTEROL HYDROCHLORIDE 1.25 MG: 1.25 SOLUTION RESPIRATORY (INHALATION) at 07:04

## 2025-04-24 RX ADMIN — ACETYLCYSTEINE 2 ML: 200 SOLUTION ORAL; RESPIRATORY (INHALATION) at 07:04

## 2025-04-24 RX ADMIN — ENOXAPARIN SODIUM 40 MG: 40 INJECTION SUBCUTANEOUS at 04:04

## 2025-04-24 RX ADMIN — SERTRALINE HYDROCHLORIDE 100 MG: 50 TABLET ORAL at 08:04

## 2025-04-24 RX ADMIN — ACETYLCYSTEINE 2 ML: 200 SOLUTION ORAL; RESPIRATORY (INHALATION) at 02:04

## 2025-04-24 RX ADMIN — Medication 6 MG: at 08:04

## 2025-04-24 RX ADMIN — MIDODRINE HYDROCHLORIDE 10 MG: 10 TABLET ORAL at 02:04

## 2025-04-24 NOTE — ASSESSMENT & PLAN NOTE
Patient with Hypoxic Respiratory failure which is Acute on chronic.  she is not on home oxygen. Supplemental oxygen was provided and noted- Oxygen Concentration (%):  [28] 28    .   Signs/symptoms of respiratory failure include- increased work of breathing and respiratory distress. Contributing diagnoses includes - Aspiration and Pneumonia Labs and images were reviewed. Patient Has not had a recent ABG. Will treat underlying causes and adjust management of respiratory failure as follows-   Appreciate pulmonology assistance  Appreciate pulmonology

## 2025-04-24 NOTE — ASSESSMENT & PLAN NOTE
Aquacel Ag + Mepilex dressings  Wound care team consulted    Continue offloading and specialty mattress  Wound Care     normal

## 2025-04-24 NOTE — SUBJECTIVE & OBJECTIVE
"Interval History:  No acute events overnight    Review of Systems  Objective:     Vital Signs (Most Recent):  Temp: 98.2 °F (36.8 °C) (04/24/25 1154)  Pulse: 107 (04/24/25 1154)  Resp: 18 (04/24/25 0717)  BP: 109/75 (04/24/25 1154)  SpO2: 95 % (04/24/25 1154) Vital Signs (24h Range):  Temp:  [98.2 °F (36.8 °C)-99.8 °F (37.7 °C)] 98.2 °F (36.8 °C)  Pulse:  [101-117] 107  Resp:  [16-20] 18  SpO2:  [95 %-100 %] 95 %  BP: (105-109)/(67-75) 109/75     Weight: 81 kg (178 lb 9.2 oz)  Body mass index is 32.66 kg/m².  No intake or output data in the 24 hours ending 04/24/25 1307        Physical Exam  Constitutional:       General: She is not in acute distress.     Appearance: She is not ill-appearing.   HENT:      Head: Normocephalic and atraumatic.      Nose: Nose normal.      Mouth/Throat:      Mouth: Mucous membranes are moist.      Pharynx: Oropharynx is clear.   Eyes:      Extraocular Movements: Extraocular movements intact.      Conjunctiva/sclera: Conjunctivae normal.      Pupils: Pupils are equal, round, and reactive to light.   Cardiovascular:      Rate and Rhythm: Normal rate and regular rhythm.   Pulmonary:      Effort: Pulmonary effort is normal. No respiratory distress.      Comments: Secretions  Abdominal:      General: There is no distension.      Palpations: Abdomen is soft.      Tenderness: There is no abdominal tenderness.   Skin:     General: Skin is warm and dry.   Neurological:      Mental Status: She is alert. Mental status is at baseline.      Comments: Quadriplegia    Psychiatric:         Mood and Affect: Mood normal.         Behavior: Behavior normal.               Significant Labs: All pertinent labs within the past 24 hours have been reviewed.  BMP:   Recent Labs   Lab 04/24/25  0334   BUN 39*   CREATININE 0.62     CBC: No results for input(s): "WBC", "HGB", "HCT", "PLT" in the last 48 hours.    Significant Imaging: I have reviewed all pertinent imaging results/findings within the past 24 hours.  "

## 2025-04-24 NOTE — PLAN OF CARE
Jules spoke with nathan with admissions and she stated there is no way at this time to verify new ins affective date. Md notified. Ss following.

## 2025-04-24 NOTE — ASSESSMENT & PLAN NOTE
Patient has persistent depression which is unknown and is currently controlled. Will Continue anti-depressant medications. We will not consult psychiatry at this time. Patient does not display psychosis at this time. Continue to monitor closely and adjust plan of care as needed.      4/22 - Will increase sertraline to 100.   4/23 stable  Stable

## 2025-04-24 NOTE — PROGRESS NOTES
[facility] - [address] [place] [address] Medicine  Progress Note    Patient Name: Karie Denney  MRN: [id number]  Patient Class: IP- Inpatient   Admission Date: 3/23/2025  Length of Stay: 32 days  Attending Physician: Mesfin Rivera DO  Primary Care Provider: Gonzalo Barrera NP        Subjective     Principal Problem:Acute on chronic respiratory failure with hypoxia        HPI:  Chief Complaint  Transfer for continued management of respiratory failure, tracheostomy care, PEG feeding, and complications of quadriplegia following prolonged hospitalization.    History of Present Illness  Ms. Karie Denney is a 59-year-old woman with a complex medical history including quadriplegia following spinal surgery in 2015 and a cerebrovascular accident (CVA) in 2024 resulting in left-sided paralysis. She was transferred to [facility] from [facility] in [place], [place], for ongoing care following a prolonged ICU course involving intubation, respiratory failure, and significant complications.  She was initially hospitalized on February 15, 2025, for aspiration and dysphagia evaluation, during which she developed aspiration pneumonia and respiratory failure requiring intubation. Her hospital course was complicated by an ESBL E. coli UTI, pericardial effusion (treated with colchicine), and a spontaneous right thigh hematoma concerning for compartment syndrome, leading to transfer to [facility]. She required prolonged mechanical ventilation, tracheostomy placement, and PEG tube insertion.  She has now returned to [facility] for continued respiratory care, tracheostomy management, PEG feeding, management of sacral pressure injury, and rehabilitation planning.    Past Medical History  Quadriplegia post-spinal surgery (2015)  CVA (2024)  Aspiration pneumonia  Acute respiratory failure  UTI (ESBL E. coli)  Depression  GERD  Pharyngoesophageal dysphagia  Pericardial effusion/pericarditis  Chronic  constipation  Pressure ulcer (sacral, unstageable)  Hyperlipidemia  Anemia    Past Surgical History  Spinal surgery (2015)  PEG tube placement (03/11/2025)  Esophageal dilation with biopsy (08/2024)    Medications  Active Medications:  Atorvastatin 20 mg daily  Omeprazole 40 mg daily  Sertraline 50 mg daily  Trazodone 100 mg qHS  Pregabalin 75 mg BID  Hydrocodone-acetaminophen 5-325 mg BID  Lactulose 30 mL daily via PEG  Midodrine 10 mg Q6H via PEG  Levalbuterol 0.63 mg nebulizer Q6H  Polyethylene glycol 17 g daily via PEG  Recent Changes:  Colchicine: Discontinued due to bleeding  Aspirin and ezetimibe: Discontinued    Allergies  Penicillins ? Rash and anaphylaxis    Social History  Never smoker  No alcohol or tobacco use  Lives at home with mother; receives home health weekly  Bedbound, non-ambulatory    Family History  Noncontributory    Review of Systems  Limited due to tracheostomy, quadriplegia, and communication challenges. History obtained from EMR and caregiver.    Physical Examination  General: Chronically ill-appearing woman, alert, tracheostomy in place with T-piece  HEENT: Mild nasal skin necrosis, mucous membranes moist  Eyes: PERRL, EOMI  Neck: Tracheostomy present  CV: RRR, no murmurs  Lungs: Breath sounds diminished at bases; no acute distress; no wheezing  Abdomen: Soft, non-tender, PEG tube in place  Skin: Unstageable sacral ulcer, nasal pressure ulcer  Neuro: Quadriplegia, responsive with eye tracking and blinking  Extremities: RLE hematoma, bilateral edema, no signs of active bleeding    Laboratory Data (Most Recent)  Hgb: 8.5 g/dL  Creatinine: 0.30 mg/dL  Albumin: 3.1 g/dL  WBC: 9.4 K/uL  Ferritin: 265 ng/mL  INR: 0.93  No growth on recent blood and urine cultures    Imaging  CT angio: Large RLE hematoma without active extravasation  Multiple chest X-rays: Stable bilateral pleural effusions, improving atelectasis  Echo: EF 55-60%, small pericardial effusion      Overview/Hospital  Course:  3/26  - continues on vanc for blood cultures + on 3/24, echo completed at time with no endocarditis seen  - discussed with pulmonary, recs to follow    3/27  - due to overall baseline health will treat coag negative staph bacteremia as a true infection, for now continue vanc and follow cultures and sensitivities  - discussed with pulmonology who plan to continue trach collar as is and see patient May 1st at 1:40 pm, confirmed appointment  - discussed with family at bedside who are very concerned about secretions and states they don't have the suction capabilities at home and do not feel comfortable going home with her in this state, will discuss with social on possible home equipment    3/28  - awaiting micro, continuing vanc  - social setting up suction at home, family plans to return home as before    3/29  - still with secretions  - family requesting voice box    3/30  - doing well today, mentation much improved  - anticipate discharge within the next two days with home health services, family will need education on trach maintenance and home feedings as patient has PEG tube and they have not cared for a PEG tube before, also we will discuss with  getting a speaking told to use with a trach    3/31  - secretions improved today  - working with social for home set up of oxygen and tube feeds  - family needs heavy education on trach care and tube feeds as primary care giver has no experience with either, still wishes to discharge home  4/23 long discussion with mother today.  Open to other facilities if needed for trach weaning.  She apparently just changed over to traditional Medicare.  If this is the case we can take her to specialty  4/24 plan to discharge to specialty once insurance verified to be traditional Medicare    Interval History:  No acute events overnight    Review of Systems  Objective:     Vital Signs (Most Recent):  Temp: 98.2 °F (36.8 °C) (04/24/25 1154)  Pulse: 107  "(04/24/25 1154)  Resp: 18 (04/24/25 0717)  BP: 109/75 (04/24/25 1154)  SpO2: 95 % (04/24/25 1154) Vital Signs (24h Range):  Temp:  [98.2 °F (36.8 °C)-99.8 °F (37.7 °C)] 98.2 °F (36.8 °C)  Pulse:  [101-117] 107  Resp:  [16-20] 18  SpO2:  [95 %-100 %] 95 %  BP: (105-109)/(67-75) 109/75     Weight: 81 kg (178 lb 9.2 oz)  Body mass index is 32.66 kg/m².  No intake or output data in the 24 hours ending 04/24/25 1307        Physical Exam  Constitutional:       General: She is not in acute distress.     Appearance: She is not ill-appearing.   HENT:      Head: Normocephalic and atraumatic.      Nose: Nose normal.      Mouth/Throat:      Mouth: Mucous membranes are moist.      Pharynx: Oropharynx is clear.   Eyes:      Extraocular Movements: Extraocular movements intact.      Conjunctiva/sclera: Conjunctivae normal.      Pupils: Pupils are equal, round, and reactive to light.   Cardiovascular:      Rate and Rhythm: Normal rate and regular rhythm.   Pulmonary:      Effort: Pulmonary effort is normal. No respiratory distress.      Comments: Secretions  Abdominal:      General: There is no distension.      Palpations: Abdomen is soft.      Tenderness: There is no abdominal tenderness.   Skin:     General: Skin is warm and dry.   Neurological:      Mental Status: She is alert. Mental status is at baseline.      Comments: Quadriplegia    Psychiatric:         Mood and Affect: Mood normal.         Behavior: Behavior normal.               Significant Labs: All pertinent labs within the past 24 hours have been reviewed.  BMP:   Recent Labs   Lab 04/24/25  0334   BUN 39*   CREATININE 0.62     CBC: No results for input(s): "WBC", "HGB", "HCT", "PLT" in the last 48 hours.    Significant Imaging: I have reviewed all pertinent imaging results/findings within the past 24 hours.      Assessment & Plan  Severe protein-calorie malnutrition  Nutrition consulted. Most recent weight and BMI monitored-     Measurements:  Wt Readings from Last 1 " Encounters:   04/23/25 81 kg (178 lb 9.2 oz)   Body mass index is 32.66 kg/m².    Patient has been screened and assessed by RD.    Malnutrition Type:  Context: chronic illness  Level: severe    Continue PEG tube feeding.  Nepro at 40 mL/hr  Maintain bowel regimen (lactulose, PEG, senna)  Dietitian to reassess  Recommend continue current POC as tolerated.  Continue tube feeds  Dysphagia  With PEG in place. Continue tube feeds.   Continue omeprazole  Avoid PO intake  Speech therapy if clinically appropriate  Continue tube feeds  Pressure ulcers of skin of multiple topographic sites  Aquacel Ag + Mepilex dressings  Wound care team consulted    Continue offloading and specialty mattress  Wound Care    Depression  Patient has persistent depression which is unknown and is currently controlled. Will Continue anti-depressant medications. We will not consult psychiatry at this time. Patient does not display psychosis at this time. Continue to monitor closely and adjust plan of care as needed.      4/22 - Will increase sertraline to 100.   4/23 stable  Stable      S/P percutaneous endoscopic gastrostomy (PEG) tube placement  Patient noted to have a percutaneous endoscopic gastrostomy tube in place. I have personally inspected the tube.Tube was placed prior to this admission There are no signs of drainage or infection around the site. The tube is patent. Medications have converted to liquid form if available.  Routine care to be done by wound care and nursing staff.       Quadriplegia  Maximal support, bedbound  PT/OT as tolerated  DME planning for discharge  Trach wean as able  Hematoma of lower extremity, right, subsequent encounter  No active bleeding; conservative management  Monitor H/H  No anticoagulation  Hyperlipidemia  Continue atorvastatin    Chronic respiratory failure with hypoxia  Patient with Hypoxic Respiratory failure which is Acute on chronic.  she is not on home oxygen. Supplemental oxygen was provided and  noted- Oxygen Concentration (%):  [28] 28    .   Signs/symptoms of respiratory failure include- increased work of breathing and respiratory distress. Contributing diagnoses includes - Aspiration and Pneumonia Labs and images were reviewed. Patient Has not had a recent ABG. Will treat underlying causes and adjust management of respiratory failure as follows-   Appreciate pulmonology assistance  Appreciate pulmonology  Coag negative Staphylococcus bacteremia  Resolved. Treatment with vancomycin completed 4/12.     No endocarditis noted on echocardiogram.     Tracheitis  Antibiotics (From admission, onward)      None            4/18 - complete abx this weekend.   4/20 - d/c abx  VTE Risk Mitigation (From admission, onward)           Ordered     enoxaparin injection 40 mg  Daily         03/23/25 1940     IP VTE HIGH RISK PATIENT  Once         03/23/25 1940     Place sequential compression device  Until discontinued         03/23/25 1941                    Discharge Planning   SHRADDHA: 4/28/2025     Code Status: Full Code   Medical Readiness for Discharge Date:   Discharge Plan A: Home with family, Home Health            51 minutes spent on face to face patient interaction, discussion with family, ancillary staff, and/or other physicians as well as review of pertinent labs, images, and notes.               Mesfin Rivera DO  Department of Hospital Medicine   [facility] - [address] [place] [address]

## 2025-04-25 DIAGNOSIS — J96.11 CHRONIC RESPIRATORY FAILURE WITH HYPOXIA: Primary | ICD-10-CM

## 2025-04-25 PROCEDURE — 25000003 PHARM REV CODE 250: Performed by: STUDENT IN AN ORGANIZED HEALTH CARE EDUCATION/TRAINING PROGRAM

## 2025-04-25 PROCEDURE — 63600175 PHARM REV CODE 636 W HCPCS: Performed by: HOSPITALIST

## 2025-04-25 PROCEDURE — 25000003 PHARM REV CODE 250: Performed by: FAMILY MEDICINE

## 2025-04-25 PROCEDURE — 25000003 PHARM REV CODE 250: Performed by: INTERNAL MEDICINE

## 2025-04-25 PROCEDURE — 27000207 HC ISOLATION

## 2025-04-25 PROCEDURE — 99900035 HC TECH TIME PER 15 MIN (STAT)

## 2025-04-25 PROCEDURE — 25000242 PHARM REV CODE 250 ALT 637 W/ HCPCS: Performed by: FAMILY MEDICINE

## 2025-04-25 PROCEDURE — 99232 SBSQ HOSP IP/OBS MODERATE 35: CPT | Mod: ,,, | Performed by: STUDENT IN AN ORGANIZED HEALTH CARE EDUCATION/TRAINING PROGRAM

## 2025-04-25 PROCEDURE — 99900026 HC AIRWAY MAINTENANCE (STAT)

## 2025-04-25 PROCEDURE — 82962 GLUCOSE BLOOD TEST: CPT

## 2025-04-25 PROCEDURE — 25000242 PHARM REV CODE 250 ALT 637 W/ HCPCS: Performed by: INTERNAL MEDICINE

## 2025-04-25 PROCEDURE — 94761 N-INVAS EAR/PLS OXIMETRY MLT: CPT

## 2025-04-25 PROCEDURE — 27000221 HC OXYGEN, UP TO 24 HOURS

## 2025-04-25 PROCEDURE — 25000003 PHARM REV CODE 250: Performed by: HOSPITALIST

## 2025-04-25 PROCEDURE — 94640 AIRWAY INHALATION TREATMENT: CPT

## 2025-04-25 PROCEDURE — 11000001 HC ACUTE MED/SURG PRIVATE ROOM

## 2025-04-25 RX ORDER — TALC
6 POWDER (GRAM) TOPICAL NIGHTLY PRN
Status: DISCONTINUED | OUTPATIENT
Start: 2025-04-25 | End: 2025-04-30 | Stop reason: HOSPADM

## 2025-04-25 RX ORDER — SERTRALINE HYDROCHLORIDE 25 MG/1
25 TABLET, FILM COATED ORAL DAILY
Status: DISCONTINUED | OUTPATIENT
Start: 2025-04-26 | End: 2025-04-30 | Stop reason: HOSPADM

## 2025-04-25 RX ADMIN — MIDODRINE HYDROCHLORIDE 10 MG: 10 TABLET ORAL at 03:04

## 2025-04-25 RX ADMIN — PREGABALIN 75 MG: 75 CAPSULE ORAL at 09:04

## 2025-04-25 RX ADMIN — SERTRALINE HYDROCHLORIDE 100 MG: 50 TABLET ORAL at 09:04

## 2025-04-25 RX ADMIN — COLLAGENASE SANTYL: 250 OINTMENT TOPICAL at 09:04

## 2025-04-25 RX ADMIN — ACETYLCYSTEINE 2 ML: 200 SOLUTION ORAL; RESPIRATORY (INHALATION) at 07:04

## 2025-04-25 RX ADMIN — SCOPOLAMINE 1 PATCH: 1.5 PATCH, EXTENDED RELEASE TRANSDERMAL at 09:04

## 2025-04-25 RX ADMIN — LEVALBUTEROL HYDROCHLORIDE 1.25 MG: 1.25 SOLUTION RESPIRATORY (INHALATION) at 01:04

## 2025-04-25 RX ADMIN — MIDODRINE HYDROCHLORIDE 10 MG: 10 TABLET ORAL at 09:04

## 2025-04-25 RX ADMIN — PANTOPRAZOLE SODIUM 40 MG: 40 INJECTION, POWDER, FOR SOLUTION INTRAVENOUS at 09:04

## 2025-04-25 RX ADMIN — Medication 6 MG: at 09:04

## 2025-04-25 RX ADMIN — ATORVASTATIN CALCIUM 20 MG: 20 TABLET, FILM COATED ORAL at 09:04

## 2025-04-25 RX ADMIN — ACETYLCYSTEINE 2 ML: 200 SOLUTION ORAL; RESPIRATORY (INHALATION) at 08:04

## 2025-04-25 RX ADMIN — LEVALBUTEROL HYDROCHLORIDE 1.25 MG: 1.25 SOLUTION RESPIRATORY (INHALATION) at 08:04

## 2025-04-25 RX ADMIN — LEVALBUTEROL HYDROCHLORIDE 1.25 MG: 1.25 SOLUTION RESPIRATORY (INHALATION) at 07:04

## 2025-04-25 RX ADMIN — ACETYLCYSTEINE 2 ML: 200 SOLUTION ORAL; RESPIRATORY (INHALATION) at 01:04

## 2025-04-25 NOTE — PLAN OF CARE
Problem: Adult Inpatient Plan of Care  Goal: Plan of Care Review  Outcome: Progressing  Goal: Patient-Specific Goal (Individualized)  Outcome: Progressing  Goal: Absence of Hospital-Acquired Illness or Injury  Outcome: Progressing  Goal: Optimal Comfort and Wellbeing  Outcome: Progressing  Goal: Readiness for Transition of Care  Outcome: Progressing     Problem: Infection  Goal: Absence of Infection Signs and Symptoms  Outcome: Progressing     Problem: Wound  Goal: Optimal Coping  Outcome: Progressing  Goal: Optimal Functional Ability  Outcome: Progressing  Goal: Absence of Infection Signs and Symptoms  Outcome: Progressing  Goal: Improved Oral Intake  Outcome: Progressing  Intervention: Promote and Optimize Oral Intake  Flowsheets (Taken 4/25/2025 1952)  Nutrition Support Management: tube feeding continued as ordered  Goal: Optimal Pain Control and Function  Outcome: Progressing  Goal: Skin Health and Integrity  Outcome: Progressing  Goal: Optimal Wound Healing  Outcome: Progressing     Problem: Skin Injury Risk Increased  Goal: Skin Health and Integrity  Outcome: Progressing  Intervention: Optimize Skin Protection  Flowsheets (Taken 4/25/2025 1524)  Skin Protection:   silicone foam dressing in place   protective footwear used   skin sealant/moisture barrier applied     Problem: Gas Exchange Impaired  Goal: Optimal Gas Exchange  Outcome: Progressing     Problem: Airway Clearance Ineffective  Goal: Effective Airway Clearance  Outcome: Progressing     Problem: Fall Injury Risk  Goal: Absence of Fall and Fall-Related Injury  Outcome: Progressing

## 2025-04-25 NOTE — ASSESSMENT & PLAN NOTE
Nutrition consulted. Most recent weight and BMI monitored-     Measurements:  Wt Readings from Last 1 Encounters:   04/23/25 81 kg (178 lb 9.2 oz)   Body mass index is 32.66 kg/m².    Patient has been screened and assessed by RD.    Malnutrition Type:  Context: chronic illness  Level: severe    Continue PEG tube feeding.  Nepro at 40 mL/hr  Maintain bowel regimen (lactulose, PEG, senna)  Dietitian to reassess  Recommend continue current POC as tolerated.  Continue tube feeds  Continue tube feeds

## 2025-04-25 NOTE — ASSESSMENT & PLAN NOTE
Aquacel Ag + Mepilex dressings  Wound care team consulted    Continue offloading and specialty mattress  Wound Care  Continue wound care

## 2025-04-25 NOTE — PROGRESS NOTES
[facility] - [address] [place] [address] Medicine  Progress Note    Patient Name: Karie Denney  MRN: [id number]  Patient Class: IP- Inpatient   Admission Date: 3/23/2025  Length of Stay: 33 days  Attending Physician: Mesfin Rivera DO  Primary Care Provider: Gonzalo Barrera NP        Subjective     Principal Problem:Acute on chronic respiratory failure with hypoxia        HPI:  Chief Complaint  Transfer for continued management of respiratory failure, tracheostomy care, PEG feeding, and complications of quadriplegia following prolonged hospitalization.    History of Present Illness  Ms. Karie Denney is a 59-year-old woman with a complex medical history including quadriplegia following spinal surgery in 2015 and a cerebrovascular accident (CVA) in 2024 resulting in left-sided paralysis. She was transferred to [facility] from [facility] in [place], [place], for ongoing care following a prolonged ICU course involving intubation, respiratory failure, and significant complications.  She was initially hospitalized on February 15, 2025, for aspiration and dysphagia evaluation, during which she developed aspiration pneumonia and respiratory failure requiring intubation. Her hospital course was complicated by an ESBL E. coli UTI, pericardial effusion (treated with colchicine), and a spontaneous right thigh hematoma concerning for compartment syndrome, leading to transfer to [facility]. She required prolonged mechanical ventilation, tracheostomy placement, and PEG tube insertion.  She has now returned to [facility] for continued respiratory care, tracheostomy management, PEG feeding, management of sacral pressure injury, and rehabilitation planning.    Past Medical History  Quadriplegia post-spinal surgery (2015)  CVA (2024)  Aspiration pneumonia  Acute respiratory failure  UTI (ESBL E. coli)  Depression  GERD  Pharyngoesophageal dysphagia  Pericardial effusion/pericarditis  Chronic  constipation  Pressure ulcer (sacral, unstageable)  Hyperlipidemia  Anemia    Past Surgical History  Spinal surgery (2015)  PEG tube placement (03/11/2025)  Esophageal dilation with biopsy (08/2024)    Medications  Active Medications:  Atorvastatin 20 mg daily  Omeprazole 40 mg daily  Sertraline 50 mg daily  Trazodone 100 mg qHS  Pregabalin 75 mg BID  Hydrocodone-acetaminophen 5-325 mg BID  Lactulose 30 mL daily via PEG  Midodrine 10 mg Q6H via PEG  Levalbuterol 0.63 mg nebulizer Q6H  Polyethylene glycol 17 g daily via PEG  Recent Changes:  Colchicine: Discontinued due to bleeding  Aspirin and ezetimibe: Discontinued    Allergies  Penicillins ? Rash and anaphylaxis    Social History  Never smoker  No alcohol or tobacco use  Lives at home with mother; receives home health weekly  Bedbound, non-ambulatory    Family History  Noncontributory    Review of Systems  Limited due to tracheostomy, quadriplegia, and communication challenges. History obtained from EMR and caregiver.    Physical Examination  General: Chronically ill-appearing woman, alert, tracheostomy in place with T-piece  HEENT: Mild nasal skin necrosis, mucous membranes moist  Eyes: PERRL, EOMI  Neck: Tracheostomy present  CV: RRR, no murmurs  Lungs: Breath sounds diminished at bases; no acute distress; no wheezing  Abdomen: Soft, non-tender, PEG tube in place  Skin: Unstageable sacral ulcer, nasal pressure ulcer  Neuro: Quadriplegia, responsive with eye tracking and blinking  Extremities: RLE hematoma, bilateral edema, no signs of active bleeding    Laboratory Data (Most Recent)  Hgb: 8.5 g/dL  Creatinine: 0.30 mg/dL  Albumin: 3.1 g/dL  WBC: 9.4 K/uL  Ferritin: 265 ng/mL  INR: 0.93  No growth on recent blood and urine cultures    Imaging  CT angio: Large RLE hematoma without active extravasation  Multiple chest X-rays: Stable bilateral pleural effusions, improving atelectasis  Echo: EF 55-60%, small pericardial effusion      Overview/Hospital  Course:  3/26  - continues on vanc for blood cultures + on 3/24, echo completed at time with no endocarditis seen  - discussed with pulmonary, recs to follow    3/27  - due to overall baseline health will treat coag negative staph bacteremia as a true infection, for now continue vanc and follow cultures and sensitivities  - discussed with pulmonology who plan to continue trach collar as is and see patient May 1st at 1:40 pm, confirmed appointment  - discussed with family at bedside who are very concerned about secretions and states they don't have the suction capabilities at home and do not feel comfortable going home with her in this state, will discuss with social on possible home equipment    3/28  - awaiting micro, continuing vanc  - social setting up suction at home, family plans to return home as before    3/29  - still with secretions  - family requesting voice box    3/30  - doing well today, mentation much improved  - anticipate discharge within the next two days with home health services, family will need education on trach maintenance and home feedings as patient has PEG tube and they have not cared for a PEG tube before, also we will discuss with  getting a speaking told to use with a trach    3/31  - secretions improved today  - working with social for home set up of oxygen and tube feeds  - family needs heavy education on trach care and tube feeds as primary care giver has no experience with either, still wishes to discharge home  4/23 long discussion with mother today.  Open to other facilities if needed for trach weaning.  She apparently just changed over to traditional Medicare.  If this is the case we can take her to specialty  4/24 plan to discharge to specialty once insurance verified to be traditional Medicare  4/25 more awake today after stopping trazodone    Interval History:  No acute events overnight    Review of Systems  Objective:     Vital Signs (Most Recent):  Temp: 98.2  "°F (36.8 °C) (04/25/25 0728)  Pulse: 105 (04/25/25 0801)  Resp: 18 (04/25/25 0758)  BP: 120/75 (04/25/25 0728)  SpO2: 96 % (04/25/25 1050) Vital Signs (24h Range):  Temp:  [98.1 °F (36.7 °C)-98.6 °F (37 °C)] 98.2 °F (36.8 °C)  Pulse:  [101-110] 105  Resp:  [16-20] 18  SpO2:  [95 %-100 %] 96 %  BP: (108-123)/(62-84) 120/75     Weight: 81 kg (178 lb 9.2 oz)  Body mass index is 32.66 kg/m².    Intake/Output Summary (Last 24 hours) at 4/25/2025 1104  Last data filed at 4/24/2025 1800  Gross per 24 hour   Intake 840 ml   Output --   Net 840 ml           Physical Exam  Constitutional:       General: She is not in acute distress.     Appearance: She is not ill-appearing.   HENT:      Head: Normocephalic and atraumatic.      Nose: Nose normal.      Mouth/Throat:      Mouth: Mucous membranes are moist.      Pharynx: Oropharynx is clear.   Eyes:      Extraocular Movements: Extraocular movements intact.      Conjunctiva/sclera: Conjunctivae normal.      Pupils: Pupils are equal, round, and reactive to light.   Cardiovascular:      Rate and Rhythm: Normal rate and regular rhythm.   Pulmonary:      Effort: Pulmonary effort is normal. No respiratory distress.      Comments: Secretions  Abdominal:      General: There is no distension.      Palpations: Abdomen is soft.      Tenderness: There is no abdominal tenderness.   Skin:     General: Skin is warm and dry.   Neurological:      Mental Status: She is alert. Mental status is at baseline.      Comments: Quadriplegia    Psychiatric:         Mood and Affect: Mood normal.         Behavior: Behavior normal.               Significant Labs: All pertinent labs within the past 24 hours have been reviewed.  BMP:   Recent Labs   Lab 04/24/25  0334   BUN 39*   CREATININE 0.62     CBC: No results for input(s): "WBC", "HGB", "HCT", "PLT" in the last 48 hours.    Significant Imaging: I have reviewed all pertinent imaging results/findings within the past 24 hours.      Assessment & Plan  Severe " protein-calorie malnutrition  Nutrition consulted. Most recent weight and BMI monitored-     Measurements:  Wt Readings from Last 1 Encounters:   04/23/25 81 kg (178 lb 9.2 oz)   Body mass index is 32.66 kg/m².    Patient has been screened and assessed by RD.    Malnutrition Type:  Context: chronic illness  Level: severe    Continue PEG tube feeding.  Nepro at 40 mL/hr  Maintain bowel regimen (lactulose, PEG, senna)  Dietitian to reassess  Recommend continue current POC as tolerated.  Continue tube feeds  Continue tube feeds  Dysphagia  With PEG in place. Continue tube feeds.   Continue omeprazole  Avoid PO intake  Speech therapy if clinically appropriate  Continue tube feeds  Pressure ulcers of skin of multiple topographic sites  Aquacel Ag + Mepilex dressings  Wound care team consulted    Continue offloading and specialty mattress  Wound Care  Continue wound care    Depression  Patient has persistent depression which is unknown and is currently controlled. Will Continue anti-depressant medications. We will not consult psychiatry at this time. Patient does not display psychosis at this time. Continue to monitor closely and adjust plan of care as needed.      4/22 - Will increase sertraline to 100.   4/23 stable  Stable  Stable      S/P percutaneous endoscopic gastrostomy (PEG) tube placement  Patient noted to have a percutaneous endoscopic gastrostomy tube in place. I have personally inspected the tube.Tube was placed prior to this admission There are no signs of drainage or infection around the site. The tube is patent. Medications have converted to liquid form if available.  Routine care to be done by wound care and nursing staff.       Quadriplegia  Maximal support, bedbound  PT/OT as tolerated  DME planning for discharge  Trach wean as able  Hematoma of lower extremity, right, subsequent encounter  No active bleeding; conservative management  Monitor H/H  No anticoagulation  Hyperlipidemia  Continue  atorvastatin    Chronic respiratory failure with hypoxia  Patient with Hypoxic Respiratory failure which is Acute on chronic.  she is not on home oxygen. Supplemental oxygen was provided and noted- Oxygen Concentration (%):  [28-32] 28    .   Signs/symptoms of respiratory failure include- increased work of breathing and respiratory distress. Contributing diagnoses includes - Aspiration and Pneumonia Labs and images were reviewed. Patient Has not had a recent ABG. Will treat underlying causes and adjust management of respiratory failure as follows-   Appreciate pulmonology assistance  Appreciate pulmonology  Appreciate pulmonology  Coag negative Staphylococcus bacteremia  Resolved. Treatment with vancomycin completed 4/12.     No endocarditis noted on echocardiogram.     Tracheitis  Antibiotics (From admission, onward)      None            4/18 - complete abx this weekend.   4/20 - d/c abx  VTE Risk Mitigation (From admission, onward)           Ordered     enoxaparin injection 40 mg  Daily         03/23/25 1940     IP VTE HIGH RISK PATIENT  Once         03/23/25 1940     Place sequential compression device  Until discontinued         03/23/25 1941                    Discharge Planning   SHRADDHA: 5/2/2025     Code Status: Full Code   Medical Readiness for Discharge Date:   Discharge Plan A: Home with family, Home Health          More awake with cessation of trazodone.  Tolerating melatonin okay per mom.  Continue              Mesfin Rivera DO  Department of Hospital Medicine   [facility] - [address] [place] [address]

## 2025-04-25 NOTE — SUBJECTIVE & OBJECTIVE
"Interval History:  No acute events overnight    Review of Systems  Objective:     Vital Signs (Most Recent):  Temp: 98.2 °F (36.8 °C) (04/25/25 0728)  Pulse: 105 (04/25/25 0801)  Resp: 18 (04/25/25 0758)  BP: 120/75 (04/25/25 0728)  SpO2: 96 % (04/25/25 1050) Vital Signs (24h Range):  Temp:  [98.1 °F (36.7 °C)-98.6 °F (37 °C)] 98.2 °F (36.8 °C)  Pulse:  [101-110] 105  Resp:  [16-20] 18  SpO2:  [95 %-100 %] 96 %  BP: (108-123)/(62-84) 120/75     Weight: 81 kg (178 lb 9.2 oz)  Body mass index is 32.66 kg/m².    Intake/Output Summary (Last 24 hours) at 4/25/2025 1104  Last data filed at 4/24/2025 1800  Gross per 24 hour   Intake 840 ml   Output --   Net 840 ml           Physical Exam  Constitutional:       General: She is not in acute distress.     Appearance: She is not ill-appearing.   HENT:      Head: Normocephalic and atraumatic.      Nose: Nose normal.      Mouth/Throat:      Mouth: Mucous membranes are moist.      Pharynx: Oropharynx is clear.   Eyes:      Extraocular Movements: Extraocular movements intact.      Conjunctiva/sclera: Conjunctivae normal.      Pupils: Pupils are equal, round, and reactive to light.   Cardiovascular:      Rate and Rhythm: Normal rate and regular rhythm.   Pulmonary:      Effort: Pulmonary effort is normal. No respiratory distress.      Comments: Secretions  Abdominal:      General: There is no distension.      Palpations: Abdomen is soft.      Tenderness: There is no abdominal tenderness.   Skin:     General: Skin is warm and dry.   Neurological:      Mental Status: She is alert. Mental status is at baseline.      Comments: Quadriplegia    Psychiatric:         Mood and Affect: Mood normal.         Behavior: Behavior normal.               Significant Labs: All pertinent labs within the past 24 hours have been reviewed.  BMP:   Recent Labs   Lab 04/24/25  0334   BUN 39*   CREATININE 0.62     CBC: No results for input(s): "WBC", "HGB", "HCT", "PLT" in the last 48 hours.    Significant " Imaging: I have reviewed all pertinent imaging results/findings within the past 24 hours.

## 2025-04-25 NOTE — ASSESSMENT & PLAN NOTE
Patient with Hypoxic Respiratory failure which is Acute on chronic.  she is not on home oxygen. Supplemental oxygen was provided and noted- Oxygen Concentration (%):  [28-32] 28    .   Signs/symptoms of respiratory failure include- increased work of breathing and respiratory distress. Contributing diagnoses includes - Aspiration and Pneumonia Labs and images were reviewed. Patient Has not had a recent ABG. Will treat underlying causes and adjust management of respiratory failure as follows-   Appreciate pulmonology assistance  Appreciate pulmonology  Appreciate pulmonology

## 2025-04-25 NOTE — PROGRESS NOTES
[facility] Tele[facility]  Wound Care    Patient Name:  Karie Denney   MRN:  [id number]  Date: 4/25/2025  Diagnosis: Acute on chronic respiratory failure with hypoxia    History:     Past Medical History:   Diagnosis Date    GERD (gastroesophageal reflux disease)     Paraplegia        Social History[1]    Precautions:     Allergies as of 03/22/2025 - Reviewed 02/26/2025   Allergen Reaction Noted    Penicillins Anaphylaxis 08/25/2022       WOC Assessment Details/Treatment        04/25/25 0735   WOCN Assessment   WOCN Total Time (mins) 60   Visit Date 04/25/25   Visit Time 0730   Consult Type Follow Up   WOCN Speciality Wound   Wound skin tear;moisture   Number of Wounds 1   Continence Type Urinary;Fecal   Intervention chart review;applied;orders   Skin Interventions   Device Skin Pressure Protection absorbent pad utilized/changed;adhesive use limited;positioning supports utilized;pressure points protected   Pressure Reduction Devices pressure-redistributing mattress utilized;heel offloading device utilized;positioning supports utilized   Pressure Reduction Techniques weight shift assistance provided;pressure points protected   Skin Protection incontinence pads utilized;silicone foam dressing in place   Positioning   Body Position supine   Head of Bed (HOB) Positioning HOB at 20-30 degrees   Positioning/Transfer Devices pillows;in use   Pressure Injury Prevention    Check Moisture Management Pad Done   Heel protection technique Heel boot        Wound 03/23/25 1541 Pressure Injury Left anterior Foot #2   Date First Assessed/Time First Assessed: 03/23/25 1541   Present on Original Admission: Yes  Primary Wound Type: Pressure Injury  Side: Left  Orientation: anterior  Location: Foot  Wound Number: #2  Is this injury device related?: No   Wound Image    Pressure Injury Stage U   Dressing Appearance Open to air   Drainage Amount None   Appearance Black;Dry   Tissue loss description Not applicable    Periwound Area Dry   Wound Edges Undefined   Wound Length (cm) 0.3 cm   Wound Width (cm) 0.2 cm   Wound Depth (cm) 0 cm   Wound Volume (cm^3) 0 cm^3   Wound Surface Area (cm^2) 0.05 cm^2        Wound 03/23/25 1542 Pressure Injury Left Buttocks #3   Date First Assessed/Time First Assessed: 03/23/25 1542   Present on Original Admission: Yes  Primary Wound Type: Pressure Injury  Side: Left  Location: Buttocks  Wound Number: #3   Wound Image   (4x2.5x.0.5)   Pressure Injury Stage U   Dressing Appearance Open to air   Drainage Amount None   Appearance Black;Pink;Dry   Tissue loss description Full thickness   Periwound Area Dry   Wound Edges Undefined;Irregular   Wound Length (cm) 4 cm   Wound Width (cm) 2.5 cm   Wound Depth (cm) 0.5 cm   Wound Volume (cm^3) 2.618 cm^3   Wound Surface Area (cm^2) 7.85 cm^2   Care Cleansed with:;Antimicrobial agent;Wound cleanser;Applied:  (Santyl)   Dressing Changed;Silicone;Island/border;Foam   Periwound Care Absorptive dressing applied;Cleansed with pH balanced cleanser;Dry periwound area maintained     WOC Team consulted for:  L Buttocks    Narrative:   Patient alert and able to mouth words. Patient family at bedside, nurse NICOLASA Nguyen in room to assist with wound care.    Active Wounds and Recommendations: Continue POC    Goals for Wound Healing: Moisture management, Apply antimicrobial, Removal of slough/eschar, Reduce bioburden, and Educate on proper wound management post D/C     Barriers to Wound Healing: multiple co-morbidities poor vascular supply edema necrosis no protective sensation    Orders placed.    Thank you for the consult.     We will continue to follow. See additional note under Notes Tab for tentative f/u plan/dates.      04/25/2025       [1]   Social History  Socioeconomic History    Marital status: Single   Tobacco Use    Smoking status: Never    Smokeless tobacco: Never   Substance and Sexual Activity    Alcohol use: Never    Drug use: Never    Sexual activity:  Not Currently     Social Drivers of Health     Financial Resource Strain: Low Risk  (3/25/2025)    Overall Financial Resource Strain (CARDIA)     Difficulty of Paying Living Expenses: Not hard at all   Food Insecurity: No Food Insecurity (3/25/2025)    Hunger Vital Sign     Worried About Running Out of Food in the Last Year: Never true     Ran Out of Food in the Last Year: Never true   Recent Concern: Food Insecurity - Food Insecurity Present (3/23/2025)    Hunger Vital Sign     Worried About Running Out of Food in the Last Year: Often true     Ran Out of Food in the Last Year: Often true   Transportation Needs: No Transportation Needs (3/24/2025)    PRAPARE - Transportation     Lack of Transportation (Medical): No     Lack of Transportation (Non-Medical): No   Physical Activity: Inactive (3/24/2025)    Exercise Vital Sign     Days of Exercise per Week: 0 days     Minutes of Exercise per Session: 0 min   Stress: No Stress Concern Present (3/25/2025)    [employer] - Occupational Stress Questionnaire     Feeling of Stress : Not at all   Recent Concern: Stress - Stress Concern Present (3/23/2025)    [employer] - Occupational Stress Questionnaire     Feeling of Stress : Rather much   Housing Stability: Low Risk  (3/25/2025)    Housing Stability Vital Sign     Unable to Pay for Housing in the Last Year: No     Homeless in the Last Year: No      Home

## 2025-04-25 NOTE — PLAN OF CARE
Ss spoke with dr lopez and rosa elena with [facility] and pt days look good for ltac and [facility] should have a bed available if pt's insurance is confirmed may 1. Ss following.

## 2025-04-25 NOTE — NURSING
Pt reported feeling lethargic, requested I check BG. Presented 120, notified Dr Rivera of lethargy, see new orders of discontinued meds.

## 2025-04-25 NOTE — ASSESSMENT & PLAN NOTE
Patient has persistent depression which is unknown and is currently controlled. Will Continue anti-depressant medications. We will not consult psychiatry at this time. Patient does not display psychosis at this time. Continue to monitor closely and adjust plan of care as needed.      4/22 - Will increase sertraline to 100.   4/23 stable  Stable  Stable

## 2025-04-25 NOTE — PLAN OF CARE
Ss spoke with dr lopez and pt is medically ready to start d/c planning. Pt's mother stated she is going to speak with family to see if they can accommodate pt at home, if not ss will work with family for snf placement. Ss following.

## 2025-04-25 NOTE — ASSESSMENT & PLAN NOTE
With PEG in place. Continue tube feeds.   Continue omeprazole  Avoid PO intake  Speech therapy if clinically appropriate  Continue tube feeds   English

## 2025-04-25 NOTE — PROGRESS NOTES
04/25/25 1116   Wound Care Follow Up   Wound Care Follow-up? Yes   Wound Care- Next Tentative Visit Date 05/01/25   Follow Up Plan Hell[facility] POC

## 2025-04-26 LAB — GLUCOSE SERPL-MCNC: 120 MG/DL (ref 70–105)

## 2025-04-26 PROCEDURE — 94640 AIRWAY INHALATION TREATMENT: CPT

## 2025-04-26 PROCEDURE — 25000242 PHARM REV CODE 250 ALT 637 W/ HCPCS: Performed by: FAMILY MEDICINE

## 2025-04-26 PROCEDURE — 63600175 PHARM REV CODE 636 W HCPCS: Performed by: HOSPITALIST

## 2025-04-26 PROCEDURE — 25000003 PHARM REV CODE 250: Performed by: STUDENT IN AN ORGANIZED HEALTH CARE EDUCATION/TRAINING PROGRAM

## 2025-04-26 PROCEDURE — 99900026 HC AIRWAY MAINTENANCE (STAT)

## 2025-04-26 PROCEDURE — 25000003 PHARM REV CODE 250: Performed by: HOSPITALIST

## 2025-04-26 PROCEDURE — 99900035 HC TECH TIME PER 15 MIN (STAT)

## 2025-04-26 PROCEDURE — 27000207 HC ISOLATION

## 2025-04-26 PROCEDURE — 27000221 HC OXYGEN, UP TO 24 HOURS

## 2025-04-26 PROCEDURE — 94761 N-INVAS EAR/PLS OXIMETRY MLT: CPT

## 2025-04-26 PROCEDURE — 11000001 HC ACUTE MED/SURG PRIVATE ROOM

## 2025-04-26 PROCEDURE — 25000242 PHARM REV CODE 250 ALT 637 W/ HCPCS: Performed by: INTERNAL MEDICINE

## 2025-04-26 PROCEDURE — 99233 SBSQ HOSP IP/OBS HIGH 50: CPT | Mod: ,,, | Performed by: STUDENT IN AN ORGANIZED HEALTH CARE EDUCATION/TRAINING PROGRAM

## 2025-04-26 RX ORDER — PREGABALIN 75 MG/1
75 CAPSULE ORAL 2 TIMES DAILY
Status: DISCONTINUED | OUTPATIENT
Start: 2025-04-26 | End: 2025-04-30 | Stop reason: HOSPADM

## 2025-04-26 RX ADMIN — ONDANSETRON 4 MG: 2 INJECTION INTRAMUSCULAR; INTRAVENOUS at 11:04

## 2025-04-26 RX ADMIN — LEVALBUTEROL HYDROCHLORIDE 1.25 MG: 1.25 SOLUTION RESPIRATORY (INHALATION) at 07:04

## 2025-04-26 RX ADMIN — LEVALBUTEROL HYDROCHLORIDE 1.25 MG: 1.25 SOLUTION RESPIRATORY (INHALATION) at 01:04

## 2025-04-26 RX ADMIN — PREGABALIN 75 MG: 75 CAPSULE ORAL at 10:04

## 2025-04-26 RX ADMIN — PANTOPRAZOLE SODIUM 40 MG: 40 INJECTION, POWDER, FOR SOLUTION INTRAVENOUS at 09:04

## 2025-04-26 RX ADMIN — ATORVASTATIN CALCIUM 20 MG: 20 TABLET, FILM COATED ORAL at 10:04

## 2025-04-26 RX ADMIN — ACETYLCYSTEINE 2 ML: 200 SOLUTION ORAL; RESPIRATORY (INHALATION) at 08:04

## 2025-04-26 RX ADMIN — ACETAMINOPHEN 1000 MG: 500 TABLET ORAL at 12:04

## 2025-04-26 RX ADMIN — ACETYLCYSTEINE 2 ML: 200 SOLUTION ORAL; RESPIRATORY (INHALATION) at 07:04

## 2025-04-26 RX ADMIN — MIDODRINE HYDROCHLORIDE 10 MG: 10 TABLET ORAL at 10:04

## 2025-04-26 RX ADMIN — ACETAMINOPHEN 1000 MG: 500 TABLET ORAL at 10:04

## 2025-04-26 RX ADMIN — SERTRALINE HYDROCHLORIDE 25 MG: 25 TABLET ORAL at 09:04

## 2025-04-26 RX ADMIN — ACETAMINOPHEN 1000 MG: 500 TABLET ORAL at 01:04

## 2025-04-26 RX ADMIN — ENOXAPARIN SODIUM 40 MG: 40 INJECTION SUBCUTANEOUS at 04:04

## 2025-04-26 RX ADMIN — COLLAGENASE SANTYL: 250 OINTMENT TOPICAL at 09:04

## 2025-04-26 RX ADMIN — LEVALBUTEROL HYDROCHLORIDE 1.25 MG: 1.25 SOLUTION RESPIRATORY (INHALATION) at 08:04

## 2025-04-26 RX ADMIN — MIDODRINE HYDROCHLORIDE 10 MG: 10 TABLET ORAL at 09:04

## 2025-04-26 RX ADMIN — ACETYLCYSTEINE 2 ML: 200 SOLUTION ORAL; RESPIRATORY (INHALATION) at 01:04

## 2025-04-26 RX ADMIN — MIDODRINE HYDROCHLORIDE 10 MG: 10 TABLET ORAL at 03:04

## 2025-04-26 NOTE — PROGRESS NOTES
[facility] - [address] [place] [address] Medicine  Progress Note    Patient Name: Karie Denney  MRN: [id number]  Patient Class: IP- Inpatient   Admission Date: 3/23/2025  Length of Stay: 34 days  Attending Physician: Mesfin Rivera DO  Primary Care Provider: Gonzalo Barrera NP        Subjective     Principal Problem:Acute on chronic respiratory failure with hypoxia        HPI:  Chief Complaint  Transfer for continued management of respiratory failure, tracheostomy care, PEG feeding, and complications of quadriplegia following prolonged hospitalization.    History of Present Illness  Ms. Karie Denney is a 59-year-old woman with a complex medical history including quadriplegia following spinal surgery in 2015 and a cerebrovascular accident (CVA) in 2024 resulting in left-sided paralysis. She was transferred to [facility] from [facility] in [place], [place], for ongoing care following a prolonged ICU course involving intubation, respiratory failure, and significant complications.  She was initially hospitalized on February 15, 2025, for aspiration and dysphagia evaluation, during which she developed aspiration pneumonia and respiratory failure requiring intubation. Her hospital course was complicated by an ESBL E. coli UTI, pericardial effusion (treated with colchicine), and a spontaneous right thigh hematoma concerning for compartment syndrome, leading to transfer to [facility]. She required prolonged mechanical ventilation, tracheostomy placement, and PEG tube insertion.  She has now returned to [facility] for continued respiratory care, tracheostomy management, PEG feeding, management of sacral pressure injury, and rehabilitation planning.    Past Medical History  Quadriplegia post-spinal surgery (2015)  CVA (2024)  Aspiration pneumonia  Acute respiratory failure  UTI (ESBL E. coli)  Depression  GERD  Pharyngoesophageal dysphagia  Pericardial effusion/pericarditis  Chronic  constipation  Pressure ulcer (sacral, unstageable)  Hyperlipidemia  Anemia    Past Surgical History  Spinal surgery (2015)  PEG tube placement (03/11/2025)  Esophageal dilation with biopsy (08/2024)    Medications  Active Medications:  Atorvastatin 20 mg daily  Omeprazole 40 mg daily  Sertraline 50 mg daily  Trazodone 100 mg qHS  Pregabalin 75 mg BID  Hydrocodone-acetaminophen 5-325 mg BID  Lactulose 30 mL daily via PEG  Midodrine 10 mg Q6H via PEG  Levalbuterol 0.63 mg nebulizer Q6H  Polyethylene glycol 17 g daily via PEG  Recent Changes:  Colchicine: Discontinued due to bleeding  Aspirin and ezetimibe: Discontinued    Allergies  Penicillins ? Rash and anaphylaxis    Social History  Never smoker  No alcohol or tobacco use  Lives at home with mother; receives home health weekly  Bedbound, non-ambulatory    Family History  Noncontributory    Review of Systems  Limited due to tracheostomy, quadriplegia, and communication challenges. History obtained from EMR and caregiver.    Physical Examination  General: Chronically ill-appearing woman, alert, tracheostomy in place with T-piece  HEENT: Mild nasal skin necrosis, mucous membranes moist  Eyes: PERRL, EOMI  Neck: Tracheostomy present  CV: RRR, no murmurs  Lungs: Breath sounds diminished at bases; no acute distress; no wheezing  Abdomen: Soft, non-tender, PEG tube in place  Skin: Unstageable sacral ulcer, nasal pressure ulcer  Neuro: Quadriplegia, responsive with eye tracking and blinking  Extremities: RLE hematoma, bilateral edema, no signs of active bleeding    Laboratory Data (Most Recent)  Hgb: 8.5 g/dL  Creatinine: 0.30 mg/dL  Albumin: 3.1 g/dL  WBC: 9.4 K/uL  Ferritin: 265 ng/mL  INR: 0.93  No growth on recent blood and urine cultures    Imaging  CT angio: Large RLE hematoma without active extravasation  Multiple chest X-rays: Stable bilateral pleural effusions, improving atelectasis  Echo: EF 55-60%, small pericardial effusion      Overview/Hospital  Course:  3/26  - continues on vanc for blood cultures + on 3/24, echo completed at time with no endocarditis seen  - discussed with pulmonary, recs to follow    3/27  - due to overall baseline health will treat coag negative staph bacteremia as a true infection, for now continue vanc and follow cultures and sensitivities  - discussed with pulmonology who plan to continue trach collar as is and see patient May 1st at 1:40 pm, confirmed appointment  - discussed with family at bedside who are very concerned about secretions and states they don't have the suction capabilities at home and do not feel comfortable going home with her in this state, will discuss with social on possible home equipment    3/28  - awaiting micro, continuing vanc  - social setting up suction at home, family plans to return home as before    3/29  - still with secretions  - family requesting voice box    3/30  - doing well today, mentation much improved  - anticipate discharge within the next two days with home health services, family will need education on trach maintenance and home feedings as patient has PEG tube and they have not cared for a PEG tube before, also we will discuss with  getting a speaking told to use with a trach    3/31  - secretions improved today  - working with social for home set up of oxygen and tube feeds  - family needs heavy education on trach care and tube feeds as primary care giver has no experience with either, still wishes to discharge home  4/23 long discussion with mother today.  Open to other facilities if needed for trach weaning.  She apparently just changed over to traditional Medicare.  If this is the case we can take her to specialty  4/24 plan to discharge to specialty once insurance verified to be traditional Medicare  4/25 more awake today after stopping trazodone  4/26 further discussion with family today about placement    Interval History:  No acute events overnight    Review of  "Systems  Objective:     Vital Signs (Most Recent):  Temp: 97.5 °F (36.4 °C) (04/26/25 0741)  Pulse: 83 (04/26/25 0741)  Resp: 18 (04/26/25 0740)  BP: 129/82 (04/26/25 0741)  SpO2: 100 % (04/26/25 0741) Vital Signs (24h Range):  Temp:  [97.5 °F (36.4 °C)-98.6 °F (37 °C)] 97.5 °F (36.4 °C)  Pulse:  [] 83  Resp:  [17-19] 18  SpO2:  [95 %-100 %] 100 %  BP: (117-133)/(78-84) 129/82     Weight: 81 kg (178 lb 9.2 oz)  Body mass index is 32.66 kg/m².  No intake or output data in the 24 hours ending 04/26/25 0917          Physical Exam  Constitutional:       General: She is not in acute distress.     Appearance: She is not ill-appearing.   HENT:      Head: Normocephalic and atraumatic.      Nose: Nose normal.      Mouth/Throat:      Mouth: Mucous membranes are moist.      Pharynx: Oropharynx is clear.   Eyes:      Extraocular Movements: Extraocular movements intact.      Conjunctiva/sclera: Conjunctivae normal.      Pupils: Pupils are equal, round, and reactive to light.   Cardiovascular:      Rate and Rhythm: Normal rate and regular rhythm.   Pulmonary:      Effort: Pulmonary effort is normal. No respiratory distress.      Comments: Secretions  Abdominal:      General: There is no distension.      Palpations: Abdomen is soft.      Tenderness: There is no abdominal tenderness.   Skin:     General: Skin is warm and dry.   Neurological:      Mental Status: She is alert. Mental status is at baseline.      Comments: Quadriplegia    Psychiatric:         Mood and Affect: Mood normal.         Behavior: Behavior normal.               Significant Labs: All pertinent labs within the past 24 hours have been reviewed.  BMP:   No results for input(s): "GLU", "NA", "K", "CL", "CO2", "BUN", "CREATININE", "CALCIUM", "MG" in the last 48 hours.    CBC: No results for input(s): "WBC", "HGB", "HCT", "PLT" in the last 48 hours.    Significant Imaging: I have reviewed all pertinent imaging results/findings within the past 24 " hours.      Assessment & Plan  Severe protein-calorie malnutrition  Nutrition consulted. Most recent weight and BMI monitored-     Measurements:  Wt Readings from Last 1 Encounters:   04/23/25 81 kg (178 lb 9.2 oz)   Body mass index is 32.66 kg/m².    Patient has been screened and assessed by RD.    Malnutrition Type:  Context: chronic illness  Level: severe    Continue PEG tube feeding.  Nepro at 40 mL/hr  Maintain bowel regimen (lactulose, PEG, senna)  Dietitian to reassess  Recommend continue current POC as tolerated.  Continue tube feeds  Continue tube feeds  Dysphagia  With PEG in place. Continue tube feeds.   Continue omeprazole  Avoid PO intake  Speech therapy if clinically appropriate  Continue tube feeds  Continue tube feeds  Pressure ulcers of skin of multiple topographic sites  Aquacel Ag + Mepilex dressings  Wound care team consulted    Continue offloading and specialty mattress  Wound Care  Continue wound care  Wound care  Depression  Patient has persistent depression which is unknown and is currently controlled. Will Continue anti-depressant medications. We will not consult psychiatry at this time. Patient does not display psychosis at this time. Continue to monitor closely and adjust plan of care as needed.      4/22 - Will increase sertraline to 100.   4/23 stable  Stable  Stable      S/P percutaneous endoscopic gastrostomy (PEG) tube placement  Patient noted to have a percutaneous endoscopic gastrostomy tube in place. I have personally inspected the tube.Tube was placed prior to this admission There are no signs of drainage or infection around the site. The tube is patent. Medications have converted to liquid form if available.  Routine care to be done by wound care and nursing staff.       Quadriplegia  Maximal support, bedbound  PT/OT as tolerated  DME planning for discharge  Trach wean as able  Hematoma of lower extremity, right, subsequent encounter  No active bleeding; conservative  management  Monitor H/H  No anticoagulation  Hyperlipidemia  Continue atorvastatin    Chronic respiratory failure with hypoxia  Patient with Hypoxic Respiratory failure which is Acute on chronic.  she is not on home oxygen. Supplemental oxygen was provided and noted- Oxygen Concentration (%):  [28] 28    .   Signs/symptoms of respiratory failure include- increased work of breathing and respiratory distress. Contributing diagnoses includes - Aspiration and Pneumonia Labs and images were reviewed. Patient Has not had a recent ABG. Will treat underlying causes and adjust management of respiratory failure as follows-   Appreciate pulmonology assistance  Appreciate pulmonology  Appreciate pulmonology  Pulmonology managing trach  Coag negative Staphylococcus bacteremia  Resolved. Treatment with vancomycin completed 4/12.     No endocarditis noted on echocardiogram.     Tracheitis  Antibiotics (From admission, onward)      None            4/18 - complete abx this weekend.   4/20 - d/c abx  VTE Risk Mitigation (From admission, onward)           Ordered     enoxaparin injection 40 mg  Daily         03/23/25 1940     IP VTE HIGH RISK PATIENT  Once         03/23/25 1940     Place sequential compression device  Until discontinued         03/23/25 1941                    Discharge Planning   SHRADDHA: 5/2/2025     Code Status: Full Code   Medical Readiness for Discharge Date:   Discharge Plan A: Home with family, Home Health          51 minutes spent on face to face patient interaction, discussion with family, ancillary staff, and/or other physicians as well as review of pertinent labs, images, and notes.                 Mesfin Rivera DO  Department of Hospital Medicine   [facility] - [address] [place] [address]

## 2025-04-26 NOTE — PLAN OF CARE
SW spoke with pt's mother regarding the family's choice for placement.  Per mother, she has not yet spoken with pt's children and therefore, a choice was not given.  SS following.

## 2025-04-26 NOTE — SUBJECTIVE & OBJECTIVE
"Interval History:  No acute events overnight    Review of Systems  Objective:     Vital Signs (Most Recent):  Temp: 97.5 °F (36.4 °C) (04/26/25 0741)  Pulse: 83 (04/26/25 0741)  Resp: 18 (04/26/25 0740)  BP: 129/82 (04/26/25 0741)  SpO2: 100 % (04/26/25 0741) Vital Signs (24h Range):  Temp:  [97.5 °F (36.4 °C)-98.6 °F (37 °C)] 97.5 °F (36.4 °C)  Pulse:  [] 83  Resp:  [17-19] 18  SpO2:  [95 %-100 %] 100 %  BP: (117-133)/(78-84) 129/82     Weight: 81 kg (178 lb 9.2 oz)  Body mass index is 32.66 kg/m².  No intake or output data in the 24 hours ending 04/26/25 0917          Physical Exam  Constitutional:       General: She is not in acute distress.     Appearance: She is not ill-appearing.   HENT:      Head: Normocephalic and atraumatic.      Nose: Nose normal.      Mouth/Throat:      Mouth: Mucous membranes are moist.      Pharynx: Oropharynx is clear.   Eyes:      Extraocular Movements: Extraocular movements intact.      Conjunctiva/sclera: Conjunctivae normal.      Pupils: Pupils are equal, round, and reactive to light.   Cardiovascular:      Rate and Rhythm: Normal rate and regular rhythm.   Pulmonary:      Effort: Pulmonary effort is normal. No respiratory distress.      Comments: Secretions  Abdominal:      General: There is no distension.      Palpations: Abdomen is soft.      Tenderness: There is no abdominal tenderness.   Skin:     General: Skin is warm and dry.   Neurological:      Mental Status: She is alert. Mental status is at baseline.      Comments: Quadriplegia    Psychiatric:         Mood and Affect: Mood normal.         Behavior: Behavior normal.               Significant Labs: All pertinent labs within the past 24 hours have been reviewed.  BMP:   No results for input(s): "GLU", "NA", "K", "CL", "CO2", "BUN", "CREATININE", "CALCIUM", "MG" in the last 48 hours.    CBC: No results for input(s): "WBC", "HGB", "HCT", "PLT" in the last 48 hours.    Significant Imaging: I have reviewed all pertinent " imaging results/findings within the past 24 hours.

## 2025-04-26 NOTE — ASSESSMENT & PLAN NOTE
With PEG in place. Continue tube feeds.   Continue omeprazole  Avoid PO intake  Speech therapy if clinically appropriate  Continue tube feeds  Continue tube feeds

## 2025-04-26 NOTE — ASSESSMENT & PLAN NOTE
Aquacel Ag + Mepilex dressings  Wound care team consulted    Continue offloading and specialty mattress  Wound Care  Continue wound care  Wound care

## 2025-04-26 NOTE — ASSESSMENT & PLAN NOTE
Patient with Hypoxic Respiratory failure which is Acute on chronic.  she is not on home oxygen. Supplemental oxygen was provided and noted- Oxygen Concentration (%):  [28] 28    .   Signs/symptoms of respiratory failure include- increased work of breathing and respiratory distress. Contributing diagnoses includes - Aspiration and Pneumonia Labs and images were reviewed. Patient Has not had a recent ABG. Will treat underlying causes and adjust management of respiratory failure as follows-   Appreciate pulmonology assistance  Appreciate pulmonology  Appreciate pulmonology  Pulmonology managing trach

## 2025-04-27 PROCEDURE — 99233 SBSQ HOSP IP/OBS HIGH 50: CPT | Mod: ,,, | Performed by: STUDENT IN AN ORGANIZED HEALTH CARE EDUCATION/TRAINING PROGRAM

## 2025-04-27 PROCEDURE — 25000003 PHARM REV CODE 250: Performed by: STUDENT IN AN ORGANIZED HEALTH CARE EDUCATION/TRAINING PROGRAM

## 2025-04-27 PROCEDURE — 94761 N-INVAS EAR/PLS OXIMETRY MLT: CPT

## 2025-04-27 PROCEDURE — 99900026 HC AIRWAY MAINTENANCE (STAT)

## 2025-04-27 PROCEDURE — 94640 AIRWAY INHALATION TREATMENT: CPT

## 2025-04-27 PROCEDURE — 27000221 HC OXYGEN, UP TO 24 HOURS

## 2025-04-27 PROCEDURE — 63600175 PHARM REV CODE 636 W HCPCS: Performed by: HOSPITALIST

## 2025-04-27 PROCEDURE — 27000207 HC ISOLATION

## 2025-04-27 PROCEDURE — 11000001 HC ACUTE MED/SURG PRIVATE ROOM

## 2025-04-27 PROCEDURE — 25000242 PHARM REV CODE 250 ALT 637 W/ HCPCS: Performed by: FAMILY MEDICINE

## 2025-04-27 PROCEDURE — 99900035 HC TECH TIME PER 15 MIN (STAT)

## 2025-04-27 PROCEDURE — 25000242 PHARM REV CODE 250 ALT 637 W/ HCPCS: Performed by: INTERNAL MEDICINE

## 2025-04-27 PROCEDURE — 25000003 PHARM REV CODE 250: Performed by: HOSPITALIST

## 2025-04-27 RX ADMIN — ACETYLCYSTEINE 2 ML: 200 SOLUTION ORAL; RESPIRATORY (INHALATION) at 08:04

## 2025-04-27 RX ADMIN — PREGABALIN 75 MG: 75 CAPSULE ORAL at 09:04

## 2025-04-27 RX ADMIN — MIDODRINE HYDROCHLORIDE 10 MG: 10 TABLET ORAL at 09:04

## 2025-04-27 RX ADMIN — COLLAGENASE SANTYL: 250 OINTMENT TOPICAL at 12:04

## 2025-04-27 RX ADMIN — ACETYLCYSTEINE 2 ML: 200 SOLUTION ORAL; RESPIRATORY (INHALATION) at 01:04

## 2025-04-27 RX ADMIN — MIDODRINE HYDROCHLORIDE 10 MG: 10 TABLET ORAL at 04:04

## 2025-04-27 RX ADMIN — SERTRALINE HYDROCHLORIDE 25 MG: 25 TABLET ORAL at 09:04

## 2025-04-27 RX ADMIN — ACETYLCYSTEINE 2 ML: 200 SOLUTION ORAL; RESPIRATORY (INHALATION) at 07:04

## 2025-04-27 RX ADMIN — ENOXAPARIN SODIUM 40 MG: 40 INJECTION SUBCUTANEOUS at 04:04

## 2025-04-27 RX ADMIN — LEVALBUTEROL HYDROCHLORIDE 1.25 MG: 1.25 SOLUTION RESPIRATORY (INHALATION) at 07:04

## 2025-04-27 RX ADMIN — ATORVASTATIN CALCIUM 20 MG: 20 TABLET, FILM COATED ORAL at 09:04

## 2025-04-27 RX ADMIN — LEVALBUTEROL HYDROCHLORIDE 1.25 MG: 1.25 SOLUTION RESPIRATORY (INHALATION) at 08:04

## 2025-04-27 RX ADMIN — LEVALBUTEROL HYDROCHLORIDE 1.25 MG: 1.25 SOLUTION RESPIRATORY (INHALATION) at 01:04

## 2025-04-27 RX ADMIN — ACETAMINOPHEN 1000 MG: 500 TABLET ORAL at 09:04

## 2025-04-27 RX ADMIN — PANTOPRAZOLE SODIUM 40 MG: 40 INJECTION, POWDER, FOR SOLUTION INTRAVENOUS at 09:04

## 2025-04-27 NOTE — ASSESSMENT & PLAN NOTE
No active bleeding; conservative management  Monitor H/H  No anticoagulation  No evidence of bleeding

## 2025-04-27 NOTE — ASSESSMENT & PLAN NOTE
Nutrition consulted. Most recent weight and BMI monitored-     Measurements:  Wt Readings from Last 1 Encounters:   04/23/25 81 kg (178 lb 9.2 oz)   Body mass index is 32.66 kg/m².    Patient has been screened and assessed by RD.    Malnutrition Type:  Context: chronic illness  Level: severe    Continue PEG tube feeding.  Nepro at 40 mL/hr  Maintain bowel regimen (lactulose, PEG, senna)  Dietitian to reassess  Recommend continue current POC as tolerated.  Continue tube feeds  Continue tube feeds  Continue tube feeds

## 2025-04-27 NOTE — PLAN OF CARE
Problem: Adult Inpatient Plan of Care  Goal: Plan of Care Review  Outcome: Progressing  Goal: Patient-Specific Goal (Individualized)  Outcome: Progressing  Goal: Absence of Hospital-Acquired Illness or Injury  Outcome: Progressing  Goal: Optimal Comfort and Wellbeing  Outcome: Progressing  Goal: Readiness for Transition of Care  Outcome: Progressing     Problem: Infection  Goal: Absence of Infection Signs and Symptoms  Outcome: Progressing     Problem: Wound  Goal: Optimal Coping  Outcome: Progressing  Goal: Optimal Functional Ability  Outcome: Progressing  Goal: Absence of Infection Signs and Symptoms  Outcome: Progressing  Goal: Improved Oral Intake  Outcome: Progressing  Goal: Optimal Pain Control and Function  Outcome: Progressing  Goal: Skin Health and Integrity  Outcome: Progressing  Goal: Optimal Wound Healing  Outcome: Progressing     Problem: Skin Injury Risk Increased  Goal: Skin Health and Integrity  Outcome: Progressing     Problem: Airway Clearance Ineffective  Goal: Effective Airway Clearance  Outcome: Progressing     Problem: Gas Exchange Impaired  Goal: Optimal Gas Exchange  Outcome: Progressing     Problem: Fall Injury Risk  Goal: Absence of Fall and Fall-Related Injury  Outcome: Progressing      permanent icd implantation

## 2025-04-27 NOTE — PROGRESS NOTES
[facility] - [address] [place] [address] Medicine  Progress Note    Patient Name: Karie Denney  MRN: [id number]  Patient Class: IP- Inpatient   Admission Date: 3/23/2025  Length of Stay: 35 days  Attending Physician: Mesfin Rivera DO  Primary Care Provider: Gonzalo Barrera NP        Subjective     Principal Problem:Acute on chronic respiratory failure with hypoxia        HPI:  Chief Complaint  Transfer for continued management of respiratory failure, tracheostomy care, PEG feeding, and complications of quadriplegia following prolonged hospitalization.    History of Present Illness  Ms. Karie Denney is a 59-year-old woman with a complex medical history including quadriplegia following spinal surgery in 2015 and a cerebrovascular accident (CVA) in 2024 resulting in left-sided paralysis. She was transferred to [facility] from [facility] [place] in [place], [place], for ongoing care following a prolonged ICU course involving intubation, respiratory failure, and significant complications.  She was initially hospitalized on February 15, 2025, for aspiration and dysphagia evaluation, during which she developed aspiration pneumonia and respiratory failure requiring intubation. Her hospital course was complicated by an ESBL E. coli UTI, pericardial effusion (treated with colchicine), and a spontaneous right thigh hematoma concerning for compartment syndrome, leading to transfer to [facility] [place]. She required prolonged mechanical ventilation, tracheostomy placement, and PEG tube insertion.  She has now returned to [facility] for continued respiratory care, tracheostomy management, PEG feeding, management of sacral pressure injury, and rehabilitation planning.    Past Medical History  Quadriplegia post-spinal surgery (2015)  CVA (2024)  Aspiration pneumonia  Acute respiratory failure  UTI (ESBL E. coli)  Depression  GERD  Pharyngoesophageal dysphagia  Pericardial effusion/pericarditis  Chronic  constipation  Pressure ulcer (sacral, unstageable)  Hyperlipidemia  Anemia    Past Surgical History  Spinal surgery (2015)  PEG tube placement (03/11/2025)  Esophageal dilation with biopsy (08/2024)    Medications  Active Medications:  Atorvastatin 20 mg daily  Omeprazole 40 mg daily  Sertraline 50 mg daily  Trazodone 100 mg qHS  Pregabalin 75 mg BID  Hydrocodone-acetaminophen 5-325 mg BID  Lactulose 30 mL daily via PEG  Midodrine 10 mg Q6H via PEG  Levalbuterol 0.63 mg nebulizer Q6H  Polyethylene glycol 17 g daily via PEG  Recent Changes:  Colchicine: Discontinued due to bleeding  Aspirin and ezetimibe: Discontinued    Allergies  Penicillins ? Rash and anaphylaxis    Social History  Never smoker  No alcohol or tobacco use  Lives at home with mother; receives home health weekly  Bedbound, non-ambulatory    Family History  Noncontributory    Review of Systems  Limited due to tracheostomy, quadriplegia, and communication challenges. History obtained from EMR and caregiver.    Physical Examination  General: Chronically ill-appearing woman, alert, tracheostomy in place with T-piece  HEENT: Mild nasal skin necrosis, mucous membranes moist  Eyes: PERRL, EOMI  Neck: Tracheostomy present  CV: RRR, no murmurs  Lungs: Breath sounds diminished at bases; no acute distress; no wheezing  Abdomen: Soft, non-tender, PEG tube in place  Skin: Unstageable sacral ulcer, nasal pressure ulcer  Neuro: Quadriplegia, responsive with eye tracking and blinking  Extremities: RLE hematoma, bilateral edema, no signs of active bleeding    Laboratory Data (Most Recent)  Hgb: 8.5 g/dL  Creatinine: 0.30 mg/dL  Albumin: 3.1 g/dL  WBC: 9.4 K/uL  Ferritin: 265 ng/mL  INR: 0.93  No growth on recent blood and urine cultures    Imaging  CT angio: Large RLE hematoma without active extravasation  Multiple chest X-rays: Stable bilateral pleural effusions, improving atelectasis  Echo: EF 55-60%, small pericardial effusion      Overview/Hospital  Course:  3/26  - continues on vanc for blood cultures + on 3/24, echo completed at time with no endocarditis seen  - discussed with pulmonary, recs to follow    3/27  - due to overall baseline health will treat coag negative staph bacteremia as a true infection, for now continue vanc and follow cultures and sensitivities  - discussed with pulmonology who plan to continue trach collar as is and see patient May 1st at 1:40 pm, confirmed appointment  - discussed with family at bedside who are very concerned about secretions and states they don't have the suction capabilities at home and do not feel comfortable going home with her in this state, will discuss with social on possible home equipment    3/28  - awaiting micro, continuing vanc  - social setting up suction at home, family plans to return home as before    3/29  - still with secretions  - family requesting voice box    3/30  - doing well today, mentation much improved  - anticipate discharge within the next two days with home health services, family will need education on trach maintenance and home feedings as patient has PEG tube and they have not cared for a PEG tube before, also we will discuss with  getting a speaking told to use with a trach    3/31  - secretions improved today  - working with social for home set up of oxygen and tube feeds  - family needs heavy education on trach care and tube feeds as primary care giver has no experience with either, still wishes to discharge home  4/23 long discussion with mother today.  Open to other facilities if needed for trach weaning.  She apparently just changed over to traditional Medicare.  If this is the case we can take her to specialty  4/24 plan to discharge to specialty once insurance verified to be traditional Medicare  4/25 more awake today after stopping trazodone  4/26 further discussion with family today about placement  4/27 mom planning to speak with children about placement  "today    Interval History:  No acute events overnight    Review of Systems  Objective:     Vital Signs (Most Recent):  Temp: 98.4 °F (36.9 °C) (04/27/25 0739)  Pulse: 91 (04/27/25 0739)  Resp: 16 (04/27/25 0715)  BP: 116/73 (04/27/25 0739)  SpO2: 99 % (04/27/25 0739) Vital Signs (24h Range):  Temp:  [97.4 °F (36.3 °C)-98.4 °F (36.9 °C)] 98.4 °F (36.9 °C)  Pulse:  [82-97] 91  Resp:  [16-18] 16  SpO2:  [97 %-100 %] 99 %  BP: (116-137)/(73-87) 116/73     Weight: 81 kg (178 lb 9.2 oz)  Body mass index is 32.66 kg/m².  No intake or output data in the 24 hours ending 04/27/25 1026          Physical Exam  Constitutional:       General: She is not in acute distress.     Appearance: She is not ill-appearing.   HENT:      Head: Normocephalic and atraumatic.      Nose: Nose normal.      Mouth/Throat:      Mouth: Mucous membranes are moist.      Pharynx: Oropharynx is clear.   Eyes:      Extraocular Movements: Extraocular movements intact.      Conjunctiva/sclera: Conjunctivae normal.      Pupils: Pupils are equal, round, and reactive to light.   Cardiovascular:      Rate and Rhythm: Normal rate and regular rhythm.   Pulmonary:      Effort: Pulmonary effort is normal. No respiratory distress.      Comments: Secretions  Abdominal:      General: There is no distension.      Palpations: Abdomen is soft.      Tenderness: There is no abdominal tenderness.   Skin:     General: Skin is warm and dry.   Neurological:      Mental Status: She is alert. Mental status is at baseline.      Comments: Quadriplegia    Psychiatric:         Mood and Affect: Mood normal.         Behavior: Behavior normal.               Significant Labs: All pertinent labs within the past 24 hours have been reviewed.  BMP:   No results for input(s): "GLU", "NA", "K", "CL", "CO2", "BUN", "CREATININE", "CALCIUM", "MG" in the last 48 hours.    CBC: No results for input(s): "WBC", "HGB", "HCT", "PLT" in the last 48 hours.    Significant Imaging: I have reviewed all " pertinent imaging results/findings within the past 24 hours.      Assessment & Plan  Severe protein-calorie malnutrition  Nutrition consulted. Most recent weight and BMI monitored-     Measurements:  Wt Readings from Last 1 Encounters:   04/23/25 81 kg (178 lb 9.2 oz)   Body mass index is 32.66 kg/m².    Patient has been screened and assessed by RD.    Malnutrition Type:  Context: chronic illness  Level: severe    Continue PEG tube feeding.  Nepro at 40 mL/hr  Maintain bowel regimen (lactulose, PEG, senna)  Dietitian to reassess  Recommend continue current POC as tolerated.  Continue tube feeds  Continue tube feeds  Continue tube feeds  Dysphagia  With PEG in place. Continue tube feeds.   Continue omeprazole  Avoid PO intake  Speech therapy if clinically appropriate  Continue tube feeds  Continue tube feeds  Pressure ulcers of skin of multiple topographic sites  Aquacel Ag + Mepilex dressings  Wound care team consulted    Continue offloading and specialty mattress  Wound Care  Continue wound care  Wound care  Continue wound care  Depression  Patient has persistent depression which is unknown and is currently controlled. Will Continue anti-depressant medications. We will not consult psychiatry at this time. Patient does not display psychosis at this time. Continue to monitor closely and adjust plan of care as needed.      4/22 - Will increase sertraline to 100.   4/23 stable  Stable  Stable      S/P percutaneous endoscopic gastrostomy (PEG) tube placement  Patient noted to have a percutaneous endoscopic gastrostomy tube in place. I have personally inspected the tube.Tube was placed prior to this admission There are no signs of drainage or infection around the site. The tube is patent. Medications have converted to liquid form if available.  Routine care to be done by wound care and nursing staff.       Quadriplegia  Maximal support, bedbound  PT/OT as tolerated  DME planning for discharge  Trach wean as  able  Hematoma of lower extremity, right, subsequent encounter  No active bleeding; conservative management  Monitor H/H  No anticoagulation  No evidence of bleeding  Hyperlipidemia  Continue atorvastatin    Chronic respiratory failure with hypoxia  Patient with Hypoxic Respiratory failure which is Acute on chronic.  she is not on home oxygen. Supplemental oxygen was provided and noted- Oxygen Concentration (%):  [28-32] 32    .   Signs/symptoms of respiratory failure include- increased work of breathing and respiratory distress. Contributing diagnoses includes - Aspiration and Pneumonia Labs and images were reviewed. Patient Has not had a recent ABG. Will treat underlying causes and adjust management of respiratory failure as follows-   Appreciate pulmonology assistance  Appreciate pulmonology  Appreciate pulmonology  Pulmonology managing trach  Appreciate palm  Coag negative Staphylococcus bacteremia  Resolved. Treatment with vancomycin completed 4/12.     No endocarditis noted on echocardiogram.     Tracheitis  Antibiotics (From admission, onward)      None            4/18 - complete abx this weekend.   4/20 - d/c abx  VTE Risk Mitigation (From admission, onward)           Ordered     enoxaparin injection 40 mg  Daily         03/23/25 1940     IP VTE HIGH RISK PATIENT  Once         03/23/25 1940     Place sequential compression device  Until discontinued         03/23/25 1941                    Discharge Planning   SHRADDHA: 5/2/2025     Code Status: Full Code   Medical Readiness for Discharge Date:   Discharge Plan A: Home with family, Home Health          Lyrica restarted yesterday.  She appears to be tolerating well.  Continue    Long discussion with patient's mother and patient's son and daughter.  They will speak to determine ultimate plan for patient when discharge from the hospital.    51 minutes spent on face to face patient interaction, discussion with family, ancillary staff, and/or other physicians as well  as review of pertinent labs, images, and notes.             Mesfin Rivera DO  Department of Hospital Medicine   [facility] - [address] [place] [address]

## 2025-04-27 NOTE — SUBJECTIVE & OBJECTIVE
"Interval History:  No acute events overnight    Review of Systems  Objective:     Vital Signs (Most Recent):  Temp: 98.4 °F (36.9 °C) (04/27/25 0739)  Pulse: 91 (04/27/25 0739)  Resp: 16 (04/27/25 0715)  BP: 116/73 (04/27/25 0739)  SpO2: 99 % (04/27/25 0739) Vital Signs (24h Range):  Temp:  [97.4 °F (36.3 °C)-98.4 °F (36.9 °C)] 98.4 °F (36.9 °C)  Pulse:  [82-97] 91  Resp:  [16-18] 16  SpO2:  [97 %-100 %] 99 %  BP: (116-137)/(73-87) 116/73     Weight: 81 kg (178 lb 9.2 oz)  Body mass index is 32.66 kg/m².  No intake or output data in the 24 hours ending 04/27/25 1026          Physical Exam  Constitutional:       General: She is not in acute distress.     Appearance: She is not ill-appearing.   HENT:      Head: Normocephalic and atraumatic.      Nose: Nose normal.      Mouth/Throat:      Mouth: Mucous membranes are moist.      Pharynx: Oropharynx is clear.   Eyes:      Extraocular Movements: Extraocular movements intact.      Conjunctiva/sclera: Conjunctivae normal.      Pupils: Pupils are equal, round, and reactive to light.   Cardiovascular:      Rate and Rhythm: Normal rate and regular rhythm.   Pulmonary:      Effort: Pulmonary effort is normal. No respiratory distress.      Comments: Secretions  Abdominal:      General: There is no distension.      Palpations: Abdomen is soft.      Tenderness: There is no abdominal tenderness.   Skin:     General: Skin is warm and dry.   Neurological:      Mental Status: She is alert. Mental status is at baseline.      Comments: Quadriplegia    Psychiatric:         Mood and Affect: Mood normal.         Behavior: Behavior normal.               Significant Labs: All pertinent labs within the past 24 hours have been reviewed.  BMP:   No results for input(s): "GLU", "NA", "K", "CL", "CO2", "BUN", "CREATININE", "CALCIUM", "MG" in the last 48 hours.    CBC: No results for input(s): "WBC", "HGB", "HCT", "PLT" in the last 48 hours.    Significant Imaging: I have reviewed all pertinent " imaging results/findings within the past 24 hours.

## 2025-04-27 NOTE — ASSESSMENT & PLAN NOTE
Aquacel Ag + Mepilex dressings  Wound care team consulted    Continue offloading and specialty mattress  Wound Care  Continue wound care  Wound care  Continue wound care

## 2025-04-27 NOTE — ASSESSMENT & PLAN NOTE
Patient with Hypoxic Respiratory failure which is Acute on chronic.  she is not on home oxygen. Supplemental oxygen was provided and noted- Oxygen Concentration (%):  [28-32] 32    .   Signs/symptoms of respiratory failure include- increased work of breathing and respiratory distress. Contributing diagnoses includes - Aspiration and Pneumonia Labs and images were reviewed. Patient Has not had a recent ABG. Will treat underlying causes and adjust management of respiratory failure as follows-   Appreciate pulmonology assistance  Appreciate pulmonology  Appreciate pulmonology  Pulmonology managing trach  Appreciate palm

## 2025-04-28 PROCEDURE — 27000221 HC OXYGEN, UP TO 24 HOURS

## 2025-04-28 PROCEDURE — 11000001 HC ACUTE MED/SURG PRIVATE ROOM

## 2025-04-28 PROCEDURE — 25000003 PHARM REV CODE 250: Performed by: HOSPITALIST

## 2025-04-28 PROCEDURE — 63600175 PHARM REV CODE 636 W HCPCS: Performed by: HOSPITALIST

## 2025-04-28 PROCEDURE — 99900035 HC TECH TIME PER 15 MIN (STAT)

## 2025-04-28 PROCEDURE — 94761 N-INVAS EAR/PLS OXIMETRY MLT: CPT

## 2025-04-28 PROCEDURE — 27000207 HC ISOLATION

## 2025-04-28 PROCEDURE — 99233 SBSQ HOSP IP/OBS HIGH 50: CPT | Mod: ,,, | Performed by: STUDENT IN AN ORGANIZED HEALTH CARE EDUCATION/TRAINING PROGRAM

## 2025-04-28 PROCEDURE — 94640 AIRWAY INHALATION TREATMENT: CPT

## 2025-04-28 PROCEDURE — 99900026 HC AIRWAY MAINTENANCE (STAT)

## 2025-04-28 PROCEDURE — 25000242 PHARM REV CODE 250 ALT 637 W/ HCPCS: Performed by: FAMILY MEDICINE

## 2025-04-28 PROCEDURE — 25000242 PHARM REV CODE 250 ALT 637 W/ HCPCS: Performed by: INTERNAL MEDICINE

## 2025-04-28 PROCEDURE — 25000003 PHARM REV CODE 250: Performed by: STUDENT IN AN ORGANIZED HEALTH CARE EDUCATION/TRAINING PROGRAM

## 2025-04-28 PROCEDURE — 25000003 PHARM REV CODE 250: Performed by: INTERNAL MEDICINE

## 2025-04-28 RX ADMIN — COLLAGENASE SANTYL: 250 OINTMENT TOPICAL at 09:04

## 2025-04-28 RX ADMIN — ACETYLCYSTEINE 2 ML: 200 SOLUTION ORAL; RESPIRATORY (INHALATION) at 07:04

## 2025-04-28 RX ADMIN — SERTRALINE HYDROCHLORIDE 25 MG: 25 TABLET ORAL at 09:04

## 2025-04-28 RX ADMIN — SCOPOLAMINE 1 PATCH: 1.5 PATCH, EXTENDED RELEASE TRANSDERMAL at 09:04

## 2025-04-28 RX ADMIN — ACETAMINOPHEN 650 MG: 325 TABLET ORAL at 08:04

## 2025-04-28 RX ADMIN — PREGABALIN 75 MG: 75 CAPSULE ORAL at 09:04

## 2025-04-28 RX ADMIN — PANTOPRAZOLE SODIUM 40 MG: 40 INJECTION, POWDER, FOR SOLUTION INTRAVENOUS at 09:04

## 2025-04-28 RX ADMIN — ACETYLCYSTEINE 2 ML: 200 SOLUTION ORAL; RESPIRATORY (INHALATION) at 02:04

## 2025-04-28 RX ADMIN — PREGABALIN 75 MG: 75 CAPSULE ORAL at 08:04

## 2025-04-28 RX ADMIN — LEVALBUTEROL HYDROCHLORIDE 1.25 MG: 1.25 SOLUTION RESPIRATORY (INHALATION) at 02:04

## 2025-04-28 RX ADMIN — MIDODRINE HYDROCHLORIDE 10 MG: 10 TABLET ORAL at 08:04

## 2025-04-28 RX ADMIN — MIDODRINE HYDROCHLORIDE 10 MG: 10 TABLET ORAL at 03:04

## 2025-04-28 RX ADMIN — ATORVASTATIN CALCIUM 20 MG: 20 TABLET, FILM COATED ORAL at 08:04

## 2025-04-28 RX ADMIN — LEVALBUTEROL HYDROCHLORIDE 1.25 MG: 1.25 SOLUTION RESPIRATORY (INHALATION) at 07:04

## 2025-04-28 RX ADMIN — ENOXAPARIN SODIUM 40 MG: 40 INJECTION SUBCUTANEOUS at 04:04

## 2025-04-28 RX ADMIN — Medication 6 MG: at 08:04

## 2025-04-28 RX ADMIN — MIDODRINE HYDROCHLORIDE 10 MG: 10 TABLET ORAL at 09:04

## 2025-04-28 NOTE — PLAN OF CARE
Problem: Adult Inpatient Plan of Care  Goal: Plan of Care Review  Outcome: Progressing  Goal: Patient-Specific Goal (Individualized)  Outcome: Progressing  Goal: Absence of Hospital-Acquired Illness or Injury  Outcome: Progressing  Intervention: Identify and Manage Fall Risk  Flowsheets (Taken 4/28/2025 1302)  Safety Promotion/Fall Prevention:   assistive device/personal item within reach   family to remain at bedside   instructed to call staff for mobility   lighting adjusted   medications reviewed   side rails raised x 2   room near unit station  Intervention: Prevent Skin Injury  Flowsheets (Taken 4/28/2025 1302)  Body Position: turned  Skin Protection: silicone foam dressing in place  Device Skin Pressure Protection: positioning supports utilized  Intervention: Prevent and Manage VTE (Venous Thromboembolism) Risk  Flowsheets (Taken 4/28/2025 1302)  VTE Prevention/Management: remove, assess skin, and reapply sequential compression device  Intervention: Prevent Infection  Flowsheets (Taken 4/28/2025 1302)  Infection Prevention:   cohorting utilized   environmental surveillance performed   equipment surfaces disinfected   personal protective equipment utilized   hand hygiene promoted   rest/sleep promoted   single patient room provided   visitors restricted/screened  Goal: Optimal Comfort and Wellbeing  Outcome: Progressing  Intervention: Monitor Pain and Promote Comfort  Flowsheets (Taken 4/28/2025 1302)  Pain Management Interventions:   pillow support provided   care clustered  Intervention: Provide Person-Centered Care  Flowsheets (Taken 4/28/2025 1302)  Trust Relationship/Rapport:   care explained   choices provided   thoughts/feelings acknowledged  Goal: Readiness for Transition of Care  Outcome: Progressing     Problem: Infection  Goal: Absence of Infection Signs and Symptoms  Outcome: Progressing  Intervention: Prevent or Manage Infection  Flowsheets (Taken 4/28/2025 1302)  Infection Management: aseptic  technique maintained  Isolation Precautions:   precautions maintained   contact     Problem: Wound  Goal: Optimal Coping  Outcome: Progressing  Goal: Optimal Functional Ability  Outcome: Progressing  Goal: Absence of Infection Signs and Symptoms  Outcome: Progressing  Goal: Improved Oral Intake  Outcome: Progressing  Goal: Optimal Pain Control and Function  Outcome: Progressing  Goal: Skin Health and Integrity  Outcome: Progressing  Goal: Optimal Wound Healing  Outcome: Progressing     Problem: Skin Injury Risk Increased  Goal: Skin Health and Integrity  Outcome: Progressing     Problem: Gas Exchange Impaired  Goal: Optimal Gas Exchange  Outcome: Progressing     Problem: Airway Clearance Ineffective  Goal: Effective Airway Clearance  Outcome: Progressing     Problem: Fall Injury Risk  Goal: Absence of Fall and Fall-Related Injury  Outcome: Progressing

## 2025-04-28 NOTE — ASSESSMENT & PLAN NOTE
Patient with Hypoxic Respiratory failure which is Acute on chronic.  she is not on home oxygen. Supplemental oxygen was provided and noted- Oxygen Concentration (%):  [28] 28    .   Signs/symptoms of respiratory failure include- increased work of breathing and respiratory distress. Contributing diagnoses includes - Aspiration and Pneumonia Labs and images were reviewed. Patient Has not had a recent ABG. Will treat underlying causes and adjust management of respiratory failure as follows-   Appreciate pulmonology assistance  Appreciate pulmonology  Appreciate pulmonology  Pulmonology managing trach  Appreciate palm

## 2025-04-28 NOTE — ASSESSMENT & PLAN NOTE
Patient has persistent depression which is unknown and is currently controlled. Will Continue anti-depressant medications. We will not consult psychiatry at this time. Patient does not display psychosis at this time. Continue to monitor closely and adjust plan of care as needed.      4/22 - Will increase sertraline to 100.   4/23 stable  Stable  Stable  Continue sertraline

## 2025-04-28 NOTE — ASSESSMENT & PLAN NOTE
Aquacel Ag + Mepilex dressings  Wound care team consulted    Continue offloading and specialty mattress  Wound Care  Continue wound care  Wound care  Continue wound care  Continue wound care

## 2025-04-28 NOTE — PROGRESS NOTES
[facility] - [address] [place] [address] Medicine  Progress Note    Patient Name: Karie Denney  MRN: [id number]  Patient Class: IP- Inpatient   Admission Date: 3/23/2025  Length of Stay: 36 days  Attending Physician: Mesfin Rivera DO  Primary Care Provider: Gonzalo Barrera NP        Subjective     Principal Problem:Acute on chronic respiratory failure with hypoxia        HPI:  Chief Complaint  Transfer for continued management of respiratory failure, tracheostomy care, PEG feeding, and complications of quadriplegia following prolonged hospitalization.    History of Present Illness  Ms. Karie Denney is a 59-year-old woman with a complex medical history including quadriplegia following spinal surgery in 2015 and a cerebrovascular accident (CVA) in 2024 resulting in left-sided paralysis. She was transferred to [facility] from [facility] in [place], [place], for ongoing care following a prolonged ICU course involving intubation, respiratory failure, and significant complications.  She was initially hospitalized on February 15, 2025, for aspiration and dysphagia evaluation, during which she developed aspiration pneumonia and respiratory failure requiring intubation. Her hospital course was complicated by an ESBL E. coli UTI, pericardial effusion (treated with colchicine), and a spontaneous right thigh hematoma concerning for compartment syndrome, leading to transfer to [facility]. She required prolonged mechanical ventilation, tracheostomy placement, and PEG tube insertion.  She has now returned to [facility] for continued respiratory care, tracheostomy management, PEG feeding, management of sacral pressure injury, and rehabilitation planning.    Past Medical History  Quadriplegia post-spinal surgery (2015)  CVA (2024)  Aspiration pneumonia  Acute respiratory failure  UTI (ESBL E. coli)  Depression  GERD  Pharyngoesophageal dysphagia  Pericardial effusion/pericarditis  Chronic  constipation  Pressure ulcer (sacral, unstageable)  Hyperlipidemia  Anemia    Past Surgical History  Spinal surgery (2015)  PEG tube placement (03/11/2025)  Esophageal dilation with biopsy (08/2024)    Medications  Active Medications:  Atorvastatin 20 mg daily  Omeprazole 40 mg daily  Sertraline 50 mg daily  Trazodone 100 mg qHS  Pregabalin 75 mg BID  Hydrocodone-acetaminophen 5-325 mg BID  Lactulose 30 mL daily via PEG  Midodrine 10 mg Q6H via PEG  Levalbuterol 0.63 mg nebulizer Q6H  Polyethylene glycol 17 g daily via PEG  Recent Changes:  Colchicine: Discontinued due to bleeding  Aspirin and ezetimibe: Discontinued    Allergies  Penicillins ? Rash and anaphylaxis    Social History  Never smoker  No alcohol or tobacco use  Lives at home with mother; receives home health weekly  Bedbound, non-ambulatory    Family History  Noncontributory    Review of Systems  Limited due to tracheostomy, quadriplegia, and communication challenges. History obtained from EMR and caregiver.    Physical Examination  General: Chronically ill-appearing woman, alert, tracheostomy in place with T-piece  HEENT: Mild nasal skin necrosis, mucous membranes moist  Eyes: PERRL, EOMI  Neck: Tracheostomy present  CV: RRR, no murmurs  Lungs: Breath sounds diminished at bases; no acute distress; no wheezing  Abdomen: Soft, non-tender, PEG tube in place  Skin: Unstageable sacral ulcer, nasal pressure ulcer  Neuro: Quadriplegia, responsive with eye tracking and blinking  Extremities: RLE hematoma, bilateral edema, no signs of active bleeding    Laboratory Data (Most Recent)  Hgb: 8.5 g/dL  Creatinine: 0.30 mg/dL  Albumin: 3.1 g/dL  WBC: 9.4 K/uL  Ferritin: 265 ng/mL  INR: 0.93  No growth on recent blood and urine cultures    Imaging  CT angio: Large RLE hematoma without active extravasation  Multiple chest X-rays: Stable bilateral pleural effusions, improving atelectasis  Echo: EF 55-60%, small pericardial effusion      Overview/Hospital  Course:  3/26  - continues on vanc for blood cultures + on 3/24, echo completed at time with no endocarditis seen  - discussed with pulmonary, recs to follow    3/27  - due to overall baseline health will treat coag negative staph bacteremia as a true infection, for now continue vanc and follow cultures and sensitivities  - discussed with pulmonology who plan to continue trach collar as is and see patient May 1st at 1:40 pm, confirmed appointment  - discussed with family at bedside who are very concerned about secretions and states they don't have the suction capabilities at home and do not feel comfortable going home with her in this state, will discuss with social on possible home equipment    3/28  - awaiting micro, continuing vanc  - social setting up suction at home, family plans to return home as before    3/29  - still with secretions  - family requesting voice box    3/30  - doing well today, mentation much improved  - anticipate discharge within the next two days with home health services, family will need education on trach maintenance and home feedings as patient has PEG tube and they have not cared for a PEG tube before, also we will discuss with  getting a speaking told to use with a trach    3/31  - secretions improved today  - working with social for home set up of oxygen and tube feeds  - family needs heavy education on trach care and tube feeds as primary care giver has no experience with either, still wishes to discharge home  4/23 long discussion with mother today.  Open to other facilities if needed for trach weaning.  She apparently just changed over to traditional Medicare.  If this is the case we can take her to specialty  4/24 plan to discharge to specialty once insurance verified to be traditional Medicare  4/25 more awake today after stopping trazodone  4/26 further discussion with family today about placement  4/27 mom planning to speak with children about placement  today  4/28 family has agreed to take patient home.  Mother requests that we give her time to get the patient's room ready and discharge tomorrow    Interval History:  No acute events overnight    Review of Systems  Objective:     Vital Signs (Most Recent):  Temp: 98.1 °F (36.7 °C) (04/28/25 0756)  Pulse: 98 (04/28/25 0816)  Resp: 20 (04/28/25 0740)  BP: 110/71 (04/28/25 0756)  SpO2: 100 % (04/28/25 0756) Vital Signs (24h Range):  Temp:  [97.3 °F (36.3 °C)-98.5 °F (36.9 °C)] 98.1 °F (36.7 °C)  Pulse:  [69-98] 98  Resp:  [16-20] 20  SpO2:  [97 %-100 %] 100 %  BP: (106-161)/(67-91) 110/71     Weight: 85 kg (187 lb 6.3 oz)  Body mass index is 34.27 kg/m².    Intake/Output Summary (Last 24 hours) at 4/28/2025 0845  Last data filed at 4/28/2025 0816  Gross per 24 hour   Intake 40 ml   Output --   Net 40 ml             Physical Exam  Constitutional:       General: She is not in acute distress.     Appearance: She is not ill-appearing.   HENT:      Head: Normocephalic and atraumatic.      Nose: Nose normal.      Mouth/Throat:      Mouth: Mucous membranes are moist.      Pharynx: Oropharynx is clear.   Eyes:      Extraocular Movements: Extraocular movements intact.      Conjunctiva/sclera: Conjunctivae normal.      Pupils: Pupils are equal, round, and reactive to light.   Cardiovascular:      Rate and Rhythm: Normal rate and regular rhythm.   Pulmonary:      Effort: Pulmonary effort is normal. No respiratory distress.      Comments: Secretions  Abdominal:      General: There is no distension.      Palpations: Abdomen is soft.      Tenderness: There is no abdominal tenderness.   Skin:     General: Skin is warm and dry.   Neurological:      Mental Status: She is alert. Mental status is at baseline.      Comments: Quadriplegia    Psychiatric:         Mood and Affect: Mood normal.         Behavior: Behavior normal.               Significant Labs: All pertinent labs within the past 24 hours have been reviewed.  BMP:   No results  "for input(s): "GLU", "NA", "K", "CL", "CO2", "BUN", "CREATININE", "CALCIUM", "MG" in the last 48 hours.    CBC: No results for input(s): "WBC", "HGB", "HCT", "PLT" in the last 48 hours.    Significant Imaging: I have reviewed all pertinent imaging results/findings within the past 24 hours.      Assessment & Plan  Severe protein-calorie malnutrition  Nutrition consulted. Most recent weight and BMI monitored-     Measurements:  Wt Readings from Last 1 Encounters:   04/28/25 85 kg (187 lb 6.3 oz)   Body mass index is 34.27 kg/m².    Patient has been screened and assessed by RD.    Malnutrition Type:  Context: chronic illness  Level: severe    Continue PEG tube feeding.  Nepro at 40 mL/hr  Maintain bowel regimen (lactulose, PEG, senna)  Dietitian to reassess  Recommend continue current POC as tolerated.  Continue tube feeds  Continue tube feeds  Continue tube feeds  Continue tube feeds  Dysphagia  With PEG in place. Continue tube feeds.   Continue omeprazole  Avoid PO intake  Speech therapy if clinically appropriate  Continue tube feeds  Continue tube feeds  Pressure ulcers of skin of multiple topographic sites  Aquacel Ag + Mepilex dressings  Wound care team consulted    Continue offloading and specialty mattress  Wound Care  Continue wound care  Wound care  Continue wound care  Continue wound care  Depression  Patient has persistent depression which is unknown and is currently controlled. Will Continue anti-depressant medications. We will not consult psychiatry at this time. Patient does not display psychosis at this time. Continue to monitor closely and adjust plan of care as needed.      4/22 - Will increase sertraline to 100.   4/23 stable  Stable  Stable  Continue sertraline    S/P percutaneous endoscopic gastrostomy (PEG) tube placement  Patient noted to have a percutaneous endoscopic gastrostomy tube in place. I have personally inspected the tube.Tube was placed prior to this admission There are no signs of " drainage or infection around the site. The tube is patent. Medications have converted to liquid form if available.  Routine care to be done by wound care and nursing staff.       Quadriplegia  Maximal support, bedbound  PT/OT as tolerated  DME planning for discharge  Trach wean as able  Hematoma of lower extremity, right, subsequent encounter  No active bleeding; conservative management  Monitor H/H  No anticoagulation  No evidence of bleeding  Hyperlipidemia  Continue atorvastatin    Chronic respiratory failure with hypoxia  Patient with Hypoxic Respiratory failure which is Acute on chronic.  she is not on home oxygen. Supplemental oxygen was provided and noted- Oxygen Concentration (%):  [28] 28    .   Signs/symptoms of respiratory failure include- increased work of breathing and respiratory distress. Contributing diagnoses includes - Aspiration and Pneumonia Labs and images were reviewed. Patient Has not had a recent ABG. Will treat underlying causes and adjust management of respiratory failure as follows-   Appreciate pulmonology assistance  Appreciate pulmonology  Appreciate pulmonology  Pulmonology managing trach  Appreciate palm  Coag negative Staphylococcus bacteremia  Resolved. Treatment with vancomycin completed 4/12.     No endocarditis noted on echocardiogram.     Tracheitis  Antibiotics (From admission, onward)      None            4/18 - complete abx this weekend.   4/20 - d/c abx  VTE Risk Mitigation (From admission, onward)           Ordered     enoxaparin injection 40 mg  Daily         03/23/25 1940     IP VTE HIGH RISK PATIENT  Once         03/23/25 1940     Place sequential compression device  Until discontinued         03/23/25 1941                    Discharge Planning   SHRADDHA: 5/2/2025     Code Status: Full Code   Medical Readiness for Discharge Date: 4/28/2025  Discharge Plan A: Home with family, Home Health            51 minutes spent on face to face patient interaction, discussion with family,  ancillary staff, and/or other physicians as well as review of pertinent labs, images, and notes.               Mesfin Rivera DO  Department of Hospital Medicine   [facility] - [address] [place] [address]

## 2025-04-28 NOTE — SUBJECTIVE & OBJECTIVE
"Interval History:  No acute events overnight    Review of Systems  Objective:     Vital Signs (Most Recent):  Temp: 98.1 °F (36.7 °C) (04/28/25 0756)  Pulse: 98 (04/28/25 0816)  Resp: 20 (04/28/25 0740)  BP: 110/71 (04/28/25 0756)  SpO2: 100 % (04/28/25 0756) Vital Signs (24h Range):  Temp:  [97.3 °F (36.3 °C)-98.5 °F (36.9 °C)] 98.1 °F (36.7 °C)  Pulse:  [69-98] 98  Resp:  [16-20] 20  SpO2:  [97 %-100 %] 100 %  BP: (106-161)/(67-91) 110/71     Weight: 85 kg (187 lb 6.3 oz)  Body mass index is 34.27 kg/m².    Intake/Output Summary (Last 24 hours) at 4/28/2025 0845  Last data filed at 4/28/2025 0816  Gross per 24 hour   Intake 40 ml   Output --   Net 40 ml             Physical Exam  Constitutional:       General: She is not in acute distress.     Appearance: She is not ill-appearing.   HENT:      Head: Normocephalic and atraumatic.      Nose: Nose normal.      Mouth/Throat:      Mouth: Mucous membranes are moist.      Pharynx: Oropharynx is clear.   Eyes:      Extraocular Movements: Extraocular movements intact.      Conjunctiva/sclera: Conjunctivae normal.      Pupils: Pupils are equal, round, and reactive to light.   Cardiovascular:      Rate and Rhythm: Normal rate and regular rhythm.   Pulmonary:      Effort: Pulmonary effort is normal. No respiratory distress.      Comments: Secretions  Abdominal:      General: There is no distension.      Palpations: Abdomen is soft.      Tenderness: There is no abdominal tenderness.   Skin:     General: Skin is warm and dry.   Neurological:      Mental Status: She is alert. Mental status is at baseline.      Comments: Quadriplegia    Psychiatric:         Mood and Affect: Mood normal.         Behavior: Behavior normal.               Significant Labs: All pertinent labs within the past 24 hours have been reviewed.  BMP:   No results for input(s): "GLU", "NA", "K", "CL", "CO2", "BUN", "CREATININE", "CALCIUM", "MG" in the last 48 hours.    CBC: No results for input(s): "WBC", " ""HGB", "HCT", "PLT" in the last 48 hours.    Significant Imaging: I have reviewed all pertinent imaging results/findings within the past 24 hours.  "

## 2025-04-28 NOTE — ASSESSMENT & PLAN NOTE
Nutrition consulted. Most recent weight and BMI monitored-     Measurements:  Wt Readings from Last 1 Encounters:   04/28/25 85 kg (187 lb 6.3 oz)   Body mass index is 34.27 kg/m².    Patient has been screened and assessed by RD.    Malnutrition Type:  Context: chronic illness  Level: severe    Continue PEG tube feeding.  Nepro at 40 mL/hr  Maintain bowel regimen (lactulose, PEG, senna)  Dietitian to reassess  Recommend continue current POC as tolerated.  Continue tube feeds  Continue tube feeds  Continue tube feeds  Continue tube feeds

## 2025-04-28 NOTE — PLAN OF CARE
Pt family decided to take pt home. Consult for hh and needed dme. Suction, neb, 02, tube feedings. [facility] [place] is unable to provide tube feedings for pt. Referral faxed. [facility] other needed home dme. Referral faxed. Informed of possible d/c tomorrow. [facility] updated. Ss following.

## 2025-04-28 NOTE — PLAN OF CARE
Problem: Adult Inpatient Plan of Care  Goal: Plan of Care Review  Outcome: Progressing  Goal: Patient-Specific Goal (Individualized)  Outcome: Progressing  Goal: Absence of Hospital-Acquired Illness or Injury  Outcome: Progressing  Goal: Optimal Comfort and Wellbeing  Outcome: Progressing  Goal: Readiness for Transition of Care  Outcome: Progressing     Problem: Infection  Goal: Absence of Infection Signs and Symptoms  Outcome: Progressing     Problem: Wound  Goal: Optimal Coping  Outcome: Progressing  Goal: Optimal Functional Ability  Outcome: Progressing  Goal: Absence of Infection Signs and Symptoms  Outcome: Progressing  Goal: Improved Oral Intake  Outcome: Progressing

## 2025-04-29 PROCEDURE — 94761 N-INVAS EAR/PLS OXIMETRY MLT: CPT

## 2025-04-29 PROCEDURE — 27000207 HC ISOLATION

## 2025-04-29 PROCEDURE — 94640 AIRWAY INHALATION TREATMENT: CPT

## 2025-04-29 PROCEDURE — 11000001 HC ACUTE MED/SURG PRIVATE ROOM

## 2025-04-29 PROCEDURE — 99900035 HC TECH TIME PER 15 MIN (STAT)

## 2025-04-29 PROCEDURE — 99900026 HC AIRWAY MAINTENANCE (STAT)

## 2025-04-29 PROCEDURE — 1111F DSCHRG MED/CURRENT MED MERGE: CPT | Mod: CPTII,,, | Performed by: HOSPITALIST

## 2025-04-29 PROCEDURE — 25000003 PHARM REV CODE 250: Performed by: HOSPITALIST

## 2025-04-29 PROCEDURE — 25000003 PHARM REV CODE 250: Performed by: STUDENT IN AN ORGANIZED HEALTH CARE EDUCATION/TRAINING PROGRAM

## 2025-04-29 PROCEDURE — 25000003 PHARM REV CODE 250: Performed by: INTERNAL MEDICINE

## 2025-04-29 PROCEDURE — 99239 HOSP IP/OBS DSCHRG MGMT >30: CPT | Mod: ,,, | Performed by: HOSPITALIST

## 2025-04-29 PROCEDURE — 25000242 PHARM REV CODE 250 ALT 637 W/ HCPCS: Performed by: FAMILY MEDICINE

## 2025-04-29 PROCEDURE — 63600175 PHARM REV CODE 636 W HCPCS: Performed by: HOSPITALIST

## 2025-04-29 PROCEDURE — 27000221 HC OXYGEN, UP TO 24 HOURS

## 2025-04-29 PROCEDURE — 25000242 PHARM REV CODE 250 ALT 637 W/ HCPCS: Performed by: INTERNAL MEDICINE

## 2025-04-29 RX ORDER — MIDODRINE HYDROCHLORIDE 5 MG/1
10 TABLET ORAL 3 TIMES DAILY
Qty: 180 TABLET | Refills: 11 | Status: SHIPPED | OUTPATIENT
Start: 2025-04-29 | End: 2025-05-08 | Stop reason: SDUPTHER

## 2025-04-29 RX ORDER — SCOPOLAMINE 1 MG/3D
1 PATCH, EXTENDED RELEASE TRANSDERMAL
Qty: 10 PATCH | Refills: 0 | Status: SHIPPED | OUTPATIENT
Start: 2025-05-01 | End: 2025-05-08 | Stop reason: SDUPTHER

## 2025-04-29 RX ORDER — LEVALBUTEROL INHALATION SOLUTION 1.25 MG/3ML
1.25 SOLUTION RESPIRATORY (INHALATION) 2 TIMES DAILY
Qty: 180 ML | Refills: 0 | Status: SHIPPED | OUTPATIENT
Start: 2025-04-29 | End: 2025-05-08 | Stop reason: SDUPTHER

## 2025-04-29 RX ORDER — HYDROCODONE BITARTRATE AND ACETAMINOPHEN 5; 325 MG/1; MG/1
1 TABLET ORAL EVERY 6 HOURS PRN
Qty: 20 TABLET | Refills: 0 | Status: SHIPPED | OUTPATIENT
Start: 2025-04-29 | End: 2025-05-04

## 2025-04-29 RX ADMIN — ACETYLCYSTEINE 2 ML: 200 SOLUTION ORAL; RESPIRATORY (INHALATION) at 08:04

## 2025-04-29 RX ADMIN — ACETYLCYSTEINE 2 ML: 200 SOLUTION ORAL; RESPIRATORY (INHALATION) at 07:04

## 2025-04-29 RX ADMIN — MIDODRINE HYDROCHLORIDE 10 MG: 10 TABLET ORAL at 04:04

## 2025-04-29 RX ADMIN — ENOXAPARIN SODIUM 40 MG: 40 INJECTION SUBCUTANEOUS at 04:04

## 2025-04-29 RX ADMIN — ACETYLCYSTEINE 2 ML: 200 SOLUTION ORAL; RESPIRATORY (INHALATION) at 01:04

## 2025-04-29 RX ADMIN — MIDODRINE HYDROCHLORIDE 10 MG: 10 TABLET ORAL at 09:04

## 2025-04-29 RX ADMIN — ATORVASTATIN CALCIUM 20 MG: 20 TABLET, FILM COATED ORAL at 08:04

## 2025-04-29 RX ADMIN — LEVALBUTEROL HYDROCHLORIDE 1.25 MG: 1.25 SOLUTION RESPIRATORY (INHALATION) at 01:04

## 2025-04-29 RX ADMIN — PREGABALIN 75 MG: 75 CAPSULE ORAL at 08:04

## 2025-04-29 RX ADMIN — LEVALBUTEROL HYDROCHLORIDE 1.25 MG: 1.25 SOLUTION RESPIRATORY (INHALATION) at 08:04

## 2025-04-29 RX ADMIN — SERTRALINE HYDROCHLORIDE 25 MG: 25 TABLET ORAL at 09:04

## 2025-04-29 RX ADMIN — COLLAGENASE SANTYL: 250 OINTMENT TOPICAL at 09:04

## 2025-04-29 RX ADMIN — PREGABALIN 75 MG: 75 CAPSULE ORAL at 09:04

## 2025-04-29 RX ADMIN — LEVALBUTEROL HYDROCHLORIDE 1.25 MG: 1.25 SOLUTION RESPIRATORY (INHALATION) at 07:04

## 2025-04-29 RX ADMIN — Medication 6 MG: at 08:04

## 2025-04-29 RX ADMIN — MIDODRINE HYDROCHLORIDE 10 MG: 10 TABLET ORAL at 08:04

## 2025-04-29 RX ADMIN — PANTOPRAZOLE SODIUM 40 MG: 40 INJECTION, POWDER, FOR SOLUTION INTRAVENOUS at 09:04

## 2025-04-29 NOTE — PLAN OF CARE
[facility]  Discharge Final Note    Primary Care Provider: Gonzalo Barrera NP    Expected Discharge Date: 5/2/2025    Final Discharge Note (most recent)       Final Note - 04/29/25 1538          Final Note    Assessment Type Final Discharge Note     Anticipated Discharge Disposition Home-Health Care Svc        Post-Acute Status    Post-Acute Authorization Home Health;HME     HME Status Set-up Complete/Auth obtained     Home Health Status Set-up Complete/Auth obtained     Patient choice form signed by patient/caregiver List with quality metrics by geographic area provided;List from CMS Compare;List from System Post-Acute Care     Discharge Delays None known at this time                 All needed dme has been delivered to pt's home. Ss informed diana with [facility] of d/c. [facility] to help with transport to dr tari ortiz.     Important Message from Medicare  Important Message from Medicare regarding Discharge Appeal Rights: Given to patient/caregiver, Explained to patient/caregiver, Signed/date by patient/caregiver     Date IMM was signed: 04/28/25  Time IMM was signed: 1321    After-discharge care                Durable Medical Equipment       [facility]   Service: Durable Medical Equipment    [address] [facility] [address]   Phone: [phone]       [facility]   Service: Durable Medical Equipment    [address]  [facility] [address]   Phone: [phone]                 Home Medical Care       [facility]   Service: Home Health Services    [address] [facility] [address]   Phone: [phone]                        61.2

## 2025-04-29 NOTE — DISCHARGE SUMMARY
[facility] - [address] [place] [address] Medicine  Discharge Summary      Patient Name: Karie Denney  MRN: [id number]  TOÑO: [id number]  Patient Class: IP- Inpatient  Admission Date: 3/23/2025  Hospital Length of Stay: 37 days  Discharge Date and Time: 04/29/2025 3:59 PM  Attending Physician: Cassie Bar MD   Discharging Provider: Cassie Bar MD  Primary Care Provider: Gonzalo Barrera NP    Primary Care Team: Networked reference to record PCT     HPI:   Chief Complaint  Transfer for continued management of respiratory failure, tracheostomy care, PEG feeding, and complications of quadriplegia following prolonged hospitalization.    History of Present Illness  Ms. Karie Denney is a 59-year-old woman with a complex medical history including quadriplegia following spinal surgery in 2015 and a cerebrovascular accident (CVA) in 2024 resulting in left-sided paralysis. She was transferred to [facility] from [facility] in [place], [place], for ongoing care following a prolonged ICU course involving intubation, respiratory failure, and significant complications.  She was initially hospitalized on February 15, 2025, for aspiration and dysphagia evaluation, during which she developed aspiration pneumonia and respiratory failure requiring intubation. Her hospital course was complicated by an ESBL E. coli UTI, pericardial effusion (treated with colchicine), and a spontaneous right thigh hematoma concerning for compartment syndrome, leading to transfer to [facility]. She required prolonged mechanical ventilation, tracheostomy placement, and PEG tube insertion.  She has now returned to [facility] for continued respiratory care, tracheostomy management, PEG feeding, management of sacral pressure injury, and rehabilitation planning.    Past Medical History  Quadriplegia post-spinal surgery (2015)  CVA (2024)  Aspiration pneumonia  Acute respiratory failure  UTI (ESBL E.  coli)  Depression  GERD  Pharyngoesophageal dysphagia  Pericardial effusion/pericarditis  Chronic constipation  Pressure ulcer (sacral, unstageable)  Hyperlipidemia  Anemia    Past Surgical History  Spinal surgery (2015)  PEG tube placement (03/11/2025)  Esophageal dilation with biopsy (08/2024)    Medications  Active Medications:  Atorvastatin 20 mg daily  Omeprazole 40 mg daily  Sertraline 50 mg daily  Trazodone 100 mg qHS  Pregabalin 75 mg BID  Hydrocodone-acetaminophen 5-325 mg BID  Lactulose 30 mL daily via PEG  Midodrine 10 mg Q6H via PEG  Levalbuterol 0.63 mg nebulizer Q6H  Polyethylene glycol 17 g daily via PEG  Recent Changes:  Colchicine: Discontinued due to bleeding  Aspirin and ezetimibe: Discontinued    Allergies  Penicillins ? Rash and anaphylaxis    Social History  Never smoker  No alcohol or tobacco use  Lives at home with mother; receives home health weekly  Bedbound, non-ambulatory    Family History  Noncontributory    Review of Systems  Limited due to tracheostomy, quadriplegia, and communication challenges. History obtained from EMR and caregiver.    Physical Examination  General: Chronically ill-appearing woman, alert, tracheostomy in place with T-piece  HEENT: Mild nasal skin necrosis, mucous membranes moist  Eyes: PERRL, EOMI  Neck: Tracheostomy present  CV: RRR, no murmurs  Lungs: Breath sounds diminished at bases; no acute distress; no wheezing  Abdomen: Soft, non-tender, PEG tube in place  Skin: Unstageable sacral ulcer, nasal pressure ulcer  Neuro: Quadriplegia, responsive with eye tracking and blinking  Extremities: RLE hematoma, bilateral edema, no signs of active bleeding    Laboratory Data (Most Recent)  Hgb: 8.5 g/dL  Creatinine: 0.30 mg/dL  Albumin: 3.1 g/dL  WBC: 9.4 K/uL  Ferritin: 265 ng/mL  INR: 0.93  No growth on recent blood and urine cultures    Imaging  CT angio: Large RLE hematoma without active extravasation  Multiple chest X-rays: Stable bilateral pleural effusions,  improving atelectasis  Echo: EF 55-60%, small pericardial effusion      * No surgery found *      Hospital Course:   3/26  - continues on vanc for blood cultures + on 3/24, echo completed at time with no endocarditis seen  - discussed with pulmonary, recs to follow    3/27  - due to overall baseline health will treat coag negative staph bacteremia as a true infection, for now continue vanc and follow cultures and sensitivities  - discussed with pulmonology who plan to continue trach collar as is and see patient May 1st at 1:40 pm, confirmed appointment  - discussed with family at bedside who are very concerned about secretions and states they don't have the suction capabilities at home and do not feel comfortable going home with her in this state, will discuss with social on possible home equipment    3/28  - awaiting micro, continuing vanc  - social setting up suction at home, family plans to return home as before    3/29  - still with secretions  - family requesting voice box    3/30  - doing well today, mentation much improved  - anticipate discharge within the next two days with home health services, family will need education on trach maintenance and home feedings as patient has PEG tube and they have not cared for a PEG tube before, also we will discuss with  getting a speaking told to use with a trach    3/31  - secretions improved today  - working with social for home set up of oxygen and tube feeds  - family needs heavy education on trach care and tube feeds as primary care giver has no experience with either, still wishes to discharge home  4/23 long discussion with mother today.  Open to other facilities if needed for trach weaning.  She apparently just changed over to traditional Medicare.  If this is the case we can take her to specialty  4/24 plan to discharge to specialty once insurance verified to be traditional Medicare  4/25 more awake today after stopping trazodone  4/26 further  discussion with family today about placement  4/27 mom planning to speak with children about placement today  4/28 family has agreed to take patient home.  Mother requests that we give her time to get the patient's room ready and discharge tomorrow     Goals of Care Treatment Preferences:  Code Status: Full Code          What is most important right now is to focus on quality of life, even if it means sacrificing a little time.  Accordingly, we have decided that the best plan to meet the patient's goals includes continuing with treatment.      SDOH Screening:  The patient was screened for utility difficulties, food insecurity, transport difficulties, housing insecurity, and interpersonal safety and there were no concerns identified this admission.     Consults:   Consults (From admission, onward)          Status Ordering Provider     Inpatient consult to Social Work  Once        Provider:  (Not yet assigned)    Completed PITO MARSHALL     Inpatient consult to Social Work  Once        Provider:  (Not yet assigned)    Completed PITO MARSHALL     Inpatient consult to Pulmonology  Once        Provider:  Luke Santizo MD    Completed EMILIANA ROWLAND JR     Inpatient consult to Registered Dietitian/Nutritionist  Once        Provider:  (Not yet assigned)    Completed LAILA BURGOS     Inpatient consult to Social Work  Once        Provider:  (Not yet assigned)    Completed GAL ROLAND     Inpatient consult to Social Work  Once        Provider:  (Not yet assigned)    Completed HAYLEY PIERCE     Inpatient consult to Social Work  Once        Provider:  (Not yet assigned)    Completed HAYLEY PIERCE     Case Management/  Once        Provider:  (Not yet assigned)    Completed GAL ROLAND     Inpatient consult to Registered Dietitian/Nutritionist  Once        Provider:  (Not yet assigned)    Completed GAL ROLAND     Inpatient consult to Pulmonology  Once        Provider:   Mariah Cunha MD    Completed GAL ROLAND            Assessment & Plan  Severe protein-calorie malnutrition  Nutrition consulted. Most recent weight and BMI monitored-     Measurements:  Wt Readings from Last 1 Encounters:   04/28/25 85 kg (187 lb 6.3 oz)   Body mass index is 34.27 kg/m².    Patient has been screened and assessed by RD.    Malnutrition Type:  Context: chronic illness  Level: severe    Continue PEG tube feeding.  Nepro at 40 mL/hr  Maintain bowel regimen (lactulose, PEG, senna)  Dietitian to reassess  Recommend continue current POC as tolerated.  Continue tube feeds  Continue tube feeds  Continue tube feeds  Continue tube feeds  Dysphagia  With PEG in place. Continue tube feeds.   Continue omeprazole  Avoid PO intake  Speech therapy if clinically appropriate  Continue tube feeds  Continue tube feeds  Pressure ulcers of skin of multiple topographic sites  Aquacel Ag + Mepilex dressings  Wound care team consulted    Continue offloading and specialty mattress  Wound Care  Continue wound care  Wound care  Continue wound care  Continue wound care  Depression  Patient has persistent depression which is unknown and is currently controlled. Will Continue anti-depressant medications. We will not consult psychiatry at this time. Patient does not display psychosis at this time. Continue to monitor closely and adjust plan of care as needed.      4/22 - Will increase sertraline to 100.   4/23 stable  Stable  Stable  Continue sertraline    S/P percutaneous endoscopic gastrostomy (PEG) tube placement  Patient noted to have a percutaneous endoscopic gastrostomy tube in place. I have personally inspected the tube.Tube was placed prior to this admission There are no signs of drainage or infection around the site. The tube is patent. Medications have converted to liquid form if available.  Routine care to be done by wound care and nursing staff.       Quadriplegia  Maximal support, bedbound  PT/OT as  tolerated  DME planning for discharge  Trach wean as able  Hematoma of lower extremity, right, subsequent encounter  No active bleeding; conservative management  Monitor H/H  No anticoagulation  No evidence of bleeding  Hyperlipidemia  Continue atorvastatin    Chronic respiratory failure with hypoxia  Patient with Hypoxic Respiratory failure which is Acute on chronic.  she is not on home oxygen. Supplemental oxygen was provided and noted-      .   Signs/symptoms of respiratory failure include- increased work of breathing and respiratory distress. Contributing diagnoses includes - Aspiration and Pneumonia Labs and images were reviewed. Patient Has not had a recent ABG. Will treat underlying causes and adjust management of respiratory failure as follows-   Appreciate pulmonology assistance  Appreciate pulmonology  Appreciate pulmonology  Pulmonology managing trach  Appreciate palm  Coag negative Staphylococcus bacteremia  Resolved. Treatment with vancomycin completed 4/12.     No endocarditis noted on echocardiogram.     Tracheitis  Antibiotics (From admission, onward)      None            4/18 - complete abx this weekend.   4/20 - d/c abx  Acute on chronic respiratory failure with hypoxia  Continue tracheostomy care with T-piece trials  Continue tracheostomy suctioning ordered due to thick secretions. Continue scheduled mucolytic and scopolamine.    Culture with Haemophilus and yeast, started on fluconazole and ceftriaxone.     Pulmonology involved with care this admission.     Family would like patient to go to LTAC before going home as she still requires frequent suctioning, tube feeds, antibiotics, and additional care.  She has required over a week of ICU care prior to returning to our facility.  She was on the vent and was difficult to wean. She has required more than three midnights intermediate ICU level of care while at our facility.       Keep trach without capping until at least April 15th.       Denied LTAC  "by [employer], her insurance provider.  Appeal sent 4/8. Awaiting decision.     Patient still with trach that is capped. Pulmonology is managing.  Last recommendation from Pulmonology below.  F/u with pulm in clinic and a bronch is scheduled.      "Chronic respiratory failure s/p tracheostomy tube placement 03/14 for extubation failures now weaned to capped tracheostomy; cuff deflated since end March. She has ongoing suctioning requirements despite low volume secretions with feeling of dyspnea. Completed course of Ceftriaxone 04/20 for Haemophilus parainfluenzae pneumonia.   - cont trach capped and suctioning PRN         -- she is not mobilizing secretions to mouth and requiring suctioning         -- requiring respiratory and nursing suctioning efforts due to dyspnea, low volume secretions  - currently with Shiley 8CN cuffed; tract considered mature and s/p suture removal         -- plan for trach exchange to uncuffed with downsize; holding at present for subjective dyspnea and frequent suctioning with recent completion of Abx         -- ongoing system wide tracheostomy tube transition, noted; will reassess for trach exchange which will be considered for 04/25  - supplement for goal SpO2 >92%  - CXR 04/23 with clear lung fields, tracheostomy appropriately positioned"    Final Active Diagnoses:    Diagnosis Date Noted POA    PRINCIPAL PROBLEM:  Acute on chronic respiratory failure with hypoxia [J96.21] 02/15/2025 Yes    Tracheitis [J04.10] 04/12/2025 Yes    Coag negative Staphylococcus bacteremia [R78.81, B95.7] 03/25/2025 Yes    Severe protein-calorie malnutrition [E43] 03/23/2025 Yes    Dysphagia [R13.10] 02/15/2025 Yes    Chronic respiratory failure with hypoxia [J96.11] 03/25/2025 Yes    S/P percutaneous endoscopic gastrostomy (PEG) tube placement [Z93.1] 03/23/2025 Not Applicable    Quadriplegia [G82.50] 03/23/2025 Yes    Hematoma of lower extremity, right, subsequent encounter [S80.11XD] 03/23/2025 Not Applicable " "   Hyperlipidemia [E78.5] 03/23/2025 Yes    Pressure ulcers of skin of multiple topographic sites [L89.90] 02/12/2025 Yes    Depression [F32.A] 08/27/2024 Yes      Problems Resolved During this Admission:    Diagnosis Date Noted Date Resolved POA    Aspiration pneumonia [J69.0] 02/12/2025 04/22/2025 Yes    Acute respiratory failure with hypoxia and hypercarbia [J96.01, J96.02] 03/24/2025 04/22/2025 Yes    Pericardial effusion [I31.39] 03/23/2025 04/22/2025 Yes    Mucus plugging of bronchi [T17.500A] 02/24/2025 04/18/2025 Yes       Discharged Condition: fair    Disposition: Home-Health Care Svc    Follow Up:   Contact information for follow-up providers       Raquel Lr MD Follow up in 1 week(s).    Specialty: Family Medicine  Why: hospital follow-up; home visit is preferred  Contact information:  [address] [place] [address]  [phone]                       Contact information for after-discharge care       Durable Medical Equipment       [facility] .    Service: Durable Medical Equipment  Contact information:  [address] [facility] [address]  [phone]             [facility] .    Service: Durable Medical Equipment  Contact information:  [address]  [facility] [address]  [phone]                     Home Medical Care       [facility] .    Service: Home Health Services  Contact information:  [address]  [place] [address]  [phone]                                 Patient Instructions:   No discharge procedures on file.    Significant Diagnostic Studies: Labs: BMP: No results for input(s): "GLU", "NA", "K", "CL", "CO2", "BUN", "CREATININE", "CALCIUM", "MG" in the last 48 hours. and CBC No results for input(s): "WBC", "HGB", "HCT", "PLT" in the last 48 hours.    Pending Diagnostic Studies:       Procedure Component Value Units Date/Time    EXTRA TUBES [[id number]] Collected: 04/20/25 0304    Order Status: Sent Lab " Status: In process Updated: 04/20/25 0452    Specimen: Blood, Venous     Narrative:      The following orders were created for panel order EXTRA TUBES.  Procedure                               Abnormality         Status                     ---------                               -----------         ------                     Light Green Top Hold[[id number]]                            In process                 Lavender Top Hold[[id number]]                               In process                   Please view results for these tests on the individual orders.    EXTRA TUBES [[id number]] Collected: 04/19/25 0450    Order Status: Sent Lab Status: In process Updated: 04/19/25 0547    Specimen: Blood, Venous     Narrative:      The following orders were created for panel order EXTRA TUBES.  Procedure                               Abnormality         Status                     ---------                               -----------         ------                     Lavender Top Hold[[id number]]                               In process                   Please view results for these tests on the individual orders.    EXTRA TUBES [[id number]] Collected: 04/15/25 0532    Order Status: Sent Lab Status: In process Updated: 04/15/25 0532    Specimen: Blood, Venous     Narrative:      The following orders were created for panel order EXTRA TUBES.  Procedure                               Abnormality         Status                     ---------                               -----------         ------                     Lavender Top Hold[[id number]]                               In process                   Please view results for these tests on the individual orders.    EXTRA TUBES [[id number]] Collected: 04/11/25 0423    Order Status: Sent Lab Status: In process Updated: 04/11/25 0602    Specimen: Blood, Venous     Narrative:      The following orders were created for panel order EXTRA TUBES.  Procedure                                Abnormality         Status                     ---------                               -----------         ------                     Light Green Top Hold[[id number]]                            In process                 Lavender Top Hold[[id number]]                               In process                   Please view results for these tests on the individual orders.    EXTRA TUBES [[id number]] Collected: 04/09/25 0719    Order Status: Sent Lab Status: In process Updated: 04/09/25 0733    Specimen: Blood, Venous     Narrative:      The following orders were created for panel order EXTRA TUBES.  Procedure                               Abnormality         Status                     ---------                               -----------         ------                     Light Green Top Hold[[id number]]                            In process                   Please view results for these tests on the individual orders.    Urinalysis, Reflex to Urine Culture [[id number]] Collected: 04/06/25 1252    Order Status: Sent Lab Status: No result     Specimen: Urine            Medications:  Reconciled Home Medications:      Medication List        START taking these medications      HYDROcodone-acetaminophen 5-325 mg per tablet  Commonly known as: NORCO  Take 1 tablet by mouth every 6 (six) hours as needed for Pain.     levalbuterol 1.25 mg/3 mL nebulizer solution  Commonly known as: XOPENEX  Take 1 NEBULE (1.25 mg total) by nebulization 2 (two) times a day.     midodrine 10 MG tablet  Commonly known as: PROAMATINE  Take 1 tablet (10 mg total) by mouth 3 (three) times daily.     scopolamine 1.3-1.5 mg (1 mg over 3 days)  Commonly known as: TRANSDERM-SCOP  Place 1 patch onto the skin Every 3 (three) days.  Start taking on: May 1, 2025            CONTINUE taking these medications      atorvastatin 20 MG tablet  Commonly known as: LIPITOR  Take 20 mg by mouth once daily.     cholecalciferol (vitamin D3) 1,250 mcg (50,000 unit)  capsule  Take 1,250 mcg by mouth every 7 days.     LINZESS 290 mcg Cap capsule  Generic drug: linaCLOtide  Take 290 mcg by mouth before breakfast.     omeprazole 40 MG capsule  Commonly known as: PRILOSEC  Take 40 mg by mouth every morning.     pregabalin 75 MG capsule  Commonly known as: LYRICA  Take 75 mg by mouth 2 (two) times daily.     sertraline 50 MG tablet  Commonly known as: ZOLOFT  Take 50 mg by mouth once daily.     traZODone 100 MG tablet  Commonly known as: DESYREL  Take 100 mg by mouth every evening.              Indwelling Lines/Drains at time of discharge:   Lines/Drains/Airways       Drain  Duration                  Gastrostomy/Enterostomy LUQ -- days              Airway  Duration             Adult Surgical Airway 03/23/25 1544 Shiley Cuffed  37 days                    Time spent on the discharge of patient: 45 minutes         Cassie Bar MD  Department of Hospital Medicine  [facility] - [facility] Tele[facility]

## 2025-04-29 NOTE — ASSESSMENT & PLAN NOTE
"Continue tracheostomy care with T-piece trials  Continue tracheostomy suctioning ordered due to thick secretions. Continue scheduled mucolytic and scopolamine.    Culture with Haemophilus and yeast, started on fluconazole and ceftriaxone.     Pulmonology involved with care this admission.     Family would like patient to go to LTAC before going home as she still requires frequent suctioning, tube feeds, antibiotics, and additional care.  She has required over a week of ICU care prior to returning to our facility.  She was on the vent and was difficult to wean. She has required more than three midnights intermediate ICU level of care while at our facility.       Keep trach without capping until at least April 15th.       Denied LTAC by [employer], her insurance provider.  Appeal sent 4/8. Awaiting decision.     Patient still with trach that is capped. Pulmonology is managing.  Last recommendation from Pulmonology below.  F/u with pulm in clinic and a bronch is scheduled.      "Chronic respiratory failure s/p tracheostomy tube placement 03/14 for extubation failures now weaned to capped tracheostomy; cuff deflated since end March. She has ongoing suctioning requirements despite low volume secretions with feeling of dyspnea. Completed course of Ceftriaxone 04/20 for Haemophilus parainfluenzae pneumonia.   - cont trach capped and suctioning PRN         -- she is not mobilizing secretions to mouth and requiring suctioning         -- requiring respiratory and nursing suctioning efforts due to dyspnea, low volume secretions  - currently with Shiley 8CN cuffed; tract considered mature and s/p suture removal         -- plan for trach exchange to uncuffed with downsize; holding at present for subjective dyspnea and frequent suctioning with recent completion of Abx         -- ongoing system wide tracheostomy tube transition, noted; will reassess for trach exchange which will be considered for 04/25  - supplement for goal SpO2 " ">92%  - CXR 04/23 with clear lung fields, tracheostomy appropriately positioned"    "

## 2025-04-29 NOTE — ASSESSMENT & PLAN NOTE
Patient with Hypoxic Respiratory failure which is Acute on chronic.  she is not on home oxygen. Supplemental oxygen was provided and noted-      .   Signs/symptoms of respiratory failure include- increased work of breathing and respiratory distress. Contributing diagnoses includes - Aspiration and Pneumonia Labs and images were reviewed. Patient Has not had a recent ABG. Will treat underlying causes and adjust management of respiratory failure as follows-   Appreciate pulmonology assistance  Appreciate pulmonology  Appreciate pulmonology  Pulmonology managing trach  Appreciate palm

## 2025-04-30 VITALS
WEIGHT: 187.38 LBS | HEIGHT: 62 IN | SYSTOLIC BLOOD PRESSURE: 118 MMHG | RESPIRATION RATE: 18 BRPM | TEMPERATURE: 98 F | OXYGEN SATURATION: 99 % | DIASTOLIC BLOOD PRESSURE: 76 MMHG | HEART RATE: 87 BPM | BODY MASS INDEX: 34.48 KG/M2

## 2025-04-30 NOTE — PLAN OF CARE
Problem: Adult Inpatient Plan of Care  Goal: Plan of Care Review  4/29/2025 2127 by Thony Sargent RN  Outcome: Progressing  4/29/2025 2124 by Thony Sargent RN  Outcome: Not Progressing  Goal: Absence of Hospital-Acquired Illness or Injury  4/29/2025 2127 by Thony Sargent RN  Outcome: Progressing  4/29/2025 2124 by Thony Sargent RN  Outcome: Not Progressing  Goal: Readiness for Transition of Care  4/29/2025 2127 by Thony Sargent RN  Outcome: Progressing  4/29/2025 2124 by Thony Sargent RN  Outcome: Not Progressing     Problem: Infection  Goal: Absence of Infection Signs and Symptoms  4/29/2025 2127 by Thony aSrgent RN  Outcome: Progressing  4/29/2025 2124 by Thony Sargent RN  Outcome: Not Progressing     Problem: Wound  Goal: Improved Oral Intake  4/29/2025 2127 by Thony Sargent RN  Outcome: Progressing  4/29/2025 2124 by Thony Sargent RN  Outcome: Not Progressing  Goal: Optimal Pain Control and Function  4/29/2025 2127 by Thony Sargent RN  Outcome: Progressing  4/29/2025 2124 by Thony Sargent RN  Outcome: Not Progressing

## 2025-04-30 NOTE — PLAN OF CARE
Problem: Adult Inpatient Plan of Care  Goal: Plan of Care Review  Outcome: Not Progressing  Goal: Patient-Specific Goal (Individualized)  Outcome: Not Progressing  Goal: Absence of Hospital-Acquired Illness or Injury  Outcome: Not Progressing  Goal: Optimal Comfort and Wellbeing  Outcome: Not Progressing  Goal: Readiness for Transition of Care  Outcome: Not Progressing     Problem: Infection  Goal: Absence of Infection Signs and Symptoms  Outcome: Not Progressing     Problem: Wound  Goal: Optimal Coping  Outcome: Not Progressing  Goal: Optimal Functional Ability  Outcome: Not Progressing  Goal: Absence of Infection Signs and Symptoms  Outcome: Not Progressing  Goal: Improved Oral Intake  Outcome: Not Progressing  Goal: Optimal Pain Control and Function  Outcome: Not Progressing  Goal: Skin Health and Integrity  Outcome: Not Progressing  Goal: Optimal Wound Healing  Outcome: Not Progressing     Problem: Skin Injury Risk Increased  Goal: Skin Health and Integrity  Outcome: Not Progressing     Problem: Gas Exchange Impaired  Goal: Optimal Gas Exchange  Outcome: Not Progressing     Problem: Airway Clearance Ineffective  Goal: Effective Airway Clearance  Outcome: Not Progressing     Problem: Fall Injury Risk  Goal: Absence of Fall and Fall-Related Injury  Outcome: Not Progressing

## 2025-05-01 ENCOUNTER — TELEPHONE (OUTPATIENT)
Dept: PULMONOLOGY | Facility: CLINIC | Age: 60
End: 2025-05-01
Payer: MEDICARE

## 2025-05-01 ENCOUNTER — TELEPHONE (OUTPATIENT)
Dept: GASTROENTEROLOGY | Facility: HOSPITAL | Age: 60
End: 2025-05-01
Payer: MEDICARE

## 2025-05-01 ENCOUNTER — PATIENT OUTREACH (OUTPATIENT)
Dept: ADMINISTRATIVE | Facility: CLINIC | Age: 60
End: 2025-05-01

## 2025-05-01 NOTE — TELEPHONE ENCOUNTER
----- Message from Luis Manuel sent at 5/1/2025  2:58 PM CDT -----  Who Called: Karie Hernandez's Preferred Phone Number on File: [phone] Best Call Back Number, if different:Additional Information: pt needs to schedule procedure that is on 5/5/25

## 2025-05-01 NOTE — TELEPHONE ENCOUNTER
Spoke with patient's daughter concerning upcoming Bronchoscopy procedure scheduled for 05.05.25. Confirmed appt time of 0730 and arrival time of 0700. Confirmed address of facility. Patient's daughter Ashley expressed understanding of all pre-procedure instructions. Confirmed NPO status after midnight on 05.04.25. Patient's daughter denied patient as having any cardiac implanted devices, pacemakers, or neuro-stimulators. Confirmed having a  present for procedure. Discussed a tentative schedule of times for the day of the procedure. Answered all questions and concerns at this time.

## 2025-05-01 NOTE — PROGRESS NOTES
C3 nurse spoke with Karie Denney mother for a TCC post hospital discharge follow up call. The patient has a scheduled [facility] appointment with Dr Raquel Lr on 5/7/25 @ 1300.

## 2025-05-04 ENCOUNTER — HOSPITAL ENCOUNTER (EMERGENCY)
Facility: HOSPITAL | Age: 60
Discharge: HOME | End: 2025-05-04
Attending: EMERGENCY MEDICINE
Payer: MEDICARE

## 2025-05-04 VITALS
DIASTOLIC BLOOD PRESSURE: 56 MMHG | OXYGEN SATURATION: 96 % | HEIGHT: 62 IN | HEART RATE: 114 BPM | WEIGHT: 187.38 LBS | TEMPERATURE: 98 F | RESPIRATION RATE: 17 BRPM | SYSTOLIC BLOOD PRESSURE: 117 MMHG | BODY MASS INDEX: 34.48 KG/M2

## 2025-05-04 DIAGNOSIS — J18.9 PNEUMONIA OF BOTH LOWER LOBES DUE TO INFECTIOUS ORGANISM: Primary | ICD-10-CM

## 2025-05-04 DIAGNOSIS — R06.02 SHORTNESS OF BREATH: ICD-10-CM

## 2025-05-04 LAB
ALBUMIN SERPL BCP-MCNC: 4 G/DL (ref 3.4–4.8)
ALBUMIN/GLOB SERPL: 0.7 {RATIO}
ALP SERPL-CCNC: 74 U/L (ref 40–150)
ALT SERPL W P-5'-P-CCNC: 34 U/L
ANION GAP SERPL CALCULATED.3IONS-SCNC: 19 MMOL/L (ref 7–16)
AST SERPL W P-5'-P-CCNC: 57 U/L (ref 11–45)
BASOPHILS # BLD AUTO: 0.05 K/UL (ref 0–0.2)
BASOPHILS NFR BLD AUTO: 0.3 % (ref 0–1)
BILIRUB SERPL-MCNC: 0.5 MG/DL
BILIRUB UR QL STRIP: NEGATIVE
BUN SERPL-MCNC: 25 MG/DL (ref 10–20)
BUN/CREAT SERPL: 38 (ref 6–20)
CALCIUM SERPL-MCNC: 11 MG/DL (ref 8.4–10.2)
CHLORIDE SERPL-SCNC: 102 MMOL/L (ref 98–107)
CLARITY UR: ABNORMAL
CO2 SERPL-SCNC: 27 MMOL/L (ref 23–31)
COLOR UR: YELLOW
CREAT SERPL-MCNC: 0.66 MG/DL (ref 0.55–1.02)
D DIMER PPP FEU-MCNC: 2.67 ΜG/ML (ref 0–0.47)
DIFFERENTIAL METHOD BLD: ABNORMAL
EGFR (NO RACE VARIABLE) (RUSH/TITUS): 100 ML/MIN/1.73M2
EOSINOPHIL # BLD AUTO: 0.19 K/UL (ref 0–0.5)
EOSINOPHIL NFR BLD AUTO: 1 % (ref 1–4)
ERYTHROCYTE [DISTWIDTH] IN BLOOD BY AUTOMATED COUNT: 17.6 % (ref 11.5–14.5)
GLOBULIN SER-MCNC: 5.6 G/DL (ref 2–4)
GLUCOSE SERPL-MCNC: 105 MG/DL (ref 82–115)
GLUCOSE UR STRIP-MCNC: NORMAL MG/DL
HCO3 UR-SCNC: 37.6 MMOL/L (ref 24–28)
HCT VFR BLD AUTO: 41.5 % (ref 38–47)
HCT VFR BLD CALC: 45 % (ref 35–51)
HGB BLD-MCNC: 11.7 G/DL (ref 12–16)
IMM GRANULOCYTES # BLD AUTO: 0.09 K/UL (ref 0–0.04)
IMM GRANULOCYTES NFR BLD: 0.5 % (ref 0–0.4)
KETONES UR STRIP-SCNC: NEGATIVE MG/DL
LDH SERPL L TO P-CCNC: 1.1 MMOL/L (ref 0.3–1.2)
LEUKOCYTE ESTERASE UR QL STRIP: NEGATIVE
LYMPHOCYTES # BLD AUTO: 3.59 K/UL (ref 1–4.8)
LYMPHOCYTES NFR BLD AUTO: 19.3 % (ref 27–41)
MCH RBC QN AUTO: 24.6 PG (ref 27–31)
MCHC RBC AUTO-ENTMCNC: 28.2 G/DL (ref 32–36)
MCV RBC AUTO: 87.2 FL (ref 80–96)
MONOCYTES # BLD AUTO: 1.11 K/UL (ref 0–0.8)
MONOCYTES NFR BLD AUTO: 6 % (ref 2–6)
MPC BLD CALC-MCNC: 12.2 FL (ref 9.4–12.4)
MUCOUS, UA: ABNORMAL /LPF
NEUTROPHILS # BLD AUTO: 13.55 K/UL (ref 1.8–7.7)
NEUTROPHILS NFR BLD AUTO: 72.9 % (ref 53–65)
NITRITE UR QL STRIP: NEGATIVE
NRBC # BLD AUTO: 0 X10E3/UL
NRBC, AUTO (.00): 0 %
NT-PROBNP SERPL-MCNC: 48 PG/ML (ref 1–125)
PCO2 BLDA: 73 MMHG (ref 41–51)
PH SMN: 7.32 [PH] (ref 7.32–7.42)
PH UR STRIP: 5.5 PH UNITS
PLATELET # BLD AUTO: 218 K/UL (ref 150–400)
PO2 BLDA: 14 MMHG (ref 25–40)
POC BASE EXCESS: 8.5 MMOL/L (ref -2–3)
POC CO2: 39.8 MMOL/L
POC IONIZED CALCIUM: 1.24 MMOL/L (ref 1.15–1.35)
POC SATURATED O2: 14 % (ref 40–70)
POCT GLUCOSE: 104 MG/DL (ref 60–95)
POTASSIUM BLD-SCNC: 4.9 MMOL/L (ref 3.4–4.5)
POTASSIUM SERPL-SCNC: 4.9 MMOL/L (ref 3.5–5.1)
PROT SERPL-MCNC: 9.6 G/DL (ref 5.8–7.6)
PROT UR QL STRIP: 20
RBC # BLD AUTO: 4.76 M/UL (ref 4.2–5.4)
RBC # UR STRIP: ABNORMAL /UL
RBC #/AREA URNS HPF: 33 /HPF
SODIUM BLD-SCNC: 141 MMOL/L (ref 136–145)
SODIUM SERPL-SCNC: 143 MMOL/L (ref 136–145)
SP GR UR STRIP: 1.02
SQUAMOUS #/AREA URNS LPF: ABNORMAL /HPF
TROPONIN I SERPL HS-MCNC: 2.9 NG/L
UROBILINOGEN UR STRIP-ACNC: NORMAL MG/DL
WBC # BLD AUTO: 18.58 K/UL (ref 4.5–11)
WBC #/AREA URNS HPF: 2 /HPF

## 2025-05-04 PROCEDURE — 83880 ASSAY OF NATRIURETIC PEPTIDE: CPT | Performed by: EMERGENCY MEDICINE

## 2025-05-04 PROCEDURE — 80053 COMPREHEN METABOLIC PANEL: CPT | Performed by: EMERGENCY MEDICINE

## 2025-05-04 PROCEDURE — 85014 HEMATOCRIT: CPT

## 2025-05-04 PROCEDURE — 85379 FIBRIN DEGRADATION QUANT: CPT | Performed by: EMERGENCY MEDICINE

## 2025-05-04 PROCEDURE — 96365 THER/PROPH/DIAG IV INF INIT: CPT

## 2025-05-04 PROCEDURE — 63600175 PHARM REV CODE 636 W HCPCS: Performed by: EMERGENCY MEDICINE

## 2025-05-04 PROCEDURE — 85025 COMPLETE CBC W/AUTO DIFF WBC: CPT | Performed by: EMERGENCY MEDICINE

## 2025-05-04 PROCEDURE — 87149 DNA/RNA DIRECT PROBE: CPT | Performed by: EMERGENCY MEDICINE

## 2025-05-04 PROCEDURE — 25500020 PHARM REV CODE 255: Performed by: EMERGENCY MEDICINE

## 2025-05-04 PROCEDURE — 96375 TX/PRO/DX INJ NEW DRUG ADDON: CPT

## 2025-05-04 PROCEDURE — 99285 EMERGENCY DEPT VISIT HI MDM: CPT | Mod: 25

## 2025-05-04 PROCEDURE — 81003 URINALYSIS AUTO W/O SCOPE: CPT | Performed by: EMERGENCY MEDICINE

## 2025-05-04 PROCEDURE — 83605 ASSAY OF LACTIC ACID: CPT

## 2025-05-04 PROCEDURE — 82947 ASSAY GLUCOSE BLOOD QUANT: CPT

## 2025-05-04 PROCEDURE — 25000003 PHARM REV CODE 250: Performed by: EMERGENCY MEDICINE

## 2025-05-04 PROCEDURE — 93005 ELECTROCARDIOGRAM TRACING: CPT

## 2025-05-04 PROCEDURE — 87040 BLOOD CULTURE FOR BACTERIA: CPT | Performed by: EMERGENCY MEDICINE

## 2025-05-04 PROCEDURE — 84132 ASSAY OF SERUM POTASSIUM: CPT

## 2025-05-04 PROCEDURE — 84484 ASSAY OF TROPONIN QUANT: CPT | Performed by: EMERGENCY MEDICINE

## 2025-05-04 PROCEDURE — 93010 ELECTROCARDIOGRAM REPORT: CPT | Mod: ,,, | Performed by: INTERNAL MEDICINE

## 2025-05-04 PROCEDURE — 82803 BLOOD GASES ANY COMBINATION: CPT

## 2025-05-04 PROCEDURE — 82330 ASSAY OF CALCIUM: CPT

## 2025-05-04 PROCEDURE — 84295 ASSAY OF SERUM SODIUM: CPT

## 2025-05-04 RX ORDER — ONDANSETRON 4 MG/1
8 TABLET, ORALLY DISINTEGRATING ORAL 2 TIMES DAILY
COMMUNITY
End: 2025-05-08 | Stop reason: SDUPTHER

## 2025-05-04 RX ORDER — IOPAMIDOL 755 MG/ML
100 INJECTION, SOLUTION INTRAVASCULAR
Status: COMPLETED | OUTPATIENT
Start: 2025-05-04 | End: 2025-05-04

## 2025-05-04 RX ORDER — ONDANSETRON HYDROCHLORIDE 2 MG/ML
4 INJECTION, SOLUTION INTRAVENOUS
Status: COMPLETED | OUTPATIENT
Start: 2025-05-04 | End: 2025-05-04

## 2025-05-04 RX ORDER — EZETIMIBE 10 MG/1
10 TABLET ORAL DAILY
COMMUNITY
End: 2025-05-08 | Stop reason: SDUPTHER

## 2025-05-04 RX ORDER — LEVOFLOXACIN 500 MG/1
500 TABLET, FILM COATED ORAL DAILY
Qty: 10 TABLET | Refills: 0 | Status: ON HOLD | OUTPATIENT
Start: 2025-05-04 | End: 2025-05-14

## 2025-05-04 RX ORDER — LEVOFLOXACIN 5 MG/ML
750 INJECTION, SOLUTION INTRAVENOUS
Status: DISCONTINUED | OUTPATIENT
Start: 2025-05-04 | End: 2025-05-04 | Stop reason: HOSPADM

## 2025-05-04 RX ADMIN — IOPAMIDOL 100 ML: 755 INJECTION, SOLUTION INTRAVENOUS at 02:05

## 2025-05-04 RX ADMIN — ONDANSETRON 4 MG: 2 INJECTION INTRAMUSCULAR; INTRAVENOUS at 05:05

## 2025-05-04 RX ADMIN — LEVOFLOXACIN 750 MG: 750 INJECTION, SOLUTION INTRAVENOUS at 03:05

## 2025-05-04 RX ADMIN — SODIUM CHLORIDE 1000 ML: 9 INJECTION, SOLUTION INTRAVENOUS at 03:05

## 2025-05-04 NOTE — ED PROVIDER NOTES
Encounter Date: 5/4/2025    SCRIBE #1 NOTE: I, Antony Sarabia, am scribing for, and in the presence of,  Bud Yancey MD. I have scribed the entire note.       Chief Complaint   Patient presents with    Shortness of Breath    Chest Pain     Pt presents to ED via [facility] with c/o SOB and a little bit of chest pain that started this morning. Pt has trachea in place and is on 4 L Oxygen at home. Pt also presents with PEG tube.      Karie Denney is a 59 y.o. female presenting to the ED via EMS for SOB and tachycardia that began this morning. PT reports that she does breathing treatments at home. She reports chest pain but denies cough and fever. According to her daughter, PT woke up feeling fine but started stating that she did not feel well. Her daughter checked her O2 which read 73. She then did a breathing treatment that brought her O2 reading into the 80s. Afterwards, her daughter called PT's home health nurse. The nurse told her to call 911. PT has Hx of GERD and Paraplegia. PT daughter reports that PT has been in the hospital for 3 months and went home 4/30/25.     The history is provided by the patient and a relative (daughter). No  was used.     Review of patient's allergies indicates:   Allergen Reactions    Penicillins Anaphylaxis     Past Medical History:   Diagnosis Date    GERD (gastroesophageal reflux disease)     Paraplegia      Past Surgical History:   Procedure Laterality Date    BACK SURGERY       Family History   Family history unknown: Yes     Social History[1]  Review of Systems   Constitutional:  Negative for fever.   Respiratory:  Positive for shortness of breath. Negative for cough.    Cardiovascular:  Positive for chest pain.   All other systems reviewed and are negative.      Physical Exam     Initial Vitals [05/04/25 1218]   BP Pulse Resp Temp SpO2   123/70 (!) 123 15 97.6 °F (36.4 °C) 98 %      MAP       --         Physical Exam    Constitutional: She appears  well-developed and well-nourished.   HENT:   Head: Normocephalic and atraumatic.   Right Ear: External ear normal.   Left Ear: External ear normal.   Nose: Nose normal. Mouth/Throat: Oropharynx is clear and moist.   Eyes: Conjunctivae and EOM are normal. Pupils are equal, round, and reactive to light.   Neck: Neck supple.   Normal range of motion.  Cardiovascular:  Regular rhythm and normal heart sounds.   Tachycardia present.         Pulmonary/Chest: She has rhonchi.   Abdominal: Abdomen is soft. Bowel sounds are normal.   Musculoskeletal:      Cervical back: Normal range of motion and neck supple.      Comments: PT is paraplegic.      Neurological: She is alert and oriented to person, place, and time.   Skin: Skin is warm and dry.   Psychiatric: She has a normal mood and affect. Her behavior is normal. Thought content normal.       ED Course   Procedures  Labs Reviewed   COMPREHENSIVE METABOLIC PANEL - Abnormal       Result Value    Sodium 143      Potassium 4.9      Chloride 102      CO2 27      Anion Gap 19 (*)     Glucose 105      BUN 25 (*)     Creatinine 0.66      BUN/Creatinine Ratio 38 (*)     Calcium 11.0 (*)     Total Protein 9.6 (*)     Albumin 4.0      Globulin 5.6 (*)     A/G Ratio 0.7      Bilirubin, Total 0.5      Alk Phos 74      ALT 34      AST 57 (*)     eGFR 100     URINALYSIS, REFLEX TO URINE CULTURE - Abnormal    Color, UA Yellow      Clarity, UA Turbid      pH, UA 5.5      Leukocytes, UA Negative      Nitrites, UA Negative      Protein, UA 20 (*)     Glucose, UA Normal      Ketones, UA Negative      Urobilinogen, UA Normal      Bilirubin, UA Negative      Blood, UA Small (*)     Specific Gravity, UA 1.020     D DIMER, QUANTITATIVE - Abnormal    D-Dimer 2.67 (*)    CBC WITH DIFFERENTIAL - Abnormal    WBC 18.58 (*)     RBC 4.76      Hemoglobin 11.7 (*)     Hematocrit 41.5      MCV 87.2      MCH 24.6 (*)     MCHC 28.2 (*)     RDW 17.6 (*)     Platelet Count 218      MPV 12.2      Neutrophils %  72.9 (*)     Lymphocytes % 19.3 (*)     Monocytes % 6.0      Eosinophils % 1.0      Basophils % 0.3      Immature Granulocytes % 0.5 (*)     nRBC, Auto 0.0      Neutrophils, Abs 13.55 (*)     Lymphocytes, Absolute 3.59      Monocytes, Absolute 1.11 (*)     Eosinophils, Absolute 0.19      Basophils, Absolute 0.05      Immature Granulocytes, Absolute 0.09 (*)     nRBC, Absolute 0.00      Diff Type Auto     URINALYSIS, MICROSCOPIC - Abnormal    WBC, UA 2      RBC, UA 33 (*)     Squamous Epithelial Cells, UA Moderate (*)     Mucous Occasional (*)    TROPONIN I - Normal    Troponin I High Sensitivity 2.9     NT-PRO NATRIURETIC PEPTIDE - Normal    ProBNP 48     CULTURE, BLOOD   CULTURE, BLOOD   CBC W/ AUTO DIFFERENTIAL    Narrative:     The following orders were created for panel order CBC auto differential.  Procedure                               Abnormality         Status                     ---------                               -----------         ------                     CBC with Differential[[id number]]       Abnormal            Final result                 Please view results for these tests on the individual orders.     EKG Readings: (Independently Interpreted)   Sinus tachycardia  Short VT interval  Rightward axis  rSr'(V1) - probable normal variant  Small inferior Q waves: infarct cannot be excluded  Anterolateral ST-T abnormality is nonspecific  Borderline ECG          Imaging Results               CTA Chest Non-Coronary (PE Studies) (Final result)  Result time 05/04/25 15:27:20      Final result by Issa Rodriguez MD (05/04/25 15:27:20)                   Impression:      1. No evidence of pulmonary embolism..  2. Bibasilar patchy infiltrate mild consolidation left greater than right could represent infection/pneumonia.  See above comments.  Follow-up recommended.  3. Mild splenomegaly.  4. This report was flagged in Epic as abnormal.      Electronically signed by: Issa  Tracie  Date:    05/04/2025  Time:    15:27               Narrative:    EXAMINATION:  CTA CHEST NON CORONARY (PE STUDIES)    CLINICAL HISTORY:  Pulmonary embolism (PE) suspected, positive D-dimer;    TECHNIQUE:  Low dose axial images, sagittal and coronal reformations were obtained from the thoracic inlet to the lung bases following the IV administration of 100 mL of Omnipaque 350.  Contrast timing was optimized to evaluate the pulmonary arteries.  MIP images were performed.    COMPARISON:  None    FINDINGS:  Pulmonary arteries:Pulmonary arteries are adequately enhanced.No filling defects to indicate pulmonary embolism.    Thoracic soft tissues: Unremarkable.    Aorta: Left-sided aortic arch.  No aneurysm or dissection.    Heart: Normal size. No effusion.    Kyra/Mediastinum: No pathologic jeremias enlargement.    Airways: Tracheostomy tube is present.    Lungs/Pleura: Bibasilar mild infiltrates and mild consolidation left greater than right could represent infection/pneumonia.    Minimal subsegmental bronchial plugging could be associated with inspiration or infection.    Esophagus: Unremarkable.    Upper Abdomen: Mild splenomegaly.    Gastrostomy 2 anteriorly in the upper abdomen on the left.    Bones: No acute fracture. No suspicious lytic or sclerotic lesions.                                       X-Ray Chest AP Portable (Final result)  Result time 05/04/25 12:38:33      Final result by Marcos Luna MD (05/04/25 12:38:33)                   Impression:      No convincing evidence of acute cardiopulmonary disease.      Electronically signed by: Marcos Luna  Date:    05/04/2025  Time:    12:38               Narrative:    EXAMINATION:  XR CHEST AP PORTABLE    CLINICAL HISTORY:  Dyspnea;    TECHNIQUE:  Single frontal view of the chest was performed.    COMPARISON:  Chest radiograph performed 04/23/2025, 06:01 hours.    FINDINGS:  Monitoring leads overlie the chest.  Tracheostomy cannula in-situ.    Grossly  unchanged cardiac contours.  Chronic interstitial coarsening, similar to prior.    No definite acute appearing focal airspace consolidation.    Atherosclerosis of the aorta.    No acute findings in the visualized abdomen.    Osseous and soft tissue structures appear without definite acute change.                                    X-Rays:   Independently Interpreted Readings:   Chest X-Ray: FINDINGS:  Monitoring leads overlie the chest.  Tracheostomy cannula in-situ.     Grossly unchanged cardiac contours.  Chronic interstitial coarsening, similar to prior.     No definite acute appearing focal airspace consolidation.     Atherosclerosis of the aorta.     No acute findings in the visualized abdomen.     Osseous and soft tissue structures appear without definite acute change.     Impression:     No convincing evidence of acute cardiopulmonary disease.     Other Readings:  CTA Chest Non-Coronary (PE Studies)  FINDINGS:  Pulmonary arteries:Pulmonary arteries are adequately enhanced.No filling defects to indicate pulmonary embolism.     Thoracic soft tissues: Unremarkable.     Aorta: Left-sided aortic arch.  No aneurysm or dissection.     Heart: Normal size. No effusion.     Kyra/Mediastinum: No pathologic jeremias enlargement.     Airways: Tracheostomy tube is present.     Lungs/Pleura: Bibasilar mild infiltrates and mild consolidation left greater than right could represent infection/pneumonia.     Minimal subsegmental bronchial plugging could be associated with inspiration or infection.     Esophagus: Unremarkable.     Upper Abdomen: Mild splenomegaly.     Gastrostomy 2 anteriorly in the upper abdomen on the left.     Bones: No acute fracture. No suspicious lytic or sclerotic lesions.     Impression:     1. No evidence of pulmonary embolism..  2. Bibasilar patchy infiltrate mild consolidation left greater than right could represent infection/pneumonia.  See above comments.  Follow-up recommended.  3. Mild  splenomegaly.  4. This report was flagged in Epic as abnormal.      Medications   levoFLOXacin 750 mg/150 mL IVPB 750 mg (750 mg Intravenous New Bag 5/4/25 1512)   sodium chloride 0.9% bolus 1,000 mL 1,000 mL (1,000 mLs Intravenous New Bag 5/4/25 1511)   iopamidoL (ISOVUE-370) injection 100 mL (100 mLs Intravenous Given 5/4/25 1457)     Medical Decision Making  Amount and/or Complexity of Data Reviewed  Labs: ordered.  Radiology: ordered.    Risk  Prescription drug management.              Attending Attestation:           Physician Attestation for Scribe:  Physician Attestation Statement for Scribe #1: I, Bud Yancey MD, reviewed documentation, as scribed by Antony Sarabia in my presence, and it is both accurate and complete.             ED Course as of 05/04/25 1621   Sun May 04, 2025   1218 Medical decision-making:  Differential diagnosis includes shortness breath, hypoxia, tachycardia, UTI, pneumonia, dehydration, CHF, STEMI, NSTEMI, ACS.  All testing ordered and interpreted by me. [BB]   1333 Venous blood gas shows normal pH, elevated pCO2 of 73.  Lactic acid normal.  Pro BNP is normal.  Troponin is normal.  CBC [place] 34[address] white blood count 2 weeks ago was normal. [BB]   1334 CMP shows elevated BUN to creatinine ratio which appears to be chronic for patient.  Otherwise unremarkable.  D-dimer is elevated at 2.67.  Will obtain CT chest. [BB]   1359 Chest x-ray by my interpretation shows no acute disease. [BB]   1401 Urinalysis is normal.  Pro BNP is normal.  Troponin is normal. [BB]   1402 Awaiting CT chest. [BB]   1513 Urinalysis is negative. [BB]   1513 Awaiting report of CT chest. [BB]   1604 CTA shows no pulmonary embolism.  There are bibasilar patchy infiltrates consistent with pneumonia. [BB]   1615 Oxygen saturations have remained good throughout seen emergency department.  Patient has had no wheezing or significant breathing difficulty.  Patient states that she does thinks she  feels slightly worse than when she was discharged from the hospital. [BB]   1619 I discussed admission versus outpatient antibiotics and patient wants to try outpatient therapy at this time.  Clinically she looks good and oxygen saturations have been fine. [BB]      ED Course User Index  [BB] Bud Yancey MD                           Clinical Impression:  Final diagnoses:  [R06.02] Shortness of breath  [J18.9] Pneumonia of both lower lobes due to infectious organism (Primary)          ED Disposition Condition    Discharge Stable          ED Prescriptions       Medication Sig Dispense Start Date End Date Auth. Provider    levoFLOXacin (LEVAQUIN) 500 MG tablet Take 1 tablet (500 mg total) by mouth once daily. for 10 days 10 tablet 5/4/2025 5/14/2025 Bud Yancey MD          Follow-up Information    None            [1]   Social History  Tobacco Use    Smoking status: Never    Smokeless tobacco: Never   Substance Use Topics    Alcohol use: Never    Drug use: Never        Bud Yancey MD  05/04/25 2983

## 2025-05-04 NOTE — ED NOTES
[facility] paperwork faxed for transport home to [address] [place] [address] [address] [place] [address]. Patient relative at home awaiting arrival Vee Herbert [phone].

## 2025-05-04 NOTE — DISCHARGE INSTRUCTIONS
Take antibiotics as prescribed starting tomorrow.  Return to emergency department for any worsening or further problems.  Follow up in clinic with primary care provider as previously scheduled.

## 2025-05-05 ENCOUNTER — TELEPHONE (OUTPATIENT)
Dept: EMERGENCY MEDICINE | Facility: HOSPITAL | Age: 60
End: 2025-05-05
Payer: MEDICARE

## 2025-05-05 LAB
OHS QRS DURATION: 90 MS
OHS QTC CALCULATION: 436 MS
VERIGENE RESULT: ABNORMAL

## 2025-05-08 ENCOUNTER — OFFICE VISIT (OUTPATIENT)
Dept: FAMILY MEDICINE | Facility: CLINIC | Age: 60
End: 2025-05-08
Payer: MEDICARE

## 2025-05-08 VITALS — HEART RATE: 108 BPM | SYSTOLIC BLOOD PRESSURE: 118 MMHG | DIASTOLIC BLOOD PRESSURE: 82 MMHG | OXYGEN SATURATION: 91 %

## 2025-05-08 DIAGNOSIS — Z76.89 ENCOUNTER TO ESTABLISH CARE WITH NEW PROVIDER: Primary | ICD-10-CM

## 2025-05-08 DIAGNOSIS — G82.50 QUADRIPLEGIA: ICD-10-CM

## 2025-05-08 DIAGNOSIS — E78.5 HYPERLIPIDEMIA, UNSPECIFIED HYPERLIPIDEMIA TYPE: ICD-10-CM

## 2025-05-08 DIAGNOSIS — J98.4 PNEUMONITIS: ICD-10-CM

## 2025-05-08 DIAGNOSIS — J96.12 CHRONIC RESPIRATORY FAILURE WITH HYPERCAPNIA: ICD-10-CM

## 2025-05-08 DIAGNOSIS — I25.10 ATHEROSCLEROTIC CARDIOVASCULAR DISEASE: ICD-10-CM

## 2025-05-08 DIAGNOSIS — F32.A DEPRESSION, UNSPECIFIED DEPRESSION TYPE: ICD-10-CM

## 2025-05-08 DIAGNOSIS — R11.0 NAUSEA: ICD-10-CM

## 2025-05-08 DIAGNOSIS — I69.354 HEMIPLEGIA AND HEMIPARESIS FOLLOWING CEREBRAL INFARCTION AFFECTING LEFT NON-DOMINANT SIDE: ICD-10-CM

## 2025-05-08 DIAGNOSIS — J98.11 ATELECTASIS: ICD-10-CM

## 2025-05-08 DIAGNOSIS — E55.9 VITAMIN D DEFICIENCY: ICD-10-CM

## 2025-05-08 DIAGNOSIS — G90.3 NEUROGENIC ORTHOSTATIC HYPOTENSION: ICD-10-CM

## 2025-05-08 DIAGNOSIS — K21.9 GASTROESOPHAGEAL REFLUX DISEASE WITHOUT ESOPHAGITIS: ICD-10-CM

## 2025-05-08 DIAGNOSIS — M21.371 FOOT DROP, BILATERAL: ICD-10-CM

## 2025-05-08 DIAGNOSIS — L89.323 PRESSURE ULCER OF LEFT BUTTOCK, STAGE 3: ICD-10-CM

## 2025-05-08 DIAGNOSIS — M24.50 CONTRACTURE OF JOINT OF MULTIPLE SITES: ICD-10-CM

## 2025-05-08 DIAGNOSIS — Z74.01 CONFINED TO BED: ICD-10-CM

## 2025-05-08 DIAGNOSIS — Z87.19 HISTORY OF ULCERATIVE COLITIS: ICD-10-CM

## 2025-05-08 DIAGNOSIS — L89.90 PRESSURE ULCERS OF SKIN OF MULTIPLE TOPOGRAPHIC SITES: ICD-10-CM

## 2025-05-08 DIAGNOSIS — R54 FRAILTY: ICD-10-CM

## 2025-05-08 DIAGNOSIS — G47.00 INSOMNIA, UNSPECIFIED TYPE: ICD-10-CM

## 2025-05-08 DIAGNOSIS — K59.04 CHRONIC IDIOPATHIC CONSTIPATION: ICD-10-CM

## 2025-05-08 DIAGNOSIS — Z99.81 O2 DEPENDENT: ICD-10-CM

## 2025-05-08 DIAGNOSIS — Z93.1 GASTROSTOMY IN PLACE: ICD-10-CM

## 2025-05-08 DIAGNOSIS — G89.4 CHRONIC PAIN SYNDROME: ICD-10-CM

## 2025-05-08 DIAGNOSIS — G47.33 OSA (OBSTRUCTIVE SLEEP APNEA): ICD-10-CM

## 2025-05-08 DIAGNOSIS — M21.372 FOOT DROP, BILATERAL: ICD-10-CM

## 2025-05-08 DIAGNOSIS — R13.10 DYSPHAGIA, UNSPECIFIED TYPE: ICD-10-CM

## 2025-05-08 DIAGNOSIS — Z93.0 TRACHEOSTOMY IN PLACE: ICD-10-CM

## 2025-05-08 DIAGNOSIS — H04.123 DRY EYE SYNDROME OF BOTH LACRIMAL GLANDS: ICD-10-CM

## 2025-05-08 DIAGNOSIS — D50.8 OTHER IRON DEFICIENCY ANEMIA: ICD-10-CM

## 2025-05-08 DIAGNOSIS — I31.39 PERICARDIAL EFFUSION (NONINFLAMMATORY): ICD-10-CM

## 2025-05-08 LAB
BACTERIA BLD CULT: ABNORMAL
GRAM STN SPEC: ABNORMAL

## 2025-05-08 PROCEDURE — 99344 HOME/RES VST NEW MOD MDM 60: CPT | Mod: ,,,

## 2025-05-08 RX ORDER — LINACLOTIDE 290 UG/1
290 CAPSULE, GELATIN COATED ORAL
Qty: 90 CAPSULE | Refills: 1 | Status: SHIPPED | OUTPATIENT
Start: 2025-05-08

## 2025-05-08 RX ORDER — HYDROCODONE BITARTRATE AND ACETAMINOPHEN 5; 325 MG/1; MG/1
1 TABLET ORAL EVERY 6 HOURS PRN
Qty: 30 TABLET | Refills: 0 | Status: SHIPPED | OUTPATIENT
Start: 2025-05-08

## 2025-05-08 RX ORDER — OMEPRAZOLE 40 MG/1
40 CAPSULE, DELAYED RELEASE ORAL EVERY MORNING
Qty: 90 CAPSULE | Refills: 1 | Status: SHIPPED | OUTPATIENT
Start: 2025-05-08

## 2025-05-08 RX ORDER — SERTRALINE HYDROCHLORIDE 50 MG/1
50 TABLET, FILM COATED ORAL DAILY
Qty: 90 TABLET | Refills: 1 | Status: SHIPPED | OUTPATIENT
Start: 2025-05-08

## 2025-05-08 RX ORDER — ONDANSETRON 4 MG/1
8 TABLET, ORALLY DISINTEGRATING ORAL 2 TIMES DAILY
Qty: 120 TABLET | Refills: 0 | Status: SHIPPED | OUTPATIENT
Start: 2025-05-08 | End: 2025-06-07

## 2025-05-08 RX ORDER — LEVALBUTEROL INHALATION SOLUTION 1.25 MG/3ML
1.25 SOLUTION RESPIRATORY (INHALATION) 2 TIMES DAILY
Qty: 180 ML | Refills: 2 | Status: ON HOLD | OUTPATIENT
Start: 2025-05-08 | End: 2025-05-15 | Stop reason: HOSPADM

## 2025-05-08 RX ORDER — HYDROCODONE BITARTRATE AND ACETAMINOPHEN 5; 325 MG/1; MG/1
1 TABLET ORAL EVERY 6 HOURS PRN
COMMUNITY
End: 2025-05-08 | Stop reason: SDUPTHER

## 2025-05-08 RX ORDER — HYDROCODONE BITARTRATE AND ACETAMINOPHEN 5; 325 MG/1; MG/1
1 TABLET ORAL EVERY 6 HOURS PRN
Qty: 30 TABLET | Refills: 0 | Status: SHIPPED | OUTPATIENT
Start: 2025-06-08

## 2025-05-08 RX ORDER — EZETIMIBE 10 MG/1
10 TABLET ORAL DAILY
Qty: 90 TABLET | Refills: 1 | Status: SHIPPED | OUTPATIENT
Start: 2025-05-08

## 2025-05-08 RX ORDER — HYDROCODONE BITARTRATE AND ACETAMINOPHEN 5; 325 MG/1; MG/1
1 TABLET ORAL EVERY 6 HOURS PRN
Qty: 30 TABLET | Refills: 0 | Status: SHIPPED | OUTPATIENT
Start: 2025-07-08

## 2025-05-08 RX ORDER — MIDODRINE HYDROCHLORIDE 5 MG/1
10 TABLET ORAL 3 TIMES DAILY
Qty: 180 TABLET | Refills: 11 | Status: SHIPPED | OUTPATIENT
Start: 2025-05-08 | End: 2026-05-08

## 2025-05-08 RX ORDER — ATORVASTATIN CALCIUM 20 MG/1
20 TABLET, FILM COATED ORAL DAILY
Qty: 90 TABLET | Refills: 1 | Status: SHIPPED | OUTPATIENT
Start: 2025-05-08

## 2025-05-08 RX ORDER — ASPIRIN 325 MG
50000 TABLET, DELAYED RELEASE (ENTERIC COATED) ORAL
Qty: 8 CAPSULE | Refills: 0 | Status: SHIPPED | OUTPATIENT
Start: 2025-05-08

## 2025-05-08 RX ORDER — PREGABALIN 75 MG/1
75 CAPSULE ORAL 2 TIMES DAILY
Qty: 180 CAPSULE | Refills: 1 | Status: SHIPPED | OUTPATIENT
Start: 2025-05-08

## 2025-05-08 RX ORDER — SCOPOLAMINE 1 MG/3D
1 PATCH, EXTENDED RELEASE TRANSDERMAL
Qty: 10 PATCH | Refills: 2 | Status: SHIPPED | OUTPATIENT
Start: 2025-05-08 | End: 2025-08-06

## 2025-05-08 RX ORDER — TRAZODONE HYDROCHLORIDE 100 MG/1
100 TABLET ORAL NIGHTLY
Qty: 90 TABLET | Refills: 1 | Status: SHIPPED | OUTPATIENT
Start: 2025-05-08

## 2025-05-09 ENCOUNTER — HOSPITAL ENCOUNTER (INPATIENT)
Facility: HOSPITAL | Age: 60
LOS: 5 days | Discharge: NURSING, REHAB OR LONG-TERM CARE | DRG: 189 | End: 2025-05-15
Attending: EMERGENCY MEDICINE
Payer: MEDICARE

## 2025-05-09 DIAGNOSIS — R09.02 HYPOXIA: Primary | ICD-10-CM

## 2025-05-09 DIAGNOSIS — R00.0 TACHYCARDIA WITH HEART RATE 100-120 BEATS PER MINUTE: ICD-10-CM

## 2025-05-09 DIAGNOSIS — R07.9 CHEST PAIN: ICD-10-CM

## 2025-05-09 DIAGNOSIS — J96.21 ACUTE ON CHRONIC RESPIRATORY FAILURE WITH HYPOXIA AND HYPERCAPNIA: ICD-10-CM

## 2025-05-09 DIAGNOSIS — J96.22 ACUTE ON CHRONIC RESPIRATORY FAILURE WITH HYPOXIA AND HYPERCAPNIA: ICD-10-CM

## 2025-05-09 DIAGNOSIS — R06.02 SHORTNESS OF BREATH: ICD-10-CM

## 2025-05-09 LAB
ALBUMIN SERPL BCP-MCNC: 3.4 G/DL (ref 3.4–4.8)
ALBUMIN/GLOB SERPL: 0.6 {RATIO}
ALP SERPL-CCNC: 121 U/L (ref 40–150)
ALT SERPL W P-5'-P-CCNC: 74 U/L
ANION GAP SERPL CALCULATED.3IONS-SCNC: 18 MMOL/L (ref 7–16)
AST SERPL W P-5'-P-CCNC: 63 U/L (ref 11–45)
BASOPHILS # BLD AUTO: 0.04 K/UL (ref 0–0.2)
BASOPHILS NFR BLD AUTO: 0.3 % (ref 0–1)
BILIRUB SERPL-MCNC: 0.4 MG/DL
BUN SERPL-MCNC: 23 MG/DL (ref 10–20)
BUN/CREAT SERPL: 38 (ref 6–20)
CALCIUM SERPL-MCNC: 11.1 MG/DL (ref 8.4–10.2)
CHLORIDE SERPL-SCNC: 99 MMOL/L (ref 98–107)
CO2 SERPL-SCNC: 32 MMOL/L (ref 23–31)
CREAT SERPL-MCNC: 0.61 MG/DL (ref 0.55–1.02)
DIFFERENTIAL METHOD BLD: ABNORMAL
EGFR (NO RACE VARIABLE) (RUSH/TITUS): 102 ML/MIN/1.73M2
EOSINOPHIL # BLD AUTO: 0.23 K/UL (ref 0–0.5)
EOSINOPHIL NFR BLD AUTO: 1.9 % (ref 1–4)
ERYTHROCYTE [DISTWIDTH] IN BLOOD BY AUTOMATED COUNT: 17.4 % (ref 11.5–14.5)
GLOBULIN SER-MCNC: 5.8 G/DL (ref 2–4)
GLUCOSE SERPL-MCNC: 95 MG/DL (ref 82–115)
HCT VFR BLD AUTO: 38.2 % (ref 38–47)
HGB BLD-MCNC: 10.7 G/DL (ref 12–16)
IMM GRANULOCYTES # BLD AUTO: 0.06 K/UL (ref 0–0.04)
IMM GRANULOCYTES NFR BLD: 0.5 % (ref 0–0.4)
LYMPHOCYTES # BLD AUTO: 2.1 K/UL (ref 1–4.8)
LYMPHOCYTES NFR BLD AUTO: 17.2 % (ref 27–41)
MCH RBC QN AUTO: 24.3 PG (ref 27–31)
MCHC RBC AUTO-ENTMCNC: 28 G/DL (ref 32–36)
MCV RBC AUTO: 86.6 FL (ref 80–96)
MONOCYTES # BLD AUTO: 0.69 K/UL (ref 0–0.8)
MONOCYTES NFR BLD AUTO: 5.7 % (ref 2–6)
MPC BLD CALC-MCNC: 10.8 FL (ref 9.4–12.4)
NEUTROPHILS # BLD AUTO: 9.07 K/UL (ref 1.8–7.7)
NEUTROPHILS NFR BLD AUTO: 74.4 % (ref 53–65)
NRBC # BLD AUTO: 0 X10E3/UL
NRBC, AUTO (.00): 0 %
NT-PROBNP SERPL-MCNC: 20 PG/ML (ref 1–125)
PLATELET # BLD AUTO: 220 K/UL (ref 150–400)
POTASSIUM SERPL-SCNC: 4.5 MMOL/L (ref 3.5–5.1)
PROT SERPL-MCNC: 9.2 G/DL (ref 5.8–7.6)
RBC # BLD AUTO: 4.41 M/UL (ref 4.2–5.4)
SODIUM SERPL-SCNC: 144 MMOL/L (ref 136–145)
TROPONIN I SERPL HS-MCNC: <2.7 NG/L
WBC # BLD AUTO: 12.19 K/UL (ref 4.5–11)

## 2025-05-09 PROCEDURE — G0378 HOSPITAL OBSERVATION PER HR: HCPCS

## 2025-05-09 PROCEDURE — 99900026 HC AIRWAY MAINTENANCE (STAT)

## 2025-05-09 PROCEDURE — 99223 1ST HOSP IP/OBS HIGH 75: CPT | Mod: AI,,,

## 2025-05-09 PROCEDURE — 99285 EMERGENCY DEPT VISIT HI MDM: CPT | Mod: 25

## 2025-05-09 PROCEDURE — 27200966 HC CLOSED SUCTION SYSTEM

## 2025-05-09 PROCEDURE — 94640 AIRWAY INHALATION TREATMENT: CPT

## 2025-05-09 PROCEDURE — 96372 THER/PROPH/DIAG INJ SC/IM: CPT

## 2025-05-09 PROCEDURE — 80053 COMPREHEN METABOLIC PANEL: CPT | Performed by: EMERGENCY MEDICINE

## 2025-05-09 PROCEDURE — 96374 THER/PROPH/DIAG INJ IV PUSH: CPT

## 2025-05-09 PROCEDURE — 25000003 PHARM REV CODE 250

## 2025-05-09 PROCEDURE — 99406 BEHAV CHNG SMOKING 3-10 MIN: CPT

## 2025-05-09 PROCEDURE — 36415 COLL VENOUS BLD VENIPUNCTURE: CPT | Performed by: EMERGENCY MEDICINE

## 2025-05-09 PROCEDURE — 93005 ELECTROCARDIOGRAM TRACING: CPT

## 2025-05-09 PROCEDURE — 94640 AIRWAY INHALATION TREATMENT: CPT | Mod: XB

## 2025-05-09 PROCEDURE — 84484 ASSAY OF TROPONIN QUANT: CPT | Performed by: EMERGENCY MEDICINE

## 2025-05-09 PROCEDURE — 94761 N-INVAS EAR/PLS OXIMETRY MLT: CPT

## 2025-05-09 PROCEDURE — 27000221 HC OXYGEN, UP TO 24 HOURS

## 2025-05-09 PROCEDURE — 63600175 PHARM REV CODE 636 W HCPCS

## 2025-05-09 PROCEDURE — 85025 COMPLETE CBC W/AUTO DIFF WBC: CPT | Performed by: EMERGENCY MEDICINE

## 2025-05-09 PROCEDURE — 25000242 PHARM REV CODE 250 ALT 637 W/ HCPCS

## 2025-05-09 PROCEDURE — 83880 ASSAY OF NATRIURETIC PEPTIDE: CPT | Performed by: EMERGENCY MEDICINE

## 2025-05-09 PROCEDURE — 99900035 HC TECH TIME PER 15 MIN (STAT)

## 2025-05-09 PROCEDURE — 25000242 PHARM REV CODE 250 ALT 637 W/ HCPCS: Performed by: EMERGENCY MEDICINE

## 2025-05-09 PROCEDURE — 94799 UNLISTED PULMONARY SVC/PX: CPT

## 2025-05-09 RX ORDER — ASPIRIN 325 MG
50000 TABLET, DELAYED RELEASE (ENTERIC COATED) ORAL
Status: DISCONTINUED | OUTPATIENT
Start: 2025-05-09 | End: 2025-05-11

## 2025-05-09 RX ORDER — TRAZODONE HYDROCHLORIDE 50 MG/1
100 TABLET ORAL NIGHTLY
Status: DISCONTINUED | OUTPATIENT
Start: 2025-05-09 | End: 2025-05-15 | Stop reason: HOSPADM

## 2025-05-09 RX ORDER — HYDROCODONE BITARTRATE AND ACETAMINOPHEN 10; 325 MG/1; MG/1
1 TABLET ORAL EVERY 6 HOURS PRN
Refills: 0 | Status: DISCONTINUED | OUTPATIENT
Start: 2025-05-09 | End: 2025-05-15 | Stop reason: HOSPADM

## 2025-05-09 RX ORDER — HYDROCODONE BITARTRATE AND ACETAMINOPHEN 5; 325 MG/1; MG/1
1 TABLET ORAL EVERY 6 HOURS PRN
Refills: 0 | Status: DISCONTINUED | OUTPATIENT
Start: 2025-05-09 | End: 2025-05-15 | Stop reason: HOSPADM

## 2025-05-09 RX ORDER — NALOXONE HCL 0.4 MG/ML
0.02 VIAL (ML) INJECTION
Status: DISCONTINUED | OUTPATIENT
Start: 2025-05-09 | End: 2025-05-15 | Stop reason: HOSPADM

## 2025-05-09 RX ORDER — SCOPOLAMINE 1 MG/3D
1 PATCH, EXTENDED RELEASE TRANSDERMAL
Status: DISCONTINUED | OUTPATIENT
Start: 2025-05-09 | End: 2025-05-14

## 2025-05-09 RX ORDER — LEVOFLOXACIN 500 MG/1
500 TABLET, FILM COATED ORAL DAILY
Status: COMPLETED | OUTPATIENT
Start: 2025-05-09 | End: 2025-05-13

## 2025-05-09 RX ORDER — GLUCAGON 1 MG
1 KIT INJECTION
Status: DISCONTINUED | OUTPATIENT
Start: 2025-05-09 | End: 2025-05-15 | Stop reason: HOSPADM

## 2025-05-09 RX ORDER — PANTOPRAZOLE SODIUM 40 MG/1
40 TABLET, DELAYED RELEASE ORAL DAILY
Status: DISCONTINUED | OUTPATIENT
Start: 2025-05-09 | End: 2025-05-15 | Stop reason: HOSPADM

## 2025-05-09 RX ORDER — ACETAMINOPHEN 325 MG/1
650 TABLET ORAL EVERY 4 HOURS PRN
Status: DISCONTINUED | OUTPATIENT
Start: 2025-05-09 | End: 2025-05-15 | Stop reason: HOSPADM

## 2025-05-09 RX ORDER — IBUPROFEN 200 MG
16 TABLET ORAL
Status: DISCONTINUED | OUTPATIENT
Start: 2025-05-09 | End: 2025-05-15 | Stop reason: HOSPADM

## 2025-05-09 RX ORDER — LANOLIN ALCOHOL/MO/W.PET/CERES
800 CREAM (GRAM) TOPICAL
Status: DISCONTINUED | OUTPATIENT
Start: 2025-05-09 | End: 2025-05-15 | Stop reason: HOSPADM

## 2025-05-09 RX ORDER — SERTRALINE HYDROCHLORIDE 50 MG/1
50 TABLET, FILM COATED ORAL DAILY
Status: DISCONTINUED | OUTPATIENT
Start: 2025-05-09 | End: 2025-05-15 | Stop reason: HOSPADM

## 2025-05-09 RX ORDER — IPRATROPIUM BROMIDE AND ALBUTEROL SULFATE 2.5; .5 MG/3ML; MG/3ML
3 SOLUTION RESPIRATORY (INHALATION)
Status: DISCONTINUED | OUTPATIENT
Start: 2025-05-09 | End: 2025-05-11

## 2025-05-09 RX ORDER — IBUPROFEN 200 MG
24 TABLET ORAL
Status: DISCONTINUED | OUTPATIENT
Start: 2025-05-09 | End: 2025-05-15 | Stop reason: HOSPADM

## 2025-05-09 RX ORDER — HEPARIN SODIUM 5000 [USP'U]/ML
5000 INJECTION, SOLUTION INTRAVENOUS; SUBCUTANEOUS EVERY 8 HOURS
Status: DISCONTINUED | OUTPATIENT
Start: 2025-05-09 | End: 2025-05-15 | Stop reason: HOSPADM

## 2025-05-09 RX ORDER — MIDODRINE HYDROCHLORIDE 5 MG/1
10 TABLET ORAL 3 TIMES DAILY
Status: DISCONTINUED | OUTPATIENT
Start: 2025-05-09 | End: 2025-05-15 | Stop reason: HOSPADM

## 2025-05-09 RX ORDER — IPRATROPIUM BROMIDE AND ALBUTEROL SULFATE 2.5; .5 MG/3ML; MG/3ML
3 SOLUTION RESPIRATORY (INHALATION)
Status: COMPLETED | OUTPATIENT
Start: 2025-05-09 | End: 2025-05-09

## 2025-05-09 RX ORDER — TALC
6 POWDER (GRAM) TOPICAL NIGHTLY PRN
Status: DISCONTINUED | OUTPATIENT
Start: 2025-05-09 | End: 2025-05-15 | Stop reason: HOSPADM

## 2025-05-09 RX ORDER — SODIUM CHLORIDE 0.9 % (FLUSH) 0.9 %
10 SYRINGE (ML) INJECTION EVERY 12 HOURS PRN
Status: DISCONTINUED | OUTPATIENT
Start: 2025-05-09 | End: 2025-05-15 | Stop reason: HOSPADM

## 2025-05-09 RX ORDER — LEVALBUTEROL INHALATION SOLUTION 1.25 MG/3ML
1.25 SOLUTION RESPIRATORY (INHALATION) 2 TIMES DAILY
Status: DISCONTINUED | OUTPATIENT
Start: 2025-05-09 | End: 2025-05-09 | Stop reason: SDUPTHER

## 2025-05-09 RX ORDER — ONDANSETRON HYDROCHLORIDE 2 MG/ML
4 INJECTION, SOLUTION INTRAVENOUS EVERY 8 HOURS PRN
Status: DISCONTINUED | OUTPATIENT
Start: 2025-05-09 | End: 2025-05-15 | Stop reason: HOSPADM

## 2025-05-09 RX ORDER — EZETIMIBE 10 MG/1
10 TABLET ORAL DAILY
Status: DISCONTINUED | OUTPATIENT
Start: 2025-05-09 | End: 2025-05-15 | Stop reason: HOSPADM

## 2025-05-09 RX ORDER — ACETAMINOPHEN 325 MG/1
650 TABLET ORAL EVERY 8 HOURS PRN
Status: DISCONTINUED | OUTPATIENT
Start: 2025-05-09 | End: 2025-05-15 | Stop reason: HOSPADM

## 2025-05-09 RX ORDER — PREGABALIN 75 MG/1
75 CAPSULE ORAL 2 TIMES DAILY
Status: DISCONTINUED | OUTPATIENT
Start: 2025-05-09 | End: 2025-05-15 | Stop reason: HOSPADM

## 2025-05-09 RX ORDER — ATORVASTATIN CALCIUM 20 MG/1
20 TABLET, FILM COATED ORAL DAILY
Status: DISCONTINUED | OUTPATIENT
Start: 2025-05-09 | End: 2025-05-15 | Stop reason: HOSPADM

## 2025-05-09 RX ADMIN — IPRATROPIUM BROMIDE AND ALBUTEROL SULFATE 3 ML: 2.5; .5 SOLUTION RESPIRATORY (INHALATION) at 08:05

## 2025-05-09 RX ADMIN — LEVOFLOXACIN 500 MG: 500 TABLET, FILM COATED ORAL at 04:05

## 2025-05-09 RX ADMIN — PREGABALIN 75 MG: 75 CAPSULE ORAL at 09:05

## 2025-05-09 RX ADMIN — ONDANSETRON 4 MG: 2 INJECTION INTRAMUSCULAR; INTRAVENOUS at 02:05

## 2025-05-09 RX ADMIN — SERTRALINE HYDROCHLORIDE 50 MG: 50 TABLET ORAL at 04:05

## 2025-05-09 RX ADMIN — HEPARIN SODIUM 5000 UNITS: 5000 INJECTION, SOLUTION INTRAVENOUS; SUBCUTANEOUS at 09:05

## 2025-05-09 RX ADMIN — TRAZODONE HYDROCHLORIDE 100 MG: 50 TABLET ORAL at 09:05

## 2025-05-09 RX ADMIN — IPRATROPIUM BROMIDE AND ALBUTEROL SULFATE 3 ML: 2.5; .5 SOLUTION RESPIRATORY (INHALATION) at 09:05

## 2025-05-09 RX ADMIN — MIDODRINE HYDROCHLORIDE 10 MG: 5 TABLET ORAL at 04:05

## 2025-05-09 RX ADMIN — MIDODRINE HYDROCHLORIDE 10 MG: 5 TABLET ORAL at 09:05

## 2025-05-09 RX ADMIN — HEPARIN SODIUM 5000 UNITS: 5000 INJECTION, SOLUTION INTRAVENOUS; SUBCUTANEOUS at 04:05

## 2025-05-09 RX ADMIN — EZETIMIBE 10 MG: 10 TABLET ORAL at 04:05

## 2025-05-09 RX ADMIN — ATORVASTATIN CALCIUM 20 MG: 20 TABLET, FILM COATED ORAL at 04:05

## 2025-05-09 RX ADMIN — LEVALBUTEROL HYDROCHLORIDE 1.25 MG: 1.25 SOLUTION RESPIRATORY (INHALATION) at 01:05

## 2025-05-09 NOTE — H&P
[facility]  [facility] Medicine  History & Physical    Patient Name: Karie Denney  MRN: [id number]  Patient Class: OP- Observation  Admission Date: 5/9/2025  Attending Physician: Jordana Cavazos MD   Primary Care Provider: Hilda Oro FNP-C         Patient information was obtained from patient and ER records.     Subjective:     Principal Problem:<principal problem not specified>    Chief Complaint:   Chief Complaint   Patient presents with    Shortness of Breath     Pt sent from home for SOB and chest congestion, pt was dx with pneumonia on 5/4/25 and sent home. Family report pt's O2 sat has been around 90% on her 5L via nasal cannula at home.        HPI: 60 yo female with tracheostomy who was discharged 8 days ago from our service after a prolonged 3 month hospital stay presents for the second time since discharge for acute on chronic respiratory failure in the setting of secretions and blockages in the trach. She was initially hypoxic with EMS but after suctioning her trach tube here in the emergency department her sats have returned to normal. Due to continued ER visits due to poor trach care will admit patient to obs and discuss with social and family about placement. Patient has also missed 2 follow ups this week including pulmonary.    Past Medical History:   Diagnosis Date    Chronic idiopathic constipation 08/27/2024    Chronic respiratory failure with hypoxia 03/25/2025    Coag negative Staphylococcus bacteremia 03/25/2025    GERD (gastroesophageal reflux disease)     Insomnia secondary to depression with anxiety     Mixed hyperlipidemia     Quadriplegia 03/23/2025    Severe protein-calorie malnutrition 03/23/2025    Spontaneous hematoma of lower leg 02/26/2025    UTI due to extended-spectrum beta lactamase (ESBL) producing Escherichia coli 02/12/2025       Past Surgical History:   Procedure Laterality Date    BACK SURGERY         Review of patient's allergies indicates:    Allergen Reactions    Penicillins Anaphylaxis       No current facility-administered medications on file prior to encounter.     Current Outpatient Medications on File Prior to Encounter   Medication Sig    atorvastatin (LIPITOR) 20 MG tablet Take 1 tablet (20 mg total) by mouth once daily.    cholecalciferol, vitamin D3, 1,250 mcg (50,000 unit) capsule Take 1 capsule (50,000 Units total) by mouth every 7 days.    ezetimibe (ZETIA) 10 mg tablet Take 1 tablet (10 mg total) by mouth once daily.    HYDROcodone-acetaminophen (NORCO) 5-325 mg per tablet Take 1 tablet by mouth every 6 (six) hours as needed for Pain.    levalbuterol (XOPENEX) 1.25 mg/3 mL nebulizer solution Take 1 NEBULE (1.25 mg total) by nebulization 2 (two) times a day.    levoFLOXacin (LEVAQUIN) 500 MG tablet Take 1 tablet (500 mg total) by mouth once daily. for 10 days    LINZESS 290 mcg Cap capsule Take 1 capsule (290 mcg total) by mouth before breakfast.    midodrine (PROAMATINE) 5 MG Tab Take 2 tablets (10 mg total) by mouth 3 (three) times daily.    omeprazole (PRILOSEC) 40 MG capsule Take 1 capsule (40 mg total) by mouth every morning.    ondansetron (ZOFRAN-ODT) 4 MG TbDL Take 2 tablets (8 mg total) by mouth 2 (two) times daily.    pregabalin (LYRICA) 75 MG capsule Take 1 capsule (75 mg total) by mouth 2 (two) times daily.    scopolamine (TRANSDERM-SCOP) 1.3-1.5 mg (1 mg over 3 days) Place 1 patch onto the skin Every 3 (three) days.    sertraline (ZOLOFT) 50 MG tablet Take 1 tablet (50 mg total) by mouth once daily.    traZODone (DESYREL) 100 MG tablet Take 1 tablet (100 mg total) by mouth every evening.    [START ON 6/8/2025] HYDROcodone-acetaminophen (NORCO) 5-325 mg per tablet Take 1 tablet by mouth every 6 (six) hours as needed for Pain.    [START ON 7/8/2025] HYDROcodone-acetaminophen (NORCO) 5-325 mg per tablet Take 1 tablet by mouth every 6 (six) hours as needed for Pain.     Family History    Family history is unknown by patient.        Tobacco Use    Smoking status: Never    Smokeless tobacco: Never   Substance and Sexual Activity    Alcohol use: Never    Drug use: Never    Sexual activity: Not Currently     Review of Systems   All other systems reviewed and are negative.    Objective:     Vital Signs (Most Recent):  Temp: 97.2 °F (36.2 °C) (05/09/25 1518)  Pulse: (!) 111 (05/09/25 1518)  Resp: 20 (05/09/25 1518)  BP: (!) 108/59 (05/09/25 1518)  SpO2: 99 % (05/09/25 1518) Vital Signs (24h Range):  Temp:  [97.2 °F (36.2 °C)-97.6 °F (36.4 °C)] 97.2 °F (36.2 °C)  Pulse:  [101-122] 111  Resp:  [10-20] 20  SpO2:  [90 %-100 %] 99 %  BP: (108-129)/(50-82) 108/59     Weight: 77.1 kg (170 lb)  Body mass index is 31.09 kg/m².     Physical Exam  Vitals and nursing note reviewed.   Constitutional:       Appearance: She is ill-appearing.   HENT:      Head: Normocephalic.      Mouth/Throat:      Mouth: Mucous membranes are moist.   Eyes:      Extraocular Movements: Extraocular movements intact.      Pupils: Pupils are equal, round, and reactive to light.   Cardiovascular:      Rate and Rhythm: Regular rhythm. Tachycardia present.      Pulses: Normal pulses.   Pulmonary:      Effort: Pulmonary effort is normal.   Abdominal:      General: Abdomen is flat. Bowel sounds are normal.      Palpations: Abdomen is soft.   Musculoskeletal:      Right lower leg: No edema.      Left lower leg: No edema.   Skin:     General: Skin is warm and dry.   Neurological:      General: No focal deficit present.      Mental Status: She is alert. Mental status is at baseline.   Psychiatric:         Mood and Affect: Mood normal.              CRANIAL NERVES     CN III, IV, VI   Pupils are equal, round, and reactive to light.       Significant Labs: All pertinent labs within the past 24 hours have been reviewed.  BMP:   Recent Labs   Lab 05/09/25 0916   GLU 95      K 4.5   CL 99   CO2 32*   BUN 23*   CREATININE 0.61   CALCIUM 11.1*     CBC:   Recent Labs   Lab 05/09/25 0916    WBC 12.19*   HGB 10.7*   HCT 38.2        CMP:   Recent Labs   Lab 05/09/25  0916      K 4.5   CL 99   CO2 32*   GLU 95   BUN 23*   CREATININE 0.61   CALCIUM 11.1*   PROT 9.2*   ALBUMIN 3.4   BILITOT 0.4   ALKPHOS 121   AST 63*   ALT 74*   ANIONGAP 18*       Significant Imaging: I have reviewed all pertinent imaging results/findings within the past 24 hours.  Assessment/Plan:     Assessment & Plan  Pressure ulcers of skin of multiple topographic sites  noted    Severe protein-calorie malnutrition  Nutrition consulted. Most recent weight and BMI monitored-     Measurements:  Wt Readings from Last 1 Encounters:   05/09/25 77.1 kg (170 lb)   Body mass index is 31.09 kg/m².    Patient has been screened and assessed by RD.    Malnutrition Type:  Context:    Level:      Malnutrition Characteristic Summary:       Interventions/Recommendations (treatment strategy):       Quadriplegia  noted    Acute on chronic respiratory failure with hypoxia and hypercapnia  Patient with Hypercapnic and Hypoxic Respiratory failure which is Acute on chronic.  she is on home oxygen at on vent LPM. Supplemental oxygen was provided and noted-  on vent    .   Signs/symptoms of respiratory failure include- tachypnea, increased work of breathing, respiratory distress, and use of accessory muscles. Contributing diagnoses includes - COPD and Interstitial lung disease Labs and images were reviewed. Patient Has not had a recent ABG. Will treat underlying causes and adjust management of respiratory failure as follows-     Admit to obs  Education about trach care  Stress to family that continued ER visits and hospital admissions will result in worsened overall health and likely placement is needed  Will consult pulmonary to enquire about bronch that she missed however do not see staying in hospital for procedure as a good option  VTE Risk Mitigation (From admission, onward)           Ordered     heparin (porcine) injection 5,000 Units   Every 8 hours         05/09/25 1340     IP VTE HIGH RISK PATIENT  Once         05/09/25 1340     Place sequential compression device  Until discontinued         05/09/25 1340                       On 05/09/2025, patient should be placed in hospital observation services under my care.             Jordana Cavazos MD  Department of Hospital Medicine  [facility]

## 2025-05-09 NOTE — PLAN OF CARE
[facility]  Initial Discharge Assessment       Primary Care Provider: Hilda Oro FNP-C    Admission Diagnosis: Shortness of breath [R06.02]    Admission Date: 5/9/2025  Expected Discharge Date:     Transition of Care Barriers: None    Payor: MEDICARE / Plan: MEDICARE PART A & B / Product Type: Government /     Extended Emergency Contact Information  Primary Emergency Contact: Carolina Matias  [place] Phone: [phone]  Relation: Sister  Preferred language: English   needed? No  Secondary Emergency Contact: Ashley Montanez  [place] Phone: [phone]  Relation: Daughter  Preferred language: English   needed? No    Discharge Plan A: Home with family  Discharge Plan B: Long-term acute care facility (LTAC), Rehab, Skilled Nursing Facility      [facility] [address]  [address]  [place] [address]  Phone: [phone] Fax: [phone]      Initial Assessment (most recent)       Adult Discharge Assessment - 05/09/25 1441          Discharge Assessment    Assessment Type Discharge Planning Assessment     Source of Information family     Communicated SHRADDHA with patient/caregiver Date not available/Unable to determine     People in Home sibling(s)     Do you expect to return to your current living situation? Yes     Do you have help at home or someone to help you manage your care at home? Yes     Who are your caregiver(s) and their phone number(s)? sister Pearson, [phone]     Prior to hospitilization cognitive status: Unable to Assess     Current cognitive status: Alert/Oriented     Walking or Climbing Stairs Difficulty yes     Walking or Climbing Stairs ambulation difficulty, dependent;stair climbing difficulty, dependent;transferring difficulty, dependent     Mobility Management does not ambulate     Dressing/Bathing Difficulty yes     Dressing/Bathing bathing difficulty, dependent;dressing difficulty, dependent      Dressing/Bathing Management sister and hh perform ADLs     Home Layout Able to live on 1st floor     Equipment Currently Used at Home wheelchair;hospital bed;lift device     Patient currently being followed by outpatient case management? No     Do you currently have service(s) that help you manage your care at home? Yes     Name and Contact number of agency [facility]     Is the pt/caregiver preference to resume services with current agency Yes     Do you take prescription medications? Yes     Do you have prescription coverage? Yes     Coverage Medicare; Medicaid     Do you have any problems affording any of your prescribed medications? No     Is the patient taking medications as prescribed? yes     Who is going to help you get home at discharge? sister     How do you get to doctors appointments? family or friend will provide     Are you on dialysis? No     Discharge Plan A Home with family     Discharge Plan B Long-term acute care facility (LTAC);Rehab;Skilled Nursing Facility     DME Needed Upon Discharge  none     Discharge Plan discussed with: Patient;Sibling;Adult children     Name(s) and Number(s) Carolina Matias, sister, [phone]  and Ashley Montanez, daughter, [phone]     Transition of Care Barriers None        Physical Activity    On average, how many days per week do you engage in moderate to strenuous exercise (like a brisk walk)? 0 days     On average, how many minutes do you engage in exercise at this level? 0 min        Financial Resource Strain    How hard is it for you to pay for the very basics like food, housing, medical care, and heating? Not very hard        Housing Stability    In the last 12 months, was there a time when you were not able to pay the mortgage or rent on time? No     At any time in the past 12 months, were you homeless or living in a shelter (including now)? No        Transportation Needs    In the past 12 months, has lack of transportation kept you from medical appointments  or from getting medications? No     In the past 12 months, has lack of transportation kept you from meetings, work, or from getting things needed for daily living? No        Food Insecurity    Within the past 12 months, you worried that your food would run out before you got the money to buy more. Never true     Within the past 12 months, the food you bought just didn't last and you didn't have money to get more. Never true        Alcohol Use    Q1: How often do you have a drink containing alcohol? Never     Q2: How many drinks containing alcohol do you have on a typical day when you are drinking? Patient does not drink     Q3: How often do you have six or more drinks on one occasion? Never        Utilities    In the past 12 months has the electric, gas, oil, or water company threatened to shut off services in your home? No        Health Literacy    How often do you need to have someone help you when you read instructions, pamphlets, or other written material from your doctor or pharmacy? Sometimes        OTHER    Name(s) of People in Home Carolina Matias, sister, [phone]                   Per consult, met with patient, sister, and daughter in ED room.  Discussed placement options.  Patient has been living with her sister, Carolina.  She was being followed by [facility] and dme includes a bed, wc, and lift.  Daughter stated that she is willing to consider nh placement at this point as they are struggling with trach care.  Daughter stated she is going to talk to her brother about it.  Notified her that [facility] are [facility] that [facility] with [facility].    Daughter stated that she heard [facility] will take trachs.  Will make referral to patient choice of [facility].  Will plan to make referrals to other 3 potential options on Monday if family is agreeable.  SDOH completed and IM obtained.  Will continue to follow.    1458  Made referral to [facility].  Documents faxed.

## 2025-05-09 NOTE — ASSESSMENT & PLAN NOTE
Patient with Hypercapnic and Hypoxic Respiratory failure which is Acute on chronic.  she is on home oxygen at on vent LPM. Supplemental oxygen was provided and noted-  on vent    .   Signs/symptoms of respiratory failure include- tachypnea, increased work of breathing, respiratory distress, and use of accessory muscles. Contributing diagnoses includes - COPD and Interstitial lung disease Labs and images were reviewed. Patient Has not had a recent ABG. Will treat underlying causes and adjust management of respiratory failure as follows-     Admit to obs  Education about trach care  Stress to family that continued ER visits and hospital admissions will result in worsened overall health and likely placement is needed  Will consult pulmonary to enquire about bronch that she missed however do not see staying in hospital for procedure as a good option

## 2025-05-09 NOTE — RESPIRATORY THERAPY
Pt trach was capped with  inner cannula in, appeared mildly labored. Pt was unable to voice. Suction large amount of thick yellow blood tinge secretion. After suctioning pt was able to voice SATs went from 82% to 100%.  Pt is unable to cough to clear her airway. Removed inner cannula, place in bio bag and tapped above her bed. Patient requested for it to stay with her. Family made aware of location.

## 2025-05-09 NOTE — ED PROVIDER NOTES
Encounter Date: 5/9/2025       History     Chief Complaint   Patient presents with    Shortness of Breath     Pt sent from home for SOB and chest congestion, pt was dx with pneumonia on 5/4/25 and sent home. Family report pt's O2 sat has been around 90% on her 5L via nasal cannula at home.     Patient is a 59 year old female with history of quadriplegia in tracheostomy tube who was seen here 5 days ago by me know which time CT scan showed findings consistent with pneumonia.  Patient was discharged home on Levaquin.  Patient is back in the emergency department this morning complaining of shortness of breath which she states feels worse than usual this morning.  No fever, no productive cough, no other acute problems or complaints at this time.        Review of patient's allergies indicates:   Allergen Reactions    Penicillins Anaphylaxis     Past Medical History:   Diagnosis Date    Chronic idiopathic constipation 08/27/2024    Chronic respiratory failure with hypoxia 03/25/2025    Coag negative Staphylococcus bacteremia 03/25/2025    GERD (gastroesophageal reflux disease)     Insomnia secondary to depression with anxiety     Mixed hyperlipidemia     Quadriplegia 03/23/2025    Severe protein-calorie malnutrition 03/23/2025    Spontaneous hematoma of lower leg 02/26/2025    UTI due to extended-spectrum beta lactamase (ESBL) producing Escherichia coli 02/12/2025     Past Surgical History:   Procedure Laterality Date    BACK SURGERY       Family History   Family history unknown: Yes     Social History[1]  Review of Systems   Respiratory:  Positive for cough and shortness of breath.    All other systems reviewed and are negative.      Physical Exam     Initial Vitals [05/09/25 0852]   BP Pulse Resp Temp SpO2   127/62 110 19 97.6 °F (36.4 °C) (!) 90 %      MAP       --         Physical Exam    Nursing note and vitals reviewed.  Constitutional: She appears well-developed and well-nourished.   HENT:   Head: Normocephalic.    Eyes: Pupils are equal, round, and reactive to light.   Neck:   Tracheostomy tube is present.   Cardiovascular:  Normal rate.           Pulmonary/Chest: Breath sounds normal.   Abdominal: Abdomen is soft.   Musculoskeletal:         General: Normal range of motion.     Neurological: She is alert.   Skin: Skin is warm. Capillary refill takes less than 2 seconds.   Psychiatric: She has a normal mood and affect.         Medical Screening Exam   See Full Note    ED Course   Procedures  Labs Reviewed   COMPREHENSIVE METABOLIC PANEL - Abnormal       Result Value    Sodium 144      Potassium 4.5      Chloride 99      CO2 32 (*)     Anion Gap 18 (*)     Glucose 95      BUN 23 (*)     Creatinine 0.61      BUN/Creatinine Ratio 38 (*)     Calcium 11.1 (*)     Total Protein 9.2 (*)     Albumin 3.4      Globulin 5.8 (*)     A/G Ratio 0.6      Bilirubin, Total 0.4      Alk Phos 121      ALT 74 (*)     AST 63 (*)     eGFR 102     CBC WITH DIFFERENTIAL - Abnormal    WBC 12.19 (*)     RBC 4.41      Hemoglobin 10.7 (*)     Hematocrit 38.2      MCV 86.6      MCH 24.3 (*)     MCHC 28.0 (*)     RDW 17.4 (*)     Platelet Count 220      MPV 10.8      Neutrophils % 74.4 (*)     Lymphocytes % 17.2 (*)     Monocytes % 5.7      Eosinophils % 1.9      Basophils % 0.3      Immature Granulocytes % 0.5 (*)     nRBC, Auto 0.0      Neutrophils, Abs 9.07 (*)     Lymphocytes, Absolute 2.10      Monocytes, Absolute 0.69      Eosinophils, Absolute 0.23      Basophils, Absolute 0.04      Immature Granulocytes, Absolute 0.06 (*)     nRBC, Absolute 0.00      Diff Type Auto     TROPONIN I - Normal    Troponin I High Sensitivity <2.7     NT-PRO NATRIURETIC PEPTIDE - Normal    ProBNP 20     CBC W/ AUTO DIFFERENTIAL    Narrative:     The following orders were created for panel order CBC auto differential.  Procedure                               Abnormality         Status                     ---------                               -----------         ------                      CBC with Differential[[id number]]       Abnormal            Final result                 Please view results for these tests on the individual orders.        ECG Results              EKG 12-lead (Final result)        Collection Time Result Time QRS Duration OHS QTC Calculation    05/09/25 08:49:34 05/10/25 05:34:58 80 429                     Final result by Interface, Lab In [facility] (05/10/25 05:35:06)                   Narrative:    Test Reason : R06.02,    Vent. Rate : 112 BPM     Atrial Rate :    BPM     P-R Int : 110 ms          QRS Dur :  80 ms      QT Int : 338 ms       P-R-T Axes :  65  95  20 degrees    QTcB Int : 429 ms    Sinus tachycardia  Rightward axis  rSr'(V1) - probable normal variant  Borderline ECG    Confirmed by Luke Santizo ([id number]) on 5/10/2025 5:34:56 AM    Referred By: AAAREFERRAL SELF           Confirmed By: Luke Santizo                                  Imaging Results              X-Ray Chest AP Portable (Final result)  Result time 05/09/25 09:22:47      Final result by Noble Choudhury DO (05/09/25 09:22:47)                   Impression:      Please see above      Electronically signed by: Noble Choudhury DO  Date:    05/09/2025  Time:    09:22               Narrative:    EXAMINATION:  XR CHEST AP PORTABLE    CLINICAL HISTORY:  Dyspnea;    TECHNIQUE:  Single frontal view of the chest was performed.    COMPARISON:  05/04/2025    FINDINGS:  Tracheostomy stable.  There is interval development of subtle bandlike opacities within the lower lobe suggestive for atelectasis.  There is no lung consolidation.  No large pleural effusion or pneumothorax.  Continued atherosclerotic aorta.  Clinical correlation and follow-up advised                                       Medications   atorvastatin tablet 20 mg (20 mg Oral Given 5/10/25 2820)   cholecalciferol (vitamin D3) 1,250 mcg (50,000 unit) capsule 50,000 Units (50,000 Units Oral Not Given 5/9/25 1445)   ezetimibe tablet 10  mg (10 mg Oral Given 5/10/25 0857)   levoFLOXacin tablet 500 mg (500 mg Oral Given 5/10/25 0858)   linaCLOtide capsule 290 mcg (290 mcg Oral Given 5/10/25 0621)   midodrine tablet 10 mg (10 mg Oral Given 5/10/25 0857)   pantoprazole EC tablet 40 mg (40 mg Oral Given 5/10/25 0857)   pregabalin capsule 75 mg (75 mg Oral Given 5/10/25 0857)   scopolamine 1.3-1.5 mg (1 mg over 3 days) 1 patch (has no administration in time range)   sertraline tablet 50 mg (50 mg Oral Given 5/10/25 0857)   traZODone tablet 100 mg (100 mg Oral Given 5/9/25 2131)   sodium chloride 0.9% flush 10 mL (has no administration in time range)   naloxone 0.4 mg/mL injection 0.02 mg (has no administration in time range)   glucose chewable tablet 16 g (has no administration in time range)   glucose chewable tablet 24 g (has no administration in time range)   dextrose 50% injection 12.5 g (has no administration in time range)   dextrose 50% injection 25 g (has no administration in time range)   glucagon (human recombinant) injection 1 mg (has no administration in time range)   heparin (porcine) injection 5,000 Units (5,000 Units Subcutaneous Given 5/10/25 0621)   potassium bicarbonate disintegrating tablet 50 mEq (has no administration in time range)   potassium bicarbonate disintegrating tablet 35 mEq (has no administration in time range)   potassium bicarbonate disintegrating tablet 60 mEq (has no administration in time range)   magnesium oxide tablet 800 mg (has no administration in time range)   magnesium oxide tablet 800 mg (has no administration in time range)   acetaminophen tablet 650 mg (has no administration in time range)   HYDROcodone-acetaminophen 5-325 mg per tablet 1 tablet (has no administration in time range)   HYDROcodone-acetaminophen  mg per tablet 1 tablet (has no administration in time range)   ondansetron injection 4 mg (4 mg Intravenous Given 5/9/25 1410)   albuterol-ipratropium 2.5 mg-0.5 mg/3 mL nebulizer solution 3 mL  (3 mLs Nebulization Given 5/10/25 1303)   acetaminophen tablet 650 mg (has no administration in time range)   melatonin tablet 6 mg (has no administration in time range)   albuterol-ipratropium 2.5 mg-0.5 mg/3 mL nebulizer solution 3 mL (3 mLs Nebulization Given 5/9/25 0910)     Medical Decision Making  Amount and/or Complexity of Data Reviewed  Labs: ordered.  Radiology: ordered.    Risk  Prescription drug management.               ED Course as of 05/10/25 1334   Fri May 09, 2025   0901 Medical decision-making:  Differential diagnosis includes shortness breath, clogged tracheostomy tube, pneumonia, CHF, bronchitis.  All testing ordered and interpreted by me. [BB]   0927 Chest x-ray by my interpretation shows no acute infiltrate. [BB]   0949 EKG by my interpretation shows sinus rhythm, rate of 112, no acute ST segment changes. [BB]   1038 CBC shows white count of 12 which is decreased from 18 5 days ago. [BB]   1038 After respiratory suction trach patient's oxygen saturations improved to 100%. [BB]   1117 Troponin is normal.   [BB]   1117 CMP is unremarkable.  Pro BNP is normal. [BB]      ED Course User Index  [BB] Bud Yancey MD                           Clinical Impression:   Final diagnoses:  [R06.02] Shortness of breath  [R09.02] Hypoxia (Primary)        ED Disposition Condition    Observation                     [1]   Social History  Tobacco Use    Smoking status: Never    Smokeless tobacco: Never   Substance Use Topics    Alcohol use: Never    Drug use: Never        Bud Yancey MD  05/10/25 1334

## 2025-05-09 NOTE — PHARMACY MED REC
"Admission Medication History     The home medication history was taken by Adelita Astudillo.    You may go to "Admission" then "Reconcile Home Medications" tabs to review and/or act upon these items.     The home medication list has been updated by the Pharmacy department.   Please read ALL comments highlighted in yellow.   Please address this information as you see fit.    Feel free to contact us if you have any questions or require assistance.    New prescriptions for all medications listed sent in to [facility] 05/08/25, but have not been picked up yet.           Medications listed below were obtained from: [employer] software- [employer], Medical records, and Patient's pharmacy  (Not in a hospital admission)        Current Outpatient Medications on File Prior to Encounter   Medication Sig Dispense Refill Last Dose/Taking    atorvastatin (LIPITOR) 20 MG tablet Take 1 tablet (20 mg total) by mouth once daily. 90 tablet 1 Taking    cholecalciferol, vitamin D3, 1,250 mcg (50,000 unit) capsule Take 1 capsule (50,000 Units total) by mouth every 7 days. 8 capsule 0 Taking    ezetimibe (ZETIA) 10 mg tablet Take 1 tablet (10 mg total) by mouth once daily. 90 tablet 1 Taking    HYDROcodone-acetaminophen (NORCO) 5-325 mg per tablet Take 1 tablet by mouth every 6 (six) hours as needed for Pain. 30 tablet 0 Taking As Needed    levalbuterol (XOPENEX) 1.25 mg/3 mL nebulizer solution Take 1 NEBULE (1.25 mg total) by nebulization 2 (two) times a day. 180 mL 2 Taking    levoFLOXacin (LEVAQUIN) 500 MG tablet Take 1 tablet (500 mg total) by mouth once daily. for 10 days 10 tablet 0 Taking    LINZESS 290 mcg Cap capsule Take 1 capsule (290 mcg total) by mouth before breakfast. 90 capsule 1 Taking    midodrine (PROAMATINE) 5 MG Tab Take 2 tablets (10 mg total) by mouth 3 (three) times daily. 180 tablet 11 Taking    omeprazole (PRILOSEC) 40 MG capsule Take 1 capsule (40 mg total) by mouth every morning. 90 capsule 1 Taking    ondansetron " (ZOFRAN-ODT) 4 MG TbDL Take 2 tablets (8 mg total) by mouth 2 (two) times daily. 120 tablet 0 Taking    pregabalin (LYRICA) 75 MG capsule Take 1 capsule (75 mg total) by mouth 2 (two) times daily. 180 capsule 1 Taking    scopolamine (TRANSDERM-SCOP) 1.3-1.5 mg (1 mg over 3 days) Place 1 patch onto the skin Every 3 (three) days. 10 patch 2 Taking    sertraline (ZOLOFT) 50 MG tablet Take 1 tablet (50 mg total) by mouth once daily. 90 tablet 1 Taking    traZODone (DESYREL) 100 MG tablet Take 1 tablet (100 mg total) by mouth every evening. 90 tablet 1 Taking    [START ON 6/8/2025] HYDROcodone-acetaminophen (NORCO) 5-325 mg per tablet Take 1 tablet by mouth every 6 (six) hours as needed for Pain. 30 tablet 0     [START ON 7/8/2025] HYDROcodone-acetaminophen (NORCO) 5-325 mg per tablet Take 1 tablet by mouth every 6 (six) hours as needed for Pain. 30 tablet 0        Potential issues to be addressed PRIOR TO DISCHARGE  N/A    Adelita Astudillo  EXT [id number]                 .

## 2025-05-09 NOTE — SUBJECTIVE & OBJECTIVE
Past Medical History:   Diagnosis Date    Chronic idiopathic constipation 08/27/2024    Chronic respiratory failure with hypoxia 03/25/2025    Coag negative Staphylococcus bacteremia 03/25/2025    GERD (gastroesophageal reflux disease)     Insomnia secondary to depression with anxiety     Mixed hyperlipidemia     Quadriplegia 03/23/2025    Severe protein-calorie malnutrition 03/23/2025    Spontaneous hematoma of lower leg 02/26/2025    UTI due to extended-spectrum beta lactamase (ESBL) producing Escherichia coli 02/12/2025       Past Surgical History:   Procedure Laterality Date    BACK SURGERY         Review of patient's allergies indicates:   Allergen Reactions    Penicillins Anaphylaxis       No current facility-administered medications on file prior to encounter.     Current Outpatient Medications on File Prior to Encounter   Medication Sig    atorvastatin (LIPITOR) 20 MG tablet Take 1 tablet (20 mg total) by mouth once daily.    cholecalciferol, vitamin D3, 1,250 mcg (50,000 unit) capsule Take 1 capsule (50,000 Units total) by mouth every 7 days.    ezetimibe (ZETIA) 10 mg tablet Take 1 tablet (10 mg total) by mouth once daily.    HYDROcodone-acetaminophen (NORCO) 5-325 mg per tablet Take 1 tablet by mouth every 6 (six) hours as needed for Pain.    levalbuterol (XOPENEX) 1.25 mg/3 mL nebulizer solution Take 1 NEBULE (1.25 mg total) by nebulization 2 (two) times a day.    levoFLOXacin (LEVAQUIN) 500 MG tablet Take 1 tablet (500 mg total) by mouth once daily. for 10 days    LINZESS 290 mcg Cap capsule Take 1 capsule (290 mcg total) by mouth before breakfast.    midodrine (PROAMATINE) 5 MG Tab Take 2 tablets (10 mg total) by mouth 3 (three) times daily.    omeprazole (PRILOSEC) 40 MG capsule Take 1 capsule (40 mg total) by mouth every morning.    ondansetron (ZOFRAN-ODT) 4 MG TbDL Take 2 tablets (8 mg total) by mouth 2 (two) times daily.    pregabalin (LYRICA) 75 MG capsule Take 1 capsule (75 mg total) by mouth 2  (two) times daily.    scopolamine (TRANSDERM-SCOP) 1.3-1.5 mg (1 mg over 3 days) Place 1 patch onto the skin Every 3 (three) days.    sertraline (ZOLOFT) 50 MG tablet Take 1 tablet (50 mg total) by mouth once daily.    traZODone (DESYREL) 100 MG tablet Take 1 tablet (100 mg total) by mouth every evening.    [START ON 6/8/2025] HYDROcodone-acetaminophen (NORCO) 5-325 mg per tablet Take 1 tablet by mouth every 6 (six) hours as needed for Pain.    [START ON 7/8/2025] HYDROcodone-acetaminophen (NORCO) 5-325 mg per tablet Take 1 tablet by mouth every 6 (six) hours as needed for Pain.     Family History    Family history is unknown by patient.       Tobacco Use    Smoking status: Never    Smokeless tobacco: Never   Substance and Sexual Activity    Alcohol use: Never    Drug use: Never    Sexual activity: Not Currently     Review of Systems   All other systems reviewed and are negative.    Objective:     Vital Signs (Most Recent):  Temp: 97.2 °F (36.2 °C) (05/09/25 1518)  Pulse: (!) 111 (05/09/25 1518)  Resp: 20 (05/09/25 1518)  BP: (!) 108/59 (05/09/25 1518)  SpO2: 99 % (05/09/25 1518) Vital Signs (24h Range):  Temp:  [97.2 °F (36.2 °C)-97.6 °F (36.4 °C)] 97.2 °F (36.2 °C)  Pulse:  [101-122] 111  Resp:  [10-20] 20  SpO2:  [90 %-100 %] 99 %  BP: (108-129)/(50-82) 108/59     Weight: 77.1 kg (170 lb)  Body mass index is 31.09 kg/m².     Physical Exam  Vitals and nursing note reviewed.   Constitutional:       Appearance: She is ill-appearing.   HENT:      Head: Normocephalic.      Mouth/Throat:      Mouth: Mucous membranes are moist.   Eyes:      Extraocular Movements: Extraocular movements intact.      Pupils: Pupils are equal, round, and reactive to light.   Cardiovascular:      Rate and Rhythm: Regular rhythm. Tachycardia present.      Pulses: Normal pulses.   Pulmonary:      Effort: Pulmonary effort is normal.   Abdominal:      General: Abdomen is flat. Bowel sounds are normal.      Palpations: Abdomen is soft.    Musculoskeletal:      Right lower leg: No edema.      Left lower leg: No edema.   Skin:     General: Skin is warm and dry.   Neurological:      General: No focal deficit present.      Mental Status: She is alert. Mental status is at baseline.   Psychiatric:         Mood and Affect: Mood normal.              CRANIAL NERVES     CN III, IV, VI   Pupils are equal, round, and reactive to light.       Significant Labs: All pertinent labs within the past 24 hours have been reviewed.  BMP:   Recent Labs   Lab 05/09/25  0916   GLU 95      K 4.5   CL 99   CO2 32*   BUN 23*   CREATININE 0.61   CALCIUM 11.1*     CBC:   Recent Labs   Lab 05/09/25  0916   WBC 12.19*   HGB 10.7*   HCT 38.2        CMP:   Recent Labs   Lab 05/09/25  0916      K 4.5   CL 99   CO2 32*   GLU 95   BUN 23*   CREATININE 0.61   CALCIUM 11.1*   PROT 9.2*   ALBUMIN 3.4   BILITOT 0.4   ALKPHOS 121   AST 63*   ALT 74*   ANIONGAP 18*       Significant Imaging: I have reviewed all pertinent imaging results/findings within the past 24 hours.

## 2025-05-09 NOTE — HPI
60 yo female with tracheostomy who was discharged 8 days ago from our service after a prolonged 3 month hospital stay presents for the second time since discharge for acute on chronic respiratory failure in the setting of secretions and blockages in the trach. She was initially hypoxic with EMS but after suctioning her trach tube here in the emergency department her sats have returned to normal. Due to continued ER visits due to poor trach care will admit patient to obs and discuss with social and family about placement. Patient has also missed 2 follow ups this week including pulmonary.

## 2025-05-09 NOTE — ASSESSMENT & PLAN NOTE
Nutrition consulted. Most recent weight and BMI monitored-     Measurements:  Wt Readings from Last 1 Encounters:   05/09/25 77.1 kg (170 lb)   Body mass index is 31.09 kg/m².    Patient has been screened and assessed by RD.    Malnutrition Type:  Context:    Level:      Malnutrition Characteristic Summary:       Interventions/Recommendations (treatment strategy):

## 2025-05-10 LAB
ANION GAP SERPL CALCULATED.3IONS-SCNC: 18 MMOL/L (ref 7–16)
BACTERIA BLD CULT: NORMAL
BASOPHILS # BLD AUTO: 0.07 K/UL (ref 0–0.2)
BASOPHILS NFR BLD AUTO: 0.7 % (ref 0–1)
BUN SERPL-MCNC: 20 MG/DL (ref 10–20)
BUN/CREAT SERPL: 34 (ref 6–20)
CALCIUM SERPL-MCNC: 10 MG/DL (ref 8.4–10.2)
CHLORIDE SERPL-SCNC: 99 MMOL/L (ref 98–107)
CO2 SERPL-SCNC: 31 MMOL/L (ref 23–31)
CREAT SERPL-MCNC: 0.59 MG/DL (ref 0.55–1.02)
DIFFERENTIAL METHOD BLD: ABNORMAL
EGFR (NO RACE VARIABLE) (RUSH/TITUS): 103 ML/MIN/1.73M2
EOSINOPHIL # BLD AUTO: 0.29 K/UL (ref 0–0.5)
EOSINOPHIL NFR BLD AUTO: 3.1 % (ref 1–4)
ERYTHROCYTE [DISTWIDTH] IN BLOOD BY AUTOMATED COUNT: 17.4 % (ref 11.5–14.5)
GLUCOSE SERPL-MCNC: 74 MG/DL (ref 82–115)
HCT VFR BLD AUTO: 34.9 % (ref 38–47)
HGB BLD-MCNC: 9.8 G/DL (ref 12–16)
IMM GRANULOCYTES # BLD AUTO: 0.06 K/UL (ref 0–0.04)
IMM GRANULOCYTES NFR BLD: 0.6 % (ref 0–0.4)
LYMPHOCYTES # BLD AUTO: 1.91 K/UL (ref 1–4.8)
LYMPHOCYTES NFR BLD AUTO: 20.4 % (ref 27–41)
MCH RBC QN AUTO: 23.7 PG (ref 27–31)
MCHC RBC AUTO-ENTMCNC: 28.1 G/DL (ref 32–36)
MCV RBC AUTO: 84.3 FL (ref 80–96)
MONOCYTES # BLD AUTO: 0.81 K/UL (ref 0–0.8)
MONOCYTES NFR BLD AUTO: 8.6 % (ref 2–6)
MPC BLD CALC-MCNC: 10.7 FL (ref 9.4–12.4)
NEUTROPHILS # BLD AUTO: 6.24 K/UL (ref 1.8–7.7)
NEUTROPHILS NFR BLD AUTO: 66.6 % (ref 53–65)
NRBC # BLD AUTO: 0 X10E3/UL
NRBC, AUTO (.00): 0 %
OHS QRS DURATION: 80 MS
OHS QTC CALCULATION: 429 MS
PLATELET # BLD AUTO: 223 K/UL (ref 150–400)
POTASSIUM SERPL-SCNC: 4.6 MMOL/L (ref 3.5–5.1)
RBC # BLD AUTO: 4.14 M/UL (ref 4.2–5.4)
SODIUM SERPL-SCNC: 143 MMOL/L (ref 136–145)
WBC # BLD AUTO: 9.38 K/UL (ref 4.5–11)

## 2025-05-10 PROCEDURE — 25000003 PHARM REV CODE 250

## 2025-05-10 PROCEDURE — 94761 N-INVAS EAR/PLS OXIMETRY MLT: CPT

## 2025-05-10 PROCEDURE — 85025 COMPLETE CBC W/AUTO DIFF WBC: CPT

## 2025-05-10 PROCEDURE — 99900026 HC AIRWAY MAINTENANCE (STAT)

## 2025-05-10 PROCEDURE — 99900035 HC TECH TIME PER 15 MIN (STAT)

## 2025-05-10 PROCEDURE — 27000207 HC ISOLATION

## 2025-05-10 PROCEDURE — 63600175 PHARM REV CODE 636 W HCPCS

## 2025-05-10 PROCEDURE — 80048 BASIC METABOLIC PNL TOTAL CA: CPT

## 2025-05-10 PROCEDURE — 96372 THER/PROPH/DIAG INJ SC/IM: CPT

## 2025-05-10 PROCEDURE — 11000001 HC ACUTE MED/SURG PRIVATE ROOM

## 2025-05-10 PROCEDURE — G0378 HOSPITAL OBSERVATION PER HR: HCPCS

## 2025-05-10 PROCEDURE — 99232 SBSQ HOSP IP/OBS MODERATE 35: CPT | Mod: ,,,

## 2025-05-10 PROCEDURE — 94640 AIRWAY INHALATION TREATMENT: CPT | Mod: XB

## 2025-05-10 PROCEDURE — 36415 COLL VENOUS BLD VENIPUNCTURE: CPT

## 2025-05-10 PROCEDURE — 27000221 HC OXYGEN, UP TO 24 HOURS

## 2025-05-10 PROCEDURE — 25000242 PHARM REV CODE 250 ALT 637 W/ HCPCS

## 2025-05-10 RX ADMIN — ATORVASTATIN CALCIUM 20 MG: 20 TABLET, FILM COATED ORAL at 08:05

## 2025-05-10 RX ADMIN — PREGABALIN 75 MG: 75 CAPSULE ORAL at 08:05

## 2025-05-10 RX ADMIN — HYDROCODONE BITARTRATE AND ACETAMINOPHEN 1 TABLET: 5; 325 TABLET ORAL at 06:05

## 2025-05-10 RX ADMIN — PANTOPRAZOLE SODIUM 40 MG: 40 TABLET, DELAYED RELEASE ORAL at 08:05

## 2025-05-10 RX ADMIN — IPRATROPIUM BROMIDE AND ALBUTEROL SULFATE 3 ML: 2.5; .5 SOLUTION RESPIRATORY (INHALATION) at 08:05

## 2025-05-10 RX ADMIN — IPRATROPIUM BROMIDE AND ALBUTEROL SULFATE 3 ML: 2.5; .5 SOLUTION RESPIRATORY (INHALATION) at 01:05

## 2025-05-10 RX ADMIN — LINACLOTIDE 290 MCG: 290 CAPSULE, GELATIN COATED ORAL at 06:05

## 2025-05-10 RX ADMIN — HEPARIN SODIUM 5000 UNITS: 5000 INJECTION, SOLUTION INTRAVENOUS; SUBCUTANEOUS at 06:05

## 2025-05-10 RX ADMIN — TRAZODONE HYDROCHLORIDE 100 MG: 50 TABLET ORAL at 09:05

## 2025-05-10 RX ADMIN — IPRATROPIUM BROMIDE AND ALBUTEROL SULFATE 3 ML: 2.5; .5 SOLUTION RESPIRATORY (INHALATION) at 07:05

## 2025-05-10 RX ADMIN — MIDODRINE HYDROCHLORIDE 10 MG: 5 TABLET ORAL at 09:05

## 2025-05-10 RX ADMIN — PREGABALIN 75 MG: 75 CAPSULE ORAL at 09:05

## 2025-05-10 RX ADMIN — MIDODRINE HYDROCHLORIDE 10 MG: 5 TABLET ORAL at 08:05

## 2025-05-10 RX ADMIN — EZETIMIBE 10 MG: 10 TABLET ORAL at 08:05

## 2025-05-10 RX ADMIN — HEPARIN SODIUM 5000 UNITS: 5000 INJECTION, SOLUTION INTRAVENOUS; SUBCUTANEOUS at 09:05

## 2025-05-10 RX ADMIN — HEPARIN SODIUM 5000 UNITS: 5000 INJECTION, SOLUTION INTRAVENOUS; SUBCUTANEOUS at 02:05

## 2025-05-10 RX ADMIN — SERTRALINE HYDROCHLORIDE 50 MG: 50 TABLET ORAL at 08:05

## 2025-05-10 RX ADMIN — MIDODRINE HYDROCHLORIDE 10 MG: 5 TABLET ORAL at 02:05

## 2025-05-10 RX ADMIN — LEVOFLOXACIN 500 MG: 500 TABLET, FILM COATED ORAL at 08:05

## 2025-05-10 NOTE — SUBJECTIVE & OBJECTIVE
Past Medical History:   Diagnosis Date    Chronic idiopathic constipation 08/27/2024    Chronic respiratory failure with hypoxia 03/25/2025    Coag negative Staphylococcus bacteremia 03/25/2025    GERD (gastroesophageal reflux disease)     Insomnia secondary to depression with anxiety     Mixed hyperlipidemia     Quadriplegia 03/23/2025    Severe protein-calorie malnutrition 03/23/2025    Spontaneous hematoma of lower leg 02/26/2025    UTI due to extended-spectrum beta lactamase (ESBL) producing Escherichia coli 02/12/2025       Past Surgical History:   Procedure Laterality Date    BACK SURGERY         Review of patient's allergies indicates:   Allergen Reactions    Penicillins Anaphylaxis       No current facility-administered medications on file prior to encounter.     Current Outpatient Medications on File Prior to Encounter   Medication Sig    atorvastatin (LIPITOR) 20 MG tablet Take 1 tablet (20 mg total) by mouth once daily.    cholecalciferol, vitamin D3, 1,250 mcg (50,000 unit) capsule Take 1 capsule (50,000 Units total) by mouth every 7 days.    ezetimibe (ZETIA) 10 mg tablet Take 1 tablet (10 mg total) by mouth once daily.    HYDROcodone-acetaminophen (NORCO) 5-325 mg per tablet Take 1 tablet by mouth every 6 (six) hours as needed for Pain.    levalbuterol (XOPENEX) 1.25 mg/3 mL nebulizer solution Take 1 NEBULE (1.25 mg total) by nebulization 2 (two) times a day.    levoFLOXacin (LEVAQUIN) 500 MG tablet Take 1 tablet (500 mg total) by mouth once daily. for 10 days    LINZESS 290 mcg Cap capsule Take 1 capsule (290 mcg total) by mouth before breakfast.    midodrine (PROAMATINE) 5 MG Tab Take 2 tablets (10 mg total) by mouth 3 (three) times daily.    omeprazole (PRILOSEC) 40 MG capsule Take 1 capsule (40 mg total) by mouth every morning.    ondansetron (ZOFRAN-ODT) 4 MG TbDL Take 2 tablets (8 mg total) by mouth 2 (two) times daily.    pregabalin (LYRICA) 75 MG capsule Take 1 capsule (75 mg total) by mouth 2  (two) times daily.    scopolamine (TRANSDERM-SCOP) 1.3-1.5 mg (1 mg over 3 days) Place 1 patch onto the skin Every 3 (three) days.    sertraline (ZOLOFT) 50 MG tablet Take 1 tablet (50 mg total) by mouth once daily.    traZODone (DESYREL) 100 MG tablet Take 1 tablet (100 mg total) by mouth every evening.    [START ON 6/8/2025] HYDROcodone-acetaminophen (NORCO) 5-325 mg per tablet Take 1 tablet by mouth every 6 (six) hours as needed for Pain.    [START ON 7/8/2025] HYDROcodone-acetaminophen (NORCO) 5-325 mg per tablet Take 1 tablet by mouth every 6 (six) hours as needed for Pain.     Family History    Family history is unknown by patient.       Tobacco Use    Smoking status: Never    Smokeless tobacco: Never   Substance and Sexual Activity    Alcohol use: Never    Drug use: Never    Sexual activity: Not Currently     Review of Systems   All other systems reviewed and are negative.    Objective:     Vital Signs (Most Recent):  Temp: 97.9 °F (36.6 °C) (05/10/25 1246)  Pulse: (!) 117 (05/10/25 1246)  Resp: 16 (05/10/25 0736)  BP: 116/70 (05/10/25 1246)  SpO2: 96 % (05/10/25 1246) Vital Signs (24h Range):  Temp:  [97 °F (36.1 °C)-99.6 °F (37.6 °C)] 97.9 °F (36.6 °C)  Pulse:  [100-125] 117  Resp:  [11-22] 16  SpO2:  [90 %-100 %] 96 %  BP: ()/(58-84) 116/70     Weight: 77.1 kg (170 lb)  Body mass index is 31.09 kg/m².     Physical Exam  Vitals and nursing note reviewed.   Constitutional:       Appearance: She is ill-appearing.   HENT:      Head: Normocephalic.      Mouth/Throat:      Mouth: Mucous membranes are moist.   Eyes:      Extraocular Movements: Extraocular movements intact.      Pupils: Pupils are equal, round, and reactive to light.   Cardiovascular:      Rate and Rhythm: Regular rhythm. Tachycardia present.      Pulses: Normal pulses.   Pulmonary:      Effort: Pulmonary effort is normal.   Abdominal:      General: Abdomen is flat. Bowel sounds are normal.      Palpations: Abdomen is soft.    Musculoskeletal:      Right lower leg: No edema.      Left lower leg: No edema.   Skin:     General: Skin is warm and dry.   Neurological:      General: No focal deficit present.      Mental Status: She is alert. Mental status is at baseline.   Psychiatric:         Mood and Affect: Mood normal.               Significant Labs: All pertinent labs within the past 24 hours have been reviewed.  BMP:   Recent Labs   Lab 05/10/25  0353   GLU 74*      K 4.6   CL 99   CO2 31   BUN 20   CREATININE 0.59   CALCIUM 10.0     CBC:   Recent Labs   Lab 05/09/25  0916 05/10/25  0353   WBC 12.19* 9.38   HGB 10.7* 9.8*   HCT 38.2 34.9*    223     CMP:   Recent Labs   Lab 05/09/25  0916 05/10/25  0353    143   K 4.5 4.6   CL 99 99   CO2 32* 31   GLU 95 74*   BUN 23* 20   CREATININE 0.61 0.59   CALCIUM 11.1* 10.0   PROT 9.2*  --    ALBUMIN 3.4  --    BILITOT 0.4  --    ALKPHOS 121  --    AST 63*  --    ALT 74*  --    ANIONGAP 18* 18*       Significant Imaging: I have reviewed all pertinent imaging results/findings within the past 24 hours.

## 2025-05-10 NOTE — PLAN OF CARE
Recommend PEG feedings of Isosource 1.5 at goal of 35 ml/hr with 35 ml/hr hourly free water flush. Add Luis Fernando BID to aid in wound healing.     Recommend bowel regimen to resolve constipation prior to feeding being initiated. When PEG feeding started, monitor for s/s of intolerance (n/v/d/c) and report to RD.

## 2025-05-10 NOTE — CONSULTS
[facility]  Adult Nutrition  Consult Note         Reason for Assessment  Reason For Assessment: consult PEG feedings       Assessment and Plan    Consult for PEG feedings received and appreciated. Patient admitted on 5/9 with a dx of acute on chronic respiratory failure with hypoxia and hypercapnia and multiple pressure ulcers. PMH of quadriplegia, Ulcerative colitis, and severe PCM per chart review. Patient is NPO.     Patient is 77.1 kg with a BMI of 31.09 which indicates obesity. Per chart review, patient with 11% significant weight loss over the past 3 months. Patient with extended hospitalization with trach and PEG placed during that time. Patient met ASPEN criteria for severe PCM on prior admission due to significant weight loss and poor po intakes. Patient now receiving nutrition via PEG and is NPO due to dysphagia. NFPE will be completed on follow up to determine if patient still meets criteria for PCM.     Last Bowel Movement: 05/04/25. Medications/labs reviewed.    Recommend Isosource 1.5 at goal of 35 ml/hr with 35 ml/hr hourly free water flush. Add Luis Fernando BID to aid in wound healing. Recommend bowel regimen to resolve constipation prior to feeding being initiated for better tolerance and reduced aspiration risk. When PEG feeding started, monitor for s/s of intolerance (n/v/d/c) and report to RD. RD following.    Learning Needs/Social Determinants of Health    Learning Assessment       05/09/2025 1719 [facility] (5/9/2025 - Present)   Created by Augustine Archer, RN - RN (Nurse) Status: Complete                 PRIMARY LEARNER     Primary Learner Name:  ANTONIA HARTMAN AW - 05/09/2025 1719    Relationship:  Patient AW - 05/09/2025 1719    Does the primary learner have any barriers to learning?:  Language AW - 05/09/2025 1719    What is the preferred language of the primary learner?:  English AW - 05/09/2025 1719    Is an  required?:  No AW - 05/09/2025  1719    How does the primary learner prefer to learn new concepts?:  Listening AW - 05/09/2025 1719    How often do you need to have someone help you read instructions, pamphlets, or written material from your doctor or pharmacy?:  Sometimes AW - 05/09/2025 1719        CO-LEARNER #1     No question answered        CO-LEARNER #2     No question answered        SPECIAL TOPICS     No question answered        ANSWERED BY:     No question answered        Comments         Edit History       Augustine Archer RN - RN (Nurse)   05/09/2025 1719                             Social Drivers of Health     Tobacco Use: Low Risk  (5/9/2025)    Patient History     Smoking Tobacco Use: Never     Smokeless Tobacco Use: Never     Passive Exposure: Not on file   Alcohol Use: Not At Risk (5/9/2025)    AUDIT-C     Frequency of Alcohol Consumption: Never     Average Number of Drinks: Patient does not drink     Frequency of Binge Drinking: Never   Financial Resource Strain: Patient Declined (5/9/2025)    Overall Financial Resource Strain (CARDIA)     Difficulty of Paying Living Expenses: Patient declined   Food Insecurity: Patient Declined (5/9/2025)    Hunger Vital Sign     Worried About Running Out of Food in the Last Year: Patient declined     Ran Out of Food in the Last Year: Patient declined   Recent Concern: Food Insecurity - Food Insecurity Present (3/23/2025)    Hunger Vital Sign     Worried About Running Out of Food in the Last Year: Often true     Ran Out of Food in the Last Year: Often true   Transportation Needs: Patient Declined (5/9/2025)    PRAPARE - Transportation     Lack of Transportation (Medical): Patient declined     Lack of Transportation (Non-Medical): Patient declined   Physical Activity: Inactive (5/9/2025)    Exercise Vital Sign     Days of Exercise per Week: 0 days     Minutes of Exercise per Session: 0 min   Stress: Patient Declined (5/9/2025)    [employer] [place] [employer] - Occupational Stress  Questionnaire     Feeling of Stress : Patient declined   Recent Concern: Stress - Stress Concern Present (3/23/2025)    [employer] [place] [employer] - Occupational Stress Questionnaire     Feeling of Stress : Rather much   Housing Stability: Patient Declined (5/9/2025)    Housing Stability Vital Sign     Unable to Pay for Housing in the Last Year: Patient declined     Number of Times Moved in the Last Year: Not on file     Homeless in the Last Year: Patient declined   Depression: Low Risk  (5/8/2025)    Depression     Last PHQ-4: Flowsheet Data: 0   Utilities: Patient Declined (5/9/2025)    [facility] Utilities     Threatened with loss of utilities: Patient declined   Health Literacy: Patient Declined (5/9/2025)     Health Literacy     Frequency of need for help with medical instructions: Patient declines to respond   Recent Concern: Health Literacy - Inadequate Health Literacy (5/9/2025)     Health Literacy     Frequency of need for help with medical instructions: Sometimes   Social Isolation: Socially Integrated (3/24/2025)    Social Isolation     Social Isolation: 1          Malnutrition  Patient met ASPEN criteria for severe PCM on last admission March 2025. PEG placed since then. Significant weight loss of 11% since February 2025. NFPE will be completed on follow up to determine if patient continues on meet criteria.  Malnutrition Assessment             Weight Loss (Malnutrition): greater than 7.5% in 3 months                           Nutrition Diagnosis  Increased Protein Needs related to Chronic illness and Wound healing as evidenced by multiple pressure ulcers of skin  Comments: add Luis Fernando BID to PEG feedings    Recent Labs   Lab 05/10/25  0353   GLU 74*     Comments on Glucose:     Nutrition Prescription / Recommendations  Recommendation/Intervention: Recommend to initiate Isosource 1.5 at goal of 35 ml/hr. Flush with 35 ml free water hourly. Add Luis Fernando BID to aid in wound healing.  Goals: PEG  feeding tolerance during admission  Nutrition Goal Status: new  Current Diet Order: NPO  Chewing or Swallowing Difficulty?: Swallowing difficulty  Recommended Diet: Enteral Nutrition  Recommended Oral Supplement: Luis Fernando [90 kcals, 2.5g Protein, 10g Carbs(3g Sugar), 7g L-Arginine, 7g L-Glutamine, Vitamin C 300mg, 9.5mg Zinc] 2 times a day  Is Nutrition Support Recommended: yes  Is Nutrition Education Recommended: No    Needs Calculated  Energy Need Method: other (see comments) (quadriplegia) Energy Calorie Requirements (kcal): 3961-9048  Protein Requirements: 60-75  Enteral Nutrition Isosource 1.5 at goal of 35 ml/hr with Luis Fernando BID; Flush with 35 ml water every hour  Enteral Nutrition Formula Provides:  1260 kcals Propofol Rate: No Luis Fernando 103=1274  57 g Protein + 5 gm Luis Fernando  =62 gm  141 g Carbohydrates  54 g Fat Propofol Rate: No  653 ml Fluid without Flush    840 ml Fluid by flush   1493 ml Total Fluid  Enteral Nutrition Recommended Order:  Tube feeding via PEG/ Gastrostomy  Tube feeding formula: Isosource 1.5 PEG/ Gastrostomy  Free Water Flush: 35 ml hourly  Modular Supplements:Luis Fernando 2 times a day  Enteral Nutrition meets needs?: yes  Enteral Nutrition Status: Recommended but Not Ordered  Monitor and Evaluation  % current Intake: Enteral Nutrition is on Hold  % intake to meet estimated needs: Enteral Nutrition   Monitor and Evaluation: Energy intake, Inflammatory profile, Weight, Lipid profile, Electrolyte and renal panel, Diet order, Gastrointestinal profile, Enteral and parenteral nutrition administration, Glucose/endocrine profile  Energy intake, Inflammatory profile, Weight, Lipid profile, Electrolyte and renal panel, Diet order, Gastrointestinal profile, Enteral and parenteral nutrition administration, Glucose/endocrine profile    Current Medical Diagnosis and Past Medical History     Past Medical History:   Diagnosis Date    Chronic idiopathic constipation 08/27/2024    Chronic respiratory failure with hypoxia  03/25/2025    Coag negative Staphylococcus bacteremia 03/25/2025    GERD (gastroesophageal reflux disease)     Insomnia secondary to depression with anxiety     Mixed hyperlipidemia     Quadriplegia 03/23/2025    Severe protein-calorie malnutrition 03/23/2025    Spontaneous hematoma of lower leg 02/26/2025    UTI due to extended-spectrum beta lactamase (ESBL) producing Escherichia coli 02/12/2025       Nutrition/Diet History  Nutrition Intake History: recent PEG placement after long hospitlization  Food Allergies: NKFA  Factors Affecting Nutritional Intake: difficulty/impaired swallowing    Lab/Procedures/Meds  Recent Labs   Lab 05/09/25  0916 05/10/25  0353    143   K 4.5 4.6   BUN 23* 20   CREATININE 0.61 0.59   CALCIUM 11.1* 10.0   ALBUMIN 3.4  --    CL 99 99   ALT 74*  --    AST 63*  --      Last A1c:   Lab Results   Component Value Date    HGBA1C 5.5 03/01/2025     Lab Results   Component Value Date    RBC 4.14 (L) 05/10/2025    HGB 9.8 (L) 05/10/2025    HCT 34.9 (L) 05/10/2025    MCV 84.3 05/10/2025    MCH 23.7 (L) 05/10/2025    MCHC 28.1 (L) 05/10/2025     Pertinent Labs Reviewed: reviewed  Pertinent Medications Reviewed: reviewed  Scheduled Meds:   albuterol-ipratropium  3 mL Nebulization Q6H WAKE    atorvastatin  20 mg Oral Daily    cholecalciferol (vitamin D3)  50,000 Units Oral Q7 Days    ezetimibe  10 mg Oral Daily    heparin (porcine)  5,000 Units Subcutaneous Q8H    levoFLOXacin  500 mg Oral Daily    linaCLOtide  290 mcg Oral Before breakfast    midodrine  10 mg Oral TID    pantoprazole  40 mg Oral Daily    pregabalin  75 mg Oral BID    scopolamine  1 patch Transdermal Q3 Days    sertraline  50 mg Oral Daily    traZODone  100 mg Oral QHS     Continuous Infusions:  PRN Meds:.  Current Facility-Administered Medications:     acetaminophen, 650 mg, Oral, Q4H PRN    acetaminophen, 650 mg, Oral, Q8H PRN    dextrose 50%, 12.5 g, Intravenous, PRN    dextrose 50%, 25 g, Intravenous, PRN    glucagon (human  "recombinant), 1 mg, Intramuscular, PRN    glucose, 16 g, Oral, PRN    glucose, 24 g, Oral, PRN    HYDROcodone-acetaminophen, 1 tablet, Oral, Q6H PRN    HYDROcodone-acetaminophen, 1 tablet, Oral, Q6H PRN    magnesium oxide, 800 mg, Oral, PRN    magnesium oxide, 800 mg, Oral, PRN    melatonin, 6 mg, Oral, Nightly PRN    naloxone, 0.02 mg, Intravenous, PRN    ondansetron, 4 mg, Intravenous, Q8H PRN    potassium bicarbonate, 35 mEq, Oral, PRN    potassium bicarbonate, 50 mEq, Oral, PRN    potassium bicarbonate, 60 mEq, Oral, PRN    sodium chloride 0.9%, 10 mL, Intravenous, Q12H PRN    Anthropometrics  Height: 5' 2" (157.5 cm)  Height (inches): 62 in  Height Method: Stated  Weight: 77.1 kg (170 lb)  Weight (lb): 170 lb  Weight Method: Standard Scale  Ideal Body Weight (IBW), Female: 110 lb  % Ideal Body Weight, Female (lb): 154.55 %  BMI (Calculated): 31.1  BMI Grade: 30 - 34.9- obesity - grade I  Additional Documentation: Tetraplegia (Quadriplegia) Ideal Body Weight (IBW) Adjustment  Tetraplegia (Quadriplegia) Ideal Body Weight (IBW) Adjustment: 100 lb    Estimated/Assessed Needs  RMR ([place]-St. Jeor Equation): 1289.36     Temp: 97.9 °F (36.6 °C)Oral  Weight Used For Calorie Calculations: 77.1 kg (169 lb 15.6 oz)   Energy Need Method: other (see comments) (quadriplegia) Energy Calorie Requirements (kcal): 3198-2902  Weight Used For Protein Calculations: 49.9 kg (110 lb)  Protein Requirements: 60-75  Estimated Fluid Requirement Method: other (see comments) Fluid Requirements (mL): 7246-3764  RDA Method (mL): 1079       Nutrition by Nursing                            Nutrition Follow-Up  RD Follow-up?: Yes      Nutrition Discharge Planning: Enteral nutrition (comments)         Available via Secure Chat  "

## 2025-05-10 NOTE — PROGRESS NOTES
[facility]  [facility] Medicine  Progress Note    Patient Name: Karie Denney  MRN: [id number]  Patient Class: OP- Observation   Admission Date: 5/9/2025  Length of Stay: 0 days  Attending Physician: Jordana Cavazos MD  Primary Care Provider: Hilda Oro FNP-C        Subjective     Principal Problem:Acute on chronic respiratory failure with hypoxia and hypercapnia        HPI:  60 yo female with tracheostomy who was discharged 8 days ago from our service after a prolonged 3 month hospital stay presents for the second time since discharge for acute on chronic respiratory failure in the setting of secretions and blockages in the trach. She was initially hypoxic with EMS but after suctioning her trach tube here in the emergency department her sats have returned to normal. Due to continued ER visits due to poor trach care will admit patient to obs and discuss with social and family about placement. Patient has also missed 2 follow ups this week including pulmonary.    Overview/Hospital Course:  5/10  - consulted pulm as family requested if she could have bronch done in hospital while she waits for NH placement  - noted tachycardia is patient's baseline, extensive work up during last 3 month stay    Past Medical History:   Diagnosis Date    Chronic idiopathic constipation 08/27/2024    Chronic respiratory failure with hypoxia 03/25/2025    Coag negative Staphylococcus bacteremia 03/25/2025    GERD (gastroesophageal reflux disease)     Insomnia secondary to depression with anxiety     Mixed hyperlipidemia     Quadriplegia 03/23/2025    Severe protein-calorie malnutrition 03/23/2025    Spontaneous hematoma of lower leg 02/26/2025    UTI due to extended-spectrum beta lactamase (ESBL) producing Escherichia coli 02/12/2025       Past Surgical History:   Procedure Laterality Date    BACK SURGERY         Review of patient's allergies indicates:   Allergen Reactions    Penicillins Anaphylaxis       No  current facility-administered medications on file prior to encounter.     Current Outpatient Medications on File Prior to Encounter   Medication Sig    atorvastatin (LIPITOR) 20 MG tablet Take 1 tablet (20 mg total) by mouth once daily.    cholecalciferol, vitamin D3, 1,250 mcg (50,000 unit) capsule Take 1 capsule (50,000 Units total) by mouth every 7 days.    ezetimibe (ZETIA) 10 mg tablet Take 1 tablet (10 mg total) by mouth once daily.    HYDROcodone-acetaminophen (NORCO) 5-325 mg per tablet Take 1 tablet by mouth every 6 (six) hours as needed for Pain.    levalbuterol (XOPENEX) 1.25 mg/3 mL nebulizer solution Take 1 NEBULE (1.25 mg total) by nebulization 2 (two) times a day.    levoFLOXacin (LEVAQUIN) 500 MG tablet Take 1 tablet (500 mg total) by mouth once daily. for 10 days    LINZESS 290 mcg Cap capsule Take 1 capsule (290 mcg total) by mouth before breakfast.    midodrine (PROAMATINE) 5 MG Tab Take 2 tablets (10 mg total) by mouth 3 (three) times daily.    omeprazole (PRILOSEC) 40 MG capsule Take 1 capsule (40 mg total) by mouth every morning.    ondansetron (ZOFRAN-ODT) 4 MG TbDL Take 2 tablets (8 mg total) by mouth 2 (two) times daily.    pregabalin (LYRICA) 75 MG capsule Take 1 capsule (75 mg total) by mouth 2 (two) times daily.    scopolamine (TRANSDERM-SCOP) 1.3-1.5 mg (1 mg over 3 days) Place 1 patch onto the skin Every 3 (three) days.    sertraline (ZOLOFT) 50 MG tablet Take 1 tablet (50 mg total) by mouth once daily.    traZODone (DESYREL) 100 MG tablet Take 1 tablet (100 mg total) by mouth every evening.    [START ON 6/8/2025] HYDROcodone-acetaminophen (NORCO) 5-325 mg per tablet Take 1 tablet by mouth every 6 (six) hours as needed for Pain.    [START ON 7/8/2025] HYDROcodone-acetaminophen (NORCO) 5-325 mg per tablet Take 1 tablet by mouth every 6 (six) hours as needed for Pain.     Family History    Family history is unknown by patient.       Tobacco Use    Smoking status: Never    Smokeless  tobacco: Never   Substance and Sexual Activity    Alcohol use: Never    Drug use: Never    Sexual activity: Not Currently     Review of Systems   All other systems reviewed and are negative.    Objective:     Vital Signs (Most Recent):  Temp: 97.9 °F (36.6 °C) (05/10/25 1246)  Pulse: (!) 117 (05/10/25 1246)  Resp: 16 (05/10/25 0736)  BP: 116/70 (05/10/25 1246)  SpO2: 96 % (05/10/25 1246) Vital Signs (24h Range):  Temp:  [97 °F (36.1 °C)-99.6 °F (37.6 °C)] 97.9 °F (36.6 °C)  Pulse:  [100-125] 117  Resp:  [11-22] 16  SpO2:  [90 %-100 %] 96 %  BP: ()/(58-84) 116/70     Weight: 77.1 kg (170 lb)  Body mass index is 31.09 kg/m².     Physical Exam  Vitals and nursing note reviewed.   Constitutional:       Appearance: She is ill-appearing.   HENT:      Head: Normocephalic.      Mouth/Throat:      Mouth: Mucous membranes are moist.   Eyes:      Extraocular Movements: Extraocular movements intact.      Pupils: Pupils are equal, round, and reactive to light.   Cardiovascular:      Rate and Rhythm: Regular rhythm. Tachycardia present.      Pulses: Normal pulses.   Pulmonary:      Effort: Pulmonary effort is normal.   Abdominal:      General: Abdomen is flat. Bowel sounds are normal.      Palpations: Abdomen is soft.   Musculoskeletal:      Right lower leg: No edema.      Left lower leg: No edema.   Skin:     General: Skin is warm and dry.   Neurological:      General: No focal deficit present.      Mental Status: She is alert. Mental status is at baseline.   Psychiatric:         Mood and Affect: Mood normal.               Significant Labs: All pertinent labs within the past 24 hours have been reviewed.  BMP:   Recent Labs   Lab 05/10/25  0353   GLU 74*      K 4.6   CL 99   CO2 31   BUN 20   CREATININE 0.59   CALCIUM 10.0     CBC:   Recent Labs   Lab 05/09/25  0916 05/10/25  0353   WBC 12.19* 9.38   HGB 10.7* 9.8*   HCT 38.2 34.9*    223     CMP:   Recent Labs   Lab 05/09/25  0916 05/10/25  0353    886    K 4.5 4.6   CL 99 99   CO2 32* 31   GLU 95 74*   BUN 23* 20   CREATININE 0.61 0.59   CALCIUM 11.1* 10.0   PROT 9.2*  --    ALBUMIN 3.4  --    BILITOT 0.4  --    ALKPHOS 121  --    AST 63*  --    ALT 74*  --    ANIONGAP 18* 18*       Significant Imaging: I have reviewed all pertinent imaging results/findings within the past 24 hours.      Assessment & Plan  Pressure ulcers of skin of multiple topographic sites  noted    Severe protein-calorie malnutrition  Nutrition consulted. Most recent weight and BMI monitored-     Measurements:  Wt Readings from Last 1 Encounters:   05/09/25 77.1 kg (170 lb)   Body mass index is 31.09 kg/m².    Patient has been screened and assessed by RD.    Malnutrition Type:  Context:    Level:      Malnutrition Characteristic Summary:       Interventions/Recommendations (treatment strategy):       Quadriplegia  noted    Acute on chronic respiratory failure with hypoxia and hypercapnia  Patient with Hypercapnic and Hypoxic Respiratory failure which is Acute on chronic.  she is on home oxygen at on vent LPM. Supplemental oxygen was provided and noted- Oxygen Concentration (%):  [36] 36on vent    .   Signs/symptoms of respiratory failure include- tachypnea, increased work of breathing, respiratory distress, and use of accessory muscles. Contributing diagnoses includes - COPD and Interstitial lung disease Labs and images were reviewed. Patient Has not had a recent ABG. Will treat underlying causes and adjust management of respiratory failure as follows-     Admit to obs  Education about trach care  Stress to family that continued ER visits and hospital admissions will result in worsened overall health and likely placement is needed  Will consult pulmonary to enquire about bronch that she missed     5/10  - family agrees with placement  VTE Risk Mitigation (From admission, onward)           Ordered     heparin (porcine) injection 5,000 Units  Every 8 hours         05/09/25 1340     IP VTE HIGH  RISK PATIENT  Once         05/09/25 1340     Place sequential compression device  Until discontinued         05/09/25 1340                    Discharge Planning   SHRADDHA:      Code Status: Full Code   Medical Readiness for Discharge Date:   Discharge Plan A: Home with family                        Jordana Cavazos MD  Department of Hospital Medicine   [facility]

## 2025-05-10 NOTE — NURSING
"1013: Notified Dr. ALEJANDRA Cunha via secure chat of consult for an established patient of hers who missed bronch on last week.     1022: Dr. Cunha responded "I am not on call" Dr. Cavazos added to secure chat.     1034: Dr. Santizo notified of consult via secure chat.   "

## 2025-05-10 NOTE — ASSESSMENT & PLAN NOTE
Patient with Hypercapnic and Hypoxic Respiratory failure which is Acute on chronic.  she is on home oxygen at on vent LPM. Supplemental oxygen was provided and noted- Oxygen Concentration (%):  [36] 36on vent    .   Signs/symptoms of respiratory failure include- tachypnea, increased work of breathing, respiratory distress, and use of accessory muscles. Contributing diagnoses includes - COPD and Interstitial lung disease Labs and images were reviewed. Patient Has not had a recent ABG. Will treat underlying causes and adjust management of respiratory failure as follows-     Admit to obs  Education about trach care  Stress to family that continued ER visits and hospital admissions will result in worsened overall health and likely placement is needed  Will consult pulmonary to enquire about bronch that she missed     5/10  - family agrees with placement

## 2025-05-10 NOTE — HOSPITAL COURSE
5/10  - consulted pulm as family requested if she could have bronch done in hospital while she waits for NH placement  - noted tachycardia is patient's baseline, extensive work up during last 3 month stay    5/11   - pulmonary recs noted  - awaiting placement    5/12   - awaiting placement    5/13  - awaiting placement  - no plans by pulmonology to do bronch while admitted  5/14 awaiting placement  5/15 stable for discharge.  Discharging to long-term [facility] facility

## 2025-05-11 LAB
ANION GAP SERPL CALCULATED.3IONS-SCNC: 15 MMOL/L (ref 7–16)
BASOPHILS # BLD AUTO: 0.06 K/UL (ref 0–0.2)
BASOPHILS NFR BLD AUTO: 0.5 % (ref 0–1)
BUN SERPL-MCNC: 25 MG/DL (ref 10–20)
BUN/CREAT SERPL: 40 (ref 6–20)
CALCIUM SERPL-MCNC: 10.4 MG/DL (ref 8.4–10.2)
CHLORIDE SERPL-SCNC: 98 MMOL/L (ref 98–107)
CO2 SERPL-SCNC: 33 MMOL/L (ref 23–31)
CREAT SERPL-MCNC: 0.62 MG/DL (ref 0.55–1.02)
DIFFERENTIAL METHOD BLD: ABNORMAL
EGFR (NO RACE VARIABLE) (RUSH/TITUS): 101 ML/MIN/1.73M2
EOSINOPHIL # BLD AUTO: 0.29 K/UL (ref 0–0.5)
EOSINOPHIL NFR BLD AUTO: 2.5 % (ref 1–4)
ERYTHROCYTE [DISTWIDTH] IN BLOOD BY AUTOMATED COUNT: 17.4 % (ref 11.5–14.5)
GLUCOSE SERPL-MCNC: 72 MG/DL (ref 82–115)
HCT VFR BLD AUTO: 36.4 % (ref 38–47)
HGB BLD-MCNC: 10.3 G/DL (ref 12–16)
IMM GRANULOCYTES # BLD AUTO: 0.07 K/UL (ref 0–0.04)
IMM GRANULOCYTES NFR BLD: 0.6 % (ref 0–0.4)
LYMPHOCYTES # BLD AUTO: 2.7 K/UL (ref 1–4.8)
LYMPHOCYTES NFR BLD AUTO: 23.7 % (ref 27–41)
MCH RBC QN AUTO: 23.9 PG (ref 27–31)
MCHC RBC AUTO-ENTMCNC: 28.3 G/DL (ref 32–36)
MCV RBC AUTO: 84.5 FL (ref 80–96)
MONOCYTES # BLD AUTO: 0.88 K/UL (ref 0–0.8)
MONOCYTES NFR BLD AUTO: 7.7 % (ref 2–6)
MPC BLD CALC-MCNC: 10.9 FL (ref 9.4–12.4)
NEUTROPHILS # BLD AUTO: 7.38 K/UL (ref 1.8–7.7)
NEUTROPHILS NFR BLD AUTO: 65 % (ref 53–65)
NRBC # BLD AUTO: 0 X10E3/UL
NRBC, AUTO (.00): 0 %
PLATELET # BLD AUTO: 240 K/UL (ref 150–400)
POTASSIUM SERPL-SCNC: 4.1 MMOL/L (ref 3.5–5.1)
RBC # BLD AUTO: 4.31 M/UL (ref 4.2–5.4)
SODIUM SERPL-SCNC: 142 MMOL/L (ref 136–145)
WBC # BLD AUTO: 11.38 K/UL (ref 4.5–11)

## 2025-05-11 PROCEDURE — 25000242 PHARM REV CODE 250 ALT 637 W/ HCPCS

## 2025-05-11 PROCEDURE — 11000001 HC ACUTE MED/SURG PRIVATE ROOM

## 2025-05-11 PROCEDURE — 94761 N-INVAS EAR/PLS OXIMETRY MLT: CPT

## 2025-05-11 PROCEDURE — 36415 COLL VENOUS BLD VENIPUNCTURE: CPT

## 2025-05-11 PROCEDURE — 80048 BASIC METABOLIC PNL TOTAL CA: CPT

## 2025-05-11 PROCEDURE — 94640 AIRWAY INHALATION TREATMENT: CPT

## 2025-05-11 PROCEDURE — 25000003 PHARM REV CODE 250

## 2025-05-11 PROCEDURE — 99900026 HC AIRWAY MAINTENANCE (STAT)

## 2025-05-11 PROCEDURE — 85025 COMPLETE CBC W/AUTO DIFF WBC: CPT

## 2025-05-11 PROCEDURE — 27000221 HC OXYGEN, UP TO 24 HOURS

## 2025-05-11 PROCEDURE — 99900035 HC TECH TIME PER 15 MIN (STAT)

## 2025-05-11 PROCEDURE — 27200966 HC CLOSED SUCTION SYSTEM

## 2025-05-11 PROCEDURE — 25000242 PHARM REV CODE 250 ALT 637 W/ HCPCS: Performed by: INTERNAL MEDICINE

## 2025-05-11 PROCEDURE — 27000207 HC ISOLATION

## 2025-05-11 PROCEDURE — 99222 1ST HOSP IP/OBS MODERATE 55: CPT | Mod: ,,, | Performed by: INTERNAL MEDICINE

## 2025-05-11 PROCEDURE — 63600175 PHARM REV CODE 636 W HCPCS

## 2025-05-11 PROCEDURE — 99232 SBSQ HOSP IP/OBS MODERATE 35: CPT | Mod: ,,,

## 2025-05-11 PROCEDURE — 25000003 PHARM REV CODE 250: Performed by: INTERNAL MEDICINE

## 2025-05-11 RX ORDER — ALBUTEROL SULFATE 0.83 MG/ML
2.5 SOLUTION RESPIRATORY (INHALATION) EVERY 4 HOURS PRN
Status: DISCONTINUED | OUTPATIENT
Start: 2025-05-11 | End: 2025-05-13

## 2025-05-11 RX ORDER — POTASSIUM IODIDE 1 G/ML
5 SOLUTION ORAL 3 TIMES DAILY
Status: DISCONTINUED | OUTPATIENT
Start: 2025-05-11 | End: 2025-05-15 | Stop reason: HOSPADM

## 2025-05-11 RX ORDER — CHOLECALCIFEROL (VITAMIN D3) 1250 MCG
50000 TABLET ORAL
Status: DISCONTINUED | OUTPATIENT
Start: 2025-05-12 | End: 2025-05-12 | Stop reason: CLARIF

## 2025-05-11 RX ADMIN — ATORVASTATIN CALCIUM 20 MG: 20 TABLET, FILM COATED ORAL at 09:05

## 2025-05-11 RX ADMIN — HEPARIN SODIUM 5000 UNITS: 5000 INJECTION, SOLUTION INTRAVENOUS; SUBCUTANEOUS at 09:05

## 2025-05-11 RX ADMIN — SERTRALINE HYDROCHLORIDE 50 MG: 50 TABLET ORAL at 09:05

## 2025-05-11 RX ADMIN — HYDROCODONE BITARTRATE AND ACETAMINOPHEN 1 TABLET: 10; 325 TABLET ORAL at 11:05

## 2025-05-11 RX ADMIN — Medication 6 MG: at 09:05

## 2025-05-11 RX ADMIN — PREGABALIN 75 MG: 75 CAPSULE ORAL at 09:05

## 2025-05-11 RX ADMIN — HEPARIN SODIUM 5000 UNITS: 5000 INJECTION, SOLUTION INTRAVENOUS; SUBCUTANEOUS at 05:05

## 2025-05-11 RX ADMIN — HYDROCODONE BITARTRATE AND ACETAMINOPHEN 1 TABLET: 10; 325 TABLET ORAL at 09:05

## 2025-05-11 RX ADMIN — IPRATROPIUM BROMIDE AND ALBUTEROL SULFATE 3 ML: 2.5; .5 SOLUTION RESPIRATORY (INHALATION) at 07:05

## 2025-05-11 RX ADMIN — LEVOFLOXACIN 500 MG: 500 TABLET, FILM COATED ORAL at 09:05

## 2025-05-11 RX ADMIN — ALBUTEROL SULFATE 2.5 MG: 2.5 SOLUTION RESPIRATORY (INHALATION) at 08:05

## 2025-05-11 RX ADMIN — PANTOPRAZOLE SODIUM 40 MG: 40 TABLET, DELAYED RELEASE ORAL at 09:05

## 2025-05-11 RX ADMIN — MIDODRINE HYDROCHLORIDE 10 MG: 5 TABLET ORAL at 09:05

## 2025-05-11 RX ADMIN — LINACLOTIDE 290 MCG: 290 CAPSULE, GELATIN COATED ORAL at 05:05

## 2025-05-11 RX ADMIN — POTASSIUM IODIDE 5 DROP: 1 SOLUTION ORAL at 09:05

## 2025-05-11 RX ADMIN — POTASSIUM IODIDE 5 DROP: 1 SOLUTION ORAL at 03:05

## 2025-05-11 RX ADMIN — POTASSIUM IODIDE 5 DROP: 1 SOLUTION ORAL at 11:05

## 2025-05-11 RX ADMIN — TRAZODONE HYDROCHLORIDE 100 MG: 50 TABLET ORAL at 09:05

## 2025-05-11 RX ADMIN — EZETIMIBE 10 MG: 10 TABLET ORAL at 09:05

## 2025-05-11 NOTE — CONSULTS
[facility]  Critical Care Medicine  Consult Note    Patient Name: Karie Denney  MRN: [id number]  Admission Date: 5/9/2025  Hospital Length of Stay: 1 days  Code Status: Full Code  Attending Physician: Jordana Cavazos MD   Primary Care Provider: Hilda Oro FNP-C   Principal Problem: Acute on chronic respiratory failure with hypoxia and hypercapnia    Consults  Subjective:     HPI:  Since patient was discharge after 3 month hospital stay for respiratory failure with a trach patient has had to come back to the emergency room twice watch was sent home in once required admission basically the patient's suction put on high-flow oxygen for a bit improves and gets better.  Patient is awake alert without complaints her trach is mostly plugged.  Patient denies any chest pain or chest tightness    Hospital/ICU Course:  No notes on file    Past Medical History:   Diagnosis Date    Chronic idiopathic constipation 08/27/2024    Chronic respiratory failure with hypoxia 03/25/2025    Coag negative Staphylococcus bacteremia 03/25/2025    GERD (gastroesophageal reflux disease)     Insomnia secondary to depression with anxiety     Mixed hyperlipidemia     Quadriplegia 03/23/2025    Severe protein-calorie malnutrition 03/23/2025    Spontaneous hematoma of lower leg 02/26/2025    UTI due to extended-spectrum beta lactamase (ESBL) producing Escherichia coli 02/12/2025       Past Surgical History:   Procedure Laterality Date    BACK SURGERY         Review of patient's allergies indicates:   Allergen Reactions    Penicillins Anaphylaxis       Family History    Family history is unknown by patient.       Tobacco Use    Smoking status: Never    Smokeless tobacco: Never   Substance and Sexual Activity    Alcohol use: Never    Drug use: Never    Sexual activity: Not Currently      Review of Systems  Objective:     Vital Signs (Most Recent):  Temp: 98.6 °F (37 °C) (05/11/25 0424)  Pulse: 110 (05/11/25  0711)  Resp: 20 (05/11/25 0711)  BP: 115/68 (05/11/25 0424)  SpO2: 96 % (05/11/25 0711) Vital Signs (24h Range):  Temp:  [97 °F (36.1 °C)-98.6 °F (37 °C)] 98.6 °F (37 °C)  Pulse:  [107-122] 110  Resp:  [14-22] 20  SpO2:  [89 %-98 %] 96 %  BP: (110-123)/(68-82) 115/68   Weight: 77.1 kg (170 lb)  Body mass index is 31.09 kg/m².    No intake or output data in the 24 hours ending 05/11/25 0806       Physical Exam  Vitals reviewed.   Constitutional:       Appearance: Normal appearance.      Interventions: She is not intubated.  HENT:      Head: Normocephalic and atraumatic.      Nose: Nose normal.      Mouth/Throat:      Mouth: Mucous membranes are dry.      Pharynx: Oropharynx is clear.   Eyes:      Extraocular Movements: Extraocular movements intact.      Conjunctiva/sclera: Conjunctivae normal.      Pupils: Pupils are equal, round, and reactive to light.   Cardiovascular:      Rate and Rhythm: Normal rate.      Heart sounds: Normal heart sounds. No murmur heard.  Pulmonary:      Effort: Pulmonary effort is normal. She is not intubated.      Breath sounds: Normal breath sounds.   Abdominal:      General: Abdomen is flat. Bowel sounds are normal.      Palpations: Abdomen is soft.   Musculoskeletal:         General: Normal range of motion.      Cervical back: Normal range of motion and neck supple.      Right lower leg: No edema.      Left lower leg: No edema.   Skin:     General: Skin is warm and dry.      Capillary Refill: Capillary refill takes less than 2 seconds.   Neurological:      General: No focal deficit present.      Mental Status: She is alert and oriented to person, place, and time.   Psychiatric:         Mood and Affect: Mood normal.         Behavior: Behavior normal.            Vents:  Oxygen Concentration (%): 36 (05/11/25 0711)  Lines/Drains/Airways       Drain  Duration                  Gastrostomy/Enterostomy LUQ -- days              Airway  Duration             Adult Surgical Airway 03/23/25 8569  Shiley Cuffed  48 days              Peripheral Intravenous Line  Duration                  Peripheral IV - Single Lumen 05/09/25 20 G Left;Posterior Hand 2 days                  Significant Labs:    CBC/Anemia Profile:  Recent Labs   Lab 05/09/25  0916 05/10/25  0353 05/11/25  0508   WBC 12.19* 9.38 11.38*   HGB 10.7* 9.8* 10.3*   HCT 38.2 34.9* 36.4*    223 240   MCV 86.6 84.3 84.5   RDW 17.4* 17.4* 17.4*        Chemistries:  Recent Labs   Lab 05/09/25  0916 05/10/25  0353 05/11/25  0508    143 142   K 4.5 4.6 4.1   CL 99 99 98   CO2 32* 31 33*   BUN 23* 20 25*   CREATININE 0.61 0.59 0.62   CALCIUM 11.1* 10.0 10.4*   ALBUMIN 3.4  --   --    PROT 9.2*  --   --    BILITOT 0.4  --   --    ALKPHOS 121  --   --    ALT 74*  --   --    AST 63*  --   --        Recent Lab Results         05/11/25  0508        Anion Gap 15       Baso # 0.06       Basophil % 0.5       BUN 25       BUN/CREAT RATIO 40       Calcium 10.4       Chloride 98       CO2 33       Creatinine 0.62       Differential Method Auto       eGFR 101  Comment: Estimated GFR calculated using the CKD-EPI creatinine (2021) equation.       Eos # 0.29       Eos % 2.5       Glucose 72       Hematocrit 36.4       Hemoglobin 10.3       Immature Grans (Abs) 0.07       Immature Granulocytes 0.6       Lymph # 2.70       Lymph % 23.7       MCH 23.9       MCHC 28.3       MCV 84.5       Mono # 0.88       Mono % 7.7       MPV 10.9       Neutrophils, Abs 7.38       Neutrophils Relative 65.0       nRBC 0.0       NUCLEATED RBC ABSOLUTE 0.00       Platelet Count 240       Potassium 4.1       RBC 4.31       RDW 17.4       Sodium 142       WBC 11.38               Significant Imaging: I have reviewed all pertinent imaging results/findings within the past 24 hours.    ABG  Recent Labs   Lab 05/04/25  1239   PH 7.32   PO2 14*   PCO2 73*   HCO3 37.6*     Assessment/Plan:     Pulmonary  * Acute on chronic respiratory failure with hypoxia and hypercapnia  Patient is trach  dependent with a trach in place but it is capped has had 2 episodes since going home for the 1st time with hypoxia that required emergency room trips.  X-ray has minimal changes.  I do not think bronchoscopy will change her course at this point.  I am very concerned that the family is not going to be able to manage her tracheostomy at home fact that she gets hypoxic with secretions make sure move in the trach to dangerous.  I think it an ideal world admitting to a nursing home with trach care would be the appropriate thing in optimal thing review medicines to see if we can add to improve clearance of secretions             Thank you for your consult. I will follow-up with patient. Please contact us if you have any additional questions.     Luke Santizo MD  Critical Care Medicine  [facility]

## 2025-05-11 NOTE — HPI
Since patient was discharge after 3 month hospital stay for respiratory failure with a trach patient has had to come back to the emergency room twice watch was sent home in once required admission basically the patient's suction put on high-flow oxygen for a bit improves and gets better.  Patient is awake alert without complaints her trach is mostly plugged.  Patient denies any chest pain or chest tightness

## 2025-05-11 NOTE — SUBJECTIVE & OBJECTIVE
Past Medical History:   Diagnosis Date    Chronic idiopathic constipation 08/27/2024    Chronic respiratory failure with hypoxia 03/25/2025    Coag negative Staphylococcus bacteremia 03/25/2025    GERD (gastroesophageal reflux disease)     Insomnia secondary to depression with anxiety     Mixed hyperlipidemia     Quadriplegia 03/23/2025    Severe protein-calorie malnutrition 03/23/2025    Spontaneous hematoma of lower leg 02/26/2025    UTI due to extended-spectrum beta lactamase (ESBL) producing Escherichia coli 02/12/2025       Past Surgical History:   Procedure Laterality Date    BACK SURGERY         Review of patient's allergies indicates:   Allergen Reactions    Penicillins Anaphylaxis       Family History    Family history is unknown by patient.       Tobacco Use    Smoking status: Never    Smokeless tobacco: Never   Substance and Sexual Activity    Alcohol use: Never    Drug use: Never    Sexual activity: Not Currently      Review of Systems  Objective:     Vital Signs (Most Recent):  Temp: 98.6 °F (37 °C) (05/11/25 0424)  Pulse: 110 (05/11/25 0711)  Resp: 20 (05/11/25 0711)  BP: 115/68 (05/11/25 0424)  SpO2: 96 % (05/11/25 0711) Vital Signs (24h Range):  Temp:  [97 °F (36.1 °C)-98.6 °F (37 °C)] 98.6 °F (37 °C)  Pulse:  [107-122] 110  Resp:  [14-22] 20  SpO2:  [89 %-98 %] 96 %  BP: (110-123)/(68-82) 115/68   Weight: 77.1 kg (170 lb)  Body mass index is 31.09 kg/m².    No intake or output data in the 24 hours ending 05/11/25 0806       Physical Exam  Vitals reviewed.   Constitutional:       Appearance: Normal appearance.      Interventions: She is not intubated.  HENT:      Head: Normocephalic and atraumatic.      Nose: Nose normal.      Mouth/Throat:      Mouth: Mucous membranes are dry.      Pharynx: Oropharynx is clear.   Eyes:      Extraocular Movements: Extraocular movements intact.      Conjunctiva/sclera: Conjunctivae normal.      Pupils: Pupils are equal, round, and reactive to light.   Cardiovascular:       Rate and Rhythm: Normal rate.      Heart sounds: Normal heart sounds. No murmur heard.  Pulmonary:      Effort: Pulmonary effort is normal. She is not intubated.      Breath sounds: Normal breath sounds.   Abdominal:      General: Abdomen is flat. Bowel sounds are normal.      Palpations: Abdomen is soft.   Musculoskeletal:         General: Normal range of motion.      Cervical back: Normal range of motion and neck supple.      Right lower leg: No edema.      Left lower leg: No edema.   Skin:     General: Skin is warm and dry.      Capillary Refill: Capillary refill takes less than 2 seconds.   Neurological:      General: No focal deficit present.      Mental Status: She is alert and oriented to person, place, and time.   Psychiatric:         Mood and Affect: Mood normal.         Behavior: Behavior normal.            Vents:  Oxygen Concentration (%): 36 (05/11/25 0711)  Lines/Drains/Airways       Drain  Duration                  Gastrostomy/Enterostomy LUQ -- days              Airway  Duration             Adult Surgical Airway 03/23/25 1544 Shiley Cuffed  48 days              Peripheral Intravenous Line  Duration                  Peripheral IV - Single Lumen 05/09/25 20 G Left;Posterior Hand 2 days                  Significant Labs:    CBC/Anemia Profile:  Recent Labs   Lab 05/09/25  0916 05/10/25  0353 05/11/25  0508   WBC 12.19* 9.38 11.38*   HGB 10.7* 9.8* 10.3*   HCT 38.2 34.9* 36.4*    223 240   MCV 86.6 84.3 84.5   RDW 17.4* 17.4* 17.4*        Chemistries:  Recent Labs   Lab 05/09/25  0916 05/10/25  0353 05/11/25  0508    143 142   K 4.5 4.6 4.1   CL 99 99 98   CO2 32* 31 33*   BUN 23* 20 25*   CREATININE 0.61 0.59 0.62   CALCIUM 11.1* 10.0 10.4*   ALBUMIN 3.4  --   --    PROT 9.2*  --   --    BILITOT 0.4  --   --    ALKPHOS 121  --   --    ALT 74*  --   --    AST 63*  --   --        Recent Lab Results         05/11/25  0508        Anion Gap 15       Baso # 0.06       Basophil % 0.5       BUN  25       BUN/CREAT RATIO 40       Calcium 10.4       Chloride 98       CO2 33       Creatinine 0.62       Differential Method Auto       eGFR 101  Comment: Estimated GFR calculated using the CKD-EPI creatinine (2021) equation.       Eos # 0.29       Eos % 2.5       Glucose 72       Hematocrit 36.4       Hemoglobin 10.3       Immature Grans (Abs) 0.07       Immature Granulocytes 0.6       Lymph # 2.70       Lymph % 23.7       MCH 23.9       MCHC 28.3       MCV 84.5       Mono # 0.88       Mono % 7.7       MPV 10.9       Neutrophils, Abs 7.38       Neutrophils Relative 65.0       nRBC 0.0       NUCLEATED RBC ABSOLUTE 0.00       Platelet Count 240       Potassium 4.1       RBC 4.31       RDW 17.4       Sodium 142       WBC 11.38               Significant Imaging: I have reviewed all pertinent imaging results/findings within the past 24 hours.

## 2025-05-11 NOTE — ASSESSMENT & PLAN NOTE
Patient is trach dependent with a trach in place but it is capped has had 2 episodes since going home for the 1st time with hypoxia that required emergency room trips.  X-ray has minimal changes.  I do not think bronchoscopy will change her course at this point.  I am very concerned that the family is not going to be able to manage her tracheostomy at home fact that she gets hypoxic with secretions make sure move in the trach to dangerous.  I think it an ideal world admitting to a nursing home with trach care would be the appropriate thing in optimal thing review medicines to see if we can add to improve clearance of secretions

## 2025-05-11 NOTE — ASSESSMENT & PLAN NOTE
Patient with Hypercapnic and Hypoxic Respiratory failure which is Acute on chronic.  she is on home oxygen at on vent LPM. Supplemental oxygen was provided and noted- Oxygen Concentration (%):  [36] 36on vent    .   Signs/symptoms of respiratory failure include- tachypnea, increased work of breathing, respiratory distress, and use of accessory muscles. Contributing diagnoses includes - COPD and Interstitial lung disease Labs and images were reviewed. Patient Has not had a recent ABG. Will treat underlying causes and adjust management of respiratory failure as follows-     Admit to obs  Education about trach care  Stress to family that continued ER visits and hospital admissions will result in worsened overall health and likely placement is needed  Will consult pulmonary to enquire about bronch that she missed     5/10  - family agrees with placement    5/11  - social working on placement, update expected tomorrow

## 2025-05-11 NOTE — PROGRESS NOTES
[facility]  [facility] Medicine  Progress Note    Patient Name: Karie Denney  MRN: [id number]  Patient Class: IP- Inpatient   Admission Date: 5/9/2025  Length of Stay: 1 days  Attending Physician: Jordana Cavazos MD  Primary Care Provider: Hilda Oro FNP-C        Subjective     Principal Problem:Acute on chronic respiratory failure with hypoxia and hypercapnia        HPI:  60 yo female with tracheostomy who was discharged 8 days ago from our service after a prolonged 3 month hospital stay presents for the second time since discharge for acute on chronic respiratory failure in the setting of secretions and blockages in the trach. She was initially hypoxic with EMS but after suctioning her trach tube here in the emergency department her sats have returned to normal. Due to continued ER visits due to poor trach care will admit patient to obs and discuss with social and family about placement. Patient has also missed 2 follow ups this week including pulmonary.    Overview/Hospital Course:  5/10  - consulted pulm as family requested if she could have bronch done in hospital while she waits for NH placement  - noted tachycardia is patient's baseline, extensive work up during last 3 month stay    5/11   - pulmonary recs noted  - awaiting placement    Past Medical History:   Diagnosis Date    Chronic idiopathic constipation 08/27/2024    Chronic respiratory failure with hypoxia 03/25/2025    Coag negative Staphylococcus bacteremia 03/25/2025    GERD (gastroesophageal reflux disease)     Insomnia secondary to depression with anxiety     Mixed hyperlipidemia     Quadriplegia 03/23/2025    Severe protein-calorie malnutrition 03/23/2025    Spontaneous hematoma of lower leg 02/26/2025    UTI due to extended-spectrum beta lactamase (ESBL) producing Escherichia coli 02/12/2025       Past Surgical History:   Procedure Laterality Date    BACK SURGERY         Review of patient's allergies indicates:    Allergen Reactions    Penicillins Anaphylaxis       No current facility-administered medications on file prior to encounter.     Current Outpatient Medications on File Prior to Encounter   Medication Sig    atorvastatin (LIPITOR) 20 MG tablet Take 1 tablet (20 mg total) by mouth once daily.    cholecalciferol, vitamin D3, 1,250 mcg (50,000 unit) capsule Take 1 capsule (50,000 Units total) by mouth every 7 days.    ezetimibe (ZETIA) 10 mg tablet Take 1 tablet (10 mg total) by mouth once daily.    HYDROcodone-acetaminophen (NORCO) 5-325 mg per tablet Take 1 tablet by mouth every 6 (six) hours as needed for Pain.    levalbuterol (XOPENEX) 1.25 mg/3 mL nebulizer solution Take 1 NEBULE (1.25 mg total) by nebulization 2 (two) times a day.    levoFLOXacin (LEVAQUIN) 500 MG tablet Take 1 tablet (500 mg total) by mouth once daily. for 10 days    LINZESS 290 mcg Cap capsule Take 1 capsule (290 mcg total) by mouth before breakfast.    midodrine (PROAMATINE) 5 MG Tab Take 2 tablets (10 mg total) by mouth 3 (three) times daily.    omeprazole (PRILOSEC) 40 MG capsule Take 1 capsule (40 mg total) by mouth every morning.    ondansetron (ZOFRAN-ODT) 4 MG TbDL Take 2 tablets (8 mg total) by mouth 2 (two) times daily.    pregabalin (LYRICA) 75 MG capsule Take 1 capsule (75 mg total) by mouth 2 (two) times daily.    scopolamine (TRANSDERM-SCOP) 1.3-1.5 mg (1 mg over 3 days) Place 1 patch onto the skin Every 3 (three) days.    sertraline (ZOLOFT) 50 MG tablet Take 1 tablet (50 mg total) by mouth once daily.    traZODone (DESYREL) 100 MG tablet Take 1 tablet (100 mg total) by mouth every evening.    [START ON 6/8/2025] HYDROcodone-acetaminophen (NORCO) 5-325 mg per tablet Take 1 tablet by mouth every 6 (six) hours as needed for Pain.    [START ON 7/8/2025] HYDROcodone-acetaminophen (NORCO) 5-325 mg per tablet Take 1 tablet by mouth every 6 (six) hours as needed for Pain.     Family History    Family history is unknown by patient.        Tobacco Use    Smoking status: Never    Smokeless tobacco: Never   Substance and Sexual Activity    Alcohol use: Never    Drug use: Never    Sexual activity: Not Currently     Review of Systems   All other systems reviewed and are negative.    Objective:     Vital Signs (Most Recent):  Temp: 98.4 °F (36.9 °C) (05/11/25 0745)  Pulse: 109 (05/11/25 1720)  Resp: 18 (05/11/25 1720)  BP: 103/71 (05/11/25 1649)  SpO2: 97 % (05/11/25 1720) Vital Signs (24h Range):  Temp:  [98 °F (36.7 °C)-98.6 °F (37 °C)] 98.4 °F (36.9 °C)  Pulse:  [101-122] 109  Resp:  [14-22] 18  SpO2:  [89 %-100 %] 97 %  BP: (103-123)/(66-82) 103/71     Weight: 77.1 kg (170 lb)  Body mass index is 31.09 kg/m².     Physical Exam  Vitals and nursing note reviewed.   Constitutional:       Appearance: She is ill-appearing.   HENT:      Head: Normocephalic.      Mouth/Throat:      Mouth: Mucous membranes are moist.   Eyes:      Extraocular Movements: Extraocular movements intact.      Pupils: Pupils are equal, round, and reactive to light.   Cardiovascular:      Rate and Rhythm: Regular rhythm. Tachycardia present.      Pulses: Normal pulses.   Pulmonary:      Effort: Pulmonary effort is normal.   Abdominal:      General: Abdomen is flat. Bowel sounds are normal.      Palpations: Abdomen is soft.   Musculoskeletal:      Right lower leg: No edema.      Left lower leg: No edema.   Skin:     General: Skin is warm and dry.   Neurological:      General: No focal deficit present.      Mental Status: She is alert. Mental status is at baseline.   Psychiatric:         Mood and Affect: Mood normal.               Significant Labs: All pertinent labs within the past 24 hours have been reviewed.  BMP:   Recent Labs   Lab 05/11/25  0508   GLU 72*      K 4.1   CL 98   CO2 33*   BUN 25*   CREATININE 0.62   CALCIUM 10.4*     CBC:   Recent Labs   Lab 05/10/25  0353 05/11/25  0508   WBC 9.38 11.38*   HGB 9.8* 10.3*   HCT 34.9* 36.4*    240     CMP:   Recent  Labs   Lab 05/10/25  0353 05/11/25  0508    142   K 4.6 4.1   CL 99 98   CO2 31 33*   GLU 74* 72*   BUN 20 25*   CREATININE 0.59 0.62   CALCIUM 10.0 10.4*   ANIONGAP 18* 15       Significant Imaging: I have reviewed all pertinent imaging results/findings within the past 24 hours.      Assessment & Plan  Pressure ulcers of skin of multiple topographic sites  noted    Severe protein-calorie malnutrition  Nutrition consulted. Most recent weight and BMI monitored-     Measurements:  Wt Readings from Last 1 Encounters:   05/09/25 77.1 kg (170 lb)   Body mass index is 31.09 kg/m².    Patient has been screened and assessed by RD.    Malnutrition Type:  Context:    Level:      Malnutrition Characteristic Summary:  Weight Loss (Malnutrition): greater than 7.5% in 3 months    Interventions/Recommendations (treatment strategy):  Recommend to initiate Isosource 1.5 at goal of 35 ml/hr. Flush with 35 ml free water hourly. Add Luis Fernando BID to aid in wound healing.    Quadriplegia  noted    Acute on chronic respiratory failure with hypoxia and hypercapnia  Patient with Hypercapnic and Hypoxic Respiratory failure which is Acute on chronic.  she is on home oxygen at on vent LPM. Supplemental oxygen was provided and noted- Oxygen Concentration (%):  [36] 36on vent    .   Signs/symptoms of respiratory failure include- tachypnea, increased work of breathing, respiratory distress, and use of accessory muscles. Contributing diagnoses includes - COPD and Interstitial lung disease Labs and images were reviewed. Patient Has not had a recent ABG. Will treat underlying causes and adjust management of respiratory failure as follows-     Admit to obs  Education about trach care  Stress to family that continued ER visits and hospital admissions will result in worsened overall health and likely placement is needed  Will consult pulmonary to enquire about bronch that she missed     5/10  - family agrees with placement    5/11  - social working  on placement, update expected tomorrow  VTE Risk Mitigation (From admission, onward)           Ordered     heparin (porcine) injection 5,000 Units  Every 8 hours         05/09/25 1340     IP VTE HIGH RISK PATIENT  Once         05/09/25 1340     Place sequential compression device  Until discontinued         05/09/25 1340                    Discharge Planning   SHRADDHA:      Code Status: Full Code   Medical Readiness for Discharge Date:   Discharge Plan A: Home with family                        Jordana Cavazos MD  Department of Hospital Medicine   [facility]

## 2025-05-11 NOTE — SUBJECTIVE & OBJECTIVE
Past Medical History:   Diagnosis Date    Chronic idiopathic constipation 08/27/2024    Chronic respiratory failure with hypoxia 03/25/2025    Coag negative Staphylococcus bacteremia 03/25/2025    GERD (gastroesophageal reflux disease)     Insomnia secondary to depression with anxiety     Mixed hyperlipidemia     Quadriplegia 03/23/2025    Severe protein-calorie malnutrition 03/23/2025    Spontaneous hematoma of lower leg 02/26/2025    UTI due to extended-spectrum beta lactamase (ESBL) producing Escherichia coli 02/12/2025       Past Surgical History:   Procedure Laterality Date    BACK SURGERY         Review of patient's allergies indicates:   Allergen Reactions    Penicillins Anaphylaxis       No current facility-administered medications on file prior to encounter.     Current Outpatient Medications on File Prior to Encounter   Medication Sig    atorvastatin (LIPITOR) 20 MG tablet Take 1 tablet (20 mg total) by mouth once daily.    cholecalciferol, vitamin D3, 1,250 mcg (50,000 unit) capsule Take 1 capsule (50,000 Units total) by mouth every 7 days.    ezetimibe (ZETIA) 10 mg tablet Take 1 tablet (10 mg total) by mouth once daily.    HYDROcodone-acetaminophen (NORCO) 5-325 mg per tablet Take 1 tablet by mouth every 6 (six) hours as needed for Pain.    levalbuterol (XOPENEX) 1.25 mg/3 mL nebulizer solution Take 1 NEBULE (1.25 mg total) by nebulization 2 (two) times a day.    levoFLOXacin (LEVAQUIN) 500 MG tablet Take 1 tablet (500 mg total) by mouth once daily. for 10 days    LINZESS 290 mcg Cap capsule Take 1 capsule (290 mcg total) by mouth before breakfast.    midodrine (PROAMATINE) 5 MG Tab Take 2 tablets (10 mg total) by mouth 3 (three) times daily.    omeprazole (PRILOSEC) 40 MG capsule Take 1 capsule (40 mg total) by mouth every morning.    ondansetron (ZOFRAN-ODT) 4 MG TbDL Take 2 tablets (8 mg total) by mouth 2 (two) times daily.    pregabalin (LYRICA) 75 MG capsule Take 1 capsule (75 mg total) by mouth 2  (two) times daily.    scopolamine (TRANSDERM-SCOP) 1.3-1.5 mg (1 mg over 3 days) Place 1 patch onto the skin Every 3 (three) days.    sertraline (ZOLOFT) 50 MG tablet Take 1 tablet (50 mg total) by mouth once daily.    traZODone (DESYREL) 100 MG tablet Take 1 tablet (100 mg total) by mouth every evening.    [START ON 6/8/2025] HYDROcodone-acetaminophen (NORCO) 5-325 mg per tablet Take 1 tablet by mouth every 6 (six) hours as needed for Pain.    [START ON 7/8/2025] HYDROcodone-acetaminophen (NORCO) 5-325 mg per tablet Take 1 tablet by mouth every 6 (six) hours as needed for Pain.     Family History    Family history is unknown by patient.       Tobacco Use    Smoking status: Never    Smokeless tobacco: Never   Substance and Sexual Activity    Alcohol use: Never    Drug use: Never    Sexual activity: Not Currently     Review of Systems   All other systems reviewed and are negative.    Objective:     Vital Signs (Most Recent):  Temp: 98.4 °F (36.9 °C) (05/11/25 0745)  Pulse: 109 (05/11/25 1720)  Resp: 18 (05/11/25 1720)  BP: 103/71 (05/11/25 1649)  SpO2: 97 % (05/11/25 1720) Vital Signs (24h Range):  Temp:  [98 °F (36.7 °C)-98.6 °F (37 °C)] 98.4 °F (36.9 °C)  Pulse:  [101-122] 109  Resp:  [14-22] 18  SpO2:  [89 %-100 %] 97 %  BP: (103-123)/(66-82) 103/71     Weight: 77.1 kg (170 lb)  Body mass index is 31.09 kg/m².     Physical Exam  Vitals and nursing note reviewed.   Constitutional:       Appearance: She is ill-appearing.   HENT:      Head: Normocephalic.      Mouth/Throat:      Mouth: Mucous membranes are moist.   Eyes:      Extraocular Movements: Extraocular movements intact.      Pupils: Pupils are equal, round, and reactive to light.   Cardiovascular:      Rate and Rhythm: Regular rhythm. Tachycardia present.      Pulses: Normal pulses.   Pulmonary:      Effort: Pulmonary effort is normal.   Abdominal:      General: Abdomen is flat. Bowel sounds are normal.      Palpations: Abdomen is soft.   Musculoskeletal:       Right lower leg: No edema.      Left lower leg: No edema.   Skin:     General: Skin is warm and dry.   Neurological:      General: No focal deficit present.      Mental Status: She is alert. Mental status is at baseline.   Psychiatric:         Mood and Affect: Mood normal.               Significant Labs: All pertinent labs within the past 24 hours have been reviewed.  BMP:   Recent Labs   Lab 05/11/25  0508   GLU 72*      K 4.1   CL 98   CO2 33*   BUN 25*   CREATININE 0.62   CALCIUM 10.4*     CBC:   Recent Labs   Lab 05/10/25  0353 05/11/25  0508   WBC 9.38 11.38*   HGB 9.8* 10.3*   HCT 34.9* 36.4*    240     CMP:   Recent Labs   Lab 05/10/25  0353 05/11/25  0508    142   K 4.6 4.1   CL 99 98   CO2 31 33*   GLU 74* 72*   BUN 20 25*   CREATININE 0.59 0.62   CALCIUM 10.0 10.4*   ANIONGAP 18* 15       Significant Imaging: I have reviewed all pertinent imaging results/findings within the past 24 hours.

## 2025-05-11 NOTE — ASSESSMENT & PLAN NOTE
Nutrition consulted. Most recent weight and BMI monitored-     Measurements:  Wt Readings from Last 1 Encounters:   05/09/25 77.1 kg (170 lb)   Body mass index is 31.09 kg/m².    Patient has been screened and assessed by RD.    Malnutrition Type:  Context:    Level:      Malnutrition Characteristic Summary:  Weight Loss (Malnutrition): greater than 7.5% in 3 months    Interventions/Recommendations (treatment strategy):  Recommend to initiate Isosource 1.5 at goal of 35 ml/hr. Flush with 35 ml free water hourly. Add Luis Fernando BID to aid in wound healing.

## 2025-05-12 LAB
ANION GAP SERPL CALCULATED.3IONS-SCNC: 16 MMOL/L (ref 7–16)
BASOPHILS # BLD AUTO: 0.08 K/UL (ref 0–0.2)
BASOPHILS NFR BLD AUTO: 0.8 % (ref 0–1)
BUN SERPL-MCNC: 26 MG/DL (ref 10–20)
BUN/CREAT SERPL: 40 (ref 6–20)
CALCIUM SERPL-MCNC: 9.9 MG/DL (ref 8.4–10.2)
CHLORIDE SERPL-SCNC: 97 MMOL/L (ref 98–107)
CO2 SERPL-SCNC: 31 MMOL/L (ref 23–31)
CREAT SERPL-MCNC: 0.65 MG/DL (ref 0.55–1.02)
DIFFERENTIAL METHOD BLD: ABNORMAL
EGFR (NO RACE VARIABLE) (RUSH/TITUS): 100 ML/MIN/1.73M2
EOSINOPHIL # BLD AUTO: 0.29 K/UL (ref 0–0.5)
EOSINOPHIL NFR BLD AUTO: 2.8 % (ref 1–4)
ERYTHROCYTE [DISTWIDTH] IN BLOOD BY AUTOMATED COUNT: 17.6 % (ref 11.5–14.5)
GLUCOSE SERPL-MCNC: 124 MG/DL (ref 82–115)
HCT VFR BLD AUTO: 36.5 % (ref 38–47)
HGB BLD-MCNC: 10.3 G/DL (ref 12–16)
IMM GRANULOCYTES # BLD AUTO: 0.12 K/UL (ref 0–0.04)
IMM GRANULOCYTES NFR BLD: 1.1 % (ref 0–0.4)
LYMPHOCYTES # BLD AUTO: 2.27 K/UL (ref 1–4.8)
LYMPHOCYTES NFR BLD AUTO: 21.6 % (ref 27–41)
MCH RBC QN AUTO: 23.6 PG (ref 27–31)
MCHC RBC AUTO-ENTMCNC: 28.2 G/DL (ref 32–36)
MCV RBC AUTO: 83.7 FL (ref 80–96)
MONOCYTES # BLD AUTO: 0.83 K/UL (ref 0–0.8)
MONOCYTES NFR BLD AUTO: 7.9 % (ref 2–6)
MPC BLD CALC-MCNC: 10.9 FL (ref 9.4–12.4)
NEUTROPHILS # BLD AUTO: 6.9 K/UL (ref 1.8–7.7)
NEUTROPHILS NFR BLD AUTO: 65.8 % (ref 53–65)
NRBC # BLD AUTO: 0 X10E3/UL
NRBC, AUTO (.00): 0 %
PLATELET # BLD AUTO: 231 K/UL (ref 150–400)
POTASSIUM SERPL-SCNC: 4 MMOL/L (ref 3.5–5.1)
RBC # BLD AUTO: 4.36 M/UL (ref 4.2–5.4)
SODIUM SERPL-SCNC: 140 MMOL/L (ref 136–145)
WBC # BLD AUTO: 10.49 K/UL (ref 4.5–11)

## 2025-05-12 PROCEDURE — 25000003 PHARM REV CODE 250

## 2025-05-12 PROCEDURE — 94640 AIRWAY INHALATION TREATMENT: CPT

## 2025-05-12 PROCEDURE — 99900022

## 2025-05-12 PROCEDURE — 27000221 HC OXYGEN, UP TO 24 HOURS

## 2025-05-12 PROCEDURE — 99900026 HC AIRWAY MAINTENANCE (STAT)

## 2025-05-12 PROCEDURE — 11000001 HC ACUTE MED/SURG PRIVATE ROOM

## 2025-05-12 PROCEDURE — 99232 SBSQ HOSP IP/OBS MODERATE 35: CPT | Mod: ,,,

## 2025-05-12 PROCEDURE — 85025 COMPLETE CBC W/AUTO DIFF WBC: CPT

## 2025-05-12 PROCEDURE — 30200315 PPD INTRADERMAL TEST REV CODE 302

## 2025-05-12 PROCEDURE — 27000207 HC ISOLATION

## 2025-05-12 PROCEDURE — 63600175 PHARM REV CODE 636 W HCPCS

## 2025-05-12 PROCEDURE — 80048 BASIC METABOLIC PNL TOTAL CA: CPT

## 2025-05-12 PROCEDURE — 27200966 HC CLOSED SUCTION SYSTEM

## 2025-05-12 PROCEDURE — 94761 N-INVAS EAR/PLS OXIMETRY MLT: CPT

## 2025-05-12 PROCEDURE — 36415 COLL VENOUS BLD VENIPUNCTURE: CPT

## 2025-05-12 PROCEDURE — 86580 TB INTRADERMAL TEST: CPT

## 2025-05-12 PROCEDURE — 99900035 HC TECH TIME PER 15 MIN (STAT)

## 2025-05-12 PROCEDURE — 25000242 PHARM REV CODE 250 ALT 637 W/ HCPCS: Performed by: INTERNAL MEDICINE

## 2025-05-12 RX ORDER — ERGOCALCIFEROL 1.25 MG/1
50000 CAPSULE ORAL
Status: DISCONTINUED | OUTPATIENT
Start: 2025-05-13 | End: 2025-05-15 | Stop reason: HOSPADM

## 2025-05-12 RX ADMIN — ONDANSETRON 4 MG: 2 INJECTION INTRAMUSCULAR; INTRAVENOUS at 09:05

## 2025-05-12 RX ADMIN — PREGABALIN 75 MG: 75 CAPSULE ORAL at 08:05

## 2025-05-12 RX ADMIN — MIDODRINE HYDROCHLORIDE 10 MG: 5 TABLET ORAL at 08:05

## 2025-05-12 RX ADMIN — LINACLOTIDE 290 MCG: 290 CAPSULE, GELATIN COATED ORAL at 05:05

## 2025-05-12 RX ADMIN — HEPARIN SODIUM 5000 UNITS: 5000 INJECTION, SOLUTION INTRAVENOUS; SUBCUTANEOUS at 09:05

## 2025-05-12 RX ADMIN — ALBUTEROL SULFATE 2.5 MG: 2.5 SOLUTION RESPIRATORY (INHALATION) at 10:05

## 2025-05-12 RX ADMIN — POTASSIUM IODIDE 5 DROP: 1 SOLUTION ORAL at 03:05

## 2025-05-12 RX ADMIN — POTASSIUM IODIDE 5 DROP: 1 SOLUTION ORAL at 08:05

## 2025-05-12 RX ADMIN — PANTOPRAZOLE SODIUM 40 MG: 40 TABLET, DELAYED RELEASE ORAL at 08:05

## 2025-05-12 RX ADMIN — TUBERCULIN PURIFIED PROTEIN DERIVATIVE 5 UNITS: 5 INJECTION, SOLUTION INTRADERMAL at 05:05

## 2025-05-12 RX ADMIN — HYDROCODONE BITARTRATE AND ACETAMINOPHEN 1 TABLET: 10; 325 TABLET ORAL at 08:05

## 2025-05-12 RX ADMIN — HEPARIN SODIUM 5000 UNITS: 5000 INJECTION, SOLUTION INTRAVENOUS; SUBCUTANEOUS at 05:05

## 2025-05-12 RX ADMIN — LEVOFLOXACIN 500 MG: 500 TABLET, FILM COATED ORAL at 08:05

## 2025-05-12 RX ADMIN — SCOPOLAMINE 1 PATCH: 1.5 PATCH, EXTENDED RELEASE TRANSDERMAL at 02:05

## 2025-05-12 RX ADMIN — SERTRALINE HYDROCHLORIDE 50 MG: 50 TABLET ORAL at 08:05

## 2025-05-12 RX ADMIN — EZETIMIBE 10 MG: 10 TABLET ORAL at 08:05

## 2025-05-12 RX ADMIN — MIDODRINE HYDROCHLORIDE 10 MG: 5 TABLET ORAL at 02:05

## 2025-05-12 RX ADMIN — ALBUTEROL SULFATE 2.5 MG: 2.5 SOLUTION RESPIRATORY (INHALATION) at 07:05

## 2025-05-12 RX ADMIN — TRAZODONE HYDROCHLORIDE 100 MG: 50 TABLET ORAL at 08:05

## 2025-05-12 RX ADMIN — ATORVASTATIN CALCIUM 20 MG: 20 TABLET, FILM COATED ORAL at 08:05

## 2025-05-12 RX ADMIN — HYDROCODONE BITARTRATE AND ACETAMINOPHEN 1 TABLET: 5; 325 TABLET ORAL at 02:05

## 2025-05-12 RX ADMIN — HEPARIN SODIUM 5000 UNITS: 5000 INJECTION, SOLUTION INTRAVENOUS; SUBCUTANEOUS at 02:05

## 2025-05-12 RX ADMIN — Medication 6 MG: at 08:05

## 2025-05-12 NOTE — PLAN OF CARE
Called Poornima at [facility] to confirm referral and inquire about admission status.  She stated she did not receive fax.  Manually faxed patient documents for referral.  Will continue to follow.

## 2025-05-12 NOTE — SUBJECTIVE & OBJECTIVE
Past Medical History:   Diagnosis Date    Chronic idiopathic constipation 08/27/2024    Chronic respiratory failure with hypoxia 03/25/2025    Coag negative Staphylococcus bacteremia 03/25/2025    GERD (gastroesophageal reflux disease)     Insomnia secondary to depression with anxiety     Mixed hyperlipidemia     Quadriplegia 03/23/2025    Severe protein-calorie malnutrition 03/23/2025    Spontaneous hematoma of lower leg 02/26/2025    UTI due to extended-spectrum beta lactamase (ESBL) producing Escherichia coli 02/12/2025       Past Surgical History:   Procedure Laterality Date    BACK SURGERY         Review of patient's allergies indicates:   Allergen Reactions    Penicillins Anaphylaxis       No current facility-administered medications on file prior to encounter.     Current Outpatient Medications on File Prior to Encounter   Medication Sig    atorvastatin (LIPITOR) 20 MG tablet Take 1 tablet (20 mg total) by mouth once daily.    cholecalciferol, vitamin D3, 1,250 mcg (50,000 unit) capsule Take 1 capsule (50,000 Units total) by mouth every 7 days.    ezetimibe (ZETIA) 10 mg tablet Take 1 tablet (10 mg total) by mouth once daily.    HYDROcodone-acetaminophen (NORCO) 5-325 mg per tablet Take 1 tablet by mouth every 6 (six) hours as needed for Pain.    levalbuterol (XOPENEX) 1.25 mg/3 mL nebulizer solution Take 1 NEBULE (1.25 mg total) by nebulization 2 (two) times a day.    levoFLOXacin (LEVAQUIN) 500 MG tablet Take 1 tablet (500 mg total) by mouth once daily. for 10 days    LINZESS 290 mcg Cap capsule Take 1 capsule (290 mcg total) by mouth before breakfast.    midodrine (PROAMATINE) 5 MG Tab Take 2 tablets (10 mg total) by mouth 3 (three) times daily.    omeprazole (PRILOSEC) 40 MG capsule Take 1 capsule (40 mg total) by mouth every morning.    ondansetron (ZOFRAN-ODT) 4 MG TbDL Take 2 tablets (8 mg total) by mouth 2 (two) times daily.    pregabalin (LYRICA) 75 MG capsule Take 1 capsule (75 mg total) by mouth 2  (two) times daily.    scopolamine (TRANSDERM-SCOP) 1.3-1.5 mg (1 mg over 3 days) Place 1 patch onto the skin Every 3 (three) days.    sertraline (ZOLOFT) 50 MG tablet Take 1 tablet (50 mg total) by mouth once daily.    traZODone (DESYREL) 100 MG tablet Take 1 tablet (100 mg total) by mouth every evening.    [START ON 6/8/2025] HYDROcodone-acetaminophen (NORCO) 5-325 mg per tablet Take 1 tablet by mouth every 6 (six) hours as needed for Pain.    [START ON 7/8/2025] HYDROcodone-acetaminophen (NORCO) 5-325 mg per tablet Take 1 tablet by mouth every 6 (six) hours as needed for Pain.     Family History    Family history is unknown by patient.       Tobacco Use    Smoking status: Never    Smokeless tobacco: Never   Substance and Sexual Activity    Alcohol use: Never    Drug use: Never    Sexual activity: Not Currently     Review of Systems   All other systems reviewed and are negative.    Objective:     Vital Signs (Most Recent):  Temp: 98.7 °F (37.1 °C) (05/12/25 1203)  Pulse: (!) 120 (05/12/25 1203)  Resp: 20 (05/12/25 1203)  BP: (!) 80/49 (05/12/25 1203)  SpO2: 99 % (05/12/25 1203) Vital Signs (24h Range):  Temp:  [97.6 °F (36.4 °C)-98.9 °F (37.2 °C)] 98.7 °F (37.1 °C)  Pulse:  [100-129] 120  Resp:  [16-22] 20  SpO2:  [94 %-100 %] 99 %  BP: ()/(49-71) 80/49     Weight: 77.1 kg (170 lb)  Body mass index is 31.09 kg/m².     Physical Exam  Vitals and nursing note reviewed.   Constitutional:       Appearance: She is ill-appearing.   HENT:      Head: Normocephalic.      Mouth/Throat:      Mouth: Mucous membranes are moist.   Eyes:      Extraocular Movements: Extraocular movements intact.      Pupils: Pupils are equal, round, and reactive to light.   Cardiovascular:      Rate and Rhythm: Regular rhythm. Tachycardia present.      Pulses: Normal pulses.   Pulmonary:      Effort: Pulmonary effort is normal.   Abdominal:      General: Abdomen is flat. Bowel sounds are normal.      Palpations: Abdomen is soft.    Musculoskeletal:      Right lower leg: No edema.      Left lower leg: No edema.   Skin:     General: Skin is warm and dry.   Neurological:      General: No focal deficit present.      Mental Status: She is alert. Mental status is at baseline.   Psychiatric:         Mood and Affect: Mood normal.               Significant Labs: All pertinent labs within the past 24 hours have been reviewed.  BMP:   Recent Labs   Lab 05/12/25  0352   *      K 4.0   CL 97*   CO2 31   BUN 26*   CREATININE 0.65   CALCIUM 9.9     CBC:   Recent Labs   Lab 05/11/25  0508 05/12/25  0352   WBC 11.38* 10.49   HGB 10.3* 10.3*   HCT 36.4* 36.5*    231     CMP:   Recent Labs   Lab 05/11/25  0508 05/12/25  0352    140   K 4.1 4.0   CL 98 97*   CO2 33* 31   GLU 72* 124*   BUN 25* 26*   CREATININE 0.62 0.65   CALCIUM 10.4* 9.9   ANIONGAP 15 16       Significant Imaging: I have reviewed all pertinent imaging results/findings within the past 24 hours.

## 2025-05-12 NOTE — ASSESSMENT & PLAN NOTE
Nutrition consulted. Most recent weight and BMI monitored-     Measurements:  Wt Readings from Last 1 Encounters:   05/09/25 77.1 kg (170 lb)   Body mass index is 31.09 kg/m².    Patient has been screened and assessed by RD.    Malnutrition Type:  Context:    Level:      Malnutrition Characteristic Summary:  Weight Loss (Malnutrition): greater than 7.5% in 3 months    Interventions/Recommendations (treatment strategy):  Recommend to continue with current tube feed regimen as tolerated. Recommend to consider initiation of bowel regimen given history of constipation, last BM 5/4

## 2025-05-12 NOTE — PROGRESS NOTES
[facility]  Adult Nutrition  Follow-Up Note         Reason for Assessment  Reason For Assessment: RD follow-up        Assessment and Plan  5/12/25: RD follow-up. Patient remains on Isosource 1.5 at goal rate of 35 mL/hr, which was initiated 5/11. Tolerating well. Recommend to continue with current tube feed regimen as tolerated. Recommend to consider initiating bowel regimen given history of constipation and last BM x 8 days ago. Patient with no evident fat/muscle depletion upon observation. Per ASPEN guidelines, patient no longer meets criteria for protein-calorie malnutrition at this time.    5/10/25: Consult for PEG feedings received and appreciated. Patient admitted on 5/9 with a dx of acute on chronic respiratory failure with hypoxia and hypercapnia and multiple pressure ulcers. PMH of quadriplegia, Ulcerative colitis, and severe PCM per chart review. Patient is NPO.     Patient is 77.1 kg with a BMI of 31.09 which indicates obesity. Per chart review, patient with 11% significant weight loss over the past 3 months. Patient with extended hospitalization with trach and PEG placed during that time. Patient met ASPEN criteria for severe PCM on prior admission due to significant weight loss and poor po intakes. Patient now receiving nutrition via PEG and is NPO due to dysphagia. NFPE will be completed on follow up to determine if patient still meets criteria for PCM.     Recommend Isosource 1.5 at goal of 35 ml/hr with 35 ml/hr hourly free water flush. Add Luis Fernando BID to aid in wound healing. Recommend bowel regimen to resolve constipation prior to feeding being initiated for better tolerance and reduced aspiration risk. When PEG feeding started, monitor for s/s of intolerance (n/v/d/c) and report to RD.     Last Bowel Movement: 05/04/25.     Medications/labs reviewed. RD following.    Learning Needs/Social Determinants of Health    Learning Assessment       05/09/2025 [id number] [facility] (5/9/2025 - Present)   Created by Augustine Archer, RN - RN (Nurse) Status: Complete                 PRIMARY LEARNER     Primary Learner Name:  ANTONIA HARTMAN AW - 05/09/2025 1719    Relationship:  Patient AW - 05/09/2025 1719    Does the primary learner have any barriers to learning?:  Language AW - 05/09/2025 1719    What is the preferred language of the primary learner?:  English AW - 05/09/2025 1719    Is an  required?:  No AW - 05/09/2025 1719    How does the primary learner prefer to learn new concepts?:  Listening AW - 05/09/2025 1719    How often do you need to have someone help you read instructions, pamphlets, or written material from your doctor or pharmacy?:  Sometimes AW - 05/09/2025 1719        CO-LEARNER #1     No question answered        CO-LEARNER #2     No question answered        SPECIAL TOPICS     No question answered        ANSWERED BY:     No question answered        Comments         Edit History       Augustine Archer, RN - RN (Nurse)   05/09/2025 1719                          Social Drivers of Health     Tobacco Use: Low Risk  (5/9/2025)    Patient History     Smoking Tobacco Use: Never     Smokeless Tobacco Use: Never     Passive Exposure: Not on file   Alcohol Use: Not At Risk (5/9/2025)    AUDIT-C     Frequency of Alcohol Consumption: Never     Average Number of Drinks: Patient does not drink     Frequency of Binge Drinking: Never   Financial Resource Strain: Patient Declined (5/9/2025)    Overall Financial Resource Strain (CARDIA)     Difficulty of Paying Living Expenses: Patient declined   Food Insecurity: Patient Declined (5/9/2025)    Hunger Vital Sign     Worried About Running Out of Food in the Last Year: Patient declined     Ran Out of Food in the Last Year: Patient declined   Recent Concern: Food Insecurity - Food Insecurity Present (3/23/2025)    Hunger Vital Sign     Worried About Running Out of Food in the Last Year: Often true     Ran Out of Food in the Last  Year: Often true   Transportation Needs: Patient Declined (5/9/2025)    PRAPARE - Transportation     Lack of Transportation (Medical): Patient declined     Lack of Transportation (Non-Medical): Patient declined   Physical Activity: Inactive (5/9/2025)    Exercise Vital Sign     Days of Exercise per Week: 0 days     Minutes of Exercise per Session: 0 min   Stress: Patient Declined (5/9/2025)    [employer] - Occupational Stress Questionnaire     Feeling of Stress : Patient declined   Recent Concern: Stress - Stress Concern Present (3/23/2025)    [employer] - Occupational Stress Questionnaire     Feeling of Stress : Rather much   Housing Stability: Patient Declined (5/9/2025)    Housing Stability Vital Sign     Unable to Pay for Housing in the Last Year: Patient declined     Number of Times Moved in the Last Year: Not on file     Homeless in the Last Year: Patient declined   Depression: Low Risk  (5/8/2025)    Depression     Last PHQ-4: Flowsheet Data: 0   Utilities: Patient Declined (5/9/2025)    [facility] Utilities     Threatened with loss of utilities: Patient declined   Health Literacy: Patient Declined (5/9/2025)     Health Literacy     Frequency of need for help with medical instructions: Patient declines to respond   Recent Concern: Health Literacy - Inadequate Health Literacy (5/9/2025)     Health Literacy     Frequency of need for help with medical instructions: Sometimes   Social Isolation: Socially Integrated (3/24/2025)    Social Isolation     Social Isolation: 1     Malnutrition  Is Patient Malnourished: No    Nutrition Diagnosis  Increased Protein Needs related to Chronic illness and Wound healing as evidenced by multiple pressure ulcers of skin  Comments: add Luis Fernando BID to PEG feedings    Recent Labs   Lab 05/12/25  0352   *     Comments on Glucose: Slightly elevated. No PMH DM. Likely related to metabolic stress, tube  feeds    Nutrition Prescription / Recommendations  Recommendation/Intervention: Recommend to continue with current tube feed regimen as tolerated. Recommend to consider initiation of bowel regimen given history of constipation, last BM 5/4  Goals: Tolerate feeds at goal rate, weight maintenance during admission  Nutrition Goal Status: progressing towards goal  Current Diet Order: NPO  Chewing or Swallowing Difficulty?: Swallowing difficulty  Recommended Diet: Enteral Nutrition  Recommended Oral Supplement: Luis Fernando [90 kcals, 2.5g Protein, 10g Carbs(3g Sugar), 7g L-Arginine, 7g L-Glutamine, Vitamin C 300mg, 9.5mg Zinc] 2 times a day  Is Nutrition Support Recommended: Yes  Is Nutrition Education Recommended: No    Needs Calculated  Energy Need Method: other (see comments) (quadriplegia) Energy Calorie Requirements (kcal): 5015-5528  Protein Requirements: 60-75    Enteral Nutrition Isosource 1.5 at goal of 35 ml/hr with Luis Fernando BID; Flush with 35 ml water every hour    Enteral Nutrition Formula Provides:  1260 kcals Propofol Rate: No Luis Fernando 180 = 1440  57 g Protein + 5 gm Luis Fernando = 62 gm  141 g Carbohydrates  54 g Fat Propofol Rate: No  653 ml Fluid without Flush    840 ml Fluid by flush   1493 ml Total Fluid  Enteral Nutrition Recommended Order:  Tube feeding via PEG/ Gastrostomy  Tube feeding formula: Isosource 1.5 PEG/ Gastrostomy  Free Water Flush: 35 ml hourly  Modular Supplements:Luis Fernando 2 times a day  Enteral Nutrition meets needs?: Yes  Enteral Nutrition Status: Ordered - EN at goal    Monitor and Evaluation  % current intake: Enteral Nutrition at Goal  % intake to meet estimated needs: Enteral Nutrition   Monitor and Evaluation: Energy intake, Inflammatory profile, Weight, Lipid profile, Electrolyte and renal panel, Diet order, Gastrointestinal profile, Enteral and parenteral nutrition administration, Glucose/endocrine profile    Current Medical Diagnosis and Past Medical History     Past Medical History:   Diagnosis  Date    Chronic idiopathic constipation 08/27/2024    Chronic respiratory failure with hypoxia 03/25/2025    Coag negative Staphylococcus bacteremia 03/25/2025    GERD (gastroesophageal reflux disease)     Insomnia secondary to depression with anxiety     Mixed hyperlipidemia     Quadriplegia 03/23/2025    Severe protein-calorie malnutrition 03/23/2025    Spontaneous hematoma of lower leg 02/26/2025    UTI due to extended-spectrum beta lactamase (ESBL) producing Escherichia coli 02/12/2025     Nutrition/Diet History  Nutrition Intake History: recent PEG placement after long hospitlization  Food Allergies: NKFA  Factors Affecting Nutritional Intake: difficulty/impaired swallowing    Lab/Procedures/Meds  Recent Labs   Lab 05/09/25  0916 05/10/25  0353 05/12/25  0352      < > 140   K 4.5   < > 4.0   BUN 23*   < > 26*   CREATININE 0.61   < > 0.65   CALCIUM 11.1*   < > 9.9   ALBUMIN 3.4  --   --    CL 99   < > 97*   ALT 74*  --   --    AST 63*  --   --     < > = values in this interval not displayed.   Note: BUN high. Cl- low.    Last A1c:   Lab Results   Component Value Date    HGBA1C 5.5 03/01/2025     Lab Results   Component Value Date    RBC 4.36 05/12/2025    HGB 10.3 (L) 05/12/2025    HCT 36.5 (L) 05/12/2025    MCV 83.7 05/12/2025    MCH 23.6 (L) 05/12/2025    MCHC 28.2 (L) 05/12/2025   Note: H/H low    Pertinent Labs Reviewed: reviewed  Pertinent Medications Reviewed: reviewed    Scheduled Meds:   atorvastatin  20 mg Oral Daily    cholecalciferol (vitamin D3)  50,000 Units Oral Q7 Days    ezetimibe  10 mg Oral Daily    heparin (porcine)  5,000 Units Subcutaneous Q8H    levoFLOXacin  500 mg Oral Daily    linaCLOtide  290 mcg Oral Before breakfast    midodrine  10 mg Oral TID    pantoprazole  40 mg Oral Daily    potassium iodide  5 drop Oral TID    pregabalin  75 mg Oral BID    scopolamine  1 patch Transdermal Q3 Days    sertraline  50 mg Oral Daily    traZODone  100 mg Oral QHS     Continuous Infusions:  PRN  "Meds:.  Current Facility-Administered Medications:     acetaminophen, 650 mg, Oral, Q4H PRN    acetaminophen, 650 mg, Oral, Q8H PRN    albuterol sulfate, 2.5 mg, Nebulization, Q4H PRN    dextrose 50%, 12.5 g, Intravenous, PRN    dextrose 50%, 25 g, Intravenous, PRN    glucagon (human recombinant), 1 mg, Intramuscular, PRN    glucose, 16 g, Oral, PRN    glucose, 24 g, Oral, PRN    HYDROcodone-acetaminophen, 1 tablet, Oral, Q6H PRN    HYDROcodone-acetaminophen, 1 tablet, Oral, Q6H PRN    magnesium oxide, 800 mg, Oral, PRN    magnesium oxide, 800 mg, Oral, PRN    melatonin, 6 mg, Oral, Nightly PRN    naloxone, 0.02 mg, Intravenous, PRN    ondansetron, 4 mg, Intravenous, Q8H PRN    potassium bicarbonate, 35 mEq, Oral, PRN    potassium bicarbonate, 50 mEq, Oral, PRN    potassium bicarbonate, 60 mEq, Oral, PRN    sodium chloride 0.9%, 10 mL, Intravenous, Q12H PRN    Anthropometrics  Height: 5' 2" (157.5 cm)  Height (inches): 62 in  Height Method: Stated  Weight: 77.1 kg (170 lb)  Weight (lb): 170 lb  Weight Method: Standard Scale  Ideal Body Weight (IBW), Female: 110 lb  % Ideal Body Weight, Female (lb): 154.55 %  BMI (Calculated): 31.1  BMI Grade: 30 - 34.9- obesity - grade I  Additional Documentation: Tetraplegia (Quadriplegia) Ideal Body Weight (IBW) Adjustment  Tetraplegia (Quadriplegia) Ideal Body Weight (IBW) Adjustment: 100 lb    Estimated/Assessed Needs  RMR ([place]-St. Jeor Equation): 1289.36     Temp: 98.9 °F (37.2 °C)Oral    Weight Used For Calorie Calculations: 77.1 kg (169 lb 15.6 oz)   Energy Need Method: other (see comments) (quadriplegia) Energy Calorie Requirements (kcal): 2575-4490    Weight Used For Protein Calculations: 49.9 kg (110 lb)  Protein Requirements: 60-75    Estimated Fluid Requirement Method: other (see comments) Fluid Requirements (mL): 0314-6129    Nutrition by Nursing                   Gastrostomy/Enterostomy LUQ-Feeding Type: continuous       Gastrostomy/Enterostomy LUQ-Current Rate " (mL/hr): 35 mL/hr       Gastrostomy/Enterostomy LUQ-Goal Rate (mL/hr): 35 mL/hr       Gastrostomy/Enterostomy LUQ-Formula Name: gastonsolancee 1.5    Nutrition Follow-Up  RD Follow-up?: Yes    Nutrition Discharge Planning: Enteral nutrition (comments)       Nidhi Britt MS, RD, LD  Available via Secure Chat

## 2025-05-12 NOTE — PROGRESS NOTES
[facility]  [facility] Medicine  Progress Note    Patient Name: Karie Denney  MRN: [id number]  Patient Class: IP- Inpatient   Admission Date: 5/9/2025  Length of Stay: 2 days  Attending Physician: Jordana Cavazos MD  Primary Care Provider: Hilda Oro FNP-C        Subjective     Principal Problem:Acute on chronic respiratory failure with hypoxia and hypercapnia        HPI:  60 yo female with tracheostomy who was discharged 8 days ago from our service after a prolonged 3 month hospital stay presents for the second time since discharge for acute on chronic respiratory failure in the setting of secretions and blockages in the trach. She was initially hypoxic with EMS but after suctioning her trach tube here in the emergency department her sats have returned to normal. Due to continued ER visits due to poor trach care will admit patient to obs and discuss with social and family about placement. Patient has also missed 2 follow ups this week including pulmonary.    Overview/Hospital Course:  5/10  - consulted pulm as family requested if she could have bronch done in hospital while she waits for NH placement  - noted tachycardia is patient's baseline, extensive work up during last 3 month stay    5/11   - pulmonary recs noted  - awaiting placement    5/12   - awaiting placement    Past Medical History:   Diagnosis Date    Chronic idiopathic constipation 08/27/2024    Chronic respiratory failure with hypoxia 03/25/2025    Coag negative Staphylococcus bacteremia 03/25/2025    GERD (gastroesophageal reflux disease)     Insomnia secondary to depression with anxiety     Mixed hyperlipidemia     Quadriplegia 03/23/2025    Severe protein-calorie malnutrition 03/23/2025    Spontaneous hematoma of lower leg 02/26/2025    UTI due to extended-spectrum beta lactamase (ESBL) producing Escherichia coli 02/12/2025       Past Surgical History:   Procedure Laterality Date    BACK SURGERY         Review of  patient's allergies indicates:   Allergen Reactions    Penicillins Anaphylaxis       No current facility-administered medications on file prior to encounter.     Current Outpatient Medications on File Prior to Encounter   Medication Sig    atorvastatin (LIPITOR) 20 MG tablet Take 1 tablet (20 mg total) by mouth once daily.    cholecalciferol, vitamin D3, 1,250 mcg (50,000 unit) capsule Take 1 capsule (50,000 Units total) by mouth every 7 days.    ezetimibe (ZETIA) 10 mg tablet Take 1 tablet (10 mg total) by mouth once daily.    HYDROcodone-acetaminophen (NORCO) 5-325 mg per tablet Take 1 tablet by mouth every 6 (six) hours as needed for Pain.    levalbuterol (XOPENEX) 1.25 mg/3 mL nebulizer solution Take 1 NEBULE (1.25 mg total) by nebulization 2 (two) times a day.    levoFLOXacin (LEVAQUIN) 500 MG tablet Take 1 tablet (500 mg total) by mouth once daily. for 10 days    LINZESS 290 mcg Cap capsule Take 1 capsule (290 mcg total) by mouth before breakfast.    midodrine (PROAMATINE) 5 MG Tab Take 2 tablets (10 mg total) by mouth 3 (three) times daily.    omeprazole (PRILOSEC) 40 MG capsule Take 1 capsule (40 mg total) by mouth every morning.    ondansetron (ZOFRAN-ODT) 4 MG TbDL Take 2 tablets (8 mg total) by mouth 2 (two) times daily.    pregabalin (LYRICA) 75 MG capsule Take 1 capsule (75 mg total) by mouth 2 (two) times daily.    scopolamine (TRANSDERM-SCOP) 1.3-1.5 mg (1 mg over 3 days) Place 1 patch onto the skin Every 3 (three) days.    sertraline (ZOLOFT) 50 MG tablet Take 1 tablet (50 mg total) by mouth once daily.    traZODone (DESYREL) 100 MG tablet Take 1 tablet (100 mg total) by mouth every evening.    [START ON 6/8/2025] HYDROcodone-acetaminophen (NORCO) 5-325 mg per tablet Take 1 tablet by mouth every 6 (six) hours as needed for Pain.    [START ON 7/8/2025] HYDROcodone-acetaminophen (NORCO) 5-325 mg per tablet Take 1 tablet by mouth every 6 (six) hours as needed for Pain.     Family History    Family  history is unknown by patient.       Tobacco Use    Smoking status: Never    Smokeless tobacco: Never   Substance and Sexual Activity    Alcohol use: Never    Drug use: Never    Sexual activity: Not Currently     Review of Systems   All other systems reviewed and are negative.    Objective:     Vital Signs (Most Recent):  Temp: 98.7 °F (37.1 °C) (05/12/25 1203)  Pulse: (!) 120 (05/12/25 1203)  Resp: 20 (05/12/25 1203)  BP: (!) 80/49 (05/12/25 1203)  SpO2: 99 % (05/12/25 1203) Vital Signs (24h Range):  Temp:  [97.6 °F (36.4 °C)-98.9 °F (37.2 °C)] 98.7 °F (37.1 °C)  Pulse:  [100-129] 120  Resp:  [16-22] 20  SpO2:  [94 %-100 %] 99 %  BP: ()/(49-71) 80/49     Weight: 77.1 kg (170 lb)  Body mass index is 31.09 kg/m².     Physical Exam  Vitals and nursing note reviewed.   Constitutional:       Appearance: She is ill-appearing.   HENT:      Head: Normocephalic.      Mouth/Throat:      Mouth: Mucous membranes are moist.   Eyes:      Extraocular Movements: Extraocular movements intact.      Pupils: Pupils are equal, round, and reactive to light.   Cardiovascular:      Rate and Rhythm: Regular rhythm. Tachycardia present.      Pulses: Normal pulses.   Pulmonary:      Effort: Pulmonary effort is normal.   Abdominal:      General: Abdomen is flat. Bowel sounds are normal.      Palpations: Abdomen is soft.   Musculoskeletal:      Right lower leg: No edema.      Left lower leg: No edema.   Skin:     General: Skin is warm and dry.   Neurological:      General: No focal deficit present.      Mental Status: She is alert. Mental status is at baseline.   Psychiatric:         Mood and Affect: Mood normal.               Significant Labs: All pertinent labs within the past 24 hours have been reviewed.  BMP:   Recent Labs   Lab 05/12/25  0352   *      K 4.0   CL 97*   CO2 31   BUN 26*   CREATININE 0.65   CALCIUM 9.9     CBC:   Recent Labs   Lab 05/11/25  0508 05/12/25  0352   WBC 11.38* 10.49   HGB 10.3* 10.3*   HCT  36.4* 36.5*    231     CMP:   Recent Labs   Lab 05/11/25  0508 05/12/25  0352    140   K 4.1 4.0   CL 98 97*   CO2 33* 31   GLU 72* 124*   BUN 25* 26*   CREATININE 0.62 0.65   CALCIUM 10.4* 9.9   ANIONGAP 15 16       Significant Imaging: I have reviewed all pertinent imaging results/findings within the past 24 hours.      Assessment & Plan  Pressure ulcers of skin of multiple topographic sites  noted    Severe protein-calorie malnutrition  Nutrition consulted. Most recent weight and BMI monitored-     Measurements:  Wt Readings from Last 1 Encounters:   05/09/25 77.1 kg (170 lb)   Body mass index is 31.09 kg/m².    Patient has been screened and assessed by RD.    Malnutrition Type:  Context:    Level:      Malnutrition Characteristic Summary:  Weight Loss (Malnutrition): greater than 7.5% in 3 months    Interventions/Recommendations (treatment strategy):  Recommend to continue with current tube feed regimen as tolerated. Recommend to consider initiation of bowel regimen given history of constipation, last BM 5/4    Quadriplegia  noted    Acute on chronic respiratory failure with hypoxia and hypercapnia  Patient with Hypercapnic and Hypoxic Respiratory failure which is Acute on chronic.  she is on home oxygen at on vent LPM. Supplemental oxygen was provided and noted- Oxygen Concentration (%):  [36] 36on vent    .   Signs/symptoms of respiratory failure include- tachypnea, increased work of breathing, respiratory distress, and use of accessory muscles. Contributing diagnoses includes - COPD and Interstitial lung disease Labs and images were reviewed. Patient Has not had a recent ABG. Will treat underlying causes and adjust management of respiratory failure as follows-     Admit to obs  Education about trach care  Stress to family that continued ER visits and hospital admissions will result in worsened overall health and likely placement is needed  Will consult pulmonary to enquire about bronch that she  missed     5/10  - family agrees with placement    5/11  - social working on placement, update expected tomorrow    5/12  - working on placement  VTE Risk Mitigation (From admission, onward)           Ordered     heparin (porcine) injection 5,000 Units  Every 8 hours         05/09/25 1340     IP VTE HIGH RISK PATIENT  Once         05/09/25 1340     Place sequential compression device  Until discontinued         05/09/25 1340                    Discharge Planning   SHRADDHA:      Code Status: Full Code   Medical Readiness for Discharge Date:   Discharge Plan A: Home with family                        Jordana Cavazos MD  Department of Hospital Medicine   [facility]

## 2025-05-12 NOTE — PLAN OF CARE
Problem: Adult Inpatient Plan of Care  Goal: Plan of Care Review  Outcome: Progressing  Goal: Patient-Specific Goal (Individualized)  Outcome: Progressing  Goal: Optimal Comfort and Wellbeing  Outcome: Progressing     Problem: Fall Injury Risk  Goal: Absence of Fall and Fall-Related Injury  Outcome: Progressing     Problem: Wound  Goal: Optimal Pain Control and Function  Outcome: Progressing

## 2025-05-12 NOTE — PHYSICIAN QUERY
Please clarify the integumentary diagnosis related to the documentation of Left buttock:  Pressure Injury/Decubitus Ulcer, Unstageable

## 2025-05-12 NOTE — PROGRESS NOTES
[facility]  Wound Care    Patient Name:  Karie Denney   MRN:  [id number]  Date: 5/12/2025  Diagnosis: Acute on chronic respiratory failure with hypoxia and hypercapnia    History:     Past Medical History:   Diagnosis Date    Chronic idiopathic constipation 08/27/2024    Chronic respiratory failure with hypoxia 03/25/2025    Coag negative Staphylococcus bacteremia 03/25/2025    GERD (gastroesophageal reflux disease)     Insomnia secondary to depression with anxiety     Mixed hyperlipidemia     Quadriplegia 03/23/2025    Severe protein-calorie malnutrition 03/23/2025    Spontaneous hematoma of lower leg 02/26/2025    UTI due to extended-spectrum beta lactamase (ESBL) producing Escherichia coli 02/12/2025       Social History[1]    Precautions:     Allergies as of 05/09/2025 - Reviewed 05/09/2025   Allergen Reaction Noted    Penicillins Anaphylaxis 08/25/2022       WO Assessment Details/Treatment        05/12/25 1120        Wound 03/23/25 1542 Pressure Injury Left Buttocks #3   Date First Assessed/Time First Assessed: 03/23/25 1542   Present on Original Admission: Yes  Primary Wound Type: Pressure Injury  Side: Left  Location: Buttocks  Wound Number: #3   Wound Image    Pressure Injury Stage U   Dressing Appearance Intact;Moist drainage   Drainage Amount Moderate   Drainage Characteristics/Odor Yellow   Appearance Pink;Red;Moist;Granulating;Yellow;Slough;Black;Eschar   Black (%), Wound Tissue Color 40 %   Red (%), Wound Tissue Color 30 %   Yellow (%), Wound Tissue Color 30 %   Periwound Area Pink;Moist   Wound Edges Irregular;Undefined   Wound Length (cm) 5.8 cm   Wound Width (cm) 3.1 cm   Wound Depth (cm) 0.2 cm   Wound Volume (cm^3) 1.883 cm^3   Wound Surface Area (cm^2) 14.12 cm^2   Care Cleansed with:;Antimicrobial agent   Dressing Applied;Silver;Hydrofiber;Foam;Island/border   Periwound Care Moisture barrier applied   Dressing Change Due 05/15/25        Wound 03/23/25 1541 Pressure  Injury Left anterior Foot #2   Date First Assessed/Time First Assessed: 03/23/25 1541   Present on Original Admission: Yes  Primary Wound Type: Pressure Injury  Side: Left  Orientation: anterior  Location: Foot  Wound Number: #2  Is this injury device related?: No   Wound Image    Dressing Appearance Open to air   Drainage Amount None   Appearance Intact;Pink;Epithelialization   Black (%), Wound Tissue Color 0 %   Red (%), Wound Tissue Color 0 %   Yellow (%), Wound Tissue Color 0 %   Periwound Area Intact;Dry;Pink   Wound Edges Defined   Wound Length (cm) 0 cm   Wound Width (cm) 0 cm   Wound Depth (cm) 0 cm   Wound Volume (cm^3) 0 cm^3   Wound Surface Area (cm^2) 0 cm^2   Care Cleansed with:;Antimicrobial agent   Periwound Care Moisturizer applied         WOC Team consulted for sacral wound    Narrative: pt lying in bed with family @ bedside. Pt has trach noted, tube feeds were stopped to be able to lay patient back to be turned for visualization of sacral wound. Pt tolerated wound care well.     Active Wounds and Recommendations: Clean sacral wound with vashe. Apply aquacel AG and secure with foam border. Change every 3 days AND AS NEEDED FOR DRAINAGE/SOILAGE.     Goals for Wound Healing: Moisture management, Apply antimicrobial, Reduce pain, Reduce bioburden, and Educate on proper wound management post D/C     Barriers to Wound Healing: excessive wound moisture and macerated tissue advanced age fragile skin no protective sensation infection    Orders placed.    Thank you for the consult.     We will continue to follow. See additional note under Notes Tab for tentative f/u plan/dates.        05/12/2025       [1]   Social History  Socioeconomic History    Marital status: Single   Tobacco Use    Smoking status: Never    Smokeless tobacco: Never   Substance and Sexual Activity    Alcohol use: Never    Drug use: Never    Sexual activity: Not Currently     Social Drivers of Health     Financial Resource Strain: Patient  Declined (5/9/2025)    Overall Financial Resource Strain (CARDIA)     Difficulty of Paying Living Expenses: Patient declined   Food Insecurity: Patient Declined (5/9/2025)    Hunger Vital Sign     Worried About Running Out of Food in the Last Year: Patient declined     Ran Out of Food in the Last Year: Patient declined   Recent Concern: Food Insecurity - Food Insecurity Present (3/23/2025)    Hunger Vital Sign     Worried About Running Out of Food in the Last Year: Often true     Ran Out of Food in the Last Year: Often true   Transportation Needs: Patient Declined (5/9/2025)    PRAPARE - Transportation     Lack of Transportation (Medical): Patient declined     Lack of Transportation (Non-Medical): Patient declined   Physical Activity: Inactive (5/9/2025)    Exercise Vital Sign     Days of Exercise per Week: 0 days     Minutes of Exercise per Session: 0 min   Stress: Patient Declined (5/9/2025)    [employer] [place] [employer] - Occupational Stress Questionnaire     Feeling of Stress : Patient declined   Recent Concern: Stress - Stress Concern Present (3/23/2025)    [employer] [place] [employer] - Occupational Stress Questionnaire     Feeling of Stress : Rather much   Housing Stability: Patient Declined (5/9/2025)    Housing Stability Vital Sign     Unable to Pay for Housing in the Last Year: Patient declined     Homeless in the Last Year: Patient declined

## 2025-05-12 NOTE — ASSESSMENT & PLAN NOTE
Patient with Hypercapnic and Hypoxic Respiratory failure which is Acute on chronic.  she is on home oxygen at on vent LPM. Supplemental oxygen was provided and noted- Oxygen Concentration (%):  [36] 36on vent    .   Signs/symptoms of respiratory failure include- tachypnea, increased work of breathing, respiratory distress, and use of accessory muscles. Contributing diagnoses includes - COPD and Interstitial lung disease Labs and images were reviewed. Patient Has not had a recent ABG. Will treat underlying causes and adjust management of respiratory failure as follows-     Admit to obs  Education about trach care  Stress to family that continued ER visits and hospital admissions will result in worsened overall health and likely placement is needed  Will consult pulmonary to enquire about bronch that she missed     5/10  - family agrees with placement    5/11  - social working on placement, update expected tomorrow    5/12  - working on placement

## 2025-05-12 NOTE — PROGRESS NOTES
[facility]  Wound Care    Patient Name:  Karie Denney   MRN:  [id number]  Date: 5/12/2025  Diagnosis: Acute on chronic respiratory failure with hypoxia and hypercapnia    History:     Past Medical History:   Diagnosis Date    Chronic idiopathic constipation 08/27/2024    Chronic respiratory failure with hypoxia 03/25/2025    Coag negative Staphylococcus bacteremia 03/25/2025    GERD (gastroesophageal reflux disease)     Insomnia secondary to depression with anxiety     Mixed hyperlipidemia     Quadriplegia 03/23/2025    Severe protein-calorie malnutrition 03/23/2025    Spontaneous hematoma of lower leg 02/26/2025    UTI due to extended-spectrum beta lactamase (ESBL) producing Escherichia coli 02/12/2025       Social History[1]    Precautions:     Allergies as of 05/09/2025 - Reviewed 05/09/2025   Allergen Reaction Noted    Penicillins Anaphylaxis 08/25/2022       [facility] Assessment Details/Treatment     Narrative: Seen patient for initial preventative skin care measures.  Patient is immobile.  Ulceration noted to sacral.  Foam borders noted to bilateral heels, no redness or open wounds noted.  Consult wound care of any other findings.    05/12/2025        [1]   Social History  Socioeconomic History    Marital status: Single   Tobacco Use    Smoking status: Never    Smokeless tobacco: Never   Substance and Sexual Activity    Alcohol use: Never    Drug use: Never    Sexual activity: Not Currently     Social Drivers of Health     Financial Resource Strain: Patient Declined (5/9/2025)    Overall Financial Resource Strain (CARDIA)     Difficulty of Paying Living Expenses: Patient declined   Food Insecurity: Patient Declined (5/9/2025)    Hunger Vital Sign     Worried About Running Out of Food in the Last Year: Patient declined     Ran Out of Food in the Last Year: Patient declined   Recent Concern: Food Insecurity - Food Insecurity Present (3/23/2025)    Hunger Vital Sign     Worried About Running  Out of Food in the Last Year: Often true     Ran Out of Food in the Last Year: Often true   Transportation Needs: Patient Declined (5/9/2025)    PRAPARE - Transportation     Lack of Transportation (Medical): Patient declined     Lack of Transportation (Non-Medical): Patient declined   Physical Activity: Inactive (5/9/2025)    Exercise Vital Sign     Days of Exercise per Week: 0 days     Minutes of Exercise per Session: 0 min   Stress: Patient Declined (5/9/2025)    [employer] [place] [employer] - Occupational Stress Questionnaire     Feeling of Stress : Patient declined   Recent Concern: Stress - Stress Concern Present (3/23/2025)    [employer] [place] [employer] - Occupational Stress Questionnaire     Feeling of Stress : Rather much   Housing Stability: Patient Declined (5/9/2025)    Housing Stability Vital Sign     Unable to Pay for Housing in the Last Year: Patient declined     Homeless in the Last Year: Patient declined

## 2025-05-12 NOTE — DISCHARGE INSTRUCTIONS
Recommend bolus feeding of Isosource 1.5 one carton (237 mL) 4x per day with 100 mL free water flush before and after each bolus. May use Jevity 1.5 as equivalent substitute. Keep HOB at 30 - 45 degrees for at least 30 minutes after each bolus to reduce risk of aspiration. Monitor for tolerance: n/v/c/d. Recommend to continue with Luis Fernando BID for a minimum of 2 weeks as well, but ideally until wounds heal.

## 2025-05-12 NOTE — PHYSICIAN QUERY
Please clarify the Acute on chronic respiratory failure with hypoxia and hypercapnia  diagnosis.  Acute on chronic respiratory failure with hypoxia and hypercapnia

## 2025-05-13 LAB — SARS-COV-2 RDRP RESP QL NAA+PROBE: NEGATIVE

## 2025-05-13 PROCEDURE — 27000221 HC OXYGEN, UP TO 24 HOURS

## 2025-05-13 PROCEDURE — 87635 SARS-COV-2 COVID-19 AMP PRB: CPT

## 2025-05-13 PROCEDURE — 99231 SBSQ HOSP IP/OBS SF/LOW 25: CPT | Mod: ,,, | Performed by: INTERNAL MEDICINE

## 2025-05-13 PROCEDURE — 99900035 HC TECH TIME PER 15 MIN (STAT)

## 2025-05-13 PROCEDURE — 25000003 PHARM REV CODE 250

## 2025-05-13 PROCEDURE — 11000001 HC ACUTE MED/SURG PRIVATE ROOM

## 2025-05-13 PROCEDURE — 99232 SBSQ HOSP IP/OBS MODERATE 35: CPT | Mod: ,,,

## 2025-05-13 PROCEDURE — 63600175 PHARM REV CODE 636 W HCPCS

## 2025-05-13 PROCEDURE — 94761 N-INVAS EAR/PLS OXIMETRY MLT: CPT

## 2025-05-13 PROCEDURE — 99900026 HC AIRWAY MAINTENANCE (STAT)

## 2025-05-13 PROCEDURE — 99900022

## 2025-05-13 PROCEDURE — 94640 AIRWAY INHALATION TREATMENT: CPT

## 2025-05-13 PROCEDURE — 25000242 PHARM REV CODE 250 ALT 637 W/ HCPCS: Performed by: INTERNAL MEDICINE

## 2025-05-13 PROCEDURE — 27000207 HC ISOLATION

## 2025-05-13 RX ORDER — ALBUTEROL SULFATE 0.83 MG/ML
2.5 SOLUTION RESPIRATORY (INHALATION) EVERY 6 HOURS
Status: DISCONTINUED | OUTPATIENT
Start: 2025-05-13 | End: 2025-05-15 | Stop reason: HOSPADM

## 2025-05-13 RX ORDER — ALBUTEROL SULFATE 0.83 MG/ML
2.5 SOLUTION RESPIRATORY (INHALATION) EVERY 4 HOURS PRN
Status: DISCONTINUED | OUTPATIENT
Start: 2025-05-13 | End: 2025-05-15 | Stop reason: HOSPADM

## 2025-05-13 RX ADMIN — SERTRALINE HYDROCHLORIDE 50 MG: 50 TABLET ORAL at 09:05

## 2025-05-13 RX ADMIN — HEPARIN SODIUM 5000 UNITS: 5000 INJECTION, SOLUTION INTRAVENOUS; SUBCUTANEOUS at 02:05

## 2025-05-13 RX ADMIN — LEVOFLOXACIN 500 MG: 500 TABLET, FILM COATED ORAL at 09:05

## 2025-05-13 RX ADMIN — MIDODRINE HYDROCHLORIDE 10 MG: 5 TABLET ORAL at 03:05

## 2025-05-13 RX ADMIN — PREGABALIN 75 MG: 75 CAPSULE ORAL at 09:05

## 2025-05-13 RX ADMIN — EZETIMIBE 10 MG: 10 TABLET ORAL at 09:05

## 2025-05-13 RX ADMIN — HEPARIN SODIUM 5000 UNITS: 5000 INJECTION, SOLUTION INTRAVENOUS; SUBCUTANEOUS at 09:05

## 2025-05-13 RX ADMIN — HYDROCODONE BITARTRATE AND ACETAMINOPHEN 1 TABLET: 5; 325 TABLET ORAL at 03:05

## 2025-05-13 RX ADMIN — ERGOCALCIFEROL 50000 UNITS: 1.25 CAPSULE ORAL at 09:05

## 2025-05-13 RX ADMIN — ALBUTEROL SULFATE 2.5 MG: 2.5 SOLUTION RESPIRATORY (INHALATION) at 11:05

## 2025-05-13 RX ADMIN — MIDODRINE HYDROCHLORIDE 10 MG: 5 TABLET ORAL at 09:05

## 2025-05-13 RX ADMIN — HEPARIN SODIUM 5000 UNITS: 5000 INJECTION, SOLUTION INTRAVENOUS; SUBCUTANEOUS at 05:05

## 2025-05-13 RX ADMIN — POTASSIUM IODIDE 5 DROP: 1 SOLUTION ORAL at 09:05

## 2025-05-13 RX ADMIN — LINACLOTIDE 290 MCG: 290 CAPSULE, GELATIN COATED ORAL at 05:05

## 2025-05-13 RX ADMIN — PANTOPRAZOLE SODIUM 40 MG: 40 TABLET, DELAYED RELEASE ORAL at 09:05

## 2025-05-13 RX ADMIN — TRAZODONE HYDROCHLORIDE 100 MG: 50 TABLET ORAL at 09:05

## 2025-05-13 RX ADMIN — ALBUTEROL SULFATE 2.5 MG: 2.5 SOLUTION RESPIRATORY (INHALATION) at 07:05

## 2025-05-13 RX ADMIN — POTASSIUM IODIDE 5 DROP: 1 SOLUTION ORAL at 03:05

## 2025-05-13 RX ADMIN — ATORVASTATIN CALCIUM 20 MG: 20 TABLET, FILM COATED ORAL at 09:05

## 2025-05-13 NOTE — SUBJECTIVE & OBJECTIVE
Interval History:  Patient without complaints this morning      Objective:     Vital Signs (Most Recent):  Temp: 98.3 °F (36.8 °C) (05/13/25 1150)  Pulse: (!) 121 (05/13/25 1150)  Resp: 16 (05/13/25 1150)  BP: (!) 88/48 (05/13/25 1150)  SpO2: 100 % (05/13/25 1150) Vital Signs (24h Range):  Temp:  [98.3 °F (36.8 °C)-99.7 °F (37.6 °C)] 98.3 °F (36.8 °C)  Pulse:  [102-126] 121  Resp:  [16-22] 16  SpO2:  [94 %-100 %] 100 %  BP: ()/(42-65) 88/48     Weight: 77.1 kg (170 lb)  Body mass index is 31.09 kg/m².      Intake/Output Summary (Last 24 hours) at 5/13/2025 1258  Last data filed at 5/13/2025 0634  Gross per 24 hour   Intake 960 ml   Output --   Net 960 ml        Physical Exam  Vitals reviewed.   Constitutional:       Appearance: Normal appearance.      Interventions: She is not intubated.  HENT:      Head: Normocephalic and atraumatic.      Nose: Nose normal.      Mouth/Throat:      Mouth: Mucous membranes are dry.      Pharynx: Oropharynx is clear.   Eyes:      Extraocular Movements: Extraocular movements intact.      Conjunctiva/sclera: Conjunctivae normal.      Pupils: Pupils are equal, round, and reactive to light.   Cardiovascular:      Rate and Rhythm: Normal rate.      Heart sounds: Normal heart sounds. No murmur heard.  Pulmonary:      Effort: Pulmonary effort is normal. She is not intubated.      Breath sounds: Normal breath sounds.   Abdominal:      General: Abdomen is flat. Bowel sounds are normal.      Palpations: Abdomen is soft.   Musculoskeletal:         General: Normal range of motion.      Cervical back: Normal range of motion and neck supple.      Right lower leg: No edema.      Left lower leg: No edema.   Skin:     General: Skin is warm and dry.      Capillary Refill: Capillary refill takes less than 2 seconds.   Neurological:      General: No focal deficit present.      Mental Status: She is alert and oriented to person, place, and time.   Psychiatric:         Mood and Affect: Mood normal.          Behavior: Behavior normal.           Review of Systems    Vents:  Oxygen Concentration (%): 32 (05/13/25 1116)    Lines/Drains/Airways       Drain  Duration                  Gastrostomy/Enterostomy LUQ -- days              Airway  Duration             Adult Surgical Airway 03/23/25 1544 Shiley Cuffed  50 days              Peripheral Intravenous Line  Duration                  Peripheral IV - Single Lumen 05/09/25 20 G Left;Posterior Hand 4 days                    Significant Labs:    CBC/Anemia Profile:  Recent Labs   Lab 05/12/25  0352   WBC 10.49   HGB 10.3*   HCT 36.5*      MCV 83.7   RDW 17.6*        Chemistries:  Recent Labs   Lab 05/12/25  0352      K 4.0   CL 97*   CO2 31   BUN 26*   CREATININE 0.65   CALCIUM 9.9       Recent Lab Results       None            Significant Imaging:  I have reviewed all pertinent imaging results/findings within the past 24 hours.

## 2025-05-13 NOTE — PLAN OF CARE
Talked to Malcolm at [facility].  She stated patient will need a chest xray, tb skin test, physician statement, and covid test.  Chest xray, and tb skin test done.  Obtaining physician statement and request Covid test.  Will continue to follow.    1310  Received call from Malcolm at [facility] requesting additional info.  Faxed MAR, nurses notes, and respiratory therapy notes.  Noted we do not have TAR- treatment administration record.  Will continue to follow for dc/placement needs.

## 2025-05-13 NOTE — PLAN OF CARE
Problem: Adult Inpatient Plan of Care  Goal: Plan of Care Review  Outcome: Progressing  Goal: Patient-Specific Goal (Individualized)  Outcome: Progressing  Goal: Absence of Hospital-Acquired Illness or Injury  Outcome: Progressing  Goal: Optimal Comfort and Wellbeing  Outcome: Progressing  Goal: Readiness for Transition of Care  Outcome: Progressing     Problem: Wound  Goal: Optimal Coping  Outcome: Progressing  Goal: Optimal Functional Ability  Outcome: Progressing  Goal: Absence of Infection Signs and Symptoms  Outcome: Progressing  Goal: Improved Oral Intake  Outcome: Progressing  Goal: Optimal Pain Control and Function  Outcome: Progressing  Goal: Skin Health and Integrity  Outcome: Progressing  Goal: Optimal Wound Healing  Outcome: Progressing     Problem: Airway Clearance Ineffective  Goal: Effective Airway Clearance  Outcome: Progressing

## 2025-05-13 NOTE — PROGRESS NOTES
Subjective     Patient ID: Karie Denney is a 59 y.o. female.    Chief Complaint: Establish Care    History of Present Illness    CHIEF COMPLAINT:  At patient's home today for follow up after hospital d/c and to establish care with a provider that does home visits as she is unable to get to doctors appointments d/t significant burden r/t medical conditions. Her mother, Carolina Matias, is her primary care giver. She was at the home during our visit to answer questions.   RECENT HOSPITALIZATIONS:  She was recently hospitalized at [facility] for pneumonia. During hospitalization, she experienced a lung collapse requiring chest tube placement, which was later removed. She was transferred to [facility] due to suspicion of a small clot, which was expected to resolve spontaneously.    MEDICAL HISTORY:  She has a history of minor stroke approximately 6 months ago. She had tracheostomy and PEG tube placement 2-3 months ago.    NUTRITION:  She receives Isosource 1.5 3 times daily via feeding tube and takes weekly Vitamin D supplementation.    WOUNDS:  She has current wounds on her bottom and nose (secondary to medical tubing). A previous toe wound has resolved.            Review of Systems   Constitutional:  Negative for fever and unexpected weight change.   HENT:  Positive for drooling.    Respiratory:  Negative for cough, shortness of breath and wheezing.    Cardiovascular:  Negative for chest pain and palpitations.   Gastrointestinal:  Negative for abdominal pain, blood in stool, constipation, diarrhea, nausea and vomiting.        Fecal incontinence     Genitourinary:         Urinary incontinence     Skin:  Positive for wound. Negative for color change and rash.   Neurological:  Negative for seizures, weakness (at baseline) and headaches.   Psychiatric/Behavioral:  Negative for sleep disturbance and suicidal ideas.          Vital Signs  Pulse: 108  SpO2: (!) 91 %  BP: 118/82  Pain Score:   4  Pain Loc:  Generalized]    Physical Exam  Vitals and nursing note reviewed.   Constitutional:       Appearance: She is normal weight.   HENT:      Mouth/Throat:      Mouth: Mucous membranes are moist.      Pharynx: Oropharynx is clear.   Eyes:      Conjunctiva/sclera: Conjunctivae normal.      Pupils: Pupils are equal, round, and reactive to light.   Neck:      Trachea: Tracheostomy present.   Cardiovascular:      Rate and Rhythm: Regular rhythm. Tachycardia present.      Pulses: Normal pulses.           Dorsalis pedis pulses are 2+ on the right side and 2+ on the left side.        Posterior tibial pulses are 2+ on the right side and 2+ on the left side.      Heart sounds: Normal heart sounds.   Pulmonary:      Effort: Pulmonary effort is normal. No respiratory distress.      Breath sounds: Transmitted upper airway sounds present. No decreased air movement. Rhonchi present.   Abdominal:      General: Abdomen is flat. The ostomy site is clean. Bowel sounds are normal. There is distension (minimal distention).      Palpations: Abdomen is soft.      Tenderness: There is no abdominal tenderness.       Genitourinary:     General: Normal vulva.   Musculoskeletal:         General: Normal range of motion.      Cervical back: Normal range of motion and neck supple.      Right lower leg: No edema.      Left lower leg: No edema.      Right foot: Foot drop present.      Left foot: Foot drop present.   Feet:      Right foot:      Skin integrity: Callus, dry skin and fissure present.      Toenail Condition: Right toenails are abnormally thick.      Left foot:      Skin integrity: Callus, dry skin and fissure present.      Toenail Condition: Left toenails are abnormally thick.   Skin:     General: Skin is warm and dry.      Capillary Refill: Capillary refill takes less than 2 seconds.      Findings: Wound present.             Comments: Multiple areas of mild breakdown. Images in media. Bilateral feet very dry and flaky.    Neurological:       General: No focal deficit present.      Mental Status: She is alert. Mental status is at baseline.      Cranial Nerves: No facial asymmetry.      Sensory: Sensory deficit present.      Motor: Atrophy and abnormal muscle tone present.      Comments: Quadriplegia  Minimally verbal d/t trach and cerebral infarct. Limbs are contracted and immobile. Muscle spasm present when moving extremities for exam.    Psychiatric:         Attention and Perception: Attention normal.         Mood and Affect: Mood is anxious.         Speech: She is noncommunicative.            Assessment and Plan     1. Encounter to establish care with new provider    2. Frailty    3. Gastroesophageal reflux disease without esophagitis  -     omeprazole (PRILOSEC) 40 MG capsule; Take 1 capsule (40 mg total) by mouth every morning.  Dispense: 90 capsule; Refill: 1    4. Chronic idiopathic constipation  -     LINZESS 290 mcg Cap capsule; Take 1 capsule (290 mcg total) by mouth before breakfast.  Dispense: 90 capsule; Refill: 1    5. Quadriplegia  -     midodrine (PROAMATINE) 5 MG Tab; Take 2 tablets (10 mg total) by mouth 3 (three) times daily.  Dispense: 180 tablet; Refill: 11  -     pregabalin (LYRICA) 75 MG capsule; Take 1 capsule (75 mg total) by mouth 2 (two) times daily.  Dispense: 180 capsule; Refill: 1    6. Tracheostomy in place    7. Nausea  -     scopolamine (TRANSDERM-SCOP) 1.3-1.5 mg (1 mg over 3 days); Place 1 patch onto the skin Every 3 (three) days.  Dispense: 10 patch; Refill: 2  -     ondansetron (ZOFRAN-ODT) 4 MG TbDL; Take 2 tablets (8 mg total) by mouth 2 (two) times daily.  Dispense: 120 tablet; Refill: 0    8. Chronic pain syndrome  -     HYDROcodone-acetaminophen (NORCO) 5-325 mg per tablet; Take 1 tablet by mouth every 6 (six) hours as needed for Pain.  Dispense: 30 tablet; Refill: 0  -     HYDROcodone-acetaminophen (NORCO) 5-325 mg per tablet; Take 1 tablet by mouth every 6 (six) hours as needed for Pain.  Dispense: 30 tablet;  Refill: 0  -     HYDROcodone-acetaminophen (NORCO) 5-325 mg per tablet; Take 1 tablet by mouth every 6 (six) hours as needed for Pain.  Dispense: 30 tablet; Refill: 0    9. Pressure ulcers of skin of multiple topographic sites  -     HYDROcodone-acetaminophen (NORCO) 5-325 mg per tablet; Take 1 tablet by mouth every 6 (six) hours as needed for Pain.  Dispense: 30 tablet; Refill: 0  -     HYDROcodone-acetaminophen (NORCO) 5-325 mg per tablet; Take 1 tablet by mouth every 6 (six) hours as needed for Pain.  Dispense: 30 tablet; Refill: 0  -     HYDROcodone-acetaminophen (NORCO) 5-325 mg per tablet; Take 1 tablet by mouth every 6 (six) hours as needed for Pain.  Dispense: 30 tablet; Refill: 0    10. O2 dependent    11. Confined to bed    12. Gastrostomy in place    13. Vitamin D deficiency  -     cholecalciferol, vitamin D3, 1,250 mcg (50,000 unit) capsule; Take 1 capsule (50,000 Units total) by mouth every 7 days.  Dispense: 8 capsule; Refill: 0    14. Pneumonitis  -     levalbuterol (XOPENEX) 1.25 mg/3 mL nebulizer solution; Take 1 NEBULE (1.25 mg total) by nebulization 2 (two) times a day.  Dispense: 180 mL; Refill: 2    15. Neurogenic orthostatic hypotension  -     midodrine (PROAMATINE) 5 MG Tab; Take 2 tablets (10 mg total) by mouth 3 (three) times daily.  Dispense: 180 tablet; Refill: 11    16. Hyperlipidemia, unspecified hyperlipidemia type  -     ezetimibe (ZETIA) 10 mg tablet; Take 1 tablet (10 mg total) by mouth once daily.  Dispense: 90 tablet; Refill: 1  -     atorvastatin (LIPITOR) 20 MG tablet; Take 1 tablet (20 mg total) by mouth once daily.  Dispense: 90 tablet; Refill: 1    17. Insomnia, unspecified type  -     traZODone (DESYREL) 100 MG tablet; Take 1 tablet (100 mg total) by mouth every evening.  Dispense: 90 tablet; Refill: 1    18. Depression, unspecified depression type  -     sertraline (ZOLOFT) 50 MG tablet; Take 1 tablet (50 mg total) by mouth once daily.  Dispense: 90 tablet; Refill: 1 19.  Hemiplegia and hemiparesis following cerebral infarction affecting left non-dominant side  -     pregabalin (LYRICA) 75 MG capsule; Take 1 capsule (75 mg total) by mouth 2 (two) times daily.  Dispense: 180 capsule; Refill: 1    20. History of ulcerative colitis    21. Dry eye syndrome of both lacrimal glands    22. Atelectasis    23. KAT (obstructive sleep apnea)    24. Dysphagia, unspecified type    25. Atherosclerotic cardiovascular disease    26. Other iron deficiency anemia    27. Pericardial effusion (noninflammatory)    28. Pressure ulcer of left buttock, stage 3    29. Chronic respiratory failure with hypercapnia    30. Foot drop, bilateral  -     PREVALON BOOTS FOR HOME USE  -     HME - OTHER    31. Contracture of joint of multiple sites  -     PREVALON BOOTS FOR HOME USE  -     HME - OTHER      Assessment & Plan    J18.9 Pneumonia, unspecified organism  I82.90 Acute embolism and thrombosis of unspecified vein  J93.83 Other pneumothorax  L89.159 Pressure ulcer of sacral region, unspecified stage  L89.309 Pressure ulcer of unspecified buttock, unspecified stage  S01.20XA Unspecified open wound of nose, initial encounter  Z86.73 Personal history of TIA, and cerebral infarction without residual deficits  Z93.0 Tracheostomy status  Z93.1 Gastrostomy status    IMPRESSION:  - Assessed tracheostomy and PEG tube, placed approximately 2-3 months ago due to pneumonia and subsequent complications.  - Evaluated wound on bottom.  - Considered history of minor stroke approximately 6 months prior.  - Reviewed current medication regimen, including vitamin D supplementation.  - Assessed nutritional intake via PEG tube.  - Evaluated current oxygen therapy at 5 L.  - Noted current breathing treatment regimen.    PNEUMONIA:  - Monitored patient's history of pneumonia which was present at equipment initiation.    THROMBOSIS:  - Monitored clot condition which is expected to dissolve spontaneously without  intervention.    PNEUMOTHORAX:  - Monitored history of pneumothorax which previously required chest tube insertion and subsequent removal.    PRESSURE ULCERS:  - Monitored pressure ulcers on sacral region and buttock.  - Photographs obtained for documentation purposes.    WOUND CARE:  - Monitored wound on patient's nose resulting from tube placement.  - Photographs obtained for documentation.    CEREBROVASCULAR ACCIDENT HISTORY:  - Noted history of cerebrovascular accident approximately 6 months ago.    TRACHEOSTOMY STATUS:  - Documented tracheostomy placement approximately 2-3 months ago.    GASTROSTOMY STATUS:  - Documented gastrostomy tube placement concurrent with tracheostomy (approximately 2-3 months ago).    GENERAL CARE:  - Reviewed current medication regimen, including vitamin D supplementation.  - Will follow up as needed, noting future visits may not all involve extensive exam.         Home health- [facility]  Continue home visits   reviewed  Chronically ill, bed ridden quadriplegic requiring medications for pain and neuropathy  Has home o2, suction, hospital bed, diapers, ostomy care supplies, tube feeding supplies, wound care dressings, pads, trach care supplies.   Needs good foot protection and hand protection to prevent further skin breakdown.             Follow up in about 8 weeks (around 7/3/2025).    This note was generated with the assistance of ambient listening technology. Verbal consent was obtained by the patient and accompanying visitor(s) for the recording of patient appointment to facilitate this note. I attest to having reviewed and edited the generated note for accuracy, though some syntax or spelling errors may persist. Please contact the author of this note for any clarification.         Approximately 60 minutes were spent on this encounter. This includes face to face time and non-face to face time preparing to see the patient (eg, review of tests), obtaining and/or reviewing  separately obtained history, documenting clinical information in the electronic or other health record, independently interpreting results and communicating results to the patient/family/caregiver, or care coordinator.    RACHEL Lo

## 2025-05-13 NOTE — PLAN OF CARE
Problem: Adult Inpatient Plan of Care  Goal: Absence of Hospital-Acquired Illness or Injury  5/13/2025 1121 by Fabby Velez, RRT  Outcome: Progressing  5/13/2025 1121 by Fabby Velez, OLEGARIO  Outcome: Progressing     Problem: Gas Exchange Impaired  Goal: Optimal Gas Exchange  5/13/2025 1121 by Fabby Velez, RRT  Outcome: Progressing  5/13/2025 1121 by Fabby Velez, RRT  Outcome: Progressing     Problem: Airway Clearance Ineffective  Goal: Effective Airway Clearance  Outcome: Progressing

## 2025-05-13 NOTE — ASSESSMENT & PLAN NOTE
Has been doing reasonably well here has a trach in place a talked about possibility of downsizing 7 happened with the problems with secretions and a couple of spells of hypoxemia not ready to switch to a smaller trach continue current treatments

## 2025-05-13 NOTE — ACP (ADVANCE CARE PLANNING)
Advance Care Planning     Date: 05/13/2025    Today a voluntary meeting took place: bedside    Patient Participation: Patient is unable to participate     Attendees (Name and  Relationship to patient): Health care power of : sister    Staff attendees (Name and  Role): myself    ACP Conversation (General): Understanding of advance care planning and role of health care agent defined .    Code Status: Full Code     ACP Documents: Provided ACP documents    Goals of care: The healthcare power of   endorses that what is most important right now is to focus on curative/life-prolongation (regardless of treatment burdens)    Accordingly, we have decided that the best plan to meet the patient's goals includes continuing with treatment      Recommendations/  Follow-up tasks: The patient and health care agent were provided the following recommendations continue current care as wished      Length of ACP   conversation in minutes: 25 minutes

## 2025-05-13 NOTE — SUBJECTIVE & OBJECTIVE
Past Medical History:   Diagnosis Date    Chronic idiopathic constipation 08/27/2024    Chronic respiratory failure with hypoxia 03/25/2025    Coag negative Staphylococcus bacteremia 03/25/2025    GERD (gastroesophageal reflux disease)     Insomnia secondary to depression with anxiety     Mixed hyperlipidemia     Quadriplegia 03/23/2025    Severe protein-calorie malnutrition 03/23/2025    Spontaneous hematoma of lower leg 02/26/2025    UTI due to extended-spectrum beta lactamase (ESBL) producing Escherichia coli 02/12/2025       Past Surgical History:   Procedure Laterality Date    BACK SURGERY         Review of patient's allergies indicates:   Allergen Reactions    Penicillins Anaphylaxis       No current facility-administered medications on file prior to encounter.     Current Outpatient Medications on File Prior to Encounter   Medication Sig    atorvastatin (LIPITOR) 20 MG tablet Take 1 tablet (20 mg total) by mouth once daily.    cholecalciferol, vitamin D3, 1,250 mcg (50,000 unit) capsule Take 1 capsule (50,000 Units total) by mouth every 7 days.    ezetimibe (ZETIA) 10 mg tablet Take 1 tablet (10 mg total) by mouth once daily.    HYDROcodone-acetaminophen (NORCO) 5-325 mg per tablet Take 1 tablet by mouth every 6 (six) hours as needed for Pain.    levalbuterol (XOPENEX) 1.25 mg/3 mL nebulizer solution Take 1 NEBULE (1.25 mg total) by nebulization 2 (two) times a day.    levoFLOXacin (LEVAQUIN) 500 MG tablet Take 1 tablet (500 mg total) by mouth once daily. for 10 days    LINZESS 290 mcg Cap capsule Take 1 capsule (290 mcg total) by mouth before breakfast.    midodrine (PROAMATINE) 5 MG Tab Take 2 tablets (10 mg total) by mouth 3 (three) times daily.    omeprazole (PRILOSEC) 40 MG capsule Take 1 capsule (40 mg total) by mouth every morning.    ondansetron (ZOFRAN-ODT) 4 MG TbDL Take 2 tablets (8 mg total) by mouth 2 (two) times daily.    pregabalin (LYRICA) 75 MG capsule Take 1 capsule (75 mg total) by mouth 2  (two) times daily.    scopolamine (TRANSDERM-SCOP) 1.3-1.5 mg (1 mg over 3 days) Place 1 patch onto the skin Every 3 (three) days.    sertraline (ZOLOFT) 50 MG tablet Take 1 tablet (50 mg total) by mouth once daily.    traZODone (DESYREL) 100 MG tablet Take 1 tablet (100 mg total) by mouth every evening.    [START ON 6/8/2025] HYDROcodone-acetaminophen (NORCO) 5-325 mg per tablet Take 1 tablet by mouth every 6 (six) hours as needed for Pain.    [START ON 7/8/2025] HYDROcodone-acetaminophen (NORCO) 5-325 mg per tablet Take 1 tablet by mouth every 6 (six) hours as needed for Pain.     Family History    Family history is unknown by patient.       Tobacco Use    Smoking status: Never    Smokeless tobacco: Never   Substance and Sexual Activity    Alcohol use: Never    Drug use: Never    Sexual activity: Not Currently     Review of Systems   All other systems reviewed and are negative.    Objective:     Vital Signs (Most Recent):  Temp: 98.3 °F (36.8 °C) (05/13/25 1150)  Pulse: (!) 121 (05/13/25 1150)  Resp: 16 (05/13/25 1150)  BP: (!) 88/48 (05/13/25 1150)  SpO2: 100 % (05/13/25 1150) Vital Signs (24h Range):  Temp:  [98.3 °F (36.8 °C)-99.7 °F (37.6 °C)] 98.3 °F (36.8 °C)  Pulse:  [102-126] 121  Resp:  [16-22] 16  SpO2:  [94 %-100 %] 100 %  BP: ()/(42-65) 88/48     Weight: 77.1 kg (170 lb)  Body mass index is 31.09 kg/m².     Physical Exam  Vitals and nursing note reviewed.   Constitutional:       Appearance: She is ill-appearing.   HENT:      Head: Normocephalic.      Mouth/Throat:      Mouth: Mucous membranes are moist.   Eyes:      Extraocular Movements: Extraocular movements intact.      Pupils: Pupils are equal, round, and reactive to light.   Cardiovascular:      Rate and Rhythm: Regular rhythm. Tachycardia present.      Pulses: Normal pulses.   Pulmonary:      Effort: Pulmonary effort is normal.   Abdominal:      General: Abdomen is flat. Bowel sounds are normal.      Palpations: Abdomen is soft.    Musculoskeletal:      Right lower leg: No edema.      Left lower leg: No edema.   Skin:     General: Skin is warm and dry.   Neurological:      General: No focal deficit present.      Mental Status: She is alert. Mental status is at baseline.   Psychiatric:         Mood and Affect: Mood normal.               Significant Labs: All pertinent labs within the past 24 hours have been reviewed.  BMP:   Recent Labs   Lab 05/12/25  0352   *      K 4.0   CL 97*   CO2 31   BUN 26*   CREATININE 0.65   CALCIUM 9.9     CBC:   Recent Labs   Lab 05/12/25  0352   WBC 10.49   HGB 10.3*   HCT 36.5*        CMP:   Recent Labs   Lab 05/12/25  0352      K 4.0   CL 97*   CO2 31   *   BUN 26*   CREATININE 0.65   CALCIUM 9.9   ANIONGAP 16       Significant Imaging: I have reviewed all pertinent imaging results/findings within the past 24 hours.

## 2025-05-13 NOTE — PLAN OF CARE
Malcolm at [facility] called and requested more info about patients wounds. Cm faxed.

## 2025-05-13 NOTE — PROGRESS NOTES
[facility]  [facility] Medicine  Progress Note    Patient Name: Karie Denney  MRN: [id number]  Patient Class: IP- Inpatient   Admission Date: 5/9/2025  Length of Stay: 3 days  Attending Physician: Jordana Cavazos MD  Primary Care Provider: Hilda Oro FNP-C        Subjective     Principal Problem:Acute on chronic respiratory failure with hypoxia and hypercapnia        HPI:  58 yo female with tracheostomy who was discharged 8 days ago from our service after a prolonged 3 month hospital stay presents for the second time since discharge for acute on chronic respiratory failure in the setting of secretions and blockages in the trach. She was initially hypoxic with EMS but after suctioning her trach tube here in the emergency department her sats have returned to normal. Due to continued ER visits due to poor trach care will admit patient to obs and discuss with social and family about placement. Patient has also missed 2 follow ups this week including pulmonary.    Overview/Hospital Course:  5/10  - consulted pulm as family requested if she could have bronch done in hospital while she waits for NH placement  - noted tachycardia is patient's baseline, extensive work up during last 3 month stay    5/11   - pulmonary recs noted  - awaiting placement    5/12   - awaiting placement    5/13  - awaiting placement  - no plans by pulmonology to do bronch while admitted    Past Medical History:   Diagnosis Date    Chronic idiopathic constipation 08/27/2024    Chronic respiratory failure with hypoxia 03/25/2025    Coag negative Staphylococcus bacteremia 03/25/2025    GERD (gastroesophageal reflux disease)     Insomnia secondary to depression with anxiety     Mixed hyperlipidemia     Quadriplegia 03/23/2025    Severe protein-calorie malnutrition 03/23/2025    Spontaneous hematoma of lower leg 02/26/2025    UTI due to extended-spectrum beta lactamase (ESBL) producing Escherichia coli 02/12/2025       Past  Surgical History:   Procedure Laterality Date    BACK SURGERY         Review of patient's allergies indicates:   Allergen Reactions    Penicillins Anaphylaxis       No current facility-administered medications on file prior to encounter.     Current Outpatient Medications on File Prior to Encounter   Medication Sig    atorvastatin (LIPITOR) 20 MG tablet Take 1 tablet (20 mg total) by mouth once daily.    cholecalciferol, vitamin D3, 1,250 mcg (50,000 unit) capsule Take 1 capsule (50,000 Units total) by mouth every 7 days.    ezetimibe (ZETIA) 10 mg tablet Take 1 tablet (10 mg total) by mouth once daily.    HYDROcodone-acetaminophen (NORCO) 5-325 mg per tablet Take 1 tablet by mouth every 6 (six) hours as needed for Pain.    levalbuterol (XOPENEX) 1.25 mg/3 mL nebulizer solution Take 1 NEBULE (1.25 mg total) by nebulization 2 (two) times a day.    levoFLOXacin (LEVAQUIN) 500 MG tablet Take 1 tablet (500 mg total) by mouth once daily. for 10 days    LINZESS 290 mcg Cap capsule Take 1 capsule (290 mcg total) by mouth before breakfast.    midodrine (PROAMATINE) 5 MG Tab Take 2 tablets (10 mg total) by mouth 3 (three) times daily.    omeprazole (PRILOSEC) 40 MG capsule Take 1 capsule (40 mg total) by mouth every morning.    ondansetron (ZOFRAN-ODT) 4 MG TbDL Take 2 tablets (8 mg total) by mouth 2 (two) times daily.    pregabalin (LYRICA) 75 MG capsule Take 1 capsule (75 mg total) by mouth 2 (two) times daily.    scopolamine (TRANSDERM-SCOP) 1.3-1.5 mg (1 mg over 3 days) Place 1 patch onto the skin Every 3 (three) days.    sertraline (ZOLOFT) 50 MG tablet Take 1 tablet (50 mg total) by mouth once daily.    traZODone (DESYREL) 100 MG tablet Take 1 tablet (100 mg total) by mouth every evening.    [START ON 6/8/2025] HYDROcodone-acetaminophen (NORCO) 5-325 mg per tablet Take 1 tablet by mouth every 6 (six) hours as needed for Pain.    [START ON 7/8/2025] HYDROcodone-acetaminophen (NORCO) 5-325 mg per tablet Take 1 tablet by  mouth every 6 (six) hours as needed for Pain.     Family History    Family history is unknown by patient.       Tobacco Use    Smoking status: Never    Smokeless tobacco: Never   Substance and Sexual Activity    Alcohol use: Never    Drug use: Never    Sexual activity: Not Currently     Review of Systems   All other systems reviewed and are negative.    Objective:     Vital Signs (Most Recent):  Temp: 98.3 °F (36.8 °C) (05/13/25 1150)  Pulse: (!) 121 (05/13/25 1150)  Resp: 16 (05/13/25 1150)  BP: (!) 88/48 (05/13/25 1150)  SpO2: 100 % (05/13/25 1150) Vital Signs (24h Range):  Temp:  [98.3 °F (36.8 °C)-99.7 °F (37.6 °C)] 98.3 °F (36.8 °C)  Pulse:  [102-126] 121  Resp:  [16-22] 16  SpO2:  [94 %-100 %] 100 %  BP: ()/(42-65) 88/48     Weight: 77.1 kg (170 lb)  Body mass index is 31.09 kg/m².     Physical Exam  Vitals and nursing note reviewed.   Constitutional:       Appearance: She is ill-appearing.   HENT:      Head: Normocephalic.      Mouth/Throat:      Mouth: Mucous membranes are moist.   Eyes:      Extraocular Movements: Extraocular movements intact.      Pupils: Pupils are equal, round, and reactive to light.   Cardiovascular:      Rate and Rhythm: Regular rhythm. Tachycardia present.      Pulses: Normal pulses.   Pulmonary:      Effort: Pulmonary effort is normal.   Abdominal:      General: Abdomen is flat. Bowel sounds are normal.      Palpations: Abdomen is soft.   Musculoskeletal:      Right lower leg: No edema.      Left lower leg: No edema.   Skin:     General: Skin is warm and dry.   Neurological:      General: No focal deficit present.      Mental Status: She is alert. Mental status is at baseline.   Psychiatric:         Mood and Affect: Mood normal.               Significant Labs: All pertinent labs within the past 24 hours have been reviewed.  BMP:   Recent Labs   Lab 05/12/25  0352   *      K 4.0   CL 97*   CO2 31   BUN 26*   CREATININE 0.65   CALCIUM 9.9     CBC:   Recent Labs   Lab  05/12/25  0352   WBC 10.49   HGB 10.3*   HCT 36.5*        CMP:   Recent Labs   Lab 05/12/25  0352      K 4.0   CL 97*   CO2 31   *   BUN 26*   CREATININE 0.65   CALCIUM 9.9   ANIONGAP 16       Significant Imaging: I have reviewed all pertinent imaging results/findings within the past 24 hours.      Assessment & Plan  Pressure ulcers of skin of multiple topographic sites  noted    Severe protein-calorie malnutrition  Nutrition consulted. Most recent weight and BMI monitored-     Measurements:  Wt Readings from Last 1 Encounters:   05/09/25 77.1 kg (170 lb)   Body mass index is 31.09 kg/m².    Patient has been screened and assessed by RD.    Malnutrition Type:  Context:    Level:      Malnutrition Characteristic Summary:  Weight Loss (Malnutrition): greater than 7.5% in 3 months    Interventions/Recommendations (treatment strategy):  Recommend to continue with current tube feed regimen as tolerated. Recommend to consider initiation of bowel regimen given history of constipation, last BM 5/4    Quadriplegia  noted    Acute on chronic respiratory failure with hypoxia and hypercapnia  Patient with Hypercapnic and Hypoxic Respiratory failure which is Acute on chronic.  she is on home oxygen at on vent LPM. Supplemental oxygen was provided and noted- Oxygen Concentration (%):  [32-36] 32on vent    .   Signs/symptoms of respiratory failure include- tachypnea, increased work of breathing, respiratory distress, and use of accessory muscles. Contributing diagnoses includes - COPD and Interstitial lung disease Labs and images were reviewed. Patient Has not had a recent ABG. Will treat underlying causes and adjust management of respiratory failure as follows-     Admit to obs  Education about trach care  Stress to family that continued ER visits and hospital admissions will result in worsened overall health and likely placement is needed  Will consult pulmonary to enquire about bronch that she missed      5/10  - family agrees with placement    5/11  - social working on placement, update expected tomorrow    5/12  - working on placement    5/13  - awaiting placement  - no plans by pulmonology to do bronch while admitted  VTE Risk Mitigation (From admission, onward)           Ordered     heparin (porcine) injection 5,000 Units  Every 8 hours         05/09/25 1340     IP VTE HIGH RISK PATIENT  Once         05/09/25 1340     Place sequential compression device  Until discontinued         05/09/25 1340                    Discharge Planning   SHRADDHA:      Code Status: Full Code   Medical Readiness for Discharge Date:   Discharge Plan A: Home with family                        Jordana Cavazos MD  Department of Hospital Medicine   [facility]

## 2025-05-13 NOTE — ASSESSMENT & PLAN NOTE
Patient with Hypercapnic and Hypoxic Respiratory failure which is Acute on chronic.  she is on home oxygen at on vent LPM. Supplemental oxygen was provided and noted- Oxygen Concentration (%):  [32-36] 32on vent    .   Signs/symptoms of respiratory failure include- tachypnea, increased work of breathing, respiratory distress, and use of accessory muscles. Contributing diagnoses includes - COPD and Interstitial lung disease Labs and images were reviewed. Patient Has not had a recent ABG. Will treat underlying causes and adjust management of respiratory failure as follows-     Admit to obs  Education about trach care  Stress to family that continued ER visits and hospital admissions will result in worsened overall health and likely placement is needed  Will consult pulmonary to enquire about bronch that she missed     5/10  - family agrees with placement    5/11  - social working on placement, update expected tomorrow    5/12  - working on placement    5/13  - awaiting placement  - no plans by pulmonology to do bronch while admitted

## 2025-05-14 PROBLEM — D64.9 ANEMIA: Status: ACTIVE | Noted: 2025-05-14

## 2025-05-14 PROCEDURE — 99900026 HC AIRWAY MAINTENANCE (STAT)

## 2025-05-14 PROCEDURE — 99900035 HC TECH TIME PER 15 MIN (STAT)

## 2025-05-14 PROCEDURE — 11000001 HC ACUTE MED/SURG PRIVATE ROOM

## 2025-05-14 PROCEDURE — 63600175 PHARM REV CODE 636 W HCPCS

## 2025-05-14 PROCEDURE — 99231 SBSQ HOSP IP/OBS SF/LOW 25: CPT | Mod: ,,, | Performed by: INTERNAL MEDICINE

## 2025-05-14 PROCEDURE — 27000221 HC OXYGEN, UP TO 24 HOURS

## 2025-05-14 PROCEDURE — 25000242 PHARM REV CODE 250 ALT 637 W/ HCPCS: Performed by: INTERNAL MEDICINE

## 2025-05-14 PROCEDURE — 93005 ELECTROCARDIOGRAM TRACING: CPT

## 2025-05-14 PROCEDURE — 94640 AIRWAY INHALATION TREATMENT: CPT

## 2025-05-14 PROCEDURE — 94761 N-INVAS EAR/PLS OXIMETRY MLT: CPT

## 2025-05-14 PROCEDURE — 27000207 HC ISOLATION

## 2025-05-14 PROCEDURE — 99900022

## 2025-05-14 PROCEDURE — 25000003 PHARM REV CODE 250: Performed by: STUDENT IN AN ORGANIZED HEALTH CARE EDUCATION/TRAINING PROGRAM

## 2025-05-14 PROCEDURE — 25000003 PHARM REV CODE 250

## 2025-05-14 RX ORDER — AMOXICILLIN 250 MG
1 CAPSULE ORAL DAILY
Status: DISCONTINUED | OUTPATIENT
Start: 2025-05-14 | End: 2025-05-15 | Stop reason: HOSPADM

## 2025-05-14 RX ORDER — POLYETHYLENE GLYCOL 3350 17 G/17G
34 POWDER, FOR SOLUTION ORAL 2 TIMES DAILY
Status: DISCONTINUED | OUTPATIENT
Start: 2025-05-14 | End: 2025-05-15 | Stop reason: HOSPADM

## 2025-05-14 RX ADMIN — TRAZODONE HYDROCHLORIDE 100 MG: 50 TABLET ORAL at 08:05

## 2025-05-14 RX ADMIN — HEPARIN SODIUM 5000 UNITS: 5000 INJECTION, SOLUTION INTRAVENOUS; SUBCUTANEOUS at 09:05

## 2025-05-14 RX ADMIN — ALBUTEROL SULFATE 2.5 MG: 2.5 SOLUTION RESPIRATORY (INHALATION) at 01:05

## 2025-05-14 RX ADMIN — PREGABALIN 75 MG: 75 CAPSULE ORAL at 08:05

## 2025-05-14 RX ADMIN — EZETIMIBE 10 MG: 10 TABLET ORAL at 09:05

## 2025-05-14 RX ADMIN — PREGABALIN 75 MG: 75 CAPSULE ORAL at 09:05

## 2025-05-14 RX ADMIN — POTASSIUM IODIDE 5 DROP: 1 SOLUTION ORAL at 08:05

## 2025-05-14 RX ADMIN — POLYETHYLENE GLYCOL 3350 34 G: 17 POWDER, FOR SOLUTION ORAL at 08:05

## 2025-05-14 RX ADMIN — POLYETHYLENE GLYCOL 3350 34 G: 17 POWDER, FOR SOLUTION ORAL at 12:05

## 2025-05-14 RX ADMIN — HEPARIN SODIUM 5000 UNITS: 5000 INJECTION, SOLUTION INTRAVENOUS; SUBCUTANEOUS at 12:05

## 2025-05-14 RX ADMIN — PANTOPRAZOLE SODIUM 40 MG: 40 TABLET, DELAYED RELEASE ORAL at 09:05

## 2025-05-14 RX ADMIN — SERTRALINE HYDROCHLORIDE 50 MG: 50 TABLET ORAL at 09:05

## 2025-05-14 RX ADMIN — ATORVASTATIN CALCIUM 20 MG: 20 TABLET, FILM COATED ORAL at 09:05

## 2025-05-14 RX ADMIN — SENNOSIDES, DOCUSATE SODIUM 1 TABLET: 8.6; 5 TABLET ORAL at 12:05

## 2025-05-14 RX ADMIN — MIDODRINE HYDROCHLORIDE 10 MG: 5 TABLET ORAL at 03:05

## 2025-05-14 RX ADMIN — POTASSIUM IODIDE 5 DROP: 1 SOLUTION ORAL at 09:05

## 2025-05-14 RX ADMIN — MIDODRINE HYDROCHLORIDE 10 MG: 5 TABLET ORAL at 08:05

## 2025-05-14 RX ADMIN — ALBUTEROL SULFATE 2.5 MG: 2.5 SOLUTION RESPIRATORY (INHALATION) at 07:05

## 2025-05-14 RX ADMIN — POTASSIUM IODIDE 5 DROP: 1 SOLUTION ORAL at 03:05

## 2025-05-14 RX ADMIN — MIDODRINE HYDROCHLORIDE 10 MG: 5 TABLET ORAL at 09:05

## 2025-05-14 RX ADMIN — LINACLOTIDE 290 MCG: 290 CAPSULE, GELATIN COATED ORAL at 05:05

## 2025-05-14 RX ADMIN — ALBUTEROL SULFATE 2.5 MG: 2.5 SOLUTION RESPIRATORY (INHALATION) at 12:05

## 2025-05-14 RX ADMIN — HEPARIN SODIUM 5000 UNITS: 5000 INJECTION, SOLUTION INTRAVENOUS; SUBCUTANEOUS at 05:05

## 2025-05-14 NOTE — PLAN OF CARE
After numerous attempts to reach Malcolm at [facility] today, talked to her and learned that DON has not made decision regarding acceptance yet.  She stated they will call me with decision very soon.  Will continue to follow for anticipated dc/placement needs.    1550 Received call from Malcolm stating that patient has been accepted for admission.  Stated she needs additional documents and needs to reach daughter to sign paperwork. Called daughter, Ashley Montanez, and provided her with Malcolm's number to call to schedule admission.  Will continue to follow for anticipated dc needs.    1610  Received call back from Malcolm at [facility].  She stated that daughter called her and is scheduled to do admission paperwork in the am.  She stated that patient may be admitted tomorrow.  Faxed additional documents to Malcolm.  Notified Dr. Rivera of acceptance for tomorrow.  Notified daughter of acceptance.  Requested that nursing schedule [facility] today if possible in order to have patient in [place] in the am if possible.  Will continue to follow as needed.

## 2025-05-14 NOTE — PROGRESS NOTES
[facility] Hospital Medicine  Progress Note    Patient Name: Karie Denney  MRN: [id number]  Patient Class: IP- Inpatient   Admission Date: 5/9/2025  Length of Stay: 4 days  Attending Physician: Mesfin Rivera DO  Primary Care Provider: Hilda Oro FNP-C        Subjective     Principal Problem:Acute on chronic respiratory failure with hypoxia and hypercapnia        HPI:  60 yo female with tracheostomy who was discharged 8 days ago from our service after a prolonged 3 month hospital stay presents for the second time since discharge for acute on chronic respiratory failure in the setting of secretions and blockages in the trach. She was initially hypoxic with EMS but after suctioning her trach tube here in the emergency department her sats have returned to normal. Due to continued ER visits due to poor trach care will admit patient to obs and discuss with social and family about placement. Patient has also missed 2 follow ups this week including pulmonary.    Overview/Hospital Course:  5/10  - consulted pulm as family requested if she could have bronch done in hospital while she waits for NH placement  - noted tachycardia is patient's baseline, extensive work up during last 3 month stay    5/11   - pulmonary recs noted  - awaiting placement    5/12   - awaiting placement    5/13  - awaiting placement  - no plans by pulmonology to do bronch while admitted  5/14 awaiting placement    Interval History:  No acute events overnight    Review of Systems  Objective:     Vital Signs (Most Recent):  Temp: 98 °F (36.7 °C) (05/14/25 0736)  Pulse: (!) 121 (05/14/25 0748)  Resp: 20 (05/14/25 0748)  BP: 90/62 (05/14/25 0736)  SpO2: 97 % (05/14/25 0748) Vital Signs (24h Range):  Temp:  [97.2 °F (36.2 °C)-98.4 °F (36.9 °C)] 98 °F (36.7 °C)  Pulse:  [103-124] 121  Resp:  [16-20] 20  SpO2:  [97 %-100 %] 97 %  BP: (87-94)/(48-68) 90/62     Weight: 77.1 kg (170 lb)  Body mass index is 31.09  kg/m².    Intake/Output Summary (Last 24 hours) at 5/14/2025 0931  Last data filed at 5/14/2025 0645  Gross per 24 hour   Intake 868 ml   Output --   Net 868 ml         Physical Exam  Vitals and nursing note reviewed.   Constitutional:       Appearance: She is ill-appearing.   HENT:      Head: Normocephalic.      Mouth/Throat:      Mouth: Mucous membranes are moist.   Eyes:      Extraocular Movements: Extraocular movements intact.      Pupils: Pupils are equal, round, and reactive to light.   Cardiovascular:      Rate and Rhythm: Regular rhythm. Tachycardia present.      Pulses: Normal pulses.   Pulmonary:      Effort: Pulmonary effort is normal.   Abdominal:      General: Abdomen is flat. Bowel sounds are normal.      Palpations: Abdomen is soft.   Musculoskeletal:      Right lower leg: No edema.      Left lower leg: No edema.   Skin:     General: Skin is warm and dry.   Neurological:      General: No focal deficit present.      Mental Status: She is alert. Mental status is at baseline.   Psychiatric:         Mood and Affect: Mood normal.               Significant Labs: All pertinent labs within the past 24 hours have been reviewed.    Significant Imaging: I have reviewed all pertinent imaging results/findings within the past 24 hours.      Assessment & Plan  Pressure ulcers of skin of multiple topographic sites  noted  Wound care  Severe protein-calorie malnutrition  Nutrition consulted. Most recent weight and BMI monitored-     Measurements:  Wt Readings from Last 1 Encounters:   05/09/25 77.1 kg (170 lb)   Body mass index is 31.09 kg/m².    Patient has been screened and assessed by RD.    Malnutrition Type:  Context:    Level:      Malnutrition Characteristic Summary:  Weight Loss (Malnutrition): greater than 7.5% in 3 months    Interventions/Recommendations (treatment strategy):  Recommend to continue with current tube feed regimen as tolerated. Recommend to consider initiation of bowel regimen given history of  constipation, last BM 5/4    Quadriplegia  noted  Total care  Acute on chronic respiratory failure with hypoxia and hypercapnia  Patient with Hypercapnic and Hypoxic Respiratory failure which is Acute on chronic.  she is on home oxygen at on vent LPM. Supplemental oxygen was provided and noted- Oxygen Concentration (%):  [32] 32on vent    .   Signs/symptoms of respiratory failure include- tachypnea, increased work of breathing, respiratory distress, and use of accessory muscles. Contributing diagnoses includes - COPD and Interstitial lung disease Labs and images were reviewed. Patient Has not had a recent ABG. Will treat underlying causes and adjust management of respiratory failure as follows-     Admit to obs  Education about trach care  Stress to family that continued ER visits and hospital admissions will result in worsened overall health and likely placement is needed  Will consult pulmonary to enquire about bronch that she missed     5/10  - family agrees with placement    5/11  - social working on placement, update expected tomorrow    5/12  - working on placement    5/13  - awaiting placement  - no plans by pulmonology to do bronch while admitted  5/14 attempting due to trach facility in [place]  Anemia  Anemia is likely due to chronic blood loss. Most recent hemoglobin and hematocrit are listed below.  Recent Labs     05/12/25  0352   HGB 10.3*   HCT 36.5*     Plan  - Monitor serial CBC: Daily  - Transfuse PRBC if patient becomes hemodynamically unstable, symptomatic or H/H drops below 7/21.  - Patient has not received any PRBC transfusions to date  - Patient's anemia is currently stable  - follow  VTE Risk Mitigation (From admission, onward)           Ordered     heparin (porcine) injection 5,000 Units  Every 8 hours         05/09/25 1340     IP VTE HIGH RISK PATIENT  Once         05/09/25 1340     Place sequential compression device  Until discontinued         05/09/25 1340                     Discharge Planning   SHRADDHA:      Code Status: Full Code   Medical Readiness for Discharge Date:   Discharge Plan A: Home with family          Chest x-ray reviewed and independently interpreted no obvious effusion or consolidation.  Labs and consultant notes reviewed.  EKG today for tachycardia.  Reviewed and independently interpreted,  Sinus tach.              Mesfin Rivera DO  Department of Hospital Medicine   [facility]

## 2025-05-14 NOTE — PLAN OF CARE
Problem: Adult Inpatient Plan of Care  Goal: Plan of Care Review  Outcome: Progressing  Goal: Readiness for Transition of Care  Outcome: Progressing     Problem: Skin Injury Risk Increased  Goal: Skin Health and Integrity  Outcome: Progressing

## 2025-05-14 NOTE — ASSESSMENT & PLAN NOTE
12/01/19 2146   Sleep/Rest/Relaxation   Sleep/Rest/Relaxation awake   Day/Evening Time Hours up all shift   Behavioral Health   Hallucinations auditory   Thinking poor concentration   Orientation person: oriented;place: oriented   Memory baseline memory   Insight poor   Judgement impaired   Eye Contact at examiner   Affect full range affect   Mood mood is calm;anxious   Physical Appearance/Attire untidy   Hygiene neglected grooming - unclean body, hair, teeth   Suicidality   (pt denies)   1. Wish to be Dead (Recent) No   2. Non-Specific Active Suicidal Thoughts (Recent) No   Self Injury   (pt denies)   Elopement   (none stated. none observed.)   Activity   (present and social in milieu.)   Speech coherent;clear   Medication Sensitivity no observed side effects;no stated side effects   Psychomotor / Gait balanced;steady   Psycho Education   Type of Intervention 1:1 intervention   Response participates with encouragement   Treatment Detail   (1:1 check in)   Activities of Daily Living   Hygiene/Grooming independent;prompts   Oral Hygiene independent;prompts   Dress scrubs (behavioral health)   Room Organization prompts     Pt denies SI/SIB, depression and anxiety. Per pt she continues to have auditory hallucinations but states her medications are helping with her symptoms and nighttime anxiety. Additionally she reports that her medications are helping with gas issues, she denies constipation and diarrhea. She states that she is having a good day and was able to talk to some family members which helped her have a better day. During the evening shift she was friendly, present and social in the milieu, she also attended groups and was was med complaint. Prompts are needed for oral and overall hygiene. Evening shift was otherwise unremarkable.   noted  Total care

## 2025-05-14 NOTE — RESPIRATORY THERAPY
Patient remains on 3L humidified nasal cannula with red trach cap with cuff deflated. I suctioned moderate amount of thick, cloudy-white secretions PRN. CTC done. Inner cannula clean and patent. No signs nor symptoms of respiratory distress to report.

## 2025-05-14 NOTE — PROGRESS NOTES
[facility]  Critical Care Medicine  Progress Note    Patient Name: Karie Denney  MRN: [id number]  Admission Date: 5/9/2025  Hospital Length of Stay: 4 days  Code Status: Full Code  Attending Provider: Mesfin Rivera DO  Primary Care Provider: Hilda Oro FNP-C   Principal Problem: Acute on chronic respiratory failure with hypoxia and hypercapnia    Subjective:     HPI:  Since patient was discharge after 3 month hospital stay for respiratory failure with a trach patient has had to come back to the emergency room twice watch was sent home in once required admission basically the patient's suction put on high-flow oxygen for a bit improves and gets better.  Patient is awake alert without complaints her trach is mostly plugged.  Patient denies any chest pain or chest tightness    Hospital/ICU Course:  No notes on file    Interval History/Significant Events:  Patient awake alert family at bedside    Review of Systems  Objective:     Vital Signs (Most Recent):  Temp: 98 °F (36.7 °C) (05/14/25 0736)  Pulse: (!) 121 (05/14/25 0748)  Resp: 20 (05/14/25 0748)  BP: 90/62 (05/14/25 0736)  SpO2: 97 % (05/14/25 0748) Vital Signs (24h Range):  Temp:  [97.2 °F (36.2 °C)-98.4 °F (36.9 °C)] 98 °F (36.7 °C)  Pulse:  [103-124] 121  Resp:  [16-20] 20  SpO2:  [97 %-100 %] 97 %  BP: (87-94)/(48-68) 90/62   Weight: 77.1 kg (170 lb)  Body mass index is 31.09 kg/m².      Intake/Output Summary (Last 24 hours) at 5/14/2025 0802  Last data filed at 5/14/2025 0645  Gross per 24 hour   Intake 868 ml   Output --   Net 868 ml          Physical Exam  Vitals reviewed.   Constitutional:       Appearance: Normal appearance.      Interventions: She is not intubated.  HENT:      Head: Normocephalic and atraumatic.      Nose: Nose normal.      Mouth/Throat:      Mouth: Mucous membranes are dry.      Pharynx: Oropharynx is clear.   Eyes:      Extraocular Movements: Extraocular movements intact.      Conjunctiva/sclera:  "Conjunctivae normal.      Pupils: Pupils are equal, round, and reactive to light.   Cardiovascular:      Rate and Rhythm: Normal rate.      Heart sounds: Normal heart sounds. No murmur heard.  Pulmonary:      Effort: Pulmonary effort is normal. She is not intubated.      Breath sounds: Normal breath sounds.   Abdominal:      General: Abdomen is flat. Bowel sounds are normal.      Palpations: Abdomen is soft.   Musculoskeletal:         General: Normal range of motion.      Cervical back: Normal range of motion and neck supple.      Right lower leg: No edema.      Left lower leg: No edema.   Skin:     General: Skin is warm and dry.      Capillary Refill: Capillary refill takes less than 2 seconds.   Neurological:      General: No focal deficit present.      Mental Status: She is alert and oriented to person, place, and time.   Psychiatric:         Mood and Affect: Mood normal.         Behavior: Behavior normal.            Vents:  Oxygen Concentration (%): 32 (05/14/25 0425)  Lines/Drains/Airways       Drain  Duration                  Gastrostomy/Enterostomy LUQ -- days              Airway  Duration             Adult Surgical Airway 03/23/25 1544 Shiley Cuffed  51 days              Peripheral Intravenous Line  Duration                  Peripheral IV - Single Lumen 05/09/25 20 G Left;Posterior Hand 5 days                  Significant Labs:    CBC/Anemia Profile:  No results for input(s): "WBC", "HGB", "HCT", "PLT", "MCV", "RDW", "IRON", "FERRITIN", "RETIC", "FOLATE", "DCETUNAG25", "OCCULTBLOOD" in the last 48 hours.     Chemistries:  No results for input(s): "NA", "K", "CL", "CO2", "BUN", "CREATININE", "CALCIUM", "ALBUMIN", "PROT", "BILITOT", "ALKPHOS", "ALT", "AST", "GLUCOSE", "MG", "PHOS" in the last 48 hours.    Recent Lab Results         05/13/25  1528        SARS COV-2 MOLECULAR Negative               Significant Imaging:  I have reviewed all pertinent imaging results/findings within the past 24 hours.    ABG  No " "results for input(s): "PH", "PO2", "PCO2", "HCO3", "BE" in the last 168 hours.  Assessment/Plan:     Pulmonary  * Acute on chronic respiratory failure with hypoxia and hypercapnia   patient is trach dependent.  Has a 7-1/2 size trach would not downsize until the secretions become less than issue she is receiving albuterol q.i.d. in his SSKI secretions seem to be better some point in the future I would go to number size 6 trach she does not need a bronchoscopy at this point looking for placement currently no further suggestions will sign off          Luke Santizo MD  Critical Care Medicine  [facility]  "

## 2025-05-14 NOTE — ASSESSMENT & PLAN NOTE
Anemia is likely due to chronic blood loss. Most recent hemoglobin and hematocrit are listed below.  Recent Labs     05/12/25  0352   HGB 10.3*   HCT 36.5*     Plan  - Monitor serial CBC: Daily  - Transfuse PRBC if patient becomes hemodynamically unstable, symptomatic or H/H drops below 7/21.  - Patient has not received any PRBC transfusions to date  - Patient's anemia is currently stable  - follow

## 2025-05-14 NOTE — ASSESSMENT & PLAN NOTE
Patient with Hypercapnic and Hypoxic Respiratory failure which is Acute on chronic.  she is on home oxygen at on vent LPM. Supplemental oxygen was provided and noted- Oxygen Concentration (%):  [32] 32on vent    .   Signs/symptoms of respiratory failure include- tachypnea, increased work of breathing, respiratory distress, and use of accessory muscles. Contributing diagnoses includes - COPD and Interstitial lung disease Labs and images were reviewed. Patient Has not had a recent ABG. Will treat underlying causes and adjust management of respiratory failure as follows-     Admit to obs  Education about trach care  Stress to family that continued ER visits and hospital admissions will result in worsened overall health and likely placement is needed  Will consult pulmonary to enquire about bronch that she missed     5/10  - family agrees with placement    5/11  - social working on placement, update expected tomorrow    5/12  - working on placement    5/13  - awaiting placement  - no plans by pulmonology to do bronch while admitted  5/14 attempting due to trach facility in [place]

## 2025-05-14 NOTE — SUBJECTIVE & OBJECTIVE
Interval History/Significant Events:  Patient awake alert family at bedside    Review of Systems  Objective:     Vital Signs (Most Recent):  Temp: 98 °F (36.7 °C) (05/14/25 0736)  Pulse: (!) 121 (05/14/25 0748)  Resp: 20 (05/14/25 0748)  BP: 90/62 (05/14/25 0736)  SpO2: 97 % (05/14/25 0748) Vital Signs (24h Range):  Temp:  [97.2 °F (36.2 °C)-98.4 °F (36.9 °C)] 98 °F (36.7 °C)  Pulse:  [103-124] 121  Resp:  [16-20] 20  SpO2:  [97 %-100 %] 97 %  BP: (87-94)/(48-68) 90/62   Weight: 77.1 kg (170 lb)  Body mass index is 31.09 kg/m².      Intake/Output Summary (Last 24 hours) at 5/14/2025 0802  Last data filed at 5/14/2025 0645  Gross per 24 hour   Intake 868 ml   Output --   Net 868 ml          Physical Exam  Vitals reviewed.   Constitutional:       Appearance: Normal appearance.      Interventions: She is not intubated.  HENT:      Head: Normocephalic and atraumatic.      Nose: Nose normal.      Mouth/Throat:      Mouth: Mucous membranes are dry.      Pharynx: Oropharynx is clear.   Eyes:      Extraocular Movements: Extraocular movements intact.      Conjunctiva/sclera: Conjunctivae normal.      Pupils: Pupils are equal, round, and reactive to light.   Cardiovascular:      Rate and Rhythm: Normal rate.      Heart sounds: Normal heart sounds. No murmur heard.  Pulmonary:      Effort: Pulmonary effort is normal. She is not intubated.      Breath sounds: Normal breath sounds.   Abdominal:      General: Abdomen is flat. Bowel sounds are normal.      Palpations: Abdomen is soft.   Musculoskeletal:         General: Normal range of motion.      Cervical back: Normal range of motion and neck supple.      Right lower leg: No edema.      Left lower leg: No edema.   Skin:     General: Skin is warm and dry.      Capillary Refill: Capillary refill takes less than 2 seconds.   Neurological:      General: No focal deficit present.      Mental Status: She is alert and oriented to person, place, and time.   Psychiatric:         Mood and  "Affect: Mood normal.         Behavior: Behavior normal.            Vents:  Oxygen Concentration (%): 32 (05/14/25 3855)  Lines/Drains/Airways       Drain  Duration                  Gastrostomy/Enterostomy LUQ -- days              Airway  Duration             Adult Surgical Airway 03/23/25 1544 Shiley Cuffed  51 days              Peripheral Intravenous Line  Duration                  Peripheral IV - Single Lumen 05/09/25 20 G Left;Posterior Hand 5 days                  Significant Labs:    CBC/Anemia Profile:  No results for input(s): "WBC", "HGB", "HCT", "PLT", "MCV", "RDW", "IRON", "FERRITIN", "RETIC", "FOLATE", "[id number]", "OCCULTBLOOD" in the last 48 hours.     Chemistries:  No results for input(s): "NA", "K", "CL", "CO2", "BUN", "CREATININE", "CALCIUM", "ALBUMIN", "PROT", "BILITOT", "ALKPHOS", "ALT", "AST", "GLUCOSE", "MG", "PHOS" in the last 48 hours.    Recent Lab Results         05/13/25  1528        SARS COV-2 MOLECULAR Negative               Significant Imaging:  I have reviewed all pertinent imaging results/findings within the past 24 hours.  "

## 2025-05-14 NOTE — SUBJECTIVE & OBJECTIVE
Interval History:  No acute events overnight    Review of Systems  Objective:     Vital Signs (Most Recent):  Temp: 98 °F (36.7 °C) (05/14/25 0736)  Pulse: (!) 121 (05/14/25 0748)  Resp: 20 (05/14/25 0748)  BP: 90/62 (05/14/25 0736)  SpO2: 97 % (05/14/25 0748) Vital Signs (24h Range):  Temp:  [97.2 °F (36.2 °C)-98.4 °F (36.9 °C)] 98 °F (36.7 °C)  Pulse:  [103-124] 121  Resp:  [16-20] 20  SpO2:  [97 %-100 %] 97 %  BP: (87-94)/(48-68) 90/62     Weight: 77.1 kg (170 lb)  Body mass index is 31.09 kg/m².    Intake/Output Summary (Last 24 hours) at 5/14/2025 0931  Last data filed at 5/14/2025 0645  Gross per 24 hour   Intake 868 ml   Output --   Net 868 ml         Physical Exam  Vitals and nursing note reviewed.   Constitutional:       Appearance: She is ill-appearing.   HENT:      Head: Normocephalic.      Mouth/Throat:      Mouth: Mucous membranes are moist.   Eyes:      Extraocular Movements: Extraocular movements intact.      Pupils: Pupils are equal, round, and reactive to light.   Cardiovascular:      Rate and Rhythm: Regular rhythm. Tachycardia present.      Pulses: Normal pulses.   Pulmonary:      Effort: Pulmonary effort is normal.   Abdominal:      General: Abdomen is flat. Bowel sounds are normal.      Palpations: Abdomen is soft.   Musculoskeletal:      Right lower leg: No edema.      Left lower leg: No edema.   Skin:     General: Skin is warm and dry.   Neurological:      General: No focal deficit present.      Mental Status: She is alert. Mental status is at baseline.   Psychiatric:         Mood and Affect: Mood normal.               Significant Labs: All pertinent labs within the past 24 hours have been reviewed.    Significant Imaging: I have reviewed all pertinent imaging results/findings within the past 24 hours.

## 2025-05-15 VITALS
RESPIRATION RATE: 20 BRPM | HEIGHT: 62 IN | TEMPERATURE: 97 F | OXYGEN SATURATION: 98 % | DIASTOLIC BLOOD PRESSURE: 53 MMHG | WEIGHT: 170 LBS | HEART RATE: 120 BPM | SYSTOLIC BLOOD PRESSURE: 82 MMHG | BODY MASS INDEX: 31.28 KG/M2

## 2025-05-15 LAB
OHS QRS DURATION: 70 MS
OHS QTC CALCULATION: 466 MS

## 2025-05-15 PROCEDURE — 99900035 HC TECH TIME PER 15 MIN (STAT)

## 2025-05-15 PROCEDURE — 25000003 PHARM REV CODE 250: Performed by: STUDENT IN AN ORGANIZED HEALTH CARE EDUCATION/TRAINING PROGRAM

## 2025-05-15 PROCEDURE — 25000003 PHARM REV CODE 250

## 2025-05-15 PROCEDURE — 99900026 HC AIRWAY MAINTENANCE (STAT)

## 2025-05-15 PROCEDURE — 63600175 PHARM REV CODE 636 W HCPCS

## 2025-05-15 PROCEDURE — 94761 N-INVAS EAR/PLS OXIMETRY MLT: CPT

## 2025-05-15 PROCEDURE — 27000221 HC OXYGEN, UP TO 24 HOURS

## 2025-05-15 PROCEDURE — 94640 AIRWAY INHALATION TREATMENT: CPT

## 2025-05-15 PROCEDURE — 27200966 HC CLOSED SUCTION SYSTEM

## 2025-05-15 PROCEDURE — 25000242 PHARM REV CODE 250 ALT 637 W/ HCPCS: Performed by: INTERNAL MEDICINE

## 2025-05-15 RX ORDER — LEVOFLOXACIN 750 MG/1
750 TABLET, FILM COATED ORAL DAILY
Start: 2025-05-15 | End: 2025-05-20

## 2025-05-15 RX ORDER — ALBUTEROL SULFATE 0.83 MG/ML
2.5 SOLUTION RESPIRATORY (INHALATION) EVERY 4 HOURS PRN
Start: 2025-05-15 | End: 2026-05-15

## 2025-05-15 RX ORDER — AMOXICILLIN 250 MG
1 CAPSULE ORAL DAILY
Start: 2025-05-15

## 2025-05-15 RX ORDER — POLYETHYLENE GLYCOL 3350 17 G/17G
34 POWDER, FOR SOLUTION ORAL 2 TIMES DAILY
Start: 2025-05-15

## 2025-05-15 RX ADMIN — MIDODRINE HYDROCHLORIDE 10 MG: 5 TABLET ORAL at 09:05

## 2025-05-15 RX ADMIN — SERTRALINE HYDROCHLORIDE 50 MG: 50 TABLET ORAL at 09:05

## 2025-05-15 RX ADMIN — LINACLOTIDE 290 MCG: 290 CAPSULE, GELATIN COATED ORAL at 05:05

## 2025-05-15 RX ADMIN — SODIUM CHLORIDE 1000 ML: 9 INJECTION, SOLUTION INTRAVENOUS at 09:05

## 2025-05-15 RX ADMIN — ALBUTEROL SULFATE 2.5 MG: 2.5 SOLUTION RESPIRATORY (INHALATION) at 07:05

## 2025-05-15 RX ADMIN — ALBUTEROL SULFATE 2.5 MG: 2.5 SOLUTION RESPIRATORY (INHALATION) at 12:05

## 2025-05-15 RX ADMIN — HEPARIN SODIUM 5000 UNITS: 5000 INJECTION, SOLUTION INTRAVENOUS; SUBCUTANEOUS at 05:05

## 2025-05-15 RX ADMIN — PREGABALIN 75 MG: 75 CAPSULE ORAL at 09:05

## 2025-05-15 RX ADMIN — POTASSIUM IODIDE 5 DROP: 1 SOLUTION ORAL at 09:05

## 2025-05-15 RX ADMIN — PANTOPRAZOLE SODIUM 40 MG: 40 TABLET, DELAYED RELEASE ORAL at 09:05

## 2025-05-15 RX ADMIN — SENNOSIDES, DOCUSATE SODIUM 1 TABLET: 8.6; 5 TABLET ORAL at 09:05

## 2025-05-15 RX ADMIN — POLYETHYLENE GLYCOL 3350 34 G: 17 POWDER, FOR SOLUTION ORAL at 09:05

## 2025-05-15 NOTE — ASSESSMENT & PLAN NOTE
"Anemia is likely due to chronic blood loss. Most recent hemoglobin and hematocrit are listed below.  No results for input(s): "HGB", "HCT" in the last 72 hours.    Plan  - Monitor serial CBC: Daily  - Transfuse PRBC if patient becomes hemodynamically unstable, symptomatic or H/H drops below 7/21.  - Patient has not received any PRBC transfusions to date  - Patient's anemia is currently stable  - follow  "

## 2025-05-15 NOTE — ASSESSMENT & PLAN NOTE
Patient with Hypercapnic and Hypoxic Respiratory failure which is Acute on chronic.  she is on home oxygen at on vent LPM. Supplemental oxygen was provided and noted- Oxygen Concentration (%):  [32] 32on vent    .   Signs/symptoms of respiratory failure include- tachypnea, increased work of breathing, respiratory distress, and use of accessory muscles. Contributing diagnoses includes - COPD and Interstitial lung disease Labs and images were reviewed. Patient Has not had a recent ABG. Will treat underlying causes and adjust management of respiratory failure as follows-     Admit to obs  Education about trach care  Stress to family that continued ER visits and hospital admissions will result in worsened overall health and likely placement is needed  Will consult pulmonary to enquire about bronch that she missed     5/10  - family agrees with placement    5/11  - social working on placement, update expected tomorrow    5/12  - working on placement    5/13  - awaiting placement  - no plans by pulmonology to do bronch while admitted  5/14 attempting due to trach facility in [place]  5/15 accepted to trach facility in [place].  Stable for discharge  Levaquin for 5 more days

## 2025-05-15 NOTE — PLAN OF CARE
[facility]  Discharge Final Note    Primary Care Provider: Hilda Oro FNP-C    Expected Discharge Date: 5/15/2025    Final Discharge Note (most recent)       Final Note - 05/15/25 1040          Final Note    Assessment Type Final Discharge Note     Anticipated Discharge Disposition Skilled Nursing Facility        Post-Acute Status    Post-Acute Authorization Placement     Post-Acute Placement Status Set-up Complete/Auth obtained     Coverage Medicare     Patient choice form signed by patient/caregiver List with quality metrics by geographic area provided     Discharge Delays None known at this time                     Important Message from Medicare  Important Message from Medicare regarding Discharge Appeal Rights: Given to patient/caregiver, Explained to patient/caregiver, Signed/date by patient/caregiver     Date IMM was signed: 05/14/25  Time IMM was signed: 0810    Patient discharged on today's date to be admitted to [facility].  Documents faxed and IM updated.  No further needs noted.

## 2025-05-15 NOTE — DISCHARGE SUMMARY
[facility] Hospital Medicine  Discharge Summary      Patient Name: Karie Denney  MRN: [id number]  TOÑO: [id number]  Patient Class: IP- Inpatient  Admission Date: 5/9/2025  Hospital Length of Stay: 5 days  Discharge Date and Time: 05/15/2025 9:05 AM  Attending Physician: Mesfin Rivera DO   Discharging Provider: Mesfin Rivera DO  Primary Care Provider: Hilda Oro FNP-C    Primary Care Team: Networked reference to record PCT     HPI:   58 yo female with tracheostomy who was discharged 8 days ago from our service after a prolonged 3 month hospital stay presents for the second time since discharge for acute on chronic respiratory failure in the setting of secretions and blockages in the trach. She was initially hypoxic with EMS but after suctioning her trach tube here in the emergency department her sats have returned to normal. Due to continued ER visits due to poor trach care will admit patient to obs and discuss with social and family about placement. Patient has also missed 2 follow ups this week including pulmonary.    * No surgery found *      Hospital Course:   5/10  - consulted pulm as family requested if she could have bronch done in hospital while she waits for NH placement  - noted tachycardia is patient's baseline, extensive work up during last 3 month stay    5/11   - pulmonary recs noted  - awaiting placement    5/12   - awaiting placement    5/13  - awaiting placement  - no plans by pulmonology to do bronch while admitted  5/14 awaiting placement  5/15 stable for discharge.  Discharging to long-term [facility] facility     Goals of Care Treatment Preferences:  Code Status: Full Code    Health care agent: [facility]  Health care agent number: No value filed.          What is most important right now is to focus on curative/life-prolongation (regardless of treatment burdens).  Accordingly, we have decided that the best plan to meet the patient's goals includes continuing with  treatment.      SDOH Screening:  The patient declined to be screened for utility difficulties, food insecurity, transport difficulties, housing insecurity, and interpersonal safety, so no concerns could be identified this admission.     Consults:   Consults (From admission, onward)          Status Ordering Provider     Inpatient consult to Pulmonology  Once        Provider:  (Not yet assigned)    Acknowledged HAYLEY PIERCE     Inpatient consult to Registered Dietitian/Nutritionist  Once        Provider:  (Not yet assigned)    Completed HAYLEY PIERCE     Inpatient consult to Social Work  Once        Provider:  (Not yet assigned)    Completed HAYLEY PIERCE            Assessment & Plan  Pressure ulcers of skin of multiple topographic sites  noted  Wound care  Severe protein-calorie malnutrition  Nutrition consulted. Most recent weight and BMI monitored-     Measurements:  Wt Readings from Last 1 Encounters:   05/09/25 77.1 kg (170 lb)   Body mass index is 31.09 kg/m².    Patient has been screened and assessed by RD.    Malnutrition Type:  Context:    Level:      Malnutrition Characteristic Summary:  Weight Loss (Malnutrition): greater than 7.5% in 3 months    Interventions/Recommendations (treatment strategy):  Recommend to continue with current tube feed regimen as tolerated. Recommend to consider initiation of bowel regimen given history of constipation, last BM 5/4    Quadriplegia  noted  Total care  Acute on chronic respiratory failure with hypoxia and hypercapnia  Patient with Hypercapnic and Hypoxic Respiratory failure which is Acute on chronic.  she is on home oxygen at on vent LPM. Supplemental oxygen was provided and noted- Oxygen Concentration (%):  [32] 32on vent    .   Signs/symptoms of respiratory failure include- tachypnea, increased work of breathing, respiratory distress, and use of accessory muscles. Contributing diagnoses includes - COPD and Interstitial lung disease Labs and images were reviewed.  "Patient Has not had a recent ABG. Will treat underlying causes and adjust management of respiratory failure as follows-     Admit to obs  Education about trach care  Stress to family that continued ER visits and hospital admissions will result in worsened overall health and likely placement is needed  Will consult pulmonary to enquire about bronch that she missed     5/10  - family agrees with placement    5/11  - social working on placement, update expected tomorrow    5/12  - working on placement    5/13  - awaiting placement  - no plans by pulmonology to do bronch while admitted  5/14 attempting due to trach facility in [place]  5/15 accepted to [facility] facility in [place].  Stable for discharge  Levaquin for 5 more days  Anemia  Anemia is likely due to chronic blood loss. Most recent hemoglobin and hematocrit are listed below.  No results for input(s): "HGB", "HCT" in the last 72 hours.    Plan  - Monitor serial CBC: Daily  - Transfuse PRBC if patient becomes hemodynamically unstable, symptomatic or H/H drops below 7/21.  - Patient has not received any PRBC transfusions to date  - Patient's anemia is currently stable  - follow  Final Active Diagnoses:    Diagnosis Date Noted POA    PRINCIPAL PROBLEM:  Acute on chronic respiratory failure with hypoxia and hypercapnia [J96.21, J96.22] 05/09/2025 Yes    Anemia [D64.9] 05/14/2025 Yes    Severe protein-calorie malnutrition [E43] 03/23/2025 Yes    Quadriplegia [G82.50] 03/23/2025 Yes    Pressure ulcers of skin of multiple topographic sites [L89.90] 02/12/2025 Yes      Problems Resolved During this Admission:       Discharged Condition: good    Disposition: Home or Self Care    Follow Up:    Patient Instructions:      Activity as tolerated       Significant Diagnostic Studies: Labs: All labs within the past 24 hours have been reviewed    Pending Diagnostic Studies:       Procedure Component Value Units Date/Time    EKG 12-lead [[id number]]     Order Status: Sent " Lab Status: No result            Medications:  Reconciled Home Medications:      Medication List        START taking these medications      albuterol 2.5 mg /3 mL (0.083 %) nebulizer solution  Commonly known as: PROVENTIL  Take 3 mLs (2.5 mg total) by nebulization every 4 (four) hours as needed (shortness of breath). Rescue     polyethylene glycol 17 gram Pwpk  Commonly known as: GLYCOLAX  Take 34 g by mouth 2 (two) times daily.     senna-docusate 8.6-50 mg per tablet  Commonly known as: PERICOLACE  Take 1 tablet by mouth once daily.            CONTINUE taking these medications      atorvastatin 20 MG tablet  Commonly known as: LIPITOR  Take 1 tablet (20 mg total) by mouth once daily.     cholecalciferol (vitamin D3) 1,250 mcg (50,000 unit) capsule  Take 1 capsule (50,000 Units total) by mouth every 7 days.     ezetimibe 10 mg tablet  Commonly known as: ZETIA  Take 1 tablet (10 mg total) by mouth once daily.     * HYDROcodone-acetaminophen 5-325 mg per tablet  Commonly known as: NORCO  Take 1 tablet by mouth every 6 (six) hours as needed for Pain.     * HYDROcodone-acetaminophen 5-325 mg per tablet  Commonly known as: NORCO  Take 1 tablet by mouth every 6 (six) hours as needed for Pain.  Start taking on: June 8, 2025     * HYDROcodone-acetaminophen 5-325 mg per tablet  Commonly known as: NORCO  Take 1 tablet by mouth every 6 (six) hours as needed for Pain.  Start taking on: July 8, 2025     LINZESS 290 mcg Cap capsule  Generic drug: linaCLOtide  Take 1 capsule (290 mcg total) by mouth before breakfast.     midodrine 5 MG Tab  Commonly known as: PROAMATINE  Take 2 tablets (10 mg total) by mouth 3 (three) times daily.     omeprazole 40 MG capsule  Commonly known as: PRILOSEC  Take 1 capsule (40 mg total) by mouth every morning.     ondansetron 4 MG Tbdl  Commonly known as: ZOFRAN-ODT  Take 2 tablets (8 mg total) by mouth 2 (two) times daily.     pregabalin 75 MG capsule  Commonly known as: LYRICA  Take 1 capsule (75  mg total) by mouth 2 (two) times daily.     scopolamine 1.3-1.5 mg (1 mg over 3 days)  Commonly known as: TRANSDERM-SCOP  Place 1 patch onto the skin Every 3 (three) days.     sertraline 50 MG tablet  Commonly known as: ZOLOFT  Take 1 tablet (50 mg total) by mouth once daily.     traZODone 100 MG tablet  Commonly known as: DESYREL  Take 1 tablet (100 mg total) by mouth every evening.           * This list has 3 medication(s) that are the same as other medications prescribed for you. Read the directions carefully, and ask your doctor or other care provider to review them with you.                STOP taking these medications      levalbuterol 1.25 mg/3 mL nebulizer solution  Commonly known as: XOPENEX     levoFLOXacin 500 MG tablet  Commonly known as: LEVAQUIN              Indwelling Lines/Drains at time of discharge:   Lines/Drains/Airways       Drain  Duration                  Gastrostomy/Enterostomy LUQ -- days              Airway  Duration             Adult Surgical Airway 03/23/25 1544 Tyrone Cuffed  52 days                    Time spent on the discharge of patient: 35 minutes         Mesfin Rivera DO  Department of Hospital Medicine  [facility]

## 2025-05-19 NOTE — PROGRESS NOTES
[facility]  Critical Care Medicine  Progress Note    Patient Name: Karie Denney  MRN: [id number]  Admission Date: 5/9/2025  Hospital Length of Stay: 5 days  Code Status: Prior  Attending Provider: No att. providers found  Primary Care Provider: Hilda Oro FNP-C   Principal Problem: Acute on chronic respiratory failure with hypoxia and hypercapnia    Subjective:     HPI:  Since patient was discharge after 3 month hospital stay for respiratory failure with a trach patient has had to come back to the emergency room twice watch was sent home in once required admission basically the patient's suction put on high-flow oxygen for a bit improves and gets better.  Patient is awake alert without complaints her trach is mostly plugged.  Patient denies any chest pain or chest tightness    Hospital/ICU Course:  No notes on file    Interval History/Significant Events:  Patient without complaints    Review of Systems  Objective:     Vital Signs (Most Recent):  Temp: 97.3 °F (36.3 °C) (05/15/25 0757)  Pulse: (!) 120 (05/15/25 0800)  Resp: 20 (05/15/25 0757)  BP: (!) 82/53 (05/15/25 0757)  SpO2: 98 % (05/15/25 0757) Vital Signs (24h Range):      Weight: 77.1 kg (170 lb)  Body mass index is 31.09 kg/m².    No intake or output data in the 24 hours ending 05/19/25 0433       Physical Exam  Vitals reviewed.   Constitutional:       Appearance: Normal appearance.      Interventions: She is not intubated.  HENT:      Head: Normocephalic and atraumatic.      Nose: Nose normal.      Mouth/Throat:      Mouth: Mucous membranes are dry.      Pharynx: Oropharynx is clear.   Eyes:      Extraocular Movements: Extraocular movements intact.      Conjunctiva/sclera: Conjunctivae normal.      Pupils: Pupils are equal, round, and reactive to light.   Cardiovascular:      Rate and Rhythm: Normal rate.      Heart sounds: Normal heart sounds. No murmur heard.  Pulmonary:      Effort: Pulmonary effort is normal. She is not  "intubated.      Breath sounds: Normal breath sounds.   Abdominal:      General: Abdomen is flat. Bowel sounds are normal.      Palpations: Abdomen is soft.   Musculoskeletal:         General: Normal range of motion.      Cervical back: Normal range of motion and neck supple.      Right lower leg: No edema.      Left lower leg: No edema.   Skin:     General: Skin is warm and dry.      Capillary Refill: Capillary refill takes less than 2 seconds.   Neurological:      General: No focal deficit present.      Mental Status: She is alert and oriented to person, place, and time.   Psychiatric:         Mood and Affect: Mood normal.         Behavior: Behavior normal.            Vents:  Oxygen Concentration (%): 32 (05/15/25 0750)  Lines/Drains/Airways       Drain  Duration                  Gastrostomy/Enterostomy LUQ -- days              Airway  Duration             Adult Surgical Airway 03/23/25 1544 Shiley Cuffed  56 days                  Significant Labs:    CBC/Anemia Profile:  No results for input(s): "WBC", "HGB", "HCT", "PLT", "MCV", "RDW", "IRON", "FERRITIN", "RETIC", "FOLATE", "[id number]", "OCCULTBLOOD" in the last 48 hours.     Chemistries:  No results for input(s): "NA", "K", "CL", "CO2", "BUN", "CREATININE", "CALCIUM", "ALBUMIN", "PROT", "BILITOT", "ALKPHOS", "ALT", "AST", "GLUCOSE", "MG", "PHOS" in the last 48 hours.    Recent Lab Results       None            Significant Imaging:  I have reviewed all pertinent imaging results/findings within the past 24 hours.    ABG  No results for input(s): "PH", "PO2", "PCO2", "HCO3", "BE" in the last 168 hours.  Assessment/Plan:     Pulmonary  * Acute on chronic respiratory failure with hypoxia and hypercapnia   patient is trach dependent.  Has a 7-1/2 size trach would not downsize until the secretions become less than issue she is receiving albuterol q.i.d. in his SSKI secretions seem to be better some point in the future I would go to number size 6 trach she does not " need a bronchoscopy at this point looking for placement currently no further suggestions will sign off             Luke Santizo MD  Critical Care Medicine  [facility]

## 2025-05-19 NOTE — SUBJECTIVE & OBJECTIVE
Interval History/Significant Events:  Patient without complaints    Review of Systems  Objective:     Vital Signs (Most Recent):  Temp: 97.3 °F (36.3 °C) (05/15/25 0757)  Pulse: (!) 120 (05/15/25 0800)  Resp: 20 (05/15/25 0757)  BP: (!) 82/53 (05/15/25 0757)  SpO2: 98 % (05/15/25 0757) Vital Signs (24h Range):      Weight: 77.1 kg (170 lb)  Body mass index is 31.09 kg/m².    No intake or output data in the 24 hours ending 05/19/25 0433       Physical Exam  Vitals reviewed.   Constitutional:       Appearance: Normal appearance.      Interventions: She is not intubated.  HENT:      Head: Normocephalic and atraumatic.      Nose: Nose normal.      Mouth/Throat:      Mouth: Mucous membranes are dry.      Pharynx: Oropharynx is clear.   Eyes:      Extraocular Movements: Extraocular movements intact.      Conjunctiva/sclera: Conjunctivae normal.      Pupils: Pupils are equal, round, and reactive to light.   Cardiovascular:      Rate and Rhythm: Normal rate.      Heart sounds: Normal heart sounds. No murmur heard.  Pulmonary:      Effort: Pulmonary effort is normal. She is not intubated.      Breath sounds: Normal breath sounds.   Abdominal:      General: Abdomen is flat. Bowel sounds are normal.      Palpations: Abdomen is soft.   Musculoskeletal:         General: Normal range of motion.      Cervical back: Normal range of motion and neck supple.      Right lower leg: No edema.      Left lower leg: No edema.   Skin:     General: Skin is warm and dry.      Capillary Refill: Capillary refill takes less than 2 seconds.   Neurological:      General: No focal deficit present.      Mental Status: She is alert and oriented to person, place, and time.   Psychiatric:         Mood and Affect: Mood normal.         Behavior: Behavior normal.            Vents:  Oxygen Concentration (%): 32 (05/15/25 0750)  Lines/Drains/Airways       Drain  Duration                  Gastrostomy/Enterostomy LUQ -- days              Airway  Duration        "      Adult Surgical Airway 03/23/25 1544 Shiley Cuffed  56 days                  Significant Labs:    CBC/Anemia Profile:  No results for input(s): "WBC", "HGB", "HCT", "PLT", "MCV", "RDW", "IRON", "FERRITIN", "RETIC", "FOLATE", "[id number]", "OCCULTBLOOD" in the last 48 hours.     Chemistries:  No results for input(s): "NA", "K", "CL", "CO2", "BUN", "CREATININE", "CALCIUM", "ALBUMIN", "PROT", "BILITOT", "ALKPHOS", "ALT", "AST", "GLUCOSE", "MG", "PHOS" in the last 48 hours.    Recent Lab Results       None            Significant Imaging:  I have reviewed all pertinent imaging results/findings within the past 24 hours.  "

## 2025-06-02 ENCOUNTER — TELEPHONE (OUTPATIENT)
Dept: PULMONOLOGY | Facility: CLINIC | Age: 60
End: 2025-06-02
Payer: MEDICARE

## 2025-06-03 ENCOUNTER — EXTERNAL HOME HEALTH (OUTPATIENT)
Dept: HOME HEALTH SERVICES | Facility: HOSPITAL | Age: 60
End: 2025-06-03
Payer: MEDICARE

## 2025-07-08 ENCOUNTER — EXTERNAL HOME HEALTH (OUTPATIENT)
Dept: HOME HEALTH SERVICES | Facility: HOSPITAL | Age: 60
End: 2025-07-08
Payer: MEDICARE

## 2025-08-04 ENCOUNTER — PATIENT OUTREACH (OUTPATIENT)
Dept: ADMINISTRATIVE | Facility: CLINIC | Age: 60
End: 2025-08-04
Payer: MEDICARE